# Patient Record
Sex: FEMALE | Race: WHITE | NOT HISPANIC OR LATINO | Employment: FULL TIME | URBAN - METROPOLITAN AREA
[De-identification: names, ages, dates, MRNs, and addresses within clinical notes are randomized per-mention and may not be internally consistent; named-entity substitution may affect disease eponyms.]

---

## 2017-03-01 RX ORDER — DICYCLOMINE HYDROCHLORIDE 10 MG/1
CAPSULE ORAL
Qty: 30 CAPSULE | Refills: 0 | Status: SHIPPED | OUTPATIENT
Start: 2017-03-01 | End: 2017-05-27 | Stop reason: SDUPTHER

## 2017-03-13 RX ORDER — PROMETHAZINE HYDROCHLORIDE 25 MG/1
TABLET ORAL
Qty: 30 TABLET | Refills: 0 | Status: SHIPPED | OUTPATIENT
Start: 2017-03-13 | End: 2018-12-07 | Stop reason: SDUPTHER

## 2017-05-22 RX ORDER — ALBUTEROL SULFATE 90 UG/1
AEROSOL, METERED RESPIRATORY (INHALATION)
Qty: 18 G | Refills: 0 | Status: SHIPPED | OUTPATIENT
Start: 2017-05-22 | End: 2018-04-09

## 2017-05-27 DIAGNOSIS — M81.0 SENILE OSTEOPOROSIS: ICD-10-CM

## 2017-05-29 RX ORDER — DICYCLOMINE HYDROCHLORIDE 10 MG/1
CAPSULE ORAL
Qty: 30 CAPSULE | Refills: 0 | Status: SHIPPED | OUTPATIENT
Start: 2017-05-29 | End: 2017-08-23 | Stop reason: SDUPTHER

## 2017-05-29 RX ORDER — ALENDRONATE SODIUM 70 MG/1
70 TABLET ORAL
Qty: 4 TABLET | Refills: 4 | Status: SHIPPED | OUTPATIENT
Start: 2017-05-29 | End: 2018-01-08 | Stop reason: SDUPTHER

## 2017-06-07 DIAGNOSIS — F32.A DEPRESSION: ICD-10-CM

## 2017-06-08 RX ORDER — ESCITALOPRAM OXALATE 10 MG/1
10 TABLET ORAL DAILY
Qty: 90 TABLET | Refills: 1 | Status: SHIPPED | OUTPATIENT
Start: 2017-06-08 | End: 2018-11-29 | Stop reason: SDUPTHER

## 2017-08-23 RX ORDER — DICYCLOMINE HYDROCHLORIDE 10 MG/1
CAPSULE ORAL
Qty: 30 CAPSULE | Refills: 0 | Status: SHIPPED | OUTPATIENT
Start: 2017-08-23 | End: 2017-10-31 | Stop reason: SDUPTHER

## 2017-09-06 DIAGNOSIS — G43.019 MIGRAINE WITHOUT AURA, WITH INTRACTABLE MIGRAINE, SO STATED, WITHOUT MENTION OF STATUS MIGRAINOSUS: ICD-10-CM

## 2017-09-07 RX ORDER — BUTALBITAL, ACETAMINOPHEN AND CAFFEINE 50; 325; 40 MG/1; MG/1; MG/1
TABLET ORAL
Qty: 40 TABLET | Refills: 0 | Status: SHIPPED | OUTPATIENT
Start: 2017-09-07 | End: 2017-09-18 | Stop reason: SDUPTHER

## 2017-09-18 DIAGNOSIS — G43.019 MIGRAINE WITHOUT AURA, WITH INTRACTABLE MIGRAINE, SO STATED, WITHOUT MENTION OF STATUS MIGRAINOSUS: ICD-10-CM

## 2017-09-18 RX ORDER — BUTALBITAL, ACETAMINOPHEN AND CAFFEINE 50; 325; 40 MG/1; MG/1; MG/1
TABLET ORAL
Qty: 40 TABLET | Refills: 0 | Status: SHIPPED | OUTPATIENT
Start: 2017-09-18 | End: 2018-04-27 | Stop reason: SDUPTHER

## 2017-09-22 ENCOUNTER — PATIENT OUTREACH (OUTPATIENT)
Dept: ADMINISTRATIVE | Facility: HOSPITAL | Age: 57
End: 2017-09-22

## 2017-09-29 ENCOUNTER — HOSPITAL ENCOUNTER (EMERGENCY)
Facility: HOSPITAL | Age: 57
Discharge: HOME | End: 2017-09-29
Attending: EMERGENCY MEDICINE
Payer: MEDICAID

## 2017-09-29 VITALS
RESPIRATION RATE: 16 BRPM | SYSTOLIC BLOOD PRESSURE: 144 MMHG | BODY MASS INDEX: 28.16 KG/M2 | OXYGEN SATURATION: 95 % | DIASTOLIC BLOOD PRESSURE: 66 MMHG | WEIGHT: 153 LBS | TEMPERATURE: 97 F | HEIGHT: 62 IN | HEART RATE: 90 BPM

## 2017-09-29 DIAGNOSIS — M25.519 SHOULDER PAIN: Primary | ICD-10-CM

## 2017-09-29 DIAGNOSIS — E11.9 DM (DIABETES MELLITUS): ICD-10-CM

## 2017-09-29 DIAGNOSIS — M25.512 PAIN IN LEFT SHOULDER: ICD-10-CM

## 2017-09-29 PROCEDURE — 25000003 PHARM REV CODE 250: Performed by: STUDENT IN AN ORGANIZED HEALTH CARE EDUCATION/TRAINING PROGRAM

## 2017-09-29 PROCEDURE — 99283 EMERGENCY DEPT VISIT LOW MDM: CPT

## 2017-09-29 PROCEDURE — 99284 EMERGENCY DEPT VISIT MOD MDM: CPT | Mod: ,,, | Performed by: EMERGENCY MEDICINE

## 2017-09-29 RX ORDER — HYDROCODONE BITARTRATE AND ACETAMINOPHEN 5; 325 MG/1; MG/1
1 TABLET ORAL EVERY 6 HOURS PRN
Qty: 10 TABLET | Refills: 0 | Status: SHIPPED | OUTPATIENT
Start: 2017-09-29 | End: 2017-09-29

## 2017-09-29 RX ORDER — HYDROCODONE BITARTRATE AND ACETAMINOPHEN 5; 325 MG/1; MG/1
1 TABLET ORAL
Status: COMPLETED | OUTPATIENT
Start: 2017-09-29 | End: 2017-09-29

## 2017-09-29 RX ORDER — HYDROCODONE BITARTRATE AND ACETAMINOPHEN 5; 325 MG/1; MG/1
1 TABLET ORAL EVERY 6 HOURS PRN
Qty: 10 TABLET | Refills: 0 | Status: SHIPPED | OUTPATIENT
Start: 2017-09-29 | End: 2018-04-09

## 2017-09-29 RX ADMIN — HYDROCODONE BITARTRATE AND ACETAMINOPHEN 1 TABLET: 5; 325 TABLET ORAL at 09:09

## 2017-09-30 NOTE — ED TRIAGE NOTES
Pt presents to ED c/o left shoulder pain. Pt has hx of left breast carcinoma stage 3. Pt stated that she is scheduled to begin chemo on the 4th. Pt stated pain is 10/10, stabbing, and radiates to left neck.     LOC: Patient name and date of birth verified.  The patient is awake, alert and aware of environment with an appropriate affect, the patient is oriented x 3 and speaking appropriately.  Pt in NAD.    APPEARANCE: Patient resting comfortably and in no acute distress, patient is clean and well groomed, patient's clothing is properly fastened.  SKIN: The skin is warm and dry, color consistent with ethnicity, patient has normal skin turgor and moist mucus membranes, skin intact, no breakdown or brusing noted.  MUSCULOSKELETAL: Patient moving all extremities well, no obvious swelling or deformities noted.  RESPIRATORY: Airway is open and patent, respirations are spontaneous, patient has a normal effort and rate, no accessory muscle use noted.  CARDIAC: Patient has a normal rate and rhythm, no periphreal edema noted, capillary refill < 3 seconds.  ABDOMEN: Soft and non tender to palpation, no distention noted. Bowel sounds present in all four quadrants.  NEUROLOGIC: Eyes open spontaneously, behavior appropriate to situation, follows commands, facial expression symmetrical, bilateral hand grasp equal and even, purposeful motor response noted, normal sensation in all extremities when touched with a finger.

## 2017-09-30 NOTE — ED PROVIDER NOTES
Encounter Date: 9/29/2017       History     Chief Complaint   Patient presents with    Shoulder Pain     left shoulder pain radiating to left side of neck _Onset this am. constant. Has a large tumor in her left breast - inoperable. Told her pain was in her lymph nodes.      58 yo female w/ hx of DM, osteoporosis presents for evaluation of one day of left shoulder pain. Pt reports her pain is worst w/ abducting her shoulder. Also reports swelling to the scapula. Denies any chest pain. Denies any trauma. Denies any heavy or unusual lifting. Pt was recently diagnosed w/ breast cancer to be treated w/ chemo. Pt does not take meds at home.           Review of patient's allergies indicates:  No Known Allergies  Past Medical History:   Diagnosis Date    Asthma     Breast carcinoma     Diabetes mellitus     Osteoporosis      Past Surgical History:   Procedure Laterality Date    CHOLECYSTECTOMY      COLONOSCOPY N/A 10/27/2015    Procedure: COLONOSCOPY;  Surgeon: Johnny Hurley MD;  Location: [facility];  Service: Endoscopy;  Laterality: N/A;    HYSTERECTOMY       Family History   Problem Relation Age of Onset    Diabetes Father      Social History   Substance Use Topics    Smoking status: Current Some Day Smoker     Packs/day: 1.00     Types: Cigarettes    Smokeless tobacco: Not on file    Alcohol use No     Review of Systems   Constitutional: Negative for fever.   HENT: Negative for sore throat.    Respiratory: Negative for shortness of breath.    Cardiovascular: Negative for chest pain.   Gastrointestinal: Negative for vomiting.   Genitourinary: Negative for dysuria.   Musculoskeletal: Positive for arthralgias and joint swelling. Negative for back pain.   Skin: Negative for rash.   Neurological: Negative for weakness.   Hematological: Does not bruise/bleed easily.       Physical Exam     Initial Vitals [09/29/17 1714]   BP Pulse Resp Temp SpO2   (!) 144/66 90 16 97.4 °F (36.3 °C) 95 %      MAP       92          Physical Exam    Nursing note and vitals reviewed.  Constitutional: She appears well-developed and well-nourished. No distress.   HENT:   Head: Normocephalic and atraumatic.   Eyes: EOM are normal. Pupils are equal, round, and reactive to light.   Neck: Normal range of motion. Neck supple.   Cardiovascular: Normal rate, regular rhythm and normal heart sounds. Exam reveals no gallop and no friction rub.    No murmur heard.  Pulmonary/Chest: Breath sounds normal. No respiratory distress. She has no wheezes. She has no rhonchi. She has no rales.   Abdominal: Soft. There is no tenderness. There is no rebound and no guarding.   Musculoskeletal:   Limited passive ROM 2/2 to pain of the shoulder. No tenderness to palpation of the clavicle or humerus. Distal neurovascular intact.    Neurological: She is alert and oriented to person, place, and time. She has normal strength.   Skin: Skin is warm and dry.         ED Course   Procedures  Labs Reviewed - No data to display       X-Rays:   Independently Interpreted Readings:   Other Readings:  Left shoulder: No evidence of acute fracture or dislocation noted; additionally, there is no evidence of osteolytic lesions    Medical Decision Making:   Clinical Tests:   Radiological Study: Ordered and Reviewed       APC / Resident Notes:   56 yo female w/ hx of breast CA, DM presents for one day of breast cancer. Worst w/ motion. No trauma. No overuse. VSS. NAD. RRR. LCTAB. No TTP. Passive ROM causes pain. Most likely capsulitis. XR negative. Relieved w/ norco. Will treat w/ norco and sling. Discussed plan w/ pt and daughter. Return precautions given.          Attending Attestation:   Physician Attestation Statement for Resident:  As the supervising MD   Physician Attestation Statement: I have personally seen and examined this patient.   I agree with the above history. -: 57-year-old woman with known history of breast CA presents for evaluation of atraumatic left shoulder pain  worsened with range of motion.   As the supervising MD I agree with the above PE.    As the supervising MD I agree with the above treatment, course, plan, and disposition.  I have reviewed and agree with the residents interpretation of the following: x-rays.                    ED Course      Clinical Impression:   The encounter diagnosis was Shoulder pain.    Disposition:   Disposition: Discharged  Condition: Stable                        Noel Martin MD  Resident  10/02/17 0515       Bert Craig MD  10/03/17 0303

## 2017-10-02 ENCOUNTER — TELEPHONE (OUTPATIENT)
Dept: FAMILY MEDICINE | Facility: CLINIC | Age: 57
End: 2017-10-02

## 2017-10-02 DIAGNOSIS — D49.3 BREAST LOBULAR NEOPLASIA: Primary | ICD-10-CM

## 2017-10-02 NOTE — TELEPHONE ENCOUNTER
Spoke with pt report she recently got diagnose with Triple Negative breast cancer stage 3 by Dr. Jacobo [facility]. Pt needs a referral to an oncology. Pt has medicaid insurance and theres no appt available till next year. Inform pt I will give her a call back to schedule appt. Please advice.

## 2017-10-02 NOTE — TELEPHONE ENCOUNTER
----- Message from Alicia Jeffery sent at 10/2/2017 10:57 AM CDT -----  Contact: [phone]  Patient is requesting to speak with you, she said it is an emergency

## 2017-10-05 ENCOUNTER — TELEPHONE (OUTPATIENT)
Dept: HEMATOLOGY/ONCOLOGY | Facility: CLINIC | Age: 57
End: 2017-10-05

## 2017-10-05 NOTE — TELEPHONE ENCOUNTER
Patient was erroneously scheduled with Dr Guerrero.  Called patient to explain change in physician (and that records needed before appointment).  Patient said to disregard request for appointment as she already has another provider with a different facility.    =======================================  ----- Message from Carline Neri RN sent at 10/5/2017  1:30 PM CDT -----  Contact: Self       ----- Message -----  From: Madai Fox  Sent: 10/2/2017  12:03 PM  To: , #    Pt is requesting a call back in regards to scheduling an urgent appt. Pt has been diagnosed with an aggressive form of breast cancer and is trying to be seen as soon as possible. Pt stated she is being treated at [facility] and is looking to transfer her care to [facility].       Pt would like to be contacted as soon as possible.       Pt can be contacted at [phone]

## 2017-10-31 RX ORDER — DICYCLOMINE HYDROCHLORIDE 10 MG/1
CAPSULE ORAL
Qty: 30 CAPSULE | Refills: 0 | Status: SHIPPED | OUTPATIENT
Start: 2017-10-31 | End: 2018-07-27 | Stop reason: SDUPTHER

## 2017-12-04 ENCOUNTER — TELEPHONE (OUTPATIENT)
Dept: HEMATOLOGY/ONCOLOGY | Facility: CLINIC | Age: 57
End: 2017-12-04

## 2017-12-04 NOTE — TELEPHONE ENCOUNTER
"Attempted another call to patient and daughter.  No answer and no voicemail recorder.  Will send letter.    ==============================   17  11:20 am   Called 3 numbers including daughter's.  Unable to reach either one & no voicemail available.  Will try again later.    ==============================   From: Wolfgang Artis M.D.   Sent: 2017 11:08 AM  To: Serenity Black <[email or web address]>  Subject: FW: Re:     I am OK with [facility]    ==============================   From: Nader Correia [[email or web address]   Sent: 2017 2:21 PM  To: RENITA Bermeo Ms. Fernandez (below) is looking for an oncologist at [facility]. Can you help?  Thx  Stewart  ==============================    From: Carmen Avila <[email or web address]>  Date: 2017 at 8:15:12 PM CST  To: [email or web address]  Subject: Fwd: Re:   *EXTERNAL EMAIL: EVALUATE*   Jud Villa she has been an [facility] patient in the past   Triple Negative Breast Cancer.  Stage 3   1960  THANK YOU  ================================    From: "Nader Correia" <[email or web address]>  Date: 2017 at 7:49:27 PM CST  To: Carmen Avila <[email or web address]>    Of course.  Can you find out the name of the cancer?  I'll find the best person.    =================================  On 2017, at 7:05 PM, Carmen Avila <[email or web address]> wrote:    Good evening,  My dear, dear friend Jud Villa, has been diagnosed with an extremely rare form of cancer. She is treating at [facility] (??.) I suggested a second opinion might be wise.   Can you recommend an excellent oncologist at [facility]? Please.   Thank you  Sherlyn"

## 2017-12-04 NOTE — LETTER
December 6, 2017    Jud Villa  [address] [place] [address]             [facility]  [address]  [place] [address]  Phone: [phone] Dear Ms. Villa:    We received a request to schedule an appointment for you with one of our oncologists but we have been unsuccessful in reaching you by phone.    Before we can schedule your visit, we will need your assistance in obtaining your medical records.    Please contact us at .  If you have any questions or concerns regarding our service, we will be happy to discuss them with you.    Sincerely,        Serenity Black RN

## 2017-12-06 ENCOUNTER — TELEPHONE (OUTPATIENT)
Dept: HEMATOLOGY/ONCOLOGY | Facility: CLINIC | Age: 57
End: 2017-12-06

## 2017-12-06 NOTE — TELEPHONE ENCOUNTER
From: Serenity Black   Sent: Wednesday, December 06, 2017 11:43 AM  To: Wolfgang Artis M.D. <[email or web address]>  Subject: RE: Jud Villa    Its the only number we have.  The good news is, I called for the 3rd time this morning and finally got her.  Shes getting chemo, as we speak, in [place].   I told her how Ive been trying to get a hold of her and her daughter since Monday - she wasnt aware her voicemail was full so I couldnt leave messages.    She said she will obtain records and have them faxed to me. Well then talk again to schedule appt.

## 2017-12-09 DIAGNOSIS — J43.9 PULMONARY EMPHYSEMA, UNSPECIFIED EMPHYSEMA TYPE: ICD-10-CM

## 2017-12-09 DIAGNOSIS — R06.02 SOB (SHORTNESS OF BREATH): ICD-10-CM

## 2017-12-09 DIAGNOSIS — F17.200 SMOKER: ICD-10-CM

## 2017-12-09 DIAGNOSIS — J44.9 CHRONIC OBSTRUCTIVE PULMONARY DISEASE, UNSPECIFIED COPD TYPE: ICD-10-CM

## 2017-12-11 RX ORDER — BUDESONIDE AND FORMOTEROL FUMARATE DIHYDRATE 80; 4.5 UG/1; UG/1
2 AEROSOL RESPIRATORY (INHALATION) 2 TIMES DAILY
Qty: 10 INHALER | Refills: 0 | Status: SHIPPED | OUTPATIENT
Start: 2017-12-11 | End: 2017-12-12

## 2017-12-11 RX ORDER — BUDESONIDE AND FORMOTEROL FUMARATE DIHYDRATE 160; 4.5 UG/1; UG/1
2 AEROSOL RESPIRATORY (INHALATION) EVERY 12 HOURS
Qty: 10.2 INHALER | Refills: 0 | Status: SHIPPED | OUTPATIENT
Start: 2017-12-11 | End: 2017-12-11

## 2017-12-12 DIAGNOSIS — R06.02 SOB (SHORTNESS OF BREATH): ICD-10-CM

## 2017-12-12 DIAGNOSIS — F17.200 SMOKER: ICD-10-CM

## 2017-12-12 DIAGNOSIS — J43.9 PULMONARY EMPHYSEMA, UNSPECIFIED EMPHYSEMA TYPE: ICD-10-CM

## 2017-12-12 RX ORDER — BUDESONIDE AND FORMOTEROL FUMARATE DIHYDRATE 80; 4.5 UG/1; UG/1
2 AEROSOL RESPIRATORY (INHALATION) 2 TIMES DAILY
Qty: 10 INHALER | Refills: 0 | Status: SHIPPED | OUTPATIENT
Start: 2017-12-12 | End: 2018-04-09

## 2018-01-08 DIAGNOSIS — M81.0 SENILE OSTEOPOROSIS: ICD-10-CM

## 2018-01-08 RX ORDER — ALENDRONATE SODIUM 70 MG/1
70 TABLET ORAL
Qty: 4 TABLET | Refills: 4 | Status: SHIPPED | OUTPATIENT
Start: 2018-01-08 | End: 2018-12-17 | Stop reason: SDUPTHER

## 2018-04-09 ENCOUNTER — OFFICE VISIT (OUTPATIENT)
Dept: FAMILY MEDICINE | Facility: CLINIC | Age: 58
End: 2018-04-09
Payer: MEDICAID

## 2018-04-09 VITALS
DIASTOLIC BLOOD PRESSURE: 60 MMHG | SYSTOLIC BLOOD PRESSURE: 100 MMHG | HEIGHT: 62 IN | HEART RATE: 82 BPM | WEIGHT: 149.94 LBS | BODY MASS INDEX: 27.59 KG/M2

## 2018-04-09 DIAGNOSIS — Z01.818 PREOP EXAMINATION: Primary | ICD-10-CM

## 2018-04-09 DIAGNOSIS — E11.65 TYPE 2 DIABETES MELLITUS WITH HYPERGLYCEMIA, WITHOUT LONG-TERM CURRENT USE OF INSULIN: ICD-10-CM

## 2018-04-09 DIAGNOSIS — F41.9 ANXIETY: ICD-10-CM

## 2018-04-09 DIAGNOSIS — J30.1 CHRONIC SEASONAL ALLERGIC RHINITIS DUE TO POLLEN: ICD-10-CM

## 2018-04-09 DIAGNOSIS — J43.8 OTHER EMPHYSEMA: ICD-10-CM

## 2018-04-09 DIAGNOSIS — Z01.419 ENCOUNTER FOR GYNECOLOGICAL EXAMINATION WITHOUT ABNORMAL FINDING: ICD-10-CM

## 2018-04-09 PROCEDURE — 93010 ELECTROCARDIOGRAM REPORT: CPT | Mod: ,,, | Performed by: INTERNAL MEDICINE

## 2018-04-09 PROCEDURE — 81003 URINALYSIS AUTO W/O SCOPE: CPT

## 2018-04-09 PROCEDURE — 93005 ELECTROCARDIOGRAM TRACING: CPT | Mod: PBBFAC,PO | Performed by: FAMILY MEDICINE

## 2018-04-09 PROCEDURE — 99214 OFFICE O/P EST MOD 30 MIN: CPT | Mod: S$PBB,,, | Performed by: FAMILY MEDICINE

## 2018-04-09 PROCEDURE — 99215 OFFICE O/P EST HI 40 MIN: CPT | Mod: PBBFAC,PO | Performed by: FAMILY MEDICINE

## 2018-04-09 PROCEDURE — 99999 PR PBB SHADOW E&M-EST. PATIENT-LVL V: CPT | Mod: PBBFAC,,, | Performed by: FAMILY MEDICINE

## 2018-04-09 RX ORDER — FLUTICASONE PROPIONATE 50 MCG
1 SPRAY, SUSPENSION (ML) NASAL DAILY
Qty: 16 G | Refills: 3 | Status: SHIPPED | OUTPATIENT
Start: 2018-04-09 | End: 2019-03-13 | Stop reason: SDUPTHER

## 2018-04-09 RX ORDER — HYDROXYZINE PAMOATE 50 MG/1
50 CAPSULE ORAL EVERY 8 HOURS PRN
Qty: 90 CAPSULE | Refills: 0 | Status: SHIPPED | OUTPATIENT
Start: 2018-04-09 | End: 2018-05-09 | Stop reason: SDUPTHER

## 2018-04-09 RX ORDER — MONTELUKAST SODIUM 10 MG/1
10 TABLET ORAL NIGHTLY
Qty: 30 TABLET | Refills: 0 | Status: SHIPPED | OUTPATIENT
Start: 2018-04-09 | End: 2018-05-07 | Stop reason: SDUPTHER

## 2018-04-09 NOTE — PROGRESS NOTES
Subjective:       Patient ID: Jud Villa is a 58 y.o. female.    Chief Complaint: Pre-op Exam    58 years old female who came to the clinic for preoperative evaluation for breast surgery.  Patient with seasonal ALLERGIES using Flonase with significant improvement.  Patient requests a medicine to help her with her COPD exacerbation.  No recent follow-up with pulmonary.  Patient was using Valium for anxiety.  Patient wants to change the medicine because of the side effects of Valium.  Patient was oriented about possible side effects including poor concentration and memory problems.patient with no recent A1c.  No polyuria polydipsia polyphagia      Review of Systems   Constitutional: Negative.    HENT: Negative.    Eyes: Negative.    Respiratory: Negative.    Cardiovascular: Negative.  Negative for chest pain, palpitations and leg swelling.   Gastrointestinal: Negative.    Endocrine: Negative for polydipsia, polyphagia and polyuria.   Genitourinary: Negative.    Musculoskeletal: Negative.    Skin: Negative.    Neurological: Negative.    Psychiatric/Behavioral: Negative.        Objective:      Physical Exam   Constitutional: She is oriented to person, place, and time. She appears well-developed and well-nourished. No distress.   HENT:   Head: Normocephalic and atraumatic.   Right Ear: External ear normal.   Left Ear: External ear normal.   Nose: Nose normal.   Mouth/Throat: Oropharynx is clear and moist. No oropharyngeal exudate.   Eyes: Conjunctivae and EOM are normal. Pupils are equal, round, and reactive to light. Right eye exhibits no discharge. Left eye exhibits no discharge. No scleral icterus.   Neck: Normal range of motion. Neck supple. No JVD present. No tracheal deviation present. No thyromegaly present.   Cardiovascular: Normal rate, regular rhythm, normal heart sounds and intact distal pulses.  Exam reveals no gallop and no friction rub.    No murmur heard.  Pulmonary/Chest: Effort normal and breath  sounds normal. No stridor. No respiratory distress. She has no wheezes. She has no rales. She exhibits no tenderness.   Abdominal: Soft. Bowel sounds are normal. She exhibits no distension and no mass. There is no tenderness. There is no rebound and no guarding.   Musculoskeletal: Normal range of motion. She exhibits no edema or tenderness.   Lymphadenopathy:     She has no cervical adenopathy.   Neurological: She is alert and oriented to person, place, and time. She has normal reflexes. No cranial nerve deficit. She exhibits normal muscle tone. Coordination normal.   Skin: Skin is warm and dry. No rash noted. She is not diaphoretic. No erythema. No pallor.   Psychiatric: Her behavior is normal. Judgment and thought content normal. Her mood appears anxious. Her affect is not angry, not blunt, not labile and not inappropriate. She does not exhibit a depressed mood.       Assessment:       1. Preop examination    2. Encounter for gynecological examination without abnormal finding    3. Anxiety    4. Chronic seasonal allergic rhinitis due to pollen    5. Other emphysema    6. Type 2 diabetes mellitus with hyperglycemia, without long-term current use of insulin        Plan:         Jud was seen today for pre-op exam.    Diagnoses and all orders for this visit:    Preop examination  -     Protime-INR; Future  -     APTT; Future  -     Comprehensive metabolic panel; Future  -     Urinalysis  -     CBC auto differential; Future  -     EKG 12-lead  -     X-Ray Chest PA And Lateral; Future    Encounter for gynecological examination without abnormal finding  -     Ambulatory referral to Obstetrics / Gynecology    Anxiety    Chronic seasonal allergic rhinitis due to pollen    Other emphysema  -     ipratropium (ATROVENT HFA) 17 mcg/actuation inhaler; Inhale 2 puffs into the lungs every 6 (six) hours.  -     Ambulatory referral to Pulmonology    Type 2 diabetes mellitus with hyperglycemia, without long-term current use of  insulin  -     Hemoglobin A1c; Future  -     Microalbumin/creatinine urine ratio  -     Ambulatory referral to Optometry  -     Ambulatory referral to Podiatry    Other orders  -     montelukast (SINGULAIR) 10 mg tablet; Take 1 tablet (10 mg total) by mouth every evening.  -     fluticasone (FLONASE) 50 mcg/actuation nasal spray; 1 spray (50 mcg total) by Each Nare route once daily.  -     hydrOXYzine pamoate (VISTARIL) 50 MG Cap; Take 1 capsule (50 mg total) by mouth every 8 (eight) hours as needed (anxiety).     after reviewing EKG chest x-ray and blood work.  Patient hemodynamically stable for surgical procedure.

## 2018-04-10 ENCOUNTER — TELEPHONE (OUTPATIENT)
Dept: PULMONOLOGY | Facility: CLINIC | Age: 58
End: 2018-04-10

## 2018-04-10 DIAGNOSIS — J44.9 CHRONIC OBSTRUCTIVE PULMONARY DISEASE, UNSPECIFIED COPD TYPE: Primary | ICD-10-CM

## 2018-04-10 LAB
BILIRUB UR QL STRIP: NEGATIVE
CLARITY UR REFRACT.AUTO: CLEAR
COLOR UR AUTO: YELLOW
GLUCOSE UR QL STRIP: ABNORMAL
HGB UR QL STRIP: NEGATIVE
KETONES UR QL STRIP: ABNORMAL
LEUKOCYTE ESTERASE UR QL STRIP: NEGATIVE
NITRITE UR QL STRIP: NEGATIVE
PH UR STRIP: 5 [PH] (ref 5–8)
PROT UR QL STRIP: NEGATIVE
SP GR UR STRIP: >=1.03 (ref 1–1.03)
URN SPEC COLLECT METH UR: ABNORMAL
UROBILINOGEN UR STRIP-ACNC: NEGATIVE EU/DL

## 2018-04-11 ENCOUNTER — HOSPITAL ENCOUNTER (OUTPATIENT)
Dept: RADIOLOGY | Facility: HOSPITAL | Age: 58
Discharge: HOME | End: 2018-04-11
Attending: FAMILY MEDICINE
Payer: MEDICAID

## 2018-04-11 DIAGNOSIS — Z01.818 PREOP EXAMINATION: ICD-10-CM

## 2018-04-11 PROCEDURE — 71046 X-RAY EXAM CHEST 2 VIEWS: CPT | Mod: 26,,, | Performed by: RADIOLOGY

## 2018-04-11 PROCEDURE — 71046 X-RAY EXAM CHEST 2 VIEWS: CPT | Mod: TC,FY

## 2018-04-13 DIAGNOSIS — E11.9 DIABETES MELLITUS WITHOUT COMPLICATION: ICD-10-CM

## 2018-04-17 ENCOUNTER — OFFICE VISIT (OUTPATIENT)
Dept: OBSTETRICS AND GYNECOLOGY | Facility: CLINIC | Age: 58
End: 2018-04-17
Payer: MEDICAID

## 2018-04-17 VITALS — BODY MASS INDEX: 27.62 KG/M2 | DIASTOLIC BLOOD PRESSURE: 80 MMHG | SYSTOLIC BLOOD PRESSURE: 120 MMHG | WEIGHT: 151 LBS

## 2018-04-17 DIAGNOSIS — Z12.4 ROUTINE PAPANICOLAOU SMEAR: Primary | ICD-10-CM

## 2018-04-17 DIAGNOSIS — Z01.419 WELL WOMAN EXAM WITH ROUTINE GYNECOLOGICAL EXAM: ICD-10-CM

## 2018-04-17 PROCEDURE — 99213 OFFICE O/P EST LOW 20 MIN: CPT | Mod: PBBFAC,PO | Performed by: OBSTETRICS & GYNECOLOGY

## 2018-04-17 PROCEDURE — 99999 PR PBB SHADOW E&M-EST. PATIENT-LVL III: CPT | Mod: PBBFAC,,, | Performed by: OBSTETRICS & GYNECOLOGY

## 2018-04-17 PROCEDURE — 99386 PREV VISIT NEW AGE 40-64: CPT | Mod: S$PBB,,, | Performed by: OBSTETRICS & GYNECOLOGY

## 2018-04-17 PROCEDURE — 88175 CYTOPATH C/V AUTO FLUID REDO: CPT

## 2018-04-17 RX ORDER — PEN NEEDLE, DIABETIC 31 GX5/16"
NEEDLE, DISPOSABLE MISCELLANEOUS
COMMUNITY
Start: 2018-04-07 | End: 2019-06-24 | Stop reason: SDUPTHER

## 2018-04-17 RX ORDER — CALCIUM CITRATE/VITAMIN D3 200MG-6.25
TABLET ORAL
COMMUNITY
Start: 2018-04-05 | End: 2018-12-17 | Stop reason: SDUPTHER

## 2018-04-17 RX ORDER — INSULIN GLARGINE 100 [IU]/ML
INJECTION, SOLUTION SUBCUTANEOUS
COMMUNITY
Start: 2018-04-12 | End: 2019-06-14

## 2018-04-17 RX ORDER — GLIPIZIDE 5 MG/1
TABLET ORAL
COMMUNITY
Start: 2018-03-26 | End: 2018-11-29

## 2018-04-17 RX ORDER — METFORMIN HYDROCHLORIDE 500 MG/1
TABLET ORAL
COMMUNITY
Start: 2018-04-06 | End: 2018-11-29

## 2018-04-17 NOTE — LETTER
April 17, 2018      Bo Lopez MD  [address]  [place] [address]           [facility]  [address], [address]  [address]  [place] [address]  Phone: [phone]          Patient: Jud Villa   MR Number: [id number]   YOB: 1960   Date of Visit: 4/17/2018       Dear Dr. Bo Lopez:    Thank you for referring Jud Villa to me for evaluation. Attached you will find relevant portions of my assessment and plan of care.    If you have questions, please do not hesitate to call me. I look forward to following Jud Villa along with you.    Sincerely,    Michael A. Wiedemann, MD    Enclosure  CC:  No Recipients    If you would like to receive this communication electronically, please contact [email or web address] or [phone] to request more information on [employer] access.    For providers and/or their staff who would like to refer a patient to [facility], please contact us through our one-stop-shop provider referral line, [facility], at [phone].    If you feel you have received this communication in error or would no longer like to receive these types of communications, please e-mail [email or web address]

## 2018-04-17 NOTE — PROGRESS NOTES
HPI:  58 y.o.   OB History     No data available       No LMP recorded. Patient has had a hysterectomy.   Patient here for her annual gynecologic exam.  She has no complaints at this time.    ROS:  GENERAL: No fever, chills, fatigability or weight loss.  SKIN: No rashes, itching or changes in color or texture of skin.  HEAD: No headaches or recent head trauma.  EYES: Visual acuity fine. No photophobia, ocular pain or diplopia.  EARS: Denies ear pain, discharge or vertigo.  NOSE: No loss of smell, no epistaxis or postnasal drip.  MOUTH & THROAT: No hoarseness or change in voice. No excessive gum bleeding.  NODES: Denies swollen glands.  CHEST: Denies SENA, cyanosis, wheezing, cough and sputum production.  CARDIOVASCULAR: Denies chest pain, PND, orthopnea or reduced exercise tolerance.  ABDOMEN: Appetite fine. No weight loss. Denies diarrhea, abdominal pain, hematemesis or blood in stool.  URINARY: No flank pain, dysuria or hematuria.  PERIPHERAL VASCULAR: No claudication or cyanosis.  MUSCULOSKELETAL: No joint stiffness or swelling. Denies back pain.  NEUROLOGIC: No history of seizures, paralysis, alteration of gait or coordination.    PE:   /80   Wt 68.5 kg (151 lb 0.2 oz)   BMI 27.62 kg/m²   APPEARANCE: Well nourished, well developed, in no acute distress.  NECK: Neck symmetric without masses or thyromegaly.  NODES: No inguinal lymph node enlargement.  ABDOMEN: Soft. No tenderness or masses. No hepatosplenomegaly. No hernias.  BREASTS: Symmetrical, no skin changes or visible lesions. No palpable masses, nipple discharge or adenopathy bilaterally.  PELVIC: Normal external female genitalia without lesions. Normal hair distribution. Adequate perineal body, normal urethral meatus. Vagina moist and well rugated without lesions or discharge. No significant cystocele or rectocele. Uterus and cervix surgically absent. Bimanual exam revealed no masses, tenderness or abnormality.    Procedure:  Pap  Smear    Assessment:  Normal Gynecologic Exam    Plan:  Mammogram and Colonoscopy as per current recommendations.   Return to clinic in one year or for any problems or complaints.  Ovaries in  Having mastectomy 26 double

## 2018-04-18 ENCOUNTER — TELEPHONE (OUTPATIENT)
Dept: FAMILY MEDICINE | Facility: CLINIC | Age: 58
End: 2018-04-18

## 2018-04-18 NOTE — TELEPHONE ENCOUNTER
After reviewing blood work EKG and chest x-ray patient hemodynamically stable for surgical procedure.

## 2018-04-27 DIAGNOSIS — G43.019 INTRACTABLE MIGRAINE WITHOUT AURA: ICD-10-CM

## 2018-04-27 RX ORDER — BUTALBITAL, ACETAMINOPHEN AND CAFFEINE 50; 325; 40 MG/1; MG/1; MG/1
TABLET ORAL
Qty: 40 TABLET | Refills: 0 | Status: SHIPPED | OUTPATIENT
Start: 2018-04-27 | End: 2018-05-24 | Stop reason: SDUPTHER

## 2018-05-07 RX ORDER — MONTELUKAST SODIUM 10 MG/1
10 TABLET ORAL NIGHTLY
Qty: 30 TABLET | Refills: 0 | Status: SHIPPED | OUTPATIENT
Start: 2018-05-07 | End: 2018-06-06

## 2018-05-09 RX ORDER — DIAZEPAM 10 MG/1
TABLET ORAL
Qty: 30 TABLET | Refills: 0 | Status: SHIPPED | OUTPATIENT
Start: 2018-05-09 | End: 2018-11-14 | Stop reason: SDUPTHER

## 2018-05-09 RX ORDER — HYDROXYZINE PAMOATE 50 MG/1
50 CAPSULE ORAL EVERY 8 HOURS PRN
Qty: 90 CAPSULE | Refills: 0 | Status: SHIPPED | OUTPATIENT
Start: 2018-05-09 | End: 2018-11-29

## 2018-05-11 DIAGNOSIS — E11.9 TYPE 2 DIABETES MELLITUS WITHOUT COMPLICATION: ICD-10-CM

## 2018-05-24 DIAGNOSIS — G43.019 INTRACTABLE MIGRAINE WITHOUT AURA: ICD-10-CM

## 2018-05-24 RX ORDER — BUTALBITAL, ACETAMINOPHEN AND CAFFEINE 50; 325; 40 MG/1; MG/1; MG/1
TABLET ORAL
Qty: 40 TABLET | Refills: 0 | Status: SHIPPED | OUTPATIENT
Start: 2018-05-24 | End: 2018-06-27 | Stop reason: SDUPTHER

## 2018-05-25 DIAGNOSIS — E11.9 TYPE 2 DIABETES MELLITUS WITHOUT COMPLICATION: ICD-10-CM

## 2018-06-15 DIAGNOSIS — Z12.39 BREAST CANCER SCREENING: ICD-10-CM

## 2018-06-27 DIAGNOSIS — G43.019 INTRACTABLE MIGRAINE WITHOUT AURA: ICD-10-CM

## 2018-06-27 RX ORDER — BUTALBITAL, ACETAMINOPHEN AND CAFFEINE 50; 325; 40 MG/1; MG/1; MG/1
TABLET ORAL
Qty: 40 TABLET | Refills: 0 | Status: SHIPPED | OUTPATIENT
Start: 2018-06-27 | End: 2018-11-29 | Stop reason: SDUPTHER

## 2018-07-27 RX ORDER — DICYCLOMINE HYDROCHLORIDE 10 MG/1
CAPSULE ORAL
Qty: 30 CAPSULE | Refills: 0 | Status: SHIPPED | OUTPATIENT
Start: 2018-07-27 | End: 2018-10-29 | Stop reason: SDUPTHER

## 2018-10-11 DIAGNOSIS — G43.019 INTRACTABLE MIGRAINE WITHOUT AURA: ICD-10-CM

## 2018-10-11 RX ORDER — BUTALBITAL, ACETAMINOPHEN AND CAFFEINE 50; 325; 40 MG/1; MG/1; MG/1
TABLET ORAL
Qty: 40 TABLET | Refills: 0 | OUTPATIENT
Start: 2018-10-11

## 2018-10-12 ENCOUNTER — TELEPHONE (OUTPATIENT)
Dept: FAMILY MEDICINE | Facility: CLINIC | Age: 58
End: 2018-10-12

## 2018-10-12 NOTE — TELEPHONE ENCOUNTER
----- Message from Bert Morrison MD sent at 10/11/2018  4:20 PM CDT -----  Patient requesting refill of Fioricet.  Last visit more than 6 months ago.  Patient has opiates and benzodiazepine medication filled within the last 6 months and is an elderly patient.  Fioricet indicated for migraine.  Recommend that alternatives to Fioricet be evaluated in clinic for medication safety.

## 2018-10-22 RX ORDER — ALBUTEROL SULFATE 90 UG/1
AEROSOL, METERED RESPIRATORY (INHALATION)
Qty: 18 G | Refills: 0 | Status: SHIPPED | OUTPATIENT
Start: 2018-10-22 | End: 2019-01-07 | Stop reason: SDUPTHER

## 2018-10-29 RX ORDER — EMPAGLIFLOZIN 10 MG/1
TABLET, FILM COATED ORAL
Qty: 90 TABLET | Refills: 3 | Status: SHIPPED | OUTPATIENT
Start: 2018-10-29 | End: 2018-11-29

## 2018-10-29 RX ORDER — DICYCLOMINE HYDROCHLORIDE 10 MG/1
CAPSULE ORAL
Qty: 30 CAPSULE | Refills: 0 | Status: SHIPPED | OUTPATIENT
Start: 2018-10-29 | End: 2018-11-29

## 2018-11-12 RX ORDER — DIAZEPAM 10 MG/1
10 TABLET ORAL DAILY
Qty: 30 TABLET | Refills: 0 | Status: CANCELLED | OUTPATIENT
Start: 2018-11-12

## 2018-11-12 NOTE — TELEPHONE ENCOUNTER
----- Message from Pura Cao sent at 11/12/2018  9:22 AM CST -----  Contact: [phone] / self   Patient is requesting a refill on her diazePAM (VALIUM) 10 MG Tab. Thank you

## 2018-11-13 NOTE — TELEPHONE ENCOUNTER
Flagged for further review. Patient is on chronic opiates from an outside medical provider.  At the last visit with primary doctor the patient voiced the interest to transition from benzodiazepine medication for the treatment of anxiety.

## 2018-11-14 RX ORDER — OMEPRAZOLE 20 MG/1
20 CAPSULE, DELAYED RELEASE ORAL DAILY
Qty: 90 CAPSULE | Refills: 0 | Status: SHIPPED | OUTPATIENT
Start: 2018-11-14 | End: 2018-12-07 | Stop reason: SDUPTHER

## 2018-11-14 RX ORDER — DIAZEPAM 10 MG/1
TABLET ORAL
Qty: 30 TABLET | Refills: 0 | Status: SHIPPED | OUTPATIENT
Start: 2018-11-14 | End: 2019-03-04 | Stop reason: SDUPTHER

## 2018-11-28 ENCOUNTER — TELEPHONE (OUTPATIENT)
Dept: ADMINISTRATIVE | Facility: HOSPITAL | Age: 58
End: 2018-11-28

## 2018-11-28 DIAGNOSIS — Z13.5 ENCOUNTER FOR SCREENING FOR DIABETIC RETINOPATHY: Primary | ICD-10-CM

## 2018-11-29 ENCOUNTER — CLINICAL SUPPORT (OUTPATIENT)
Dept: FAMILY MEDICINE | Facility: CLINIC | Age: 58
End: 2018-11-29
Attending: FAMILY MEDICINE
Payer: MEDICAID

## 2018-11-29 ENCOUNTER — OFFICE VISIT (OUTPATIENT)
Dept: FAMILY MEDICINE | Facility: CLINIC | Age: 58
End: 2018-11-29
Payer: MEDICAID

## 2018-11-29 VITALS
BODY MASS INDEX: 28.64 KG/M2 | SYSTOLIC BLOOD PRESSURE: 110 MMHG | DIASTOLIC BLOOD PRESSURE: 60 MMHG | HEIGHT: 62 IN | OXYGEN SATURATION: 97 % | HEART RATE: 95 BPM | WEIGHT: 155.63 LBS

## 2018-11-29 DIAGNOSIS — M15.9 PRIMARY OSTEOARTHRITIS INVOLVING MULTIPLE JOINTS: ICD-10-CM

## 2018-11-29 DIAGNOSIS — M81.0 SENILE OSTEOPOROSIS: Primary | ICD-10-CM

## 2018-11-29 DIAGNOSIS — L30.9 DERMATITIS: ICD-10-CM

## 2018-11-29 DIAGNOSIS — F33.1 MODERATE EPISODE OF RECURRENT MAJOR DEPRESSIVE DISORDER: ICD-10-CM

## 2018-11-29 DIAGNOSIS — G44.221 CHRONIC TENSION-TYPE HEADACHE, INTRACTABLE: ICD-10-CM

## 2018-11-29 DIAGNOSIS — K59.01 SLOW TRANSIT CONSTIPATION: ICD-10-CM

## 2018-11-29 DIAGNOSIS — F32.A DEPRESSION: ICD-10-CM

## 2018-11-29 DIAGNOSIS — Z13.5 ENCOUNTER FOR SCREENING FOR DIABETIC RETINOPATHY: ICD-10-CM

## 2018-11-29 DIAGNOSIS — Z79.4 TYPE 2 DIABETES MELLITUS WITH HYPERGLYCEMIA, WITH LONG-TERM CURRENT USE OF INSULIN: ICD-10-CM

## 2018-11-29 DIAGNOSIS — E11.65 TYPE 2 DIABETES MELLITUS WITH HYPERGLYCEMIA, WITH LONG-TERM CURRENT USE OF INSULIN: ICD-10-CM

## 2018-11-29 DIAGNOSIS — G43.019 INTRACTABLE MIGRAINE WITHOUT AURA: ICD-10-CM

## 2018-11-29 PROCEDURE — 99999 PR PBB SHADOW E&M-EST. PATIENT-LVL IV: CPT | Mod: PBBFAC,,, | Performed by: FAMILY MEDICINE

## 2018-11-29 PROCEDURE — 99214 OFFICE O/P EST MOD 30 MIN: CPT | Mod: S$PBB,,, | Performed by: FAMILY MEDICINE

## 2018-11-29 PROCEDURE — 99214 OFFICE O/P EST MOD 30 MIN: CPT | Mod: PBBFAC,PO | Performed by: FAMILY MEDICINE

## 2018-11-29 RX ORDER — ETODOLAC 200 MG/1
200 CAPSULE ORAL 2 TIMES DAILY PRN
Qty: 30 CAPSULE | Refills: 0 | Status: SHIPPED | OUTPATIENT
Start: 2018-11-29 | End: 2019-09-05 | Stop reason: SDUPTHER

## 2018-11-29 RX ORDER — BUTALBITAL, ACETAMINOPHEN AND CAFFEINE 50; 325; 40 MG/1; MG/1; MG/1
1 TABLET ORAL 3 TIMES DAILY PRN
Qty: 40 TABLET | Refills: 0 | Status: SHIPPED | OUTPATIENT
Start: 2018-11-29 | End: 2018-12-21 | Stop reason: SDUPTHER

## 2018-11-29 RX ORDER — TRIAMCINOLONE ACETONIDE 1 MG/G
CREAM TOPICAL 2 TIMES DAILY
Qty: 45 G | Refills: 0 | Status: SHIPPED | OUTPATIENT
Start: 2018-11-29 | End: 2020-04-27

## 2018-11-29 RX ORDER — POLYETHYLENE GLYCOL 3350 17 G/17G
17 POWDER, FOR SOLUTION ORAL 2 TIMES DAILY
Qty: 100 EACH | Refills: 3 | Status: SHIPPED | OUTPATIENT
Start: 2018-11-29 | End: 2019-02-21

## 2018-11-29 RX ORDER — ESCITALOPRAM OXALATE 10 MG/1
10 TABLET ORAL DAILY
Qty: 90 TABLET | Refills: 3 | Status: SHIPPED | OUTPATIENT
Start: 2018-11-29 | End: 2019-12-23

## 2018-11-29 NOTE — PROGRESS NOTES
Subjective:       Patient ID: Jud Villa is a 58 y.o. female.    Chief Complaint: Follow-up (on medication refill) and Diabetes    58 years old female came to the clinic for diabetes check.  Patient with no recent A1c.  Patient currently under breast cancer treatment.  Patient with significant depression currently not taking the Lexapro.  No suicidal or homicidal ideations.  Patient is planning to do chemotherapy at [facility].  Patient with multiple breast surgeries.  Patient requests a medicine to help her with the arthritis.  The pain is 3 intensity on and off aggravated with activity and better with rest.  Patient with significant constipation.      Review of Systems   Constitutional: Negative.    HENT: Negative.    Eyes: Negative.    Respiratory: Negative.    Cardiovascular: Negative.  Negative for chest pain, palpitations and leg swelling.   Gastrointestinal: Negative.    Endocrine: Negative for polydipsia, polyphagia and polyuria.   Genitourinary: Negative.    Musculoskeletal: Negative.    Skin: Negative.    Neurological: Positive for headaches.   Psychiatric/Behavioral: Negative.        Objective:      Physical Exam   Constitutional: She is oriented to person, place, and time. She appears well-developed and well-nourished. No distress.   HENT:   Head: Normocephalic and atraumatic.   Right Ear: External ear normal.   Left Ear: External ear normal.   Nose: Nose normal.   Mouth/Throat: Oropharynx is clear and moist. No oropharyngeal exudate.   Eyes: Conjunctivae and EOM are normal. Pupils are equal, round, and reactive to light. Right eye exhibits no discharge. Left eye exhibits no discharge. No scleral icterus.   Neck: Normal range of motion. Neck supple. No JVD present. No tracheal deviation present. No thyromegaly present.   Cardiovascular: Normal rate, regular rhythm, normal heart sounds and intact distal pulses. Exam reveals no gallop and no friction rub.   No murmur heard.  Pulmonary/Chest: Effort  normal and breath sounds normal. No stridor. No respiratory distress. She has no wheezes. She has no rales. She exhibits no tenderness.   Abdominal: Soft. Bowel sounds are normal. She exhibits no distension and no mass. There is no tenderness. There is no rebound and no guarding.   Musculoskeletal: Normal range of motion. She exhibits no edema or tenderness.   Lymphadenopathy:     She has no cervical adenopathy.   Neurological: She is alert and oriented to person, place, and time. She has normal reflexes. No cranial nerve deficit. She exhibits normal muscle tone. Coordination normal.   Skin: Skin is warm and dry. No rash noted. She is not diaphoretic. No erythema. No pallor.   Psychiatric: She has a normal mood and affect. Her behavior is normal. Judgment and thought content normal.       Assessment:       1. Senile osteoporosis    2. Moderate episode of recurrent major depressive disorder    3. Depression    4. Type 2 diabetes mellitus with hyperglycemia, with long-term current use of insulin    5. Primary osteoarthritis involving multiple joints    6. Dermatitis    7. Chronic tension-type headache, intractable    8. Slow transit constipation    9. Intractable migraine without aura        Plan:         Jud was seen today for follow-up and diabetes.    Diagnoses and all orders for this visit:    Senile osteoporosis  -     DXA Bone Density Spine And Hip; Future  -     Vitamin D; Future    Moderate episode of recurrent major depressive disorder  -     TSH; Future  -     CBC auto differential; Future    Depression  -     escitalopram oxalate (LEXAPRO) 10 MG tablet; Take 1 tablet (10 mg total) by mouth once daily.    Type 2 diabetes mellitus with hyperglycemia, with long-term current use of insulin  -     Comprehensive metabolic panel; Future  -     Lipid panel; Future  -     Hemoglobin A1c; Future  -     Microalbumin/creatinine urine ratio; Future  -     Ambulatory referral to Optometry    Primary osteoarthritis  involving multiple joints  -     etodolac (LODINE) 200 MG Cap; Take 1 capsule (200 mg total) by mouth 2 (two) times daily as needed.    Dermatitis  -     triamcinolone acetonide 0.1% (KENALOG) 0.1 % cream; Apply topically 2 (two) times daily.  -     Ambulatory referral to Dermatology    Chronic tension-type headache, intractable    Slow transit constipation  -     polyethylene glycol (GLYCOLAX) 17 gram PwPk; Take 17 g by mouth 2 (two) times daily.    Intractable migraine without aura  -     butalbital-acetaminophen-caffeine -40 mg (FIORICET, ESGIC) -40 mg per tablet; Take 1 tablet by mouth 3 (three) times daily as needed for Headaches.    Continue monitoring blood sugar at home,ADA diet.

## 2018-12-06 ENCOUNTER — OFFICE VISIT (OUTPATIENT)
Dept: OPTOMETRY | Facility: CLINIC | Age: 58
End: 2018-12-06
Payer: MEDICAID

## 2018-12-06 ENCOUNTER — APPOINTMENT (OUTPATIENT)
Dept: RADIOLOGY | Facility: CLINIC | Age: 58
End: 2018-12-06
Attending: FAMILY MEDICINE
Payer: MEDICAID

## 2018-12-06 DIAGNOSIS — H40.039 ANATOMICAL NARROW ANGLE: Primary | ICD-10-CM

## 2018-12-06 DIAGNOSIS — H25.13 NUCLEAR SCLEROSIS OF BOTH EYES: ICD-10-CM

## 2018-12-06 DIAGNOSIS — M81.0 SENILE OSTEOPOROSIS: ICD-10-CM

## 2018-12-06 PROCEDURE — 77080 DXA BONE DENSITY AXIAL: CPT | Mod: TC,PO

## 2018-12-06 PROCEDURE — 92020 GONIOSCOPY: CPT | Mod: PBBFAC,PO | Performed by: OPTOMETRIST

## 2018-12-06 PROCEDURE — 99999 PR PBB SHADOW E&M-EST. PATIENT-LVL II: CPT | Mod: PBBFAC,,, | Performed by: OPTOMETRIST

## 2018-12-06 PROCEDURE — 92004 COMPRE OPH EXAM NEW PT 1/>: CPT | Mod: S$PBB,,, | Performed by: OPTOMETRIST

## 2018-12-06 PROCEDURE — 77080 DXA BONE DENSITY AXIAL: CPT | Mod: 26,,, | Performed by: INTERNAL MEDICINE

## 2018-12-06 PROCEDURE — 99212 OFFICE O/P EST SF 10 MIN: CPT | Mod: PBBFAC,25,PO | Performed by: OPTOMETRIST

## 2018-12-06 PROCEDURE — 92020 GONIOSCOPY: CPT | Mod: S$PBB,,, | Performed by: OPTOMETRIST

## 2018-12-06 NOTE — LETTER
December 6, 2018      Bo Lopez MD  [address] [address]           [place] [facility]  [address]  [place] [address]  Phone: [phone]  Fax: [phone]          Patient: Jud Villa   MR Number: [id number]   YOB: 1960   Date of Visit: 12/6/2018       Dear Dr. Bo Lopez:    Thank you for referring Jud Villa to me for evaluation. Attached you will find relevant portions of my assessment and plan of care.    If you have questions, please do not hesitate to call me. I look forward to following Jud Villa along with you.    Sincerely,    Judy Tee, OD    Enclosure  CC:  No Recipients    If you would like to receive this communication electronically, please contact [email or web address] or [phone] to request more information on [employer] access.    For providers and/or their staff who would like to refer a patient to [facility], please contact us through our one-stop-shop provider referral line, [facility], at [phone].    If you feel you have received this communication in error or would no longer like to receive these types of communications, please e-mail [email or web address]

## 2018-12-06 NOTE — PROGRESS NOTES
HPI     JOSE: May years ago   (-)pain, irritation, itching OU  +2.50 OTC reading glasses  (-)SRx distance   (+)gtts, visine prn   (-)eye injures, eye surgeries  (-)POHx  (+)FOHx --- son has glaucoma      Hemoglobin A1C       Date                     Value               Ref Range             Status                04/11/2018               8.1 (H)             4.0 - 5.6 %           Final                  03/03/2016               8.9 (H)             4.5 - 6.2 %           Final             -250 recently. Usually 109-140. BS goes up at night. Pt admits to   not perfect control.        Chemo: last was March 7th. Doing another scan in January. Breast Cancer Sx   with complications.     Last edited by Judy Tee, OD on 12/6/2018  4:15 PM. (History)            Assessment /Plan     For exam results, see Encounter Report.    Anatomical narrow angle  -     Ambulatory Referral to Ophthalmology    Nuclear sclerosis of both eyes      1. Educated pt on findings. Referral to Dr. Pereira to evaluate narrow angles and determine if patient can be dilated and if a PI is indicated. Monitor.   2. Educated pt on findings. No need for removal at this time. Monitor yearly.     RTC for narrow angle evaluation with Dr. Pereira and completion of DFE if indicated.

## 2018-12-08 RX ORDER — PROMETHAZINE HYDROCHLORIDE 25 MG/1
25 TABLET ORAL EVERY 6 HOURS PRN
Qty: 30 TABLET | Refills: 0 | Status: SHIPPED | OUTPATIENT
Start: 2018-12-08 | End: 2020-04-27

## 2018-12-08 RX ORDER — OMEPRAZOLE 20 MG/1
20 CAPSULE, DELAYED RELEASE ORAL
Qty: 90 CAPSULE | Refills: 3 | Status: SHIPPED | OUTPATIENT
Start: 2018-12-08 | End: 2018-12-10 | Stop reason: CLARIF

## 2018-12-08 RX ORDER — GLIPIZIDE 10 MG/1
10 TABLET ORAL 2 TIMES DAILY
Qty: 180 TABLET | Refills: 3 | Status: SHIPPED | OUTPATIENT
Start: 2018-12-08 | End: 2019-11-12 | Stop reason: SDUPTHER

## 2018-12-08 RX ORDER — DICYCLOMINE HYDROCHLORIDE 10 MG/1
10 CAPSULE ORAL 3 TIMES DAILY PRN
Qty: 90 CAPSULE | Refills: 0 | Status: SHIPPED | OUTPATIENT
Start: 2018-12-08 | End: 2019-01-07

## 2018-12-08 RX ORDER — ERGOCALCIFEROL 1.25 MG/1
50000 CAPSULE ORAL
Qty: 12 CAPSULE | Refills: 3 | Status: SHIPPED | OUTPATIENT
Start: 2018-12-08 | End: 2019-01-08 | Stop reason: SDUPTHER

## 2018-12-10 ENCOUNTER — TELEPHONE (OUTPATIENT)
Dept: FAMILY MEDICINE | Facility: CLINIC | Age: 58
End: 2018-12-10

## 2018-12-10 RX ORDER — PANTOPRAZOLE SODIUM 40 MG/1
40 TABLET, DELAYED RELEASE ORAL DAILY
COMMUNITY
End: 2022-01-14 | Stop reason: SDUPTHER

## 2018-12-17 DIAGNOSIS — M81.0 SENILE OSTEOPOROSIS: ICD-10-CM

## 2018-12-17 RX ORDER — CALCIUM CITRATE/VITAMIN D3 200MG-6.25
1 TABLET ORAL 4 TIMES DAILY
Qty: 400 STRIP | Refills: 3 | Status: SHIPPED | OUTPATIENT
Start: 2018-12-17 | End: 2019-02-22 | Stop reason: SDUPTHER

## 2018-12-17 RX ORDER — ALENDRONATE SODIUM 70 MG/1
70 TABLET ORAL
Qty: 4 TABLET | Refills: 11 | Status: SHIPPED | OUTPATIENT
Start: 2018-12-17 | End: 2019-11-27 | Stop reason: SDUPTHER

## 2018-12-18 ENCOUNTER — LAB VISIT (OUTPATIENT)
Dept: LAB | Facility: HOSPITAL | Age: 58
End: 2018-12-18
Attending: FAMILY MEDICINE
Payer: MEDICAID

## 2018-12-18 DIAGNOSIS — F33.1 MODERATE EPISODE OF RECURRENT MAJOR DEPRESSIVE DISORDER: ICD-10-CM

## 2018-12-18 DIAGNOSIS — Z79.4 TYPE 2 DIABETES MELLITUS WITH HYPERGLYCEMIA, WITH LONG-TERM CURRENT USE OF INSULIN: ICD-10-CM

## 2018-12-18 DIAGNOSIS — M81.0 SENILE OSTEOPOROSIS: ICD-10-CM

## 2018-12-18 DIAGNOSIS — E11.65 TYPE 2 DIABETES MELLITUS WITH HYPERGLYCEMIA, WITH LONG-TERM CURRENT USE OF INSULIN: ICD-10-CM

## 2018-12-18 LAB
25(OH)D3+25(OH)D2 SERPL-MCNC: 29 NG/ML
ALBUMIN SERPL BCP-MCNC: 3.6 G/DL
ALP SERPL-CCNC: 85 U/L
ALT SERPL W/O P-5'-P-CCNC: 12 U/L
ANION GAP SERPL CALC-SCNC: 8 MMOL/L
AST SERPL-CCNC: 14 U/L
BASOPHILS # BLD AUTO: 0.06 K/UL
BASOPHILS NFR BLD: 0.8 %
BILIRUB SERPL-MCNC: 0.2 MG/DL
BUN SERPL-MCNC: 12 MG/DL
CALCIUM SERPL-MCNC: 9.1 MG/DL
CHLORIDE SERPL-SCNC: 103 MMOL/L
CHOLEST SERPL-MCNC: 199 MG/DL
CHOLEST/HDLC SERPL: 4.1 {RATIO}
CO2 SERPL-SCNC: 28 MMOL/L
CREAT SERPL-MCNC: 0.7 MG/DL
DIFFERENTIAL METHOD: NORMAL
EOSINOPHIL # BLD AUTO: 0.4 K/UL
EOSINOPHIL NFR BLD: 5.5 %
ERYTHROCYTE [DISTWIDTH] IN BLOOD BY AUTOMATED COUNT: 13.3 %
EST. GFR  (AFRICAN AMERICAN): >60 ML/MIN/1.73 M^2
EST. GFR  (NON AFRICAN AMERICAN): >60 ML/MIN/1.73 M^2
ESTIMATED AVG GLUCOSE: 146 MG/DL
GLUCOSE SERPL-MCNC: 147 MG/DL
HBA1C MFR BLD HPLC: 6.7 %
HCT VFR BLD AUTO: 37.6 %
HDLC SERPL-MCNC: 49 MG/DL
HDLC SERPL: 24.6 %
HGB BLD-MCNC: 12.1 G/DL
IMM GRANULOCYTES # BLD AUTO: 0.02 K/UL
IMM GRANULOCYTES NFR BLD AUTO: 0.3 %
LDLC SERPL CALC-MCNC: 129.2 MG/DL
LYMPHOCYTES # BLD AUTO: 2.5 K/UL
LYMPHOCYTES NFR BLD: 30.8 %
MCH RBC QN AUTO: 27.4 PG
MCHC RBC AUTO-ENTMCNC: 32.2 G/DL
MCV RBC AUTO: 85 FL
MONOCYTES # BLD AUTO: 0.5 K/UL
MONOCYTES NFR BLD: 6.6 %
NEUTROPHILS # BLD AUTO: 4.5 K/UL
NEUTROPHILS NFR BLD: 56 %
NONHDLC SERPL-MCNC: 150 MG/DL
NRBC BLD-RTO: 0 /100 WBC
PLATELET # BLD AUTO: 255 K/UL
PMV BLD AUTO: 9.7 FL
POTASSIUM SERPL-SCNC: 4.1 MMOL/L
PROT SERPL-MCNC: 7.2 G/DL
RBC # BLD AUTO: 4.42 M/UL
SODIUM SERPL-SCNC: 139 MMOL/L
TRIGL SERPL-MCNC: 104 MG/DL
TSH SERPL DL<=0.005 MIU/L-ACNC: 3.61 UIU/ML
WBC # BLD AUTO: 7.99 K/UL

## 2018-12-18 PROCEDURE — 36415 COLL VENOUS BLD VENIPUNCTURE: CPT | Mod: PO

## 2018-12-18 PROCEDURE — 84443 ASSAY THYROID STIM HORMONE: CPT

## 2018-12-18 PROCEDURE — 80053 COMPREHEN METABOLIC PANEL: CPT

## 2018-12-18 PROCEDURE — 80061 LIPID PANEL: CPT

## 2018-12-18 PROCEDURE — 85025 COMPLETE CBC W/AUTO DIFF WBC: CPT

## 2018-12-18 PROCEDURE — 83036 HEMOGLOBIN GLYCOSYLATED A1C: CPT

## 2018-12-18 PROCEDURE — 82306 VITAMIN D 25 HYDROXY: CPT

## 2018-12-21 DIAGNOSIS — G43.019 INTRACTABLE MIGRAINE WITHOUT AURA: ICD-10-CM

## 2018-12-21 RX ORDER — BUTALBITAL, ACETAMINOPHEN AND CAFFEINE 50; 325; 40 MG/1; MG/1; MG/1
1 TABLET ORAL 3 TIMES DAILY PRN
Qty: 40 TABLET | Refills: 0 | Status: SHIPPED | OUTPATIENT
Start: 2018-12-21 | End: 2019-01-08 | Stop reason: SDUPTHER

## 2018-12-21 NOTE — TELEPHONE ENCOUNTER
Patient with low vitamin-D.    Prescription was sent to the pharmacy.    Please notify the patient.

## 2019-01-07 RX ORDER — ALBUTEROL SULFATE 90 UG/1
AEROSOL, METERED RESPIRATORY (INHALATION)
Qty: 18 G | Refills: 0 | Status: SHIPPED | OUTPATIENT
Start: 2019-01-07 | End: 2019-11-13

## 2019-01-07 RX ORDER — ALBUTEROL SULFATE 90 UG/1
AEROSOL, METERED RESPIRATORY (INHALATION)
Qty: 18 G | Refills: 0 | Status: SHIPPED | OUTPATIENT
Start: 2019-01-07 | End: 2019-03-13 | Stop reason: SDUPTHER

## 2019-01-08 DIAGNOSIS — G43.019 INTRACTABLE MIGRAINE WITHOUT AURA: ICD-10-CM

## 2019-01-08 DIAGNOSIS — J44.9 CHRONIC OBSTRUCTIVE PULMONARY DISEASE, UNSPECIFIED COPD TYPE: Primary | ICD-10-CM

## 2019-01-08 RX ORDER — BUTALBITAL, ACETAMINOPHEN AND CAFFEINE 50; 325; 40 MG/1; MG/1; MG/1
1 TABLET ORAL 3 TIMES DAILY PRN
Qty: 40 TABLET | Refills: 0 | Status: SHIPPED | OUTPATIENT
Start: 2019-01-08 | End: 2019-02-22 | Stop reason: SDUPTHER

## 2019-01-08 RX ORDER — ERGOCALCIFEROL 1.25 MG/1
50000 CAPSULE ORAL
Qty: 12 CAPSULE | Refills: 3 | Status: SHIPPED | OUTPATIENT
Start: 2019-01-08 | End: 2020-01-12

## 2019-01-22 ENCOUNTER — INITIAL CONSULT (OUTPATIENT)
Dept: OPHTHALMOLOGY | Facility: CLINIC | Age: 59
End: 2019-01-22
Payer: MEDICAID

## 2019-01-22 DIAGNOSIS — H25.13 NUCLEAR SCLEROTIC CATARACT OF BOTH EYES: ICD-10-CM

## 2019-01-22 DIAGNOSIS — H40.033 ANATOMICAL NARROW ANGLE GLAUCOMA, BILATERAL: Primary | ICD-10-CM

## 2019-01-22 PROCEDURE — 99999 PR PBB SHADOW E&M-EST. PATIENT-LVL II: CPT | Mod: PBBFAC,,, | Performed by: OPHTHALMOLOGY

## 2019-01-22 PROCEDURE — 99212 OFFICE O/P EST SF 10 MIN: CPT | Mod: PBBFAC,PO,25 | Performed by: OPHTHALMOLOGY

## 2019-01-22 PROCEDURE — 92020 GONIOSCOPY: CPT | Mod: S$PBB,,, | Performed by: OPHTHALMOLOGY

## 2019-01-22 PROCEDURE — 92012 INTRM OPH EXAM EST PATIENT: CPT | Mod: S$PBB,,, | Performed by: OPHTHALMOLOGY

## 2019-01-22 PROCEDURE — 99999 PR PBB SHADOW E&M-EST. PATIENT-LVL II: ICD-10-PCS | Mod: PBBFAC,,, | Performed by: OPHTHALMOLOGY

## 2019-01-22 PROCEDURE — 92020 PR SPECIAL EYE EVAL,GONIOSCOPY: ICD-10-PCS | Mod: S$PBB,,, | Performed by: OPHTHALMOLOGY

## 2019-01-22 PROCEDURE — 92020 GONIOSCOPY: CPT | Mod: PBBFAC,PO | Performed by: OPHTHALMOLOGY

## 2019-01-22 PROCEDURE — 92012 PR EYE EXAM, EST PATIENT,INTERMED: ICD-10-PCS | Mod: S$PBB,,, | Performed by: OPHTHALMOLOGY

## 2019-01-22 RX ORDER — ONDANSETRON 4 MG/1
TABLET, ORALLY DISINTEGRATING ORAL
COMMUNITY
Start: 2018-12-31 | End: 2019-02-21

## 2019-01-22 RX ORDER — HYDROCODONE BITARTRATE AND ACETAMINOPHEN 7.5; 325 MG/1; MG/1
1 TABLET ORAL
COMMUNITY
Start: 2018-11-21 | End: 2019-02-21

## 2019-01-22 RX ORDER — IPRATROPIUM BROMIDE AND ALBUTEROL SULFATE 2.5; .5 MG/3ML; MG/3ML
3 SOLUTION RESPIRATORY (INHALATION) DAILY
COMMUNITY
Start: 2019-01-08 | End: 2019-11-13 | Stop reason: SDUPTHER

## 2019-01-22 RX ORDER — TRIAMCINOLONE ACETONIDE 1 MG/G
OINTMENT TOPICAL
COMMUNITY
Start: 2018-12-06 | End: 2019-02-21 | Stop reason: SDUPTHER

## 2019-01-23 ENCOUNTER — OFFICE VISIT (OUTPATIENT)
Dept: FAMILY MEDICINE | Facility: CLINIC | Age: 59
End: 2019-01-23
Payer: MEDICAID

## 2019-01-23 VITALS
DIASTOLIC BLOOD PRESSURE: 60 MMHG | OXYGEN SATURATION: 96 % | SYSTOLIC BLOOD PRESSURE: 104 MMHG | HEART RATE: 94 BPM | BODY MASS INDEX: 28.84 KG/M2 | WEIGHT: 156.75 LBS | HEIGHT: 62 IN

## 2019-01-23 DIAGNOSIS — J43.8 OTHER EMPHYSEMA: Primary | ICD-10-CM

## 2019-01-23 DIAGNOSIS — Z79.4 TYPE 2 DIABETES MELLITUS WITH HYPERGLYCEMIA, WITH LONG-TERM CURRENT USE OF INSULIN: ICD-10-CM

## 2019-01-23 DIAGNOSIS — I89.0 LYMPHEDEMA: ICD-10-CM

## 2019-01-23 DIAGNOSIS — E11.65 TYPE 2 DIABETES MELLITUS WITH HYPERGLYCEMIA, WITH LONG-TERM CURRENT USE OF INSULIN: ICD-10-CM

## 2019-01-23 DIAGNOSIS — K76.0 FATTY LIVER: ICD-10-CM

## 2019-01-23 DIAGNOSIS — F33.0 MILD EPISODE OF RECURRENT MAJOR DEPRESSIVE DISORDER: ICD-10-CM

## 2019-01-23 PROCEDURE — 99214 PR OFFICE/OUTPT VISIT, EST, LEVL IV, 30-39 MIN: ICD-10-PCS | Mod: S$PBB,,, | Performed by: FAMILY MEDICINE

## 2019-01-23 PROCEDURE — 99999 PR PBB SHADOW E&M-EST. PATIENT-LVL III: CPT | Mod: PBBFAC,,, | Performed by: FAMILY MEDICINE

## 2019-01-23 PROCEDURE — 99213 OFFICE O/P EST LOW 20 MIN: CPT | Mod: PBBFAC,PO | Performed by: FAMILY MEDICINE

## 2019-01-23 PROCEDURE — 99214 OFFICE O/P EST MOD 30 MIN: CPT | Mod: S$PBB,,, | Performed by: FAMILY MEDICINE

## 2019-01-23 PROCEDURE — 99999 PR PBB SHADOW E&M-EST. PATIENT-LVL III: ICD-10-PCS | Mod: PBBFAC,,, | Performed by: FAMILY MEDICINE

## 2019-01-23 RX ORDER — POTASSIUM CHLORIDE 600 MG/1
8 TABLET, FILM COATED, EXTENDED RELEASE ORAL DAILY
Qty: 30 TABLET | Refills: 0 | Status: SHIPPED | OUTPATIENT
Start: 2019-01-23 | End: 2019-02-22

## 2019-01-23 RX ORDER — HYDROCHLOROTHIAZIDE 12.5 MG/1
12.5 CAPSULE ORAL DAILY
Qty: 30 CAPSULE | Refills: 0 | Status: SHIPPED | OUTPATIENT
Start: 2019-01-23 | End: 2019-09-04

## 2019-01-23 NOTE — PROGRESS NOTES
Subjective:       Patient ID: Jud Villa is a 58 y.o. female.    Chief Complaint: Hospital Follow Up and Diabetes    58 years old female came to the clinic for diabetes check.  Last A1c was stable.  No polyuria, polydipsia or polyphagia.  Patient recently admitted in the hospital with COPD exacerbation.  Patient is doing significantly better.  Patient requests lung specialist referral.  Patient with chest lymphedema.  Patient requests a diuretic to help her.  Patient previously diagnosed with fatty liver.      Review of Systems   Constitutional: Negative.    HENT: Negative.    Eyes: Negative.    Respiratory: Negative.    Cardiovascular: Negative.  Negative for chest pain, palpitations and leg swelling.   Gastrointestinal: Negative.    Genitourinary: Negative.    Musculoskeletal: Negative.    Skin: Negative.    Neurological: Negative.    Psychiatric/Behavioral: Negative.        Objective:      Physical Exam   Constitutional: She is oriented to person, place, and time. She appears well-developed and well-nourished. No distress.   HENT:   Head: Normocephalic and atraumatic.   Right Ear: External ear normal.   Left Ear: External ear normal.   Nose: Nose normal.   Mouth/Throat: Oropharynx is clear and moist. No oropharyngeal exudate.   Eyes: Conjunctivae and EOM are normal. Pupils are equal, round, and reactive to light. Right eye exhibits no discharge. Left eye exhibits no discharge. No scleral icterus.   Neck: Normal range of motion. Neck supple. No JVD present. No tracheal deviation present. No thyromegaly present.   Cardiovascular: Normal rate, regular rhythm, normal heart sounds and intact distal pulses. Exam reveals no gallop and no friction rub.   No murmur heard.  Pulmonary/Chest: Effort normal and breath sounds normal. No stridor. No respiratory distress. She has no wheezes. She has no rales. She exhibits no tenderness.   Abdominal: Soft. Bowel sounds are normal. She exhibits no distension and no mass.  There is no tenderness. There is no rebound and no guarding.   Musculoskeletal: Normal range of motion. She exhibits no edema or tenderness.   Lymphadenopathy:     She has no cervical adenopathy.   Neurological: She is alert and oriented to person, place, and time. She has normal reflexes. No cranial nerve deficit. She exhibits normal muscle tone. Coordination normal.   Skin: Skin is warm and dry. No rash noted. She is not diaphoretic. No erythema. No pallor.   Psychiatric: She has a normal mood and affect. Her behavior is normal. Judgment and thought content normal.       Assessment:       1. Other emphysema    2. Type 2 diabetes mellitus with hyperglycemia, with long-term current use of insulin    3. Lymphedema    4. Fatty liver    5. Mild episode of recurrent major depressive disorder        Plan:         Jud was seen today for hospital follow up and diabetes.    Diagnoses and all orders for this visit:    Other emphysema  -     Ambulatory referral to Pulmonology    Type 2 diabetes mellitus with hyperglycemia, with long-term current use of insulin  -     Comprehensive metabolic panel; Future  -     Lipid panel; Future  -     Hemoglobin A1c; Future  -     Microalbumin/creatinine urine ratio; Future    Lymphedema  -     hydroCHLOROthiazide (MICROZIDE) 12.5 mg capsule; Take 1 capsule (12.5 mg total) by mouth once daily.  -     potassium chloride (KLOR-CON) 8 MEQ TbSR; Take 1 tablet (8 mEq total) by mouth once daily.    Fatty liver  -     Comprehensive metabolic panel; Future    Mild episode of recurrent major depressive disorder  -     TSH; Future    Continue monitoring blood sugar at home,ADA diet.   Diet and physical activity to promote weight loss.

## 2019-02-18 NOTE — PROGRESS NOTES
Assessment /Plan     For exam results, see Encounter Report.    Anatomical narrow angle glaucoma, bilateral    Nuclear sclerotic cataract of both eyes             Glaucoma (type and duration)    Narrow angles   First HVF   //   First photos   //   Treatment / Drops started   //           Family history    none        Glaucoma meds    none        H/O adverse rxn to glaucoma drops    none        LASERS    none        GLAUCOMA SURGERIES    none        OTHER EYE SURGERIES    none        CDR    0.35/0.35        Tbase    18-22          Tmax    22/18            Ttarget    //             HVF    / test 20/ to  20/ - / od // / os        Gonio    +1/+1        CCT    /        OCT    / test 20/ to 20/ - RNFL - / od // / os        HRT    / test 20/ to 20/ - MR - / od // / os        Disc photos    /    - Ttoday   21/??  - Test done today    PI od         NSC  Not visually significant      Schedule PI OD first - argon then yag - NO cypts // then OS    After PI's done can F/U Metaire again     PI OD - 2/21/2019 - PA taper   PI OS - pending     F/U 2 weeks for PI os // check gonio od to see if more open

## 2019-02-19 ENCOUNTER — TELEPHONE (OUTPATIENT)
Dept: ADMINISTRATIVE | Facility: HOSPITAL | Age: 59
End: 2019-02-19

## 2019-02-21 ENCOUNTER — OFFICE VISIT (OUTPATIENT)
Dept: OPHTHALMOLOGY | Facility: CLINIC | Age: 59
End: 2019-02-21
Payer: MEDICAID

## 2019-02-21 VITALS — DIASTOLIC BLOOD PRESSURE: 82 MMHG | SYSTOLIC BLOOD PRESSURE: 136 MMHG | HEART RATE: 76 BPM

## 2019-02-21 DIAGNOSIS — H40.033 ANATOMICAL NARROW ANGLE GLAUCOMA, BILATERAL: Primary | ICD-10-CM

## 2019-02-21 DIAGNOSIS — G43.019 INTRACTABLE MIGRAINE WITHOUT AURA: ICD-10-CM

## 2019-02-21 DIAGNOSIS — H25.13 NUCLEAR SCLEROTIC CATARACT OF BOTH EYES: ICD-10-CM

## 2019-02-21 PROCEDURE — 99999 PR PBB SHADOW E&M-EST. PATIENT-LVL II: ICD-10-PCS | Mod: PBBFAC,,, | Performed by: OPHTHALMOLOGY

## 2019-02-21 PROCEDURE — 99499 NO LOS: ICD-10-PCS | Mod: S$PBB,,, | Performed by: OPHTHALMOLOGY

## 2019-02-21 PROCEDURE — 66761 LASER PERIPHERY IRIDOTOMY - OD - RIGHT EYE: ICD-10-PCS | Mod: S$PBB,RT,, | Performed by: OPHTHALMOLOGY

## 2019-02-21 PROCEDURE — 99212 OFFICE O/P EST SF 10 MIN: CPT | Mod: PBBFAC,25 | Performed by: OPHTHALMOLOGY

## 2019-02-21 PROCEDURE — 99999 PR PBB SHADOW E&M-EST. PATIENT-LVL II: CPT | Mod: PBBFAC,,, | Performed by: OPHTHALMOLOGY

## 2019-02-21 PROCEDURE — 66761 REVISION OF IRIS: CPT | Mod: PBBFAC,RT | Performed by: OPHTHALMOLOGY

## 2019-02-21 PROCEDURE — 99499 UNLISTED E&M SERVICE: CPT | Mod: S$PBB,,, | Performed by: OPHTHALMOLOGY

## 2019-02-21 RX ORDER — TIZANIDINE 4 MG/1
5 TABLET ORAL
COMMUNITY
Start: 2019-02-14 | End: 2020-04-27

## 2019-02-21 RX ORDER — EMPAGLIFLOZIN 10 MG/1
10 TABLET, FILM COATED ORAL DAILY
COMMUNITY
Start: 2019-01-31 | End: 2019-11-18

## 2019-02-21 RX ORDER — UMECLIDINIUM BROMIDE AND VILANTEROL TRIFENATATE 62.5; 25 UG/1; UG/1
1 POWDER RESPIRATORY (INHALATION) DAILY PRN
COMMUNITY
Start: 2019-02-04 | End: 2019-03-13 | Stop reason: SDUPTHER

## 2019-02-21 RX ORDER — METFORMIN HYDROCHLORIDE 1000 MG/1
1000 TABLET ORAL 2 TIMES DAILY WITH MEALS
Qty: 180 TABLET | Refills: 0 | Status: SHIPPED | OUTPATIENT
Start: 2019-02-21 | End: 2019-05-20 | Stop reason: SDUPTHER

## 2019-02-21 NOTE — TELEPHONE ENCOUNTER
----- Message from Alea Wood sent at 2/21/2019 11:09 AM CST -----  Contact: self / [phone]  Patient is requesting a refill on the below. Please advise  She as well needs, her headache medication refilled       metformin (GLUCOPHAGE) 1000 MG tablet      Pharmacy       [id number]  [place] - [address]  [facility]

## 2019-02-22 RX ORDER — BUTALBITAL, ACETAMINOPHEN AND CAFFEINE 50; 325; 40 MG/1; MG/1; MG/1
1 TABLET ORAL 3 TIMES DAILY PRN
Qty: 30 TABLET | Refills: 0 | Status: SHIPPED | OUTPATIENT
Start: 2019-02-22 | End: 2019-04-07 | Stop reason: SDUPTHER

## 2019-02-22 RX ORDER — CALCIUM CITRATE/VITAMIN D3 200MG-6.25
1 TABLET ORAL 4 TIMES DAILY
Qty: 400 STRIP | Refills: 0 | Status: SHIPPED | OUTPATIENT
Start: 2019-02-22 | End: 2019-03-06 | Stop reason: SDUPTHER

## 2019-02-22 NOTE — TELEPHONE ENCOUNTER
----- Message from Roz Purdy sent at 2/22/2019 11:23 AM CST -----  Contact: [phone]/self  Patient called in requesting to speak with you. Patient prefers to speak with a nurse. Please advise.

## 2019-02-22 NOTE — TELEPHONE ENCOUNTER
----- Message from Roz Purdy sent at 2/22/2019 11:23 AM CST -----  Contact: [phone]/self  Patient called in requesting to speak with you. Patient prefers to speak with a nurse. Please advise.

## 2019-03-04 RX ORDER — DIAZEPAM 10 MG/1
TABLET ORAL
Qty: 30 TABLET | Refills: 0 | Status: SHIPPED | OUTPATIENT
Start: 2019-03-04 | End: 2019-04-09 | Stop reason: SDUPTHER

## 2019-03-07 RX ORDER — CALCIUM CITRATE/VITAMIN D3 200MG-6.25
TABLET ORAL
Qty: 400 STRIP | Refills: 0 | Status: SHIPPED | OUTPATIENT
Start: 2019-03-07 | End: 2020-01-29 | Stop reason: SDUPTHER

## 2019-03-13 ENCOUNTER — HOSPITAL ENCOUNTER (OUTPATIENT)
Dept: PULMONOLOGY | Facility: CLINIC | Age: 59
Discharge: HOME | End: 2019-03-13
Payer: MEDICAID

## 2019-03-13 ENCOUNTER — OFFICE VISIT (OUTPATIENT)
Dept: PULMONOLOGY | Facility: CLINIC | Age: 59
End: 2019-03-13
Payer: MEDICAID

## 2019-03-13 VITALS
HEART RATE: 79 BPM | BODY MASS INDEX: 29.44 KG/M2 | SYSTOLIC BLOOD PRESSURE: 110 MMHG | DIASTOLIC BLOOD PRESSURE: 78 MMHG | HEIGHT: 62 IN | OXYGEN SATURATION: 94 % | WEIGHT: 160 LBS

## 2019-03-13 DIAGNOSIS — C50.912 BREAST CANCER METASTASIZED TO AXILLARY LYMPH NODE, LEFT: ICD-10-CM

## 2019-03-13 DIAGNOSIS — J44.9 CHRONIC OBSTRUCTIVE PULMONARY DISEASE, UNSPECIFIED COPD TYPE: ICD-10-CM

## 2019-03-13 DIAGNOSIS — Z87.891 FORMER SMOKER: ICD-10-CM

## 2019-03-13 DIAGNOSIS — J32.1 CHRONIC FRONTAL SINUSITIS: Primary | ICD-10-CM

## 2019-03-13 DIAGNOSIS — C77.3 BREAST CANCER METASTASIZED TO AXILLARY LYMPH NODE, LEFT: ICD-10-CM

## 2019-03-13 DIAGNOSIS — J43.8 OTHER EMPHYSEMA: ICD-10-CM

## 2019-03-13 LAB
PRE FEV1 FVC: 68
PRE FEV1: 1.3
PRE FVC: 1.9
PREDICTED FEV1 FVC: 81
PREDICTED FEV1: 2.26
PREDICTED FVC: 2.8

## 2019-03-13 PROCEDURE — 99204 PR OFFICE/OUTPT VISIT, NEW, LEVL IV, 45-59 MIN: ICD-10-PCS | Mod: 25,S$PBB,, | Performed by: INTERNAL MEDICINE

## 2019-03-13 PROCEDURE — 99999 PR PBB SHADOW E&M-EST. PATIENT-LVL V: CPT | Mod: PBBFAC,,, | Performed by: INTERNAL MEDICINE

## 2019-03-13 PROCEDURE — 94010 BREATHING CAPACITY TEST: ICD-10-PCS | Mod: 26,S$PBB,, | Performed by: INTERNAL MEDICINE

## 2019-03-13 PROCEDURE — 94010 BREATHING CAPACITY TEST: CPT | Mod: PBBFAC | Performed by: INTERNAL MEDICINE

## 2019-03-13 PROCEDURE — 94729 PR C02/MEMBANE DIFFUSE CAPACITY: ICD-10-PCS | Mod: 26,S$PBB,, | Performed by: INTERNAL MEDICINE

## 2019-03-13 PROCEDURE — 94729 DIFFUSING CAPACITY: CPT | Mod: PBBFAC | Performed by: INTERNAL MEDICINE

## 2019-03-13 PROCEDURE — 99999 PR PBB SHADOW E&M-EST. PATIENT-LVL V: ICD-10-PCS | Mod: PBBFAC,,, | Performed by: INTERNAL MEDICINE

## 2019-03-13 PROCEDURE — 99204 OFFICE O/P NEW MOD 45 MIN: CPT | Mod: 25,S$PBB,, | Performed by: INTERNAL MEDICINE

## 2019-03-13 PROCEDURE — 99215 OFFICE O/P EST HI 40 MIN: CPT | Mod: PBBFAC,25 | Performed by: INTERNAL MEDICINE

## 2019-03-13 PROCEDURE — 94010 BREATHING CAPACITY TEST: CPT | Mod: 26,S$PBB,, | Performed by: INTERNAL MEDICINE

## 2019-03-13 PROCEDURE — 94729 DIFFUSING CAPACITY: CPT | Mod: 26,S$PBB,, | Performed by: INTERNAL MEDICINE

## 2019-03-13 RX ORDER — UMECLIDINIUM BROMIDE AND VILANTEROL TRIFENATATE 62.5; 25 UG/1; UG/1
1 POWDER RESPIRATORY (INHALATION) DAILY PRN
Qty: 1 EACH | Refills: 11 | Status: SHIPPED | OUTPATIENT
Start: 2019-03-13 | End: 2019-08-05 | Stop reason: ALTCHOICE

## 2019-03-13 RX ORDER — BENZONATATE 100 MG/1
100 CAPSULE ORAL
COMMUNITY
Start: 2019-01-08 | End: 2019-09-04

## 2019-03-13 RX ORDER — OXYMETAZOLINE HCL 0.05 %
2 SPRAY, NON-AEROSOL (ML) NASAL
COMMUNITY
Start: 2019-01-08 | End: 2023-03-22

## 2019-03-13 RX ORDER — FLUTICASONE PROPIONATE 50 MCG
1 SPRAY, SUSPENSION (ML) NASAL DAILY
Qty: 16 G | Refills: 3 | Status: SHIPPED | OUTPATIENT
Start: 2019-03-13 | End: 2019-11-13

## 2019-03-13 RX ORDER — ALBUTEROL SULFATE 90 UG/1
AEROSOL, METERED RESPIRATORY (INHALATION)
Qty: 18 G | Refills: 11 | Status: SHIPPED | OUTPATIENT
Start: 2019-03-13 | End: 2019-11-13

## 2019-03-13 RX ORDER — GABAPENTIN 100 MG/1
100 CAPSULE ORAL
COMMUNITY
End: 2020-08-26 | Stop reason: SDUPTHER

## 2019-03-13 NOTE — PATIENT INSTRUCTIONS
STop afrin, start flonase 2 sprays per nostril daily    Continue anoro every day for COPD control  Ventolin/proair for rescue and prevention.

## 2019-03-13 NOTE — PROGRESS NOTES
"Subjective:       Patient ID: Jud Villa is a 58 y.o. female.    Chief Complaint: COPD    58 year old with a history of COPD.  Since last visit 9/2015 was diagnosed with Stage III triple negative breast cancer, treated at [facility].  Here for COPD evaluation.  In January, was hospitalized for COPD exacerbation.  Not exactly adherent to Anoro daily for control and rarely uses albuterol for rescue inhalation.   Does endorse SENA with a flight of stairs when carrying groceries up a flight of stairs.      Review of Systems   Constitutional: Negative for fever, weight loss, weight gain and night sweats.   HENT: Positive for postnasal drip and congestion.         Uses afrin daily for sinus congestion   Eyes: Negative for redness and itching.   Respiratory: Positive for cough and sputum production (yellow). Negative for shortness of breath.    Cardiovascular: Negative for chest pain, palpitations and leg swelling.   Genitourinary: Negative for difficulty urinating and hematuria.   Endocrine: Negative for cold intolerance and heat intolerance.    Musculoskeletal: Negative for arthralgias.   Skin: Negative for rash.   Gastrointestinal: Negative for acid reflux.   Neurological: Positive for headaches (frontal pressure/sinus headache).   Hematological: Negative for adenopathy.   Psychiatric/Behavioral: Negative for confusion.       Past medical and surgical history reviewed.  Social and family history reviewed.  Allergies and medications reviewed.  Uses afrin daily for congestion.  Objective:       Vitals:    03/13/19 1525   BP: 110/78   BP Location: Left arm   Patient Position: Sitting   Pulse: 79   SpO2: (!) 94%   Weight: 72.6 kg (160 lb)   Height: 5' 2" (1.575 m)     Physical Exam   Constitutional: She is oriented to person, place, and time. She appears well-developed and well-nourished.   HENT:   Head: Normocephalic.   Nose: Nose normal.   Neck: Normal range of motion. Neck supple. No tracheal deviation present. "   Cardiovascular: Normal rate, regular rhythm, normal heart sounds and intact distal pulses.   Pulmonary/Chest: Normal expansion, symmetric chest wall expansion, effort normal and breath sounds normal.   Abdominal: Soft. Bowel sounds are normal. There is no hepatosplenomegaly.   Musculoskeletal: Normal range of motion. She exhibits no edema.   Lymphadenopathy: No supraclavicular adenopathy is present.     She has no cervical adenopathy.   Neurological: She is alert and oriented to person, place, and time. No cranial nerve deficit.   Skin: Skin is warm and dry.   Psychiatric: She has a normal mood and affect.        Personal Diagnostic Review:  1.  PFT stable to improved from 9/2015:  Today moderate obstruction with normal diffusion.    2.  Report of PET reviewed in Care Everywhere January 2019:    PET/CT eyes to thighs    Technique: Patient's blood glucose was 122.  Net injected dose of F-18 FDG was 12.7 mCi via right antecubital vein.  The patient was scanned approximately one hour after receiving the FDG.     PET images were obtained from the eyes to the thighs.  Correlative low dose CT images of the same levels were obtained for attenuation correction and localization.     Study performed for subsequent treatment strategy.     COMPARISON: PET/CT dated October 10, 2017    HISTORY: C 50.919. Left breast carcinoma. Bilateral mastectomy April 2018. Patient reports chemotherapy March 2018.    FINDINGS:  Head and neck: Visualized portions of the brain demonstrate no abnormal foci of FDG uptake. Correlative low-dose CT images demonstrate no mass effect, midline shift, or acute intracranial hemorrhage. There is physiologic FDG activity in the neck. No FDG avid cervical adenopathy is identified. Dependent fluid/debris levels in the maxillary sinuses, moderate on the left and small on the right.    CHEST: There are postoperative changes of bilateral mastectomy with a right breast implant in place. Subcutaneous band-like  "opacity at the left mastectomy site extends to the skin and demonstrates heterogeneous mild FDG activity, likely postoperative scarring. Prior left axillary lymph node dissection. No FDG avid thoracic adenopathy is identified. No pleural effusion. There is a band of atelectasis or scarring in the medial right middle lobe.    Abdomen and pelvis: There is physiologic FDG activity in the liver, spleen, kidneys, and several bowel loops including ileal bowel loops, the colon, rectum, and stomach. FDG activity in the liver is heterogeneous with no suspicious focal hepatic lesion identified on correlative low-dose noncontrast CT images. No FDG avid abdominal, pelvic, or inguinal adenopathy is identified. Cholecystectomy clips. The appendix is normal. Prior hysterectomy. Again seen are innumerable subcutaneous nodular foci in the gluteal regions with low level FDG activity which likely represent granulomas.    MUSCULOSKELETAL: No suspicious abnormal foci of FDG uptake are identified in the osseous structures  No flowsheet data found.      Assessment:       1. Chronic frontal sinusitis    2. Other emphysema    3. Breast cancer metastasized to axillary lymph node, left    4. Former smoker        Outpatient Encounter Medications as of 3/13/2019   Medication Sig Dispense Refill    albuterol (VENTOLIN HFA) 90 mcg/actuation inhaler INHALE 2 PUFFS BY MOUTH INTO THE LUNGS EVERY 6 HOURS AS NEEDED FOR WHEEZING 18 g 11    albuterol-ipratropium (DUO-NEB) 2.5 mg-0.5 mg/3 mL nebulizer solution Take 3 mLs by nebulization once daily.       alendronate (FOSAMAX) 70 MG tablet Take 1 tablet (70 mg total) by mouth every 7 days. 4 tablet 11    ANORO ELLIPTA 62.5-25 mcg/actuation DsDv Inhale 1 puff into the lungs daily as needed. 1 each 11    BASAGLAR KWIKPEN U-100 INSULIN 100 unit/mL (3 mL) InPn pen       BD INSULIN PEN NEEDLE UF SHORT 31 gauge x 5/16" Ndle       benzonatate (TESSALON) 100 MG capsule Take 100 mg by mouth.      " butalbital-acetaminophen-caffeine -40 mg (FIORICET, ESGIC) -40 mg per tablet Take 1 tablet by mouth 3 (three) times daily as needed for Headaches. 30 tablet 0    diazePAM (VALIUM) 10 MG Tab TAKE 1 TABLET BY MOUTH EVERY DAY AS NEEDED FOR ANXIETY 30 tablet 0    ergocalciferol (ERGOCALCIFEROL) 50,000 unit Cap Take 1 capsule (50,000 Units total) by mouth every 7 days. 12 capsule 3    escitalopram oxalate (LEXAPRO) 10 MG tablet Take 1 tablet (10 mg total) by mouth once daily. 90 tablet 3    etodolac (LODINE) 200 MG Cap Take 1 capsule (200 mg total) by mouth 2 (two) times daily as needed. 30 capsule 0    fluticasone (FLONASE) 50 mcg/actuation nasal spray 1 spray (50 mcg total) by Each Nare route once daily. 16 g 3    gabapentin (NEURONTIN) 100 MG capsule Take 100 mg by mouth.      glipiZIDE (GLUCOTROL) 10 MG tablet Take 1 tablet (10 mg total) by mouth 2 (two) times daily. 180 tablet 3    ipratropium (ATROVENT HFA) 17 mcg/actuation inhaler Inhale 2 puffs into the lungs every 6 (six) hours. 12.9 Inhaler 1    JARDIANCE 10 mg Tab Take 10 mg by mouth once daily.      lorcaserin 10 mg Tab Take 10 mg by mouth 2 (two) times daily. 60 tablet 3    metFORMIN (GLUCOPHAGE) 1000 MG tablet Take 1 tablet (1,000 mg total) by mouth 2 (two) times daily with meals. 180 tablet 0    oxymetazoline (AFRIN) 0.05 % nasal spray 2 sprays by Nasal route.      pantoprazole (PROTONIX) 40 MG tablet Take 40 mg by mouth once daily.      promethazine (PHENERGAN) 25 MG tablet Take 1 tablet (25 mg total) by mouth every 6 (six) hours as needed. 30 tablet 0    triamcinolone acetonide 0.1% (KENALOG) 0.1 % cream Apply topically 2 (two) times daily. 45 g 0    TRUE METRIX GLUCOSE TEST STRIP Strp USE AS DIRECTED FOUR TIMES DAILY 400 strip 0    VENTOLIN HFA 90 mcg/actuation inhaler INHALE 2 PUFFS BY MOUTH INTO THE LUNGS EVERY 6 HOURS AS NEEDED FOR WHEEZING 18 g 0    VIRTUSSIN AC  mg/5 mL syrup Take 5 mLs by mouth as needed.        [DISCONTINUED] albuterol (VENTOLIN HFA) 90 mcg/actuation inhaler INHALE 2 PUFFS BY MOUTH INTO THE LUNGS EVERY 6 HOURS AS NEEDED FOR WHEEZING 18 g 0    [DISCONTINUED] ANORO ELLIPTA 62.5-25 mcg/actuation DsDv Inhale 1 puff into the lungs daily as needed.      [DISCONTINUED] fluticasone (FLONASE) 50 mcg/actuation nasal spray 1 spray (50 mcg total) by Each Nare route once daily. 16 g 3    hydroCHLOROthiazide (MICROZIDE) 12.5 mg capsule Take 1 capsule (12.5 mg total) by mouth once daily. 30 capsule 0     No facility-administered encounter medications on file as of 3/13/2019.      Orders Placed This Encounter   Procedures    Ambulatory consult to Hematology / Oncology     Referral Priority:   Routine     Referral Type:   Consultation     Referral Reason:   Specialty Services Required     Requested Specialty:   Hematology and Oncology     Number of Visits Requested:   1     Plan:     Problem List Items Addressed This Visit     COPD (chronic obstructive pulmonary disease)    Overview     Anoro daily for control.  Albuterol for rescue and prevention.    Moderate obstruction on FEV1 (58% predicted), normal diffusion.         Relevant Medications    ANORO ELLIPTA 62.5-25 mcg/actuation DsDv    albuterol (VENTOLIN HFA) 90 mcg/actuation inhaler    Breast cancer metastasized to axillary lymph node, left    Overview     Visits and path in care everywhere  Diagnosed September 2017.  Status post chemo/xrt  Bilateral mastectomy 4/2018.  Would like to establish follow up care at [facility].         Relevant Orders    Ambulatory consult to Hematology / Oncology    Former smoker    Overview     Encouraged continued cessation.  Recent PET/CT 1/2019 without description of malignancy.  If no chest imaging per oncology, will continue yearly LDCT screen for cancer.         Chronic frontal sinusitis - Primary    Overview     Counseled that Afrin should not be used daily  Flonase 2 sprays per nostril daily.         Relevant Medications     fluticasone (FLONASE) 50 mcg/actuation nasal spray

## 2019-03-13 NOTE — LETTER
March 13, 2019      Bo Lopez MD  [address] [facility] [address]           [facility] - Pulmonary Services  [address]  [place] [address]  Phone: [phone]          Patient: Jud Villa   MR Number: [id number]   YOB: 1960   Date of Visit: 3/13/2019       Dear Dr. Bo Lopez:    Thank you for referring Jud Villa to me for evaluation. Attached you will find relevant portions of my assessment and plan of care.    If you have questions, please do not hesitate to call me. I look forward to following Jud Villa along with you.    Sincerely,    Maria R Camara MD    Enclosure  CC:  No Recipients    If you would like to receive this communication electronically, please contact [email or web address] or [phone] to request more information on [facility] [facility] access.    For providers and/or their staff who would like to refer a patient to [facility], please contact us through our one-stop-shop provider referral line, [facility] , at [phone].    If you feel you have received this communication in error or would no longer like to receive these types of communications, please e-mail [email or web address]

## 2019-03-14 ENCOUNTER — TELEPHONE (OUTPATIENT)
Dept: HEMATOLOGY/ONCOLOGY | Facility: CLINIC | Age: 59
End: 2019-03-14

## 2019-03-28 ENCOUNTER — OFFICE VISIT (OUTPATIENT)
Dept: OPHTHALMOLOGY | Facility: CLINIC | Age: 59
End: 2019-03-28
Payer: MEDICAID

## 2019-03-28 VITALS — SYSTOLIC BLOOD PRESSURE: 123 MMHG | HEART RATE: 74 BPM | DIASTOLIC BLOOD PRESSURE: 79 MMHG

## 2019-03-28 DIAGNOSIS — H40.033 ANATOMICAL NARROW ANGLE GLAUCOMA, BILATERAL: Primary | ICD-10-CM

## 2019-03-28 DIAGNOSIS — H25.13 NUCLEAR SCLEROTIC CATARACT OF BOTH EYES: ICD-10-CM

## 2019-03-28 PROCEDURE — 99999 PR PBB SHADOW E&M-EST. PATIENT-LVL II: CPT | Mod: PBBFAC,,, | Performed by: OPHTHALMOLOGY

## 2019-03-28 PROCEDURE — 99212 OFFICE O/P EST SF 10 MIN: CPT | Mod: PBBFAC | Performed by: OPHTHALMOLOGY

## 2019-03-28 PROCEDURE — 99499 UNLISTED E&M SERVICE: CPT | Mod: S$PBB,,, | Performed by: OPHTHALMOLOGY

## 2019-03-28 PROCEDURE — 99499 NO LOS: ICD-10-PCS | Mod: S$PBB,,, | Performed by: OPHTHALMOLOGY

## 2019-03-28 PROCEDURE — 99999 PR PBB SHADOW E&M-EST. PATIENT-LVL II: ICD-10-PCS | Mod: PBBFAC,,, | Performed by: OPHTHALMOLOGY

## 2019-03-28 NOTE — PROGRESS NOTES
Assessment /Plan     For exam results, see Encounter Report.    Anatomical narrow angle glaucoma, bilateral    Nuclear sclerotic cataract of both eyes                 Glaucoma (type and duration)    Narrow angles   First HVF   //   First photos   //   Treatment / Drops started   //           Family history    none        Glaucoma meds    none        H/O adverse rxn to glaucoma drops    none        LASERS    none        GLAUCOMA SURGERIES    none        OTHER EYE SURGERIES    none        CDR    0.35/0.35        Tbase    18-22          Tmax    22/18            Ttarget    //             HVF    / test 20/ to  20/ - / od // / os        Gonio    +1/+1 (pre- PI's)         CCT    /        OCT    / test 20/ to 20/ - RNFL - / od // / os        HRT    / test 20/ to 20/ - MR - / od // / os        Disc photos    /    - Ttoday  19/16  - Test done today   F/U PI OD // here for PI os         NSC  Not visually significant      Schedule PI OD first - argon then yag - NO cypts // then OS    After PI's done can F/U Metaire again     PI OD - 2/21/2019 -  PI OS - 3/28/2019 - steroid taper     F/U  4 months - HVF / DFE / photos - [facility]

## 2019-04-07 DIAGNOSIS — G43.019 INTRACTABLE MIGRAINE WITHOUT AURA: ICD-10-CM

## 2019-04-08 RX ORDER — BUTALBITAL, ACETAMINOPHEN AND CAFFEINE 50; 325; 40 MG/1; MG/1; MG/1
TABLET ORAL
Qty: 30 TABLET | Refills: 0 | Status: SHIPPED | OUTPATIENT
Start: 2019-04-08 | End: 2019-05-23 | Stop reason: SDUPTHER

## 2019-04-09 RX ORDER — DIAZEPAM 10 MG/1
TABLET ORAL
Qty: 30 TABLET | Refills: 0 | Status: SHIPPED | OUTPATIENT
Start: 2019-04-09 | End: 2019-11-12 | Stop reason: SDUPTHER

## 2019-04-10 ENCOUNTER — TELEPHONE (OUTPATIENT)
Dept: FAMILY MEDICINE | Facility: CLINIC | Age: 59
End: 2019-04-10

## 2019-04-11 RX ORDER — POTASSIUM CHLORIDE 600 MG/1
8 CAPSULE, EXTENDED RELEASE ORAL DAILY
Qty: 90 CAPSULE | Refills: 1 | Status: SHIPPED | OUTPATIENT
Start: 2019-04-11 | End: 2023-03-22

## 2019-04-11 NOTE — TELEPHONE ENCOUNTER
----- Message from Slime Kim sent at 4/10/2019  3:40 PM CDT -----  Contact: [facility]/[phone]  Type:  RX Refill Request    Who Called:[facility]  Refill or New Rx:  RX Name and Strength: Potassium chloride  How is the patient currently taking it? (ex. 1XDay):  Is this a 30 day or 90 day RX  Preferred Pharmacy with phone number:[facility] [phone] [address] [address]  AT [facility] & [address] [facility] [place]

## 2019-04-11 NOTE — TELEPHONE ENCOUNTER
----- Message from Slime Kim sent at 4/10/2019  3:40 PM CDT -----  Contact: [facility]/[phone]  Type:  RX Refill Request    Who Called:[facility]  Refill or New Rx:  RX Name and Strength: Potassium chloride  How is the patient currently taking it? (ex. 1XDay):  Is this a 30 day or 90 day RX  Preferred Pharmacy with phone number:[facility] [id number] [address] [address]  AT [facility] & [facility]

## 2019-04-12 ENCOUNTER — TELEPHONE (OUTPATIENT)
Dept: OPHTHALMOLOGY | Facility: CLINIC | Age: 59
End: 2019-04-12

## 2019-04-12 DIAGNOSIS — H20.9 IRITIS OF LEFT EYE: Primary | ICD-10-CM

## 2019-04-12 RX ORDER — PREDNISOLONE ACETATE 10 MG/ML
1 SUSPENSION/ DROPS OPHTHALMIC 4 TIMES DAILY
COMMUNITY
Start: 2019-04-12 | End: 2019-04-12 | Stop reason: SDUPTHER

## 2019-04-12 RX ORDER — PREDNISOLONE ACETATE 10 MG/ML
1 SUSPENSION/ DROPS OPHTHALMIC 4 TIMES DAILY
Qty: 5 ML | Refills: 0 | Status: SHIPPED | OUTPATIENT
Start: 2019-04-12 | End: 2022-12-15

## 2019-04-12 NOTE — TELEPHONE ENCOUNTER
----- Message from Prema Mcelroy sent at 4/12/2019  7:27 AM CDT -----  Contact: Pt  Pt asked if she can get a prescription for possible pink eye and says she is having issues with seeing now than before    Pt is requesting a callback at [phone]

## 2019-04-12 NOTE — TELEPHONE ENCOUNTER
----- Message from Carmen Crews sent at 4/12/2019  7:21 AM CDT -----  Contact: Self [phone]  Patient is requesting to talk to nurse she states is urgent.

## 2019-04-12 NOTE — TELEPHONE ENCOUNTER
"Pt states that her left eye has been having a "pinching felling" in it with blurred vision. Dr. Pereira reviewed and will have pt re-start steroid taper. Pt will use steroid drops for 4 x's day for 3 days, then 3 x's day for 3 days , then 2 x's a for 3 days and 1 x a day for 3 days. Pt still has some drops leftover form after laser procedure. We also sent in a Rx to [facility] for pt. Pt will call back if pronlem gets worse and also will schedule appt for 1 week ck.  "

## 2019-04-25 DIAGNOSIS — G43.019 INTRACTABLE MIGRAINE WITHOUT AURA: ICD-10-CM

## 2019-04-25 RX ORDER — BUTALBITAL, ACETAMINOPHEN AND CAFFEINE 50; 325; 40 MG/1; MG/1; MG/1
1 TABLET ORAL EVERY 6 HOURS PRN
Qty: 30 TABLET | Refills: 0 | OUTPATIENT
Start: 2019-04-25

## 2019-04-25 NOTE — TELEPHONE ENCOUNTER
----- Message from Cassandra Lopez sent at 4/25/2019  2:15 PM CDT -----  Contact: Self/ [phone]  Patient called in to get prescription refill. Please call and advise.     butalbital-acetaminophen-caffeine -40 mg (FIORICET, ESGIC) -40 mg per tablet    [id number] - [place]  [address]  AT [facility] OF [facility] & [address]

## 2019-05-20 RX ORDER — METFORMIN HYDROCHLORIDE 1000 MG/1
TABLET ORAL
Qty: 180 TABLET | Refills: 3 | Status: SHIPPED | OUTPATIENT
Start: 2019-05-20 | End: 2020-04-27 | Stop reason: SDUPTHER

## 2019-05-23 DIAGNOSIS — G43.019 INTRACTABLE MIGRAINE WITHOUT AURA: ICD-10-CM

## 2019-05-23 RX ORDER — BUTALBITAL, ACETAMINOPHEN AND CAFFEINE 50; 325; 40 MG/1; MG/1; MG/1
1 TABLET ORAL EVERY 6 HOURS PRN
Qty: 30 TABLET | Refills: 0 | Status: SHIPPED | OUTPATIENT
Start: 2019-05-23 | End: 2019-06-04 | Stop reason: SDUPTHER

## 2019-05-23 NOTE — TELEPHONE ENCOUNTER
----- Message from Kady Acharya sent at 5/23/2019  4:41 PM CDT -----  Contact: [phone]/ self   Type:  Patient Requesting Referral    Who Called: Jud Villa  Does the patient already have the specialty appointment scheduled?: No  If yes, what is the date of that appointment?: n/a  Referral to What Specialty: Neurology   Reason for Referral: Migraines  Does the patient want the referral with a specific physician?: No  Is the specialist an [facility]?: Pt unsure due to insurance  Patient Requesting a Response?: Yes  Would the patient rather a call back or a response via [facility]? Call Back  Best Call Back Number: [phone]  Additional Information: n/a

## 2019-05-23 NOTE — TELEPHONE ENCOUNTER
Patients complains of having migraines everyday for the last 2 weeks wants rx on fioricets with refills and a referral to see a neurologist. Please advice.

## 2019-05-23 NOTE — TELEPHONE ENCOUNTER
----- Message from Kady Acharya sent at 5/23/2019  4:41 PM CDT -----  Contact: [phone]/ self   Type:  Patient Requesting Referral    Who Called: Jud Villa  Does the patient already have the specialty appointment scheduled?: No  If yes, what is the date of that appointment?: n/a  Referral to What Specialty: Neurology   Reason for Referral: Migraines  Does the patient want the referral with a specific physician?: No  Is the specialist an [facility]?: Pt unsure due to insurance  Patient Requesting a Response?: Yes  Would the patient rather a call back or a response via [facility]? Call Back  Best Call Back Number: [phone]  Additional Information: n/a

## 2019-05-24 ENCOUNTER — TELEPHONE (OUTPATIENT)
Dept: FAMILY MEDICINE | Facility: CLINIC | Age: 59
End: 2019-05-24

## 2019-05-24 DIAGNOSIS — G43.909 MIGRAINE SYNDROME: Primary | ICD-10-CM

## 2019-05-24 NOTE — TELEPHONE ENCOUNTER
----- Message from Isa Logan MA sent at 5/24/2019 11:16 AM CDT -----  Contact: [phone]/ self       ----- Message -----  From: Kady Acharya  Sent: 5/23/2019   4:41 PM  To: Jessica TORRES Staff    Type:  Patient Requesting Referral    Who Called: Jud Villa  Does the patient already have the specialty appointment scheduled?: No  If yes, what is the date of that appointment?: n/a  Referral to What Specialty: Neurology   Reason for Referral: Migraines  Does the patient want the referral with a specific physician?: No  Is the specialist [facility]?: Pt unsure due to insurance  Patient Requesting a Response?: Yes  Would the patient rather a call back or a response via [facility]? Call Back  Best Call Back Number: [phone]  Additional Information: n/a

## 2019-06-04 DIAGNOSIS — G43.019 INTRACTABLE MIGRAINE WITHOUT AURA: ICD-10-CM

## 2019-06-04 RX ORDER — BUTALBITAL, ACETAMINOPHEN AND CAFFEINE 50; 325; 40 MG/1; MG/1; MG/1
1 TABLET ORAL EVERY 12 HOURS PRN
Qty: 60 TABLET | Refills: 0 | Status: SHIPPED | OUTPATIENT
Start: 2019-06-04 | End: 2019-08-01 | Stop reason: SDUPTHER

## 2019-06-04 NOTE — TELEPHONE ENCOUNTER
Patient is taking fioricet 2 pills a day and wants more than 30 pills because of getting migraines everyday.

## 2019-06-04 NOTE — TELEPHONE ENCOUNTER
----- Message from Roz Purdy sent at 6/4/2019 12:07 PM CDT -----  Contact: [phone]/self  Patient is very upset. Patient would like a callback today. Please advise.

## 2019-06-14 ENCOUNTER — TELEPHONE (OUTPATIENT)
Dept: FAMILY MEDICINE | Facility: CLINIC | Age: 59
End: 2019-06-14

## 2019-06-14 RX ORDER — INSULIN GLARGINE 100 [IU]/ML
INJECTION, SOLUTION SUBCUTANEOUS
Qty: 15 ML | Refills: 0 | Status: SHIPPED | OUTPATIENT
Start: 2019-06-14 | End: 2023-04-13 | Stop reason: SDUPTHER

## 2019-06-14 NOTE — TELEPHONE ENCOUNTER
Pt informed per dr thomas will keep fiorcet at the same dose because of side efects, she can talk to her oncologist about increasing the dose, also needs to make appt with neurology

## 2019-06-14 NOTE — TELEPHONE ENCOUNTER
Continue with Fioricet at the same dose.    Unfortunately higher doses of Fioricet associated with side effects her age group.    Wait for follow-up with neurologist.

## 2019-06-14 NOTE — TELEPHONE ENCOUNTER
----- Message from Braden Najera sent at 6/14/2019  1:21 PM CDT -----  Contact: [phone]  Patient requested to speak with the nurse about her migraine medication. Please call.

## 2019-06-14 NOTE — TELEPHONE ENCOUNTER
Pt phoned she is having a lot of headaches, her oncologist ordered an mri and told her to double the fiorcet to 2 every 6 hours until she sees neurology, her neurology appt is still pending because of her insurance, she is requesting a new rx for fiorcet

## 2019-06-24 RX ORDER — PEN NEEDLE, DIABETIC 31 GX5/16"
1 NEEDLE, DISPOSABLE MISCELLANEOUS DAILY
Qty: 100 EACH | Refills: 3 | Status: SHIPPED | OUTPATIENT
Start: 2019-06-24 | End: 2021-07-15 | Stop reason: SDUPTHER

## 2019-06-24 NOTE — TELEPHONE ENCOUNTER
----- Message from Alex Acharya sent at 6/24/2019  3:04 PM CDT -----  Contact: Patient   Patient called to give you the numbers for the neurologist she wants to see.  She also states she is in need of her needles for her insulin.    Please call back to assist at [phone]

## 2019-06-25 ENCOUNTER — TELEPHONE (OUTPATIENT)
Dept: FAMILY MEDICINE | Facility: CLINIC | Age: 59
End: 2019-06-25

## 2019-06-25 DIAGNOSIS — G44.029 CHRONIC CLUSTER HEADACHE, NOT INTRACTABLE: Primary | ICD-10-CM

## 2019-06-25 NOTE — TELEPHONE ENCOUNTER
----- Message from Leandra Mcelroy sent at 6/24/2019  4:14 PM CDT -----  Contact: self/[phone]  Needs a referral to neurologist and she also needs to get medication for dizziness.

## 2019-06-25 NOTE — TELEPHONE ENCOUNTER
Spoke to patient to inform of making an appointment today 06/25/2019 for dizziness also she gave me information on [facility] to [facility] an [facility]  To fax another referral for migraine. Patient voices understanding.

## 2019-06-28 DIAGNOSIS — E11.9 TYPE 2 DIABETES MELLITUS WITHOUT COMPLICATION: ICD-10-CM

## 2019-07-18 ENCOUNTER — TELEPHONE (OUTPATIENT)
Dept: FAMILY MEDICINE | Facility: CLINIC | Age: 59
End: 2019-07-18

## 2019-07-18 NOTE — TELEPHONE ENCOUNTER
Pt informed Heather--referral coordinator faxed referral to [facility] for appt, pt given phone number [phone] to call for appt

## 2019-08-01 ENCOUNTER — TELEPHONE (OUTPATIENT)
Dept: FAMILY MEDICINE | Facility: CLINIC | Age: 59
End: 2019-08-01

## 2019-08-01 DIAGNOSIS — G43.019 INTRACTABLE MIGRAINE WITHOUT AURA: ICD-10-CM

## 2019-08-01 RX ORDER — BUTALBITAL, ACETAMINOPHEN AND CAFFEINE 50; 325; 40 MG/1; MG/1; MG/1
1 TABLET ORAL EVERY 12 HOURS PRN
Qty: 30 TABLET | Refills: 0 | Status: SHIPPED | OUTPATIENT
Start: 2019-08-01 | End: 2019-12-23 | Stop reason: SDUPTHER

## 2019-08-02 ENCOUNTER — PATIENT OUTREACH (OUTPATIENT)
Dept: ADMINISTRATIVE | Facility: OTHER | Age: 59
End: 2019-08-02

## 2019-08-02 ENCOUNTER — TELEPHONE (OUTPATIENT)
Dept: FAMILY MEDICINE | Facility: CLINIC | Age: 59
End: 2019-08-02

## 2019-08-02 NOTE — TELEPHONE ENCOUNTER
----- Message from Bert Morrison MD sent at 8/1/2019  6:18 PM CDT -----  Just refilled fiorcet for patient, 14 day supply while covering [place]. Can you call patient and see if she established with neurology? Concerned she is not gotten this done.

## 2019-08-02 NOTE — TELEPHONE ENCOUNTER
Spoke with patient and she does not have a Neurologist she see's. Patient has medicaid and will need referral to [facility].

## 2019-08-02 NOTE — TELEPHONE ENCOUNTER
I spoke to patient and I gave her the phone number for [facility] so she can make the appointment. Patient voices understanding.

## 2019-08-05 ENCOUNTER — TELEPHONE (OUTPATIENT)
Dept: PULMONOLOGY | Facility: CLINIC | Age: 59
End: 2019-08-05

## 2019-08-05 NOTE — TELEPHONE ENCOUNTER
----- Message from Stacy Schroeder MA sent at 8/5/2019  9:46 AM CDT -----  Dr Camara,  Pts Anoro ellipts 62.5-25mcg not covered preferred alternative is Dar Zhang mcg please send to pt's pharmacy [facility] on [address] [place] [address]

## 2019-08-20 ENCOUNTER — TELEPHONE (OUTPATIENT)
Dept: FAMILY MEDICINE | Facility: CLINIC | Age: 59
End: 2019-08-20

## 2019-08-21 ENCOUNTER — TELEPHONE (OUTPATIENT)
Dept: FAMILY MEDICINE | Facility: CLINIC | Age: 59
End: 2019-08-21

## 2019-08-21 NOTE — TELEPHONE ENCOUNTER
Pt phoned states she is in [facility] now, she is having slurred speech and confusion, also has a severe headache, she will talk with the dr at the [facility] and call us back

## 2019-08-21 NOTE — TELEPHONE ENCOUNTER
----- Message from Alea Wood sent at 8/21/2019 10:16 AM CDT -----  Contact: [facility] - [phone]  She is in  hospital now for slurred speech and confusion. Please advise

## 2019-09-04 ENCOUNTER — OFFICE VISIT (OUTPATIENT)
Dept: FAMILY MEDICINE | Facility: CLINIC | Age: 59
End: 2019-09-04
Payer: MEDICAID

## 2019-09-04 VITALS
SYSTOLIC BLOOD PRESSURE: 104 MMHG | OXYGEN SATURATION: 96 % | HEART RATE: 79 BPM | BODY MASS INDEX: 26.98 KG/M2 | WEIGHT: 146.63 LBS | HEIGHT: 62 IN | DIASTOLIC BLOOD PRESSURE: 60 MMHG

## 2019-09-04 DIAGNOSIS — R41.3 MEMORY LOSS: ICD-10-CM

## 2019-09-04 DIAGNOSIS — Z00.00 ANNUAL PHYSICAL EXAM: Primary | ICD-10-CM

## 2019-09-04 DIAGNOSIS — G43.909 MIGRAINE SYNDROME: ICD-10-CM

## 2019-09-04 DIAGNOSIS — E11.65 TYPE 2 DIABETES MELLITUS WITH HYPERGLYCEMIA, WITH LONG-TERM CURRENT USE OF INSULIN: ICD-10-CM

## 2019-09-04 DIAGNOSIS — Z79.4 TYPE 2 DIABETES MELLITUS WITH HYPERGLYCEMIA, WITH LONG-TERM CURRENT USE OF INSULIN: ICD-10-CM

## 2019-09-04 PROCEDURE — 99214 OFFICE O/P EST MOD 30 MIN: CPT | Mod: S$PBB,,, | Performed by: FAMILY MEDICINE

## 2019-09-04 PROCEDURE — 99999 PR PBB SHADOW E&M-EST. PATIENT-LVL IV: CPT | Mod: PBBFAC,,, | Performed by: FAMILY MEDICINE

## 2019-09-04 PROCEDURE — 99999 PR PBB SHADOW E&M-EST. PATIENT-LVL IV: ICD-10-PCS | Mod: PBBFAC,,, | Performed by: FAMILY MEDICINE

## 2019-09-04 PROCEDURE — 99214 OFFICE O/P EST MOD 30 MIN: CPT | Mod: PBBFAC,PO | Performed by: FAMILY MEDICINE

## 2019-09-04 PROCEDURE — 99214 PR OFFICE/OUTPT VISIT, EST, LEVL IV, 30-39 MIN: ICD-10-PCS | Mod: S$PBB,,, | Performed by: FAMILY MEDICINE

## 2019-09-04 RX ORDER — BROMPHENIRAMINE MALEATE, PSEUDOEPHEDRINE HYDROCHLORIDE, AND DEXTROMETHORPHAN HYDROBROMIDE 2; 30; 10 MG/5ML; MG/5ML; MG/5ML
SYRUP ORAL
COMMUNITY
Start: 2019-07-31 | End: 2019-12-23 | Stop reason: SDUPTHER

## 2019-09-04 RX ORDER — NAPROXEN SODIUM 220 MG/1
81 TABLET, FILM COATED ORAL
COMMUNITY
Start: 2019-08-22 | End: 2020-08-21

## 2019-09-04 RX ORDER — TOPIRAMATE 25 MG/1
25 TABLET ORAL 2 TIMES DAILY
Qty: 60 TABLET | Refills: 0 | Status: SHIPPED | OUTPATIENT
Start: 2019-09-04 | End: 2020-04-27 | Stop reason: SDUPTHER

## 2019-09-04 RX ORDER — LINAGLIPTIN 5 MG/1
TABLET, FILM COATED ORAL
COMMUNITY
Start: 2019-08-07 | End: 2020-04-27

## 2019-09-04 RX ORDER — LANOLIN ALCOHOL/MO/W.PET/CERES
400 CREAM (GRAM) TOPICAL
COMMUNITY
Start: 2019-08-21 | End: 2019-11-19

## 2019-09-04 NOTE — PROGRESS NOTES
Subjective:       Patient ID: Jud Villa is a 59 y.o. female.    Chief Complaint: Annual Exam    59 years old female came to the clinic for her physical examination.  Patient with significant migraine at least 3 headaches per week.  She is using Fioricet with partial improvement of her symptoms.  Patient was not able to follow up with Neurology.  She is requesting a medicine to help her with the headaches.  Patient also reports problems with the memory.  Patient still able to do her activities of daily living.    Review of Systems   Constitutional: Negative.    HENT: Negative.    Eyes: Negative.    Respiratory: Negative.    Cardiovascular: Negative.    Gastrointestinal: Negative.    Genitourinary: Negative.    Musculoskeletal: Negative.    Skin: Negative.    Neurological: Positive for headaches.   Psychiatric/Behavioral: Positive for decreased concentration. The patient is nervous/anxious.        Objective:      Physical Exam   Constitutional: She is oriented to person, place, and time. She appears well-developed and well-nourished. No distress.   HENT:   Head: Normocephalic and atraumatic.   Right Ear: External ear normal.   Left Ear: External ear normal.   Nose: Nose normal.   Mouth/Throat: Oropharynx is clear and moist. No oropharyngeal exudate.   Eyes: Pupils are equal, round, and reactive to light. Conjunctivae and EOM are normal. Right eye exhibits no discharge. Left eye exhibits no discharge. No scleral icterus.   Neck: Normal range of motion. Neck supple. No JVD present. No tracheal deviation present. No thyromegaly present.   Cardiovascular: Normal rate, regular rhythm, normal heart sounds and intact distal pulses. Exam reveals no gallop and no friction rub.   No murmur heard.  Pulmonary/Chest: Effort normal and breath sounds normal. No stridor. No respiratory distress. She has no wheezes. She has no rales. She exhibits no tenderness.   Abdominal: Soft. Bowel sounds are normal. She exhibits no  distension and no mass. There is no tenderness. There is no rebound and no guarding.   Musculoskeletal: Normal range of motion. She exhibits no edema or tenderness.   Lymphadenopathy:     She has no cervical adenopathy.   Neurological: She is alert and oriented to person, place, and time. She has normal reflexes. No cranial nerve deficit. She exhibits normal muscle tone. Coordination normal.   Skin: Skin is warm and dry. No rash noted. She is not diaphoretic. No erythema. No pallor.   Psychiatric: Her behavior is normal. Judgment and thought content normal. Her mood appears anxious. Her affect is not angry, not blunt, not labile and not inappropriate. Cognition and memory are impaired. She exhibits a depressed mood. She exhibits abnormal recent memory. She exhibits normal remote memory.   Mini-mental test 26/30       Assessment:       1. Annual physical exam    2. Migraine syndrome    3. Type 2 diabetes mellitus with hyperglycemia, with long-term current use of insulin    4. Memory loss        Plan:         Jud was seen today for annual exam.    Diagnoses and all orders for this visit:    Annual physical exam  -     TSH; Future  -     Comprehensive metabolic panel; Future  -     Lipid panel; Future  -     Hemoglobin A1c; Future  -     Microalbumin/creatinine urine ratio; Future  -     CBC auto differential; Future    Migraine syndrome  -     topiramate (TOPAMAX) 25 MG tablet; Take 1 tablet (25 mg total) by mouth 2 (two) times daily.    Type 2 diabetes mellitus with hyperglycemia, with long-term current use of insulin  -     Hemoglobin A1c; Future  -     Microalbumin/creatinine urine ratio; Future    Memory loss  -     TSH; Future  -     Comprehensive metabolic panel; Future  -     CBC auto differential; Future  -     Vitamin B12; Future  -     Folate; Future  -     RPR; Future  -     Ambulatory Referral to Neuropsychology  -     Ambulatory referral to Neurology    Continue monitoring blood sugar at home,ADA  diet.

## 2019-09-05 DIAGNOSIS — M15.9 PRIMARY OSTEOARTHRITIS INVOLVING MULTIPLE JOINTS: ICD-10-CM

## 2019-09-05 RX ORDER — ETODOLAC 200 MG/1
200 CAPSULE ORAL 2 TIMES DAILY PRN
Qty: 60 CAPSULE | Refills: 0 | Status: SHIPPED | OUTPATIENT
Start: 2019-09-05 | End: 2020-04-27

## 2019-09-05 NOTE — TELEPHONE ENCOUNTER
----- Message from Carmen Crews sent at 9/5/2019  1:15 PM CDT -----  Contact: Self [phone]  Patient is calling to get refills on her medication sent to [id number] - [place], [place] - [address] AT [facility] OF [address] & [address] [phone] (Phone) [phone] (Fax)      1. Naproxen     2. Cyclobenzaprine Flexeril     3. Tramadol

## 2019-11-11 ENCOUNTER — TELEPHONE (OUTPATIENT)
Dept: PULMONOLOGY | Facility: CLINIC | Age: 59
End: 2019-11-11

## 2019-11-11 DIAGNOSIS — R05.9 COUGH: Primary | ICD-10-CM

## 2019-11-11 NOTE — TELEPHONE ENCOUNTER
----- Message from Mary Jane Nichols sent at 11/11/2019  1:44 PM CST -----  Contact: daughter in law  Leia   [phone] =   Pt needs urgent appt    Sent message this morning      Went to ENT   Been taking med  No better      Pt needs urgent appt     Wheezing,  Coughing bad  ,  COPD , Enphzema   ENT thinks she might have pneumonia       Thanks

## 2019-11-11 NOTE — TELEPHONE ENCOUNTER
I spoke to patients daughter in law, Tere. Patient scheduled for follow up on 11/13/19 at 10:30am with cxr prior at 8:45am on 2nd floor radiology. Tere verbalized understanding and confirmed appointment.

## 2019-11-11 NOTE — TELEPHONE ENCOUNTER
----- Message from Morenita Sanchez MA sent at 11/11/2019 10:14 AM CST -----  Contact: self/[phone]  Pt states that's she is not feeling to well, Pt says she has a sharp pain in chest and a bad cough, Pt is wanting to be seen and requesting a call back.

## 2019-11-12 ENCOUNTER — PATIENT OUTREACH (OUTPATIENT)
Dept: ADMINISTRATIVE | Facility: OTHER | Age: 59
End: 2019-11-12

## 2019-11-12 RX ORDER — GLIPIZIDE 10 MG/1
10 TABLET ORAL 2 TIMES DAILY
Qty: 180 TABLET | Refills: 3 | Status: SHIPPED | OUTPATIENT
Start: 2019-11-12 | End: 2020-04-27 | Stop reason: SDUPTHER

## 2019-11-12 RX ORDER — DIAZEPAM 10 MG/1
TABLET ORAL
Qty: 30 TABLET | Refills: 0 | Status: SHIPPED | OUTPATIENT
Start: 2019-11-12 | End: 2020-03-26

## 2019-11-13 ENCOUNTER — OFFICE VISIT (OUTPATIENT)
Dept: PULMONOLOGY | Facility: CLINIC | Age: 59
End: 2019-11-13
Payer: MEDICAID

## 2019-11-13 ENCOUNTER — HOSPITAL ENCOUNTER (OUTPATIENT)
Dept: RADIOLOGY | Facility: HOSPITAL | Age: 59
Discharge: HOME | End: 2019-11-13
Attending: INTERNAL MEDICINE
Payer: MEDICAID

## 2019-11-13 VITALS
HEART RATE: 90 BPM | HEIGHT: 62 IN | WEIGHT: 143.94 LBS | SYSTOLIC BLOOD PRESSURE: 128 MMHG | OXYGEN SATURATION: 95 % | BODY MASS INDEX: 26.49 KG/M2 | DIASTOLIC BLOOD PRESSURE: 76 MMHG

## 2019-11-13 DIAGNOSIS — J43.2 CENTRILOBULAR EMPHYSEMA: ICD-10-CM

## 2019-11-13 DIAGNOSIS — R05.9 COUGH: ICD-10-CM

## 2019-11-13 DIAGNOSIS — F17.200 SMOKER: ICD-10-CM

## 2019-11-13 DIAGNOSIS — J44.1 COPD EXACERBATION: Primary | ICD-10-CM

## 2019-11-13 DIAGNOSIS — J01.10 ACUTE FRONTAL SINUSITIS, RECURRENCE NOT SPECIFIED: ICD-10-CM

## 2019-11-13 PROCEDURE — 99999 PR PBB SHADOW E&M-EST. PATIENT-LVL III: CPT | Mod: PBBFAC,,, | Performed by: INTERNAL MEDICINE

## 2019-11-13 PROCEDURE — 99213 OFFICE O/P EST LOW 20 MIN: CPT | Mod: PBBFAC,25 | Performed by: INTERNAL MEDICINE

## 2019-11-13 PROCEDURE — 99214 OFFICE O/P EST MOD 30 MIN: CPT | Mod: 25,S$PBB,, | Performed by: INTERNAL MEDICINE

## 2019-11-13 PROCEDURE — 71046 X-RAY EXAM CHEST 2 VIEWS: CPT | Mod: 26,,, | Performed by: RADIOLOGY

## 2019-11-13 PROCEDURE — 71046 XR CHEST PA AND LATERAL: ICD-10-PCS | Mod: 26,,, | Performed by: RADIOLOGY

## 2019-11-13 PROCEDURE — 99999 PR PBB SHADOW E&M-EST. PATIENT-LVL III: ICD-10-PCS | Mod: PBBFAC,,, | Performed by: INTERNAL MEDICINE

## 2019-11-13 PROCEDURE — 99214 PR OFFICE/OUTPT VISIT, EST, LEVL IV, 30-39 MIN: ICD-10-PCS | Mod: 25,S$PBB,, | Performed by: INTERNAL MEDICINE

## 2019-11-13 PROCEDURE — 71046 X-RAY EXAM CHEST 2 VIEWS: CPT | Mod: TC,FY

## 2019-11-13 RX ORDER — FLUTICASONE PROPIONATE 50 MCG
2 SPRAY, SUSPENSION (ML) NASAL DAILY
Qty: 16 G | Refills: 6 | Status: SHIPPED | OUTPATIENT
Start: 2019-11-13 | End: 2019-12-13

## 2019-11-13 RX ORDER — IPRATROPIUM BROMIDE AND ALBUTEROL SULFATE 2.5; .5 MG/3ML; MG/3ML
3 SOLUTION RESPIRATORY (INHALATION) DAILY
Qty: 1 BOX | Refills: 11 | Status: SHIPPED | OUTPATIENT
Start: 2019-11-13 | End: 2020-02-06 | Stop reason: SDUPTHER

## 2019-11-13 RX ORDER — ALBUTEROL SULFATE 90 UG/1
AEROSOL, METERED RESPIRATORY (INHALATION)
Qty: 18 G | Refills: 11 | Status: SHIPPED | OUTPATIENT
Start: 2019-11-13 | End: 2021-08-13 | Stop reason: SDUPTHER

## 2019-11-13 RX ORDER — PREDNISONE 20 MG/1
40 TABLET ORAL DAILY
Qty: 10 TABLET | Refills: 0 | Status: SHIPPED | OUTPATIENT
Start: 2019-11-13 | End: 2019-11-18

## 2019-11-13 RX ORDER — LEVOCETIRIZINE DIHYDROCHLORIDE 5 MG/1
5 TABLET, FILM COATED ORAL NIGHTLY
Qty: 30 TABLET | Refills: 11 | Status: SHIPPED | OUTPATIENT
Start: 2019-11-13 | End: 2021-05-19

## 2019-11-13 NOTE — PROGRESS NOTES
"Subjective:       Patient ID: Jud Villa is a 59 y.o. female.    Chief Complaint: Wheezing and Cough    59 year old with mild obstruction/emphysema.  In March started on Bevespi without relief of symptoms.  Previously (in 2015) improved on symbicort.  Patient is on biaxin for sinus.  Has constant sinus congestion and pain.  Friday was given a steroid injection with minimal relief.  Has been coughing for two weeks.      Review of Systems   Constitutional: Negative for fever.   HENT: Negative for trouble swallowing.    Respiratory: Positive for cough, sputum production (white phlegm) and wheezing.    Gastrointestinal: Negative for acid reflux.   Neurological: Positive for headaches.       Sharp pain with activities and with coughing.  Quit smoking one year ago but lives in a home with a smoker.  Has been using bevespi once daily and albuterol once a day without relief of cough    Objective:       Vitals:    11/13/19 1033   BP: 128/76   BP Location: Right arm   Patient Position: Sitting   Pulse: 90   SpO2: 95%   Weight: 65.3 kg (143 lb 15.4 oz)   Height: 5' 2" (1.575 m)     Physical Exam   Constitutional: She is oriented to person, place, and time.   HENT:   Right TM is dull   Pulmonary/Chest: She has wheezes (on exhalation throughout).   Musculoskeletal: Normal range of motion. She exhibits no edema.   Lymphadenopathy: No supraclavicular adenopathy is present.     She has no cervical adenopathy.   Neurological: She is alert and oriented to person, place, and time.   Skin: Skin is warm and dry.   Psychiatric: She has a normal mood and affect.        Personal Diagnostic Review:  CXR today normal without infiltrate    CT of chest performed on 2015 without contrast revealed Slight centrilobular emphysema and 2-cm bullae in the right lung apex. There are no abnormal opacities requiring follow-up..  Pulmonary Function Tests 11/13/2019   SpO2 95   Height 62.000   Weight 2303.37   BMI (Calculated) 26.3   Some recent " "data might be hidden         Assessment:       1. COPD exacerbation    2. Centrilobular emphysema    3. Acute frontal sinusitis, recurrence not specified    4. Smoker        Outpatient Encounter Medications as of 11/13/2019   Medication Sig Dispense Refill    albuterol (VENTOLIN HFA) 90 mcg/actuation inhaler INHALE 2 PUFFS BY MOUTH INTO THE LUNGS EVERY 6 HOURS AS NEEDED FOR WHEEZING 18 g 11    albuterol-ipratropium (DUO-NEB) 2.5 mg-0.5 mg/3 mL nebulizer solution Take 3 mLs by nebulization once daily. 1 Box 11    alendronate (FOSAMAX) 70 MG tablet Take 1 tablet (70 mg total) by mouth every 7 days. 4 tablet 11    aspirin 81 MG Chew Take 81 mg by mouth.      BASAGLAR KWIKPEN U-100 INSULIN glargine 100 units/mL (3mL) SubQ pen INJECT 10 UNITS UNDER THE SKIN EVERY MORNING 15 mL 0    BD ULTRA-FINE SHORT PEN NEEDLE 31 gauge x 5/16" Ndle Inject 1 pen into the skin once daily. 100 each 3    brompheniramine-pseudoeph-DM (BROMFED DM) 2-30-10 mg/5 mL Syrp       butalbital-acetaminophen-caffeine -40 mg (FIORICET, ESGIC) -40 mg per tablet Take 1 tablet by mouth every 12 (twelve) hours as needed for Headaches. Further refills require visit with neurology 30 tablet 0    diazePAM (VALIUM) 10 MG Tab TAKE 1 TABLET BY MOUTH EVERY DAY AS NEEDED FOR ANXIETY 30 tablet 0    ergocalciferol (ERGOCALCIFEROL) 50,000 unit Cap Take 1 capsule (50,000 Units total) by mouth every 7 days. 12 capsule 3    escitalopram oxalate (LEXAPRO) 10 MG tablet Take 1 tablet (10 mg total) by mouth once daily. 90 tablet 3    etodolac (LODINE) 200 MG Cap Take 1 capsule (200 mg total) by mouth 2 (two) times daily as needed (pain). 60 capsule 0    gabapentin (NEURONTIN) 100 MG capsule Take 100 mg by mouth.      glipiZIDE (GLUCOTROL) 10 MG tablet Take 1 tablet (10 mg total) by mouth 2 (two) times daily. 180 tablet 3    JARDIANCE 10 mg Tab Take 10 mg by mouth once daily.      lorcaserin 10 mg Tab Take 10 mg by mouth 2 (two) times daily. 60 " tablet 3    magnesium oxide (MAGOX) 400 mg (241.3 mg magnesium) tablet Take 400 mg by mouth.      metFORMIN (GLUCOPHAGE) 1000 MG tablet TAKE 1 TABLET(1000 MG) BY MOUTH TWICE DAILY WITH MEALS 180 tablet 3    oxymetazoline (AFRIN) 0.05 % nasal spray 2 sprays by Nasal route.      pantoprazole (PROTONIX) 40 MG tablet Take 40 mg by mouth once daily.      potassium chloride (MICRO-K) 8 mEq CpSR Take 1 capsule (8 mEq total) by mouth once daily. 90 capsule 1    prednisoLONE acetate (PRED FORTE) 1 % DrpS Place 1 drop into the left eye 4 (four) times daily. 5 mL 0    promethazine (PHENERGAN) 25 MG tablet Take 1 tablet (25 mg total) by mouth every 6 (six) hours as needed. 30 tablet 0    topiramate (TOPAMAX) 25 MG tablet Take 1 tablet (25 mg total) by mouth 2 (two) times daily. 60 tablet 0    TRADJENTA 5 mg Tab tablet       triamcinolone acetonide 0.1% (KENALOG) 0.1 % cream Apply topically 2 (two) times daily. 45 g 0    TRUE METRIX GLUCOSE TEST STRIP Strp USE AS DIRECTED FOUR TIMES DAILY 400 strip 0    VIRTUSSIN AC  mg/5 mL syrup Take 5 mLs by mouth as needed.       [DISCONTINUED] albuterol (VENTOLIN HFA) 90 mcg/actuation inhaler INHALE 2 PUFFS BY MOUTH INTO THE LUNGS EVERY 6 HOURS AS NEEDED FOR WHEEZING 18 g 11    [DISCONTINUED] albuterol-ipratropium (DUO-NEB) 2.5 mg-0.5 mg/3 mL nebulizer solution Take 3 mLs by nebulization once daily.       [DISCONTINUED] fluticasone (FLONASE) 50 mcg/actuation nasal spray 1 spray (50 mcg total) by Each Nare route once daily. 16 g 3    [DISCONTINUED] glycopyrrolate-formoterol (BEVESPI AEROSPHERE) 9-4.8 mcg HFAA Inhale 2 puffs into the lungs 2 (two) times daily. Controller 10.7 g 11    [DISCONTINUED] VENTOLIN HFA 90 mcg/actuation inhaler INHALE 2 PUFFS BY MOUTH INTO THE LUNGS EVERY 6 HOURS AS NEEDED FOR WHEEZING 18 g 0    fluticasone propionate (FLONASE) 50 mcg/actuation nasal spray 2 sprays (100 mcg total) by Each Nostril route once daily. 16 g 6     fluticasone-umeclidin-vilanter (TRELEGY ELLIPTA) 100-62.5-25 mcg DsDv Inhale 1 puff into the lungs once daily. 1 each 11    levocetirizine (XYZAL) 5 MG tablet Take 1 tablet (5 mg total) by mouth every evening. 30 tablet 11    predniSONE (DELTASONE) 20 MG tablet Take 2 tablets (40 mg total) by mouth once daily. for 5 days 10 tablet 0    [DISCONTINUED] ipratropium (ATROVENT HFA) 17 mcg/actuation inhaler Inhale 2 puffs into the lungs every 6 (six) hours. 12.9 Inhaler 1     No facility-administered encounter medications on file as of 11/13/2019.      No orders of the defined types were placed in this encounter.    Plan:     Problem List Items Addressed This Visit     Smoker    Overview     Has quit but lives in home, must not smoke in home         COPD (chronic obstructive pulmonary disease)    Overview     Add ICS to Laba/lama combination (trelegy daily)  Albuterol HFA or nebulizer  for rescue and prevention.    Moderate obstruction on FEV1 (58% predicted), normal diffusion.         Relevant Medications    fluticasone-umeclidin-vilanter (TRELEGY ELLIPTA) 100-62.5-25 mcg DsDv    albuterol (VENTOLIN HFA) 90 mcg/actuation inhaler    albuterol-ipratropium (DUO-NEB) 2.5 mg-0.5 mg/3 mL nebulizer solution      Other Visit Diagnoses     COPD exacerbation    -  Primary    Relevant Medications    fluticasone-umeclidin-vilanter (TRELEGY ELLIPTA) 100-62.5-25 mcg DsDv    albuterol (VENTOLIN HFA) 90 mcg/actuation inhaler    predniSONE (DELTASONE) 20 MG tablet    albuterol-ipratropium (DUO-NEB) 2.5 mg-0.5 mg/3 mL nebulizer solution    Acute frontal sinusitis, recurrence not specified        Relevant Medications    predniSONE (DELTASONE) 20 MG tablet    fluticasone propionate (FLONASE) 50 mcg/actuation nasal spray    levocetirizine (XYZAL) 5 MG tablet          Follow up in three months with any provider.

## 2019-11-18 RX ORDER — EMPAGLIFLOZIN 10 MG/1
TABLET, FILM COATED ORAL
Qty: 90 TABLET | Refills: 0 | Status: SHIPPED | OUTPATIENT
Start: 2019-11-18 | End: 2020-04-27 | Stop reason: SDUPTHER

## 2019-11-27 DIAGNOSIS — M81.0 SENILE OSTEOPOROSIS: ICD-10-CM

## 2019-11-27 RX ORDER — ALENDRONATE SODIUM 70 MG/1
70 TABLET ORAL
Qty: 4 TABLET | Refills: 0 | Status: SHIPPED | OUTPATIENT
Start: 2019-11-27 | End: 2020-01-13

## 2019-12-06 ENCOUNTER — LAB VISIT (OUTPATIENT)
Dept: LAB | Facility: HOSPITAL | Age: 59
End: 2019-12-06
Attending: FAMILY MEDICINE
Payer: MEDICAID

## 2019-12-06 DIAGNOSIS — Z00.00 ANNUAL PHYSICAL EXAM: ICD-10-CM

## 2019-12-06 DIAGNOSIS — Z79.4 TYPE 2 DIABETES MELLITUS WITH HYPERGLYCEMIA, WITH LONG-TERM CURRENT USE OF INSULIN: ICD-10-CM

## 2019-12-06 DIAGNOSIS — E11.65 TYPE 2 DIABETES MELLITUS WITH HYPERGLYCEMIA, WITH LONG-TERM CURRENT USE OF INSULIN: ICD-10-CM

## 2019-12-06 LAB
ALBUMIN/CREAT UR: 6.7 UG/MG (ref 0–30)
CREAT UR-MCNC: 179 MG/DL (ref 15–325)
MICROALBUMIN UR DL<=1MG/L-MCNC: 12 UG/ML

## 2019-12-06 PROCEDURE — 82043 UR ALBUMIN QUANTITATIVE: CPT

## 2019-12-10 ENCOUNTER — PATIENT OUTREACH (OUTPATIENT)
Dept: ADMINISTRATIVE | Facility: OTHER | Age: 59
End: 2019-12-10

## 2019-12-12 ENCOUNTER — TELEPHONE (OUTPATIENT)
Dept: FAMILY MEDICINE | Facility: CLINIC | Age: 59
End: 2019-12-12

## 2019-12-12 ENCOUNTER — OFFICE VISIT (OUTPATIENT)
Dept: FAMILY MEDICINE | Facility: CLINIC | Age: 59
End: 2019-12-12
Attending: FAMILY MEDICINE
Payer: MEDICAID

## 2019-12-12 VITALS
SYSTOLIC BLOOD PRESSURE: 120 MMHG | WEIGHT: 143.31 LBS | HEART RATE: 87 BPM | TEMPERATURE: 99 F | DIASTOLIC BLOOD PRESSURE: 80 MMHG | BODY MASS INDEX: 26.37 KG/M2 | HEIGHT: 62 IN | OXYGEN SATURATION: 96 %

## 2019-12-12 DIAGNOSIS — J45.41 MODERATE PERSISTENT REACTIVE AIRWAY DISEASE WITH ACUTE EXACERBATION: ICD-10-CM

## 2019-12-12 DIAGNOSIS — R05.9 COUGH: ICD-10-CM

## 2019-12-12 DIAGNOSIS — J01.01 ACUTE RECURRENT MAXILLARY SINUSITIS: Primary | ICD-10-CM

## 2019-12-12 PROCEDURE — 99999 PR PBB SHADOW E&M-EST. PATIENT-LVL III: CPT | Mod: PBBFAC,,, | Performed by: FAMILY MEDICINE

## 2019-12-12 PROCEDURE — 99999 PR PBB SHADOW E&M-EST. PATIENT-LVL III: ICD-10-PCS | Mod: PBBFAC,,, | Performed by: FAMILY MEDICINE

## 2019-12-12 PROCEDURE — 99213 OFFICE O/P EST LOW 20 MIN: CPT | Mod: PBBFAC,PO | Performed by: FAMILY MEDICINE

## 2019-12-12 PROCEDURE — 99214 OFFICE O/P EST MOD 30 MIN: CPT | Mod: S$PBB,,, | Performed by: FAMILY MEDICINE

## 2019-12-12 PROCEDURE — 99214 PR OFFICE/OUTPT VISIT, EST, LEVL IV, 30-39 MIN: ICD-10-PCS | Mod: S$PBB,,, | Performed by: FAMILY MEDICINE

## 2019-12-12 RX ORDER — PREDNISONE 20 MG/1
20 TABLET ORAL DAILY
Qty: 3 TABLET | Refills: 0 | Status: SHIPPED | OUTPATIENT
Start: 2019-12-12 | End: 2019-12-15

## 2019-12-12 RX ORDER — PROMETHAZINE HYDROCHLORIDE AND DEXTROMETHORPHAN HYDROBROMIDE 6.25; 15 MG/5ML; MG/5ML
5 SYRUP ORAL 2 TIMES DAILY PRN
Qty: 180 ML | Refills: 1 | Status: SHIPPED | OUTPATIENT
Start: 2019-12-12 | End: 2019-12-22

## 2019-12-12 NOTE — TELEPHONE ENCOUNTER
Called patient, no answer, left VM to call office.      ----- Message from Rudy Santos sent at 12/12/2019 11:49 AM CST -----  Contact: self [phone]  Patient would like to be seen today. PINK [employer]

## 2019-12-12 NOTE — PATIENT INSTRUCTIONS
Self-Care for Sinusitis     Drinking plenty of water can help sinuses drain.   Sinusitis can often be managed with self-care. Self-care can keep sinuses moist and make you feel more comfortable. Remember to follow your doctor's instructions closely. This can make a big difference in getting your sinus problem under control.  Drink fluids  Drinking extra fluids helps thin your mucus. This lets it drain from your sinuses more easily. Have a glass of water every hour or two. A humidifier helps in much the same way. Fluids can also offset the drying effects of certain medicines. If you use a humidifier, follow the product maker's instructions on how to use it. Clean it on a regular schedule.  Use saltwater rinses  Rinses help keep your sinuses and nose moist. Mix a teaspoon of salt in 8 ounces of fresh, warm water. Use a bulb syringe to gently squirt the water into your nose a few times a day. You can also buy ready-made saline nasal sprays.  Apply hot or cold packs  Applying heat to the area surrounding your sinuses may make you feel more comfortable. Use a hot water bottle or a hand towel dipped in hot water. Some people also find ice packs effective for relieving pain.  Medicines  Your doctor may prescribe medications to help treat your sinusitis. If you have an infection, antibiotics can help clear it up. If you are prescribed antibiotics, take all pills on schedule until they are gone, even if you feel better. Decongestants help relieve swelling. Use decongestant sprays for short periods only under the direction of your doctor. If you have allergies, your doctor may prescribe medications to help relieve them.   Date Last Reviewed: 10/1/2016  © 8347-2362 [employer]. [address] [place] [address]. All rights reserved. This information is not intended as a substitute for professional medical care. Always follow your healthcare professional's instructions.

## 2019-12-12 NOTE — TELEPHONE ENCOUNTER
Called and spoke w/ patient. Scheduled same day appt urgent care at 5:00pm, body aches,headache, nausea.      ----- Message from Slime Kim sent at 12/12/2019  1:53 PM CST -----  Contact: [phone]/self  Type:  Same Day Appointment Request    Caller is requesting a same day appointment.  Caller declined first available appointment listed below.    Name of Caller: self  When is the first available appointment?   Symptoms:   Best Call Back Number:   Additional Information:  Any doctor in the office

## 2019-12-12 NOTE — PROGRESS NOTES
Subjective:       Patient ID: Jud Villa is a 59 y.o. female.    Chief Complaint: Sinusitis (x 3 days); Cough; and Generalized Body Aches    59 yr old pleasant female with known DM II, and other co morbidities presents today for urgent care c/o wheezing, cough and sinus congestion. Onset 3-4 days ago and gradually worsening. No fever or chills. OTC meds not working. Details as follows -    History as below - reviewed     Sinus Problem   This is a new problem. The current episode started in the past 7 days. The problem is unchanged. There has been no fever. Her pain is at a severity of 5/10. The pain is moderate. Associated symptoms include congestion, coughing and sinus pressure. Pertinent negatives include no diaphoresis, headaches, shortness of breath or sore throat. Past treatments include nothing. The treatment provided no relief.   Cough   This is a new problem. The current episode started in the past 7 days. The problem has been unchanged. The problem occurs constantly. The cough is non-productive. Associated symptoms include nasal congestion, postnasal drip and wheezing. Pertinent negatives include no chest pain, ear congestion, headaches, myalgias, rhinorrhea, sore throat or shortness of breath. Nothing aggravates the symptoms. She has tried nothing for the symptoms. The treatment provided no relief. Her past medical history is significant for COPD and environmental allergies. There is no history of asthma or bronchitis.   Wheezing    This is a new problem. The current episode started in the past 7 days. The problem occurs constantly. The problem has been unchanged. Associated symptoms include coughing. Pertinent negatives include no chest pain, headaches, rhinorrhea, shortness of breath or sore throat. Nothing aggravates the symptoms. She has tried nothing for the symptoms. The treatment provided no relief. Her past medical history is significant for COPD. There is no history of asthma or chronic  lung disease.     Review of Systems   Constitutional: Negative.  Negative for activity change, diaphoresis and unexpected weight change.   HENT: Positive for congestion, postnasal drip and sinus pressure. Negative for ear discharge, hearing loss, rhinorrhea, sore throat and voice change.    Eyes: Negative.  Negative for pain, discharge and visual disturbance.   Respiratory: Positive for cough and wheezing. Negative for chest tightness and shortness of breath.    Cardiovascular: Negative.  Negative for chest pain.   Gastrointestinal: Negative.  Negative for abdominal distention, anal bleeding, constipation and nausea.   Endocrine: Negative.  Negative for cold intolerance, polydipsia and polyuria.   Genitourinary: Negative.  Negative for decreased urine volume, difficulty urinating, dysuria, frequency, menstrual problem and vaginal pain.   Musculoskeletal: Negative.  Negative for arthralgias, gait problem and myalgias.   Skin: Negative.  Negative for color change, pallor and wound.   Allergic/Immunologic: Positive for environmental allergies. Negative for immunocompromised state.   Neurological: Negative.  Negative for dizziness, tremors, seizures, speech difficulty and headaches.   Hematological: Negative.  Negative for adenopathy. Does not bruise/bleed easily.   Psychiatric/Behavioral: Negative.  Negative for agitation, confusion, decreased concentration, hallucinations, self-injury and suicidal ideas. The patient is not nervous/anxious.        PMH/PSH/FH/SH/MED/ALLERGY reviewed    Objective:       Vitals:    12/12/19 1733   BP: 120/80   Pulse: 87   Temp: 98.6 °F (37 °C)       Physical Exam   Constitutional: She is oriented to person, place, and time. She appears well-developed and well-nourished. No distress.   HENT:   Head: Normocephalic and atraumatic.   Nose: Mucosal edema present. Right sinus exhibits maxillary sinus tenderness. Left sinus exhibits maxillary sinus tenderness.   Eyes: Pupils are equal, round,  and reactive to light. EOM are normal.   Neck: Normal range of motion. Neck supple.   Cardiovascular: Normal rate, regular rhythm, normal heart sounds and intact distal pulses. Exam reveals no gallop and no friction rub.   No murmur heard.  Pulmonary/Chest: Effort normal. No respiratory distress. She has wheezes (wheeze+ B/L). She has no rales.   Neurological: She is alert and oriented to person, place, and time.   Skin: Skin is warm and dry. She is not diaphoretic.   Psychiatric: She has a normal mood and affect.       Assessment:       1. Acute recurrent maxillary sinusitis    2. Moderate persistent reactive airway disease with acute exacerbation    3. Cough        Plan:           Jud was seen today for sinusitis, cough and generalized body aches.    Diagnoses and all orders for this visit:    Acute recurrent maxillary sinusitis  -     predniSONE (DELTASONE) 20 MG tablet; Take 1 tablet (20 mg total) by mouth once daily. for 3 days  -     promethazine-dextromethorphan (PROMETHAZINE-DM) 6.25-15 mg/5 mL Syrp; Take 5 mLs by mouth 2 (two) times daily as needed (cough).    Moderate persistent reactive airway disease with acute exacerbation  -     predniSONE (DELTASONE) 20 MG tablet; Take 1 tablet (20 mg total) by mouth once daily. for 3 days  -     promethazine-dextromethorphan (PROMETHAZINE-DM) 6.25-15 mg/5 mL Syrp; Take 5 mLs by mouth 2 (two) times daily as needed (cough).    Cough  -     promethazine-dextromethorphan (PROMETHAZINE-DM) 6.25-15 mg/5 mL Syrp; Take 5 mLs by mouth 2 (two) times daily as needed (cough).      Ac sinusitis/RAD  -prednisone x 3 days  -cough med prn  -Advised saline irrigation, warm humidifier, steam inhalation, vicks vaporub, claritin D for congestion  Spent adequate time in obtaining history and explaining differentials    25 minutes spent during this visit of which greater than 50% devoted to face-face counseling and coordination of care regarding diagnosis and management plan    Follow  up if symptoms worsen or fail to improve.

## 2019-12-13 ENCOUNTER — HOSPITAL ENCOUNTER (EMERGENCY)
Facility: HOSPITAL | Age: 59
Discharge: HOME | End: 2019-12-13
Attending: EMERGENCY MEDICINE
Payer: MEDICAID

## 2019-12-13 ENCOUNTER — NURSE TRIAGE (OUTPATIENT)
Dept: ADMINISTRATIVE | Facility: CLINIC | Age: 59
End: 2019-12-13

## 2019-12-13 VITALS
BODY MASS INDEX: 26.31 KG/M2 | HEIGHT: 62 IN | RESPIRATION RATE: 18 BRPM | WEIGHT: 143 LBS | TEMPERATURE: 99 F | DIASTOLIC BLOOD PRESSURE: 82 MMHG | HEART RATE: 91 BPM | SYSTOLIC BLOOD PRESSURE: 146 MMHG | OXYGEN SATURATION: 96 %

## 2019-12-13 DIAGNOSIS — B00.52 HERPES KERATITIS: Primary | ICD-10-CM

## 2019-12-13 PROCEDURE — 99284 EMERGENCY DEPT VISIT MOD MDM: CPT | Mod: ,,, | Performed by: PHYSICIAN ASSISTANT

## 2019-12-13 PROCEDURE — 99284 PR EMERGENCY DEPT VISIT,LEVEL IV: ICD-10-PCS | Mod: ,,, | Performed by: PHYSICIAN ASSISTANT

## 2019-12-13 PROCEDURE — 92012 INTRM OPH EXAM EST PATIENT: CPT | Mod: ,,, | Performed by: OPHTHALMOLOGY

## 2019-12-13 PROCEDURE — 92012 PR EYE EXAM, EST PATIENT,INTERMED: ICD-10-PCS | Mod: ,,, | Performed by: OPHTHALMOLOGY

## 2019-12-13 PROCEDURE — 25000003 PHARM REV CODE 250: Performed by: PHYSICIAN ASSISTANT

## 2019-12-13 PROCEDURE — 99284 EMERGENCY DEPT VISIT MOD MDM: CPT

## 2019-12-13 RX ORDER — PROPARACAINE HYDROCHLORIDE 5 MG/ML
1 SOLUTION/ DROPS OPHTHALMIC
Status: COMPLETED | OUTPATIENT
Start: 2019-12-13 | End: 2019-12-13

## 2019-12-13 RX ORDER — CARBOXYMETHYLCELLULOSE SODIUM 10 MG/ML
1 GEL OPHTHALMIC 4 TIMES DAILY
Status: DISCONTINUED | OUTPATIENT
Start: 2019-12-13 | End: 2019-12-13

## 2019-12-13 RX ORDER — ACYCLOVIR 400 MG/1
400 TABLET ORAL 4 TIMES DAILY
Qty: 40 TABLET | Refills: 0 | Status: SHIPPED | OUTPATIENT
Start: 2019-12-13 | End: 2020-04-27

## 2019-12-13 RX ORDER — ACYCLOVIR 800 MG/1
400 TABLET ORAL
Status: DISCONTINUED | OUTPATIENT
Start: 2019-12-13 | End: 2019-12-13

## 2019-12-13 RX ORDER — ERYTHROMYCIN 5 MG/G
OINTMENT OPHTHALMIC
Qty: 1 TUBE | Refills: 0 | Status: SHIPPED | OUTPATIENT
Start: 2019-12-13 | End: 2020-04-27

## 2019-12-13 RX ORDER — ERYTHROMYCIN 5 MG/G
OINTMENT OPHTHALMIC 2 TIMES DAILY
Status: DISCONTINUED | OUTPATIENT
Start: 2019-12-13 | End: 2019-12-13

## 2019-12-13 RX ORDER — CARBOXYMETHYLCELLULOSE SODIUM 10 MG/ML
1 GEL OPHTHALMIC 4 TIMES DAILY
Qty: 1 EACH | Refills: 0 | Status: SHIPPED | OUTPATIENT
Start: 2019-12-13 | End: 2022-12-15

## 2019-12-13 RX ADMIN — FLUORESCEIN SODIUM 1 EACH: 1 STRIP OPHTHALMIC at 08:12

## 2019-12-13 RX ADMIN — PROPARACAINE HYDROCHLORIDE 1 DROP: 5 SOLUTION/ DROPS OPHTHALMIC at 08:12

## 2019-12-13 NOTE — TELEPHONE ENCOUNTER
Went to after hours yesterday. Last night on left eye in the corner a little bruise. Now feels like needle going through her eyes.    Reason for Disposition   SEVERE eye pain    Additional Information   Negative: Followed an eye injury   Negative: Eye pain from chemical in the eye   Negative: Eye pain from foreign body in eye   Negative: [1] Tender, red lump or pimple AND [2] located along the eyelid margin   Negative: Has sinus pain or pressure    Protocols used: EYE PAIN-A-AH

## 2019-12-13 NOTE — ED PROVIDER NOTES
Encounter Date: 12/13/2019       History     Chief Complaint   Patient presents with    Eye Pain     woke up with severe pain to L eye     59-year-old female presents to the ED for evaluation of eye pain. Patient reports severe pain in the left eye that began this morning.  She states that she feels like a needle is sticking in her eye.  She reports associated blurry vision.  She does note that she has had recent congestion, cough, and sinus pressure.  She was seated PCP clinic yesterday and treated for acute recurrent maxillary sinusitis and acute reactive airway disease with steroids and cough suppressants.  She denies history of similar symptoms, eye trauma.         Review of patient's allergies indicates:  No Known Allergies  Past Medical History:   Diagnosis Date    Asthma     Breast carcinoma     Cataract     Diabetes mellitus     Osteoporosis      Past Surgical History:   Procedure Laterality Date    CHOLECYSTECTOMY      COLONOSCOPY N/A 10/27/2015    Procedure: COLONOSCOPY;  Surgeon: Johnny Hurley MD;  Location: [facility];  Service: Endoscopy;  Laterality: N/A;    HYSTERECTOMY      LASER PERIPHERAL IRIDOTOMY Right 02/21/2019         Family History   Problem Relation Age of Onset    Diabetes Father     Amblyopia Neg Hx     Blindness Neg Hx     Cataracts Neg Hx     Glaucoma Neg Hx     Macular degeneration Neg Hx     Retinal detachment Neg Hx     Strabismus Neg Hx      Social History     Tobacco Use    Smoking status: Former Smoker     Packs/day: 0.00     Years: 1.00     Pack years: 0.00     Types: Cigarettes    Smokeless tobacco: Never Used    Tobacco comment: Stop smoking 1/2018   Substance Use Topics    Alcohol use: No     Alcohol/week: 0.0 standard drinks    Drug use: No     Review of Systems   Eyes: Positive for pain and discharge (tearing).       Physical Exam     Initial Vitals [12/13/19 0747]   BP Pulse Resp Temp SpO2   (!) 146/82 91 18 98.7 °F (37.1 °C) 96 %       MAP       --         Physical Exam    Nursing note and vitals reviewed.  Constitutional: She appears well-developed and well-nourished. She is not diaphoretic.  Non-toxic appearance. She does not appear ill. No distress.   HENT:   Head: Normocephalic and atraumatic.   Eyes: EOM are normal. Pupils are equal, round, and reactive to light. Right eye exhibits no chemosis and no exudate. Left eye exhibits no chemosis and no exudate. Right conjunctiva is not injected. Left conjunctiva is injected (mild).   Jagged linear fluorescein uptake most notable in the nasal aspect the left cornea.  Tearing of both eyes   Neck: Neck supple.   Cardiovascular: Normal rate and regular rhythm.   Pulmonary/Chest: Effort normal. No accessory muscle usage. No tachypnea. No respiratory distress.   Abdominal: She exhibits no distension.   Neurological: She is alert.   Skin: No rash noted.   Psychiatric: She has a normal mood and affect. Her behavior is normal.         ED Course   Procedures  Labs Reviewed - No data to display       Imaging Results    None          Medical Decision Making:   History:   Old Medical Records: I decided to obtain old medical records.  Differential Diagnosis:   My differential diagnosis includes but is not limited to:  Herpes ophthalmicus, corneal abrasion, corneal ulcer, Acute angle closure glaucoma, conjunctivitis, iritis, uveitis  ED Management:  59-year-old female presents to the ED with acute left eye pain since this morning.  Exam is concerning for herpetic lesion noted on the cornea versus corneal abrasion versus corneal ulcer.  Ophthalmology was consulted.  Patient will be taken to ophthalmology clinic for further evaluation.  Per ophthalmology, patient may have early herpetic keratitis versus corneal abrasion.  They will treat with acyclovir, artificial tears, and topical erythromycin ointment.  She will follow up in ophthalmology clinic in 1-2 weeks for re-evaluation.  Patient brought back to the ED  and discharged in stable condition.    I have reviewed the patient's records and discussed this case with my supervising physician.  Please be advised that this text was dictated with [email or web address] software and may contain dictation errors.     Other:   I have discussed this case with another health care provider.       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

## 2019-12-13 NOTE — ED NOTES
Spoke with Jane in the Opthalmology clinic on the status of the patient. Jane states the patient was calling and screaming that her medications are not at the pharmacy.  States that opthalmology  Is to call in prescriptions.      The patient did not return to the ED for discharge.

## 2019-12-13 NOTE — ED TRIAGE NOTES
"Patient is a 60 yo female in for eval of L eye pain. Reports pain started last night - denies any eye injury or trauma and denies getting anything in her eye. Reports it feels like "needles" in her eye and reports blurry vision to her left eye. Patient reports this started sometime last night, but got worse throughout the night and this morning.  "

## 2019-12-13 NOTE — CONSULTS
"Chief complaint/Reason for Consult:     History of Present Illness: Jud Villa is a 59 y.o. female who presents with 1 day hx of OS eye pain and blurry vision. For several days, pt had viral prodrome, fever, nausea, aches pains and chills, and was later started in prednisone by local PCP and diagnosed with viral sinus infection. Arpx 6 hours after starting steroid therapy, aformentioned visual symptoms started. PT started taking PF ggts this morning, prior to coming to ED. No previous episodes, no skin involvement now or in past, no contact lens use. PT has had chemotherapy for breast cancer, which was completed in spring of 2018.     POcularHx: narrow angle glaucoma - lost to fu, being managed by MD Kelli     Current eye gtts: none      PMHx:  has a past medical history of Asthma, Breast carcinoma, Cataract, Diabetes mellitus, and Osteoporosis.     PSurgHx:  has a past surgical history that includes Cholecystectomy; Hysterectomy; Colonoscopy (N/A, 10/27/2015); and LASER PERIPHERAL IRIDOTOMY (Right, 02/21/2019).     Home Medications:   Prior to Admission medications    Medication Sig Start Date End Date Taking? Authorizing Provider   albuterol (VENTOLIN HFA) 90 mcg/actuation inhaler INHALE 2 PUFFS BY MOUTH INTO THE LUNGS EVERY 6 HOURS AS NEEDED FOR WHEEZING 11/13/19  Yes Maria R Camara MD   albuterol-ipratropium (DUO-NEB) 2.5 mg-0.5 mg/3 mL nebulizer solution Take 3 mLs by nebulization once daily. 11/13/19  Yes Maria R Camara MD   alendronate (FOSAMAX) 70 MG tablet Take 1 tablet (70 mg total) by mouth every 7 days. 11/27/19 11/26/20 Yes Johanna Desai MD   aspirin 81 MG Chew Take 81 mg by mouth. 8/22/19 8/21/20 Yes Historical Provider, MD HOANG NICHOLAS U-100 INSULIN glargine 100 units/mL (3mL) SubQ pen INJECT 10 UNITS UNDER THE SKIN EVERY MORNING 6/14/19  Yes Bo Lopez MD   BD ULTRA-FINE SHORT PEN NEEDLE 31 gauge x 5/16" Ndle Inject 1 pen into the skin once daily. 6/24/19  Yes " Bo Lopez MD   brompheniramine-pseudoeph-DM (BROMFED DM) 2-30-10 mg/5 mL Syrp  7/31/19  Yes Historical Provider, MD   butalbital-acetaminophen-caffeine -40 mg (FIORICET, ESGIC) -40 mg per tablet Take 1 tablet by mouth every 12 (twelve) hours as needed for Headaches. Further refills require visit with neurology 8/1/19  Yes Bert Morrison MD   diazePAM (VALIUM) 10 MG Tab TAKE 1 TABLET BY MOUTH EVERY DAY AS NEEDED FOR ANXIETY 11/12/19  Yes Bo Lopez MD   ergocalciferol (ERGOCALCIFEROL) 50,000 unit Cap Take 1 capsule (50,000 Units total) by mouth every 7 days. 1/8/19  Yes Bo Lopez MD   etodolac (LODINE) 200 MG Cap Take 1 capsule (200 mg total) by mouth 2 (two) times daily as needed (pain). 9/5/19  Yes Bo Lopez MD   fluticasone propionate (FLONASE) 50 mcg/actuation nasal spray 2 sprays (100 mcg total) by Each Nostril route once daily. 11/13/19 12/13/19 Yes Maria R Camara MD   fluticasone-umeclidin-vilanter (TRELEGY ELLIPTA) 100-62.5-25 mcg DsDv Inhale 1 puff into the lungs once daily. 11/13/19  Yes Maria R Camara MD   gabapentin (NEURONTIN) 100 MG capsule Take 100 mg by mouth.   Yes Historical Provider, MD   glipiZIDE (GLUCOTROL) 10 MG tablet Take 1 tablet (10 mg total) by mouth 2 (two) times daily. 11/12/19 11/11/20 Yes Bo Lopez MD   JARDIANCE 10 mg Tab TAKE 1 TABLET BY MOUTH EVERY DAY 11/18/19  Yes Bo Lopez MD   levocetirizine (XYZAL) 5 MG tablet Take 1 tablet (5 mg total) by mouth every evening. 11/13/19 11/12/20 Yes Maria R Camara MD   lorcaserin 10 mg Tab Take 10 mg by mouth 2 (two) times daily. 9/15/15  Yes Bo Lopez MD   metFORMIN (GLUCOPHAGE) 1000 MG tablet TAKE 1 TABLET(1000 MG) BY MOUTH TWICE DAILY WITH MEALS 5/20/19  Yes Bo Lopez MD   oxymetazoline (AFRIN) 0.05 % nasal spray 2 sprays by Nasal route. 1/8/19  Yes Historical Provider, MD   pantoprazole (PROTONIX) 40 MG tablet  Take 40 mg by mouth once daily.   Yes Historical Provider, MD   potassium chloride (MICRO-K) 8 mEq CpSR Take 1 capsule (8 mEq total) by mouth once daily. 4/11/19  Yes Bo Lopez MD   prednisoLONE acetate (PRED FORTE) 1 % DrpS Place 1 drop into the left eye 4 (four) times daily. 4/12/19  Yes Claribel Pereira MD   predniSONE (DELTASONE) 20 MG tablet Take 1 tablet (20 mg total) by mouth once daily. for 3 days 12/12/19 12/15/19 Yes Ranjith Parada MD   promethazine (PHENERGAN) 25 MG tablet Take 1 tablet (25 mg total) by mouth every 6 (six) hours as needed. 12/8/18  Yes Bo Lopez MD   promethazine-dextromethorphan (PROMETHAZINE-DM) 6.25-15 mg/5 mL Syrp Take 5 mLs by mouth 2 (two) times daily as needed (cough). 12/12/19 12/22/19 Yes Ranjith Parada MD   topiramate (TOPAMAX) 25 MG tablet Take 1 tablet (25 mg total) by mouth 2 (two) times daily. 9/4/19 9/3/20 Yes Bo Lopez MD   TRADJENTA 5 mg Tab tablet  8/7/19  Yes Historical Provider, MD   triamcinolone acetonide 0.1% (KENALOG) 0.1 % cream Apply topically 2 (two) times daily. 11/29/18  Yes Bo Lopez MD   TRUE METRIX GLUCOSE TEST STRIP Strp USE AS DIRECTED FOUR TIMES DAILY 3/7/19  Yes Bo Lopez MD   VIRTUSSIN AC  mg/5 mL syrup Take 5 mLs by mouth as needed.  2/14/19  Yes Historical Provider, MD   escitalopram oxalate (LEXAPRO) 10 MG tablet Take 1 tablet (10 mg total) by mouth once daily. 11/29/18 12/12/19  Bo Lopez MD        Medications this encounter:     Allergies: has No Known Allergies.     Social:  reports that she has quit smoking. Her smoking use included cigarettes. She smoked 0.00 packs per day for 1.00 year. She has never used smokeless tobacco. She reports that she does not drink alcohol or use drugs.     Family Hx: No family history of glaucoma. family history includes Diabetes in her father.     ROS: Negative x 10 except for complaints as described in HPI; negative for  fever, chills, weight loss, nausea, vomiting, diarrhea, shortness of breath, nasal discharge, cough, abdominal pain, dyspnea, difficulty moving arms and legs, confusion, dysuria, palpitations, or chest pain     Ocular examination/Dilated fundus examination:  Base Eye Exam     Visual Acuity (Snellen - Linear)       Right Left    Dist sc 20/25 20/70 -1    Dist ph sc 20/20 -2 20/40          Tonometry (Tonopen, 9:25 AM)       Right Left    Pressure 18 16          Pupils       Dark Light Shape React APD    Right 4 2 Round Brisk None    Left 4 2 Round Brisk None          Visual Fields       Right Left     Full Full          Extraocular Movement       Right Left     Full Full            Slit Lamp and Fundus Exam     External Exam       Right Left    External Prolapsed fat pads: Lower           Slit Lamp Exam       Right Left    Lids/Lashes Dermatochalasis - upper lid Dermatochalasis - upper lid    Conjunctiva/Sclera Pinguecula White and quiet    Cornea scattered PEE Inf SPK, Nasal area of linear vertical fluorescein uptake ~2mm in largest vertical diameter with possible dendrites. Dense inferior SPKs    Anterior Chamber Narrow angle Narrow angle    Iris Round and reactive, patent PI Round and reactive, patent PI    Lens 2+ Nuclear sclerosis 2+ Nuclear sclerosis          Fundus Exam       Right Left    Disc sharp pink sharp pink    C/D Ratio .3 .3    Macula flat flat    Vessels Normal Normal    Periphery no holes tears detachments 360 no holes tears detachments 360                Assessment/Plan:   1. Diffuse SPK with K abrasion VS possible early dendrite  - Etiology: EDUARDO w K abrasion vs early  Early HSV dendritic keratitis   - Erythromycin LUCY BID for bacterial ppx   - Acyclovir 400 mg po 5xd   - AT QID  - No AC reaction or photophobia, but on pred PO and PF ggts, so possible resolution of AC reaction but unlikely- will hold cyclopentolate    - Follow up with cornea/comprehensive in 1-2 weeks     2. Diabetes without  diabetic retinopathy on exam 12/2019  - BP and BG control   - A1c 6.2 (12/2018)   - DFE due 12/2020    3. Glaucoma   - patient lost to follow up     Glaucoma (type and duration)    Narrow angles              First HVF   //              First photos   //              Treatment / Drops started   //           Family history    none        Glaucoma meds    none        H/O adverse rxn to glaucoma drops    none        LASERS    none        GLAUCOMA SURGERIES    none        OTHER EYE SURGERIES    none        CDR    0.35/0.35        Tbase    18-22          Tmax    22/18            Ttarget    //             HVF    / test 20/ to  20/ - / od // / os        Gonio    +1/+1 (pre- PI's)         CCT    /        OCT    / test 20/ to 20/ - RNFL - / od // / os        HRT    / test 20/ to 20/ - MR - / od // / os        Disc photos    /     4. Cataracts OU   - possible VS     Discussed patient's history, physical, assessment and plan with the attending, Dr. Pressley and Dr. Pereira      RTC in 1-2 weeks for K check   RTC in 1-2 months to re-establish care with glaucoma specialist     Michi Santos MD  Ophthalmology Resident, PGY-2  [facility] Department of Ophthalmology

## 2019-12-23 ENCOUNTER — NURSE TRIAGE (OUTPATIENT)
Dept: ADMINISTRATIVE | Facility: CLINIC | Age: 59
End: 2019-12-23

## 2019-12-23 DIAGNOSIS — F32.A DEPRESSION: ICD-10-CM

## 2019-12-23 DIAGNOSIS — G43.019 INTRACTABLE MIGRAINE WITHOUT AURA: ICD-10-CM

## 2019-12-23 RX ORDER — BROMPHENIRAMINE MALEATE, PSEUDOEPHEDRINE HYDROCHLORIDE, AND DEXTROMETHORPHAN HYDROBROMIDE 2; 30; 10 MG/5ML; MG/5ML; MG/5ML
10 SYRUP ORAL 3 TIMES DAILY PRN
Qty: 118 ML | Refills: 0 | Status: SHIPPED | OUTPATIENT
Start: 2019-12-23 | End: 2019-12-27

## 2019-12-23 RX ORDER — ESCITALOPRAM OXALATE 10 MG/1
TABLET ORAL
Qty: 90 TABLET | Refills: 3 | Status: SHIPPED | OUTPATIENT
Start: 2019-12-23 | End: 2021-05-19 | Stop reason: SDUPTHER

## 2019-12-23 RX ORDER — BUTALBITAL, ACETAMINOPHEN AND CAFFEINE 50; 325; 40 MG/1; MG/1; MG/1
1 TABLET ORAL EVERY 12 HOURS PRN
Qty: 30 TABLET | Refills: 0 | Status: SHIPPED | OUTPATIENT
Start: 2019-12-23 | End: 2021-08-10

## 2019-12-23 NOTE — TELEPHONE ENCOUNTER
Reason for Disposition   [1] Prescription not at pharmacy AND [2] was prescribed today by PCP    Protocols used: MEDICATION QUESTION CALL-A-    Patient states she called for refill of the promethazine DM/. Informed the patient the promethazine DM has a refill, so i'd have to call Sendy Lantigua the [facility] pharmacist states a Bromfed cough syrup is being prepared right now.     Called patient back and gave her the information and verbalized understanding.

## 2019-12-27 DIAGNOSIS — E11.9 TYPE 2 DIABETES MELLITUS WITHOUT COMPLICATION: ICD-10-CM

## 2019-12-27 RX ORDER — BROMPHENIRAMINE MALEATE, PSEUDOEPHEDRINE HYDROCHLORIDE, AND DEXTROMETHORPHAN HYDROBROMIDE 2; 30; 10 MG/5ML; MG/5ML; MG/5ML
SYRUP ORAL
Qty: 118 ML | Refills: 0 | Status: SHIPPED | OUTPATIENT
Start: 2019-12-27 | End: 2023-03-22

## 2019-12-30 ENCOUNTER — TELEPHONE (OUTPATIENT)
Dept: FAMILY MEDICINE | Facility: CLINIC | Age: 59
End: 2019-12-30

## 2019-12-30 NOTE — TELEPHONE ENCOUNTER
Spoke w/ patient. Scheduled appt on 12/31/19 @ 11:20am        ----- Message from Warren Lee sent at 12/27/2019  5:01 PM CST -----  Contact: [phone] / self  Patient would like a medication sent in for a cold. Please Advise.

## 2020-01-04 ENCOUNTER — PATIENT OUTREACH (OUTPATIENT)
Dept: ADMINISTRATIVE | Facility: OTHER | Age: 60
End: 2020-01-04

## 2020-01-04 DIAGNOSIS — Z12.31 ENCOUNTER FOR SCREENING MAMMOGRAM FOR MALIGNANT NEOPLASM OF BREAST: Primary | ICD-10-CM

## 2020-01-07 ENCOUNTER — OFFICE VISIT (OUTPATIENT)
Dept: OPHTHALMOLOGY | Facility: CLINIC | Age: 60
End: 2020-01-07
Payer: MEDICAID

## 2020-01-07 DIAGNOSIS — H16.223 KERATOCONJUNCTIVITIS SICCA NOT SPECIFIED AS SJOGREN'S, BILATERAL: Primary | ICD-10-CM

## 2020-01-07 DIAGNOSIS — H25.13 NUCLEAR SCLEROTIC CATARACT OF BOTH EYES: ICD-10-CM

## 2020-01-07 DIAGNOSIS — H40.033 ANATOMICAL NARROW ANGLE GLAUCOMA, BILATERAL: ICD-10-CM

## 2020-01-07 PROCEDURE — 99213 OFFICE O/P EST LOW 20 MIN: CPT | Mod: PBBFAC | Performed by: OPHTHALMOLOGY

## 2020-01-07 PROCEDURE — 92014 PR EYE EXAM, EST PATIENT,COMPREHESV: ICD-10-PCS | Mod: S$PBB,,, | Performed by: OPHTHALMOLOGY

## 2020-01-07 PROCEDURE — 99999 PR PBB SHADOW E&M-EST. PATIENT-LVL III: CPT | Mod: PBBFAC,,, | Performed by: OPHTHALMOLOGY

## 2020-01-07 PROCEDURE — 99999 PR PBB SHADOW E&M-EST. PATIENT-LVL III: ICD-10-PCS | Mod: PBBFAC,,, | Performed by: OPHTHALMOLOGY

## 2020-01-07 PROCEDURE — 92014 COMPRE OPH EXAM EST PT 1/>: CPT | Mod: S$PBB,,, | Performed by: OPHTHALMOLOGY

## 2020-01-07 RX ORDER — PROMETHAZINE HYDROCHLORIDE AND DEXTROMETHORPHAN HYDROBROMIDE 6.25; 15 MG/5ML; MG/5ML
10 SYRUP ORAL 4 TIMES DAILY
COMMUNITY
Start: 2019-12-27 | End: 2020-01-28 | Stop reason: SDUPTHER

## 2020-01-07 RX ORDER — ONDANSETRON 4 MG/1
1 TABLET, ORALLY DISINTEGRATING ORAL 3 TIMES DAILY
COMMUNITY
Start: 2019-12-26 | End: 2021-09-22 | Stop reason: SDUPTHER

## 2020-01-07 NOTE — LETTER
January 7, 2020      Brad Pressley MD  [address] [facility] [address]  [facility] [address]           [facility]  [address]  [facility] [address]  Phone: [phone]  Fax: [phone]          Patient: Jud Villa   MR Number: [id number]   YOB: 1960   Date of Visit: 1/7/2020       Dear Dr. Brad Pressley:    Thank you for referring Jud Villa to me for evaluation. Attached you will find relevant portions of my assessment and plan of care.    If you have questions, please do not hesitate to call me. I look forward to following Jud Villa along with you.    Sincerely,    Breanne Figueroa MD    Enclosure  CC:  No Recipients    If you would like to receive this communication electronically, please contact [email or web address] or [phone] to request more information on [employer] access.    For providers and/or their staff who would like to refer a patient to [facility], please contact us through our one-stop-shop provider referral line, [facility], at [phone].    If you feel you have received this communication in error or would no longer like to receive these types of communications, please e-mail [email or web address]

## 2020-01-07 NOTE — PROGRESS NOTES
"HPI     ED F/U - Dr. Hammonds pt    Anatomical narrow angle glaucoma OU  NS OU   LASIK OU    Acyclovir (finished)   AT (did not use)  DEVON LUCY BID OU (pt ran out, no longer using)    Pt here for ed f/u for starting stages of shingles. Denies eye pain,   flashes and floaters. Pt states she feels disturbance OS, "like it's not   cured completely." Not fbs.  No other ocular complaints.     Last edited by Breanne Figueroa MD on 1/7/2020 10:25 AM. (History)            Assessment /Plan     For exam results, see Encounter Report.    Keratoconjunctivitis sicca not specified as Sjogren's, bilateral    Anatomical narrow angle glaucoma, bilateral    Nuclear sclerotic cataract of both eyes      Evaporative dry eyes  - doubt occurrence of HSV OS as pt said next day same sx occurred OD.  - incr ATs    RAH / NSC  - f/up dr. hammonds as scheduled               "

## 2020-01-11 DIAGNOSIS — M81.0 SENILE OSTEOPOROSIS: ICD-10-CM

## 2020-01-12 RX ORDER — ERGOCALCIFEROL 1.25 MG/1
CAPSULE ORAL
Qty: 12 CAPSULE | Refills: 3 | Status: SHIPPED | OUTPATIENT
Start: 2020-01-12 | End: 2021-05-14 | Stop reason: SDUPTHER

## 2020-01-13 ENCOUNTER — PATIENT OUTREACH (OUTPATIENT)
Dept: ADMINISTRATIVE | Facility: OTHER | Age: 60
End: 2020-01-13

## 2020-01-13 RX ORDER — ALENDRONATE SODIUM 70 MG/1
TABLET ORAL
Qty: 4 TABLET | Refills: 0 | Status: SHIPPED | OUTPATIENT
Start: 2020-01-13 | End: 2020-05-15

## 2020-01-28 RX ORDER — PROMETHAZINE HYDROCHLORIDE AND DEXTROMETHORPHAN HYDROBROMIDE 6.25; 15 MG/5ML; MG/5ML
SYRUP ORAL
Qty: 180 ML | Refills: 0 | Status: SHIPPED | OUTPATIENT
Start: 2020-01-28 | End: 2020-01-29 | Stop reason: SDUPTHER

## 2020-01-29 ENCOUNTER — OFFICE VISIT (OUTPATIENT)
Dept: FAMILY MEDICINE | Facility: CLINIC | Age: 60
End: 2020-01-29
Payer: MEDICAID

## 2020-01-29 VITALS
DIASTOLIC BLOOD PRESSURE: 70 MMHG | HEIGHT: 62 IN | OXYGEN SATURATION: 97 % | WEIGHT: 143.31 LBS | BODY MASS INDEX: 26.37 KG/M2 | SYSTOLIC BLOOD PRESSURE: 120 MMHG | TEMPERATURE: 98 F | HEART RATE: 97 BPM

## 2020-01-29 DIAGNOSIS — Z79.4 CONTROLLED TYPE 2 DIABETES MELLITUS WITH COMPLICATION, WITH LONG-TERM CURRENT USE OF INSULIN: Chronic | ICD-10-CM

## 2020-01-29 DIAGNOSIS — E11.8 CONTROLLED TYPE 2 DIABETES MELLITUS WITH COMPLICATION, WITH LONG-TERM CURRENT USE OF INSULIN: Chronic | ICD-10-CM

## 2020-01-29 DIAGNOSIS — J44.1 COPD EXACERBATION: Primary | ICD-10-CM

## 2020-01-29 PROCEDURE — 99215 OFFICE O/P EST HI 40 MIN: CPT | Mod: PBBFAC,PO | Performed by: NURSE PRACTITIONER

## 2020-01-29 PROCEDURE — 99999 PR PBB SHADOW E&M-EST. PATIENT-LVL V: ICD-10-PCS | Mod: PBBFAC,,, | Performed by: NURSE PRACTITIONER

## 2020-01-29 PROCEDURE — 99999 PR PBB SHADOW E&M-EST. PATIENT-LVL V: CPT | Mod: PBBFAC,,, | Performed by: NURSE PRACTITIONER

## 2020-01-29 PROCEDURE — 99213 PR OFFICE/OUTPT VISIT, EST, LEVL III, 20-29 MIN: ICD-10-PCS | Mod: S$PBB,,, | Performed by: NURSE PRACTITIONER

## 2020-01-29 PROCEDURE — 99213 OFFICE O/P EST LOW 20 MIN: CPT | Mod: S$PBB,,, | Performed by: NURSE PRACTITIONER

## 2020-01-29 RX ORDER — PREDNISONE 20 MG/1
20 TABLET ORAL DAILY
Qty: 5 TABLET | Refills: 0 | Status: SHIPPED | OUTPATIENT
Start: 2020-01-29 | End: 2020-02-03

## 2020-01-29 RX ORDER — CALCIUM CITRATE/VITAMIN D3 200MG-6.25
TABLET ORAL
Qty: 50 EACH | Refills: 0 | Status: SHIPPED | OUTPATIENT
Start: 2020-01-29

## 2020-01-29 RX ORDER — DOXYCYCLINE 100 MG/1
100 CAPSULE ORAL EVERY 12 HOURS
Qty: 20 CAPSULE | Refills: 0 | Status: SHIPPED | OUTPATIENT
Start: 2020-01-29 | End: 2020-02-08

## 2020-01-29 RX ORDER — PROMETHAZINE HYDROCHLORIDE AND DEXTROMETHORPHAN HYDROBROMIDE 6.25; 15 MG/5ML; MG/5ML
SYRUP ORAL
Qty: 180 ML | Refills: 0 | Status: SHIPPED | OUTPATIENT
Start: 2020-01-29 | End: 2020-04-27

## 2020-01-29 NOTE — TELEPHONE ENCOUNTER
----- Message from Afua Hopkins sent at 1/29/2020  1:07 PM CST -----  PT IS REQUESTING A CALL BACK     PT STATES SHE  HAS CALLED TWICE AND HAS NOT RECEIVED A CALL BACK.    PT IS REQUESTING A COUGH MEDICINE, BECAUSE SHE IS VERY SICK      PLEASE CALL AND ADVISE        THANK YOU

## 2020-01-30 NOTE — PROGRESS NOTES
Subjective:       Patient ID: Jud Villa is a 59 y.o. female.    Chief Complaint: Cough and Nasal Congestion  PMH COPD, followed by Dr Camara, LOv 11/2019  + smoker     11/13/2019  FINDINGS:  PA lateral.  Heart size is normal.  Lungs are clear.  There is aortic plaque and DJD.   Impression     No acute process seen.         Was seen 12/13/2019 in  by Dr Parada and prescribed prednisone 20 mg x 3 days and cough syrup  She the saw  Dr Geller ENT at [facility] ~ 2-3 weeks later and received a steroid injection and antibiotic injection.   She was on an antibiotic for about 10 days. She did see some improvement , but it  Never went away.       Cough   This is a chronic (HX COPD) problem. Episode onset: 2 months ago. The problem occurs every few minutes. The cough is productive of sputum. Associated symptoms include myalgias, nasal congestion, shortness of breath and wheezing. Pertinent negatives include no rash. Associated symptoms comments: Chest congestion. She has tried a beta-agonist inhaler, prescription cough suppressant, oral steroids and ipratropium inhaler for the symptoms. The treatment provided no relief. Her past medical history is significant for COPD.     Review of Systems   Constitutional: Positive for fatigue.   HENT: Positive for congestion.    Respiratory: Positive for cough, chest tightness, shortness of breath and wheezing.    Musculoskeletal: Positive for myalgias.   Skin: Negative for rash and wound.   Neurological: Negative for dizziness and weakness.   Psychiatric/Behavioral: Negative for agitation and confusion. The patient is not nervous/anxious.            Past Medical History:   Diagnosis Date    Asthma     Breast carcinoma     Cataract     Diabetes mellitus     Osteoporosis      Lab Results   Component Value Date    WBC 7.99 12/18/2018    HGB 12.1 12/18/2018    HCT 37.6 12/18/2018    MCV 85 12/18/2018     12/18/2018       Lab Results   Component Value Date    CREATININE 0.7  "12/18/2018    BUN 12 12/18/2018     12/18/2018    K 4.1 12/18/2018     12/18/2018    CO2 28 12/18/2018     Lab Results   Component Value Date    ALT 12 12/18/2018    AST 14 12/18/2018    ALKPHOS 85 12/18/2018    BILITOT 0.2 12/18/2018       Objective:  /70 (BP Location: Right arm, Patient Position: Sitting, BP Method: Medium (Manual))   Pulse 97   Temp 97.9 °F (36.6 °C) (Oral)   Ht 5' 2" (1.575 m)   Wt 65 kg (143 lb 4.8 oz)   SpO2 97%   BMI 26.21 kg/m²         Physical Exam   Constitutional: She is oriented to person, place, and time. She appears well-developed and well-nourished.   HENT:   Head: Normocephalic.   Right Ear: Tympanic membrane normal.   Left Ear: Tympanic membrane normal.   Nose: Rhinorrhea present.   Mouth/Throat: Posterior oropharyngeal erythema present.   Eyes: Pupils are equal, round, and reactive to light. Conjunctivae and EOM are normal.   Neck: Normal range of motion. Neck supple.   Cardiovascular: Normal rate, regular rhythm and normal heart sounds.   Pulmonary/Chest: Effort normal. No stridor. No respiratory distress. She has wheezes in the right upper field, the right middle field, the right lower field, the left upper field, the left middle field and the left lower field. She has no rales. She exhibits no tenderness.   expiratory wheezing noted throughout  Entire lung field    Abdominal: Soft. Bowel sounds are normal.   Musculoskeletal: Normal range of motion.   Neurological: She is alert and oriented to person, place, and time. Coordination normal.   Skin: Skin is warm and dry.   Psychiatric: She has a normal mood and affect. Her behavior is normal.   Vitals reviewed.      Assessment:       No diagnosis found.    Plan:       Jud was seen today for cough and nasal congestion.    Diagnoses and all orders for this visit:    COPD exacerbation  -     predniSONE (DELTASONE) 20 MG tablet; Take 1 tablet (20 mg total) by mouth once daily. for 5 days  -     doxycycline " (MONODOX) 100 MG capsule; Take 1 capsule (100 mg total) by mouth every 12 (twelve) hours. for 10 days  -     promethazine-dextromethorphan (PROMETHAZINE-DM) 6.25-15 mg/5 mL Syrp; TAKE 5 ML BY MOUTH TWICE DAILY AS NEEDED FOR COUGH  Instructed to f/u with Dr BRAVO Camara/ pulmonologist , next available     Pt is out of test strips:   One time refill given for test strips per request, will need to f/u with Dr Yañez for future refills.   Controlled type 2 diabetes mellitus with complication, with long-term current use of insulin  -     TRUE METRIX GLUCOSE TEST STRIP Strp; USE AS DIRECTED FOUR TIMES DAILY

## 2020-02-03 ENCOUNTER — TELEPHONE (OUTPATIENT)
Dept: PULMONOLOGY | Facility: CLINIC | Age: 60
End: 2020-02-03

## 2020-02-03 NOTE — TELEPHONE ENCOUNTER
I tried to return patient's call. Patient did not answer and mailbox was full. At this time Dr Camara does not have an available appointment for today and/or soon. Patient to follow up with PCP.

## 2020-02-03 NOTE — TELEPHONE ENCOUNTER
----- Message from Maria R Camara MD sent at 2/3/2020  3:24 PM CST -----  Contact: patient  Thanks,  Happy to follow with you.  Stacy, Please let Mrs. Renner know it is all we have this week  Maria R    ----- Message -----  From: Uyen Rodrigez NP  Sent: 2/3/2020   3:02 PM CST  To: Maria R Camara MD, Stacy Schroeder MA    I should have a spot open Wednesday.     Uyen  ----- Message -----  From: Maria R Camara MD  Sent: 2/3/2020   2:48 PM CST  To: Cat De Jesus DNP, Stacy Schroeder MA, #    Please make sure that she is using inhalers.  Any cancellations this week?  Cat or Uyen?  Maria R    ----- Message -----  From: Stacy Schroeder MA  Sent: 2/3/2020   2:33 PM CST  To: MD Dr Hoa Nice,  I spoke to Ms Villa. Patient stated she is not feeling well. She stated she feels her lungs are inflamed. She has been coughing a lot. This has been going on for 7 days. She saw her PCP last week and was told to f/u with you.    Thank you, Stacy  ----- Message -----  From: Marimar Acharya  Sent: 2/3/2020   2:06 PM CST  To: Hoa ALVARADO Staff    Please call above patient at [phone] said she has called and left a couple of messages not feeling well need to speak with the nurse waiting on a call back thanks.

## 2020-02-03 NOTE — TELEPHONE ENCOUNTER
----- Message from Morenita Sanchez MA sent at 2/3/2020 11:58 AM CST -----  Contact: self/[phone]  PT is looking to be seen today for SOB/bronchitis .

## 2020-02-05 ENCOUNTER — PATIENT OUTREACH (OUTPATIENT)
Dept: ADMINISTRATIVE | Facility: OTHER | Age: 60
End: 2020-02-05

## 2020-02-06 ENCOUNTER — TELEPHONE (OUTPATIENT)
Dept: PULMONOLOGY | Facility: CLINIC | Age: 60
End: 2020-02-06

## 2020-02-06 ENCOUNTER — OFFICE VISIT (OUTPATIENT)
Dept: PULMONOLOGY | Facility: CLINIC | Age: 60
End: 2020-02-06
Payer: MEDICARE

## 2020-02-06 VITALS
OXYGEN SATURATION: 96 % | WEIGHT: 147.06 LBS | SYSTOLIC BLOOD PRESSURE: 130 MMHG | DIASTOLIC BLOOD PRESSURE: 80 MMHG | HEART RATE: 91 BPM | HEIGHT: 62 IN | BODY MASS INDEX: 27.06 KG/M2

## 2020-02-06 DIAGNOSIS — J44.1 COPD EXACERBATION: Primary | ICD-10-CM

## 2020-02-06 DIAGNOSIS — F17.210 CIGARETTE NICOTINE DEPENDENCE WITHOUT COMPLICATION: ICD-10-CM

## 2020-02-06 PROCEDURE — 99406 BEHAV CHNG SMOKING 3-10 MIN: CPT | Mod: S$PBB,,, | Performed by: NURSE PRACTITIONER

## 2020-02-06 PROCEDURE — 94664 PR DEMO &/OR EVAL,PT USE,AEROSOL DEVICE: ICD-10-PCS | Mod: S$PBB,,, | Performed by: NURSE PRACTITIONER

## 2020-02-06 PROCEDURE — 99213 OFFICE O/P EST LOW 20 MIN: CPT | Mod: PBBFAC | Performed by: NURSE PRACTITIONER

## 2020-02-06 PROCEDURE — 99999 PR PBB SHADOW E&M-EST. PATIENT-LVL III: CPT | Mod: PBBFAC,,, | Performed by: NURSE PRACTITIONER

## 2020-02-06 PROCEDURE — 99999 PR PBB SHADOW E&M-EST. PATIENT-LVL III: ICD-10-PCS | Mod: PBBFAC,,, | Performed by: NURSE PRACTITIONER

## 2020-02-06 PROCEDURE — 94664 DEMO&/EVAL PT USE INHALER: CPT | Mod: S$PBB,,, | Performed by: NURSE PRACTITIONER

## 2020-02-06 PROCEDURE — 99406 PR TOBACCO USE CESSATION INTERMEDIATE 3-10 MINUTES: ICD-10-PCS | Mod: S$PBB,,, | Performed by: NURSE PRACTITIONER

## 2020-02-06 PROCEDURE — 99213 OFFICE O/P EST LOW 20 MIN: CPT | Mod: 25,S$PBB,, | Performed by: NURSE PRACTITIONER

## 2020-02-06 PROCEDURE — 99213 PR OFFICE/OUTPT VISIT, EST, LEVL III, 20-29 MIN: ICD-10-PCS | Mod: 25,S$PBB,, | Performed by: NURSE PRACTITIONER

## 2020-02-06 RX ORDER — IPRATROPIUM BROMIDE AND ALBUTEROL SULFATE 2.5; .5 MG/3ML; MG/3ML
3 SOLUTION RESPIRATORY (INHALATION) DAILY
Qty: 1 BOX | Refills: 11 | Status: SHIPPED | OUTPATIENT
Start: 2020-02-06 | End: 2020-05-11 | Stop reason: SDUPTHER

## 2020-02-06 RX ORDER — UMECLIDINIUM BROMIDE AND VILANTEROL TRIFENATATE 62.5; 25 UG/1; UG/1
POWDER RESPIRATORY (INHALATION)
COMMUNITY
Start: 2020-02-03 | End: 2020-02-06 | Stop reason: ALTCHOICE

## 2020-02-06 RX ORDER — LEVOFLOXACIN 500 MG/1
500 TABLET, FILM COATED ORAL DAILY
COMMUNITY
End: 2020-04-27

## 2020-02-06 RX ORDER — IBUPROFEN 800 MG/1
800 TABLET ORAL
COMMUNITY
End: 2020-04-27

## 2020-02-06 RX ORDER — PREDNISONE 20 MG/1
20 TABLET ORAL DAILY
Qty: 5 TABLET | Refills: 0 | Status: SHIPPED | OUTPATIENT
Start: 2020-02-06 | End: 2020-02-11

## 2020-02-06 NOTE — ASSESSMENT & PLAN NOTE
"Cigarette Counseling    Currently smoking    I have counseled pt for 3-5 minutes regarding cigarette cessation.  This has included the need to stop smoking as well as strategies, including but not limited to "cold turkey", CHANTIX (including risks and benefits of edita drug), nicotine replacement and WELLBUTRIN.    Patient expresses wants to quit on her own.   "

## 2020-02-06 NOTE — TELEPHONE ENCOUNTER
Patient stated that she was advised to follow up with Dr. Camara, she would like to only be seen by her doctor since she is sick. I informed patient that on 02/03/20 she confirmed an appointment to follow up with Uyen on 02/06/20 at 11:00am. Patient again stated that she wanted to be seen by Dr. Camara. I explained to patient that Dr. Camara was in clinic on today however she did not have any available appointments to offer to her, advised patient that she should keep her appointment with Uyen on this morning and also that Uyen can discuss her visit with Dr. Camara while she's here. Patient states that she will get up and get dressed to come in to her appointment.

## 2020-02-06 NOTE — PROGRESS NOTES
"Subjective:       Patient ID: Jud Villa is a 59 y.o. female.    Chief Complaint: Cough    59 year old with mild obstruction/emphysema.  In March started on Bevespi without relief of symptoms.  Previously (in 2015) improved on symbicort.  Patient is on biaxin for sinus.  Has constant sinus congestion and pain.  Friday was given a steroid injection with minimal relief.  Has been coughing for two weeks.      Interval Hx 2/6/2020  Patient of Dr. Camara, new to me. Patient needed sooner appt for cough, increased sputum and congestion-started one week ago. Explains having thick white/hollins sputum.  Ms. Villa explains had followed with her ENT yesterday. She tells prescribed Levaquin antibiotics and given steroid shot in office. She reports has been using Anoro. She explains unaware of having trelegy at pharmacy. She denies fever or chills. Requesting refills on neb treatment. Called pharmacy in office Trelegy is available for .    Ms. Villa explains she remains smoking appx 6 cigarettes a day. She is not interested in smoking cessation program.     Cough   Associated symptoms include postnasal drip and wheezing. Pertinent negatives include no fever or headaches.     Review of Systems   Constitutional: Negative for fever.   HENT: Positive for postnasal drip and congestion. Negative for trouble swallowing.    Respiratory: Positive for cough, sputum production (white phlegm) and wheezing.    Gastrointestinal: Negative for acid reflux.   Neurological: Negative for headaches.       Sharp pain with activities and with coughing.  Quit smoking one year ago but lives in a home with a smoker.  Has been using bevespi once daily and albuterol once a day without relief of cough    Objective:       Vitals:    02/06/20 1140   BP: 130/80   BP Location: Right arm   Patient Position: Sitting   Pulse: 91   SpO2: 96%   Weight: 66.7 kg (147 lb 0.8 oz)   Height: 5' 2" (1.575 m)     Physical Exam   Constitutional: She is " "oriented to person, place, and time. She appears well-developed and well-nourished. No distress.   Cardiovascular: Normal rate and regular rhythm.   Pulmonary/Chest: Normal expansion. She has no wheezes. She has rhonchi.   Musculoskeletal: Normal range of motion. She exhibits no edema.   Lymphadenopathy:     She has no cervical adenopathy.   Neurological: She is alert and oriented to person, place, and time.   Skin: Skin is warm and dry.   Psychiatric: She has a normal mood and affect.      Personal Diagnostic Review:    CXR 11/11/2019-FINDINGS:  PA lateral. Heart size is normal.  Lungs are clear.  There is aortic plaque and DJD.    CT of chest performed on 2015 without contrast revealed Slight centrilobular emphysema and 2-cm bullae in the right lung apex. There are no abnormal opacities requiring follow-up..      Assessment:       1. COPD exacerbation    2. Cigarette nicotine dependence without complication        Outpatient Encounter Medications as of 2/6/2020   Medication Sig Dispense Refill    albuterol (VENTOLIN HFA) 90 mcg/actuation inhaler INHALE 2 PUFFS BY MOUTH INTO THE LUNGS EVERY 6 HOURS AS NEEDED FOR WHEEZING 18 g 11    albuterol-ipratropium (DUO-NEB) 2.5 mg-0.5 mg/3 mL nebulizer solution Take 3 mLs by nebulization once daily. 1 Box 11    alendronate (FOSAMAX) 70 MG tablet TAKE 1 TABLET BY MOUTH EVERY 7 DAYS 4 tablet 0    aspirin 81 MG Chew Take 81 mg by mouth.      BASAGLAR KWIKPEN U-100 INSULIN glargine 100 units/mL (3mL) SubQ pen INJECT 10 UNITS UNDER THE SKIN EVERY MORNING 15 mL 0    BD ULTRA-FINE SHORT PEN NEEDLE 31 gauge x 5/16" Ndle Inject 1 pen into the skin once daily. 100 each 3    brompheniramine-pseudoeph-DM (BROMFED DM) 2-30-10 mg/5 mL Syrp TAKE 10 ML BY MOUTH THREE TIMES DAILY AS NEEDED 118 mL 0    butalbital-acetaminophen-caffeine -40 mg (FIORICET, ESGIC) -40 mg per tablet Take 1 tablet by mouth every 12 (twelve) hours as needed for Headaches. Further refills require " visit with neurology 30 tablet 0    carboxymethylcellulose sodium 1 % DpGe Apply 1 drop to eye 4 (four) times daily. 1 each 0    diazePAM (VALIUM) 10 MG Tab TAKE 1 TABLET BY MOUTH EVERY DAY AS NEEDED FOR ANXIETY 30 tablet 0    doxycycline (MONODOX) 100 MG capsule Take 1 capsule (100 mg total) by mouth every 12 (twelve) hours. for 10 days 20 capsule 0    escitalopram oxalate (LEXAPRO) 10 MG tablet TAKE 1 TABLET(10 MG) BY MOUTH EVERY DAY 90 tablet 3    gabapentin (NEURONTIN) 100 MG capsule Take 100 mg by mouth.      glipiZIDE (GLUCOTROL) 10 MG tablet Take 1 tablet (10 mg total) by mouth 2 (two) times daily. 180 tablet 3    ibuprofen (ADVIL,MOTRIN) 800 MG tablet Take 800 mg by mouth.      JARDIANCE 10 mg Tab TAKE 1 TABLET BY MOUTH EVERY DAY 90 tablet 0    levoFLOXacin (LEVAQUIN) 500 MG tablet Take 500 mg by mouth once daily.      lorcaserin 10 mg Tab Take 10 mg by mouth 2 (two) times daily. 60 tablet 3    metFORMIN (GLUCOPHAGE) 1000 MG tablet TAKE 1 TABLET(1000 MG) BY MOUTH TWICE DAILY WITH MEALS 180 tablet 3    pantoprazole (PROTONIX) 40 MG tablet Take 40 mg by mouth once daily.      potassium chloride (MICRO-K) 8 mEq CpSR Take 1 capsule (8 mEq total) by mouth once daily. 90 capsule 1    TRUE METRIX GLUCOSE TEST STRIP Strp USE AS DIRECTED FOUR TIMES DAILY 50 each 0    [DISCONTINUED] albuterol-ipratropium (DUO-NEB) 2.5 mg-0.5 mg/3 mL nebulizer solution Take 3 mLs by nebulization once daily. 1 Box 11    [DISCONTINUED] ANORO ELLIPTA 62.5-25 mcg/actuation DsDv       acyclovir (ZOVIRAX) 400 MG tablet Take 1 tablet (400 mg total) by mouth 4 (four) times daily. for 10 days (Patient not taking: Reported on 1/29/2020) 40 tablet 0    ergocalciferol (ERGOCALCIFEROL) 50,000 unit Cap TAKE 1 CAPSULE BY MOUTH EVERY 7 DAYS 12 capsule 3    erythromycin (ROMYCIN) ophthalmic ointment Place a 1/2 inch ribbon of ointment 4 times a day into the lower eyelid until medication is complete. (Patient not taking: Reported on  2/6/2020) 1 Tube 0    etodolac (LODINE) 200 MG Cap Take 1 capsule (200 mg total) by mouth 2 (two) times daily as needed (pain). (Patient not taking: Reported on 2/6/2020) 60 capsule 0    fluticasone-umeclidin-vilanter (TRELEGY ELLIPTA) 100-62.5-25 mcg DsDv Inhale 1 puff into the lungs once daily. (Patient not taking: Reported on 1/29/2020) 1 each 11    levocetirizine (XYZAL) 5 MG tablet Take 1 tablet (5 mg total) by mouth every evening. 30 tablet 11    ondansetron (ZOFRAN-ODT) 4 MG TbDL Take 1 tablet by mouth 3 (three) times daily.      oxymetazoline (AFRIN) 0.05 % nasal spray 2 sprays by Nasal route.      prednisoLONE acetate (PRED FORTE) 1 % DrpS Place 1 drop into the left eye 4 (four) times daily. (Patient not taking: Reported on 2/6/2020) 5 mL 0    predniSONE (DELTASONE) 20 MG tablet Take 1 tablet (20 mg total) by mouth once daily. for 5 days 5 tablet 0    promethazine (PHENERGAN) 25 MG tablet Take 1 tablet (25 mg total) by mouth every 6 (six) hours as needed. (Patient not taking: Reported on 1/29/2020) 30 tablet 0    promethazine-dextromethorphan (PROMETHAZINE-DM) 6.25-15 mg/5 mL Syrp TAKE 5 ML BY MOUTH TWICE DAILY AS NEEDED FOR COUGH (Patient not taking: Reported on 2/6/2020) 180 mL 0    topiramate (TOPAMAX) 25 MG tablet Take 1 tablet (25 mg total) by mouth 2 (two) times daily. (Patient not taking: Reported on 2/6/2020) 60 tablet 0    TRADJENTA 5 mg Tab tablet       triamcinolone acetonide 0.1% (KENALOG) 0.1 % cream Apply topically 2 (two) times daily. (Patient not taking: Reported on 1/29/2020) 45 g 0    VIRTUSSIN AC  mg/5 mL syrup Take 5 mLs by mouth as needed.        No facility-administered encounter medications on file as of 2/6/2020.      No orders of the defined types were placed in this encounter.    Plan:     Problem List Items Addressed This Visit        Pulmonary    COPD exacerbation - Primary    Current Assessment & Plan     Patient with increased cough and sputum production.  "Will treat for COPD exacerbation. Pt discloses remains smoking cigarettes.     Plan:  -Encouraged to stop smoking  -Stop Anoro  -Start Trelegy- medication is available at pharmacy  -Continue albuterol as needed  -Continue Levaquin as prescribed per ENT  -Start Prednisone 40 mg times 5 days    Inhaler/Nebulizer Instruction    I have instructed pt in the use of their inhaler (or nebulizer).  This has included a demonstration of the inhaler and discussion of the dosing and frequency of the medication.  I have also discussed with pt about the rationale for the medication for their condition.  All questions were answered.  Pt voiced understanding of the medication and how to use the device.             Relevant Medications    albuterol-ipratropium (DUO-NEB) 2.5 mg-0.5 mg/3 mL nebulizer solution    predniSONE (DELTASONE) 20 MG tablet       Other    Cigarette nicotine dependence without complication    Current Assessment & Plan     Cigarette Counseling    Currently smoking    I have counseled pt for 3-5 minutes regarding cigarette cessation.  This has included the need to stop smoking as well as strategies, including but not limited to "cold turkey", CHANTIX (including risks and benefits of edita drug), nicotine replacement and WELLBUTRIN.    Patient expresses wants to quit on her own.            Notify if not having improvement in symptoms in 7-10 days. If doing ok follow up in 3 months.     This note is dictated on M*Modal word recognition program.  There are word recognition mistakes that are occasionally missed on review.        "

## 2020-02-06 NOTE — TELEPHONE ENCOUNTER
----- Message from Morenita Sanchez MA sent at 2/6/2020 10:25 AM CST -----  Contact: Self/[phone]  Pt states that she only wants to be seen by Dr. Camara. Requesting a call back to R./S per pt to send follow up msg.

## 2020-02-06 NOTE — ASSESSMENT & PLAN NOTE
Patient with increased cough and sputum production. Will treat for COPD exacerbation. Pt discloses remains smoking cigarettes.     Plan:  -Encouraged to stop smoking  -Stop Anoro  -Start Trelegy- medication is available at pharmacy  -Continue albuterol as needed  -Continue Levaquin as prescribed per ENT  -Start Prednisone 40 mg times 5 days    Inhaler/Nebulizer Instruction    I have instructed pt in the use of their inhaler (or nebulizer).  This has included a demonstration of the inhaler and discussion of the dosing and frequency of the medication.  I have also discussed with pt about the rationale for the medication for their condition.  All questions were answered.  Pt voiced understanding of the medication and how to use the device.

## 2020-02-07 ENCOUNTER — TELEPHONE (OUTPATIENT)
Dept: PULMONOLOGY | Facility: CLINIC | Age: 60
End: 2020-02-07

## 2020-02-07 NOTE — TELEPHONE ENCOUNTER
----- Message from Tameka Mcelroy sent at 2/7/2020  1:57 PM CST -----  Contact: [phone]  Pharmacy states they need a Diagnose  code on pt     albuterol-ipratropium (DUO-NEB) 2.5 mg-0.5 mg/3 mL nebulizer solution please call back to discuss

## 2020-02-07 NOTE — TELEPHONE ENCOUNTER
Spoke with patient pharmacy, informed her that I have received a message stating that patient diagnosis code is needed. Patient pharmacist verbalized that she understand. I advised patient pharmacist with patient diagnosis code. Patient pharmacy verbalized that she understand.

## 2020-02-17 ENCOUNTER — PATIENT OUTREACH (OUTPATIENT)
Dept: ADMINISTRATIVE | Facility: OTHER | Age: 60
End: 2020-02-17

## 2020-02-19 ENCOUNTER — TELEPHONE (OUTPATIENT)
Dept: SLEEP MEDICINE | Facility: CLINIC | Age: 60
End: 2020-02-19

## 2020-02-19 NOTE — TELEPHONE ENCOUNTER
----- Message from Alex Acharya sent at 2/19/2020 12:10 PM CST -----  Contact: PATIENT  Type:  Needs Medical Advice    Who Called: Jud  Would the patient rather a call back or a response via [facility]?  Call back  Best Call Back Number:  [phone]  Additional Information:  She would like to begin seeing Dr Conklin at the earliest available appointment date/time

## 2020-03-06 DIAGNOSIS — E11.9 TYPE 2 DIABETES MELLITUS WITHOUT COMPLICATION: ICD-10-CM

## 2020-03-09 ENCOUNTER — PATIENT OUTREACH (OUTPATIENT)
Dept: ADMINISTRATIVE | Facility: OTHER | Age: 60
End: 2020-03-09

## 2020-03-26 ENCOUNTER — TELEPHONE (OUTPATIENT)
Dept: FAMILY MEDICINE | Facility: CLINIC | Age: 60
End: 2020-03-26

## 2020-03-26 DIAGNOSIS — F41.9 ANXIETY: Primary | ICD-10-CM

## 2020-03-26 RX ORDER — ALPRAZOLAM 0.25 MG/1
0.25 TABLET ORAL NIGHTLY PRN
Qty: 40 TABLET | Refills: 0 | Status: SHIPPED | OUTPATIENT
Start: 2020-03-26 | End: 2020-07-01

## 2020-03-26 NOTE — TELEPHONE ENCOUNTER
Alprazolam was ordered to help the patient with anxiety symptoms.    Patient was oriented about possible side effects of the medicine.  Including falls confusion and dependence.

## 2020-03-26 NOTE — TELEPHONE ENCOUNTER
----- Message from Lisa Fernández sent at 3/26/2020  3:02 PM CDT -----  Contact: SELF [phone]  Patient would like to speak with you about a personal matter. Please advise

## 2020-03-26 NOTE — TELEPHONE ENCOUNTER
Patient complaints of having a lot of anxiety because of the covid-19. Would like for you to send an RX at [facility] [address] . Please advise.

## 2020-04-27 ENCOUNTER — OFFICE VISIT (OUTPATIENT)
Dept: FAMILY MEDICINE | Facility: CLINIC | Age: 60
End: 2020-04-27
Payer: MEDICARE

## 2020-04-27 DIAGNOSIS — M79.10 MUSCLE PAIN: ICD-10-CM

## 2020-04-27 DIAGNOSIS — G43.909 MIGRAINE SYNDROME: ICD-10-CM

## 2020-04-27 DIAGNOSIS — B96.89 BACTERIAL SINUSITIS: ICD-10-CM

## 2020-04-27 DIAGNOSIS — J32.9 BACTERIAL SINUSITIS: ICD-10-CM

## 2020-04-27 DIAGNOSIS — Z79.4 TYPE 2 DIABETES MELLITUS WITH HYPERGLYCEMIA, WITH LONG-TERM CURRENT USE OF INSULIN: Primary | ICD-10-CM

## 2020-04-27 DIAGNOSIS — E11.65 TYPE 2 DIABETES MELLITUS WITH HYPERGLYCEMIA, WITH LONG-TERM CURRENT USE OF INSULIN: Primary | ICD-10-CM

## 2020-04-27 PROCEDURE — 99213 PR OFFICE/OUTPT VISIT, EST, LEVL III, 20-29 MIN: ICD-10-PCS | Mod: 95,,, | Performed by: FAMILY MEDICINE

## 2020-04-27 PROCEDURE — 99213 OFFICE O/P EST LOW 20 MIN: CPT | Mod: 95,,, | Performed by: FAMILY MEDICINE

## 2020-04-27 RX ORDER — TOPIRAMATE 25 MG/1
25 TABLET ORAL 2 TIMES DAILY
Qty: 180 TABLET | Refills: 0 | Status: SHIPPED | OUTPATIENT
Start: 2020-04-27 | End: 2021-08-10

## 2020-04-27 RX ORDER — MELOXICAM 15 MG/1
15 TABLET ORAL DAILY PRN
Qty: 30 TABLET | Refills: 0 | Status: SHIPPED | OUTPATIENT
Start: 2020-04-27 | End: 2020-04-27

## 2020-04-27 RX ORDER — METFORMIN HYDROCHLORIDE 1000 MG/1
1000 TABLET ORAL 2 TIMES DAILY WITH MEALS
Qty: 180 TABLET | Refills: 3 | Status: SHIPPED | OUTPATIENT
Start: 2020-04-27 | End: 2020-06-01

## 2020-04-27 RX ORDER — GLIPIZIDE 10 MG/1
10 TABLET ORAL 2 TIMES DAILY
Qty: 180 TABLET | Refills: 3 | Status: SHIPPED | OUTPATIENT
Start: 2020-04-27 | End: 2021-07-01

## 2020-04-27 RX ORDER — AZITHROMYCIN 500 MG/1
500 TABLET, FILM COATED ORAL DAILY
Qty: 3 TABLET | Refills: 0 | Status: SHIPPED | OUTPATIENT
Start: 2020-04-27 | End: 2020-04-30

## 2020-04-27 RX ORDER — MELOXICAM 15 MG/1
TABLET ORAL
Qty: 90 TABLET | Refills: 0 | Status: SHIPPED | OUTPATIENT
Start: 2020-04-27 | End: 2020-08-26

## 2020-04-27 NOTE — PROGRESS NOTES
The patient location is:  [place]  The chief complaint leading to consultation is:  Headache/diabetes  Visit type: audiovisual  Total time spent with patient: 21 min  Each patient to whom he or she provides medical services by telemedicine is:  (1) informed of the relationship between the physician and patient and the respective role of any other health care provider with respect to management of the patient; and (2) notified that he or she may decline to receive medical services by telemedicine and may withdraw from such care at any time.    Notes:  60 years old female who was evaluated by telemedicine.  Patient with worsening the headaches for the last month.  Patient was using Imitrex with no significant improvement.  Patient was taking Topamax at home.  The hepatic 6/10 of intensity on and off aggravated with activity and better with rest.  No recent A1c.  No polyuria, polydipsia or polyphagia.  Patient requests a refill of her medicines for diabetes.  Patient also with sinus congestion for a couple of weeks.  No fever or chills.  Patient with sinus pressure and postnasal drip.  She reports episodic muscle pain.  Patient requests a medicine to help her with the pain.  The pain is 3/10 of intensity on and off aggravated with activity better with rest.  No fever or chills.                  Diagnoses and all orders for this visit:    Type 2 diabetes mellitus with hyperglycemia, with long-term current use of insulin  -     Comprehensive metabolic panel; Future  -     Lipid panel; Future  -     Hemoglobin A1c; Future  -     metFORMIN (GLUCOPHAGE) 1000 MG tablet; Take 1 tablet (1,000 mg total) by mouth 2 (two) times daily with meals.  -     empagliflozin (JARDIANCE) 10 mg tablet; Take 1 tablet (10 mg total) by mouth once daily.  -     glipiZIDE (GLUCOTROL) 10 MG tablet; Take 1 tablet (10 mg total) by mouth 2 (two) times daily.    Migraine syndrome  -     Sedimentation rate; Future  -     C-Reactive Protein;  Future  -     RAH Screen w/Reflex; Future  -     Rheumatoid factor; Future  -     topiramate (TOPAMAX) 25 MG tablet; Take 1 tablet (25 mg total) by mouth 2 (two) times daily.    Bacterial sinusitis  -     azithromycin (ZITHROMAX) 500 MG tablet; Take 1 tablet (500 mg total) by mouth once daily. for 3 days    Muscle pain  -     meloxicam (MOBIC) 15 MG tablet; Take 1 tablet (15 mg total) by mouth daily as needed for Pain.  -     Sedimentation rate; Future  -     C-Reactive Protein; Future  -     RAH Screen w/Reflex; Future  -     Rheumatoid factor; Future

## 2020-04-28 RX ORDER — DIAZEPAM 10 MG/1
TABLET ORAL
Qty: 30 TABLET | Refills: 0 | Status: SHIPPED | OUTPATIENT
Start: 2020-04-28 | End: 2020-07-01

## 2020-05-11 DIAGNOSIS — J44.1 COPD EXACERBATION: ICD-10-CM

## 2020-05-11 RX ORDER — IPRATROPIUM BROMIDE AND ALBUTEROL SULFATE 2.5; .5 MG/3ML; MG/3ML
3 SOLUTION RESPIRATORY (INHALATION) DAILY
Qty: 1 BOX | Refills: 11 | Status: SHIPPED | OUTPATIENT
Start: 2020-05-11 | End: 2022-12-15

## 2020-05-11 NOTE — TELEPHONE ENCOUNTER
----- Message from Delaney Back sent at 5/11/2020 11:53 AM CDT -----  Contact: Pt  Pt requesting a call back in regards to getting tested for Covid 19    Pt states that she was around someone that tested positive for covid 19    Please call and advise      Phone [phone]

## 2020-05-11 NOTE — TELEPHONE ENCOUNTER
Pt reported she came in contact with a someone that is COVID 19 +.  She reported having some sore throat, headaches. She is concerned because she suffers from COPD and diabetes. Denies cough and fever.

## 2020-05-12 ENCOUNTER — OFFICE VISIT (OUTPATIENT)
Dept: URGENT CARE | Facility: CLINIC | Age: 60
End: 2020-05-12
Payer: MEDICARE

## 2020-05-12 ENCOUNTER — TELEPHONE (OUTPATIENT)
Dept: FAMILY MEDICINE | Facility: CLINIC | Age: 60
End: 2020-05-12

## 2020-05-12 VITALS
OXYGEN SATURATION: 95 % | TEMPERATURE: 99 F | BODY MASS INDEX: 27.05 KG/M2 | WEIGHT: 147 LBS | HEART RATE: 90 BPM | HEIGHT: 62 IN

## 2020-05-12 DIAGNOSIS — J20.8 ACUTE BACTERIAL BRONCHITIS: Primary | ICD-10-CM

## 2020-05-12 DIAGNOSIS — J02.9 SORE THROAT: ICD-10-CM

## 2020-05-12 DIAGNOSIS — Z20.822 SUSPECTED COVID-19 VIRUS INFECTION: Primary | ICD-10-CM

## 2020-05-12 DIAGNOSIS — B96.89 ACUTE BACTERIAL BRONCHITIS: Primary | ICD-10-CM

## 2020-05-12 DIAGNOSIS — Z20.822 SUSPECTED COVID-19 VIRUS INFECTION: ICD-10-CM

## 2020-05-12 DIAGNOSIS — R06.02 SHORTNESS OF BREATH: ICD-10-CM

## 2020-05-12 PROCEDURE — 71046 X-RAY EXAM CHEST 2 VIEWS: CPT | Mod: FY,S$GLB,, | Performed by: RADIOLOGY

## 2020-05-12 PROCEDURE — 99214 PR OFFICE/OUTPT VISIT, EST, LEVL IV, 30-39 MIN: ICD-10-PCS | Mod: S$GLB,,, | Performed by: STUDENT IN AN ORGANIZED HEALTH CARE EDUCATION/TRAINING PROGRAM

## 2020-05-12 PROCEDURE — U0002 COVID-19 LAB TEST NON-CDC: HCPCS

## 2020-05-12 PROCEDURE — 71046 XR CHEST PA AND LATERAL: ICD-10-PCS | Mod: FY,S$GLB,, | Performed by: RADIOLOGY

## 2020-05-12 PROCEDURE — 99214 OFFICE O/P EST MOD 30 MIN: CPT | Mod: S$GLB,,, | Performed by: STUDENT IN AN ORGANIZED HEALTH CARE EDUCATION/TRAINING PROGRAM

## 2020-05-12 RX ORDER — SUMATRIPTAN 50 MG/1
TABLET, FILM COATED ORAL
COMMUNITY
Start: 2020-04-18 | End: 2021-02-25

## 2020-05-12 RX ORDER — AZITHROMYCIN 250 MG/1
TABLET, FILM COATED ORAL
Qty: 6 TABLET | Refills: 0 | Status: SHIPPED | OUTPATIENT
Start: 2020-05-12 | End: 2020-05-17

## 2020-05-12 NOTE — TELEPHONE ENCOUNTER
----- Message from Slime Kim sent at 5/12/2020  1:08 PM CDT -----  Contact: [phone]/self  Patient is requesting a call back regarding being exposed to covid and wanting to be tested. Thanks

## 2020-05-12 NOTE — PROGRESS NOTES
"Subjective:       Patient ID: Jud Villa is a 60 y.o. female.    Vitals:  height is 5' 2" (1.575 m) and weight is 66.7 kg (147 lb). Her tympanic temperature is 98.9 °F (37.2 °C). Her pulse is 90. Her oxygen saturation is 95%.     Chief Complaint: Sore Throat    Pt with PMHx of COPD, DMII, breast CA, and migraines presents for cough and sore throat x3d. Reports body aches, fatigue, HA, SoB/cough with wheezing, and sore throat x3d. Concerned for COVID, outpt order put in by PCP but pt requesting to be seen by physician. Reports improvement in respiratory sx's with home nebulizer.    Sore Throat    This is a new problem. The current episode started in the past 7 days (3 days ago). The problem has been unchanged. Neither side of throat is experiencing more pain than the other. There has been no fever. The pain is at a severity of 3/10. The pain is mild. Associated symptoms include congestion, coughing, ear pain, headaches, a plugged ear sensation and shortness of breath. Pertinent negatives include no abdominal pain, diarrhea, drooling, neck pain, stridor, swollen glands, trouble swallowing or vomiting. She has had no exposure to strep or mono. She has tried nothing for the symptoms. The treatment provided no relief.   Wheezing    This is a new problem. The current episode started in the past 7 days. The problem occurs intermittently. The problem has been unchanged. Associated symptoms include coughing, ear pain, headaches, rhinorrhea, shortness of breath and a sore throat. Pertinent negatives include no abdominal pain, chest pain, chills, diarrhea, fever, hemoptysis, neck pain, rash, sputum production, swollen glands or vomiting. Nothing aggravates the symptoms. She has tried ipratropium inhalers and beta agonist inhalers for the symptoms. The treatment provided moderate relief. Her past medical history is significant for COPD.       Constitution: Positive for fatigue. Negative for chills, sweating and fever. " "  HENT: Positive for ear pain, congestion, postnasal drip, sinus pain, sinus pressure and sore throat. Negative for drooling, facial trauma, nosebleeds, foreign body in nose, trouble swallowing and voice change.    Neck: Negative for neck pain, neck stiffness and painful lymph nodes.   Cardiovascular: Negative for chest pain, leg swelling and palpitations.   Eyes: Negative for eye discharge, eye itching and eye redness.   Respiratory: Positive for chest tightness, cough, COPD, shortness of breath and wheezing. Negative for sputum production, bloody sputum and stridor.    Gastrointestinal: Negative for abdominal pain, nausea, vomiting and diarrhea.   Musculoskeletal: Positive for muscle ache. Negative for joint pain, joint swelling and muscle cramps.   Skin: Negative for color change, rash and lesion.   Neurological: Positive for headaches and history of migraines. Negative for dizziness, light-headedness, passing out and altered mental status.   Hematologic/Lymphatic: Negative for swollen lymph nodes, easy bruising/bleeding and trouble clotting. Does not bruise/bleed easily.   Psychiatric/Behavioral: Negative for altered mental status, confusion, agitation and hallucinations.       Objective:       Vitals:    05/12/20 1737   Pulse: 90   Temp: 98.9 °F (37.2 °C)   TempSrc: Tympanic   SpO2: 95%   Weight: 66.7 kg (147 lb)   Height: 5' 2" (1.575 m)     Physical Exam   Constitutional: She is oriented to person, place, and time. She appears well-developed and well-nourished. No distress.   HENT:   Head: Normocephalic and atraumatic.   Right Ear: Hearing and external ear normal.   Left Ear: Hearing and external ear normal.   Nose: Nose normal.   Mouth/Throat: Uvula is midline, oropharynx is clear and moist and mucous membranes are normal. Tonsils are 0 on the right. Tonsils are 0 on the left. No tonsillar exudate.   Eyes: Conjunctivae, EOM and lids are normal. Right eye exhibits no discharge. Left eye exhibits no discharge. " No scleral icterus.   Neck: Normal range of motion. Neck supple.   Cardiovascular: Normal rate, regular rhythm and normal heart sounds.   No murmur heard.  Pulmonary/Chest: Effort normal. No respiratory distress. She has wheezes (diffuse end expiratory). She has no rales.   Abdominal: Normal appearance.   Musculoskeletal: Normal range of motion. She exhibits no edema or deformity.   Neurological: She is alert and oriented to person, place, and time. No cranial nerve deficit (CN II-XII grossly intact).   Skin: Skin is warm, dry, not pale and no rash.   Psychiatric: She has a normal mood and affect. Her speech is normal and behavior is normal. Judgment and thought content normal. Cognition and memory are normal.   Nursing note and vitals reviewed.    CXR: no acute cardiopulmonary process; compared to 11/2019 ( physician interpretation)      Assessment:       1. Acute bacterial bronchitis    2. Sore throat    3. Suspected Covid-19 Virus Infection    4. Shortness of breath        Plan:         Acute bacterial bronchitis  -     azithromycin (Z-ANDREA) 250 MG tablet; Take 2 tablets by mouth on day 1; Take 1 tablet by mouth on days 2-5  Dispense: 6 tablet; Refill: 0  - to continue home nebulizer PRN; discussed steroids in potential COVID, pt to continue home nebulizer and return if worsening respiratory sx's    Sore throat  -     COVID-19 Routine Screening    Suspected Covid-19 Virus Infection  -     Airborne and Contact and Droplet Isolation Status; Standing  - counseled patient on home isolation protocols pending test results, questions answered and return precautions given    Shortness of breath  -     XR CHEST PA AND LATERAL; Future; Expected date: 05/12/2020    Results, medications and diagnosis reviewed with patient, questions answered, and return precautions given    Follow up if symptoms worsen or fail to improve.    Sachin Foote MD/MPH  [facility]  [facility]

## 2020-05-12 NOTE — TELEPHONE ENCOUNTER
Pt reported she came in contact with a someone that is COVID 19 +.  She reported having some sore throat, headaches. She is concerned because she suffers from COPD and diabetes. Denies cough and fever. She would like to be tested for the virus. Please advise.

## 2020-05-12 NOTE — TELEPHONE ENCOUNTER
COVID-19 testing was ordered and ambulatory monitoring.    Keep contact precautions.    Please contact the patient with the appointment .

## 2020-05-13 ENCOUNTER — TELEPHONE (OUTPATIENT)
Dept: URGENT CARE | Facility: CLINIC | Age: 60
End: 2020-05-13

## 2020-05-13 LAB — SARS-COV-2 RNA RESP QL NAA+PROBE: NOT DETECTED

## 2020-05-13 NOTE — PATIENT INSTRUCTIONS
Instructions for Patients with Confirmed or Suspected COVID-19    If you are awaiting your test result, you will either be called or it will be released to the patient portal.  If you have any questions about your test, please visit [email or web address] or call our COVID-19 information line at [phone].       Stay home and stay away from family members and friends. The CDC says, you can leave home after these three things have happened: 1) You have had no fever for at least 72 hours (that is three full days of no fever without the use of medicine that reduces fevers) 2) AND other symptoms have improved (for example, when your cough or shortness of breath have improved) 3) AND at least 7 days have passed since your symptoms first appeared.   Separate yourself from other people and animals in your home.   Call ahead before visiting your doctor.   Wear a facemask.   Cover your coughs and sneezes.   Wash your hands often with soap and water; hand  can be used, too.   Avoid sharing personal household items.   Wipe down surfaces used daily.   Monitor your symptoms. Seek prompt medical attention if your illness is worsening (e.g., difficulty breathing).    Before seeking care, call your healthcare provider.   If you have a medical emergency and need to call 911, notify the dispatch personnel that you have, or are being evaluated for COVID-19. If possible, put on a facemask before emergency medical services arrive.        Recommended precautions for household members, intimate partners, and caregivers in a home setting of a patient with symptomatic laboratory-confirmed COVID-19 or a patient under investigation.  Household members, intimate partners, and caregivers in the home setting awaiting tests results have close contact with a person with symptomatic, laboratory-confirmed COVID-19 or a person under investigation. Close contacts should monitor their health; they should call their  provider right away if they develop symptoms suggestive of COVID-19 (e.g., fever, cough, shortness of breath).    Close contacts should also follow these recommendations:   Make sure that you understand and can help the patient follow their provider's instructions for medication(s) and care. You should help the patient with basic needs in the home and provide support for getting groceries, prescriptions, and other personal needs.   Monitor the patient's symptoms. If the patient is getting sicker, call his or her healthcare provider and tell them that the patient has laboratory-confirmed COVID-19. If the patient has a medical emergency and you need to call 911, notify the dispatch personnel that the patient has, or is being evaluated for COVID-19.   Household members should stay in another room or be  from the patient. Household members should use a separate bedroom and bathroom, if available.   Prohibit visitors.   Household members should care for any pets in the home.   Make sure that shared spaces in the home have good air flow, such as by an air conditioner or an opened window, weather permitting.   Perform hand hygiene frequently. Wash your hands often with soap and water for at least 20 seconds or use an alcohol-based hand  (that contains > 60% alcohol) covering all surfaces of your hands and rubbing them together until they feel dry. Soap and water should be used preferentially.   Avoid touching your eyes, nose, and mouth.   The patient should wear a facemask. If the patient is not able to wear a facemask (for example, because it causes trouble breathing), caregivers should wear a mask when they are in the same room as the patient.   Wear a disposable facemask and gloves when you touch or have contact with the patient's blood, stool, or body fluids, such as saliva, sputum, nasal mucus, vomit, urine.  o Throw out disposable facemasks and gloves after using them. Do not  reuse.  o When removing personal protective equipment, first remove and dispose of gloves. Then, immediately clean your hands with soap and water or alcohol-based hand . Next, remove and dispose of facemask, and immediately clean your hands again with soap and water or alcohol-based hand .   You should not share dishes, drinking glasses, cups, eating utensils, towels, bedding, or other items with the patient. After the patient uses these items, you should wash them thoroughly (see below Wash laundry thoroughly).   Clean all high-touch surfaces, such as counters, tabletops, doorknobs, bathroom fixtures, toilets, phones, keyboards, tablets, and bedside tables, every day. Also, clean any surfaces that may have blood, stool, or body fluids on them.   Use a household cleaning spray or wipe, according to the label instructions. Labels contain instructions for safe and effective use of the cleaning product including precautions you should take when applying the product, such as wearing gloves and making sure you have good ventilation during use of the product.   Wash laundry thoroughly.  o Immediately remove and wash clothes or bedding that have blood, stool, or body fluids on them.  o Wear disposable gloves while handling soiled items and keep soiled items away from your body. Clean your hands (with soap and water or an alcohol-based hand ) immediately after removing your gloves.  o Read and follow directions on labels of laundry or clothing items and detergent. In general, using a normal laundry detergent according to washing machine instructions and dry thoroughly using the warmest temperatures recommended on the clothing label.   Place all used disposable gloves, facemasks, and other contaminated items in a lined container before disposing of them with other household waste. Clean your hands (with soap and water or an alcohol-based hand ) immediately after handling these  items. Soap and water should be used preferentially if hands are visibly dirty.   Discuss any additional questions with your state or local health department or healthcare provider. Check available hours when contacting your local health department.    For more information see CDC link below.      https://www.cdc.gov/coronavirus/2019-ncov/hcp/guidance-prevent-spread.html#precautions        Sources:  [facility], [facility] and [facility]

## 2020-05-15 DIAGNOSIS — M81.0 SENILE OSTEOPOROSIS: ICD-10-CM

## 2020-05-15 RX ORDER — ALENDRONATE SODIUM 70 MG/1
TABLET ORAL
Qty: 4 TABLET | Refills: 0 | Status: SHIPPED | OUTPATIENT
Start: 2020-05-15 | End: 2020-07-16

## 2020-05-15 RX ORDER — ALENDRONATE SODIUM 70 MG/1
TABLET ORAL
Qty: 12 TABLET | OUTPATIENT
Start: 2020-05-15

## 2020-05-31 DIAGNOSIS — E11.65 TYPE 2 DIABETES MELLITUS WITH HYPERGLYCEMIA, WITH LONG-TERM CURRENT USE OF INSULIN: ICD-10-CM

## 2020-05-31 DIAGNOSIS — Z79.4 TYPE 2 DIABETES MELLITUS WITH HYPERGLYCEMIA, WITH LONG-TERM CURRENT USE OF INSULIN: ICD-10-CM

## 2020-06-01 RX ORDER — METFORMIN HYDROCHLORIDE 1000 MG/1
TABLET ORAL
Qty: 180 TABLET | Refills: 3 | Status: SHIPPED | OUTPATIENT
Start: 2020-06-01 | End: 2021-12-03

## 2020-06-09 ENCOUNTER — TELEPHONE (OUTPATIENT)
Dept: FAMILY MEDICINE | Facility: CLINIC | Age: 60
End: 2020-06-09

## 2020-06-09 NOTE — TELEPHONE ENCOUNTER
----- Message from Dione Munoz sent at 6/9/2020 12:31 PM CDT -----  Contact: pt  Type:  RX Refill Request    Who Called: Pt  Refill or New Rx: refill  RX Name and Strength:rizatriptan 10 mg  How is the patient currently taking it? (ex. 1XDay):1  Is this a 30 day or 90 day RX:30  [email or web address] [phone]  Local or Mail Order:  Ordering Provider Dr Velez  Would the patient rather a call back or a response via [facility]?   Best Call Back Number:[phone]  Additional Information:

## 2020-06-11 RX ORDER — RIZATRIPTAN BENZOATE 10 MG/1
TABLET ORAL
COMMUNITY
Start: 2020-05-26 | End: 2020-06-11 | Stop reason: SDUPTHER

## 2020-06-11 NOTE — TELEPHONE ENCOUNTER
----- Message from Nathalia Tolentino sent at 6/11/2020  1:17 PM CDT -----  Type:  RX Refill Request    Who Called: Macrina    RX Name and Strength: rizatriptan 10 mg    How is the patient currently taking it? (ex. 1XDay): 1 EVERY HOUR    Is this a 30 day or 90 day RX: 90    [facility] Pharm[facility] with phone [id number] - [address]  AT [facility] [address] [phone] (Phone)     Local or Mail Order: local    Ordering Provider: DR thomas    Would the patient rather a call back or a response via [facility]? call    Best Call Back Number:  [phone]     Additional Information: pt all so want a call back

## 2020-06-12 RX ORDER — RIZATRIPTAN BENZOATE 10 MG/1
10 TABLET ORAL ONCE AS NEEDED
Qty: 30 TABLET | Refills: 0 | Status: SHIPPED | OUTPATIENT
Start: 2020-06-12 | End: 2020-06-12

## 2020-06-13 ENCOUNTER — OFFICE VISIT (OUTPATIENT)
Dept: URGENT CARE | Facility: CLINIC | Age: 60
End: 2020-06-13
Payer: MEDICARE

## 2020-06-13 VITALS
HEIGHT: 62 IN | WEIGHT: 147 LBS | TEMPERATURE: 98 F | BODY MASS INDEX: 27.05 KG/M2 | HEART RATE: 88 BPM | OXYGEN SATURATION: 96 % | RESPIRATION RATE: 16 BRPM

## 2020-06-13 DIAGNOSIS — R07.9 LEFT-SIDED CHEST PAIN: ICD-10-CM

## 2020-06-13 DIAGNOSIS — R07.89 CHEST WALL PAIN: Primary | ICD-10-CM

## 2020-06-13 PROCEDURE — 71046 X-RAY EXAM CHEST 2 VIEWS: CPT | Mod: FY,S$GLB,, | Performed by: RADIOLOGY

## 2020-06-13 PROCEDURE — 71046 XR CHEST PA AND LATERAL: ICD-10-PCS | Mod: FY,S$GLB,, | Performed by: RADIOLOGY

## 2020-06-13 PROCEDURE — 99214 OFFICE O/P EST MOD 30 MIN: CPT | Mod: S$GLB,,, | Performed by: PHYSICIAN ASSISTANT

## 2020-06-13 PROCEDURE — 99214 PR OFFICE/OUTPT VISIT, EST, LEVL IV, 30-39 MIN: ICD-10-PCS | Mod: S$GLB,,, | Performed by: PHYSICIAN ASSISTANT

## 2020-06-13 PROCEDURE — 93010 ELECTROCARDIOGRAM REPORT: CPT | Mod: S$GLB,,, | Performed by: INTERNAL MEDICINE

## 2020-06-13 PROCEDURE — 93005 ELECTROCARDIOGRAM TRACING: CPT | Mod: S$GLB,,, | Performed by: PHYSICIAN ASSISTANT

## 2020-06-13 PROCEDURE — 93005 EKG 12-LEAD: ICD-10-PCS | Mod: S$GLB,,, | Performed by: PHYSICIAN ASSISTANT

## 2020-06-13 PROCEDURE — 93010 EKG 12-LEAD: ICD-10-PCS | Mod: S$GLB,,, | Performed by: INTERNAL MEDICINE

## 2020-06-13 RX ORDER — RIZATRIPTAN BENZOATE 10 MG/1
10 TABLET ORAL
COMMUNITY
Start: 2020-05-26 | End: 2020-06-13

## 2020-06-13 RX ORDER — GABAPENTIN 100 MG/1
300 CAPSULE ORAL
COMMUNITY
Start: 2020-05-26 | End: 2020-06-13

## 2020-06-13 RX ORDER — TOPIRAMATE 50 MG/1
TABLET, FILM COATED ORAL
COMMUNITY
Start: 2020-05-12 | End: 2020-06-13

## 2020-06-13 RX ORDER — RIBOFLAVIN (VITAMIN B2) 400 MG
400 TABLET ORAL
COMMUNITY
Start: 2020-05-26 | End: 2021-05-26

## 2020-06-13 NOTE — PROGRESS NOTES
"Subjective:       Patient ID: Jud Villa is a 60 y.o. female.    Vitals:  height is 5' 2.01" (1.575 m) and weight is 66.7 kg (147 lb). Her oral temperature is 98 °F (36.7 °C). Her pulse is 88. Her respiration is 16 and oxygen saturation is 96%.     Chief Complaint: Chest Pain (L SIDE)    Patient is a 60-year-old female with a past medical history    Past Medical History:  No date: Asthma  No date: Breast carcinoma  No date: Cataract  No date: Diabetes mellitus  No date: Osteoporosis    Presents today with left-sided chest pain that is tender to palpation worse with taking a deep breath or coughing.  She denies any trauma or injury.  Pain is reproducible when does not radiate.  She denies shortness of breath.  No calf tenderness.  No recent surgery or immobilization.    Chest Pain   This is a new problem. The current episode started 1 to 4 weeks ago (X2 WEEKS). Episode frequency: WITH DEEP BREATHING. The problem has been unchanged. The pain is present in the substernal region and lateral region. The pain is at a severity of 6/10. The pain is mild. The quality of the pain is described as pressure. The pain does not radiate. Pertinent negatives include no abdominal pain, back pain, claudication, cough, diaphoresis, dizziness, exertional chest pressure, fever, headaches, hemoptysis, irregular heartbeat, leg pain, lower extremity edema, malaise/fatigue, nausea, near-syncope, numbness, orthopnea, palpitations, PND, shortness of breath, sputum production, syncope, vomiting or weakness. The pain is aggravated by breathing. She has tried nothing for the symptoms.       Constitution: Negative for chills, sweating, fatigue and fever.   HENT: Negative for congestion and sore throat.    Neck: Negative for painful lymph nodes.   Cardiovascular: Positive for chest pain. Negative for leg swelling, palpitations and passing out.        L CHEST WALL PAIN, WORSE WITH DEEP INHALATION, DENIES SOB   Eyes: Negative for double vision " and blurred vision.   Respiratory: Negative for cough, sputum production, bloody sputum and shortness of breath.    Gastrointestinal: Negative for abdominal pain, nausea, vomiting and diarrhea.   Genitourinary: Negative for dysuria, frequency, urgency and history of kidney stones.   Musculoskeletal: Negative for pain, joint pain, joint swelling, back pain, muscle cramps and muscle ache.   Skin: Negative for color change, pale, rash and bruising.   Allergic/Immunologic: Negative for seasonal allergies.   Neurological: Negative for dizziness, history of vertigo, light-headedness, passing out, headaches and numbness.   Hematologic/Lymphatic: Negative for swollen lymph nodes.   Psychiatric/Behavioral: Negative for nervous/anxious, sleep disturbance and depression. The patient is not nervous/anxious.        Objective:      Physical Exam   Constitutional: She is oriented to person, place, and time. She appears well-developed. She is cooperative.  Non-toxic appearance. She does not appear ill. No distress.   HENT:   Head: Normocephalic and atraumatic.   Right Ear: Hearing, tympanic membrane, external ear and ear canal normal.   Left Ear: Hearing, tympanic membrane, external ear and ear canal normal.   Nose: Nose normal. No mucosal edema, rhinorrhea or nasal deformity. No epistaxis. Right sinus exhibits no maxillary sinus tenderness and no frontal sinus tenderness. Left sinus exhibits no maxillary sinus tenderness and no frontal sinus tenderness.   Mouth/Throat: Uvula is midline, oropharynx is clear and moist and mucous membranes are normal. No trismus in the jaw. Normal dentition. No uvula swelling. No oropharyngeal exudate, posterior oropharyngeal edema or posterior oropharyngeal erythema.   Eyes: Conjunctivae and lids are normal. Right eye exhibits no discharge. Left eye exhibits no discharge. No scleral icterus.   Neck: Trachea normal, normal range of motion, full passive range of motion without pain and phonation  normal. Neck supple. No neck rigidity. No edema and no erythema present.   Cardiovascular: Normal rate, regular rhythm, normal heart sounds and normal pulses.   Pulmonary/Chest: Effort normal and breath sounds normal. No accessory muscle usage or stridor. No respiratory distress. She has no decreased breath sounds. She has no wheezes. She has no rhonchi. She has no rales. Chest wall is not dull to percussion. She exhibits tenderness and bony tenderness. She exhibits no mass, no laceration, no crepitus, no edema, no deformity, no swelling and no retraction.       Abdominal: Soft. Normal appearance and bowel sounds are normal. She exhibits no distension, no pulsatile midline mass and no mass. There is no abdominal tenderness.   Musculoskeletal: Normal range of motion.         General: No deformity.   Neurological: She is alert and oriented to person, place, and time. She exhibits normal muscle tone. Coordination normal.   Skin: Skin is warm, dry, intact, not diaphoretic and not pale.   Psychiatric: Her speech is normal and behavior is normal. Judgment and thought content normal.   Nursing note and vitals reviewed.      EKG: NSR; 78 bpm; No acute ST changes; Compared to last EKG no changes      X-ray Chest Pa And Lateral    Result Date: 6/13/2020  EXAMINATION: CHEST PA AND LATERAL CLINICAL HISTORY: Chest pain, unspecified TECHNIQUE: PA and lateral chest radiograph COMPARISON: 05/12/2020 FINDINGS: The cardiac silhouette is within normal limits.   There is no focal consolidation, pneumothorax, or pleural effusion.  Mild dextroscoliosis and spondylitic changes are present.  Multiple surgical clips are seen over the left chest.  The gallbladder is surgically absent.     No acute intrathoracic abnormality. Electronically signed by: Isa Engel Date:    06/13/2020 Time:    18:25    Assessment:       1. Chest wall pain    2. Left-sided chest pain        Plan:         Chest wall pain    Left-sided chest pain  -     Cancel:  POCT Urinalysis, Dipstick, Automated, W/O Scope  -     X-Ray Chest PA And Lateral; Future; Expected date: 06/13/2020  -     EKG 12-lead     Reviewed radiographs an EKG with patient.  Discussed exact etiology of chest pain unknown although tenderness palpation and being able to reproduce the pain is a good sign.  Discussed follow-up with her primary care physician.  Strict ER precautions given. Discussed diagnosis with patient as well as treatment and home care. Discussed return to clinic precautions vs ER precautions. All patients questions answered. Patient verbalized understanding. Patient agreed with plan of care.         Uncertain Causes of Chest Pain    Chest pain can happen for a number of reasons. Sometimes the cause can't be determined. If your condition does not seem serious, and your pain does not appear to be coming from your heart, your healthcare provider may recommend watching it closely. Sometimes the signs of a serious problem take more time to appear. Many problems not related to your heart can cause chest pain.These include:  · Musculoskeletal. Costochondritis, an inflammation of the tissues around the ribs that can occur from trauma or overuse injuries  · Respiratory. Pneumonia, pneumothorax, or pneumonitis (inflammation of the lining of the chest and lungs)  · Gastrointestinal. Esophageal reflux, heartburn, or gallbladder disease  · Anxiety and panic disorders  · Nerve compression and neuritis  · Miscellaneous problems such as aortic aneurysm or pulmonary embolism (a blood clot in the lungs)  Home care  After your visit, follow these recommendations:  · Rest today and avoid strenuous activity.  · Take any prescribed medicine as directed.  · Be aware of any recurrent chest pain and notice any changes  Follow-up care  Follow up with your healthcare provider if you do not start to feel better within 24 hours, or as advised.  Call 911  Call 911 if any of these occur:  · A change in the type of pain:  if it feels different, becomes more severe, lasts longer, or begins to spread into your shoulder, arm, neck, jaw or back  · Shortness of breath or increased pain with breathing  · Weakness, dizziness, or fainting  · Rapid heart beat  · Crushing sensation in your chest  When to seek medical advice  Call your healthcare provider right away if any of the following occur:  · Cough with dark colored sputum (phlegm) or blood  · Fever of 100.4ºF (38ºC) or higher, or as directed by your healthcare provider  · Swelling, pain or redness in one leg  · Shortness of breath  Date Last Reviewed: 12/30/2015 © 2000-2017 [employer]. [address] [place] [address], [place] [address]. All rights reserved. This information is not intended as a substitute for professional medical care. Always follow your healthcare professional's instructions.        Chest Wall Pain: Costochondritis    The chest pain that you have had today is caused by costochondritis. This condition is caused by an inflammation of the cartilage joining your ribs to your breastbone. It is not caused by heart or lung problems. Your healthcare team has made sure that the chest pain you feel is not from a life threatening cause of chest pain such as heart attack, collapsed lung, blood clot in the lung, tear in the aorta, or esophageal rupture. The inflammation may have been brought on by a blow to the chest, lifting heavy objects, intense exercise, or an illness that made you cough and sneeze a lot. It often occurs during times of emotional stress. It can be painful, but it is not dangerous. It usually goes away in 1 to 2 weeks. But it may happen again. Rarely, a more serious condition may cause symptoms similar to costochondritis. Thats why its important to watch for the warning signs listed below.  Home care  Follow these guidelines when caring for yourself at home:  · If you feel that emotional stress is a cause of your condition, try to figure out the sources  of that stress. It may not be obvious. Learn ways to deal with the stress in your life. This can include regular exercise, muscle relaxation, meditation, or simply taking time out for yourself.  · You may use acetaminophen, ibuprofen, or naproxen to control pain, unless another pain medicine was prescribed. If you have liver or kidney disease or ever had a stomach ulcer, talk with your healthcare provider before using these medicines.  · You can also help ease pain by using a hot, wet compress or heating pad. Use this with or without a medicated skin cream that helps relieves pain.  · Do stretching exercise as advised by your provider.  · Take any prescribed medicines as directed.  Follow-up care  Follow up with your healthcare provider, or as advised, if you do not start to get better in the next 2 days.  When to seek medical advice  Call your healthcare provider right away if any of these occur:  · A change in the type of pain. Call if it feels different, becomes more serious, lasts longer, or spreads into your shoulder, arm, neck, jaw, or back.  · Shortness of breath or pain gets worse when you breathe  · Weakness, dizziness, or fainting  · Cough with dark-colored sputum (phlegm) or blood  · Abdominal pain  · Dark red or black stools  · Fever of 100.4ºF (38ºC) or higher, or as directed by your healthcare provider  Date Last Reviewed: 12/1/2016  © 7045-5781 [employer]. [address] [place] [address]. All rights reserved. This information is not intended as a substitute for professional medical care. Always follow your healthcare professional's instructions.      Please follow up with your Primary care provider within 2-5 days if your signs and symptoms have not resolved or worsen.     If your condition worsens or fails to improve we recommend that you receive another evaluation at the emergency room immediately or contact your primary medical clinic to discuss your concerns.   You must  understand that you have received an Urgent Care treatment only and that you may be released before all of your medical problems are known or treated. You, the patient, will arrange for follow up care as instructed.     RED FLAGS/WARNING SYMPTOMS DISCUSSED WITH PATIENT THAT WOULD WARRANT EMERGENT MEDICAL ATTENTION. PATIENT VERBALIZED UNDERSTANDING.

## 2020-06-13 NOTE — PATIENT INSTRUCTIONS
Uncertain Causes of Chest Pain    Chest pain can happen for a number of reasons. Sometimes the cause can't be determined. If your condition does not seem serious, and your pain does not appear to be coming from your heart, your healthcare provider may recommend watching it closely. Sometimes the signs of a serious problem take more time to appear. Many problems not related to your heart can cause chest pain.These include:  · Musculoskeletal. Costochondritis, an inflammation of the tissues around the ribs that can occur from trauma or overuse injuries  · Respiratory. Pneumonia, pneumothorax, or pneumonitis (inflammation of the lining of the chest and lungs)  · Gastrointestinal. Esophageal reflux, heartburn, or gallbladder disease  · Anxiety and panic disorders  · Nerve compression and neuritis  · Miscellaneous problems such as aortic aneurysm or pulmonary embolism (a blood clot in the lungs)  Home care  After your visit, follow these recommendations:  · Rest today and avoid strenuous activity.  · Take any prescribed medicine as directed.  · Be aware of any recurrent chest pain and notice any changes  Follow-up care  Follow up with your healthcare provider if you do not start to feel better within 24 hours, or as advised.  Call 911  Call 911 if any of these occur:  · A change in the type of pain: if it feels different, becomes more severe, lasts longer, or begins to spread into your shoulder, arm, neck, jaw or back  · Shortness of breath or increased pain with breathing  · Weakness, dizziness, or fainting  · Rapid heart beat  · Crushing sensation in your chest  When to seek medical advice  Call your healthcare provider right away if any of the following occur:  · Cough with dark colored sputum (phlegm) or blood  · Fever of 100.4ºF (38ºC) or higher, or as directed by your healthcare provider  · Swelling, pain or redness in one leg  · Shortness of breath  Date Last Reviewed: 12/30/2015  © 0570-4253 [facility]  [employer]. [address] [place] [address], [place] [address]. All rights reserved. This information is not intended as a substitute for professional medical care. Always follow your healthcare professional's instructions.        Chest Wall Pain: Costochondritis    The chest pain that you have had today is caused by costochondritis. This condition is caused by an inflammation of the cartilage joining your ribs to your breastbone. It is not caused by heart or lung problems. Your healthcare team has made sure that the chest pain you feel is not from a life threatening cause of chest pain such as heart attack, collapsed lung, blood clot in the lung, tear in the aorta, or esophageal rupture. The inflammation may have been brought on by a blow to the chest, lifting heavy objects, intense exercise, or an illness that made you cough and sneeze a lot. It often occurs during times of emotional stress. It can be painful, but it is not dangerous. It usually goes away in 1 to 2 weeks. But it may happen again. Rarely, a more serious condition may cause symptoms similar to costochondritis. Thats why its important to watch for the warning signs listed below.  Home care  Follow these guidelines when caring for yourself at home:  · If you feel that emotional stress is a cause of your condition, try to figure out the sources of that stress. It may not be obvious. Learn ways to deal with the stress in your life. This can include regular exercise, muscle relaxation, meditation, or simply taking time out for yourself.  · You may use acetaminophen, ibuprofen, or naproxen to control pain, unless another pain medicine was prescribed. If you have liver or kidney disease or ever had a stomach ulcer, talk with your healthcare provider before using these medicines.  · You can also help ease pain by using a hot, wet compress or heating pad. Use this with or without a medicated skin cream that helps relieves pain.  · Do stretching exercise as  advised by your provider.  · Take any prescribed medicines as directed.  Follow-up care  Follow up with your healthcare provider, or as advised, if you do not start to get better in the next 2 days.  When to seek medical advice  Call your healthcare provider right away if any of these occur:  · A change in the type of pain. Call if it feels different, becomes more serious, lasts longer, or spreads into your shoulder, arm, neck, jaw, or back.  · Shortness of breath or pain gets worse when you breathe  · Weakness, dizziness, or fainting  · Cough with dark-colored sputum (phlegm) or blood  · Abdominal pain  · Dark red or black stools  · Fever of 100.4ºF (38ºC) or higher, or as directed by your healthcare provider  Date Last Reviewed: 12/1/2016  © 2682-6758 [employer]. [address] [place] [address]. All rights reserved. This information is not intended as a substitute for professional medical care. Always follow your healthcare professional's instructions.      Please follow up with your Primary care provider within 2-5 days if your signs and symptoms have not resolved or worsen.     If your condition worsens or fails to improve we recommend that you receive another evaluation at the emergency room immediately or contact your primary medical clinic to discuss your concerns.   You must understand that you have received an Urgent Care treatment only and that you may be released before all of your medical problems are known or treated. You, the patient, will arrange for follow up care as instructed.     RED FLAGS/WARNING SYMPTOMS DISCUSSED WITH PATIENT THAT WOULD WARRANT EMERGENT MEDICAL ATTENTION. PATIENT VERBALIZED UNDERSTANDING.

## 2020-06-22 ENCOUNTER — TELEPHONE (OUTPATIENT)
Dept: FAMILY MEDICINE | Facility: CLINIC | Age: 60
End: 2020-06-22

## 2020-06-22 NOTE — TELEPHONE ENCOUNTER
----- Message from Bailey Sumner sent at 6/22/2020  8:37 AM CDT -----  Regarding: Sinus Medication  Contact: Self [phone]    Type:  Needs Medical Advice    Who Called: Pt  Symptoms (please be specific): Sinus Infection, pt would like to have something Prescribed for this  How long has patient had these symptoms:  3 Days  Pharm[facility] and phone #:  [id number]  [place] - [address]  AT [facility] [address]  Would the patient rather a call back or a response via [facility]?  Call Back  Best Call Back Number: [phone]

## 2020-07-05 ENCOUNTER — PATIENT OUTREACH (OUTPATIENT)
Dept: ADMINISTRATIVE | Facility: OTHER | Age: 60
End: 2020-07-05

## 2020-07-05 NOTE — PROGRESS NOTES
Requested updates within Care Everywhere.  Patient's chart was reviewed for overdue ANTONIO topics.  Immunizations reconciled.    Orders placed:n/a  Tasked appts:n/a  Labs Linked:n/a

## 2020-07-31 DIAGNOSIS — E11.9 TYPE 2 DIABETES MELLITUS WITHOUT COMPLICATION: ICD-10-CM

## 2020-08-26 ENCOUNTER — OFFICE VISIT (OUTPATIENT)
Dept: FAMILY MEDICINE | Facility: CLINIC | Age: 60
End: 2020-08-26
Payer: MEDICARE

## 2020-08-26 VITALS
HEIGHT: 62 IN | DIASTOLIC BLOOD PRESSURE: 60 MMHG | BODY MASS INDEX: 26.53 KG/M2 | HEART RATE: 68 BPM | SYSTOLIC BLOOD PRESSURE: 100 MMHG | OXYGEN SATURATION: 95 % | TEMPERATURE: 98 F | WEIGHT: 144.19 LBS

## 2020-08-26 DIAGNOSIS — H60.502 ACUTE OTITIS EXTERNA OF LEFT EAR, UNSPECIFIED TYPE: ICD-10-CM

## 2020-08-26 DIAGNOSIS — M81.0 SENILE OSTEOPOROSIS: ICD-10-CM

## 2020-08-26 DIAGNOSIS — F33.1 MODERATE EPISODE OF RECURRENT MAJOR DEPRESSIVE DISORDER: ICD-10-CM

## 2020-08-26 DIAGNOSIS — Z11.4 ENCOUNTER FOR SCREENING FOR HIV: ICD-10-CM

## 2020-08-26 DIAGNOSIS — Z79.4 TYPE 2 DIABETES MELLITUS WITH HYPERGLYCEMIA, WITH LONG-TERM CURRENT USE OF INSULIN: ICD-10-CM

## 2020-08-26 DIAGNOSIS — E11.65 TYPE 2 DIABETES MELLITUS WITH HYPERGLYCEMIA, WITH LONG-TERM CURRENT USE OF INSULIN: ICD-10-CM

## 2020-08-26 DIAGNOSIS — G62.9 NEUROPATHY: Primary | ICD-10-CM

## 2020-08-26 DIAGNOSIS — M25.50 ARTHRALGIA, UNSPECIFIED JOINT: ICD-10-CM

## 2020-08-26 PROCEDURE — 99999 PR PBB SHADOW E&M-EST. PATIENT-LVL III: ICD-10-PCS | Mod: PBBFAC,,, | Performed by: FAMILY MEDICINE

## 2020-08-26 PROCEDURE — 99214 PR OFFICE/OUTPT VISIT, EST, LEVL IV, 30-39 MIN: ICD-10-PCS | Mod: S$PBB,,, | Performed by: FAMILY MEDICINE

## 2020-08-26 PROCEDURE — 99999 PR PBB SHADOW E&M-EST. PATIENT-LVL III: CPT | Mod: PBBFAC,,, | Performed by: FAMILY MEDICINE

## 2020-08-26 PROCEDURE — 99214 OFFICE O/P EST MOD 30 MIN: CPT | Mod: S$PBB,,, | Performed by: FAMILY MEDICINE

## 2020-08-26 PROCEDURE — 99213 OFFICE O/P EST LOW 20 MIN: CPT | Mod: PBBFAC,PO | Performed by: FAMILY MEDICINE

## 2020-08-26 RX ORDER — NEOMYCIN SULFATE, POLYMYXIN B SULFATE AND HYDROCORTISONE 10; 3.5; 1 MG/ML; MG/ML; [USP'U]/ML
3 SUSPENSION/ DROPS AURICULAR (OTIC) 3 TIMES DAILY
Qty: 10 ML | Refills: 0 | Status: SHIPPED | OUTPATIENT
Start: 2020-08-26 | End: 2021-05-19

## 2020-08-26 RX ORDER — GABAPENTIN 300 MG/1
300 CAPSULE ORAL 3 TIMES DAILY
Qty: 270 CAPSULE | Refills: 3 | Status: SHIPPED | OUTPATIENT
Start: 2020-08-26 | End: 2022-01-24

## 2020-08-26 RX ORDER — DICLOFENAC SODIUM 50 MG/1
50 TABLET, DELAYED RELEASE ORAL 2 TIMES DAILY PRN
Qty: 60 TABLET | Refills: 0 | Status: SHIPPED | OUTPATIENT
Start: 2020-08-26 | End: 2020-09-25

## 2020-08-26 NOTE — PROGRESS NOTES
Subjective:       Patient ID: Jud Villa is a 60 y.o. female.    Chief Complaint: Follow-up (on meds) and Generalized Body Aches (on and off)    60 years old female who came to the clinic with upper extremity numbness and tingling associated with multiple joints pain.  Patient with neuropathy secondary to chemotherapy.  Patient requests adjustment of gabapentin treatment.  The pain is 6/10 of intensity on and off aggravated with activity better with rest.  Patient also with left ear pain for the last couple of weeks.  No fever or chills.  Patient with good compliance with Lexapro.  No suicidal or homicidal ideations .  No recent A1c.  No polyuria, polydipsia or polyphagia.  Patient with good compliance with medical regimen.    Review of Systems   Constitutional: Negative.    HENT: Negative.    Eyes: Negative.    Respiratory: Negative.    Cardiovascular: Negative for chest pain, palpitations, leg swelling and claudication.   Gastrointestinal: Negative.    Endocrine: Negative for polydipsia, polyphagia and polyuria.   Genitourinary: Negative.    Musculoskeletal: Positive for arthralgias.   Neurological: Positive for numbness.   Psychiatric/Behavioral: Negative.          Objective:      Physical Exam  Constitutional:       General: She is not in acute distress.     Appearance: She is well-developed. She is not diaphoretic.   HENT:      Head: Normocephalic and atraumatic.      Right Ear: External ear normal.      Left Ear:  No middle ear effusion.      Ears:        Nose: Nose normal.      Mouth/Throat:      Pharynx: No oropharyngeal exudate.   Eyes:      General: No scleral icterus.        Right eye: No discharge.         Left eye: No discharge.      Conjunctiva/sclera: Conjunctivae normal.      Pupils: Pupils are equal, round, and reactive to light.   Neck:      Musculoskeletal: Normal range of motion and neck supple.      Thyroid: No thyromegaly.      Vascular: No JVD.      Trachea: No tracheal deviation.    Cardiovascular:      Rate and Rhythm: Normal rate and regular rhythm.      Heart sounds: Normal heart sounds. No murmur. No friction rub. No gallop.    Pulmonary:      Effort: Pulmonary effort is normal. No respiratory distress.      Breath sounds: Normal breath sounds. No stridor. No wheezing or rales.   Chest:      Chest wall: No tenderness.   Abdominal:      General: Bowel sounds are normal. There is no distension.      Palpations: Abdomen is soft. There is no mass.      Tenderness: There is no abdominal tenderness. There is no guarding or rebound.   Musculoskeletal: Normal range of motion.      Right shoulder: She exhibits tenderness.      Right elbow: Tenderness found.      Right wrist: She exhibits tenderness.      Right forearm: She exhibits tenderness.   Feet:      Right foot:      Protective Sensation: 10 sites tested. 10 sites sensed.      Skin integrity: No ulcer, blister, skin breakdown, erythema, warmth, callus, dry skin or fissure.      Left foot:      Protective Sensation: 10 sites tested. 10 sites sensed.      Skin integrity: No ulcer, blister, skin breakdown, erythema, warmth, callus, dry skin or fissure.   Lymphadenopathy:      Cervical: No cervical adenopathy.   Skin:     General: Skin is warm and dry.      Coloration: Skin is not pale.      Findings: No erythema or rash.   Neurological:      Mental Status: She is alert and oriented to person, place, and time.      Cranial Nerves: No cranial nerve deficit.      Motor: No abnormal muscle tone.      Coordination: Coordination normal.      Deep Tendon Reflexes: Reflexes are normal and symmetric.   Psychiatric:         Mood and Affect: Mood is anxious and depressed.         Behavior: Behavior normal.         Thought Content: Thought content normal.         Judgment: Judgment normal.         Assessment:       1. Neuropathy    2. Type 2 diabetes mellitus with hyperglycemia, with long-term current use of insulin    3. Arthralgia, unspecified joint     4. Moderate episode of recurrent major depressive disorder    5. Senile osteoporosis    6. Acute otitis externa of left ear, unspecified type    7. Encounter for screening for HIV        Plan:       Jud was seen today for follow-up and generalized body aches.    Diagnoses and all orders for this visit:    Neuropathy  -     gabapentin (NEURONTIN) 300 MG capsule; Take 1 capsule (300 mg total) by mouth 3 (three) times daily.  -     CBC auto differential; Future    Type 2 diabetes mellitus with hyperglycemia, with long-term current use of insulin  -     Hemoglobin A1C; Future  -     Lipid Panel; Future  -     CBC auto differential; Future  -     Microalbumin/creatinine urine ratio; Future    Arthralgia, unspecified joint  -     diclofenac (VOLTAREN) 50 MG EC tablet; Take 1 tablet (50 mg total) by mouth 2 (two) times daily as needed (pain).  -     CBC auto differential; Future  -     RAH Screen w/Reflex; Future  -     C-Reactive Protein; Future  -     Sedimentation rate; Future  -     Rheumatoid factor; Future  -     Uric acid; Future    Moderate episode of recurrent major depressive disorder  -     TSH; Future  -     CBC auto differential; Future    Senile osteoporosis  -     TSH; Future  -     Comprehensive metabolic panel; Future  -     CBC auto differential; Future    Acute otitis externa of left ear, unspecified type  -     neomycin-polymyxin-hydrocortisone (CORTISPORIN) 3.5-10,000-1 mg/mL-unit/mL-% otic suspension; Place 3 drops into the left ear 3 (three) times daily.    Encounter for screening for HIV  -     HIV 1/2 Ag/Ab (4th Gen); Future

## 2020-09-23 ENCOUNTER — LAB VISIT (OUTPATIENT)
Dept: LAB | Facility: HOSPITAL | Age: 60
End: 2020-09-23
Attending: FAMILY MEDICINE
Payer: MEDICARE

## 2020-09-23 DIAGNOSIS — F33.1 MODERATE EPISODE OF RECURRENT MAJOR DEPRESSIVE DISORDER: ICD-10-CM

## 2020-09-23 DIAGNOSIS — E11.65 TYPE 2 DIABETES MELLITUS WITH HYPERGLYCEMIA, WITH LONG-TERM CURRENT USE OF INSULIN: ICD-10-CM

## 2020-09-23 DIAGNOSIS — M81.0 SENILE OSTEOPOROSIS: ICD-10-CM

## 2020-09-23 DIAGNOSIS — Z11.4 ENCOUNTER FOR SCREENING FOR HIV: ICD-10-CM

## 2020-09-23 DIAGNOSIS — G62.9 NEUROPATHY: ICD-10-CM

## 2020-09-23 DIAGNOSIS — M25.50 ARTHRALGIA, UNSPECIFIED JOINT: ICD-10-CM

## 2020-09-23 DIAGNOSIS — Z79.4 TYPE 2 DIABETES MELLITUS WITH HYPERGLYCEMIA, WITH LONG-TERM CURRENT USE OF INSULIN: ICD-10-CM

## 2020-09-23 LAB
BASOPHILS # BLD AUTO: 0.08 K/UL (ref 0–0.2)
BASOPHILS NFR BLD: 0.9 % (ref 0–1.9)
DIFFERENTIAL METHOD: ABNORMAL
EOSINOPHIL # BLD AUTO: 0.4 K/UL (ref 0–0.5)
EOSINOPHIL NFR BLD: 3.9 % (ref 0–8)
ERYTHROCYTE [DISTWIDTH] IN BLOOD BY AUTOMATED COUNT: 13.8 % (ref 11.5–14.5)
ERYTHROCYTE [SEDIMENTATION RATE] IN BLOOD BY WESTERGREN METHOD: 11 MM/HR (ref 0–36)
HCT VFR BLD AUTO: 45.5 % (ref 37–48.5)
HGB BLD-MCNC: 13.9 G/DL (ref 12–16)
IMM GRANULOCYTES # BLD AUTO: 0.01 K/UL (ref 0–0.04)
IMM GRANULOCYTES NFR BLD AUTO: 0.1 % (ref 0–0.5)
LYMPHOCYTES # BLD AUTO: 2.5 K/UL (ref 1–4.8)
LYMPHOCYTES NFR BLD: 26.9 % (ref 18–48)
MCH RBC QN AUTO: 28.3 PG (ref 27–31)
MCHC RBC AUTO-ENTMCNC: 30.5 G/DL (ref 32–36)
MCV RBC AUTO: 93 FL (ref 82–98)
MONOCYTES # BLD AUTO: 0.6 K/UL (ref 0.3–1)
MONOCYTES NFR BLD: 6.6 % (ref 4–15)
NEUTROPHILS # BLD AUTO: 5.7 K/UL (ref 1.8–7.7)
NEUTROPHILS NFR BLD: 61.6 % (ref 38–73)
NRBC BLD-RTO: 0 /100 WBC
PLATELET # BLD AUTO: 279 K/UL (ref 150–350)
PMV BLD AUTO: 10.1 FL (ref 9.2–12.9)
RBC # BLD AUTO: 4.91 M/UL (ref 4–5.4)
RHEUMATOID FACT SERPL-ACNC: <10 IU/ML (ref 0–15)
WBC # BLD AUTO: 9.21 K/UL (ref 3.9–12.7)

## 2020-09-23 PROCEDURE — 83036 HEMOGLOBIN GLYCOSYLATED A1C: CPT

## 2020-09-23 PROCEDURE — 85652 RBC SED RATE AUTOMATED: CPT

## 2020-09-23 PROCEDURE — 86140 C-REACTIVE PROTEIN: CPT

## 2020-09-23 PROCEDURE — 86703 HIV-1/HIV-2 1 RESULT ANTBDY: CPT

## 2020-09-23 PROCEDURE — 86039 ANTINUCLEAR ANTIBODIES (ANA): CPT

## 2020-09-23 PROCEDURE — 84443 ASSAY THYROID STIM HORMONE: CPT

## 2020-09-23 PROCEDURE — 86235 NUCLEAR ANTIGEN ANTIBODY: CPT | Mod: 59

## 2020-09-23 PROCEDURE — 80061 LIPID PANEL: CPT

## 2020-09-23 PROCEDURE — 80053 COMPREHEN METABOLIC PANEL: CPT

## 2020-09-23 PROCEDURE — 86038 ANTINUCLEAR ANTIBODIES: CPT

## 2020-09-23 PROCEDURE — 36415 COLL VENOUS BLD VENIPUNCTURE: CPT | Mod: PO

## 2020-09-23 PROCEDURE — 84550 ASSAY OF BLOOD/URIC ACID: CPT

## 2020-09-23 PROCEDURE — 86431 RHEUMATOID FACTOR QUANT: CPT

## 2020-09-23 PROCEDURE — 85025 COMPLETE CBC W/AUTO DIFF WBC: CPT

## 2020-09-24 LAB
ALBUMIN SERPL BCP-MCNC: 3.9 G/DL (ref 3.5–5.2)
ALP SERPL-CCNC: 63 U/L (ref 55–135)
ALT SERPL W/O P-5'-P-CCNC: 14 U/L (ref 10–44)
ANA PATTERN 1: NORMAL
ANA SER QL IF: POSITIVE
ANA TITR SER IF: NORMAL {TITER}
ANION GAP SERPL CALC-SCNC: 10 MMOL/L (ref 8–16)
AST SERPL-CCNC: 15 U/L (ref 10–40)
BILIRUB SERPL-MCNC: 0.2 MG/DL (ref 0.1–1)
BUN SERPL-MCNC: 17 MG/DL (ref 8–23)
CALCIUM SERPL-MCNC: 8.8 MG/DL (ref 8.7–10.5)
CHLORIDE SERPL-SCNC: 104 MMOL/L (ref 95–110)
CHOLEST SERPL-MCNC: 192 MG/DL (ref 120–199)
CHOLEST/HDLC SERPL: 4.3 {RATIO} (ref 2–5)
CO2 SERPL-SCNC: 26 MMOL/L (ref 23–29)
CREAT SERPL-MCNC: 0.7 MG/DL (ref 0.5–1.4)
CRP SERPL-MCNC: 1.6 MG/L (ref 0–8.2)
EST. GFR  (AFRICAN AMERICAN): >60 ML/MIN/1.73 M^2
EST. GFR  (NON AFRICAN AMERICAN): >60 ML/MIN/1.73 M^2
ESTIMATED AVG GLUCOSE: 137 MG/DL (ref 68–131)
GLUCOSE SERPL-MCNC: 100 MG/DL (ref 70–110)
HBA1C MFR BLD HPLC: 6.4 % (ref 4–5.6)
HDLC SERPL-MCNC: 45 MG/DL (ref 40–75)
HDLC SERPL: 23.4 % (ref 20–50)
HIV 1+2 AB+HIV1 P24 AG SERPL QL IA: NEGATIVE
LDLC SERPL CALC-MCNC: 112.8 MG/DL (ref 63–159)
NONHDLC SERPL-MCNC: 147 MG/DL
POTASSIUM SERPL-SCNC: 3.9 MMOL/L (ref 3.5–5.1)
PROT SERPL-MCNC: 7.1 G/DL (ref 6–8.4)
SODIUM SERPL-SCNC: 140 MMOL/L (ref 136–145)
TRIGL SERPL-MCNC: 171 MG/DL (ref 30–150)
TSH SERPL DL<=0.005 MIU/L-ACNC: 2.91 UIU/ML (ref 0.4–4)
URATE SERPL-MCNC: 3.8 MG/DL (ref 2.4–5.7)

## 2020-09-25 ENCOUNTER — TELEPHONE (OUTPATIENT)
Dept: FAMILY MEDICINE | Facility: CLINIC | Age: 60
End: 2020-09-25

## 2020-09-25 DIAGNOSIS — R76.8 ANA POSITIVE: Primary | ICD-10-CM

## 2020-09-25 NOTE — TELEPHONE ENCOUNTER
Patient with positive RAH.    Rheumatologist evaluation was ordered.    Please notify the patient with appointment.

## 2020-09-28 ENCOUNTER — TELEPHONE (OUTPATIENT)
Dept: ENDOCRINOLOGY | Facility: CLINIC | Age: 60
End: 2020-09-28

## 2020-09-28 ENCOUNTER — TELEPHONE (OUTPATIENT)
Dept: FAMILY MEDICINE | Facility: CLINIC | Age: 60
End: 2020-09-28

## 2020-09-28 LAB
ANTI SM ANTIBODY: 0.07 RATIO (ref 0–0.99)
ANTI SM/RNP ANTIBODY: 0.07 RATIO (ref 0–0.99)
ANTI-SM INTERPRETATION: NEGATIVE
ANTI-SM/RNP INTERPRETATION: NEGATIVE
ANTI-SSA ANTIBODY: 0.05 RATIO (ref 0–0.99)
ANTI-SSA INTERPRETATION: NEGATIVE
ANTI-SSB ANTIBODY: 0.05 RATIO (ref 0–0.99)
ANTI-SSB INTERPRETATION: NEGATIVE
DSDNA AB SER-ACNC: NORMAL [IU]/ML

## 2020-09-28 NOTE — TELEPHONE ENCOUNTER
----- Message from Savana Jacobo sent at 9/28/2020  1:13 PM CDT -----  Patient has a referral placed by Dr. Yañez, can you assist in scheduilng, thank you

## 2020-09-30 ENCOUNTER — TELEPHONE (OUTPATIENT)
Dept: FAMILY MEDICINE | Facility: CLINIC | Age: 60
End: 2020-09-30

## 2020-09-30 NOTE — TELEPHONE ENCOUNTER
----- Message from Bailey Sumner sent at 9/30/2020  2:12 PM CDT -----  Contact: [phone]  Type:  Test Results    Who Called: PT  Name of Test (Lab/Mammo/Etc):  Blood work  Date of Test: 9/23  Ordering Provider:  Dr Yañez  Where the test was performed: [facility]  Would the patient rather a call back or a response via [facility]?  Call back  Best Call Back Number: [phone]

## 2020-10-01 ENCOUNTER — PATIENT MESSAGE (OUTPATIENT)
Dept: OTHER | Facility: OTHER | Age: 60
End: 2020-10-01

## 2020-10-01 ENCOUNTER — TELEPHONE (OUTPATIENT)
Dept: FAMILY MEDICINE | Facility: CLINIC | Age: 60
End: 2020-10-01

## 2020-10-01 NOTE — TELEPHONE ENCOUNTER
I spoke to patient yesterday and inform of all results and that she needed to follow up with a rheumatologist for her positive RAH. Patient voices understanding.

## 2020-10-01 NOTE — TELEPHONE ENCOUNTER
----- Message from Nargis Sullivan sent at 9/30/2020  4:01 PM CDT -----  Contact: [phone]  Pt ABELINO DEMARCO calling regarding following up on test results..      Please call

## 2020-10-05 ENCOUNTER — TELEPHONE (OUTPATIENT)
Dept: FAMILY MEDICINE | Facility: CLINIC | Age: 60
End: 2020-10-05

## 2020-10-05 ENCOUNTER — TELEPHONE (OUTPATIENT)
Dept: RHEUMATOLOGY | Facility: CLINIC | Age: 60
End: 2020-10-05

## 2020-10-05 NOTE — TELEPHONE ENCOUNTER
----- Message from Alycia Diaz sent at 10/5/2020  3:51 PM CDT -----  Contact: self, [phone]  Patient states she had a missed call and thinks it might have been from your office. Please advise.

## 2020-10-05 NOTE — TELEPHONE ENCOUNTER
Alycia Hensley Staff; LUIS TORRES Staff   Caller: self, [phone] (Today,  4:38 PM)             Patient states she had a missed call and thinks it might have been from your office. Please advise     Pt confirmed apt for tomorrow at 8:30 am.

## 2020-10-05 NOTE — TELEPHONE ENCOUNTER
----- Message from Alycia Diaz sent at 10/5/2020  3:51 PM CDT -----  Contact: self, [phone]  Patient states she had a missed call and thinks it might have been from your office. Please advise.

## 2020-10-06 ENCOUNTER — HOSPITAL ENCOUNTER (OUTPATIENT)
Dept: RADIOLOGY | Facility: HOSPITAL | Age: 60
Discharge: HOME | End: 2020-10-06
Attending: INTERNAL MEDICINE
Payer: MEDICARE

## 2020-10-06 ENCOUNTER — OFFICE VISIT (OUTPATIENT)
Dept: RHEUMATOLOGY | Facility: CLINIC | Age: 60
End: 2020-10-06
Payer: MEDICARE

## 2020-10-06 VITALS
HEIGHT: 62 IN | DIASTOLIC BLOOD PRESSURE: 72 MMHG | SYSTOLIC BLOOD PRESSURE: 122 MMHG | BODY MASS INDEX: 25.95 KG/M2 | WEIGHT: 141 LBS | OXYGEN SATURATION: 96 % | HEART RATE: 72 BPM | TEMPERATURE: 97 F

## 2020-10-06 DIAGNOSIS — M25.50 POLYARTHRALGIA: ICD-10-CM

## 2020-10-06 DIAGNOSIS — M75.80 ROTATOR CUFF TENDINITIS, UNSPECIFIED LATERALITY: ICD-10-CM

## 2020-10-06 DIAGNOSIS — M81.0 AGE-RELATED OSTEOPOROSIS WITHOUT CURRENT PATHOLOGICAL FRACTURE: ICD-10-CM

## 2020-10-06 DIAGNOSIS — R53.83 FATIGUE, UNSPECIFIED TYPE: ICD-10-CM

## 2020-10-06 DIAGNOSIS — R51.9 CHRONIC NONINTRACTABLE HEADACHE, UNSPECIFIED HEADACHE TYPE: ICD-10-CM

## 2020-10-06 DIAGNOSIS — G89.29 CHRONIC NONINTRACTABLE HEADACHE, UNSPECIFIED HEADACHE TYPE: ICD-10-CM

## 2020-10-06 DIAGNOSIS — R76.8 ANA POSITIVE: Primary | ICD-10-CM

## 2020-10-06 DIAGNOSIS — Z79.899 HIGH RISK MEDICATION USE: ICD-10-CM

## 2020-10-06 DIAGNOSIS — E55.9 VITAMIN D DEFICIENCY: ICD-10-CM

## 2020-10-06 PROCEDURE — 99215 OFFICE O/P EST HI 40 MIN: CPT | Mod: PBBFAC,PO,25 | Performed by: INTERNAL MEDICINE

## 2020-10-06 PROCEDURE — 73521 X-RAY EXAM HIPS BI 2 VIEWS: CPT | Mod: 26,,, | Performed by: RADIOLOGY

## 2020-10-06 PROCEDURE — 99999 PR PBB SHADOW E&M-EST. PATIENT-LVL V: CPT | Mod: PBBFAC,,, | Performed by: INTERNAL MEDICINE

## 2020-10-06 PROCEDURE — 73552 X-RAY EXAM OF FEMUR 2/>: CPT | Mod: TC,50,FY,PO

## 2020-10-06 PROCEDURE — 73521 XR HIPS BILATERAL 2 VIEW INCL AP PELVIS: ICD-10-PCS | Mod: 26,,, | Performed by: RADIOLOGY

## 2020-10-06 PROCEDURE — 73552 XR FEMUR 2 VIEW BILATERAL: ICD-10-PCS | Mod: 26,50,, | Performed by: RADIOLOGY

## 2020-10-06 PROCEDURE — 73521 X-RAY EXAM HIPS BI 2 VIEWS: CPT | Mod: TC,FY,PO

## 2020-10-06 PROCEDURE — 73552 X-RAY EXAM OF FEMUR 2/>: CPT | Mod: 26,50,, | Performed by: RADIOLOGY

## 2020-10-06 PROCEDURE — 99205 PR OFFICE/OUTPT VISIT, NEW, LEVL V, 60-74 MIN: ICD-10-PCS | Mod: S$PBB,,, | Performed by: INTERNAL MEDICINE

## 2020-10-06 PROCEDURE — 99205 OFFICE O/P NEW HI 60 MIN: CPT | Mod: S$PBB,,, | Performed by: INTERNAL MEDICINE

## 2020-10-06 PROCEDURE — 99999 PR PBB SHADOW E&M-EST. PATIENT-LVL V: ICD-10-PCS | Mod: PBBFAC,,, | Performed by: INTERNAL MEDICINE

## 2020-10-06 RX ORDER — RIZATRIPTAN BENZOATE 10 MG/1
TABLET ORAL
COMMUNITY
Start: 2020-09-15 | End: 2021-05-19

## 2020-10-06 RX ORDER — LINAGLIPTIN 5 MG/1
TABLET, FILM COATED ORAL
COMMUNITY
Start: 2020-09-08 | End: 2022-02-04 | Stop reason: SDUPTHER

## 2020-10-06 NOTE — LETTER
October 6, 2020      Bo Lopez MD  [address]  [place] [address]           [facility]  [address] [place] [address]  Phone: [phone]  Fax: [phone]          Patient: Jud Villa   MR Number: [id number]   YOB: 1960   Date of Visit: 10/6/2020       Dear Dr. Bo Lopez:    Thank you for referring Jud Villa to me for evaluation. Attached you will find relevant portions of my assessment and plan of care.    If you have questions, please do not hesitate to call me. I look forward to following Jud Villa along with you.    Sincerely,    Shellie Gauthier, DO    Enclosure  CC:  No Recipients    If you would like to receive this communication electronically, please contact [email or web address] or [phone] to request more information on [facility] access.    For providers and/or their staff who would like to refer a patient to [facility], please contact us through our one-stop-shop provider referral line, [facility] , at [phone].    If you feel you have received this communication in error or would no longer like to receive these types of communications, please e-mail [email or web address]

## 2020-10-06 NOTE — PROGRESS NOTES
Subjective:       Patient ID: Jud Villa is a 60 y.o. female.    Chief Complaint: joint pain  HPI  Pt is a 60 year old female with PMH of DM2, glaucoma, osteoporosis (on fosamax), fatty liver, psoriasis, sleep apnea (not compliant with CPAP), emphysema (seen on CT chest 2015) breast cancer in 2018 s/p b/l mastectomy and chemotherapy (triple negative breast cancer) who presented today for joint pain.    Pt stated that she has been having a lot of fatigue along with joint pain and muscle aching in her b/l shoulders and proximal arms along with proximal lower extremities which started about 6-7 months ago. She admits to stiffness in her b/l arms for just a few minutes in the morning. She rates this pain a 2/10. She saw her oncologist at [facility] Dr Lugo and she last saw him in August of 2020. She was recommended to follow up with rheumatology and she saw her PCP for the fatigue and muscle aching on 8/26/2020 who checked some labs and found to have +RAH 1:80. No joint swelling, dry eyes, raynauds, weight loss, fever, hair loss, sob, chest pain,  oral or nasal ulcerations, v/d, neck or low back pain.  She also admits to headaches over the last 6 months, she has always had headaches prior she stated but feels they are lasting longer now.  She had an MRI at [facility] and was told she had migraines.  She saw neurology Dr Ellison  And was given topamax and rizatriptan which hasn't helped much, but excedrin makes her headache go away.  Pt was told she may need botox for her headaches.     +dry mouth, +cough and sob with her smoking and emphysema, +nausea intermittently and admits to intermittent lightheadedness, unrelated to position.    Pt has been on fosamax since 2017.    Current smoker, smokes 1/2 ppd, previously was 1 ppd, has been smoking since age 16. No drinking, no drug use. 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  Pt has 4 children, no h/o pre-eclampsia or eclampsia. No miscarriages. Half sister and aunt has APS. Patient  "denied family history of RA, lupus, psoriasis, scleroderma, sjogrens, sarcoidosis, UC or Crohn's disease.    Review of Systems   Constitutional: Positive for fatigue. Negative for chills, diaphoresis, fever and unexpected weight change.   HENT: Positive for congestion. Negative for hearing loss, mouth sores, nosebleeds, sore throat, trouble swallowing and voice change.         Has sinus issues for all of her life   Eyes: Positive for visual disturbance. Negative for photophobia, pain and redness.        Blurry vision    Respiratory: Negative for cough, chest tightness and shortness of breath.    Cardiovascular: Negative for chest pain and palpitations.   Gastrointestinal: Positive for nausea. Negative for abdominal pain, diarrhea and vomiting.   Genitourinary: Negative for difficulty urinating, dysuria, genital sores and hematuria.        Yeast infection   Musculoskeletal: Positive for arthralgias and myalgias. Negative for back pain, joint swelling, neck pain and neck stiffness.   Skin: Negative for color change and rash.   Neurological: Positive for numbness and headaches. Negative for seizures and weakness.        Has neuropathy since her chemotherapy   Psychiatric/Behavioral: Positive for sleep disturbance. Negative for agitation and confusion. The patient is not nervous/anxious.          Objective:   /72   Pulse 72   Temp 97.2 °F (36.2 °C)   Ht 5' 2" (1.575 m)   Wt 64 kg (141 lb)   SpO2 96%   BMI 25.79 kg/m²      Physical Exam   Nursing note and vitals reviewed.  Constitutional: She is oriented to person, place, and time and well-developed, well-nourished, and in no distress. No distress.   HENT:   Head: Normocephalic and atraumatic.   Right Ear: External ear normal.   Left Ear: External ear normal.   Mouth/Throat: Oropharynx is clear and moist. No oropharyngeal exudate.   +2 temporal artery b/l, no temporal tenderness   Eyes: Conjunctivae are normal. Right eye exhibits no discharge. Left eye " exhibits no discharge. No scleral icterus.   Neck: Neck supple. No tracheal deviation present.   Cardiovascular: Normal rate, regular rhythm and normal heart sounds.    No murmur heard.  Pulmonary/Chest: Effort normal and breath sounds normal. No stridor. No respiratory distress. She has no wheezes. She has no rales.   Abdominal: Soft. Bowel sounds are normal. She exhibits no distension. There is no abdominal tenderness. There is no rebound.       Right Side Rheumatological Exam     Muscle Strength (0-5 scale):  Neck Flexion:  5  Neck Extension: 5  Deltoid:  5  Biceps: 5/5   Triceps:  5  : 5/5   Iliopsoas: 5  Quadriceps:  5   Distal Lower Extremity: 5    Left Side Rheumatological Exam     Muscle Strength (0-5 scale):  Neck Flexion:  5  Neck Extension: 5  Deltoid:  5  Biceps: 5/5   Triceps:  5  :  5/5   Iliopsoas: 5  Quadriceps:  5   Distal Lower Extremity: 5      Neurological: She is alert and oriented to person, place, and time.   Skin: Skin is warm and dry. No rash noted. She is not diaphoretic.     Psychiatric: Mood and affect normal.   Musculoskeletal: Normal range of motion. Deformity present. No tenderness or edema.      Comments: No active synovitis, enthesitis, dactylitis or effusion noted on exam      OA changes b/l hands with squaring of CMC joints b/l    B/l knee crepitus     Able to go from sitting to standing without pushing off the chair, able to raise her hands over her head    +empty can test b/l             Ref. Range 9/23/2020 08:21   RAH Screen Latest Ref Range: Negative <1:80  Positive (A)   RAH Titer 1 Unknown 1:80   RAH PATTERN 1 Unknown Homogeneous   ds DNA Ab Latest Ref Range: Negative 1:10  Negative 1:10   Anti-SSA Antibody Latest Ref Range: 0.00 - 0.99 Ratio 0.05   Anti-SSA Interpretation Latest Ref Range: Negative  Negative   Anti-SSB Antibody Latest Ref Range: 0.00 - 0.99 Ratio 0.05   Anti-SSB Interpretation Latest Ref Range: Negative  Negative   Anti Sm Antibody Latest Ref Range:  0.00 - 0.99 Ratio 0.07   Anti-Sm Interpretation Latest Ref Range: Negative  Negative   Anti Sm/RNP Antibody Latest Ref Range: 0.00 - 0.99 Ratio 0.07   Anti-Sm/RNP Interpretation Latest Ref Range: Negative  Negative   Rheumatoid Factor Latest Ref Range: 0.0 - 15.0 IU/mL <10.0       DEXA done 12/6/2018: FINDINGS:  Lumbar Spine: Lumbar bone mineral density L1-L4 is 0.893g/cm2, which is a T-score of -1.4. The Z-score is 0.1.Total Hip: Total hip bone mineral density is 0.667g/cm2.  The T-score is -2.3, and the Z-score is -1.2.Femoral neck: Bone mineral density is 0.524g/cm2 and the T-score is -2.9 and the Z-score is -1.7 g/cm2.  There is a 7.5% risk of a major osteoporotic fracture and a 1.7% risk of hip fracture in the next 10 years (FRAX).   IMPRESSION: Osteoporosis of the femoral neck.  Assessment:       1. RAH positive    2. Fatigue, unspecified type    3. Rotator cuff tendinitis, unspecified laterality    4. Age-related osteoporosis without current pathological fracture    5. High risk medication use    6. Vitamin D deficiency    7. Chronic nonintractable headache, unspecified headache type    8. Polyarthralgia        Pt is a 60 year old female with PMH of DM2, glaucoma, osteoporosis (on fosamax), fatty liver, psoriasis, sleep apnea (not compliant with CPAP), emphysema (seen on CT chest 2015) breast cancer in 2018 s/p b/l mastectomy and chemotherapy (triple negative breast cancer) who presented today for joint pain in proximal arms, b/l shoulders and b/l proximal lower extremities. Pt with full 5/5 strength on exam making myositis unlikely.  Given normal esr/crp and able to raise her hands over her head, minimal morning stiffness and able to go from sitting to standing without pushing off makes PMR unlikely. Normal esr/crp also makes GCA very unlikely in setting of her headaches.    No signs of inflammatory arthritis on exam, no oral or nasal ulcerations, no h/o pluerisy, no photosensitive rashes or raynauds  indicative of lupus.    Given b/l thigh pain and being on bisphosphonate therapy need to rule out subtrochanteric fracture.    Plan:       -obtain labs, urine and xrays as below  -if xrays are negative will pursue CT of b/l femurs to rule out subtrochanteric fractures in this patient with b/l thigh pain on bisphosphonate therapy.  -follow up with neurology regarding headaches.  -obtain DEXA  -PT referral for rotator cuff tendinopathy b/l  -tylenol arthritis TID for pain.  -RTC in 8 weeks or sooner if needed.  Problem List Items Addressed This Visit     None      Visit Diagnoses     RAH positive    -  Primary    Relevant Orders    C4 Complement    C3 Complement    Cardiolipin antibody    Beta-2 Glycoprotein Abs (IgA, IgG, IgM)    Direct antiglobulin test    DRVVT    Cyclic Citrullinated Peptide Antibody, IgG    Complement, total    CBC auto differential    CK    Comprehensive Metabolic Panel    Aldolase    C-Reactive Protein    Sedimentation rate    Urinalysis    Protein/Creatinine Ratio, Urine    Thyroid Peroxidase Antibody    Thyroid Stimulating Immunoglobulin    Thyrotropin Receptor Antibody    Vitamin D    PTH, Intact    Fatigue, unspecified type        Relevant Orders    Thyroid Peroxidase Antibody    Thyroid Stimulating Immunoglobulin    Thyrotropin Receptor Antibody    Rotator cuff tendinitis, unspecified laterality        Relevant Orders    Ambulatory referral/consult to Physical/Occupational Therapy    Age-related osteoporosis without current pathological fracture        Relevant Orders    X-Ray Femur 2 View Bilateral    X-Ray Hips Bilateral 2 View Inc AP Pelvis    DXA Bone Density Spine And Hip    High risk medication use        Relevant Orders    X-Ray Femur 2 View Bilateral    X-Ray Hips Bilateral 2 View Inc AP Pelvis    Vitamin D deficiency        Relevant Orders    Vitamin D    PTH, Intact    Chronic nonintractable headache, unspecified headache type        Polyarthralgia        Relevant Medications     TRADJENTA 5 mg Tab tablet    rizatriptan (MAXALT) 10 MG tablet    Other Relevant Orders    C4 Complement    C3 Complement    Cardiolipin antibody    Beta-2 Glycoprotein Abs (IgA, IgG, IgM)    Direct antiglobulin test    DRVVT    Cyclic Citrullinated Peptide Antibody, IgG    Complement, total    CBC auto differential    CK    Comprehensive Metabolic Panel    Aldolase    C-Reactive Protein    Sedimentation rate    Urinalysis    Protein/Creatinine Ratio, Urine    Thyroid Peroxidase Antibody    Thyroid Stimulating Immunoglobulin    Thyrotropin Receptor Antibody    Vitamin D    PTH, Intact    Ambulatory referral/consult to Physical/Occupational Therapy    X-Ray Femur 2 View Bilateral    X-Ray Hips Bilateral 2 View Inc AP Pelvis    DXA Bone Density Spine And Hip

## 2020-10-07 ENCOUNTER — TELEPHONE (OUTPATIENT)
Dept: RHEUMATOLOGY | Facility: CLINIC | Age: 60
End: 2020-10-07

## 2020-10-07 DIAGNOSIS — Z79.83 ENCOUNTER FOR MONITORING BISPHOSPHONATE THERAPY: ICD-10-CM

## 2020-10-07 DIAGNOSIS — M79.652 PAIN IN BOTH THIGHS: Primary | ICD-10-CM

## 2020-10-07 DIAGNOSIS — Z51.81 ENCOUNTER FOR MONITORING BISPHOSPHONATE THERAPY: ICD-10-CM

## 2020-10-07 DIAGNOSIS — M79.651 PAIN IN BOTH THIGHS: Primary | ICD-10-CM

## 2020-10-12 ENCOUNTER — TELEPHONE (OUTPATIENT)
Dept: REHABILITATION | Facility: HOSPITAL | Age: 60
End: 2020-10-12

## 2020-10-15 ENCOUNTER — CLINICAL SUPPORT (OUTPATIENT)
Dept: REHABILITATION | Facility: HOSPITAL | Age: 60
End: 2020-10-15
Attending: INTERNAL MEDICINE
Payer: MEDICARE

## 2020-10-15 DIAGNOSIS — M75.80 ROTATOR CUFF TENDINITIS, UNSPECIFIED LATERALITY: ICD-10-CM

## 2020-10-15 DIAGNOSIS — G89.29 CHRONIC LEFT SHOULDER PAIN: ICD-10-CM

## 2020-10-15 DIAGNOSIS — M25.50 POLYARTHRALGIA: ICD-10-CM

## 2020-10-15 DIAGNOSIS — Z78.9 IMPAIRED MOBILITY AND ACTIVITIES OF DAILY LIVING: ICD-10-CM

## 2020-10-15 DIAGNOSIS — Z74.09 IMPAIRED MOBILITY AND ACTIVITIES OF DAILY LIVING: ICD-10-CM

## 2020-10-15 DIAGNOSIS — M25.512 CHRONIC LEFT SHOULDER PAIN: ICD-10-CM

## 2020-10-15 PROCEDURE — 97110 THERAPEUTIC EXERCISES: CPT | Mod: PN

## 2020-10-15 PROCEDURE — 97166 OT EVAL MOD COMPLEX 45 MIN: CPT | Mod: PN

## 2020-10-15 NOTE — PLAN OF CARE
"  [facility] Occupational Therapy  Initial Evaluation     Date: 10/15/2020  Patient: Jud Villa  Chart Number: [id number]    Therapy Diagnosis:   Encounter Diagnoses   Name Primary?    Rotator cuff tendinitis, unspecified laterality     Polyarthralgia     Impaired mobility and activities of daily living     Chronic left shoulder pain      Physician: Shellie Gauthier DO    Physician Orders: Eval and treat  Medical Diagnosis:   M75.80 (ICD-10-CM) - Rotator cuff tendinitis, unspecified laterality   M25.50 (ICD-10-CM) - Polyarthralgia     Evaluation Date: 10/15/2020  Insurance Authorization period Expiration: 12/31/2020  Plan of Care Expiration Period: 12/11/2020  Next MD appointment::    Visit # / Visits Authorized: 1 / 50  Time In 9:00 AM  Time Out: 9:30 AM  Total Billable Time: 30 minutes    Precautions: Standard and cancer    Subjective     Involved Side: Left  Dominant Side: Right  Date of Onset: About 6 months ago  Mechanism of Injury: Gradual pain started  History of Current Condition: L arm started hurting without traumatic cause. Pt describes it as "muscular" pain. Arm demonstrates good function and strength, but pain near end range of motion. She reports constant pain in shoulder throughout the day that is worse in the morning and at night.  Surgical Procedure: None  Imaging: CT scan films, bone scan films under imaging  Previous Therapy: Currently receiving OT at [facility] for neuropaathy of hands post chemotherapy for cancer     Patient's Goals for Therapy: Decrease pain    Pain:  Functional Pain Scale Rating 0-10:   3/10 on average  2/10 at best  6/10 at worst  Location: R upper arm  Description: Deep  Aggravating Factors: Night Time and Morning  Easing Factors: pain medication    Occupation:    Working presently: unemployed currently due to COVID  Duties: Computer work    Functional Limitations/Social History:    Previous functional status includes: Independent with all " ADLs.     Current FunctionalStatus   Home/Living environment : lives with a grandchild (19 years old)      Limitation of Functional Status as follows:   ADLs/IADLs:     - Feeding: Independent     - Bathing: Independent     - Dressing/Grooming: Independent    - Driving: Independent     Leisure: Listening to music, gardening      Past Medical History/Physical Systems Review:   Jud Villa  has a past medical history of Asthma, Breast carcinoma, Cataract, Diabetes mellitus, and Osteoporosis.    Jud Villa  has a past surgical history that includes Cholecystectomy; Hysterectomy; Colonoscopy (N/A, 10/27/2015); LASER PERIPHERAL IRIDOTOMY (Right, 02/21/2019); and Bilateral mastectomy (04/26/2018).    Jud has a current medication list which includes the following prescription(s): albuterol, albuterol-ipratropium, alendronate, alprazolam, basaglar kwikpen u-100 insulin, bd ultra-fine short pen needle, brompheniramine-pseudoeph-dm, butalbital-acetaminophen-caffeine -40 mg, carboxymethylcellulose sodium, empagliflozin, ergocalciferol, escitalopram oxalate, fluticasone-umeclidin-vilanter, gabapentin, glipizide, levocetirizine, lorcaserin, metformin, neomycin-polymyxin-hydrocortisone, ondansetron, oxymetazoline, pantoprazole, potassium chloride, prednisolone acetate, riboflavin (vitamin b2), rizatriptan, sumatriptan, topiramate, tradjenta, and true metrix glucose test strip.    Review of patient's allergies indicates:  No Known Allergies       Objective     Sensation Test: experiences tingling experiences numbness in bilateral hands due to chemothearpy    Observation/Inspection:  rounded shoulders    Range of Motion/Strength:   Shoulder  Left   Right  Pain/Dysfunction with Movement    AROM PROM MMT AROM PROM MMT    Flexion 124 175 All 5/5 in available ROM All WNL  5/5 Yes   Extension 60 75    5/5 Yes   Abduction 155 155    5/5 Yes   HorizAdduction 14 NT    5/5 Yes   Internal rotation T12 64    5/5 Yes   ER  at 90° abd NT 70    5/5 Yes   ER at 0° abd 50 64    5/5 Yes   NT=Not tested  ROM Comments:   Pain at mid range     Special Tests:  AC Joint Left Right   Empty Can Test + -   Drop Arm test _ -   Hawkin's Kenndy - -   Neer's Test + -         CMS Impairment/Limitation/Restriction for FOTO Shoulder Survey    Therapist reviewed FOTO scores for Jud Villa on 10/15/2020.   FOTO documents entered into [employer] - see Media section.    Limitation Score: 47%  Category: Self Care         Treatment     Treatment Time In: 9:22 AM  Treatment Time Out: 9:30 AM  Total Treatment time separate from Evaluation time:8 minutes    Jud received therapeutic exercises for 8 minutes including:  -Isometric exercises hold for 5 seconds    - flexion   - extension   - abduction/adduciton   - IR/ER    Home Exercise Program/Education:  Issued HEP (see patient instructions in EMR) and educated on modality use for pain management . Exercises were reviewed and Jud was able to demonstrate them prior to the end of the session.   Pt received a written copy of exercises to perform at home. Jud demonstrated good  understanding of the education provided.  Pt was advised to perform these exercises free of pain, and to stop performing them if pain occurs.    Patient/Family Education: role of OT, goals for OT, scheduling/cancellations - pt verbalized understanding. Discussed insurance limitations with patient.    Assessment     Jud Villa is a 60 y.o. female referred to outpatient occupational therapy and presents with a medical diagnosis of rotator cuff tendinitis, resulting in Decreased ROM, Decreased muscle strength and Increased pain and demonstrates limitations as described in the chart below. Following medical record review it is determined that pt will benefit from occupational therapy services in order to maximize pain free and/or functional use of left UE. The following goals were discussed with the patient and patient is in agreement  with them as to be addressed in the treatment plan. The patient's rehab potential is Good.     Anticipated barriers to occupational therapy: pain  Pt has no cultural, educational or language barriers to learning provided.     Profile and History Assessment of Occupational Performance Level of Clinical Decision Making Complexity Score   Occupational Profile:   Jud Villa is a 60 y.o. female who lives with a grandchild and is currently unemployed as a  due to COVID. Jud Villa has difficulty with     driving/transportation management and housework/household chores  affecting his/her daily functional abilities. His/her main goal for therapy is to decrease pain in arm.     Comorbidities:   anxiety, COPD/asthma and history of cancer    Medical and Therapy History Review:   Brief               Performance Deficits    Physical:  Joint Mobility  Joint Stability  Muscle Power/Strength  Muscle Endurance  Tactile Functions  Pain    Cognitive:  No Deficits    Psychosocial:    Social Interaction  Habits  Routines  Rituals     Clinical Decision Making:  moderate    Assessment Process:  Problem-Focused Assessments    Modification/Need for Assistance:  Not Necessary    Intervention Selection:  Several Treatment Options       moderate  Based on PMHX, co morbidities , data from assessments and functional level of assistance required with task and clinical presentation directly impacting function.       The following goals were discussed with the patient and patient is in agreement with them as to be addressed in the treatment plan.     Goals:     Short Term Goals to be met in 4 weeks:  1) Initiate Hep   2) Pt will increase R shoulder AROM by 10 degrees grossly for improved performance with overhead ADL's  3) Pt will report 3/10 pain in L shoulder at worst  4) Patient will be able to achieve less than or equal to 40% on FOTO shoulder survey demonstrating overall improved functional ability with upper  extremity.     Long Term Goals to be met by discharge:  1) Independent with HEP  2) Pt will demonstrate L shoulder AROM WNL grossly for [place] with ADL's  3) Independent and pain free with ADL's and IADL's  4) Patient will be able to achieve less than or equal to 30% on FOTO shoulder survey demonstrating overall improved functional ability with upper extremity.       Plan   Certification Period/Plan of care expiration: 10/15/2020 to 12/11/2020.    Outpatient Occupational Therapy 2 times weekly for 8 weeks to include the following interventions: Modalities for pain management, Therapeutic exercises/activities., Strengthening, Joint Protection and Energy Conservation.      KYLE CHAN, OT

## 2020-10-16 ENCOUNTER — HOSPITAL ENCOUNTER (OUTPATIENT)
Dept: RADIOLOGY | Facility: HOSPITAL | Age: 60
Discharge: HOME | End: 2020-10-16
Attending: INTERNAL MEDICINE
Payer: MEDICARE

## 2020-10-16 DIAGNOSIS — Z79.83 ENCOUNTER FOR MONITORING BISPHOSPHONATE THERAPY: ICD-10-CM

## 2020-10-16 DIAGNOSIS — M79.651 PAIN IN BOTH THIGHS: ICD-10-CM

## 2020-10-16 DIAGNOSIS — M79.652 PAIN IN BOTH THIGHS: ICD-10-CM

## 2020-10-16 DIAGNOSIS — Z51.81 ENCOUNTER FOR MONITORING BISPHOSPHONATE THERAPY: ICD-10-CM

## 2020-10-16 PROCEDURE — 73701 CT LOWER EXTREMITY W/DYE: CPT | Mod: 26,50,, | Performed by: RADIOLOGY

## 2020-10-16 PROCEDURE — 73701 CT THIGH WITH CONTRAST BILATERAL: ICD-10-PCS | Mod: 26,50,, | Performed by: RADIOLOGY

## 2020-10-16 PROCEDURE — 25500020 PHARM REV CODE 255: Performed by: INTERNAL MEDICINE

## 2020-10-16 PROCEDURE — 73701 CT LOWER EXTREMITY W/DYE: CPT | Mod: TC,50

## 2020-10-16 RX ADMIN — IOHEXOL 75 ML: 350 INJECTION, SOLUTION INTRAVENOUS at 11:10

## 2020-10-19 ENCOUNTER — TELEPHONE (OUTPATIENT)
Dept: REHABILITATION | Facility: HOSPITAL | Age: 60
End: 2020-10-19

## 2020-10-26 ENCOUNTER — TELEPHONE (OUTPATIENT)
Dept: REHABILITATION | Facility: HOSPITAL | Age: 60
End: 2020-10-26

## 2020-11-03 ENCOUNTER — TELEPHONE (OUTPATIENT)
Dept: RHEUMATOLOGY | Facility: CLINIC | Age: 60
End: 2020-11-03

## 2020-11-03 NOTE — TELEPHONE ENCOUNTER
[facility] for patient to call our office back.    ----- Message from Syeda Stout MA sent at 11/3/2020  2:31 PM CST -----  Regarding: FW: test results    ----- Message -----  From: Dewayne Ram  Sent: 11/3/2020   9:49 AM CST  To: Disha Hensley Staff  Subject: test results                                     Type:  Needs Medical Advice    Who Called: patient   Reason: patient would like a call back pertaining to her test results   Would the patient rather a call back or a response via [facility]? Call back   Best Call Back Number: [phone]  Additional Information: n/a

## 2020-11-09 ENCOUNTER — DOCUMENTATION ONLY (OUTPATIENT)
Dept: REHABILITATION | Facility: HOSPITAL | Age: 60
End: 2020-11-09

## 2020-11-09 ENCOUNTER — TELEPHONE (OUTPATIENT)
Dept: RHEUMATOLOGY | Facility: CLINIC | Age: 60
End: 2020-11-09

## 2020-11-09 NOTE — TELEPHONE ENCOUNTER
DO Syeda Mars MA   Caller: Unspecified (3 days ago, 12:52 PM)             Please let the patient know i'm sorry we kept missing eachother on the phone and I am out of the office today, but her CT of her femurs did not show any fractures.  Her labs overall also do not show an inflammatory rheumatologic cause of her joint pain.  Has she started PT as we had discussed at her visit for her shoulder pain?  I see that she is on the portal online, if she has any specific questions about her lab results please let her know to reach out on the portal as this is likely a better way to communicate so we are not playing phone tag.      Pt voices understanding. Message sent to provider per the patient's request.

## 2020-11-09 NOTE — TELEPHONE ENCOUNTER
LVm for patient to call the office back.      ----- Message from Syeda Stout MA sent at 11/9/2020  4:26 PM CST -----  Hi Dr Gauthier,    Pt was informed of message you sent me in regards to her recent lab work and CT. The patient stated that she still had some question in regards to her lab work. The patient also would like to know if why her previous provider stated that she has Lupus? Please advise.

## 2020-11-09 NOTE — PROGRESS NOTES
Pt 25 minutes late to appointment. States she has missed appointments due to her mother passing and she overslept today due to taking pain meds. OT unable to see her today confirmed her next appointment with emphasis on attendance policy. Pt confirmed she would be here and understanding of policy.

## 2020-11-11 ENCOUNTER — CLINICAL SUPPORT (OUTPATIENT)
Dept: REHABILITATION | Facility: HOSPITAL | Age: 60
End: 2020-11-11
Payer: MEDICARE

## 2020-11-11 DIAGNOSIS — G89.29 CHRONIC LEFT SHOULDER PAIN: ICD-10-CM

## 2020-11-11 DIAGNOSIS — Z78.9 IMPAIRED MOBILITY AND ACTIVITIES OF DAILY LIVING: ICD-10-CM

## 2020-11-11 DIAGNOSIS — M25.512 CHRONIC LEFT SHOULDER PAIN: ICD-10-CM

## 2020-11-11 DIAGNOSIS — Z74.09 IMPAIRED MOBILITY AND ACTIVITIES OF DAILY LIVING: ICD-10-CM

## 2020-11-11 PROCEDURE — 97110 THERAPEUTIC EXERCISES: CPT | Mod: PN

## 2020-11-11 PROCEDURE — 97140 MANUAL THERAPY 1/> REGIONS: CPT | Mod: PN
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
## 2020-11-16 ENCOUNTER — CLINICAL SUPPORT (OUTPATIENT)
Dept: REHABILITATION | Facility: HOSPITAL | Age: 60
End: 2020-11-16
Payer: MEDICARE

## 2020-11-16 DIAGNOSIS — Z74.09 IMPAIRED MOBILITY AND ACTIVITIES OF DAILY LIVING: ICD-10-CM

## 2020-11-16 DIAGNOSIS — G89.29 CHRONIC LEFT SHOULDER PAIN: ICD-10-CM

## 2020-11-16 DIAGNOSIS — M25.512 CHRONIC LEFT SHOULDER PAIN: ICD-10-CM

## 2020-11-16 DIAGNOSIS — Z78.9 IMPAIRED MOBILITY AND ACTIVITIES OF DAILY LIVING: ICD-10-CM

## 2020-11-16 PROCEDURE — 97110 THERAPEUTIC EXERCISES: CPT | Mod: PN

## 2020-11-16 PROCEDURE — 97140 MANUAL THERAPY 1/> REGIONS: CPT | Mod: PN

## 2020-11-16 NOTE — PROGRESS NOTES
"  Occupational Therapy Treatment Note     Date: 11/16/2020  Name: Jud Villa  Clinic Number: [id number]    Therapy Diagnosis:   Encounter Diagnoses   Name Primary?    Impaired mobility and activities of daily living     Chronic left shoulder pain      Physician: Shellie Gauthier DO    Physician Orders: Eval and treat  Medical Diagnosis:   M75.80 (ICD-10-CM) - Rotator cuff tendinitis, unspecified laterality   M25.50 (ICD-10-CM) - Polyarthralgia      Evaluation Date: 10/15/2020  Insurance Authorization period Expiration: 12/31/2020  Plan of Care Expiration Period: 12/11/2020  Next MD appointment: 12/18/2020     Visit # / Visits Authorized: 3 / 50  Time In 1:00 PM  Time Out:  1:45 PM  Total Billable Time:  45 minutes   MT1 TE2  Precautions: Standard and cancer    Subjective     Pt reports: "My shoulder feels a lot better."  she was not compliant with home exercise program given last session.   Response to previous treatment: Good  Functional change: None noted yet    Pain: 2/10  Location: left shoulder      Objective     Jud participated in manual therapy consisting of patient supine for L and R shoulder lateral telescoping, upper trapezius soft tissue mobilization, pectoralis lift, subscapularis myofascial release and stretch, PROM with endrange stretching, glenohumeral joint inferior anterior posterior glides grade I-III, gentle shoulder oscillations for 10 minutes    Jud participated in therapeutic exercises for 35 minutes including:    Exercises    UBE forward and reverse @120 rpms 3 minutes each direction   PROM L Shoulder Flexion/Abduction/Internal rotation/External Rotation 10x     Supine dowel Flexion 1# dowel  2/15   Sidelying Abduction L 1#  2/15   Sidelying External Rotation L 1#  2/15   pulleys 3'   Wall slides ball  2/15   Theraband Extension pull downs Green  2/15   Theraband Rows Green  2/15   Theraband Internal Rotation red  2/15   Theraband External Rotation red  2/15   Corner pectoralis " "stretch 3/30"       Home Exercises and Education Provided     Education provided:   - Continue initial HEP  - Progress towards goals     Written Home Exercises Provided: Patient instructed to cont prior HEP.  Exercises were reviewed and Jud was able to demonstrate them prior to the end of the session.  Jud demonstrated good  understanding of the HEP provided.   .   See EMR under Patient Instructions for exercises provided prior visit.        Assessment     Pt participated well today. Her shoulder pain improved from previous session. She was able to perform all exercises with good technique and without c/o. Pt would probably benefit from increasing weight next session.     Jud is progressing well towards her goals and there are no updates to goals at this time. Pt prognosis is Good.     Pt will continue to benefit from skilled outpatient occupational therapy to address the deficits listed in the problem list on initial evaluation provide pt/family education and to maximize pt's level of independence in the home and community environment.     Anticipated barriers to occupational therapy: Transportation, pain    Pt's spiritual, cultural and educational needs considered and pt agreeable to plan of care and goals.    Goals:  Short Term Goals to be met in 4 weeks:  1) Initiate Hep Met 10/15/2020  2) Pt will increase R shoulder AROM by 10 degrees grossly for improved performance with overhead ADL's Progressing  3) Pt will report 3/10 pain in L shoulder at worst Progressing  4) Patient will be able to achieve less than or equal to 40% on FOTO shoulder survey demonstrating overall improved functional ability with upper extremity.  Progressing     Long Term Goals to be met by discharge:  1) Independent with HEP Progressing  2) Pt will demonstrate L shoulder AROM WNL grossly for [place] with ADL's Progressing  3) Independent and pain free with ADL's and IADL's Progressing  4) Patient will be able to achieve less " than or equal to 30% on FOTO shoulder survey demonstrating overall improved functional ability with upper extremity.  Progressing       Plan     Continue per initial plan of care  Updates/Grading for next session: Progress as tolerated       SACHI Pop

## 2020-11-18 ENCOUNTER — CLINICAL SUPPORT (OUTPATIENT)
Dept: REHABILITATION | Facility: HOSPITAL | Age: 60
End: 2020-11-18
Payer: MEDICARE

## 2020-11-18 DIAGNOSIS — G89.29 CHRONIC LEFT SHOULDER PAIN: ICD-10-CM

## 2020-11-18 DIAGNOSIS — M25.512 CHRONIC LEFT SHOULDER PAIN: ICD-10-CM

## 2020-11-18 DIAGNOSIS — Z74.09 IMPAIRED MOBILITY AND ACTIVITIES OF DAILY LIVING: ICD-10-CM

## 2020-11-18 DIAGNOSIS — Z78.9 IMPAIRED MOBILITY AND ACTIVITIES OF DAILY LIVING: ICD-10-CM

## 2020-11-18 PROCEDURE — 97110 THERAPEUTIC EXERCISES: CPT | Mod: PN

## 2020-11-18 PROCEDURE — 97140 MANUAL THERAPY 1/> REGIONS: CPT | Mod: PN

## 2020-11-18 RX ORDER — DIAZEPAM 10 MG/1
10 TABLET ORAL
OUTPATIENT
Start: 2020-11-18 | End: 2020-12-18

## 2020-11-18 NOTE — PROGRESS NOTES
"  Occupational Therapy Treatment Note     Date: 11/18/2020  Name: Jud Villa  Clinic Number: [id number]    Therapy Diagnosis:   Encounter Diagnoses   Name Primary?    Impaired mobility and activities of daily living     Chronic left shoulder pain      Physician: Shellie Gauthier DO    Physician Orders: Eval and treat  Medical Diagnosis:   M75.80 (ICD-10-CM) - Rotator cuff tendinitis, unspecified laterality   M25.50 (ICD-10-CM) - Polyarthralgia      Evaluation Date: 10/15/2020  Insurance Authorization period Expiration: 12/31/2020  Plan of Care Expiration Period: 12/11/2020  Next MD appointment: 12/18/2020     Visit # / Visits Authorized: 4 / 50  Time In  1:52 PM  Time Out: 2:35 PM  Total Billable Time: 43 minutes   MT1 TE2  Precautions: Standard and cancer    Subjective     Pt reports: "It hurts when I'm reaching for something high on the shelves"  she was not compliant with home exercise program given last session.   Response to previous treatment: Good  Functional change: None noted yet    Pain: 3/10  Location: left shoulder      Objective     Jud participated in manual therapy consisting of patient supine for L shoulder lateral telescoping, upper trapezius soft tissue mobilization, pectoralis lift, subscapularis myofascial release and stretch, PROM with endrange stretching, glenohumeral joint inferior anterior posterior glides grade I-III, gentle shoulder oscillations for 10 minutes    Jud participated in therapeutic exercises for 33 minutes including:    Exercises    Scifit UBE Level 3.0 4 minutes each direction   PROM L Shoulder Flexion/Abduction/Internal rotation/External Rotation 10x     Supine dowel Flexion/Chest Press 1# dowel  2/15   Sidelying Abduction L 1#  2/15   Sidelying External Rotation L 1#  2/15   pulleys 3'   Wall slides ball  2/15   Theraband Extension pull downs Red   2/15   Theraband Rows Red   2/15   Theraband Internal Rotation Red  2/15   Theraband External Rotation Red  2/15 " "  Corner pectoralis stretch 3/30"       Home Exercises and Education Provided     Education provided:   - Continue initial HEP  - Progress towards goals     Written Home Exercises Provided: Patient instructed to cont prior HEP.  Exercises were reviewed and Jud was able to demonstrate them prior to the end of the session.  Jud demonstrated good  understanding of the HEP provided.   .   See EMR under Patient Instructions for exercises provided prior visit.        Assessment     Pt tolerated session well today. Shoulder pain is about the same from last time, but she was able to perform all exercises in pain free range and with good form. Pt also reporting pain in B/L shoulders. Next session to continue to progress as tolerated.    Jud is progressing well towards her goals and there are no updates to goals at this time. Pt prognosis is Good.     Pt will continue to benefit from skilled outpatient occupational therapy to address the deficits listed in the problem list on initial evaluation provide pt/family education and to maximize pt's level of independence in the home and community environment.     Anticipated barriers to occupational therapy: Transportation, pain    Pt's spiritual, cultural and educational needs considered and pt agreeable to plan of care and goals.    Goals:  Short Term Goals to be met in 4 weeks:  1) Initiate Hep Met 10/15/2020  2) Pt will increase R shoulder AROM by 10 degrees grossly for improved performance with overhead ADL's Progressing  3) Pt will report 3/10 pain in L shoulder at worst Progressing  4) Patient will be able to achieve less than or equal to 40% on FOTO shoulder survey demonstrating overall improved functional ability with upper extremity.  Progressing     Long Term Goals to be met by discharge:  1) Independent with HEP Progressing  2) Pt will demonstrate L shoulder AROM WNL grossly for [place] with ADL's Progressing  3) Independent and pain free with ADL's and IADL's " Progressing  4) Patient will be able to achieve less than or equal to 30% on FOTO shoulder survey demonstrating overall improved functional ability with upper extremity.  Progressing       Plan     Continue per initial plan of care  Updates/Grading for next session: Progress as tolerated       KYLE CHAN, OT

## 2020-11-23 ENCOUNTER — CLINICAL SUPPORT (OUTPATIENT)
Dept: REHABILITATION | Facility: HOSPITAL | Age: 60
End: 2020-11-23
Payer: MEDICARE

## 2020-11-23 DIAGNOSIS — G89.29 CHRONIC LEFT SHOULDER PAIN: ICD-10-CM

## 2020-11-23 DIAGNOSIS — Z78.9 IMPAIRED MOBILITY AND ACTIVITIES OF DAILY LIVING: ICD-10-CM

## 2020-11-23 DIAGNOSIS — M25.512 CHRONIC LEFT SHOULDER PAIN: ICD-10-CM

## 2020-11-23 DIAGNOSIS — Z74.09 IMPAIRED MOBILITY AND ACTIVITIES OF DAILY LIVING: ICD-10-CM

## 2020-11-23 PROCEDURE — 97110 THERAPEUTIC EXERCISES: CPT | Mod: PN

## 2020-11-23 PROCEDURE — 97140 MANUAL THERAPY 1/> REGIONS: CPT | Mod: PN

## 2020-11-23 NOTE — PROGRESS NOTES
"  Occupational Therapy Treatment Note     Date: 11/23/2020  Name: Jud Villa  Clinic Number: [id number]    Therapy Diagnosis:   Encounter Diagnoses   Name Primary?    Impaired mobility and activities of daily living     Chronic left shoulder pain      Physician: Shellie Gauthier DO    Physician Orders: Eval and treat  Medical Diagnosis:   M75.80 (ICD-10-CM) - Rotator cuff tendinitis, unspecified laterality   M25.50 (ICD-10-CM) - Polyarthralgia      Evaluation Date: 10/15/2020  Insurance Authorization period Expiration: 12/31/2020  Plan of Care Expiration Period: 12/11/2020  Next MD appointment: 12/18/2020     Visit # / Visits Authorized: 5 / 50 FOTO:43%  Time In  1:00 PM  Time Out: 1:45 PM  Total Billable Time: 45 minutes   MT1 TE2  Precautions: Standard and cancer    Subjective     Pt reports: "I'm feeling pretty good."  she was not compliant with home exercise program given last session.   Response to previous treatment: Good  Functional change: able to progress with weight and resistance    Pain: 0/10  Location: left shoulder      Objective     Jud participated in manual therapy consisting of patient supine for L shoulder lateral telescoping, upper trapezius soft tissue mobilization, pectoralis lift, subscapularis myofascial release and stretch, PROM with endrange stretching, glenohumeral joint inferior anterior posterior glides grade I-III, gentle shoulder oscillations for 10 minutes    Jud participated in therapeutic exercises for 35 minutes including:    Exercises    cybex UBE @90rpms 3 minutes each direction   PROM L Shoulder Flexion/Abduction/Internal rotation/External Rotation 10x     Supine dowel Flexion 2# dowel  2/15   Sidelying Abduction L 1#  2/15   Sidelying External Rotation L 1#  2/15   pulleys 3'   Wall slides ball  2/15   Theraband Extension pull downs Green  2/15   Theraband Rows Green  2/15   Theraband Internal Rotation Green  2/15   Theraband External Rotation Green  2/15   Corner " "pectoralis stretch 3/30"       Home Exercises and Education Provided     Education provided:   - Continue initial HEP  - Progress towards goals     Written Home Exercises Provided: Patient instructed to cont prior HEP.  Exercises were reviewed and Jud was able to demonstrate them prior to the end of the session.  Jud demonstrated good  understanding of the HEP provided.     See EMR under Patient Instructions for exercises provided prior visit.      Assessment     Pt pain free in today's session. She was able to increase weight and resistance today without c/o. Some end range pain and stiffness noted on Left side however responds well to manual therapy and PLLS. Pt motivated.     Jud is progressing well towards her goals and there are no updates to goals at this time. Pt prognosis is Good.     Pt will continue to benefit from skilled outpatient occupational therapy to address the deficits listed in the problem list on initial evaluation provide pt/family education and to maximize pt's level of independence in the home and community environment.     Anticipated barriers to occupational therapy: Transportation, pain    Pt's spiritual, cultural and educational needs considered and pt agreeable to plan of care and goals.    Goals:  Short Term Goals to be met in 4 weeks:  1) Initiate Hep Met 10/15/2020  2) Pt will increase R shoulder AROM by 10 degrees grossly for improved performance with overhead ADL's Progressing  3) Pt will report 3/10 pain in L shoulder at worst Progressing  4) Patient will be able to achieve less than or equal to 40% on FOTO shoulder survey demonstrating overall improved functional ability with upper extremity.  Progressing     Long Term Goals to be met by discharge:  1) Independent with HEP Progressing  2) Pt will demonstrate L shoulder AROM WNL grossly for [place] with ADL's Progressing  3) Independent and pain free with ADL's and IADL's Progressing  4) Patient will be able to achieve " less than or equal to 30% on FOTO shoulder survey demonstrating overall improved functional ability with upper extremity.  Progressing       Plan     Continue per initial plan of care  Updates/Grading for next session: Progress as tolerated       SACHI Pop

## 2020-12-02 ENCOUNTER — TELEPHONE (OUTPATIENT)
Dept: REHABILITATION | Facility: HOSPITAL | Age: 60
End: 2020-12-02

## 2020-12-09 ENCOUNTER — TELEPHONE (OUTPATIENT)
Dept: REHABILITATION | Facility: HOSPITAL | Age: 60
End: 2020-12-09

## 2020-12-09 ENCOUNTER — TELEPHONE (OUTPATIENT)
Dept: FAMILY MEDICINE | Facility: CLINIC | Age: 60
End: 2020-12-09

## 2020-12-10 NOTE — TELEPHONE ENCOUNTER
----- Message from Claribel Jacobo MA sent at 12/9/2020  4:13 PM CST -----  Regarding: Rx request  Patient has nausea and dizziness, she is requesting a prescription for promethazine.  ----- Message -----  From: Jayro Reilly  Sent: 12/9/2020   4:09 PM CST  To: [facility] Staff    Type:  Needs Medical Advice    Who Called: self  Reason:Speak with nurse :)  Would the patient rather a call back or a response via [facility]? call  Best Call Back Number: [phone]  Additional Information: none

## 2021-01-05 ENCOUNTER — PATIENT MESSAGE (OUTPATIENT)
Dept: ADMINISTRATIVE | Facility: HOSPITAL | Age: 61
End: 2021-01-05

## 2021-01-06 DIAGNOSIS — E11.9 TYPE 2 DIABETES MELLITUS WITHOUT COMPLICATION: ICD-10-CM

## 2021-01-28 DIAGNOSIS — F41.9 ANXIETY: ICD-10-CM

## 2021-01-28 RX ORDER — ALPRAZOLAM 0.25 MG/1
TABLET ORAL
Qty: 40 TABLET | Refills: 0 | Status: SHIPPED | OUTPATIENT
Start: 2021-01-28 | End: 2021-02-25 | Stop reason: SDUPTHER

## 2021-02-11 ENCOUNTER — TELEPHONE (OUTPATIENT)
Dept: NEUROLOGY | Facility: CLINIC | Age: 61
End: 2021-02-11

## 2021-02-11 ENCOUNTER — HOSPITAL ENCOUNTER (EMERGENCY)
Facility: HOSPITAL | Age: 61
Discharge: HOME | End: 2021-02-11
Attending: EMERGENCY MEDICINE
Payer: MEDICARE

## 2021-02-11 VITALS
DIASTOLIC BLOOD PRESSURE: 72 MMHG | OXYGEN SATURATION: 97 % | HEART RATE: 94 BPM | BODY MASS INDEX: 24.66 KG/M2 | TEMPERATURE: 98 F | RESPIRATION RATE: 18 BRPM | SYSTOLIC BLOOD PRESSURE: 139 MMHG | WEIGHT: 134 LBS | HEIGHT: 62 IN

## 2021-02-11 DIAGNOSIS — S40.011A CONTUSION OF RIGHT SHOULDER, INITIAL ENCOUNTER: ICD-10-CM

## 2021-02-11 DIAGNOSIS — M25.511 RIGHT SHOULDER PAIN: Primary | ICD-10-CM

## 2021-02-11 LAB — HCV AB SERPL QL IA: NEGATIVE

## 2021-02-11 PROCEDURE — 99285 PR EMERGENCY DEPT VISIT,LEVEL V: ICD-10-PCS | Mod: ,,, | Performed by: EMERGENCY MEDICINE

## 2021-02-11 PROCEDURE — 86803 HEPATITIS C AB TEST: CPT

## 2021-02-11 PROCEDURE — 99285 EMERGENCY DEPT VISIT HI MDM: CPT | Mod: ,,, | Performed by: EMERGENCY MEDICINE

## 2021-02-11 PROCEDURE — 25000003 PHARM REV CODE 250: Performed by: EMERGENCY MEDICINE

## 2021-02-11 PROCEDURE — 99283 EMERGENCY DEPT VISIT LOW MDM: CPT | Mod: 25

## 2021-02-11 RX ORDER — HYDROCODONE BITARTRATE AND ACETAMINOPHEN 5; 325 MG/1; MG/1
1 TABLET ORAL
Status: COMPLETED | OUTPATIENT
Start: 2021-02-11 | End: 2021-02-11

## 2021-02-11 RX ORDER — TRAMADOL HYDROCHLORIDE 50 MG/1
50 TABLET ORAL EVERY 6 HOURS PRN
Qty: 12 TABLET | Refills: 0 | Status: ON HOLD | OUTPATIENT
Start: 2021-02-11 | End: 2023-04-22 | Stop reason: HOSPADM

## 2021-02-11 RX ADMIN — HYDROCODONE BITARTRATE AND ACETAMINOPHEN 1 TABLET: 5; 325 TABLET ORAL at 06:02

## 2021-02-12 NOTE — TELEPHONE ENCOUNTER
----- Message from Pura Cao sent at 8/20/2019 10:38 AM CDT -----  Just spoke with the patient. I let her know due to her insurance I was only able to find three places that accept new patients with medicaid:  [facility] on [facility] in [facility], neurosciences division  I will be faxing her information and referral to all three locations.   Patient asked several times while on the phone whether she could go to the ER and I advised her that yes she can go to any ER and receive treatment but that I could not tell her if they would call a neurologist to evaluate her and that she may just receive another referral to schedule with neurology. Patient expressed understanding and stated she would go to [facility] because she had a MRI done there.   Thanks     hair removal not indicated

## 2021-02-22 ENCOUNTER — PATIENT OUTREACH (OUTPATIENT)
Dept: ADMINISTRATIVE | Facility: OTHER | Age: 61
End: 2021-02-22

## 2021-02-25 ENCOUNTER — CLINICAL SUPPORT (OUTPATIENT)
Dept: FAMILY MEDICINE | Facility: CLINIC | Age: 61
End: 2021-02-25
Attending: FAMILY MEDICINE
Payer: MEDICARE

## 2021-02-25 ENCOUNTER — OFFICE VISIT (OUTPATIENT)
Dept: FAMILY MEDICINE | Facility: CLINIC | Age: 61
End: 2021-02-25
Payer: MEDICARE

## 2021-02-25 VITALS
OXYGEN SATURATION: 95 % | TEMPERATURE: 97 F | WEIGHT: 134.69 LBS | SYSTOLIC BLOOD PRESSURE: 128 MMHG | DIASTOLIC BLOOD PRESSURE: 86 MMHG | BODY MASS INDEX: 24.64 KG/M2 | HEART RATE: 119 BPM

## 2021-02-25 DIAGNOSIS — Z79.4 TYPE 2 DIABETES MELLITUS WITH HYPERGLYCEMIA, WITH LONG-TERM CURRENT USE OF INSULIN: ICD-10-CM

## 2021-02-25 DIAGNOSIS — F41.9 ANXIETY: ICD-10-CM

## 2021-02-25 DIAGNOSIS — E11.65 TYPE 2 DIABETES MELLITUS WITH HYPERGLYCEMIA, WITH LONG-TERM CURRENT USE OF INSULIN: ICD-10-CM

## 2021-02-25 DIAGNOSIS — G43.909 MIGRAINE SYNDROME: Primary | ICD-10-CM

## 2021-02-25 PROCEDURE — 92228 IMG RTA DETC/MNTR DS PHY/QHP: CPT | Mod: TC,S$GLB,, | Performed by: FAMILY MEDICINE

## 2021-02-25 PROCEDURE — 1126F PR PAIN SEVERITY QUANTIFIED, NO PAIN PRESENT: ICD-10-PCS | Mod: S$GLB,,, | Performed by: FAMILY MEDICINE

## 2021-02-25 PROCEDURE — 3044F PR MOST RECENT HEMOGLOBIN A1C LEVEL <7.0%: ICD-10-PCS | Mod: CPTII,S$GLB,, | Performed by: FAMILY MEDICINE

## 2021-02-25 PROCEDURE — 3008F PR BODY MASS INDEX (BMI) DOCUMENTED: ICD-10-PCS | Mod: CPTII,S$GLB,, | Performed by: FAMILY MEDICINE

## 2021-02-25 PROCEDURE — 92228 DIABETIC EYE SCREENING PHOTO: ICD-10-PCS | Mod: 26,S$GLB,, | Performed by: OPTOMETRIST

## 2021-02-25 PROCEDURE — 99499 RISK ADDL DX/OHS AUDIT: ICD-10-PCS | Mod: S$GLB,,, | Performed by: FAMILY MEDICINE

## 2021-02-25 PROCEDURE — 99214 PR OFFICE/OUTPT VISIT, EST, LEVL IV, 30-39 MIN: ICD-10-PCS | Mod: S$GLB,,, | Performed by: FAMILY MEDICINE

## 2021-02-25 PROCEDURE — 3008F BODY MASS INDEX DOCD: CPT | Mod: CPTII,S$GLB,, | Performed by: FAMILY MEDICINE

## 2021-02-25 PROCEDURE — 99499 UNLISTED E&M SERVICE: CPT | Mod: S$GLB,,, | Performed by: FAMILY MEDICINE

## 2021-02-25 PROCEDURE — 92228 DIABETIC EYE SCREENING PHOTO: ICD-10-PCS | Mod: TC,S$GLB,, | Performed by: FAMILY MEDICINE

## 2021-02-25 PROCEDURE — 3044F HG A1C LEVEL LT 7.0%: CPT | Mod: CPTII,S$GLB,, | Performed by: FAMILY MEDICINE

## 2021-02-25 PROCEDURE — 99214 OFFICE O/P EST MOD 30 MIN: CPT | Mod: S$GLB,,, | Performed by: FAMILY MEDICINE

## 2021-02-25 PROCEDURE — 1126F AMNT PAIN NOTED NONE PRSNT: CPT | Mod: S$GLB,,, | Performed by: FAMILY MEDICINE

## 2021-02-25 PROCEDURE — 92228 IMG RTA DETC/MNTR DS PHY/QHP: CPT | Mod: 26,S$GLB,, | Performed by: OPTOMETRIST

## 2021-02-25 PROCEDURE — 99999 PR PBB SHADOW E&M-EST. PATIENT-LVL III: CPT | Mod: PBBFAC,,, | Performed by: FAMILY MEDICINE

## 2021-02-25 PROCEDURE — 99999 PR PBB SHADOW E&M-EST. PATIENT-LVL III: ICD-10-PCS | Mod: PBBFAC,,, | Performed by: FAMILY MEDICINE

## 2021-02-25 RX ORDER — ALPRAZOLAM 0.5 MG/1
0.5 TABLET ORAL NIGHTLY PRN
Qty: 40 TABLET | Refills: 0 | Status: SHIPPED | OUTPATIENT
Start: 2021-02-25 | End: 2021-07-14

## 2021-03-12 DIAGNOSIS — J43.2 CENTRILOBULAR EMPHYSEMA: ICD-10-CM

## 2021-03-12 DIAGNOSIS — J44.1 COPD EXACERBATION: ICD-10-CM

## 2021-03-12 RX ORDER — FLUTICASONE PROPIONATE 50 MCG
2 SPRAY, SUSPENSION (ML) NASAL DAILY
Qty: 16 ML | Refills: 1 | Status: SHIPPED | OUTPATIENT
Start: 2021-03-12 | End: 2021-04-15 | Stop reason: SDUPTHER

## 2021-03-15 ENCOUNTER — HOSPITAL ENCOUNTER (EMERGENCY)
Facility: HOSPITAL | Age: 61
Discharge: HOME | End: 2021-03-16
Attending: EMERGENCY MEDICINE
Payer: MEDICARE

## 2021-03-15 ENCOUNTER — OFFICE VISIT (OUTPATIENT)
Dept: RHEUMATOLOGY | Facility: CLINIC | Age: 61
End: 2021-03-15
Payer: MEDICARE

## 2021-03-15 ENCOUNTER — TELEPHONE (OUTPATIENT)
Dept: NEUROLOGY | Facility: CLINIC | Age: 61
End: 2021-03-15

## 2021-03-15 VITALS
DIASTOLIC BLOOD PRESSURE: 74 MMHG | HEART RATE: 81 BPM | BODY MASS INDEX: 24.64 KG/M2 | OXYGEN SATURATION: 93 % | HEIGHT: 62 IN | SYSTOLIC BLOOD PRESSURE: 124 MMHG | TEMPERATURE: 97 F

## 2021-03-15 DIAGNOSIS — R76.8 ANA POSITIVE: ICD-10-CM

## 2021-03-15 DIAGNOSIS — M25.511 ACUTE PAIN OF RIGHT SHOULDER: Primary | ICD-10-CM

## 2021-03-15 DIAGNOSIS — M81.0 AGE-RELATED OSTEOPOROSIS WITHOUT CURRENT PATHOLOGICAL FRACTURE: ICD-10-CM

## 2021-03-15 DIAGNOSIS — Z79.899 HIGH RISK MEDICATION USE: ICD-10-CM

## 2021-03-15 DIAGNOSIS — M25.511 RIGHT SHOULDER PAIN: ICD-10-CM

## 2021-03-15 PROCEDURE — 99214 PR OFFICE/OUTPT VISIT, EST, LEVL IV, 30-39 MIN: ICD-10-PCS | Mod: S$GLB,,, | Performed by: INTERNAL MEDICINE

## 2021-03-15 PROCEDURE — 1125F AMNT PAIN NOTED PAIN PRSNT: CPT | Mod: S$GLB,,, | Performed by: INTERNAL MEDICINE

## 2021-03-15 PROCEDURE — 3008F PR BODY MASS INDEX (BMI) DOCUMENTED: ICD-10-PCS | Mod: CPTII,S$GLB,, | Performed by: INTERNAL MEDICINE

## 2021-03-15 PROCEDURE — 99283 EMERGENCY DEPT VISIT LOW MDM: CPT | Mod: 25

## 2021-03-15 PROCEDURE — 99214 OFFICE O/P EST MOD 30 MIN: CPT | Mod: S$GLB,,, | Performed by: INTERNAL MEDICINE

## 2021-03-15 PROCEDURE — 99999 PR PBB SHADOW E&M-EST. PATIENT-LVL III: ICD-10-PCS | Mod: PBBFAC,,, | Performed by: INTERNAL MEDICINE

## 2021-03-15 PROCEDURE — 3008F BODY MASS INDEX DOCD: CPT | Mod: CPTII,S$GLB,, | Performed by: INTERNAL MEDICINE

## 2021-03-15 PROCEDURE — 99999 PR PBB SHADOW E&M-EST. PATIENT-LVL III: CPT | Mod: PBBFAC,,, | Performed by: INTERNAL MEDICINE

## 2021-03-15 PROCEDURE — 99284 EMERGENCY DEPT VISIT MOD MDM: CPT | Mod: ,,, | Performed by: PHYSICIAN ASSISTANT

## 2021-03-15 PROCEDURE — 99284 PR EMERGENCY DEPT VISIT,LEVEL IV: ICD-10-PCS | Mod: ,,, | Performed by: PHYSICIAN ASSISTANT

## 2021-03-15 PROCEDURE — 25000003 PHARM REV CODE 250: Performed by: PHYSICIAN ASSISTANT

## 2021-03-15 PROCEDURE — 1125F PR PAIN SEVERITY QUANTIFIED, PAIN PRESENT: ICD-10-PCS | Mod: S$GLB,,, | Performed by: INTERNAL MEDICINE

## 2021-03-15 RX ORDER — METHOCARBAMOL 750 MG/1
1500 TABLET, FILM COATED ORAL
Status: COMPLETED | OUTPATIENT
Start: 2021-03-15 | End: 2021-03-15

## 2021-03-15 RX ORDER — METHOCARBAMOL 750 MG/1
1500 TABLET, FILM COATED ORAL 3 TIMES DAILY
Qty: 30 TABLET | Refills: 0 | Status: SHIPPED | OUTPATIENT
Start: 2021-03-15 | End: 2021-03-20

## 2021-03-15 RX ORDER — MELOXICAM 15 MG/1
15 TABLET ORAL DAILY
Qty: 30 TABLET | Refills: 0 | Status: SHIPPED | OUTPATIENT
Start: 2021-03-15 | End: 2021-03-15

## 2021-03-15 RX ADMIN — METHOCARBAMOL 1500 MG: 750 TABLET ORAL at 10:03

## 2021-03-16 VITALS
DIASTOLIC BLOOD PRESSURE: 88 MMHG | TEMPERATURE: 98 F | HEIGHT: 62 IN | RESPIRATION RATE: 17 BRPM | OXYGEN SATURATION: 99 % | BODY MASS INDEX: 24.66 KG/M2 | SYSTOLIC BLOOD PRESSURE: 165 MMHG | HEART RATE: 80 BPM | WEIGHT: 134 LBS

## 2021-03-22 ENCOUNTER — TELEPHONE (OUTPATIENT)
Dept: RHEUMATOLOGY | Facility: CLINIC | Age: 61
End: 2021-03-22

## 2021-03-22 ENCOUNTER — HOSPITAL ENCOUNTER (OUTPATIENT)
Dept: RADIOLOGY | Facility: HOSPITAL | Age: 61
Discharge: HOME | End: 2021-03-22
Attending: INTERNAL MEDICINE
Payer: MEDICARE

## 2021-03-22 DIAGNOSIS — M25.511 ACUTE PAIN OF RIGHT SHOULDER: ICD-10-CM

## 2021-03-22 PROCEDURE — 73221 MRI JOINT UPR EXTREM W/O DYE: CPT | Mod: TC,RT

## 2021-03-22 PROCEDURE — 73221 MRI JOINT UPR EXTREM W/O DYE: CPT | Mod: 26,RT,, | Performed by: RADIOLOGY

## 2021-03-22 PROCEDURE — 73221 MRI SHOULDER WITHOUT CONTRAST RIGHT: ICD-10-PCS | Mod: 26,RT,, | Performed by: RADIOLOGY

## 2021-04-01 ENCOUNTER — CLINICAL SUPPORT (OUTPATIENT)
Dept: REHABILITATION | Facility: HOSPITAL | Age: 61
End: 2021-04-01
Attending: INTERNAL MEDICINE
Payer: MEDICARE

## 2021-04-01 ENCOUNTER — PATIENT MESSAGE (OUTPATIENT)
Dept: ADMINISTRATIVE | Facility: HOSPITAL | Age: 61
End: 2021-04-01

## 2021-04-01 ENCOUNTER — PATIENT OUTREACH (OUTPATIENT)
Dept: ADMINISTRATIVE | Facility: HOSPITAL | Age: 61
End: 2021-04-01

## 2021-04-01 DIAGNOSIS — Z79.4 CONTROLLED TYPE 2 DIABETES MELLITUS WITH COMPLICATION, WITH LONG-TERM CURRENT USE OF INSULIN: Primary | ICD-10-CM

## 2021-04-01 DIAGNOSIS — M25.511 ACUTE PAIN OF RIGHT SHOULDER: ICD-10-CM

## 2021-04-01 DIAGNOSIS — M19.011 PRIMARY OSTEOARTHRITIS OF RIGHT SHOULDER: ICD-10-CM

## 2021-04-01 DIAGNOSIS — M11.00 HYDROXYAPATITE ARTHROPATHY: ICD-10-CM

## 2021-04-01 DIAGNOSIS — E11.8 CONTROLLED TYPE 2 DIABETES MELLITUS WITH COMPLICATION, WITH LONG-TERM CURRENT USE OF INSULIN: Primary | ICD-10-CM

## 2021-04-01 DIAGNOSIS — R53.1 WEAKNESS: ICD-10-CM

## 2021-04-01 PROCEDURE — 97110 THERAPEUTIC EXERCISES: CPT | Mod: PO

## 2021-04-01 PROCEDURE — 97161 PT EVAL LOW COMPLEX 20 MIN: CPT | Mod: PO

## 2021-04-05 ENCOUNTER — PATIENT MESSAGE (OUTPATIENT)
Dept: ADMINISTRATIVE | Facility: HOSPITAL | Age: 61
End: 2021-04-05

## 2021-04-06 ENCOUNTER — TELEPHONE (OUTPATIENT)
Dept: FAMILY MEDICINE | Facility: CLINIC | Age: 61
End: 2021-04-06

## 2021-04-06 DIAGNOSIS — Z79.4 TYPE 2 DIABETES MELLITUS WITH HYPERGLYCEMIA, WITH LONG-TERM CURRENT USE OF INSULIN: Primary | ICD-10-CM

## 2021-04-06 DIAGNOSIS — E11.65 TYPE 2 DIABETES MELLITUS WITH HYPERGLYCEMIA, WITH LONG-TERM CURRENT USE OF INSULIN: Primary | ICD-10-CM

## 2021-04-07 ENCOUNTER — PATIENT OUTREACH (OUTPATIENT)
Dept: ADMINISTRATIVE | Facility: OTHER | Age: 61
End: 2021-04-07

## 2021-04-07 ENCOUNTER — LAB VISIT (OUTPATIENT)
Dept: LAB | Facility: HOSPITAL | Age: 61
End: 2021-04-07
Attending: FAMILY MEDICINE
Payer: MEDICARE

## 2021-04-07 DIAGNOSIS — E11.65 TYPE 2 DIABETES MELLITUS WITH HYPERGLYCEMIA, WITH LONG-TERM CURRENT USE OF INSULIN: ICD-10-CM

## 2021-04-07 DIAGNOSIS — Z79.4 TYPE 2 DIABETES MELLITUS WITH HYPERGLYCEMIA, WITH LONG-TERM CURRENT USE OF INSULIN: ICD-10-CM

## 2021-04-07 LAB
ESTIMATED AVG GLUCOSE: 146 MG/DL (ref 68–131)
HBA1C MFR BLD: 6.7 % (ref 4–5.6)

## 2021-04-07 PROCEDURE — 36415 COLL VENOUS BLD VENIPUNCTURE: CPT | Mod: PO | Performed by: FAMILY MEDICINE

## 2021-04-07 PROCEDURE — 83036 HEMOGLOBIN GLYCOSYLATED A1C: CPT | Performed by: FAMILY MEDICINE

## 2021-04-07 PROCEDURE — 80053 COMPREHEN METABOLIC PANEL: CPT | Performed by: FAMILY MEDICINE

## 2021-04-07 PROCEDURE — 80061 LIPID PANEL: CPT | Performed by: FAMILY MEDICINE

## 2021-04-08 LAB
ALBUMIN SERPL BCP-MCNC: 3.5 G/DL (ref 3.5–5.2)
ALP SERPL-CCNC: 72 U/L (ref 55–135)
ALT SERPL W/O P-5'-P-CCNC: 12 U/L (ref 10–44)
ANION GAP SERPL CALC-SCNC: 8 MMOL/L (ref 8–16)
AST SERPL-CCNC: 15 U/L (ref 10–40)
BILIRUB SERPL-MCNC: 0.2 MG/DL (ref 0.1–1)
BUN SERPL-MCNC: 24 MG/DL (ref 8–23)
CALCIUM SERPL-MCNC: 8.5 MG/DL (ref 8.7–10.5)
CHLORIDE SERPL-SCNC: 110 MMOL/L (ref 95–110)
CHOLEST SERPL-MCNC: 210 MG/DL (ref 120–199)
CHOLEST/HDLC SERPL: 4.8 {RATIO} (ref 2–5)
CO2 SERPL-SCNC: 22 MMOL/L (ref 23–29)
CREAT SERPL-MCNC: 0.6 MG/DL (ref 0.5–1.4)
EST. GFR  (AFRICAN AMERICAN): >60 ML/MIN/1.73 M^2
EST. GFR  (NON AFRICAN AMERICAN): >60 ML/MIN/1.73 M^2
GLUCOSE SERPL-MCNC: 82 MG/DL (ref 70–110)
HDLC SERPL-MCNC: 44 MG/DL (ref 40–75)
HDLC SERPL: 21 % (ref 20–50)
LDLC SERPL CALC-MCNC: 125.2 MG/DL (ref 63–159)
NONHDLC SERPL-MCNC: 166 MG/DL
POTASSIUM SERPL-SCNC: 4.1 MMOL/L (ref 3.5–5.1)
PROT SERPL-MCNC: 6.7 G/DL (ref 6–8.4)
SODIUM SERPL-SCNC: 140 MMOL/L (ref 136–145)
TRIGL SERPL-MCNC: 204 MG/DL (ref 30–150)

## 2021-04-09 ENCOUNTER — TELEPHONE (OUTPATIENT)
Dept: NEUROLOGY | Facility: CLINIC | Age: 61
End: 2021-04-09

## 2021-04-12 ENCOUNTER — PATIENT MESSAGE (OUTPATIENT)
Dept: NEUROLOGY | Facility: CLINIC | Age: 61
End: 2021-04-12

## 2021-04-13 ENCOUNTER — OFFICE VISIT (OUTPATIENT)
Dept: OBSTETRICS AND GYNECOLOGY | Facility: CLINIC | Age: 61
End: 2021-04-13
Payer: MEDICARE

## 2021-04-13 VITALS
BODY MASS INDEX: 25.1 KG/M2 | WEIGHT: 136.38 LBS | DIASTOLIC BLOOD PRESSURE: 72 MMHG | HEIGHT: 62 IN | SYSTOLIC BLOOD PRESSURE: 112 MMHG

## 2021-04-13 DIAGNOSIS — Z01.419 WELL WOMAN EXAM WITH ROUTINE GYNECOLOGICAL EXAM: ICD-10-CM

## 2021-04-13 DIAGNOSIS — N89.8 VAGINAL DISCHARGE: ICD-10-CM

## 2021-04-13 DIAGNOSIS — Z12.4 ROUTINE PAPANICOLAOU SMEAR: Primary | ICD-10-CM

## 2021-04-13 PROCEDURE — 88175 CYTOPATH C/V AUTO FLUID REDO: CPT | Performed by: OBSTETRICS & GYNECOLOGY

## 2021-04-13 PROCEDURE — 3008F PR BODY MASS INDEX (BMI) DOCUMENTED: ICD-10-PCS | Mod: CPTII,S$GLB,, | Performed by: OBSTETRICS & GYNECOLOGY

## 2021-04-13 PROCEDURE — G0101 PR CA SCREEN;PELVIC/BREAST EXAM: ICD-10-PCS | Mod: GZ,S$GLB,, | Performed by: OBSTETRICS & GYNECOLOGY

## 2021-04-13 PROCEDURE — 1126F AMNT PAIN NOTED NONE PRSNT: CPT | Mod: S$GLB,,, | Performed by: OBSTETRICS & GYNECOLOGY

## 2021-04-13 PROCEDURE — 3008F BODY MASS INDEX DOCD: CPT | Mod: CPTII,S$GLB,, | Performed by: OBSTETRICS & GYNECOLOGY

## 2021-04-13 PROCEDURE — 87481 CANDIDA DNA AMP PROBE: CPT | Mod: 59 | Performed by: OBSTETRICS & GYNECOLOGY

## 2021-04-13 PROCEDURE — G0101 CA SCREEN;PELVIC/BREAST EXAM: HCPCS | Mod: GZ,S$GLB,, | Performed by: OBSTETRICS & GYNECOLOGY

## 2021-04-13 PROCEDURE — 99999 PR PBB SHADOW E&M-EST. PATIENT-LVL IV: CPT | Mod: PBBFAC,,, | Performed by: OBSTETRICS & GYNECOLOGY

## 2021-04-13 PROCEDURE — 1126F PR PAIN SEVERITY QUANTIFIED, NO PAIN PRESENT: ICD-10-PCS | Mod: S$GLB,,, | Performed by: OBSTETRICS & GYNECOLOGY

## 2021-04-13 PROCEDURE — 99999 PR PBB SHADOW E&M-EST. PATIENT-LVL IV: ICD-10-PCS | Mod: PBBFAC,,, | Performed by: OBSTETRICS & GYNECOLOGY

## 2021-04-15 ENCOUNTER — TELEPHONE (OUTPATIENT)
Dept: OBSTETRICS AND GYNECOLOGY | Facility: CLINIC | Age: 61
End: 2021-04-15

## 2021-04-15 ENCOUNTER — OFFICE VISIT (OUTPATIENT)
Dept: FAMILY MEDICINE | Facility: CLINIC | Age: 61
End: 2021-04-15
Payer: MEDICARE

## 2021-04-15 VITALS
DIASTOLIC BLOOD PRESSURE: 60 MMHG | HEIGHT: 62 IN | HEART RATE: 87 BPM | OXYGEN SATURATION: 97 % | BODY MASS INDEX: 24.75 KG/M2 | SYSTOLIC BLOOD PRESSURE: 120 MMHG | WEIGHT: 134.5 LBS

## 2021-04-15 DIAGNOSIS — M25.511 CHRONIC RIGHT SHOULDER PAIN: ICD-10-CM

## 2021-04-15 DIAGNOSIS — M81.0 SENILE OSTEOPOROSIS: ICD-10-CM

## 2021-04-15 DIAGNOSIS — E78.5 DYSLIPIDEMIA: ICD-10-CM

## 2021-04-15 DIAGNOSIS — G89.29 CHRONIC RIGHT SHOULDER PAIN: ICD-10-CM

## 2021-04-15 DIAGNOSIS — E11.65 TYPE 2 DIABETES MELLITUS WITH HYPERGLYCEMIA, WITHOUT LONG-TERM CURRENT USE OF INSULIN: Primary | ICD-10-CM

## 2021-04-15 DIAGNOSIS — E83.51 HYPOCALCEMIA: ICD-10-CM

## 2021-04-15 LAB
BACTERIAL VAGINOSIS DNA: NEGATIVE
CANDIDA GLABRATA DNA: NEGATIVE
CANDIDA KRUSEI DNA: NEGATIVE
CANDIDA RRNA VAG QL PROBE: NEGATIVE
T VAGINALIS RRNA GENITAL QL PROBE: NEGATIVE

## 2021-04-15 PROCEDURE — 99214 OFFICE O/P EST MOD 30 MIN: CPT | Mod: S$GLB,,, | Performed by: FAMILY MEDICINE

## 2021-04-15 PROCEDURE — 1125F AMNT PAIN NOTED PAIN PRSNT: CPT | Mod: S$GLB,,, | Performed by: FAMILY MEDICINE

## 2021-04-15 PROCEDURE — 99499 RISK ADDL DX/OHS AUDIT: ICD-10-PCS | Mod: S$GLB,,, | Performed by: FAMILY MEDICINE

## 2021-04-15 PROCEDURE — 99214 PR OFFICE/OUTPT VISIT, EST, LEVL IV, 30-39 MIN: ICD-10-PCS | Mod: S$GLB,,, | Performed by: FAMILY MEDICINE

## 2021-04-15 PROCEDURE — 99499 UNLISTED E&M SERVICE: CPT | Mod: S$GLB,,, | Performed by: FAMILY MEDICINE

## 2021-04-15 PROCEDURE — 1125F PR PAIN SEVERITY QUANTIFIED, PAIN PRESENT: ICD-10-PCS | Mod: S$GLB,,, | Performed by: FAMILY MEDICINE

## 2021-04-15 PROCEDURE — 99999 PR PBB SHADOW E&M-EST. PATIENT-LVL V: CPT | Mod: PBBFAC,,, | Performed by: FAMILY MEDICINE

## 2021-04-15 PROCEDURE — 99999 PR PBB SHADOW E&M-EST. PATIENT-LVL V: ICD-10-PCS | Mod: PBBFAC,,, | Performed by: FAMILY MEDICINE

## 2021-04-15 PROCEDURE — 3044F PR MOST RECENT HEMOGLOBIN A1C LEVEL <7.0%: ICD-10-PCS | Mod: CPTII,S$GLB,, | Performed by: FAMILY MEDICINE

## 2021-04-15 PROCEDURE — 3008F BODY MASS INDEX DOCD: CPT | Mod: CPTII,S$GLB,, | Performed by: FAMILY MEDICINE

## 2021-04-15 PROCEDURE — 3008F PR BODY MASS INDEX (BMI) DOCUMENTED: ICD-10-PCS | Mod: CPTII,S$GLB,, | Performed by: FAMILY MEDICINE

## 2021-04-15 PROCEDURE — 3044F HG A1C LEVEL LT 7.0%: CPT | Mod: CPTII,S$GLB,, | Performed by: FAMILY MEDICINE

## 2021-04-15 RX ORDER — CLOTRIMAZOLE AND BETAMETHASONE DIPROPIONATE 10; .64 MG/G; MG/G
CREAM TOPICAL 2 TIMES DAILY
Qty: 1 TUBE | Refills: 2 | Status: SHIPPED | OUTPATIENT
Start: 2021-04-15 | End: 2022-12-15

## 2021-04-15 RX ORDER — CYCLOBENZAPRINE HCL 5 MG
5 TABLET ORAL 3 TIMES DAILY PRN
Qty: 30 TABLET | Refills: 0 | Status: SHIPPED | OUTPATIENT
Start: 2021-04-15 | End: 2021-04-25

## 2021-04-16 RX ORDER — FLUTICASONE PROPIONATE 50 MCG
2 SPRAY, SUSPENSION (ML) NASAL DAILY
Qty: 16 G | Refills: 1 | Status: SHIPPED | OUTPATIENT
Start: 2021-04-16 | End: 2021-07-15

## 2021-04-19 LAB
FINAL PATHOLOGIC DIAGNOSIS: NORMAL
Lab: NORMAL

## 2021-04-23 ENCOUNTER — OFFICE VISIT (OUTPATIENT)
Dept: ORTHOPEDICS | Facility: CLINIC | Age: 61
End: 2021-04-23
Payer: MEDICARE

## 2021-04-23 VITALS — BODY MASS INDEX: 24.66 KG/M2 | WEIGHT: 134 LBS | HEIGHT: 62 IN

## 2021-04-23 DIAGNOSIS — M25.511 RIGHT SHOULDER PAIN: ICD-10-CM

## 2021-04-23 DIAGNOSIS — M75.31 CALCIFIC TENDINITIS OF RIGHT SHOULDER: Primary | ICD-10-CM

## 2021-04-23 DIAGNOSIS — M25.511 ACUTE PAIN OF RIGHT SHOULDER: ICD-10-CM

## 2021-04-23 DIAGNOSIS — M19.011 PRIMARY OSTEOARTHRITIS OF RIGHT SHOULDER: ICD-10-CM

## 2021-04-23 DIAGNOSIS — M11.00 HYDROXYAPATITE ARTHROPATHY: ICD-10-CM

## 2021-04-23 DIAGNOSIS — S40.011A CONTUSION OF RIGHT SHOULDER, INITIAL ENCOUNTER: ICD-10-CM

## 2021-04-23 PROCEDURE — 3008F PR BODY MASS INDEX (BMI) DOCUMENTED: ICD-10-PCS | Mod: CPTII,S$GLB,, | Performed by: PHYSICIAN ASSISTANT

## 2021-04-23 PROCEDURE — 20610 PR DRAIN/INJECT LARGE JOINT/BURSA: ICD-10-PCS | Mod: RT,S$GLB,, | Performed by: PHYSICIAN ASSISTANT

## 2021-04-23 PROCEDURE — 99202 OFFICE O/P NEW SF 15 MIN: CPT | Mod: 25,S$GLB,, | Performed by: PHYSICIAN ASSISTANT

## 2021-04-23 PROCEDURE — 20610 DRAIN/INJ JOINT/BURSA W/O US: CPT | Mod: RT,S$GLB,, | Performed by: PHYSICIAN ASSISTANT

## 2021-04-23 PROCEDURE — 99202 PR OFFICE/OUTPT VISIT, NEW, LEVL II, 15-29 MIN: ICD-10-PCS | Mod: 25,S$GLB,, | Performed by: PHYSICIAN ASSISTANT

## 2021-04-23 PROCEDURE — 99999 PR PBB SHADOW E&M-EST. PATIENT-LVL V: CPT | Mod: PBBFAC,,, | Performed by: PHYSICIAN ASSISTANT

## 2021-04-23 PROCEDURE — 1125F AMNT PAIN NOTED PAIN PRSNT: CPT | Mod: S$GLB,,, | Performed by: PHYSICIAN ASSISTANT

## 2021-04-23 PROCEDURE — 3008F BODY MASS INDEX DOCD: CPT | Mod: CPTII,S$GLB,, | Performed by: PHYSICIAN ASSISTANT

## 2021-04-23 PROCEDURE — 99999 PR PBB SHADOW E&M-EST. PATIENT-LVL V: ICD-10-PCS | Mod: PBBFAC,,, | Performed by: PHYSICIAN ASSISTANT

## 2021-04-23 PROCEDURE — 1125F PR PAIN SEVERITY QUANTIFIED, PAIN PRESENT: ICD-10-PCS | Mod: S$GLB,,, | Performed by: PHYSICIAN ASSISTANT

## 2021-04-23 RX ORDER — TRIAMCINOLONE ACETONIDE 40 MG/ML
40 INJECTION, SUSPENSION INTRA-ARTICULAR; INTRAMUSCULAR
Status: DISCONTINUED | OUTPATIENT
Start: 2021-04-23 | End: 2021-04-23 | Stop reason: HOSPADM

## 2021-04-23 RX ORDER — TRIAMCINOLONE ACETONIDE 40 MG/ML
40 INJECTION, SUSPENSION INTRA-ARTICULAR; INTRAMUSCULAR
Status: COMPLETED | OUTPATIENT
Start: 2021-04-23 | End: 2021-04-23

## 2021-04-23 RX ADMIN — TRIAMCINOLONE ACETONIDE 40 MG: 40 INJECTION, SUSPENSION INTRA-ARTICULAR; INTRAMUSCULAR at 01:04

## 2021-04-28 ENCOUNTER — CLINICAL SUPPORT (OUTPATIENT)
Dept: REHABILITATION | Facility: HOSPITAL | Age: 61
End: 2021-04-28
Attending: INTERNAL MEDICINE
Payer: MEDICARE

## 2021-04-28 DIAGNOSIS — M25.511 ACUTE PAIN OF RIGHT SHOULDER: Primary | ICD-10-CM

## 2021-04-28 DIAGNOSIS — R53.1 WEAKNESS: ICD-10-CM

## 2021-04-28 PROCEDURE — 97110 THERAPEUTIC EXERCISES: CPT | Mod: PO,CQ

## 2021-04-30 ENCOUNTER — DOCUMENTATION ONLY (OUTPATIENT)
Dept: REHABILITATION | Facility: HOSPITAL | Age: 61
End: 2021-04-30

## 2021-05-04 ENCOUNTER — DOCUMENTATION ONLY (OUTPATIENT)
Dept: REHABILITATION | Facility: HOSPITAL | Age: 61
End: 2021-05-04

## 2021-05-12 ENCOUNTER — DOCUMENTATION ONLY (OUTPATIENT)
Dept: REHABILITATION | Facility: HOSPITAL | Age: 61
End: 2021-05-12

## 2021-05-15 ENCOUNTER — TELEPHONE (OUTPATIENT)
Dept: FAMILY MEDICINE | Facility: CLINIC | Age: 61
End: 2021-05-15

## 2021-05-15 RX ORDER — ERGOCALCIFEROL 1.25 MG/1
50000 CAPSULE ORAL
Qty: 12 CAPSULE | Refills: 3 | Status: SHIPPED | OUTPATIENT
Start: 2021-05-15 | End: 2022-05-10

## 2021-05-19 ENCOUNTER — OFFICE VISIT (OUTPATIENT)
Dept: FAMILY MEDICINE | Facility: CLINIC | Age: 61
End: 2021-05-19
Payer: MEDICARE

## 2021-05-19 VITALS
WEIGHT: 132.94 LBS | HEIGHT: 62 IN | DIASTOLIC BLOOD PRESSURE: 70 MMHG | SYSTOLIC BLOOD PRESSURE: 110 MMHG | OXYGEN SATURATION: 97 % | BODY MASS INDEX: 24.46 KG/M2 | HEART RATE: 92 BPM

## 2021-05-19 DIAGNOSIS — R22.9 LUMP OF SKIN: ICD-10-CM

## 2021-05-19 DIAGNOSIS — F32.A DEPRESSION: ICD-10-CM

## 2021-05-19 DIAGNOSIS — C50.912 BREAST CANCER METASTASIZED TO AXILLARY LYMPH NODE, LEFT: ICD-10-CM

## 2021-05-19 DIAGNOSIS — R41.89 COGNITIVE DEFICITS: Primary | ICD-10-CM

## 2021-05-19 DIAGNOSIS — G43.909 MIGRAINE SYNDROME: ICD-10-CM

## 2021-05-19 DIAGNOSIS — F33.1 MODERATE EPISODE OF RECURRENT MAJOR DEPRESSIVE DISORDER: ICD-10-CM

## 2021-05-19 DIAGNOSIS — Z79.4 CONTROLLED TYPE 2 DIABETES MELLITUS WITH COMPLICATION, WITH LONG-TERM CURRENT USE OF INSULIN: ICD-10-CM

## 2021-05-19 DIAGNOSIS — C77.3 BREAST CANCER METASTASIZED TO AXILLARY LYMPH NODE, LEFT: ICD-10-CM

## 2021-05-19 DIAGNOSIS — Z23 NEED FOR VACCINATION AGAINST STREPTOCOCCUS PNEUMONIAE: ICD-10-CM

## 2021-05-19 DIAGNOSIS — E11.8 CONTROLLED TYPE 2 DIABETES MELLITUS WITH COMPLICATION, WITH LONG-TERM CURRENT USE OF INSULIN: ICD-10-CM

## 2021-05-19 PROCEDURE — 3008F PR BODY MASS INDEX (BMI) DOCUMENTED: ICD-10-PCS | Mod: CPTII,S$GLB,, | Performed by: FAMILY MEDICINE

## 2021-05-19 PROCEDURE — 3008F BODY MASS INDEX DOCD: CPT | Mod: CPTII,S$GLB,, | Performed by: FAMILY MEDICINE

## 2021-05-19 PROCEDURE — G0009 ADMIN PNEUMOCOCCAL VACCINE: HCPCS | Mod: S$GLB,,, | Performed by: FAMILY MEDICINE

## 2021-05-19 PROCEDURE — 1125F AMNT PAIN NOTED PAIN PRSNT: CPT | Mod: S$GLB,,, | Performed by: FAMILY MEDICINE

## 2021-05-19 PROCEDURE — 1125F PR PAIN SEVERITY QUANTIFIED, PAIN PRESENT: ICD-10-PCS | Mod: S$GLB,,, | Performed by: FAMILY MEDICINE

## 2021-05-19 PROCEDURE — 99214 OFFICE O/P EST MOD 30 MIN: CPT | Mod: 25,S$GLB,, | Performed by: FAMILY MEDICINE

## 2021-05-19 PROCEDURE — 90732 PNEUMOCOCCAL POLYSACCHARIDE VACCINE 23-VALENT =>2YO SQ IM: ICD-10-PCS | Mod: S$GLB,,, | Performed by: FAMILY MEDICINE

## 2021-05-19 PROCEDURE — 99999 PR PBB SHADOW E&M-EST. PATIENT-LVL V: CPT | Mod: PBBFAC,,, | Performed by: FAMILY MEDICINE

## 2021-05-19 PROCEDURE — 99214 PR OFFICE/OUTPT VISIT, EST, LEVL IV, 30-39 MIN: ICD-10-PCS | Mod: 25,S$GLB,, | Performed by: FAMILY MEDICINE

## 2021-05-19 PROCEDURE — 90732 PPSV23 VACC 2 YRS+ SUBQ/IM: CPT | Mod: S$GLB,,, | Performed by: FAMILY MEDICINE

## 2021-05-19 PROCEDURE — 99999 PR PBB SHADOW E&M-EST. PATIENT-LVL V: ICD-10-PCS | Mod: PBBFAC,,, | Performed by: FAMILY MEDICINE

## 2021-05-19 PROCEDURE — 99499 UNLISTED E&M SERVICE: CPT | Mod: S$GLB,,, | Performed by: FAMILY MEDICINE

## 2021-05-19 PROCEDURE — 99499 RISK ADDL DX/OHS AUDIT: ICD-10-PCS | Mod: S$GLB,,, | Performed by: FAMILY MEDICINE

## 2021-05-19 PROCEDURE — G0009 PNEUMOCOCCAL POLYSACCHARIDE VACCINE 23-VALENT =>2YO SQ IM: ICD-10-PCS | Mod: S$GLB,,, | Performed by: FAMILY MEDICINE

## 2021-05-19 RX ORDER — ESCITALOPRAM OXALATE 10 MG/1
10 TABLET ORAL DAILY
Qty: 90 TABLET | Refills: 3 | Status: SHIPPED | OUTPATIENT
Start: 2021-05-19 | End: 2021-08-13 | Stop reason: SDUPTHER

## 2021-05-20 ENCOUNTER — TELEPHONE (OUTPATIENT)
Dept: ADMINISTRATIVE | Facility: OTHER | Age: 61
End: 2021-05-20

## 2021-05-24 ENCOUNTER — PATIENT OUTREACH (OUTPATIENT)
Dept: ADMINISTRATIVE | Facility: OTHER | Age: 61
End: 2021-05-24

## 2021-05-25 ENCOUNTER — OFFICE VISIT (OUTPATIENT)
Dept: SURGERY | Facility: CLINIC | Age: 61
End: 2021-05-25
Payer: MEDICARE

## 2021-05-25 VITALS
DIASTOLIC BLOOD PRESSURE: 77 MMHG | BODY MASS INDEX: 24.51 KG/M2 | SYSTOLIC BLOOD PRESSURE: 126 MMHG | WEIGHT: 133.19 LBS | HEIGHT: 62 IN | HEART RATE: 79 BPM

## 2021-05-25 DIAGNOSIS — R22.9 LUMP OF SKIN: ICD-10-CM

## 2021-05-25 PROCEDURE — 1125F PR PAIN SEVERITY QUANTIFIED, PAIN PRESENT: ICD-10-PCS | Mod: S$GLB,,, | Performed by: SURGERY

## 2021-05-25 PROCEDURE — 3008F PR BODY MASS INDEX (BMI) DOCUMENTED: ICD-10-PCS | Mod: CPTII,S$GLB,, | Performed by: SURGERY

## 2021-05-25 PROCEDURE — 99203 OFFICE O/P NEW LOW 30 MIN: CPT | Mod: S$GLB,,, | Performed by: SURGERY

## 2021-05-25 PROCEDURE — 99999 PR PBB SHADOW E&M-EST. PATIENT-LVL III: ICD-10-PCS | Mod: PBBFAC,,, | Performed by: SURGERY

## 2021-05-25 PROCEDURE — 3008F BODY MASS INDEX DOCD: CPT | Mod: CPTII,S$GLB,, | Performed by: SURGERY

## 2021-05-25 PROCEDURE — 99203 PR OFFICE/OUTPT VISIT, NEW, LEVL III, 30-44 MIN: ICD-10-PCS | Mod: S$GLB,,, | Performed by: SURGERY

## 2021-05-25 PROCEDURE — 1125F AMNT PAIN NOTED PAIN PRSNT: CPT | Mod: S$GLB,,, | Performed by: SURGERY

## 2021-05-25 PROCEDURE — 99999 PR PBB SHADOW E&M-EST. PATIENT-LVL III: CPT | Mod: PBBFAC,,, | Performed by: SURGERY

## 2021-06-28 ENCOUNTER — PATIENT OUTREACH (OUTPATIENT)
Dept: ADMINISTRATIVE | Facility: OTHER | Age: 61
End: 2021-06-28

## 2021-07-21 ENCOUNTER — TELEPHONE (OUTPATIENT)
Dept: ADMINISTRATIVE | Facility: OTHER | Age: 61
End: 2021-07-21

## 2021-07-30 ENCOUNTER — TELEPHONE (OUTPATIENT)
Dept: FAMILY MEDICINE | Facility: CLINIC | Age: 61
End: 2021-07-30

## 2021-07-30 ENCOUNTER — PATIENT OUTREACH (OUTPATIENT)
Dept: ADMINISTRATIVE | Facility: HOSPITAL | Age: 61
End: 2021-07-30

## 2021-07-30 DIAGNOSIS — F41.1 ANXIETY STATE: Primary | ICD-10-CM

## 2021-07-30 DIAGNOSIS — F41.9 ANXIETY: ICD-10-CM

## 2021-08-01 RX ORDER — ALPRAZOLAM 0.25 MG/1
0.25 TABLET ORAL NIGHTLY PRN
Qty: 40 TABLET | Refills: 0 | OUTPATIENT
Start: 2021-08-01

## 2021-08-04 ENCOUNTER — PATIENT MESSAGE (OUTPATIENT)
Dept: ADMINISTRATIVE | Facility: HOSPITAL | Age: 61
End: 2021-08-04

## 2021-08-09 ENCOUNTER — PATIENT OUTREACH (OUTPATIENT)
Dept: ADMINISTRATIVE | Facility: OTHER | Age: 61
End: 2021-08-09

## 2021-08-10 ENCOUNTER — OFFICE VISIT (OUTPATIENT)
Dept: NEUROLOGY | Facility: CLINIC | Age: 61
End: 2021-08-10
Payer: MEDICARE

## 2021-08-10 ENCOUNTER — LAB VISIT (OUTPATIENT)
Dept: LAB | Facility: HOSPITAL | Age: 61
End: 2021-08-10
Attending: FAMILY MEDICINE
Payer: MEDICARE

## 2021-08-10 VITALS
HEIGHT: 62 IN | DIASTOLIC BLOOD PRESSURE: 70 MMHG | BODY MASS INDEX: 24.63 KG/M2 | HEART RATE: 84 BPM | WEIGHT: 133.81 LBS | SYSTOLIC BLOOD PRESSURE: 123 MMHG

## 2021-08-10 DIAGNOSIS — G43.711 CHRONIC MIGRAINE WITHOUT AURA, INTRACTABLE, WITH STATUS MIGRAINOSUS: Primary | ICD-10-CM

## 2021-08-10 DIAGNOSIS — G44.40 MEDICATION OVERUSE HEADACHE: ICD-10-CM

## 2021-08-10 DIAGNOSIS — R41.89 COGNITIVE DEFICITS: ICD-10-CM

## 2021-08-10 DIAGNOSIS — E11.65 TYPE 2 DIABETES MELLITUS WITH HYPERGLYCEMIA, WITH LONG-TERM CURRENT USE OF INSULIN: ICD-10-CM

## 2021-08-10 DIAGNOSIS — E11.9 TYPE 2 DIABETES MELLITUS WITHOUT COMPLICATION: ICD-10-CM

## 2021-08-10 DIAGNOSIS — G43.909 MIGRAINE SYNDROME: ICD-10-CM

## 2021-08-10 DIAGNOSIS — Z79.4 TYPE 2 DIABETES MELLITUS WITH HYPERGLYCEMIA, WITH LONG-TERM CURRENT USE OF INSULIN: ICD-10-CM

## 2021-08-10 DIAGNOSIS — G62.9 NEUROPATHY: ICD-10-CM

## 2021-08-10 LAB
ALBUMIN SERPL BCP-MCNC: 3.4 G/DL (ref 3.5–5.2)
ALP SERPL-CCNC: 58 U/L (ref 55–135)
ALT SERPL W/O P-5'-P-CCNC: 12 U/L (ref 10–44)
ANION GAP SERPL CALC-SCNC: 11 MMOL/L (ref 8–16)
AST SERPL-CCNC: 15 U/L (ref 10–40)
BILIRUB SERPL-MCNC: 0.3 MG/DL (ref 0.1–1)
BUN SERPL-MCNC: 11 MG/DL (ref 8–23)
CALCIUM SERPL-MCNC: 9.3 MG/DL (ref 8.7–10.5)
CHLORIDE SERPL-SCNC: 105 MMOL/L (ref 95–110)
CHOLEST SERPL-MCNC: 175 MG/DL (ref 120–199)
CHOLEST/HDLC SERPL: 3.5 {RATIO} (ref 2–5)
CO2 SERPL-SCNC: 26 MMOL/L (ref 23–29)
CREAT SERPL-MCNC: 0.6 MG/DL (ref 0.5–1.4)
EST. GFR  (AFRICAN AMERICAN): >60 ML/MIN/1.73 M^2
EST. GFR  (NON AFRICAN AMERICAN): >60 ML/MIN/1.73 M^2
ESTIMATED AVG GLUCOSE: 151 MG/DL (ref 68–131)
ESTIMATED AVG GLUCOSE: 151 MG/DL (ref 68–131)
GLUCOSE SERPL-MCNC: 105 MG/DL (ref 70–110)
HBA1C MFR BLD: 6.9 % (ref 4–5.6)
HBA1C MFR BLD: 6.9 % (ref 4–5.6)
HDLC SERPL-MCNC: 50 MG/DL (ref 40–75)
HDLC SERPL: 28.6 % (ref 20–50)
LDLC SERPL CALC-MCNC: 99.2 MG/DL (ref 63–159)
NONHDLC SERPL-MCNC: 125 MG/DL
POTASSIUM SERPL-SCNC: 4.3 MMOL/L (ref 3.5–5.1)
PROT SERPL-MCNC: 6.6 G/DL (ref 6–8.4)
SODIUM SERPL-SCNC: 142 MMOL/L (ref 136–145)
TRIGL SERPL-MCNC: 129 MG/DL (ref 30–150)

## 2021-08-10 PROCEDURE — 3078F PR MOST RECENT DIASTOLIC BLOOD PRESSURE < 80 MM HG: ICD-10-PCS | Mod: CPTII,S$GLB,, | Performed by: PSYCHIATRY & NEUROLOGY

## 2021-08-10 PROCEDURE — 99999 PR PBB SHADOW E&M-EST. PATIENT-LVL V: CPT | Mod: PBBFAC,,, | Performed by: PSYCHIATRY & NEUROLOGY

## 2021-08-10 PROCEDURE — 99204 PR OFFICE/OUTPT VISIT, NEW, LEVL IV, 45-59 MIN: ICD-10-PCS | Mod: S$GLB,,, | Performed by: PSYCHIATRY & NEUROLOGY

## 2021-08-10 PROCEDURE — 3044F HG A1C LEVEL LT 7.0%: CPT | Mod: CPTII,S$GLB,, | Performed by: PSYCHIATRY & NEUROLOGY

## 2021-08-10 PROCEDURE — 3008F BODY MASS INDEX DOCD: CPT | Mod: CPTII,S$GLB,, | Performed by: PSYCHIATRY & NEUROLOGY

## 2021-08-10 PROCEDURE — 80061 LIPID PANEL: CPT | Performed by: FAMILY MEDICINE

## 2021-08-10 PROCEDURE — 99499 RISK ADDL DX/OHS AUDIT: ICD-10-PCS | Mod: HCNC,S$GLB,, | Performed by: PSYCHIATRY & NEUROLOGY

## 2021-08-10 PROCEDURE — 99204 OFFICE O/P NEW MOD 45 MIN: CPT | Mod: S$GLB,,, | Performed by: PSYCHIATRY & NEUROLOGY

## 2021-08-10 PROCEDURE — 83036 HEMOGLOBIN GLYCOSYLATED A1C: CPT | Performed by: FAMILY MEDICINE

## 2021-08-10 PROCEDURE — 3074F SYST BP LT 130 MM HG: CPT | Mod: CPTII,S$GLB,, | Performed by: PSYCHIATRY & NEUROLOGY

## 2021-08-10 PROCEDURE — 3074F PR MOST RECENT SYSTOLIC BLOOD PRESSURE < 130 MM HG: ICD-10-PCS | Mod: CPTII,S$GLB,, | Performed by: PSYCHIATRY & NEUROLOGY

## 2021-08-10 PROCEDURE — 3078F DIAST BP <80 MM HG: CPT | Mod: CPTII,S$GLB,, | Performed by: PSYCHIATRY & NEUROLOGY

## 2021-08-10 PROCEDURE — 1125F AMNT PAIN NOTED PAIN PRSNT: CPT | Mod: CPTII,S$GLB,, | Performed by: PSYCHIATRY & NEUROLOGY

## 2021-08-10 PROCEDURE — 1125F PR PAIN SEVERITY QUANTIFIED, PAIN PRESENT: ICD-10-PCS | Mod: CPTII,S$GLB,, | Performed by: PSYCHIATRY & NEUROLOGY

## 2021-08-10 PROCEDURE — 99999 PR PBB SHADOW E&M-EST. PATIENT-LVL V: ICD-10-PCS | Mod: PBBFAC,,, | Performed by: PSYCHIATRY & NEUROLOGY

## 2021-08-10 PROCEDURE — 80053 COMPREHEN METABOLIC PANEL: CPT | Performed by: FAMILY MEDICINE

## 2021-08-10 PROCEDURE — 36415 COLL VENOUS BLD VENIPUNCTURE: CPT | Mod: PO | Performed by: FAMILY MEDICINE

## 2021-08-10 PROCEDURE — 3044F PR MOST RECENT HEMOGLOBIN A1C LEVEL <7.0%: ICD-10-PCS | Mod: CPTII,S$GLB,, | Performed by: PSYCHIATRY & NEUROLOGY

## 2021-08-10 PROCEDURE — 1160F RVW MEDS BY RX/DR IN RCRD: CPT | Mod: CPTII,S$GLB,, | Performed by: PSYCHIATRY & NEUROLOGY

## 2021-08-10 PROCEDURE — 1159F MED LIST DOCD IN RCRD: CPT | Mod: CPTII,S$GLB,, | Performed by: PSYCHIATRY & NEUROLOGY

## 2021-08-10 PROCEDURE — 3008F PR BODY MASS INDEX (BMI) DOCUMENTED: ICD-10-PCS | Mod: CPTII,S$GLB,, | Performed by: PSYCHIATRY & NEUROLOGY

## 2021-08-10 PROCEDURE — 99499 UNLISTED E&M SERVICE: CPT | Mod: HCNC,S$GLB,, | Performed by: PSYCHIATRY & NEUROLOGY

## 2021-08-10 PROCEDURE — 1160F PR REVIEW ALL MEDS BY PRESCRIBER/CLIN PHARMACIST DOCUMENTED: ICD-10-PCS | Mod: CPTII,S$GLB,, | Performed by: PSYCHIATRY & NEUROLOGY

## 2021-08-10 PROCEDURE — 1159F PR MEDICATION LIST DOCUMENTED IN MEDICAL RECORD: ICD-10-PCS | Mod: CPTII,S$GLB,, | Performed by: PSYCHIATRY & NEUROLOGY

## 2021-08-10 RX ORDER — TOPIRAMATE 50 MG/1
TABLET, FILM COATED ORAL
Qty: 120 TABLET | Refills: 0 | Status: SHIPPED | OUTPATIENT
Start: 2021-08-10 | End: 2021-10-11

## 2021-08-13 ENCOUNTER — OFFICE VISIT (OUTPATIENT)
Dept: FAMILY MEDICINE | Facility: CLINIC | Age: 61
End: 2021-08-13
Payer: MEDICARE

## 2021-08-13 VITALS
HEIGHT: 62 IN | DIASTOLIC BLOOD PRESSURE: 60 MMHG | HEART RATE: 83 BPM | OXYGEN SATURATION: 95 % | SYSTOLIC BLOOD PRESSURE: 134 MMHG | BODY MASS INDEX: 24.7 KG/M2 | WEIGHT: 134.25 LBS

## 2021-08-13 DIAGNOSIS — J44.1 COPD EXACERBATION: ICD-10-CM

## 2021-08-13 DIAGNOSIS — J43.2 CENTRILOBULAR EMPHYSEMA: ICD-10-CM

## 2021-08-13 DIAGNOSIS — F33.1 MODERATE EPISODE OF RECURRENT MAJOR DEPRESSIVE DISORDER: Primary | ICD-10-CM

## 2021-08-13 PROCEDURE — 3044F HG A1C LEVEL LT 7.0%: CPT | Mod: CPTII,S$GLB,, | Performed by: FAMILY MEDICINE

## 2021-08-13 PROCEDURE — 3075F PR MOST RECENT SYSTOLIC BLOOD PRESS GE 130-139MM HG: ICD-10-PCS | Mod: CPTII,S$GLB,, | Performed by: FAMILY MEDICINE

## 2021-08-13 PROCEDURE — 1159F PR MEDICATION LIST DOCUMENTED IN MEDICAL RECORD: ICD-10-PCS | Mod: CPTII,S$GLB,, | Performed by: FAMILY MEDICINE

## 2021-08-13 PROCEDURE — 3044F PR MOST RECENT HEMOGLOBIN A1C LEVEL <7.0%: ICD-10-PCS | Mod: CPTII,S$GLB,, | Performed by: FAMILY MEDICINE

## 2021-08-13 PROCEDURE — 99214 OFFICE O/P EST MOD 30 MIN: CPT | Mod: S$GLB,,, | Performed by: FAMILY MEDICINE

## 2021-08-13 PROCEDURE — 1159F MED LIST DOCD IN RCRD: CPT | Mod: CPTII,S$GLB,, | Performed by: FAMILY MEDICINE

## 2021-08-13 PROCEDURE — 99214 PR OFFICE/OUTPT VISIT, EST, LEVL IV, 30-39 MIN: ICD-10-PCS | Mod: S$GLB,,, | Performed by: FAMILY MEDICINE

## 2021-08-13 PROCEDURE — 99999 PR PBB SHADOW E&M-EST. PATIENT-LVL V: CPT | Mod: PBBFAC,,, | Performed by: FAMILY MEDICINE

## 2021-08-13 PROCEDURE — 3078F DIAST BP <80 MM HG: CPT | Mod: CPTII,S$GLB,, | Performed by: FAMILY MEDICINE

## 2021-08-13 PROCEDURE — 3078F PR MOST RECENT DIASTOLIC BLOOD PRESSURE < 80 MM HG: ICD-10-PCS | Mod: CPTII,S$GLB,, | Performed by: FAMILY MEDICINE

## 2021-08-13 PROCEDURE — 3008F PR BODY MASS INDEX (BMI) DOCUMENTED: ICD-10-PCS | Mod: CPTII,S$GLB,, | Performed by: FAMILY MEDICINE

## 2021-08-13 PROCEDURE — 1126F PR PAIN SEVERITY QUANTIFIED, NO PAIN PRESENT: ICD-10-PCS | Mod: CPTII,S$GLB,, | Performed by: FAMILY MEDICINE

## 2021-08-13 PROCEDURE — 99499 UNLISTED E&M SERVICE: CPT | Mod: HCNC,S$GLB,, | Performed by: FAMILY MEDICINE

## 2021-08-13 PROCEDURE — 1160F PR REVIEW ALL MEDS BY PRESCRIBER/CLIN PHARMACIST DOCUMENTED: ICD-10-PCS | Mod: CPTII,S$GLB,, | Performed by: FAMILY MEDICINE

## 2021-08-13 PROCEDURE — 99499 RISK ADDL DX/OHS AUDIT: ICD-10-PCS | Mod: HCNC,S$GLB,, | Performed by: FAMILY MEDICINE

## 2021-08-13 PROCEDURE — 1160F RVW MEDS BY RX/DR IN RCRD: CPT | Mod: CPTII,S$GLB,, | Performed by: FAMILY MEDICINE

## 2021-08-13 PROCEDURE — 3075F SYST BP GE 130 - 139MM HG: CPT | Mod: CPTII,S$GLB,, | Performed by: FAMILY MEDICINE

## 2021-08-13 PROCEDURE — 99999 PR PBB SHADOW E&M-EST. PATIENT-LVL V: ICD-10-PCS | Mod: PBBFAC,,, | Performed by: FAMILY MEDICINE

## 2021-08-13 PROCEDURE — 3008F BODY MASS INDEX DOCD: CPT | Mod: CPTII,S$GLB,, | Performed by: FAMILY MEDICINE

## 2021-08-13 PROCEDURE — 1126F AMNT PAIN NOTED NONE PRSNT: CPT | Mod: CPTII,S$GLB,, | Performed by: FAMILY MEDICINE

## 2021-08-13 RX ORDER — CLONAZEPAM 0.5 MG/1
0.5 TABLET ORAL NIGHTLY
Qty: 30 TABLET | Refills: 0 | Status: SHIPPED | OUTPATIENT
Start: 2021-08-13 | End: 2021-09-03

## 2021-08-13 RX ORDER — QUETIAPINE FUMARATE 25 MG/1
25 TABLET, FILM COATED ORAL NIGHTLY
Qty: 90 TABLET | Refills: 0 | Status: SHIPPED | OUTPATIENT
Start: 2021-08-13 | End: 2021-10-26

## 2021-08-13 RX ORDER — ESCITALOPRAM OXALATE 20 MG/1
20 TABLET ORAL DAILY
Qty: 90 TABLET | Refills: 0 | Status: SHIPPED | OUTPATIENT
Start: 2021-08-13 | End: 2021-10-26 | Stop reason: SDUPTHER

## 2021-08-13 RX ORDER — ALBUTEROL SULFATE 90 UG/1
AEROSOL, METERED RESPIRATORY (INHALATION)
Qty: 18 G | Refills: 11 | Status: SHIPPED | OUTPATIENT
Start: 2021-08-13

## 2021-09-20 ENCOUNTER — PATIENT MESSAGE (OUTPATIENT)
Dept: NEUROLOGY | Facility: CLINIC | Age: 61
End: 2021-09-20

## 2021-09-20 ENCOUNTER — TELEPHONE (OUTPATIENT)
Dept: NEUROLOGY | Facility: CLINIC | Age: 61
End: 2021-09-20

## 2021-09-22 ENCOUNTER — TELEPHONE (OUTPATIENT)
Dept: NEUROLOGY | Facility: CLINIC | Age: 61
End: 2021-09-22

## 2021-09-22 RX ORDER — BUTALBITAL, ACETAMINOPHEN AND CAFFEINE 50; 325; 40 MG/1; MG/1; MG/1
1 TABLET ORAL 2 TIMES DAILY
Qty: 60 TABLET | Refills: 0 | Status: SHIPPED | OUTPATIENT
Start: 2021-09-22 | End: 2022-11-25

## 2021-09-22 RX ORDER — ONDANSETRON 4 MG/1
4 TABLET, ORALLY DISINTEGRATING ORAL 3 TIMES DAILY
Qty: 30 TABLET | Refills: 0 | Status: SHIPPED | OUTPATIENT
Start: 2021-09-22 | End: 2023-04-23

## 2021-10-06 DIAGNOSIS — F33.1 MODERATE EPISODE OF RECURRENT MAJOR DEPRESSIVE DISORDER: ICD-10-CM

## 2021-10-06 RX ORDER — CLONAZEPAM 0.5 MG/1
0.5 TABLET ORAL NIGHTLY PRN
Qty: 30 TABLET | Refills: 0 | Status: SHIPPED | OUTPATIENT
Start: 2021-10-06 | End: 2021-10-07 | Stop reason: SDUPTHER

## 2021-10-07 DIAGNOSIS — F33.1 MODERATE EPISODE OF RECURRENT MAJOR DEPRESSIVE DISORDER: ICD-10-CM

## 2021-10-07 RX ORDER — CLONAZEPAM 0.5 MG/1
0.5 TABLET ORAL NIGHTLY PRN
Qty: 30 TABLET | Refills: 0 | Status: SHIPPED | OUTPATIENT
Start: 2021-10-07 | End: 2022-04-05

## 2021-10-18 ENCOUNTER — PATIENT MESSAGE (OUTPATIENT)
Dept: ADMINISTRATIVE | Facility: HOSPITAL | Age: 61
End: 2021-10-18
Payer: MEDICARE

## 2021-10-26 ENCOUNTER — OFFICE VISIT (OUTPATIENT)
Dept: PSYCHIATRY | Facility: CLINIC | Age: 61
End: 2021-10-26
Payer: MEDICARE

## 2021-10-26 DIAGNOSIS — F33.1 MODERATE EPISODE OF RECURRENT MAJOR DEPRESSIVE DISORDER: Primary | ICD-10-CM

## 2021-10-26 PROCEDURE — 90792 PSYCH DIAG EVAL W/MED SRVCS: CPT | Mod: HCNC,95,, | Performed by: PSYCHIATRY & NEUROLOGY

## 2021-10-26 PROCEDURE — 3044F PR MOST RECENT HEMOGLOBIN A1C LEVEL <7.0%: ICD-10-PCS | Mod: HCNC,CPTII,95, | Performed by: PSYCHIATRY & NEUROLOGY

## 2021-10-26 PROCEDURE — 1159F PR MEDICATION LIST DOCUMENTED IN MEDICAL RECORD: ICD-10-PCS | Mod: HCNC,CPTII,95, | Performed by: PSYCHIATRY & NEUROLOGY

## 2021-10-26 PROCEDURE — 90792 PR PSYCHIATRIC DIAGNOSTIC EVALUATION W/MEDICAL SERVICES: ICD-10-PCS | Mod: HCNC,95,, | Performed by: PSYCHIATRY & NEUROLOGY

## 2021-10-26 PROCEDURE — 99499 RISK ADDL DX/OHS AUDIT: ICD-10-PCS | Mod: 95,,, | Performed by: PSYCHIATRY & NEUROLOGY

## 2021-10-26 PROCEDURE — 1159F MED LIST DOCD IN RCRD: CPT | Mod: HCNC,CPTII,95, | Performed by: PSYCHIATRY & NEUROLOGY

## 2021-10-26 PROCEDURE — 3044F HG A1C LEVEL LT 7.0%: CPT | Mod: HCNC,CPTII,95, | Performed by: PSYCHIATRY & NEUROLOGY

## 2021-10-26 PROCEDURE — 99499 UNLISTED E&M SERVICE: CPT | Mod: 95,,, | Performed by: PSYCHIATRY & NEUROLOGY

## 2021-10-26 PROCEDURE — 1160F PR REVIEW ALL MEDS BY PRESCRIBER/CLIN PHARMACIST DOCUMENTED: ICD-10-PCS | Mod: HCNC,CPTII,95, | Performed by: PSYCHIATRY & NEUROLOGY

## 2021-10-26 PROCEDURE — 1160F RVW MEDS BY RX/DR IN RCRD: CPT | Mod: HCNC,CPTII,95, | Performed by: PSYCHIATRY & NEUROLOGY

## 2021-10-26 RX ORDER — MIRTAZAPINE 15 MG/1
7.5 TABLET, FILM COATED ORAL NIGHTLY
Qty: 15 TABLET | Refills: 5 | Status: SHIPPED | OUTPATIENT
Start: 2021-10-26 | End: 2022-01-13 | Stop reason: SDUPTHER

## 2021-10-26 RX ORDER — ESCITALOPRAM OXALATE 20 MG/1
20 TABLET ORAL DAILY
Qty: 90 TABLET | Refills: 0 | Status: SHIPPED | OUTPATIENT
Start: 2021-10-26 | End: 2022-01-13

## 2021-11-19 ENCOUNTER — OFFICE VISIT (OUTPATIENT)
Dept: URGENT CARE | Facility: CLINIC | Age: 61
End: 2021-11-19
Payer: MEDICARE

## 2021-11-19 VITALS
HEART RATE: 83 BPM | TEMPERATURE: 99 F | WEIGHT: 130 LBS | DIASTOLIC BLOOD PRESSURE: 81 MMHG | SYSTOLIC BLOOD PRESSURE: 131 MMHG | HEIGHT: 62 IN | OXYGEN SATURATION: 95 % | BODY MASS INDEX: 23.92 KG/M2 | RESPIRATION RATE: 14 BRPM

## 2021-11-19 DIAGNOSIS — S50.812A INFECTED ABRASION OF LEFT FOREARM, INITIAL ENCOUNTER: ICD-10-CM
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
## 2021-11-19 DIAGNOSIS — L08.9 INFECTED ABRASION OF LEFT FOREARM, INITIAL ENCOUNTER: ICD-10-CM

## 2021-11-19 DIAGNOSIS — S51.852A DOG BITE OF LEFT FOREARM, INITIAL ENCOUNTER: Primary | ICD-10-CM

## 2021-11-19 PROCEDURE — 99214 OFFICE O/P EST MOD 30 MIN: CPT | Mod: 25,S$GLB,, | Performed by: STUDENT IN AN ORGANIZED HEALTH CARE EDUCATION/TRAINING PROGRAM

## 2021-11-19 PROCEDURE — 3075F PR MOST RECENT SYSTOLIC BLOOD PRESS GE 130-139MM HG: ICD-10-PCS | Mod: CPTII,S$GLB,, | Performed by: STUDENT IN AN ORGANIZED HEALTH CARE EDUCATION/TRAINING PROGRAM

## 2021-11-19 PROCEDURE — 1159F MED LIST DOCD IN RCRD: CPT | Mod: CPTII,S$GLB,, | Performed by: STUDENT IN AN ORGANIZED HEALTH CARE EDUCATION/TRAINING PROGRAM

## 2021-11-19 PROCEDURE — 1160F PR REVIEW ALL MEDS BY PRESCRIBER/CLIN PHARMACIST DOCUMENTED: ICD-10-PCS | Mod: CPTII,S$GLB,, | Performed by: STUDENT IN AN ORGANIZED HEALTH CARE EDUCATION/TRAINING PROGRAM

## 2021-11-19 PROCEDURE — 90714 TD VACC NO PRESV 7 YRS+ IM: CPT | Mod: S$GLB,,, | Performed by: STUDENT IN AN ORGANIZED HEALTH CARE EDUCATION/TRAINING PROGRAM

## 2021-11-19 PROCEDURE — 3079F DIAST BP 80-89 MM HG: CPT | Mod: CPTII,S$GLB,, | Performed by: STUDENT IN AN ORGANIZED HEALTH CARE EDUCATION/TRAINING PROGRAM

## 2021-11-19 PROCEDURE — 3075F SYST BP GE 130 - 139MM HG: CPT | Mod: CPTII,S$GLB,, | Performed by: STUDENT IN AN ORGANIZED HEALTH CARE EDUCATION/TRAINING PROGRAM

## 2021-11-19 PROCEDURE — 99214 PR OFFICE/OUTPT VISIT, EST, LEVL IV, 30-39 MIN: ICD-10-PCS | Mod: 25,S$GLB,, | Performed by: STUDENT IN AN ORGANIZED HEALTH CARE EDUCATION/TRAINING PROGRAM

## 2021-11-19 PROCEDURE — 3079F PR MOST RECENT DIASTOLIC BLOOD PRESSURE 80-89 MM HG: ICD-10-PCS | Mod: CPTII,S$GLB,, | Performed by: STUDENT IN AN ORGANIZED HEALTH CARE EDUCATION/TRAINING PROGRAM

## 2021-11-19 PROCEDURE — 90471 TD VACCINE GREATER THAN OR EQUAL TO 7YO WITH PRESERVATIVE IM: ICD-10-PCS | Mod: S$GLB,,, | Performed by: STUDENT IN AN ORGANIZED HEALTH CARE EDUCATION/TRAINING PROGRAM

## 2021-11-19 PROCEDURE — 1159F PR MEDICATION LIST DOCUMENTED IN MEDICAL RECORD: ICD-10-PCS | Mod: CPTII,S$GLB,, | Performed by: STUDENT IN AN ORGANIZED HEALTH CARE EDUCATION/TRAINING PROGRAM

## 2021-11-19 PROCEDURE — 3008F BODY MASS INDEX DOCD: CPT | Mod: CPTII,S$GLB,, | Performed by: STUDENT IN AN ORGANIZED HEALTH CARE EDUCATION/TRAINING PROGRAM

## 2021-11-19 PROCEDURE — 3008F PR BODY MASS INDEX (BMI) DOCUMENTED: ICD-10-PCS | Mod: CPTII,S$GLB,, | Performed by: STUDENT IN AN ORGANIZED HEALTH CARE EDUCATION/TRAINING PROGRAM

## 2021-11-19 PROCEDURE — 90471 IMMUNIZATION ADMIN: CPT | Mod: S$GLB,,, | Performed by: STUDENT IN AN ORGANIZED HEALTH CARE EDUCATION/TRAINING PROGRAM

## 2021-11-19 PROCEDURE — 3044F PR MOST RECENT HEMOGLOBIN A1C LEVEL <7.0%: ICD-10-PCS | Mod: CPTII,S$GLB,, | Performed by: STUDENT IN AN ORGANIZED HEALTH CARE EDUCATION/TRAINING PROGRAM

## 2021-11-19 PROCEDURE — 1160F RVW MEDS BY RX/DR IN RCRD: CPT | Mod: CPTII,S$GLB,, | Performed by: STUDENT IN AN ORGANIZED HEALTH CARE EDUCATION/TRAINING PROGRAM

## 2021-11-19 PROCEDURE — 3044F HG A1C LEVEL LT 7.0%: CPT | Mod: CPTII,S$GLB,, | Performed by: STUDENT IN AN ORGANIZED HEALTH CARE EDUCATION/TRAINING PROGRAM

## 2021-11-19 PROCEDURE — 90714 TD VACCINE GREATER THAN OR EQUAL TO 7YO WITH PRESERVATIVE IM: ICD-10-PCS | Mod: S$GLB,,, | Performed by: STUDENT IN AN ORGANIZED HEALTH CARE EDUCATION/TRAINING PROGRAM

## 2021-11-19 RX ORDER — AMOXICILLIN AND CLAVULANATE POTASSIUM 875; 125 MG/1; MG/1
1 TABLET, FILM COATED ORAL EVERY 12 HOURS
Qty: 14 TABLET | Refills: 0 | Status: SHIPPED | OUTPATIENT
Start: 2021-11-19 | End: 2021-11-26

## 2021-11-19 RX ORDER — MUPIROCIN 20 MG/G
OINTMENT TOPICAL 2 TIMES DAILY
Qty: 22 G | Refills: 0 | Status: SHIPPED | OUTPATIENT
Start: 2021-11-19 | End: 2023-03-22

## 2021-12-23 DIAGNOSIS — E11.9 TYPE 2 DIABETES MELLITUS WITHOUT COMPLICATION: ICD-10-CM

## 2022-01-04 ENCOUNTER — TELEPHONE (OUTPATIENT)
Dept: FAMILY MEDICINE | Facility: CLINIC | Age: 62
End: 2022-01-04
Payer: MEDICARE

## 2022-01-04 NOTE — TELEPHONE ENCOUNTER
----- Message from Deanne Munoz sent at 2022 11:25 AM CST -----  Contact: [phone]  Who Called: PT  Regardinth day of covid and has a lot of pain in kidney (started yesterday )   Would the patient rather a call back or a response via [facility]? Call back  Best Call Back Number: [phone]  Additional Information:

## 2022-01-07 ENCOUNTER — TELEPHONE (OUTPATIENT)
Dept: FAMILY MEDICINE | Facility: CLINIC | Age: 62
End: 2022-01-07
Payer: MEDICARE

## 2022-01-07 DIAGNOSIS — U07.1 COVID-19 VIRUS INFECTION: Primary | ICD-10-CM

## 2022-01-07 RX ORDER — PROMETHAZINE HYDROCHLORIDE AND DEXTROMETHORPHAN HYDROBROMIDE 6.25; 15 MG/5ML; MG/5ML
5 SYRUP ORAL 4 TIMES DAILY PRN
Qty: 240 ML | Refills: 0 | Status: SHIPPED | OUTPATIENT
Start: 2022-01-07 | End: 2022-01-13

## 2022-01-07 RX ORDER — PREDNISONE 5 MG/1
5 TABLET ORAL 2 TIMES DAILY
Qty: 10 TABLET | Refills: 0 | Status: SHIPPED | OUTPATIENT
Start: 2022-01-07 | End: 2022-01-12

## 2022-01-07 NOTE — TELEPHONE ENCOUNTER
----- Message from Claribel Jacobo MA sent at 1/5/2022 12:44 PM CST -----  Regarding: Covid  positive  Contact: pt.  Patient is on 9 th day of Covid she is  coughing ,Headache and has nausea. Please advise  ----- Message -----  From: Georges Plaza  Sent: 1/5/2022  12:36 PM CST  To: [facility] Staff    .Type:  Needs Medical Advice    Who Called: pt  Would the patient rather a call back or a response via [facility]? Call back  Best Call Back Number: [phone]  Additional Information: Pt. Is positive for covid and would like the nurse to call her back she states this is her 2nd phone call.

## 2022-01-07 NOTE — TELEPHONE ENCOUNTER
Cough medicine and steroid were sent to the pharmacy.    If not improvement please consider the follow up at urgent care,[facility] or [facility].

## 2022-01-07 NOTE — TELEPHONE ENCOUNTER
Spoke with patient and advised her that Cough medicine and steroid were sent to the pharmacy.     If not improvement please consider the follow up at urgent care,[facility] or [facility]. Patient voiced understanding

## 2022-01-08 ENCOUNTER — OFFICE VISIT (OUTPATIENT)
Dept: URGENT CARE | Facility: CLINIC | Age: 62
End: 2022-01-08
Payer: MEDICARE

## 2022-01-08 VITALS
HEART RATE: 89 BPM | TEMPERATURE: 98 F | DIASTOLIC BLOOD PRESSURE: 71 MMHG | RESPIRATION RATE: 18 BRPM | HEIGHT: 62 IN | WEIGHT: 129 LBS | BODY MASS INDEX: 23.74 KG/M2 | SYSTOLIC BLOOD PRESSURE: 120 MMHG | OXYGEN SATURATION: 96 %

## 2022-01-08 DIAGNOSIS — R05.9 COUGH: ICD-10-CM

## 2022-01-08 DIAGNOSIS — J40 BRONCHITIS: Primary | ICD-10-CM

## 2022-01-08 DIAGNOSIS — Z87.09 HISTORY OF COPD: ICD-10-CM

## 2022-01-08 LAB
CTP QC/QA: YES
SARS-COV-2 RDRP RESP QL NAA+PROBE: NEGATIVE

## 2022-01-08 PROCEDURE — 3008F BODY MASS INDEX DOCD: CPT | Mod: CPTII,S$GLB,, | Performed by: PHYSICIAN ASSISTANT

## 2022-01-08 PROCEDURE — 3074F SYST BP LT 130 MM HG: CPT | Mod: CPTII,S$GLB,, | Performed by: PHYSICIAN ASSISTANT

## 2022-01-08 PROCEDURE — 3008F PR BODY MASS INDEX (BMI) DOCUMENTED: ICD-10-PCS | Mod: CPTII,S$GLB,, | Performed by: PHYSICIAN ASSISTANT

## 2022-01-08 PROCEDURE — 99213 PR OFFICE/OUTPT VISIT, EST, LEVL III, 20-29 MIN: ICD-10-PCS | Mod: S$GLB,CS,, | Performed by: PHYSICIAN ASSISTANT

## 2022-01-08 PROCEDURE — 3074F PR MOST RECENT SYSTOLIC BLOOD PRESSURE < 130 MM HG: ICD-10-PCS | Mod: CPTII,S$GLB,, | Performed by: PHYSICIAN ASSISTANT

## 2022-01-08 PROCEDURE — 3078F DIAST BP <80 MM HG: CPT | Mod: CPTII,S$GLB,, | Performed by: PHYSICIAN ASSISTANT

## 2022-01-08 PROCEDURE — 1159F MED LIST DOCD IN RCRD: CPT | Mod: CPTII,S$GLB,, | Performed by: PHYSICIAN ASSISTANT

## 2022-01-08 PROCEDURE — 3078F PR MOST RECENT DIASTOLIC BLOOD PRESSURE < 80 MM HG: ICD-10-PCS | Mod: CPTII,S$GLB,, | Performed by: PHYSICIAN ASSISTANT

## 2022-01-08 PROCEDURE — U0002: ICD-10-PCS | Mod: QW,S$GLB,, | Performed by: PHYSICIAN ASSISTANT

## 2022-01-08 PROCEDURE — 1159F PR MEDICATION LIST DOCUMENTED IN MEDICAL RECORD: ICD-10-PCS | Mod: CPTII,S$GLB,, | Performed by: PHYSICIAN ASSISTANT

## 2022-01-08 PROCEDURE — 71046 X-RAY EXAM CHEST 2 VIEWS: CPT | Mod: FY,S$GLB,, | Performed by: RADIOLOGY

## 2022-01-08 PROCEDURE — 1160F PR REVIEW ALL MEDS BY PRESCRIBER/CLIN PHARMACIST DOCUMENTED: ICD-10-PCS | Mod: CPTII,S$GLB,, | Performed by: PHYSICIAN ASSISTANT

## 2022-01-08 PROCEDURE — 99213 OFFICE O/P EST LOW 20 MIN: CPT | Mod: S$GLB,CS,, | Performed by: PHYSICIAN ASSISTANT

## 2022-01-08 PROCEDURE — 1160F RVW MEDS BY RX/DR IN RCRD: CPT | Mod: CPTII,S$GLB,, | Performed by: PHYSICIAN ASSISTANT

## 2022-01-08 PROCEDURE — U0002 COVID-19 LAB TEST NON-CDC: HCPCS | Mod: QW,S$GLB,, | Performed by: PHYSICIAN ASSISTANT

## 2022-01-08 PROCEDURE — 71046 XR CHEST PA AND LATERAL: ICD-10-PCS | Mod: FY,S$GLB,, | Performed by: RADIOLOGY

## 2022-01-08 RX ORDER — BETAMETHASONE SODIUM PHOSPHATE AND BETAMETHASONE ACETATE 3; 3 MG/ML; MG/ML
6 INJECTION, SUSPENSION INTRA-ARTICULAR; INTRALESIONAL; INTRAMUSCULAR; SOFT TISSUE
Status: COMPLETED | OUTPATIENT
Start: 2022-01-08 | End: 2022-01-08

## 2022-01-08 RX ORDER — AZITHROMYCIN 250 MG/1
TABLET, FILM COATED ORAL
Qty: 6 TABLET | Refills: 0 | Status: SHIPPED | OUTPATIENT
Start: 2022-01-08 | End: 2022-05-20

## 2022-01-08 RX ADMIN — BETAMETHASONE SODIUM PHOSPHATE AND BETAMETHASONE ACETATE 6 MG: 3; 3 INJECTION, SUSPENSION INTRA-ARTICULAR; INTRALESIONAL; INTRAMUSCULAR; SOFT TISSUE at 05:01

## 2022-01-08 NOTE — PATIENT INSTRUCTIONS
PLEASE READ YOUR DISCHARGE INSTRUCTIONS ENTIRELY AS IT CONTAINS IMPORTANT INFORMATION.  You may restart the steroid pills tomorrow.  Start the antibiotics today.  You received a steroid today - this can elevate your blood pressure, elevate your blood sugar, water weight gain, nervous energy, redness to the face and dimpling of the skin where the shot goes in if you had an injection.   Do not use steroids more than 3 times per year.   If you have diabetes, please check you blood sugar frequently.  If you have high blood pressure, please check your blood pressure frequently.     - Rest.    - Drink plenty of fluids.    - Tylenol or Ibuprofen as directed as needed for fever/pain.    - If you were prescribed antibiotics, please take them to completion.  - If you are female and on birth control pills - please use additional methods of contraception to prevent pregnancy while on antibiotics and for one cycle after.   - If you were prescribed a narcotic medication, a cough syrup, or a muscle relaxer, do not drive or operate heavy equipment or machinery while taking these medications, as they can cause drowsiness.   - If you smoke, please stop smoking.  -You must understand that you've received an Urgent Care treatment only and that you may be released before all your medical problems are known or treated. You, the patient, will    arrange for follow up care as instructed. Please arrange follow up with your primary medical clinic as soon as possible.   - Follow up with your PCP or specialty clinic as directed in the next 1-2 weeks if not improved or as needed.  You can call [phone] to schedule an appointment with the appropriate provider.    - Please return to Urgent Care or to the Emergency Department if your symptoms worsen.    Patient aware and verbalized understanding.      Patient Education       Acute Bronchitis   The Basics   Written by the doctors and editors at [facility]   What is bronchitis? -- Bronchitis is  "an infection that causes a cough. It happens when the tubes that carry air into the lungs, called the "bronchi," get infected (figure 1).  Usually, bronchitis happens after a person gets a cold or the flu. The viruses that cause the cold or flu infect the bronchi and irritate them. People often wonder if taking antibiotics will help with their bronchitis. But the answer is no, because it is usually caused by a virus. Antibiotics kill bacteria, not viruses.  Bronchitis can also happen when a person gets an infection called "whooping cough," but this is much less common. Whooping cough is caused by bacteria that can infect the bronchi. Most people get vaccines that prevent whooping cough, but the vaccine doesn't always work. Your doctor will be able to tell if you have whooping cough by doing an exam and listening to way your cough sounds.  This article is about "acute" bronchitis. This is different from "chronic" bronchitis, which is an illness in smokers who have a long-lasting cough.  What are the symptoms of bronchitis? -- The most common symptoms of bronchitis are:  · A nagging cough that can last up to a few weeks  · Coughing up mucus that is clear, yellow, or green - Some people think green mucus means you have a bacterial infection. But this is not always true.  · You might also have normal cold or flu symptoms, like a stuffy nose, sore throat, or headache. People with bronchitis do not usually get a fever.  When should I call the doctor or nurse? -- Most people who have a cough that lasts longer than their other cold or flu symptoms do not need to see a doctor. The cough can take up to 3 weeks to get better, sometimes even longer. But you should call your doctor or nurse if you have:  · A fever higher than 100.4°F (38°C)  · Chest pain when you cough, trouble breathing, or coughing up blood  · A barking cough that makes it hard to talk  · A cough and weight loss that you cannot explain  · Symptoms that are not " getting better after 3 weeks   Is there a test for bronchitis? -- People do not usually need a test. But your doctor or nurse might do a test, such as a chest X-ray, if the cause of your cough isn't clear.  How is bronchitis treated? -- Bronchitis almost always goes away on its own, although it can take a few weeks. Doctors do not usually treat bronchitis with antibiotic medicines. That's because bronchitis is usually caused by a virus, and antibiotics kill bacteria, not viruses. Antibiotics will not help your bronchitis go away faster, and they can actually cause other problems. So it's not a good idea to take them if you don't really need them.  To feel better, you can treat your cold and flu symptoms. Different treatments you can try include:  · Getting lots of rest and drinking plenty of liquids  · Drinking hot tea  · Sucking on cough drops or hard candy  · Taking over-the-counter cough and cold medicines  · Breathing in warm, moist air, such as in the shower, over a kettle, or from a humidifier  · Taking a pain-relieving medicine if you have cold or flu symptoms like headache, muscle aches, or joint pain  It's also important to avoid smoking or being around others who smoke. This can make your cough worse.  How can I keep from getting bronchitis again? -- You can reduce your chance of getting bronchitis again by keeping the germs that cause bronchitis out of your body. One of the best ways to do this is to wash your hands often with soap and water. If there is no sink nearby, you can use a hand gel with alcohol in it to clean your hands.  How can I keep from spreading my germs? -- In addition to washing your hands often, you should cover your mouth with your elbow when you sneeze or cough. Using your elbow keeps you from getting germs on your hands. If you use a tissue, throw the tissue away and wash your hands.  All topics are updated as new evidence becomes available and our peer review process is  "complete.  This topic retrieved from [employer] on: Sep 21, 2021.  Topic 05791 Version 13.0  Release: 29.4.2 - C29.263  © 2021 UpToDate, Inc. and/or its affiliates. All rights reserved.  figure 1: Normal lungs     The lungs sit in the chest, inside the ribcage. They are covered with a thin membrane called the "pleura." The windpipe, or trachea, branches into two smaller airways called the left and right "bronchi." The space between the lungs is called the "mediastinum." Lymph nodes are located within and around the lungs and mediastinum.  Graphic 60252 Version 13.0    Consumer Information Use and Disclaimer   This information is not specific medical advice and does not replace information you receive from your health care provider. This is only a brief summary of general information. It does NOT include all information about conditions, illnesses, injuries, tests, procedures, treatments, therapies, discharge instructions or life-style choices that may apply to you. You must talk with your health care provider for complete information about your health and treatment options. This information should not be used to decide whether or not to accept your health care provider's advice, instructions or recommendations. Only your health care provider has the knowledge and training to provide advice that is right for you. The use of this information is governed by the [employer] End User License Agreement, available at https://www.[employer].Socialance/en/solutions/[employer]/about/faby.The use of [employer] content is governed by the [employer] Terms of Use. ©2021 UpToDate, Inc. All rights reserved.  Copyright   © 2021 UpToDate, Inc. and/or its affiliates. All rights reserved.    "

## 2022-01-08 NOTE — PROGRESS NOTES
"Subjective:       Patient ID: Jud Villa is a 61 y.o. female.    Vitals:  height is 5' 2" (1.575 m) and weight is 58.5 kg (129 lb). Her temperature is 98.1 °F (36.7 °C). Her blood pressure is 120/71 and her pulse is 89. Her respiration is 18 and oxygen saturation is 96%.     Chief Complaint: Cough (Headache, sore throat, abdominal pain. Fatigue, body aches, running nose)    Ms. Villa presents for evaluation of cough, PND, sore throat, headache, congestion, SOB, wheezing that started 9 days ago.  She does have a history of COPD.  She reports her daughter tested positive for COVID-19 around the time that she got sick.  She did not test, but assumed she also had COVID.  She reports brown sputum.  She denies any fevers, chills, chest pain, leg swelling, nausea, vomiting, diarrhea, anosmia or ageusia.  She called her PCP yesterday & was sent cough syrup & prednisone.  She took two doses of prednisone yesterday but has had no improvement.  Her albuterol inhaler does improve the cough.        Cough  This is a new problem. The current episode started 1 to 4 weeks ago. The problem has been unchanged. The problem occurs every few minutes. The cough is non-productive. Associated symptoms include a fever, headaches, nasal congestion, postnasal drip, a sore throat, shortness of breath and wheezing. Pertinent negatives include no chest pain, chills, ear pain, myalgias or rash. Nothing aggravates the symptoms. She has tried OTC cough suppressant for the symptoms. The treatment provided no relief.       Constitution: Positive for fever. Negative for appetite change, chills, sweating and fatigue.   HENT: Positive for postnasal drip and sore throat. Negative for ear pain, ear discharge, hearing loss, drooling, congestion, sinus pain and sinus pressure.    Neck: Negative for neck stiffness and painful lymph nodes.   Cardiovascular: Negative for chest trauma, chest pain, leg swelling, palpitations, sob on exertion and passing " out.   Eyes: Negative for eye pain and blurred vision.   Respiratory: Positive for cough, shortness of breath and wheezing. Negative for chest tightness and sputum production.    Gastrointestinal: Negative for abdominal pain, nausea, vomiting and diarrhea.   Genitourinary: Negative for dysuria, frequency and urgency.   Musculoskeletal: Negative for joint pain, joint swelling, muscle cramps and muscle ache.   Skin: Negative for rash.   Allergic/Immunologic: Negative for itching and sneezing.   Neurological: Positive for headaches. Negative for dizziness, history of vertigo, light-headedness, passing out, facial drooping, speech difficulty, coordination disturbances, loss of balance and altered mental status.   Hematologic/Lymphatic: Negative for swollen lymph nodes and easy bruising/bleeding. Does not bruise/bleed easily.   Psychiatric/Behavioral: Negative for altered mental status.       Objective:      Physical Exam   Constitutional: She is oriented to person, place, and time. She appears well-developed and well-nourished. She is cooperative.  Non-toxic appearance. She does not have a sickly appearance. She does not appear ill. No distress.   HENT:   Head: Normocephalic and atraumatic.   Ears:   Right Ear: Hearing, tympanic membrane, external ear and ear canal normal.   Left Ear: Hearing, tympanic membrane, external ear and ear canal normal.   Nose: Nose normal. No mucosal edema, rhinorrhea or nasal deformity. No epistaxis. Right sinus exhibits no maxillary sinus tenderness and no frontal sinus tenderness. Left sinus exhibits no maxillary sinus tenderness and no frontal sinus tenderness.   Mouth/Throat: Uvula is midline, oropharynx is clear and moist and mucous membranes are normal. No trismus in the jaw. Normal dentition. No uvula swelling. No oropharyngeal exudate, posterior oropharyngeal edema or posterior oropharyngeal erythema.   Eyes: Conjunctivae and lids are normal. No scleral icterus.   Neck: Trachea  normal and phonation normal. Neck supple. No edema present. No erythema present. No neck rigidity present.   Cardiovascular: Normal rate, regular rhythm, normal heart sounds, intact distal pulses and normal pulses.   Pulmonary/Chest: Effort normal. No stridor. No respiratory distress. She has no decreased breath sounds. She has wheezes. She has no rhonchi. She has no rales.   Wheeze with cough    Comments: Wheeze with cough    Abdominal: Normal appearance.   Musculoskeletal: Normal range of motion.         General: No deformity or edema. Normal range of motion.   Lymphadenopathy:     She has no cervical adenopathy.   Neurological: She is alert and oriented to person, place, and time. She exhibits normal muscle tone. Coordination normal.   Skin: Skin is warm, dry, intact, not diaphoretic and not pale.   Psychiatric: She has a normal mood and affect. Her speech is normal and behavior is normal. Judgment and thought content normal. Cognition and memory  Nursing note and vitals reviewed.        Results for orders placed or performed in visit on 01/08/22   POCT COVID-19 Rapid Screening   Result Value Ref Range    POC Rapid COVID Negative Negative     Acceptable Yes      CXR - No acute abnormality.    Assessment:       1. Bronchitis    2. Cough    3. History of COPD          Plan:         Bronchitis    Cough  -     POCT COVID-19 Rapid Screening  -     XR CHEST PA AND LATERAL; Future; Expected date: 01/08/2022    History of COPD    Other orders  -     betamethasone acetate-betamethasone sodium phosphate injection 6 mg  -     azithromycin (Z-ANDREA) 250 MG tablet; Take 2 tablets by mouth on day 1; Take 1 tablet by mouth on days 2-5  Dispense: 6 tablet; Refill: 0    Discussed risk of steroids with patient, understanding was verbalized.    Diagnoses and plan discussed with the patient, as well as the expected course and duration of her symptoms. All questions and concerns were addressed prior to discharge.  She  was advised to follow up with her PCP within 1 week if symptoms do not improve. Emergency department precautions were given. Patient verbalized understanding and was happy with the plan of care.   Note dictated with voice recognition software, please excuse any grammatical errors.    Patient Instructions   PLEASE READ YOUR DISCHARGE INSTRUCTIONS ENTIRELY AS IT CONTAINS IMPORTANT INFORMATION.  You may restart the steroid pills tomorrow.  Start the antibiotics today.  You received a steroid today - this can elevate your blood pressure, elevate your blood sugar, water weight gain, nervous energy, redness to the face and dimpling of the skin where the shot goes in if you had an injection.   Do not use steroids more than 3 times per year.   If you have diabetes, please check you blood sugar frequently.  If you have high blood pressure, please check your blood pressure frequently.     - Rest.    - Drink plenty of fluids.    - Tylenol or Ibuprofen as directed as needed for fever/pain.    - If you were prescribed antibiotics, please take them to completion.  - If you are female and on birth control pills - please use additional methods of contraception to prevent pregnancy while on antibiotics and for one cycle after.   - If you were prescribed a narcotic medication, a cough syrup, or a muscle relaxer, do not drive or operate heavy equipment or machinery while taking these medications, as they can cause drowsiness.   - If you smoke, please stop smoking.  -You must understand that you've received an Urgent Care treatment only and that you may be released before all your medical problems are known or treated. You, the patient, will    arrange for follow up care as instructed. Please arrange follow up with your primary medical clinic as soon as possible.   - Follow up with your PCP or specialty clinic as directed in the next 1-2 weeks if not improved or as needed.  You can call [phone] to schedule an appointment with  "the appropriate provider.    - Please return to Urgent Care or to the Emergency Department if your symptoms worsen.    Patient aware and verbalized understanding.      Patient Education       Acute Bronchitis   The Basics   Written by the doctors and editors at [facility]   What is bronchitis? -- Bronchitis is an infection that causes a cough. It happens when the tubes that carry air into the lungs, called the "bronchi," get infected (figure 1).  Usually, bronchitis happens after a person gets a cold or the flu. The viruses that cause the cold or flu infect the bronchi and irritate them. People often wonder if taking antibiotics will help with their bronchitis. But the answer is no, because it is usually caused by a virus. Antibiotics kill bacteria, not viruses.  Bronchitis can also happen when a person gets an infection called "whooping cough," but this is much less common. Whooping cough is caused by bacteria that can infect the bronchi. Most people get vaccines that prevent whooping cough, but the vaccine doesn't always work. Your doctor will be able to tell if you have whooping cough by doing an exam and listening to way your cough sounds.  This article is about "acute" bronchitis. This is different from "chronic" bronchitis, which is an illness in smokers who have a long-lasting cough.  What are the symptoms of bronchitis? -- The most common symptoms of bronchitis are:  · A nagging cough that can last up to a few weeks  · Coughing up mucus that is clear, yellow, or green - Some people think green mucus means you have a bacterial infection. But this is not always true.  · You might also have normal cold or flu symptoms, like a stuffy nose, sore throat, or headache. People with bronchitis do not usually get a fever.  When should I call the doctor or nurse? -- Most people who have a cough that lasts longer than their other cold or flu symptoms do not need to see a doctor. The cough can take up to 3 weeks to get " better, sometimes even longer. But you should call your doctor or nurse if you have:  · A fever higher than 100.4°F (38°C)  · Chest pain when you cough, trouble breathing, or coughing up blood  · A barking cough that makes it hard to talk  · A cough and weight loss that you cannot explain  · Symptoms that are not getting better after 3 weeks   Is there a test for bronchitis? -- People do not usually need a test. But your doctor or nurse might do a test, such as a chest X-ray, if the cause of your cough isn't clear.  How is bronchitis treated? -- Bronchitis almost always goes away on its own, although it can take a few weeks. Doctors do not usually treat bronchitis with antibiotic medicines. That's because bronchitis is usually caused by a virus, and antibiotics kill bacteria, not viruses. Antibiotics will not help your bronchitis go away faster, and they can actually cause other problems. So it's not a good idea to take them if you don't really need them.  To feel better, you can treat your cold and flu symptoms. Different treatments you can try include:  · Getting lots of rest and drinking plenty of liquids  · Drinking hot tea  · Sucking on cough drops or hard candy  · Taking over-the-counter cough and cold medicines  · Breathing in warm, moist air, such as in the shower, over a kettle, or from a humidifier  · Taking a pain-relieving medicine if you have cold or flu symptoms like headache, muscle aches, or joint pain  It's also important to avoid smoking or being around others who smoke. This can make your cough worse.  How can I keep from getting bronchitis again? -- You can reduce your chance of getting bronchitis again by keeping the germs that cause bronchitis out of your body. One of the best ways to do this is to wash your hands often with soap and water. If there is no sink nearby, you can use a hand gel with alcohol in it to clean your hands.  How can I keep from spreading my germs? -- In addition to  "washing your hands often, you should cover your mouth with your elbow when you sneeze or cough. Using your elbow keeps you from getting germs on your hands. If you use a tissue, throw the tissue away and wash your hands.  All topics are updated as new evidence becomes available and our peer review process is complete.  This topic retrieved from [employer] on: Sep 21, 2021.  Topic 43328 Version 13.0  Release: 29.4.2 - C29.263  © 2021 UpToDate, Inc. and/or its affiliates. All rights reserved.  figure 1: Normal lungs     The lungs sit in the chest, inside the ribcage. They are covered with a thin membrane called the "pleura." The windpipe, or trachea, branches into two smaller airways called the left and right "bronchi." The space between the lungs is called the "mediastinum." Lymph nodes are located within and around the lungs and mediastinum.  Graphic 71604 Version 13.0    Consumer Information Use and Disclaimer   This information is not specific medical advice and does not replace information you receive from your health care provider. This is only a brief summary of general information. It does NOT include all information about conditions, illnesses, injuries, tests, procedures, treatments, therapies, discharge instructions or life-style choices that may apply to you. You must talk with your health care provider for complete information about your health and treatment options. This information should not be used to decide whether or not to accept your health care provider's advice, instructions or recommendations. Only your health care provider has the knowledge and training to provide advice that is right for you. The use of this information is governed by the [employer] End User License Agreement, available at https://www.[employer]/en/solutions/[employer]/about/faby.The use of [employer] content is governed by the [employer] Terms of Use. ©2021 UpToDate, Inc. All rights reserved.  Copyright   © 2021 Up[employer]Date, Inc. " and/or its affiliates. All rights reserved.

## 2022-01-13 ENCOUNTER — HOSPITAL ENCOUNTER (EMERGENCY)
Facility: HOSPITAL | Age: 62
Discharge: HOME | End: 2022-01-13
Attending: EMERGENCY MEDICINE
Payer: MEDICARE

## 2022-01-13 ENCOUNTER — OFFICE VISIT (OUTPATIENT)
Dept: FAMILY MEDICINE | Facility: CLINIC | Age: 62
End: 2022-01-13
Payer: MEDICARE

## 2022-01-13 VITALS
SYSTOLIC BLOOD PRESSURE: 116 MMHG | RESPIRATION RATE: 16 BRPM | TEMPERATURE: 98 F | BODY MASS INDEX: 23.78 KG/M2 | OXYGEN SATURATION: 99 % | HEART RATE: 78 BPM | DIASTOLIC BLOOD PRESSURE: 58 MMHG | WEIGHT: 130 LBS

## 2022-01-13 VITALS
BODY MASS INDEX: 24.51 KG/M2 | SYSTOLIC BLOOD PRESSURE: 102 MMHG | OXYGEN SATURATION: 95 % | DIASTOLIC BLOOD PRESSURE: 60 MMHG | WEIGHT: 133.19 LBS | HEIGHT: 62 IN

## 2022-01-13 DIAGNOSIS — C50.912 BREAST CANCER METASTASIZED TO AXILLARY LYMPH NODE, LEFT: ICD-10-CM

## 2022-01-13 DIAGNOSIS — E11.8 CONTROLLED TYPE 2 DIABETES MELLITUS WITH COMPLICATION, WITH LONG-TERM CURRENT USE OF INSULIN: ICD-10-CM

## 2022-01-13 DIAGNOSIS — C77.3 BREAST CANCER METASTASIZED TO AXILLARY LYMPH NODE, LEFT: ICD-10-CM

## 2022-01-13 DIAGNOSIS — F33.1 MODERATE EPISODE OF RECURRENT MAJOR DEPRESSIVE DISORDER: ICD-10-CM

## 2022-01-13 DIAGNOSIS — L40.9 PSORIASIS: ICD-10-CM

## 2022-01-13 DIAGNOSIS — J44.1 COPD EXACERBATION: ICD-10-CM

## 2022-01-13 DIAGNOSIS — Z78.0 ASYMPTOMATIC MENOPAUSE: ICD-10-CM

## 2022-01-13 DIAGNOSIS — G62.0 DRUG-INDUCED POLYNEUROPATHY: Primary | ICD-10-CM

## 2022-01-13 DIAGNOSIS — Z79.4 CONTROLLED TYPE 2 DIABETES MELLITUS WITH COMPLICATION, WITH LONG-TERM CURRENT USE OF INSULIN: ICD-10-CM

## 2022-01-13 DIAGNOSIS — R07.9 CHEST PAIN: ICD-10-CM

## 2022-01-13 LAB
ALBUMIN SERPL BCP-MCNC: 3.7 G/DL (ref 3.5–5.2)
ALP SERPL-CCNC: 89 U/L (ref 55–135)
ALT SERPL W/O P-5'-P-CCNC: 10 U/L (ref 10–44)
ANION GAP SERPL CALC-SCNC: 8 MMOL/L (ref 8–16)
AST SERPL-CCNC: 11 U/L (ref 10–40)
BASOPHILS # BLD AUTO: 0.06 K/UL (ref 0–0.2)
BASOPHILS NFR BLD: 0.5 % (ref 0–1.9)
BILIRUB SERPL-MCNC: 0.2 MG/DL (ref 0.1–1)
BNP SERPL-MCNC: 16 PG/ML (ref 0–99)
BUN SERPL-MCNC: 20 MG/DL (ref 8–23)
CALCIUM SERPL-MCNC: 10.2 MG/DL (ref 8.7–10.5)
CHLORIDE SERPL-SCNC: 101 MMOL/L (ref 95–110)
CO2 SERPL-SCNC: 25 MMOL/L (ref 23–29)
CREAT SERPL-MCNC: 0.8 MG/DL (ref 0.5–1.4)
D DIMER PPP IA.FEU-MCNC: 0.63 MG/L FEU
DIFFERENTIAL METHOD: ABNORMAL
EOSINOPHIL # BLD AUTO: 0.2 K/UL (ref 0–0.5)
EOSINOPHIL NFR BLD: 1.5 % (ref 0–8)
ERYTHROCYTE [DISTWIDTH] IN BLOOD BY AUTOMATED COUNT: 13.2 % (ref 11.5–14.5)
EST. GFR  (AFRICAN AMERICAN): >60 ML/MIN/1.73 M^2
EST. GFR  (NON AFRICAN AMERICAN): >60 ML/MIN/1.73 M^2
GLUCOSE SERPL-MCNC: 167 MG/DL (ref 70–110)
HCT VFR BLD AUTO: 45.4 % (ref 37–48.5)
HGB BLD-MCNC: 14.7 G/DL (ref 12–16)
IMM GRANULOCYTES # BLD AUTO: 0.04 K/UL (ref 0–0.04)
IMM GRANULOCYTES NFR BLD AUTO: 0.3 % (ref 0–0.5)
LYMPHOCYTES # BLD AUTO: 3.1 K/UL (ref 1–4.8)
LYMPHOCYTES NFR BLD: 23.8 % (ref 18–48)
MCH RBC QN AUTO: 29.2 PG (ref 27–31)
MCHC RBC AUTO-ENTMCNC: 32.4 G/DL (ref 32–36)
MCV RBC AUTO: 90 FL (ref 82–98)
MONOCYTES # BLD AUTO: 1 K/UL (ref 0.3–1)
MONOCYTES NFR BLD: 7.3 % (ref 4–15)
NEUTROPHILS # BLD AUTO: 8.7 K/UL (ref 1.8–7.7)
NEUTROPHILS NFR BLD: 66.6 % (ref 38–73)
NRBC BLD-RTO: 0 /100 WBC
PLATELET # BLD AUTO: 238 K/UL (ref 150–450)
PMV BLD AUTO: 9.8 FL (ref 9.2–12.9)
POTASSIUM SERPL-SCNC: 4.4 MMOL/L (ref 3.5–5.1)
PROT SERPL-MCNC: 7.3 G/DL (ref 6–8.4)
RBC # BLD AUTO: 5.03 M/UL (ref 4–5.4)
SODIUM SERPL-SCNC: 134 MMOL/L (ref 136–145)
TROPONIN I SERPL DL<=0.01 NG/ML-MCNC: 0.01 NG/ML (ref 0–0.03)
TROPONIN I SERPL DL<=0.01 NG/ML-MCNC: <0.006 NG/ML (ref 0–0.03)
WBC # BLD AUTO: 13.05 K/UL (ref 3.9–12.7)

## 2022-01-13 PROCEDURE — 99999 PR PBB SHADOW E&M-EST. PATIENT-LVL III: ICD-10-PCS | Mod: PBBFAC,HCNC,, | Performed by: FAMILY MEDICINE

## 2022-01-13 PROCEDURE — 3074F PR MOST RECENT SYSTOLIC BLOOD PRESSURE < 130 MM HG: ICD-10-PCS | Mod: HCNC,CPTII,S$GLB, | Performed by: FAMILY MEDICINE

## 2022-01-13 PROCEDURE — 99999 PR PBB SHADOW E&M-EST. PATIENT-LVL III: CPT | Mod: PBBFAC,HCNC,, | Performed by: FAMILY MEDICINE

## 2022-01-13 PROCEDURE — 3074F SYST BP LT 130 MM HG: CPT | Mod: HCNC,CPTII,S$GLB, | Performed by: FAMILY MEDICINE

## 2022-01-13 PROCEDURE — 85379 FIBRIN DEGRADATION QUANT: CPT | Mod: HCNC | Performed by: EMERGENCY MEDICINE

## 2022-01-13 PROCEDURE — 80053 COMPREHEN METABOLIC PANEL: CPT | Mod: HCNC | Performed by: EMERGENCY MEDICINE

## 2022-01-13 PROCEDURE — 25000003 PHARM REV CODE 250: Mod: HCNC | Performed by: EMERGENCY MEDICINE

## 2022-01-13 PROCEDURE — 36000 PLACE NEEDLE IN VEIN: CPT | Mod: HCNC

## 2022-01-13 PROCEDURE — 3078F PR MOST RECENT DIASTOLIC BLOOD PRESSURE < 80 MM HG: ICD-10-PCS | Mod: HCNC,CPTII,S$GLB, | Performed by: FAMILY MEDICINE

## 2022-01-13 PROCEDURE — 99213 OFFICE O/P EST LOW 20 MIN: CPT | Mod: PBBFAC,PO | Performed by: FAMILY MEDICINE

## 2022-01-13 PROCEDURE — 1159F PR MEDICATION LIST DOCUMENTED IN MEDICAL RECORD: ICD-10-PCS | Mod: HCNC,CPTII,S$GLB, | Performed by: FAMILY MEDICINE

## 2022-01-13 PROCEDURE — 99285 EMERGENCY DEPT VISIT HI MDM: CPT | Mod: HCNC,,, | Performed by: EMERGENCY MEDICINE

## 2022-01-13 PROCEDURE — 85025 COMPLETE CBC W/AUTO DIFF WBC: CPT | Mod: HCNC | Performed by: EMERGENCY MEDICINE

## 2022-01-13 PROCEDURE — 3008F PR BODY MASS INDEX (BMI) DOCUMENTED: ICD-10-PCS | Mod: HCNC,CPTII,S$GLB, | Performed by: FAMILY MEDICINE

## 2022-01-13 PROCEDURE — 1159F MED LIST DOCD IN RCRD: CPT | Mod: HCNC,CPTII,S$GLB, | Performed by: FAMILY MEDICINE

## 2022-01-13 PROCEDURE — 3008F BODY MASS INDEX DOCD: CPT | Mod: HCNC,CPTII,S$GLB, | Performed by: FAMILY MEDICINE

## 2022-01-13 PROCEDURE — 93005 ELECTROCARDIOGRAM TRACING: CPT | Mod: HCNC

## 2022-01-13 PROCEDURE — 83880 ASSAY OF NATRIURETIC PEPTIDE: CPT | Mod: HCNC | Performed by: EMERGENCY MEDICINE

## 2022-01-13 PROCEDURE — 93010 EKG 12-LEAD: ICD-10-PCS | Mod: HCNC,,, | Performed by: INTERNAL MEDICINE

## 2022-01-13 PROCEDURE — 99285 PR EMERGENCY DEPT VISIT,LEVEL V: ICD-10-PCS | Mod: HCNC,,, | Performed by: EMERGENCY MEDICINE

## 2022-01-13 PROCEDURE — 99214 PR OFFICE/OUTPT VISIT, EST, LEVL IV, 30-39 MIN: ICD-10-PCS | Mod: HCNC,S$GLB,, | Performed by: FAMILY MEDICINE

## 2022-01-13 PROCEDURE — 99499 RISK ADDL DX/OHS AUDIT: ICD-10-PCS | Mod: HCNC,S$GLB,, | Performed by: FAMILY MEDICINE

## 2022-01-13 PROCEDURE — 3078F DIAST BP <80 MM HG: CPT | Mod: HCNC,CPTII,S$GLB, | Performed by: FAMILY MEDICINE

## 2022-01-13 PROCEDURE — 1160F PR REVIEW ALL MEDS BY PRESCRIBER/CLIN PHARMACIST DOCUMENTED: ICD-10-PCS | Mod: HCNC,CPTII,S$GLB, | Performed by: FAMILY MEDICINE

## 2022-01-13 PROCEDURE — 84484 ASSAY OF TROPONIN QUANT: CPT | Mod: HCNC | Performed by: EMERGENCY MEDICINE

## 2022-01-13 PROCEDURE — 93010 ELECTROCARDIOGRAM REPORT: CPT | Mod: HCNC,,, | Performed by: INTERNAL MEDICINE

## 2022-01-13 PROCEDURE — 99499 UNLISTED E&M SERVICE: CPT | Mod: HCNC,S$GLB,, | Performed by: FAMILY MEDICINE

## 2022-01-13 PROCEDURE — 1160F RVW MEDS BY RX/DR IN RCRD: CPT | Mod: HCNC,CPTII,S$GLB, | Performed by: FAMILY MEDICINE

## 2022-01-13 PROCEDURE — 99285 EMERGENCY DEPT VISIT HI MDM: CPT | Mod: 25,HCNC

## 2022-01-13 PROCEDURE — 99214 OFFICE O/P EST MOD 30 MIN: CPT | Mod: HCNC,S$GLB,, | Performed by: FAMILY MEDICINE

## 2022-01-13 RX ORDER — BETAMETHASONE DIPROPIONATE 0.5 MG/G
CREAM TOPICAL 2 TIMES DAILY
Qty: 50 G | Refills: 0 | Status: SHIPPED | OUTPATIENT
Start: 2022-01-13 | End: 2023-03-24

## 2022-01-13 RX ORDER — MIRTAZAPINE 15 MG/1
15 TABLET, FILM COATED ORAL NIGHTLY
Qty: 90 TABLET | Refills: 3 | Status: SHIPPED | OUTPATIENT
Start: 2022-01-13 | End: 2022-02-16 | Stop reason: DRUGHIGH

## 2022-01-13 RX ORDER — ASPIRIN 325 MG
325 TABLET ORAL
Status: COMPLETED | OUTPATIENT
Start: 2022-01-13 | End: 2022-01-13

## 2022-01-13 RX ORDER — HYDROCODONE POLISTIREX AND CHLORPHENIRAMINE POLISTIREX 10; 8 MG/5ML; MG/5ML
5 SUSPENSION, EXTENDED RELEASE ORAL EVERY 12 HOURS PRN
Qty: 115 ML | Refills: 0 | Status: SHIPPED | OUTPATIENT
Start: 2022-01-13 | End: 2023-03-22

## 2022-01-13 RX ADMIN — Medication 325 MG: at 06:01

## 2022-01-13 NOTE — PROGRESS NOTES
Subjective:       Patient ID: Jud Villa is a 61 y.o. female.    Chief Complaint: Follow-up, Depression, and Cough    61 years old female came to the clinic for diabetes follow-up.  Last A1c was stable.  No polyuria, polydipsia or polyphagia.  No recent flare ups of breast cancer.  Patient previously diagnosed with COPD.  She is requesting a medicine to help her to control cough symptoms .  She has reported psoriasis flare ups.  She is due for her bone density.    Review of Systems   Constitutional: Negative.    HENT: Negative.    Eyes: Negative.    Respiratory: Positive for cough.    Gastrointestinal: Negative.    Endocrine: Negative for polydipsia, polyphagia and polyuria.   Genitourinary: Negative.    Musculoskeletal: Negative.    Integumentary:  Positive for rash.   Neurological: Negative.    Psychiatric/Behavioral: Positive for dysphoric mood. The patient is nervous/anxious.          Objective:      Physical Exam  Constitutional:       General: She is not in acute distress.     Appearance: She is well-developed and well-nourished. She is not diaphoretic.   HENT:      Head: Normocephalic and atraumatic.      Right Ear: External ear normal.      Left Ear: External ear normal.      Nose: Nose normal.      Mouth/Throat:      Mouth: Oropharynx is clear and moist.      Pharynx: No oropharyngeal exudate.   Eyes:      General: No scleral icterus.        Right eye: No discharge.         Left eye: No discharge.      Extraocular Movements: EOM normal.      Conjunctiva/sclera: Conjunctivae normal.      Pupils: Pupils are equal, round, and reactive to light.   Neck:      Thyroid: No thyromegaly.      Vascular: No JVD.      Trachea: No tracheal deviation.   Cardiovascular:      Rate and Rhythm: Normal rate and regular rhythm.      Pulses: Intact distal pulses.      Heart sounds: Normal heart sounds. No murmur heard.  No friction rub. No gallop.    Pulmonary:      Effort: Pulmonary effort is normal. No respiratory  distress.      Breath sounds: No stridor. Examination of the left-upper field reveals rhonchi. Rhonchi present. No wheezing or rales.   Chest:      Chest wall: No tenderness.   Abdominal:      General: Bowel sounds are normal. There is no distension.      Palpations: Abdomen is soft. There is no mass.      Tenderness: There is no abdominal tenderness. There is no guarding or rebound.   Musculoskeletal:         General: No tenderness or edema. Normal range of motion.      Cervical back: Normal range of motion and neck supple.   Lymphadenopathy:      Cervical: No cervical adenopathy.   Skin:     General: Skin is warm and dry.      Coloration: Skin is not pale.      Findings: No erythema or rash.   Neurological:      Mental Status: She is alert and oriented to person, place, and time.      Cranial Nerves: No cranial nerve deficit.      Motor: No abnormal muscle tone.      Coordination: Coordination normal.      Deep Tendon Reflexes: Reflexes are normal and symmetric.   Psychiatric:         Mood and Affect: Mood and affect normal.         Behavior: Behavior normal.         Thought Content: Thought content normal.         Judgment: Judgment normal.         Assessment:       Problem List Items Addressed This Visit     Controlled type 2 diabetes mellitus with complication, with long-term current use of insulin (Chronic)    Relevant Orders    Comprehensive Metabolic Panel    Hemoglobin A1C    Lipid Panel    Microalbumin/Creatinine Ratio, Urine    Ambulatory referral/consult to Podiatry    Breast cancer metastasized to axillary lymph node, left    COPD exacerbation    Relevant Medications    hydrocodone-chlorpheniramine (TUSSIONEX) 10-8 mg/5 mL suspension    Moderate episode of recurrent major depressive disorder    Relevant Medications    mirtazapine (REMERON) 15 MG tablet    Drug-induced polyneuropathy - Primary      Other Visit Diagnoses     Psoriasis        Relevant Medications    augmented betamethasone dipropionate  (DIPROLENE-AF) 0.05 % cream    Asymptomatic menopause        Relevant Orders    DXA Bone Density Spine And Hip          Plan:           Jud was seen today for follow-up, depression and cough.    Diagnoses and all orders for this visit:    Drug-induced polyneuropathy    Breast cancer metastasized to axillary lymph node, left    Controlled type 2 diabetes mellitus with complication, with long-term current use of insulin  -     Comprehensive Metabolic Panel; Future  -     Hemoglobin A1C; Future  -     Lipid Panel; Future  -     Microalbumin/Creatinine Ratio, Urine; Future  -     Ambulatory referral/consult to Podiatry; Future    COPD exacerbation  -     hydrocodone-chlorpheniramine (TUSSIONEX) 10-8 mg/5 mL suspension; Take 5 mLs by mouth every 12 (twelve) hours as needed for Cough.    Moderate episode of recurrent major depressive disorder  -     mirtazapine (REMERON) 15 MG tablet; Take 1 tablet (15 mg total) by mouth every evening.    Psoriasis  -     augmented betamethasone dipropionate (DIPROLENE-AF) 0.05 % cream; Apply topically 2 (two) times daily. for 10 days    Asymptomatic menopause  -     Cancel: DXA Bone Density Spine And Hip; Future  -     DXA Bone Density Spine And Hip; Future

## 2022-01-14 RX ORDER — PANTOPRAZOLE SODIUM 40 MG/1
40 TABLET, DELAYED RELEASE ORAL
Qty: 90 TABLET | Refills: 3 | Status: SHIPPED | OUTPATIENT
Start: 2022-01-14 | End: 2022-01-20

## 2022-01-14 NOTE — ED PROVIDER NOTES
"Encounter Date: 1/13/2022       History     Chief Complaint   Patient presents with    Chest Pain     Midsternal chest pressure, "comes and goes", right arm feels weaker, neuro exam in triage shows no facial drop, equal strength bilaterally      61-year-old female with a history of breast cancer, diabetes, asthma presenting with midsternal chest pressure, intermittent lasting approximately 5-6 minutes at a time intermittently for approximately 1 hour.  Denies shortness of breath, nausea, vomiting, diaphoresis.  Denies history of similar symptoms.  Notes intermittent cough for approximately the last week.  Denies hemoptysis, productive cough, lower extremity edema, history of DVT or PE.  Also patient states she feels like her right arm is weak about the shoulder.  Denies numbness, weakness, changes in vision, facial droop, syncope, headache.  Previously in usual state of health.        Review of patient's allergies indicates:  No Known Allergies  Past Medical History:   Diagnosis Date    Asthma     Breast carcinoma     Cataract     Diabetes mellitus     Moderate episode of recurrent major depressive disorder 5/19/2021    Osteoporosis      Past Surgical History:   Procedure Laterality Date    BILATERAL MASTECTOMY  04/26/2018    CHOLECYSTECTOMY      COLONOSCOPY N/A 10/27/2015    Procedure: COLONOSCOPY;  Surgeon: Johnny Hurley MD;  Location: [facility];  Service: Endoscopy;  Laterality: N/A;    HYSTERECTOMY      LASER PERIPHERAL IRIDOTOMY Right 02/21/2019         Family History   Problem Relation Age of Onset    Diabetes Father     Amblyopia Neg Hx     Blindness Neg Hx     Cataracts Neg Hx     Glaucoma Neg Hx     Macular degeneration Neg Hx     Retinal detachment Neg Hx     Strabismus Neg Hx      Social History     Tobacco Use    Smoking status: Current Every Day Smoker     Packs/day: 1.00     Years: 0.00     Pack years: 0.00     Types: Cigarettes    Smokeless tobacco: Never Used "   Substance Use Topics    Alcohol use: No     Alcohol/week: 0.0 standard drinks    Drug use: No     Review of Systems   Constitutional: Negative for fever.   HENT: Negative for sore throat.    Respiratory: Positive for cough. Negative for shortness of breath.    Cardiovascular: Positive for chest pain.   Gastrointestinal: Negative for nausea.   Genitourinary: Negative for dysuria.   Musculoskeletal: Negative for back pain.   Skin: Negative for rash.   Neurological: Negative for weakness.   Psychiatric/Behavioral: Negative for confusion.       Physical Exam     Initial Vitals [01/13/22 1731]   BP Pulse Resp Temp SpO2   128/71 82 16 98.2 °F (36.8 °C) 97 %      MAP       --         Physical Exam    Nursing note and vitals reviewed.  Constitutional: She appears well-developed and well-nourished. She is not diaphoretic. No distress.   HENT:   Head: Normocephalic and atraumatic.   Nose: Nose normal.   Eyes: EOM are normal. Pupils are equal, round, and reactive to light. No scleral icterus.   Neck: Neck supple.   Normal range of motion.  Cardiovascular: Normal rate, regular rhythm, normal heart sounds and intact distal pulses. Exam reveals no gallop and no friction rub.    No murmur heard.  Pulmonary/Chest: Breath sounds normal. No stridor. No respiratory distress. She has no wheezes. She has no rhonchi. She has no rales.   Abdominal: Abdomen is soft. Normal appearance and bowel sounds are normal. She exhibits no distension. There is no abdominal tenderness. There is no rebound and no guarding.   Musculoskeletal:         General: No tenderness or edema. Normal range of motion.      Cervical back: Normal range of motion and neck supple.     Neurological: She is alert and oriented to person, place, and time. No cranial nerve deficit. GCS score is 15. GCS eye subscore is 4. GCS verbal subscore is 5. GCS motor subscore is 6.   Skin: Skin is warm and dry. No rash noted.   Psychiatric: She has a normal mood and affect. Her  behavior is normal.         ED Course   Procedures  Labs Reviewed   CBC W/ AUTO DIFFERENTIAL - Abnormal; Notable for the following components:       Result Value    WBC 13.05 (*)     Gran # (ANC) 8.7 (*)     All other components within normal limits   COMPREHENSIVE METABOLIC PANEL - Abnormal; Notable for the following components:    Sodium 134 (*)     Glucose 167 (*)     All other components within normal limits   D DIMER, QUANTITATIVE - Abnormal; Notable for the following components:    D-Dimer 0.63 (*)     All other components within normal limits   TROPONIN I   B-TYPE NATRIURETIC PEPTIDE   TROPONIN I     EKG Readings: (Independently Interpreted)   Initial Reading: No STEMI. Rhythm: Normal Sinus Rhythm. Heart Rate: 78. Ectopy: No Ectopy.   No ST segment elevation or depression concerning for acute ischemia.          Imaging Results          X-Ray Chest AP Portable (Final result)  Result time 01/13/22 20:33:47    Final result by Westley Santos MD (01/13/22 20:33:47)                 Impression:      No detrimental change or radiographic acute intrathoracic process seen.    Electronically signed by resident: Brad Hansen  Date:    01/13/2022  Time:    20:26    Electronically signed by: Westley Santos MD  Date:    01/13/2022  Time:    20:33             Narrative:    EXAMINATION:  XR CHEST AP PORTABLE    CLINICAL HISTORY:  Chest Pain;    TECHNIQUE:  Single frontal portable view of the chest was performed.    COMPARISON:  Chest radiograph 01/08/2022, 06/13/2020, 05/12/2020    FINDINGS:  Trachea and mediastinal structures are midline.  Cardiac silhouette appears stable.  Remote postoperative changes of the bilateral breasts similar to prior.  Surgical clips in the right upper quadrant.  Left axillary surgical clips.  No focal consolidation, pneumothorax, or pleural fluid.  No pneumoperitoneum.                                 Medications   aspirin tablet 325 mg (325 mg Oral Given 1/13/22 1830)     Medical Decision  Making:   Initial Assessment:   61 year old patient presenting secondary to chest pain.  Patient has received an aspirin. Troponins, Cxr, ekg, and labs ordered which showed no acute findings.    Also considered but less likely:     PE: normal rate, no sob/recent immovilization/surgery/travel/family history  Pneumonia: chest xray negative. No fever or leukoscytosis. No cough and lungs non consistent with pna  Tamponade: unlikely due to chest xray and ekg  STEMI: No STEMI on ekg  Dissection: equal pulses bilaterally and no ripping chest pain to the back  Esophageal rupture: no dysphagia or vomiting and chest xray negative for mediastinal air  Arrhythmia: no arrhythmia on ekg  CHF: no fluid overload on Cxr and physical exam  Pneumothorax: bilateral breath sounds and no signs of pneumothorax on chest xray      D-dimer at limit of age adjusted cutoff.  Symptoms not consistent with PE.  Troponin negative x2.  EKG nonischemic.  Doubt ACS.  Unclear cause, symptoms self-resolved.  Believe she is safe for discharge home.  Neuro exam reassuring, no significant weakness.  Doubt TIA or stroke.   Discussed return precautions.                      Clinical Impression:   Final diagnoses:  [R07.9] Chest pain          ED Disposition Condition    Discharge Stable        ED Prescriptions     None        Follow-up Information     Follow up With Specialties Details Why Contact Info    Bo Lopez MD Family Medicine Schedule an appointment as soon as possible for a visit   [address] [facility] [address] [phone]      [facility] - Emergency Dept Emergency Medicine  As needed, If symptoms worsen [address] [facility] [address] [phone]           Ben Bernard MD  01/13/22 2025

## 2022-01-14 NOTE — ED NOTES
"Patient states pain to chest with right arm "weakness" onset 1530 today, reports SOB, cough. Recent positive COVID, cleared Saturday. Currently on steroids and Zithromax  "

## 2022-01-14 NOTE — TELEPHONE ENCOUNTER
Care Due:                  Date            Visit Type   Department     Provider  --------------------------------------------------------------------------------                                             [facility]  Last Visit: 01-      [facility]       Bo Lopez                                           [facility]  Next Visit: 05-      [facility]       Bo Lopez                                                            Last  Test          Frequency    Reason                     Performed    Due Date  --------------------------------------------------------------------------------    HBA1C.......  6 months...  JARDIANCE, glipiZIDE,      08-   02-                             metFORMIN................    Powered by [employer]. Reference number: [id number].   1/14/2022 9:46:50 AM CST

## 2022-01-18 ENCOUNTER — TELEPHONE (OUTPATIENT)
Dept: FAMILY MEDICINE | Facility: CLINIC | Age: 62
End: 2022-01-18
Payer: MEDICARE

## 2022-01-18 NOTE — TELEPHONE ENCOUNTER
----- Message from Varsha Lamas sent at 1/18/2022  9:43 AM CST -----  Needs advice from nurse:      Who Called:pt  Regarding:patient having lab work done tomorrow, would like some lab work done for her kidneys/states she had covid and she thinks it is not effecting her kidneys  Would the patient rather a call back or [facility]IA [facility]?  Best Call Back number:[phone]  Additional Info:

## 2022-01-19 ENCOUNTER — LAB VISIT (OUTPATIENT)
Dept: LAB | Facility: HOSPITAL | Age: 62
End: 2022-01-19
Attending: FAMILY MEDICINE
Payer: MEDICARE

## 2022-01-19 DIAGNOSIS — E11.8 CONTROLLED TYPE 2 DIABETES MELLITUS WITH COMPLICATION, WITH LONG-TERM CURRENT USE OF INSULIN: ICD-10-CM

## 2022-01-19 DIAGNOSIS — Z79.4 CONTROLLED TYPE 2 DIABETES MELLITUS WITH COMPLICATION, WITH LONG-TERM CURRENT USE OF INSULIN: ICD-10-CM

## 2022-01-19 LAB
ALBUMIN SERPL BCP-MCNC: 3.3 G/DL (ref 3.5–5.2)
ALP SERPL-CCNC: 70 U/L (ref 55–135)
ALT SERPL W/O P-5'-P-CCNC: 15 U/L (ref 10–44)
ANION GAP SERPL CALC-SCNC: 10 MMOL/L (ref 8–16)
AST SERPL-CCNC: 23 U/L (ref 10–40)
BILIRUB SERPL-MCNC: 0.3 MG/DL (ref 0.1–1)
BUN SERPL-MCNC: 22 MG/DL (ref 8–23)
CALCIUM SERPL-MCNC: 9.8 MG/DL (ref 8.7–10.5)
CHLORIDE SERPL-SCNC: 103 MMOL/L (ref 95–110)
CHOLEST SERPL-MCNC: 186 MG/DL (ref 120–199)
CHOLEST/HDLC SERPL: 5.2 {RATIO} (ref 2–5)
CO2 SERPL-SCNC: 26 MMOL/L (ref 23–29)
CREAT SERPL-MCNC: 0.7 MG/DL (ref 0.5–1.4)
EST. GFR  (AFRICAN AMERICAN): >60 ML/MIN/1.73 M^2
EST. GFR  (NON AFRICAN AMERICAN): >60 ML/MIN/1.73 M^2
ESTIMATED AVG GLUCOSE: 157 MG/DL (ref 68–131)
GLUCOSE SERPL-MCNC: 123 MG/DL (ref 70–110)
HBA1C MFR BLD: 7.1 % (ref 4–5.6)
HDLC SERPL-MCNC: 36 MG/DL (ref 40–75)
HDLC SERPL: 19.4 % (ref 20–50)
LDLC SERPL CALC-MCNC: 112.4 MG/DL (ref 63–159)
NONHDLC SERPL-MCNC: 150 MG/DL
POTASSIUM SERPL-SCNC: 4.2 MMOL/L (ref 3.5–5.1)
PROT SERPL-MCNC: 7.1 G/DL (ref 6–8.4)
SODIUM SERPL-SCNC: 139 MMOL/L (ref 136–145)
TRIGL SERPL-MCNC: 188 MG/DL (ref 30–150)

## 2022-01-19 PROCEDURE — 83036 HEMOGLOBIN GLYCOSYLATED A1C: CPT | Mod: HCNC | Performed by: FAMILY MEDICINE

## 2022-01-19 PROCEDURE — 36415 COLL VENOUS BLD VENIPUNCTURE: CPT | Mod: HCNC,PO | Performed by: FAMILY MEDICINE

## 2022-01-19 PROCEDURE — 80053 COMPREHEN METABOLIC PANEL: CPT | Mod: HCNC | Performed by: FAMILY MEDICINE

## 2022-01-19 PROCEDURE — 80061 LIPID PANEL: CPT | Mod: HCNC | Performed by: FAMILY MEDICINE

## 2022-01-20 ENCOUNTER — OFFICE VISIT (OUTPATIENT)
Dept: FAMILY MEDICINE | Facility: CLINIC | Age: 62
End: 2022-01-20
Payer: MEDICARE

## 2022-01-20 ENCOUNTER — LAB VISIT (OUTPATIENT)
Dept: LAB | Facility: HOSPITAL | Age: 62
End: 2022-01-20
Attending: FAMILY MEDICINE
Payer: MEDICARE

## 2022-01-20 VITALS
SYSTOLIC BLOOD PRESSURE: 102 MMHG | OXYGEN SATURATION: 96 % | HEART RATE: 104 BPM | HEIGHT: 62 IN | DIASTOLIC BLOOD PRESSURE: 60 MMHG | WEIGHT: 130.94 LBS | BODY MASS INDEX: 24.09 KG/M2

## 2022-01-20 DIAGNOSIS — Z79.4 CONTROLLED TYPE 2 DIABETES MELLITUS WITH COMPLICATION, WITH LONG-TERM CURRENT USE OF INSULIN: ICD-10-CM

## 2022-01-20 DIAGNOSIS — K21.9 GASTROESOPHAGEAL REFLUX DISEASE WITHOUT ESOPHAGITIS: Primary | ICD-10-CM

## 2022-01-20 DIAGNOSIS — R10.13 EPIGASTRIC PAIN: ICD-10-CM

## 2022-01-20 DIAGNOSIS — K29.00 ACUTE GASTRITIS WITHOUT HEMORRHAGE, UNSPECIFIED GASTRITIS TYPE: ICD-10-CM

## 2022-01-20 DIAGNOSIS — E11.8 CONTROLLED TYPE 2 DIABETES MELLITUS WITH COMPLICATION, WITH LONG-TERM CURRENT USE OF INSULIN: ICD-10-CM

## 2022-01-20 DIAGNOSIS — F17.200 SMOKER: ICD-10-CM

## 2022-01-20 LAB
AMYLASE SERPL-CCNC: 33 U/L (ref 20–110)
LIPASE SERPL-CCNC: 16 U/L (ref 4–60)

## 2022-01-20 PROCEDURE — 99214 PR OFFICE/OUTPT VISIT, EST, LEVL IV, 30-39 MIN: ICD-10-PCS | Mod: HCNC,S$GLB,, | Performed by: FAMILY MEDICINE

## 2022-01-20 PROCEDURE — 99999 PR PBB SHADOW E&M-EST. PATIENT-LVL III: ICD-10-PCS | Mod: PBBFAC,HCNC,, | Performed by: FAMILY MEDICINE

## 2022-01-20 PROCEDURE — 99999 PR PBB SHADOW E&M-EST. PATIENT-LVL III: CPT | Mod: PBBFAC,HCNC,, | Performed by: FAMILY MEDICINE

## 2022-01-20 PROCEDURE — 3074F SYST BP LT 130 MM HG: CPT | Mod: HCNC,CPTII,S$GLB, | Performed by: FAMILY MEDICINE

## 2022-01-20 PROCEDURE — 3008F BODY MASS INDEX DOCD: CPT | Mod: HCNC,CPTII,S$GLB, | Performed by: FAMILY MEDICINE

## 2022-01-20 PROCEDURE — 3061F NEG MICROALBUMINURIA REV: CPT | Mod: HCNC,CPTII,S$GLB, | Performed by: FAMILY MEDICINE

## 2022-01-20 PROCEDURE — 83690 ASSAY OF LIPASE: CPT | Mod: HCNC | Performed by: FAMILY MEDICINE

## 2022-01-20 PROCEDURE — 3078F PR MOST RECENT DIASTOLIC BLOOD PRESSURE < 80 MM HG: ICD-10-PCS | Mod: HCNC,CPTII,S$GLB, | Performed by: FAMILY MEDICINE

## 2022-01-20 PROCEDURE — 3066F NEPHROPATHY DOC TX: CPT | Mod: HCNC,CPTII,S$GLB, | Performed by: FAMILY MEDICINE

## 2022-01-20 PROCEDURE — 3051F PR MOST RECENT HEMOGLOBIN A1C LEVEL 7.0 - < 8.0%: ICD-10-PCS | Mod: HCNC,CPTII,S$GLB, | Performed by: FAMILY MEDICINE

## 2022-01-20 PROCEDURE — 3008F PR BODY MASS INDEX (BMI) DOCUMENTED: ICD-10-PCS | Mod: HCNC,CPTII,S$GLB, | Performed by: FAMILY MEDICINE

## 2022-01-20 PROCEDURE — 36415 COLL VENOUS BLD VENIPUNCTURE: CPT | Mod: HCNC,PO | Performed by: FAMILY MEDICINE

## 2022-01-20 PROCEDURE — 99214 OFFICE O/P EST MOD 30 MIN: CPT | Mod: HCNC,S$GLB,, | Performed by: FAMILY MEDICINE

## 2022-01-20 PROCEDURE — 3051F HG A1C>EQUAL 7.0%<8.0%: CPT | Mod: HCNC,CPTII,S$GLB, | Performed by: FAMILY MEDICINE

## 2022-01-20 PROCEDURE — 3074F PR MOST RECENT SYSTOLIC BLOOD PRESSURE < 130 MM HG: ICD-10-PCS | Mod: HCNC,CPTII,S$GLB, | Performed by: FAMILY MEDICINE

## 2022-01-20 PROCEDURE — 3066F PR DOCUMENTATION OF TREATMENT FOR NEPHROPATHY: ICD-10-PCS | Mod: HCNC,CPTII,S$GLB, | Performed by: FAMILY MEDICINE

## 2022-01-20 PROCEDURE — 1159F MED LIST DOCD IN RCRD: CPT | Mod: HCNC,CPTII,S$GLB, | Performed by: FAMILY MEDICINE

## 2022-01-20 PROCEDURE — 3061F PR NEG MICROALBUMINURIA RESULT DOCUMENTED/REVIEW: ICD-10-PCS | Mod: HCNC,CPTII,S$GLB, | Performed by: FAMILY MEDICINE

## 2022-01-20 PROCEDURE — 82150 ASSAY OF AMYLASE: CPT | Mod: HCNC | Performed by: FAMILY MEDICINE

## 2022-01-20 PROCEDURE — 3078F DIAST BP <80 MM HG: CPT | Mod: HCNC,CPTII,S$GLB, | Performed by: FAMILY MEDICINE

## 2022-01-20 PROCEDURE — 1159F PR MEDICATION LIST DOCUMENTED IN MEDICAL RECORD: ICD-10-PCS | Mod: HCNC,CPTII,S$GLB, | Performed by: FAMILY MEDICINE

## 2022-01-20 RX ORDER — IBUPROFEN 200 MG
1 TABLET ORAL DAILY
Qty: 28 PATCH | Refills: 3 | Status: SHIPPED | OUTPATIENT
Start: 2022-01-20 | End: 2022-12-15

## 2022-01-20 RX ORDER — SUCRALFATE 1 G/1
1 TABLET ORAL
Qty: 90 TABLET | Refills: 0 | Status: SHIPPED | OUTPATIENT
Start: 2022-01-20 | End: 2022-01-21

## 2022-01-20 NOTE — PROGRESS NOTES
Subjective:       Patient ID: Jud Villa is a 61 y.o. female.    Chief Complaint: Abdominal Pain and Heartburn    61 years old female came to the clinic with epigastric pain for the last couple of weeks.  Patient was using Nexium with no significant improvement.  The pain is 3/10 of intensity on and off aggravated with palpation and better with rest.  She is smoker.    Review of Systems   Constitutional: Negative.  Negative for fever.   HENT: Negative.    Eyes: Negative.    Respiratory: Negative.  Negative for cough and shortness of breath.    Gastrointestinal: Positive for abdominal pain and reflux. Negative for abdominal distention, anal bleeding, blood in stool, change in bowel habit, constipation, diarrhea, nausea, rectal pain, vomiting, fecal incontinence and change in bowel habit.   Genitourinary: Negative.    Musculoskeletal: Negative.    Neurological: Negative.    Psychiatric/Behavioral: Negative.          Objective:      Physical Exam  Constitutional:       General: She is not in acute distress.     Appearance: She is well-developed and well-nourished. She is not diaphoretic.   HENT:      Head: Normocephalic and atraumatic.      Right Ear: External ear normal.      Left Ear: External ear normal.      Nose: Nose normal.      Mouth/Throat:      Mouth: Oropharynx is clear and moist.      Pharynx: No oropharyngeal exudate.   Eyes:      General: No scleral icterus.        Right eye: No discharge.         Left eye: No discharge.      Extraocular Movements: EOM normal.      Conjunctiva/sclera: Conjunctivae normal.      Pupils: Pupils are equal, round, and reactive to light.   Neck:      Thyroid: No thyromegaly.      Vascular: No JVD.      Trachea: No tracheal deviation.   Cardiovascular:      Rate and Rhythm: Normal rate and regular rhythm.      Pulses: Intact distal pulses.      Heart sounds: Normal heart sounds. No murmur heard.  No friction rub. No gallop.    Pulmonary:      Effort: Pulmonary effort is  normal. No respiratory distress.      Breath sounds: Normal breath sounds. No stridor. No wheezing or rales.   Chest:      Chest wall: No tenderness.   Abdominal:      General: Bowel sounds are normal. There is no distension.      Palpations: Abdomen is soft. There is no mass.      Tenderness: There is no abdominal tenderness. There is no guarding or rebound.   Musculoskeletal:         General: No tenderness or edema. Normal range of motion.      Cervical back: Normal range of motion and neck supple.   Lymphadenopathy:      Cervical: No cervical adenopathy.   Skin:     General: Skin is warm and dry.      Coloration: Skin is not pale.      Findings: No erythema or rash.   Neurological:      Mental Status: She is alert and oriented to person, place, and time.      Cranial Nerves: No cranial nerve deficit.      Motor: No abnormal muscle tone.      Coordination: Coordination normal.      Deep Tendon Reflexes: Reflexes are normal and symmetric.   Psychiatric:         Mood and Affect: Mood and affect normal.         Behavior: Behavior normal.         Thought Content: Thought content normal.         Judgment: Judgment normal.         Assessment:       Problem List Items Addressed This Visit     Smoker    Relevant Medications    nicotine (NICODERM CQ) 21 mg/24 hr    Other Relevant Orders    Ambulatory referral/consult to Smoking Cessation Program      Other Visit Diagnoses     Gastroesophageal reflux disease without esophagitis    -  Primary    Relevant Medications    sucralfate (CARAFATE) 1 gram tablet    Acute gastritis without hemorrhage, unspecified gastritis type        Relevant Medications    sucralfate (CARAFATE) 1 gram tablet    Other Relevant Orders    Case Request Endoscopy: ESOPHAGOGASTRODUODENOSCOPY (EGD) (Completed)    Epigastric pain        Relevant Orders    Amylase    Lipase    US Abdomen Complete          Plan:         Jud was seen today for abdominal pain and heartburn.    Diagnoses and all orders for  this visit:    Gastroesophageal reflux disease without esophagitis  -     sucralfate (CARAFATE) 1 gram tablet; Take 1 tablet (1 g total) by mouth 3 (three) times daily before meals.    Acute gastritis without hemorrhage, unspecified gastritis type  -     sucralfate (CARAFATE) 1 gram tablet; Take 1 tablet (1 g total) by mouth 3 (three) times daily before meals.  -     Case Request Endoscopy: ESOPHAGOGASTRODUODENOSCOPY (EGD)    Epigastric pain  -     Amylase; Future  -     Lipase; Future  -     US Abdomen Complete; Future    Smoker  -     nicotine (NICODERM CQ) 21 mg/24 hr; Place 1 patch onto the skin once daily.  -     Ambulatory referral/consult to Smoking Cessation Program; Future

## 2022-01-21 ENCOUNTER — PATIENT MESSAGE (OUTPATIENT)
Dept: ENDOSCOPY | Facility: HOSPITAL | Age: 62
End: 2022-01-21
Payer: MEDICARE

## 2022-01-24 ENCOUNTER — HOSPITAL ENCOUNTER (OUTPATIENT)
Facility: HOSPITAL | Age: 62
Discharge: HOME | End: 2022-01-24
Attending: INTERNAL MEDICINE | Admitting: INTERNAL MEDICINE
Payer: MEDICARE

## 2022-01-24 ENCOUNTER — ANESTHESIA EVENT (OUTPATIENT)
Dept: ENDOSCOPY | Facility: HOSPITAL | Age: 62
End: 2022-01-24
Payer: MEDICARE

## 2022-01-24 ENCOUNTER — PATIENT OUTREACH (OUTPATIENT)
Dept: ADMINISTRATIVE | Facility: OTHER | Age: 62
End: 2022-01-24
Payer: MEDICARE

## 2022-01-24 ENCOUNTER — ANESTHESIA (OUTPATIENT)
Dept: ENDOSCOPY | Facility: HOSPITAL | Age: 62
End: 2022-01-24
Payer: MEDICARE

## 2022-01-24 VITALS
SYSTOLIC BLOOD PRESSURE: 111 MMHG | WEIGHT: 129 LBS | RESPIRATION RATE: 18 BRPM | TEMPERATURE: 97 F | HEIGHT: 62 IN | DIASTOLIC BLOOD PRESSURE: 67 MMHG | BODY MASS INDEX: 23.74 KG/M2 | OXYGEN SATURATION: 97 % | HEART RATE: 84 BPM

## 2022-01-24 DIAGNOSIS — K21.9 GASTROESOPHAGEAL REFLUX DISEASE, UNSPECIFIED WHETHER ESOPHAGITIS PRESENT: Primary | ICD-10-CM

## 2022-01-24 DIAGNOSIS — K21.00 REFLUX ESOPHAGITIS: ICD-10-CM

## 2022-01-24 LAB
CTP QC/QA: YES
POCT GLUCOSE: 117 MG/DL (ref 70–110)
SARS-COV-2 AG RESP QL IA.RAPID: NEGATIVE

## 2022-01-24 PROCEDURE — 88342 CHG IMMUNOCYTOCHEMISTRY: ICD-10-PCS | Mod: 26,HCNC,, | Performed by: PATHOLOGY

## 2022-01-24 PROCEDURE — 25000003 PHARM REV CODE 250: Mod: HCNC | Performed by: NURSE ANESTHETIST, CERTIFIED REGISTERED

## 2022-01-24 PROCEDURE — E9220 PRA ENDO ANESTHESIA: ICD-10-PCS | Mod: HCNC,,, | Performed by: NURSE ANESTHETIST, CERTIFIED REGISTERED

## 2022-01-24 PROCEDURE — 43239 EGD BIOPSY SINGLE/MULTIPLE: CPT | Mod: HCNC,,, | Performed by: INTERNAL MEDICINE

## 2022-01-24 PROCEDURE — 88305 TISSUE EXAM BY PATHOLOGIST: ICD-10-PCS | Mod: 26,HCNC,, | Performed by: PATHOLOGY

## 2022-01-24 PROCEDURE — 37000009 HC ANESTHESIA EA ADD 15 MINS: Mod: HCNC | Performed by: INTERNAL MEDICINE

## 2022-01-24 PROCEDURE — 88305 TISSUE EXAM BY PATHOLOGIST: CPT | Mod: 26,HCNC,, | Performed by: PATHOLOGY

## 2022-01-24 PROCEDURE — 43239 EGD BIOPSY SINGLE/MULTIPLE: CPT | Mod: HCNC | Performed by: INTERNAL MEDICINE

## 2022-01-24 PROCEDURE — 63600175 PHARM REV CODE 636 W HCPCS: Mod: HCNC | Performed by: NURSE ANESTHETIST, CERTIFIED REGISTERED

## 2022-01-24 PROCEDURE — 37000008 HC ANESTHESIA 1ST 15 MINUTES: Mod: HCNC | Performed by: INTERNAL MEDICINE

## 2022-01-24 PROCEDURE — 88305 TISSUE EXAM BY PATHOLOGIST: CPT | Mod: HCNC | Performed by: PATHOLOGY

## 2022-01-24 PROCEDURE — E9220 PRA ENDO ANESTHESIA: HCPCS | Mod: HCNC,,, | Performed by: NURSE ANESTHETIST, CERTIFIED REGISTERED

## 2022-01-24 PROCEDURE — 88342 IMHCHEM/IMCYTCHM 1ST ANTB: CPT | Mod: 26,HCNC,, | Performed by: PATHOLOGY

## 2022-01-24 PROCEDURE — 88342 IMHCHEM/IMCYTCHM 1ST ANTB: CPT | Mod: HCNC | Performed by: PATHOLOGY

## 2022-01-24 PROCEDURE — 27201012 HC FORCEPS, HOT/COLD, DISP: Mod: HCNC | Performed by: INTERNAL MEDICINE

## 2022-01-24 PROCEDURE — 43239 PR EGD, FLEX, W/BIOPSY, SGL/MULTI: ICD-10-PCS | Mod: HCNC,,, | Performed by: INTERNAL MEDICINE

## 2022-01-24 RX ORDER — PANTOPRAZOLE SODIUM 40 MG/1
40 TABLET, DELAYED RELEASE ORAL DAILY
Qty: 30 TABLET | Refills: 1 | Status: SHIPPED | OUTPATIENT
Start: 2022-01-24 | End: 2022-12-15 | Stop reason: SDUPTHER

## 2022-01-24 RX ORDER — PROPOFOL 10 MG/ML
VIAL (ML) INTRAVENOUS
Status: DISCONTINUED | OUTPATIENT
Start: 2022-01-24 | End: 2022-01-24

## 2022-01-24 RX ORDER — LIDOCAINE HYDROCHLORIDE 20 MG/ML
INJECTION INTRAVENOUS
Status: DISCONTINUED | OUTPATIENT
Start: 2022-01-24 | End: 2022-01-24

## 2022-01-24 RX ORDER — PROPOFOL 10 MG/ML
VIAL (ML) INTRAVENOUS CONTINUOUS PRN
Status: DISCONTINUED | OUTPATIENT
Start: 2022-01-24 | End: 2022-01-24

## 2022-01-24 RX ORDER — SODIUM CHLORIDE 9 MG/ML
INJECTION, SOLUTION INTRAVENOUS CONTINUOUS
Status: DISCONTINUED | OUTPATIENT
Start: 2022-01-24 | End: 2022-01-24 | Stop reason: HOSPADM

## 2022-01-24 RX ADMIN — Medication 70 MG: at 12:01

## 2022-01-24 RX ADMIN — LIDOCAINE HYDROCHLORIDE 100 MG: 20 INJECTION, SOLUTION INTRAVENOUS at 12:01

## 2022-01-24 RX ADMIN — GLYCOPYRROLATE 0.2 MG: 0.2 INJECTION, SOLUTION INTRAMUSCULAR; INTRAVITREAL at 12:01

## 2022-01-24 RX ADMIN — PROPOFOL 200 MCG/KG/MIN: 10 INJECTION, EMULSION INTRAVENOUS at 12:01

## 2022-01-24 RX ADMIN — SODIUM CHLORIDE: 0.9 INJECTION, SOLUTION INTRAVENOUS at 11:01

## 2022-01-24 NOTE — H&P
Short Stay Endoscopy History and Physical    PCP - Bo Lopez MD  Referring Physician - Bo Lopez MD  [address] [facility]  [place] [id number]    Procedure - Endoscopy  ASA - per anesthesia  Mallampati - per anesthesia  History of Anesthesia problems - no  Family history Anesthesia problems -  no   Plan of anesthesia - General    HPI  61 y.o. female  Reason for procedure:   Abd pain, reflux    ROS:  Constitutional: No fevers, chills, No weight loss  CV: No chest pain  Pulm: No cough, No shortness of breath  GI: see HPI    Medical History:  has a past medical history of Asthma, Breast carcinoma, Cataract, Diabetes mellitus, Moderate episode of recurrent major depressive disorder (5/19/2021), and Osteoporosis.    Surgical History:  has a past surgical history that includes Cholecystectomy; Hysterectomy; Colonoscopy (N/A, 10/27/2015); LASER PERIPHERAL IRIDOTOMY (Right, 02/21/2019); and Bilateral mastectomy (04/26/2018).    Family History: family history includes Diabetes in her father..    Social History:  reports that she has been smoking cigarettes. She has been smoking about 1.00 pack per day for the past 0.00 years. She has never used smokeless tobacco. She reports that she does not drink alcohol and does not use drugs.    Review of patient's allergies indicates:  No Known Allergies    Medications:   Medications Prior to Admission   Medication Sig Dispense Refill Last Dose    albuterol (PROVENTIL) 2.5 mg /3 mL (0.083 %) nebulizer solution INHALE 1 VIAL PER NEBULIZER FOUR TIMES DAILY AS NEEDED FOR SHORTNESS OF BREATH/ WHEEZING 360 mL 11     albuterol (VENTOLIN HFA) 90 mcg/actuation inhaler INHALE 2 PUFFS BY MOUTH INTO THE LUNGS EVERY 6 HOURS AS NEEDED FOR WHEEZING 18 g 11     albuterol-ipratropium (DUO-NEB) 2.5 mg-0.5 mg/3 mL nebulizer solution Take 3 mLs by nebulization once daily. 1 Box 11     alendronate (FOSAMAX) 70 MG tablet TAKE 1 TABLET BY MOUTH EVERY 7 DAYS. TAKE WITH 8 OUNCES OF  "WATER AND 30 MINUTES BEFORE FOOD, STAY UPRIGHT FOR 30 MINUTES 12 tablet 3     augmented betamethasone dipropionate (DIPROLENE-AF) 0.05 % cream Apply topically 2 (two) times daily. for 10 days 50 g 0     azithromycin (Z-ANDREA) 250 MG tablet Take 2 tablets by mouth on day 1; Take 1 tablet by mouth on days 2-5 6 tablet 0     BASAGLAR KWIKPEN U-100 INSULIN glargine 100 units/mL (3mL) SubQ pen INJECT 10 UNITS UNDER THE SKIN EVERY MORNING 15 mL 0     BD ULTRA-FINE SHORT PEN NEEDLE 31 gauge x 5/16" Ndle Inject 1 pen into the skin once daily. 100 each 3     brompheniramine-pseudoeph-DM (BROMFED DM) 2-30-10 mg/5 mL Syrp TAKE 10 ML BY MOUTH THREE TIMES DAILY AS NEEDED 118 mL 0     carboxymethylcellulose sodium 1 % DpGe Apply 1 drop to eye 4 (four) times daily. 1 each 0     clonazePAM (KLONOPIN) 0.5 MG tablet Take 1 tablet (0.5 mg total) by mouth nightly as needed for Anxiety. 30 tablet 0     clotrimazole-betamethasone 1-0.05% (LOTRISONE) cream Apply topically 2 (two) times daily. 1 Tube 2     ergocalciferol (ERGOCALCIFEROL) 50,000 unit Cap Take 1 capsule (50,000 Units total) by mouth every 7 days. 12 capsule 3     fluticasone-umeclidin-vilanter (TRELEGY ELLIPTA) 100-62.5-25 mcg DsDv Inhale 1 puff into the lungs once daily. 1 each 11     gabapentin (NEURONTIN) 300 MG capsule TAKE 1 CAPSULE(300 MG) BY MOUTH THREE TIMES DAILY 270 capsule 3     glipiZIDE (GLUCOTROL) 10 MG tablet TAKE 1 TABLET(10 MG) BY MOUTH TWICE DAILY 180 tablet 3     hydrocodone-chlorpheniramine (TUSSIONEX) 10-8 mg/5 mL suspension Take 5 mLs by mouth every 12 (twelve) hours as needed for Cough. 115 mL 0     JARDIANCE 10 mg tablet TAKE 1 TABLET(10 MG) BY MOUTH EVERY DAY 90 tablet 3     metFORMIN (GLUCOPHAGE) 1000 MG tablet TAKE 1 TABLET(1000 MG) BY MOUTH TWICE DAILY WITH MEALS 180 tablet 0     mirtazapine (REMERON) 15 MG tablet Take 1 tablet (15 mg total) by mouth every evening. 90 tablet 3     mupirocin (BACTROBAN) 2 % ointment Apply topically 2 " (two) times daily. 22 g 0     nicotine (NICODERM CQ) 21 mg/24 hr Place 1 patch onto the skin once daily. 28 patch 3     ondansetron (ZOFRAN-ODT) 4 MG TbDL Take 1 tablet (4 mg total) by mouth 3 (three) times daily. 30 tablet 0     oxymetazoline (AFRIN) 0.05 % nasal spray 2 sprays by Nasal route.       potassium chloride (MICRO-K) 8 mEq CpSR Take 1 capsule (8 mEq total) by mouth once daily. 90 capsule 1     prednisoLONE acetate (PRED FORTE) 1 % DrpS Place 1 drop into the left eye 4 (four) times daily. 5 mL 0     sucralfate (CARAFATE) 1 gram tablet TAKE 1 TABLET(1 GRAM) BY MOUTH THREE TIMES DAILY BEFORE MEALS 270 tablet 3     topiramate (TOPAMAX) 50 MG tablet Take 2 tablets (100 mg total) by mouth 2 (two) times daily. TAKE 1 TABLET BY MOUTH TWICE DAILY FOR 7 DAYS, THEN 1 TABLET EVERY MORNING AND 2 TABLETS EVERY EVENING FOR 7 DAYS, THEN 2 TABLETS TWICE DAILY 360 tablet 1     TRADJENTA 5 mg Tab tablet        traMADoL (ULTRAM) 50 mg tablet Take 1 tablet (50 mg total) by mouth every 6 (six) hours as needed for Pain. 12 tablet 0     TRUE METRIX GLUCOSE TEST STRIP Strp USE AS DIRECTED FOUR TIMES DAILY 50 each 0        Physical Exam:    Vital Signs: There were no vitals filed for this visit.    General Appearance: Well appearing in no acute distress  Abdomen: Soft, non tender, non distended with normal bowel sounds, no masses    Labs:  Lab Results   Component Value Date    WBC 13.05 (H) 01/13/2022    HGB 14.7 01/13/2022    HCT 45.4 01/13/2022     01/13/2022    CHOL 186 01/19/2022    TRIG 188 (H) 01/19/2022    HDL 36 (L) 01/19/2022    ALT 15 01/19/2022    AST 23 01/19/2022     01/19/2022    K 4.2 01/19/2022     01/19/2022    CREATININE 0.7 01/19/2022    BUN 22 01/19/2022    CO2 26 01/19/2022    TSH 2.913 09/23/2020    INR 0.9 04/11/2018    HGBA1C 7.1 (H) 01/19/2022       I have explained the risks and benefits of this endoscopic procedure to the patient including but not limited to bleeding,  inflammation, infection, perforation, and death.      Danny Shoemaker MD

## 2022-01-24 NOTE — DISCHARGE INSTRUCTIONS
Patient Education       Upper GI Endoscopy   Why is this procedure done?   This procedure is done to view your upper gastrointestinal (GI) tract. This includes your throat and food pipe (esophagus). It also includes your stomach and the first part of the small bowel. Some people have this test for problems like coughing or throwing up blood. Other people may be having bad belly pain or blood in their stool. You may be having trouble swallowing or problems with acid reflux.  Doctors often use this test to look for problems like:  · Ulcers  · Cancer or tumor growths  · Internal bleeding  · Swelling  · Inflammation  · Infection  · Sandoval's esophagus  · Gastroesophageal reflux disease or GERD  · Swallowing problems     What will the results be?   Your doctor may find the problem inside your body that is causing your signs. The doctor can also treat some problems while doing this procedure. This may include things like stopping bleeding or removing a growth.  What happens before the procedure?   Your doctor will take your history and do an exam. Talk to the doctor about:  · All the drugs you are taking. Be sure to include all prescription, over the counter, vitamins, and herbal supplements. Bring a list of drugs you take with you.  · Tell the doctor if you have any drug allergy.  · Any bleeding problems. Be sure to tell your doctor if you are taking any drugs that may cause bleeding. Some of these are warfarin, rivaroxaban, apixaban, ticagrelor, clopidogrel, ketorolac, ibuprofen, naproxen, or aspirin. Certain vitamins and herbs, such as garlic and fish oil, may also add to the risk for bleeding. You may need to stop these drugs as well. Talk to your doctor about them.  · When you need to stop eating or drinking before your procedure.  You will not be allowed to drive right away after the procedure. Ask a family member or a friend to drive you home.  What happens during the procedure?   · Once you are in the operating  room, the staff will put an IV in your arm to give you fluids and drugs. You will be given a drug to make you sleepy. It will also help you stay pain free during the surgery.  · Your doctor may spray a drug in your throat to numb the area.  · You will be asked to lie on your left side. The staff may put a small tube in your nose to help you breathe. Your doctor may place a tool in your mouth to keep it open during the procedure. The staff may place a suction tool in your mouth to lessen saliva flow.  · The doctor will put a special scope in your mouth and down your food pipe. It is a long, thin tube with lights and a small camera. It sends images to a screen in the operating room where the camera is being used.  · To be able to view the site clearly, gas will be pumped into your belly.  · Your doctor will use the scope to see if there are problems in your upper GI tract. Small tools may be used with the scope to fix any problems that are found. Your doctor may stop an area of bleeding or take out a tumor. The doctor may also remove a growth or take tissue samples for biopsy.  · This procedure takes about 15 to 30 minutes.  What happens after the procedure?   · You will go to the Recovery Room and the staff will watch you closely.  · You will be allowed to go home when you are awake and able to eat and drink.  · You may feel bloated after the procedure. This is from any gas the doctor may have used to help see your GI tract better.  · You may have a sore throat after the procedure. You can drink fluid once the numbing drugs in your throat wear off.  · Ask your doctor when the results will be available. Set up a visit to talk about them.  What drugs may be needed?   The doctor may order drugs to:  · Help with pain  · Decrease the acid in your stomach  What problems could happen?   · Painful swallowing  · Upset stomach  · Injury to food pipe   · Throwing up  · Tear in the esophagus  Where can I learn more?   [employer]  [employer]  https://gi.org/topics/upper-gi-endoscopy-[email or web address]   Last Reviewed Date   2021-10-05  Consumer Information Use and Disclaimer   This information is not specific medical advice and does not replace information you receive from your health care provider. This is only a brief summary of general information. It does NOT include all information about conditions, illnesses, injuries, tests, procedures, treatments, therapies, discharge instructions or life-style choices that may apply to you. You must talk with your health care provider for complete information about your health and treatment options. This information should not be used to decide whether or not to accept your health care providers advice, instructions or recommendations. Only your health care provider has the knowledge and training to provide advice that is right for you.  Copyright   Copyright © 2021 [employer] and its affiliates and/or licensors. All rights reserved.

## 2022-01-24 NOTE — ANESTHESIA POSTPROCEDURE EVALUATION
Anesthesia Post Evaluation    Patient: Jud Villa    Procedure(s) Performed: Procedure(s) (LRB):  ESOPHAGOGASTRODUODENOSCOPY (EGD) (N/A)    Final Anesthesia Type: general                        Vitals Value Taken Time   /67 01/24/22 1241   Temp * 01/24/22 1352   Pulse 84 01/24/22 1241   Resp 18 01/24/22 1241   SpO2 97 % 01/24/22 1241         Event Time   Out of Recovery 12:48:32         Pain/Javed Score: Javed Score: 10 (1/24/2022 12:19 PM)

## 2022-01-24 NOTE — PROGRESS NOTES
Health Maintenance Due   Topic Date Due    Low Dose Statin  Never done    Shingles Vaccine (1 of 2) Never done    DEXA SCAN  12/06/2020    Foot Exam  08/26/2021    Influenza Vaccine (1) 09/01/2021    COVID-19 Vaccine (3 - Booster for Pfizer series) 10/18/2021     Updates were requested from care everywhere.  Chart was reviewed for overdue Proactive [facility] Encounters ([facility]) topics (CRS, Breast Cancer Screening, Eye exam)  Health Maintenance has been updated.  LINKS immunization registry triggered.  Immunizations were reconciled.

## 2022-01-24 NOTE — PROVATION PATIENT INSTRUCTIONS
Discharge Summary/Instructions after an Endoscopic Procedure  Patient Name: Jud Villa  Patient MRN: [id number]  Patient YOB: 1960  Monday, January 24, 2022  Mili Katz MD  Dear patient,  As a result of recent federal legislation (The Federal Cures Act), you may   receive lab or pathology results from your procedure in your [facility]   account before your physician is able to contact you. Your physician or   their representative will relay the results to you with their   recommendations at their soonest availability.  Thank you,  RESTRICTIONS:  During your procedure today, you received medications for sedation.  These   medications may affect your judgment, balance and coordination.  Therefore,   for 24 hours, you have the following restrictions:   - DO NOT drive a car, operate machinery, make legal/financial decisions,   sign important papers or drink alcohol.    ACTIVITY:  Today: no heavy lifting, straining or running due to procedural   sedation/anesthesia.  The following day: return to full activity including work.  DIET:  Eat and drink normally unless instructed otherwise.     TREATMENT FOR COMMON SIDE EFFECTS:  - Mild abdominal pain, nausea, belching, bloating or excessive gas:  rest,   eat lightly and use a heating pad.  - Sore Throat: treat with throat lozenges and/or gargle with warm salt   water.  - Because air was used during the procedure, expelling large amounts of air   from your rectum or belching is normal.  - If a bowel prep was taken, you may not have a bowel movement for 1-3 days.    This is normal.  SYMPTOMS TO WATCH FOR AND REPORT TO YOUR PHYSICIAN:  1. Abdominal pain or bloating, other than gas cramps.  2. Chest pain.  3. Back pain.  4. Signs of infection such as: chills or fever occurring within 24 hours   after the procedure.  5. Rectal bleeding, which would show as bright red, maroon, or black stools.   (A tablespoon of blood from the rectum is not serious, especially  if   hemorrhoids are present.)  6. Vomiting.  7. Weakness or dizziness.  GO DIRECTLY TO THE NEAREST EMERGENCY ROOM IF YOU HAVE ANY OF THE FOLLOWING:      Difficulty breathing              Chills and/or fever over 101 F   Persistent vomiting and/or vomiting blood   Severe abdominal pain   Severe chest pain   Black, tarry stools   Bleeding- more than one tablespoon   Any other symptom or condition that you feel may need urgent attention  Your doctor recommends these additional instructions:  If any biopsies were taken, your doctors clinic will contact you in 1 to 2   weeks with any results.  - Discharge patient to home.   - Resume previous diet.   - Continue present medications.   - Await pathology results.  For questions, problems or results please call your physician - Mili Katz MD at Work:  [phone].  [facility] [place], EMERGENCY ROOM PHONE NUMBER: [phone]  IF A COMPLICATION OR EMERGENCY SITUATION ARISES AND YOU ARE UNABLE TO REACH   YOUR PHYSICIAN - GO DIRECTLY TO THE EMERGENCY ROOM.  Mili Katz MD  1/24/2022 12:12:59 PM  This report has been verified and signed electronically.  Dear patient,  As a result of recent federal legislation (The Federal Cures Act), you may   receive lab or pathology results from your procedure in your [facility]   account before your physician is able to contact you. Your physician or   their representative will relay the results to you with their   recommendations at their soonest availability.  Thank you,  [employer]ATION

## 2022-01-24 NOTE — TRANSFER OF CARE
"Anesthesia Transfer of Care Note    Patient: Jud Villa    Procedure(s) Performed: Procedure(s) (LRB):  ESOPHAGOGASTRODUODENOSCOPY (EGD) (N/A)    Patient location: PACU    Anesthesia Type: general    Transport from OR: Transported from OR on 2-3 L/min O2 by NC with adequate spontaneous ventilation    Post pain: adequate analgesia    Post assessment: tolerated procedure well and no apparent anesthetic complications    Post vital signs: stable    Level of consciousness: sedated    Nausea/Vomiting: no nausea/vomiting    Complications: none    Transfer of care protocol was followed      Last vitals:   Visit Vitals  /67   Pulse 88   Temp 36.2 °C (97.2 °F)   Resp 15   Ht 5' 2" (1.575 m)   Wt 58.5 kg (129 lb)   SpO2 97%   Breastfeeding No   BMI 23.59 kg/m²     "

## 2022-01-24 NOTE — ANESTHESIA PREPROCEDURE EVALUATION
"                                                                                                             01/24/2022  Jud Villa is a 61 y.o., female.  Pre-operative evaluation for Procedure(s) (LRB):  ESOPHAGOGASTRODUODENOSCOPY (EGD) (N/A)    Jud Villa is a 61 y.o. female     Patient Active Problem List   Diagnosis    Vertigo    Chronic migraine without aura, intractable, with status migrainosus    Dizziness    Anxiety state, unspecified    Smoker    History of colon polyps    COPD (chronic obstructive pulmonary disease)    Breast cancer metastasized to axillary lymph node, left    Former smoker    Chronic frontal sinusitis    Controlled type 2 diabetes mellitus with complication, with long-term current use of insulin    COPD exacerbation    Cigarette nicotine dependence without complication    Impaired mobility and activities of daily living    Chronic right shoulder pain    Weakness    Moderate episode of recurrent major depressive disorder    Medication overuse headache    Drug-induced polyneuropathy       Review of patient's allergies indicates:  No Known Allergies    No current facility-administered medications on file prior to encounter.     Current Outpatient Medications on File Prior to Encounter   Medication Sig Dispense Refill    albuterol (PROVENTIL) 2.5 mg /3 mL (0.083 %) nebulizer solution INHALE 1 VIAL PER NEBULIZER FOUR TIMES DAILY AS NEEDED FOR SHORTNESS OF BREATH/ WHEEZING 360 mL 11    albuterol (VENTOLIN HFA) 90 mcg/actuation inhaler INHALE 2 PUFFS BY MOUTH INTO THE LUNGS EVERY 6 HOURS AS NEEDED FOR WHEEZING 18 g 11    alendronate (FOSAMAX) 70 MG tablet TAKE 1 TABLET BY MOUTH EVERY 7 DAYS. TAKE WITH 8 OUNCES OF WATER AND 30 MINUTES BEFORE FOOD, STAY UPRIGHT FOR 30 MINUTES 12 tablet 3    BD ULTRA-FINE SHORT PEN NEEDLE 31 gauge x 5/16" Ndle Inject 1 pen into the skin once daily. 100 each 3    clonazePAM (KLONOPIN) 0.5 MG tablet Take 1 tablet (0.5 mg total) by " mouth nightly as needed for Anxiety. 30 tablet 0    clotrimazole-betamethasone 1-0.05% (LOTRISONE) cream Apply topically 2 (two) times daily. 1 Tube 2    ergocalciferol (ERGOCALCIFEROL) 50,000 unit Cap Take 1 capsule (50,000 Units total) by mouth every 7 days. 12 capsule 3    fluticasone-umeclidin-vilanter (TRELEGY ELLIPTA) 100-62.5-25 mcg DsDv Inhale 1 puff into the lungs once daily. 1 each 11    glipiZIDE (GLUCOTROL) 10 MG tablet TAKE 1 TABLET(10 MG) BY MOUTH TWICE DAILY 180 tablet 3    JARDIANCE 10 mg tablet TAKE 1 TABLET(10 MG) BY MOUTH EVERY DAY 90 tablet 3    metFORMIN (GLUCOPHAGE) 1000 MG tablet TAKE 1 TABLET(1000 MG) BY MOUTH TWICE DAILY WITH MEALS 180 tablet 0    mirtazapine (REMERON) 15 MG tablet Take 1 tablet (15 mg total) by mouth every evening. 90 tablet 3    topiramate (TOPAMAX) 50 MG tablet Take 2 tablets (100 mg total) by mouth 2 (two) times daily. TAKE 1 TABLET BY MOUTH TWICE DAILY FOR 7 DAYS, THEN 1 TABLET EVERY MORNING AND 2 TABLETS EVERY EVENING FOR 7 DAYS, THEN 2 TABLETS TWICE DAILY 360 tablet 1    TRADJENTA 5 mg Tab tablet       traMADoL (ULTRAM) 50 mg tablet Take 1 tablet (50 mg total) by mouth every 6 (six) hours as needed for Pain. 12 tablet 0    TRUE METRIX GLUCOSE TEST STRIP Strp USE AS DIRECTED FOUR TIMES DAILY 50 each 0    albuterol-ipratropium (DUO-NEB) 2.5 mg-0.5 mg/3 mL nebulizer solution Take 3 mLs by nebulization once daily. 1 Box 11    augmented betamethasone dipropionate (DIPROLENE-AF) 0.05 % cream Apply topically 2 (two) times daily. for 10 days 50 g 0    azithromycin (Z-ANDREA) 250 MG tablet Take 2 tablets by mouth on day 1; Take 1 tablet by mouth on days 2-5 6 tablet 0    BASAGLAR KWIKPEN U-100 INSULIN glargine 100 units/mL (3mL) SubQ pen INJECT 10 UNITS UNDER THE SKIN EVERY MORNING 15 mL 0    brompheniramine-pseudoeph-DM (BROMFED DM) 2-30-10 mg/5 mL Syrp TAKE 10 ML BY MOUTH THREE TIMES DAILY AS NEEDED 118 mL 0    carboxymethylcellulose sodium 1 % DpGe Apply 1  drop to eye 4 (four) times daily. 1 each 0    hydrocodone-chlorpheniramine (TUSSIONEX) 10-8 mg/5 mL suspension Take 5 mLs by mouth every 12 (twelve) hours as needed for Cough. 115 mL 0    mupirocin (BACTROBAN) 2 % ointment Apply topically 2 (two) times daily. 22 g 0    nicotine (NICODERM CQ) 21 mg/24 hr Place 1 patch onto the skin once daily. 28 patch 3    ondansetron (ZOFRAN-ODT) 4 MG TbDL Take 1 tablet (4 mg total) by mouth 3 (three) times daily. 30 tablet 0    oxymetazoline (AFRIN) 0.05 % nasal spray 2 sprays by Nasal route.      potassium chloride (MICRO-K) 8 mEq CpSR Take 1 capsule (8 mEq total) by mouth once daily. 90 capsule 1    prednisoLONE acetate (PRED FORTE) 1 % DrpS Place 1 drop into the left eye 4 (four) times daily. 5 mL 0       Past Surgical History:   Procedure Laterality Date    BILATERAL MASTECTOMY  04/26/2018    CHOLECYSTECTOMY      COLONOSCOPY N/A 10/27/2015    Procedure: COLONOSCOPY;  Surgeon: Johnny Hurley MD;  [facility];  Service: Endoscopy;  Laterality: N/A;    HYSTERECTOMY      LASER PERIPHERAL IRIDOTOMY Right 02/21/2019           Social History     Socioeconomic History    Marital status:    Tobacco Use    Smoking status: Current Every Day Smoker     Packs/day: 1.00     Years: 0.00     Pack years: 0.00     Types: Cigarettes    Smokeless tobacco: Never Used   Substance and Sexual Activity    Alcohol use: No     Alcohol/week: 0.0 standard drinks    Drug use: No         Anesthesia Evaluation    I have reviewed the Patient Summary Reports.    I have reviewed the Nursing Notes.    I have reviewed the Medications.     Review of Systems  Anesthesia Hx:  No problems with previous Anesthesia  History of prior surgery of interest to airway management or planning: Denies Family Hx of Anesthesia complications.   Denies Personal Hx of Anesthesia complications.   Social:  Smoker    Hematology/Oncology:  Hematology Normal   Oncology Normal      EENT/Dental:EENT/Dental Normal   Cardiovascular:  Cardiovascular Normal     Pulmonary:   COPD Asthma    Renal/:  Renal/ Normal     Hepatic/GI:  Hepatic/GI Normal    Musculoskeletal:  Musculoskeletal Normal    Neurological:  Neurology Normal    Endocrine:  Endocrine Normal    Psych:  Psychiatric Normal           Physical Exam  General:  Well nourished    Airway/Jaw/Neck:  Airway Findings: Mouth Opening: Normal Tongue: Normal  General Airway Assessment: Adult  Mallampati: II  Jaw/Neck Findings:  Neck ROM: Normal ROM      Dental:  Dental Findings: In tact   Chest/Lungs:  Chest/Lungs Findings: Clear to auscultation, Normal Respiratory Rate     Heart/Vascular:  Heart Findings: Rate: Normal  Rhythm: Regular Rhythm  Sounds: Normal        Mental Status:  Mental Status Findings:  Cooperative, Alert and Oriented         Anesthesia Plan  Type of Anesthesia, risks & benefits discussed:  Anesthesia Type:  general    Patient's Preference:   Plan Factors:          Intra-op Monitoring Plan: standard ASA monitors  Intra-op Monitoring Plan Comments:   Post Op Pain Control Plan: multimodal analgesia  Post Op Pain Control Plan Comments:     Induction:   IV  Beta Blocker:  Patient is not currently on a Beta-Blocker (No further documentation required).       Informed Consent: Patient understands risks and agrees with Anesthesia plan.  Questions answered. Anesthesia consent signed with patient.  ASA Score: 3     Day of Surgery Review of History & Physical:    H&P update referred to the provider.         Ready For Surgery From Anesthesia Perspective.

## 2022-01-24 NOTE — ANESTHESIA POSTPROCEDURE EVALUATION
Anesthesia Post Evaluation    Patient: Jud Villa    Procedure(s) Performed: Procedure(s) (LRB):  ESOPHAGOGASTRODUODENOSCOPY (EGD) (N/A)    Final Anesthesia Type: general      Patient location during evaluation: PACU  Patient participation: Yes- Able to Participate  Level of consciousness: awake and alert  Post-procedure vital signs: reviewed and stable  Pain management: adequate  Airway patency: patent    PONV status at discharge: No PONV  Anesthetic complications: no      Cardiovascular status: blood pressure returned to baseline  Respiratory status: unassisted, spontaneous ventilation and room air  Hydration status: euvolemic  Follow-up not needed.          Vitals Value Taken Time   /67 01/24/22 1241   Temp  01/24/22 1353   Pulse 84 01/24/22 1241   Resp 18 01/24/22 1241   SpO2 97 % 01/24/22 1241         Event Time   Out of Recovery 12:48:32         Pain/Javed Score: Javed Score: 10 (1/24/2022 12:19 PM)

## 2022-01-26 DIAGNOSIS — E11.8 CONTROLLED TYPE 2 DIABETES MELLITUS WITH COMPLICATION, WITH LONG-TERM CURRENT USE OF INSULIN: ICD-10-CM

## 2022-01-26 DIAGNOSIS — Z79.4 CONTROLLED TYPE 2 DIABETES MELLITUS WITH COMPLICATION, WITH LONG-TERM CURRENT USE OF INSULIN: ICD-10-CM

## 2022-02-01 LAB
FINAL PATHOLOGIC DIAGNOSIS: NORMAL
GROSS: NORMAL
Lab: NORMAL
SUPPLEMENTAL DIAGNOSIS: NORMAL

## 2022-02-02 DIAGNOSIS — E11.8 CONTROLLED TYPE 2 DIABETES MELLITUS WITH COMPLICATION, WITH LONG-TERM CURRENT USE OF INSULIN: ICD-10-CM

## 2022-02-02 DIAGNOSIS — Z79.4 CONTROLLED TYPE 2 DIABETES MELLITUS WITH COMPLICATION, WITH LONG-TERM CURRENT USE OF INSULIN: ICD-10-CM

## 2022-02-04 DIAGNOSIS — M25.50 POLYARTHRALGIA: ICD-10-CM

## 2022-02-04 DIAGNOSIS — G62.9 NEUROPATHY: ICD-10-CM

## 2022-02-04 NOTE — TELEPHONE ENCOUNTER
No new care gaps identified.  Powered by [employer]. Reference number: [id number].   2/04/2022 2:57:26 PM CST

## 2022-02-07 ENCOUNTER — TELEPHONE (OUTPATIENT)
Dept: FAMILY MEDICINE | Facility: CLINIC | Age: 62
End: 2022-02-07
Payer: MEDICARE

## 2022-02-07 RX ORDER — LINAGLIPTIN 5 MG/1
5 TABLET, FILM COATED ORAL DAILY
Qty: 90 TABLET | Refills: 3 | Status: SHIPPED | OUTPATIENT
Start: 2022-02-07 | End: 2022-05-20 | Stop reason: SDUPTHER

## 2022-02-07 RX ORDER — GABAPENTIN 300 MG/1
300 CAPSULE ORAL 3 TIMES DAILY
Qty: 270 CAPSULE | Refills: 3 | Status: SHIPPED | OUTPATIENT
Start: 2022-02-07 | End: 2023-04-24

## 2022-02-07 NOTE — TELEPHONE ENCOUNTER
Refill of the medicines was done.    Upper endoscopy with evidence of inflammation of the duodenum and hiatal hernia.    Continue with Carafate and pantoprazole.    Reflux diet is recommended.

## 2022-02-08 NOTE — TELEPHONE ENCOUNTER
Pt. notified endoscopy showed inflammation and a hiatal hernia. Instructed to keep taking Carafate and Protonix. Also instructed on GERD diet. Verbalized understanding.

## 2022-02-11 ENCOUNTER — HOSPITAL ENCOUNTER (OUTPATIENT)
Dept: RADIOLOGY | Facility: HOSPITAL | Age: 62
Discharge: HOME | End: 2022-02-11
Attending: FAMILY MEDICINE
Payer: MEDICARE

## 2022-02-11 DIAGNOSIS — R10.13 EPIGASTRIC PAIN: ICD-10-CM

## 2022-02-11 PROCEDURE — 76700 US EXAM ABDOM COMPLETE: CPT | Mod: 26,HCNC,, | Performed by: RADIOLOGY

## 2022-02-11 PROCEDURE — 76700 US EXAM ABDOM COMPLETE: CPT | Mod: TC,HCNC

## 2022-02-11 PROCEDURE — 76700 US ABDOMEN COMPLETE: ICD-10-PCS | Mod: 26,HCNC,, | Performed by: RADIOLOGY

## 2022-02-14 ENCOUNTER — PATIENT MESSAGE (OUTPATIENT)
Dept: RESEARCH | Facility: HOSPITAL | Age: 62
End: 2022-02-14
Payer: MEDICARE

## 2022-02-16 ENCOUNTER — OFFICE VISIT (OUTPATIENT)
Dept: PSYCHIATRY | Facility: CLINIC | Age: 62
End: 2022-02-16
Payer: MEDICARE

## 2022-02-16 DIAGNOSIS — F33.1 MODERATE EPISODE OF RECURRENT MAJOR DEPRESSIVE DISORDER: Primary | ICD-10-CM

## 2022-02-16 PROCEDURE — 3061F NEG MICROALBUMINURIA REV: CPT | Mod: HCNC,CPTII,95, | Performed by: PSYCHIATRY & NEUROLOGY

## 2022-02-16 PROCEDURE — 99214 PR OFFICE/OUTPT VISIT, EST, LEVL IV, 30-39 MIN: ICD-10-PCS | Mod: HCNC,95,, | Performed by: PSYCHIATRY & NEUROLOGY

## 2022-02-16 PROCEDURE — 3066F NEPHROPATHY DOC TX: CPT | Mod: HCNC,CPTII,95, | Performed by: PSYCHIATRY & NEUROLOGY

## 2022-02-16 PROCEDURE — 3051F HG A1C>EQUAL 7.0%<8.0%: CPT | Mod: HCNC,CPTII,95, | Performed by: PSYCHIATRY & NEUROLOGY

## 2022-02-16 PROCEDURE — 99214 OFFICE O/P EST MOD 30 MIN: CPT | Mod: HCNC,95,, | Performed by: PSYCHIATRY & NEUROLOGY

## 2022-02-16 PROCEDURE — 1159F PR MEDICATION LIST DOCUMENTED IN MEDICAL RECORD: ICD-10-PCS | Mod: HCNC,CPTII,95, | Performed by: PSYCHIATRY & NEUROLOGY

## 2022-02-16 PROCEDURE — 3066F PR DOCUMENTATION OF TREATMENT FOR NEPHROPATHY: ICD-10-PCS | Mod: HCNC,CPTII,95, | Performed by: PSYCHIATRY & NEUROLOGY

## 2022-02-16 PROCEDURE — 90833 PR PSYCHOTHERAPY W/PATIENT W/E&M, 30 MIN (ADD ON): ICD-10-PCS | Mod: HCNC,95,, | Performed by: PSYCHIATRY & NEUROLOGY

## 2022-02-16 PROCEDURE — 1159F MED LIST DOCD IN RCRD: CPT | Mod: HCNC,CPTII,95, | Performed by: PSYCHIATRY & NEUROLOGY

## 2022-02-16 PROCEDURE — 3061F PR NEG MICROALBUMINURIA RESULT DOCUMENTED/REVIEW: ICD-10-PCS | Mod: HCNC,CPTII,95, | Performed by: PSYCHIATRY & NEUROLOGY

## 2022-02-16 PROCEDURE — 3051F PR MOST RECENT HEMOGLOBIN A1C LEVEL 7.0 - < 8.0%: ICD-10-PCS | Mod: HCNC,CPTII,95, | Performed by: PSYCHIATRY & NEUROLOGY

## 2022-02-16 PROCEDURE — 1160F RVW MEDS BY RX/DR IN RCRD: CPT | Mod: HCNC,CPTII,95, | Performed by: PSYCHIATRY & NEUROLOGY

## 2022-02-16 PROCEDURE — 90833 PSYTX W PT W E/M 30 MIN: CPT | Mod: HCNC,95,, | Performed by: PSYCHIATRY & NEUROLOGY

## 2022-02-16 PROCEDURE — 1160F PR REVIEW ALL MEDS BY PRESCRIBER/CLIN PHARMACIST DOCUMENTED: ICD-10-PCS | Mod: HCNC,CPTII,95, | Performed by: PSYCHIATRY & NEUROLOGY

## 2022-02-16 RX ORDER — MIRTAZAPINE 30 MG/1
30 TABLET, FILM COATED ORAL NIGHTLY
Qty: 30 TABLET | Refills: 11 | Status: SHIPPED | OUTPATIENT
Start: 2022-02-16 | End: 2022-05-20

## 2022-02-16 NOTE — PROGRESS NOTES
"Outpatient Psychiatry Follow-Up Visit (MD/NP)    2/16/2022Phone visit. Patient could not use virtual system and requested a phone call instead.(29", with 3" on meds and 26" for supportive counseling and update of history and mental status.)    Clinical Status of Patient:  Outpatient (Ambulatory)    Chief Complaint:  Jud Villa is a 61 y.o. female who presents today for follow-up of depression.  Met with patient and no relatives present for interview.      Interval History and Content of Current Session:  Interim Events/Subjective Report/Content of Current Session: Patient continues to grieve the loss of daughter. Patient remains sad and tearful.Patient discussed frustrations re the loss of her daughter. Patient had to stop Lexapro due to breast swelling in recent past. Patient denies active thoughts of harming herself and has no signs of nghia or psychosis. We discussed my FDC. Support was offered to patient re her grief. Patient also has lack of drive and enthusiasm and sleep. Her appetite is better since beginning Remeron. Patient is very upset re her daughter's demise and talked of how important her daughter was to her and the impact of the loss.    Psychotherapy:  · Target symptoms: depression  · Why chosen therapy is appropriate versus another modality: relevant to diagnosis, patient responds to this modality, evidence based practice  · Outcome monitoring methods: self-report  · Therapeutic intervention type: supportive psychotherapy  · Topics discussed/themes: depression, illness/death of a loved one  · The patient's response to the intervention is accepting. The patient's progress toward treatment goals is not progressing.   · Duration of intervention: 29 minutes.    Review of Systems   · PSYCHIATRIC: Pertinant items are noted in the narrative.    Past Medical, Family and Social History: The patient's past medical, family and social history have been reviewed and updated as appropriate within the " electronic medical record - see encounter notes.    Compliance: yes    Side effects: None    Risk Parameters:  Patient reports no suicidal ideation  Patient reports no homicidal ideation  Patient reports no self-injurious behavior  Patient reports no violent behavior    Exam (detailed: at least 9 elements; comprehensive: all 15 elements)   Constitutional  Vitals:  Most recent vital signs, dated less than 90 days prior to this appointment, were reviewed.   There were no vitals filed for this visit.Patient reports stable vital signs     General:  unremarkable, age appropriate, could not assess appearance     Musculoskeletal  Muscle Strength/Tone:  patient reports some loss of muscle tone   Gait & Station:  non-ataxic     Psychiatric  Speech:  no latency; no press, spontaneous   Mood & Affect:  depressed, grieving death of daughter  congruent and appropriate, sad   Thought Process:  normal and logical   Associations:  intact   Thought Content:  normal, no suicidality, no homicidality, delusions, or paranoia   Insight:  has awareness of illness   Judgement: behavior is adequate to circumstances   Orientation:  grossly intact   Memory: intact for content of interview   Language: grossly intact   Attention Span & Concentration:  able to focus   Fund of Knowledge:  intact and appropriate to age and level of education     Assessment and Diagnosis   Status/Progress: Based on the examination today, the patient's problem(s) is/are inadequately controlled.  New problems have not been presented today.   grief re loss of daughter are complicating management of the primary condition.  The working differential for this patient includes depression.     General Impression: Patient is struggling with grief re death of her daughter. Patient is depressed but not an active danger to self or others at this time. Patient is not sleeping well and is still without adequate energy and drive and is tearful during much of the day. Patient is  starting with a counselor in the near future.      ICD-10-CM ICD-9-CM   1. Moderate episode of recurrent major depressive disorder  F33.1 296.32       Intervention/Counseling/Treatment Plan   · Medication Management: The risks and benefits of medication were discussed with the patient. Patient agreed to stop Lexapro.(caused swelling in the recent past and was stopped shortly ago in the past anyway), to increase Remeron to 30 mg q hs(has been taking 15 mg q hs for 2 weeks now), and continue Klonopin 0.5 mg prn only for severe stress. Patient will be seeing a counselor for grief counseling.      Return to Clinic: 2 months

## 2022-02-22 DIAGNOSIS — D84.9 IMMUNOSUPPRESSED STATUS: ICD-10-CM

## 2022-02-28 ENCOUNTER — TELEPHONE (OUTPATIENT)
Dept: ADMINISTRATIVE | Facility: OTHER | Age: 62
End: 2022-02-28
Payer: MEDICARE

## 2022-02-28 DIAGNOSIS — Z79.4 TYPE 2 DIABETES MELLITUS WITH HYPERGLYCEMIA, WITH LONG-TERM CURRENT USE OF INSULIN: ICD-10-CM

## 2022-02-28 DIAGNOSIS — E11.65 TYPE 2 DIABETES MELLITUS WITH HYPERGLYCEMIA, WITH LONG-TERM CURRENT USE OF INSULIN: ICD-10-CM

## 2022-03-01 NOTE — TELEPHONE ENCOUNTER
No new care gaps identified.  Powered by [email or web address] by [employer]. Reference number: [id number].   2/28/2022 6:39:36 PM CST

## 2022-03-08 RX ORDER — METFORMIN HYDROCHLORIDE 1000 MG/1
TABLET ORAL
Qty: 180 TABLET | Refills: 3 | Status: SHIPPED | OUTPATIENT
Start: 2022-03-08 | End: 2023-05-08 | Stop reason: SDUPTHER

## 2022-03-08 NOTE — TELEPHONE ENCOUNTER
This Rx Request does not qualify for assessment with the OR.  Please review protocol details and the Care Due Message for extra clinical information    Reasons Rx Request may be deferred:  Labs/Vitals overdue  Labs/Vitals abnormal  Patient has been seen in the ED/Hospital since the last PCP visit  Medication is non-delegated  Provider is a non-participating provider   No Valid encounter within last 15 months

## 2022-03-09 ENCOUNTER — OFFICE VISIT (OUTPATIENT)
Dept: PSYCHIATRY | Facility: CLINIC | Age: 62
End: 2022-03-09
Payer: MEDICARE

## 2022-03-09 DIAGNOSIS — F33.1 MDD (MAJOR DEPRESSIVE DISORDER), RECURRENT EPISODE, MODERATE: Primary | ICD-10-CM

## 2022-03-09 PROCEDURE — 3066F PR DOCUMENTATION OF TREATMENT FOR NEPHROPATHY: ICD-10-PCS | Mod: CPTII,S$GLB,, | Performed by: SOCIAL WORKER

## 2022-03-09 PROCEDURE — 3066F NEPHROPATHY DOC TX: CPT | Mod: CPTII,S$GLB,, | Performed by: SOCIAL WORKER

## 2022-03-09 PROCEDURE — 99999 PR PBB SHADOW E&M-EST. PATIENT-LVL I: CPT | Mod: PBBFAC,,, | Performed by: SOCIAL WORKER

## 2022-03-09 PROCEDURE — 1159F MED LIST DOCD IN RCRD: CPT | Mod: CPTII,S$GLB,, | Performed by: SOCIAL WORKER

## 2022-03-09 PROCEDURE — 1159F PR MEDICATION LIST DOCUMENTED IN MEDICAL RECORD: ICD-10-PCS | Mod: CPTII,S$GLB,, | Performed by: SOCIAL WORKER

## 2022-03-09 PROCEDURE — 99999 PR PBB SHADOW E&M-EST. PATIENT-LVL I: ICD-10-PCS | Mod: PBBFAC,,, | Performed by: SOCIAL WORKER

## 2022-03-09 PROCEDURE — 3061F PR NEG MICROALBUMINURIA RESULT DOCUMENTED/REVIEW: ICD-10-PCS | Mod: CPTII,S$GLB,, | Performed by: SOCIAL WORKER

## 2022-03-09 PROCEDURE — 90791 PSYCH DIAGNOSTIC EVALUATION: CPT | Mod: S$GLB,,, | Performed by: SOCIAL WORKER

## 2022-03-09 PROCEDURE — 90791 PR PSYCHIATRIC DIAGNOSTIC EVALUATION: ICD-10-PCS | Mod: S$GLB,,, | Performed by: SOCIAL WORKER

## 2022-03-09 PROCEDURE — 3061F NEG MICROALBUMINURIA REV: CPT | Mod: CPTII,S$GLB,, | Performed by: SOCIAL WORKER

## 2022-03-09 PROCEDURE — 3051F HG A1C>EQUAL 7.0%<8.0%: CPT | Mod: CPTII,S$GLB,, | Performed by: SOCIAL WORKER

## 2022-03-09 PROCEDURE — 3051F PR MOST RECENT HEMOGLOBIN A1C LEVEL 7.0 - < 8.0%: ICD-10-PCS | Mod: CPTII,S$GLB,, | Performed by: SOCIAL WORKER

## 2022-03-09 NOTE — PROGRESS NOTES
Psychiatry Initial Visit (PhD/LCSW)  Diagnostic Interview - CPT 50393    Date: 3/9/2022    Site: [facility]    Referral source: DR. Wing    Clinical status of patient: Outpatient    Jud Villa, a 61 y.o. female, for initial evaluation visit.  Met with patient.    Chief complaint/reason for encounter: grief and loss, depression, anxiety, sleep issues, and coping with a difficult loss    History of present illness: client lives alone, and lost her daughter age 37 in July of 2021 from an accidental death and the client is having a hard time with this and needs some help and some one to talk with, has grief and loss feelings, and a lot of emotional pain from this loss. The client helped her daughter for many years and the client's daughter had some mental health problems with at times, caused stress for the daughter and the client also. The client lives alone and is needing support and counseling at this time due to severe loss. The client has four children, the daughter who passed was the third sibling. The daughters son was living with her, the client, for awhile and he is age 20. The client is very spiritual and has a good support system, the client is not suicidal and is not homicidal and is not psychotic, no drugs and no alcohol. Client smokes since daughter passed away.     Pain: 0    Symptoms:   · Mood: depressed mood, diminished interest, insomnia, fatigue, worthlessness/guilt, poor concentration, tearfulness and social isolation  · Anxiety: decreased memory, excessive anxiety/worry, restlessness/keyed up, irritability and muscle tension  · Substance abuse: denied  · Cognitive functioning: denied  · Health behaviors: noncontributory    Psychiatric history: currently under psychiatric care    Medical history: noncontributory    Family history of psychiatric illness: her daughter had bipolar disorder, and a cousin has bipolar disorder    Social history (marriage, employment, etc.): client lives  alone right now.    Substance use:   Alcohol: none   Drugs: none   Tobacco: none   Caffeine: none    Current medications and drug reactions (include OTC, herbal): see medication list see med list    Strengths and liabilities: Strength: Patient accepts guidance/feedback, Strength: Patient is expressive/articulate., Strength: Patient is intelligent., Strength: Patient is motivated for change., Strength: Patient is physically healthy., Strength: Patient has positive support network., Strength: Patient has reasonable judgment., Liability: Patient lacks coping skills.    Current Evaluation:     Mental Status Exam:  General Appearance:  unremarkable, age appropriate   Speech: normal tone, normal rate, normal pitch, normal volume      Level of Cooperation: cooperative      Thought Processes: normal and logical   Mood: angry, anxious, depressed, irritable, sad      Thought Content: normal, no suicidality, no homicidality, delusions, or paranoia   Affect: congruent and appropriate   Orientation: Oriented x3   Memory: recent >  intact, remote >  intact   Attention Span & Concentration: intact   Fund of General Knowledge: intact and appropriate to age and level of education   Abstract Reasoning: interpretation of similarities was concrete   Judgment & Insight: good     Language  intact     Diagnostic Impression - Plan:       ICD-10-CM ICD-9-CM   1. MDD (major depressive disorder), recurrent episode, moderate  F33.1 296.32       Plan:individual psychotherapy and consult psychiatrist for medication evaluation    Return to Clinic: 2 weeks    Length of Service (minutes): 45  Will do individual therapy and maybe add group therapy.

## 2022-03-21 ENCOUNTER — TELEPHONE (OUTPATIENT)
Dept: ADMINISTRATIVE | Facility: OTHER | Age: 62
End: 2022-03-21
Payer: MEDICARE

## 2022-03-31 ENCOUNTER — OFFICE VISIT (OUTPATIENT)
Dept: PSYCHIATRY | Facility: CLINIC | Age: 62
End: 2022-03-31
Payer: MEDICARE

## 2022-03-31 DIAGNOSIS — F33.1 MDD (MAJOR DEPRESSIVE DISORDER), RECURRENT EPISODE, MODERATE: Primary | ICD-10-CM

## 2022-03-31 DIAGNOSIS — F43.23 ADJUSTMENT DISORDER WITH MIXED ANXIETY AND DEPRESSED MOOD: ICD-10-CM

## 2022-03-31 PROCEDURE — 1159F PR MEDICATION LIST DOCUMENTED IN MEDICAL RECORD: ICD-10-PCS | Mod: CPTII,S$GLB,, | Performed by: SOCIAL WORKER

## 2022-03-31 PROCEDURE — 90834 PSYTX W PT 45 MINUTES: CPT | Mod: S$GLB,,, | Performed by: SOCIAL WORKER

## 2022-03-31 PROCEDURE — 3051F PR MOST RECENT HEMOGLOBIN A1C LEVEL 7.0 - < 8.0%: ICD-10-PCS | Mod: CPTII,S$GLB,, | Performed by: SOCIAL WORKER

## 2022-03-31 PROCEDURE — 99999 PR PBB SHADOW E&M-EST. PATIENT-LVL I: ICD-10-PCS | Mod: PBBFAC,,, | Performed by: SOCIAL WORKER

## 2022-03-31 PROCEDURE — 90834 PR PSYCHOTHERAPY W/PATIENT, 45 MIN: ICD-10-PCS | Mod: S$GLB,,, | Performed by: SOCIAL WORKER

## 2022-03-31 PROCEDURE — 3061F NEG MICROALBUMINURIA REV: CPT | Mod: CPTII,S$GLB,, | Performed by: SOCIAL WORKER

## 2022-03-31 PROCEDURE — 1159F MED LIST DOCD IN RCRD: CPT | Mod: CPTII,S$GLB,, | Performed by: SOCIAL WORKER

## 2022-03-31 PROCEDURE — 3066F PR DOCUMENTATION OF TREATMENT FOR NEPHROPATHY: ICD-10-PCS | Mod: CPTII,S$GLB,, | Performed by: SOCIAL WORKER

## 2022-03-31 PROCEDURE — 3066F NEPHROPATHY DOC TX: CPT | Mod: CPTII,S$GLB,, | Performed by: SOCIAL WORKER

## 2022-03-31 PROCEDURE — 3051F HG A1C>EQUAL 7.0%<8.0%: CPT | Mod: CPTII,S$GLB,, | Performed by: SOCIAL WORKER

## 2022-03-31 PROCEDURE — 99999 PR PBB SHADOW E&M-EST. PATIENT-LVL I: CPT | Mod: PBBFAC,,, | Performed by: SOCIAL WORKER

## 2022-03-31 PROCEDURE — 3061F PR NEG MICROALBUMINURIA RESULT DOCUMENTED/REVIEW: ICD-10-PCS | Mod: CPTII,S$GLB,, | Performed by: SOCIAL WORKER

## 2022-03-31 NOTE — PROGRESS NOTES
Individual Psychotherapy (PhD/LCSW)    3/31/2022    Site:  [facility]         Therapeutic Intervention: Met with patient.  Outpatient - Insight oriented psychotherapy 45 min - CPT code 84822    Chief complaint/reason for encounter: depression, anxiety, sleep, appetite, behavior and interpersonal     Interval history and content of current session: discussed grief and loss issues, discussed her grand daughter age five is visiting her, and they are having a good visit, she is concerned as the mother of the grand daughter is not very responsible and client would like to get an improved situation for her, and court and legal issues need to be bridged for this to happen, sleep, mood, loss and grief and how to take care of herself addressed, and wants to do group therapy and starts on Wednesday group beginning 4/6/2022 at 5:30 PM to 7 PM. She feels she is grieving and also concerned over the grand daughter's situation and wants to help and her 20 year old son will help also. Start group and do weekly and then have also an individual session.    Treatment plan:  · Target symptoms: depression, recurrent depression, anxiety , mood swings, mood disorder, adjustment, grief  · Why chosen therapy is appropriate versus another modality: relevant to diagnosis, patient responds to this modality, evidence based practice  · Outcome monitoring methods: self-report, observation  · Therapeutic intervention type: insight oriented psychotherapy, behavior modifying psychotherapy, supportive psychotherapy    Risk parameters:  Patient reports no suicidal ideation  Patient reports no homicidal ideation  Patient reports no self-injurious behavior  Patient reports no violent behavior    Verbal deficits: None    Patient's response to intervention:  The patient's response to intervention is accepting.    Progress toward goals and other mental status changes:  The patient's progress toward goals is fair .    Diagnosis:     ICD-10-CM  ICD-9-CM   1. MDD (major depressive disorder), recurrent episode, moderate  F33.1 296.32   2. Adjustment disorder with mixed anxiety and depressed mood  F43.23 309.28       Plan:  individual psychotherapy, group psychotherapy and consult psychiatrist for medication evaluation    Return to clinic: 1 week    Length of Service (minutes): 45

## 2022-04-13 ENCOUNTER — OFFICE VISIT (OUTPATIENT)
Dept: PSYCHIATRY | Facility: CLINIC | Age: 62
End: 2022-04-13
Payer: MEDICARE

## 2022-04-13 DIAGNOSIS — F33.1 MDD (MAJOR DEPRESSIVE DISORDER), RECURRENT EPISODE, MODERATE: Primary | ICD-10-CM

## 2022-04-13 PROCEDURE — 3061F NEG MICROALBUMINURIA REV: CPT | Mod: CPTII,S$GLB,, | Performed by: SOCIAL WORKER

## 2022-04-13 PROCEDURE — 99999 PR PBB SHADOW E&M-EST. PATIENT-LVL I: CPT | Mod: PBBFAC,,, | Performed by: SOCIAL WORKER

## 2022-04-13 PROCEDURE — 3066F NEPHROPATHY DOC TX: CPT | Mod: CPTII,S$GLB,, | Performed by: SOCIAL WORKER

## 2022-04-13 PROCEDURE — 3061F PR NEG MICROALBUMINURIA RESULT DOCUMENTED/REVIEW: ICD-10-PCS | Mod: CPTII,S$GLB,, | Performed by: SOCIAL WORKER

## 2022-04-13 PROCEDURE — 1159F MED LIST DOCD IN RCRD: CPT | Mod: CPTII,S$GLB,, | Performed by: SOCIAL WORKER

## 2022-04-13 PROCEDURE — 3051F HG A1C>EQUAL 7.0%<8.0%: CPT | Mod: CPTII,S$GLB,, | Performed by: SOCIAL WORKER

## 2022-04-13 PROCEDURE — 90853 PR GROUP PSYCHOTHERAPY: ICD-10-PCS | Mod: S$GLB,,, | Performed by: SOCIAL WORKER

## 2022-04-13 PROCEDURE — 3051F PR MOST RECENT HEMOGLOBIN A1C LEVEL 7.0 - < 8.0%: ICD-10-PCS | Mod: CPTII,S$GLB,, | Performed by: SOCIAL WORKER

## 2022-04-13 PROCEDURE — 90853 GROUP PSYCHOTHERAPY: CPT | Mod: S$GLB,,, | Performed by: SOCIAL WORKER

## 2022-04-13 PROCEDURE — 99999 PR PBB SHADOW E&M-EST. PATIENT-LVL I: ICD-10-PCS | Mod: PBBFAC,,, | Performed by: SOCIAL WORKER

## 2022-04-13 PROCEDURE — 3066F PR DOCUMENTATION OF TREATMENT FOR NEPHROPATHY: ICD-10-PCS | Mod: CPTII,S$GLB,, | Performed by: SOCIAL WORKER

## 2022-04-13 PROCEDURE — 1159F PR MEDICATION LIST DOCUMENTED IN MEDICAL RECORD: ICD-10-PCS | Mod: CPTII,S$GLB,, | Performed by: SOCIAL WORKER

## 2022-04-16 NOTE — PROGRESS NOTES
Group Psychotherapy    Site: [facility]    Clinical status of patient: Outpatient    4/13/2022    Length of service:[id number]    Referred by: MD     Number of patients in attendance: 4    Target symptoms: depression, recurrent depression, anxiety , mood swings, mood disorder, adjustment, grief    Patient's response to intervention:  The patient's response to intervention is active listening, frequent questions, self-disclosure, feedback to other patients.    Progress toward goals and other mental status changes:  The patient's progress toward goals is limited.    Interval history: discussed her family situation with come complicated relationship, stress, coping skills, medical concerns, sleep and grief and loss, taking care of herself and getting support all addressed, group members were very supportive of her.    Diagnosis: 296.32, 309.28    Plan: individual psychotherapy, group psychotherapy, consult psychiatrist for medication evaluation and medication management by physician    Return to clinic: 1 week

## 2022-05-13 ENCOUNTER — HOSPITAL ENCOUNTER (EMERGENCY)
Facility: HOSPITAL | Age: 62
Discharge: HOME | End: 2022-05-13
Attending: EMERGENCY MEDICINE
Payer: MEDICARE

## 2022-05-13 VITALS
HEART RATE: 84 BPM | TEMPERATURE: 98 F | WEIGHT: 130 LBS | RESPIRATION RATE: 20 BRPM | BODY MASS INDEX: 23.92 KG/M2 | HEIGHT: 62 IN | OXYGEN SATURATION: 95 % | SYSTOLIC BLOOD PRESSURE: 158 MMHG | DIASTOLIC BLOOD PRESSURE: 76 MMHG

## 2022-05-13 DIAGNOSIS — M54.6 ACUTE BILATERAL THORACIC BACK PAIN: Primary | ICD-10-CM

## 2022-05-13 PROCEDURE — 99284 PR EMERGENCY DEPT VISIT,LEVEL IV: ICD-10-PCS | Mod: ,,, | Performed by: EMERGENCY MEDICINE

## 2022-05-13 PROCEDURE — 99284 EMERGENCY DEPT VISIT MOD MDM: CPT | Mod: 25

## 2022-05-13 PROCEDURE — 99284 EMERGENCY DEPT VISIT MOD MDM: CPT | Mod: ,,, | Performed by: EMERGENCY MEDICINE

## 2022-05-13 PROCEDURE — 63600175 PHARM REV CODE 636 W HCPCS: Performed by: EMERGENCY MEDICINE

## 2022-05-13 PROCEDURE — 96372 THER/PROPH/DIAG INJ SC/IM: CPT | Performed by: EMERGENCY MEDICINE

## 2022-05-13 RX ORDER — KETOROLAC TROMETHAMINE 30 MG/ML
15 INJECTION, SOLUTION INTRAMUSCULAR; INTRAVENOUS
Status: COMPLETED | OUTPATIENT
Start: 2022-05-13 | End: 2022-05-13

## 2022-05-13 RX ADMIN — KETOROLAC TROMETHAMINE 15 MG: 30 INJECTION, SOLUTION INTRAMUSCULAR at 10:05

## 2022-05-14 NOTE — ED NOTES
"Patient identifiers verified and correct for Jud Mackay  C/C: pt c/o lower back onset 4 days ago, denies any injury. Pt also reports sob "more than usual" states has history of emphysema  APPEARANCE: awake and alert in no acute distress.  SKIN: warm, dry and intact. No breakdown or bruising.  MUSCULOSKELETAL: Patient moving all extremities spontaneously, no obvious swelling or deformities noted. Ambulates independently.  RESPIRATORY: reports shortness of breath. Respirations unlabored.   CARDIAC: Denies CP, 2+ distal pulses; no peripheral edema  ABDOMEN: soft, non-tender, and non-distended, Denies N/V  : voids spontaneously, denies difficulty  Neurologic: AAO x 4; follows commands equal strength in all extremities; denies numbness/tingling. Denies dizziness   "

## 2022-05-14 NOTE — ED PROVIDER NOTES
"SCRIBE #1 NOTE: I, Zeynep Contreras, am scribing for, and in the presence of,  Jovan Pinedo MD. I have scribed the following portions of the note: HPI, ROS, PE.     Source of History:  Patient    Chief complaint:  Back Pain ("My lung is hurting. Right in the middle" x3-4 days. Pt reports hx emphysema)      HPI:  Jud Villa is a 62 y.o. female presenting with middle back pain with onset 2-3 days ago. The pain has worsened today and does not radiate. She reports it is constant but worsens when she coughs or moves. She is also experiencing a productive cough that is more than she normally has with her emphysema. She denies any strain, injury, or fevers. She has not taken any medications for the pain.      ROS: As per HPI and below:    General: No fever.  No chills.  Eyes: No visual changes.  Head: No headache.    Integument: No rashes or lesions.  Chest: No shortness of breath. + Cough (productive)  Cardiovascular: No chest pain.  Abdomen: No abdominal pain.  No nausea or vomiting.  Urinary: No abnormal urination.  Musculoskeletal: + Mid back pain.  Neurologic: No focal weakness.  No numbness.  Hematologic: No easy bruising.  Endocrine: No excessive thirst or urination.      Review of patient's allergies indicates:  No Known Allergies    No current facility-administered medications on file prior to encounter.     Current Outpatient Medications on File Prior to Encounter   Medication Sig Dispense Refill    albuterol (PROVENTIL) 2.5 mg /3 mL (0.083 %) nebulizer solution INHALE 1 VIAL PER NEBULIZER FOUR TIMES DAILY AS NEEDED FOR SHORTNESS OF BREATH/ WHEEZING 360 mL 11    albuterol (VENTOLIN HFA) 90 mcg/actuation inhaler INHALE 2 PUFFS BY MOUTH INTO THE LUNGS EVERY 6 HOURS AS NEEDED FOR WHEEZING 18 g 11    albuterol-ipratropium (DUO-NEB) 2.5 mg-0.5 mg/3 mL nebulizer solution Take 3 mLs by nebulization once daily. 1 Box 11    alendronate (FOSAMAX) 70 MG tablet TAKE 1 TABLET BY MOUTH EVERY 7 DAYS. TAKE WITH 8 " "OUNCES OF WATER AND 30 MINUTES BEFORE FOOD, STAY UPRIGHT FOR 30 MINUTES 12 tablet 3    augmented betamethasone dipropionate (DIPROLENE-AF) 0.05 % cream Apply topically 2 (two) times daily. for 10 days 50 g 0    azithromycin (Z-ANDREA) 250 MG tablet Take 2 tablets by mouth on day 1; Take 1 tablet by mouth on days 2-5 6 tablet 0    BASAGLAR KWIKPEN U-100 INSULIN glargine 100 units/mL (3mL) SubQ pen INJECT 10 UNITS UNDER THE SKIN EVERY MORNING 15 mL 0    BD ULTRA-FINE SHORT PEN NEEDLE 31 gauge x 5/16" Ndle Inject 1 pen into the skin once daily. 100 each 3    brompheniramine-pseudoeph-DM (BROMFED DM) 2-30-10 mg/5 mL Syrp TAKE 10 ML BY MOUTH THREE TIMES DAILY AS NEEDED 118 mL 0    carboxymethylcellulose sodium 1 % DpGe Apply 1 drop to eye 4 (four) times daily. 1 each 0    clonazePAM (KLONOPIN) 0.5 MG tablet TAKE 1 TABLET(0.5 MG) BY MOUTH EVERY NIGHT AS NEEDED FOR ANXIETY 30 tablet 0    clotrimazole-betamethasone 1-0.05% (LOTRISONE) cream Apply topically 2 (two) times daily. 1 Tube 2    ergocalciferol (ERGOCALCIFEROL) 50,000 unit Cap TAKE 1 CAPSULE BY MOUTH EVERY 7 DAYS 12 capsule 3    gabapentin (NEURONTIN) 300 MG capsule Take 1 capsule (300 mg total) by mouth 3 (three) times daily. 270 capsule 3    glipiZIDE (GLUCOTROL) 10 MG tablet TAKE 1 TABLET(10 MG) BY MOUTH TWICE DAILY 180 tablet 3    hydrocodone-chlorpheniramine (TUSSIONEX) 10-8 mg/5 mL suspension Take 5 mLs by mouth every 12 (twelve) hours as needed for Cough. 115 mL 0    JARDIANCE 10 mg tablet TAKE 1 TABLET(10 MG) BY MOUTH EVERY DAY 90 tablet 3    metFORMIN (GLUCOPHAGE) 1000 MG tablet TAKE 1 TABLET(1000 MG) BY MOUTH TWICE DAILY WITH MEALS 180 tablet 3    mirtazapine (REMERON) 30 MG tablet Take 1 tablet (30 mg total) by mouth every evening. 30 tablet 11    mupirocin (BACTROBAN) 2 % ointment Apply topically 2 (two) times daily. 22 g 0    nicotine (NICODERM CQ) 21 mg/24 hr Place 1 patch onto the skin once daily. 28 patch 3    ondansetron (ZOFRAN-ODT) " 4 MG TbDL Take 1 tablet (4 mg total) by mouth 3 (three) times daily. 30 tablet 0    oxymetazoline (AFRIN) 0.05 % nasal spray 2 sprays by Nasal route.      pantoprazole (PROTONIX) 40 MG tablet Take 1 tablet (40 mg total) by mouth once daily. 30 tablet 1    potassium chloride (MICRO-K) 8 mEq CpSR Take 1 capsule (8 mEq total) by mouth once daily. 90 capsule 1    prednisoLONE acetate (PRED FORTE) 1 % DrpS Place 1 drop into the left eye 4 (four) times daily. 5 mL 0    sucralfate (CARAFATE) 1 gram tablet TAKE 1 TABLET(1 GRAM) BY MOUTH THREE TIMES DAILY BEFORE MEALS 270 tablet 3    topiramate (TOPAMAX) 50 MG tablet Take 2 tablets (100 mg total) by mouth 2 (two) times daily. TAKE 1 TABLET BY MOUTH TWICE DAILY FOR 7 DAYS, THEN 1 TABLET EVERY MORNING AND 2 TABLETS EVERY EVENING FOR 7 DAYS, THEN 2 TABLETS TWICE DAILY 360 tablet 1    TRADJENTA 5 mg Tab tablet Take 1 tablet (5 mg total) by mouth once daily. 90 tablet 3    traMADoL (ULTRAM) 50 mg tablet Take 1 tablet (50 mg total) by mouth every 6 (six) hours as needed for Pain. 12 tablet 0    TRELEGY ELLIPTA 100-62.5-25 mcg DsDv INHALE 1 PUFF INTO THE LUNGS EVERY DAY 60 each 2    TRUE METRIX GLUCOSE TEST STRIP Strp USE AS DIRECTED FOUR TIMES DAILY 50 each 0       PMH:  As per HPI and below:  Past Medical History:   Diagnosis Date    Asthma     Breast carcinoma     Cataract     Diabetes mellitus     Moderate episode of recurrent major depressive disorder 5/19/2021    Osteoporosis      Past Surgical History:   Procedure Laterality Date    BILATERAL MASTECTOMY  04/26/2018    CHOLECYSTECTOMY      COLONOSCOPY N/A 10/27/2015    Procedure: COLONOSCOPY;  Surgeon: Johnny Hurley MD;  Location: [facility];  Service: Endoscopy;  Laterality: N/A;    ESOPHAGOGASTRODUODENOSCOPY N/A 1/24/2022    Procedure: ESOPHAGOGASTRODUODENOSCOPY (EGD);  Surgeon: Mili Katz MD;  Location: [facility] ([address] [place] [address]);  Service: Endoscopy;  Laterality: N/A;  rapid in AM, instr portal -ml     HYSTERECTOMY      LASER PERIPHERAL IRIDOTOMY Right 02/21/2019           Social History     Socioeconomic History    Marital status:    Tobacco Use    Smoking status: Current Every Day Smoker     Packs/day: 1.00     Years: 0.00     Pack years: 0.00     Types: Cigarettes    Smokeless tobacco: Never Used   Substance and Sexual Activity    Alcohol use: No     Alcohol/week: 0.0 standard drinks    Drug use: No       Family History   Problem Relation Age of Onset    Diabetes Father     Amblyopia Neg Hx     Blindness Neg Hx     Cataracts Neg Hx     Glaucoma Neg Hx     Macular degeneration Neg Hx     Retinal detachment Neg Hx     Strabismus Neg Hx        Physical Exam:    Vitals:    05/13/22 2154   BP: (!) 158/76   Pulse: 84   Resp: 20   Temp: 97.7 °F (36.5 °C)     Appearance: No acute distress.  Skin: No rashes seen.  Good turgor.  No abrasions.  No ecchymoses.  Eyes: No conjunctival injection. EOMI, PERRL.  ENT: Oropharynx clear.    Chest:  No increased work of breathing, bilateral chest rise.  Cardiovascular: Regular rate and rhythm.  Normal equal bilateral radial pulses.  Abdomen: Soft.  Not distended.  Nontender.  No guarding.  No rebound. No Masses  Musculoskeletal: Good range of motion all joints. Paraspinal tenderness bilaterally at approximately T10. No midline tenderness, step-offs, or deformities. Neck supple, full range of motion, no obvious deformity.  Neurologic: Moves all extremities.  Normal sensation.  No facial droop.  Normal speech.    Mental Status:  Alert and oriented x 3.  Appropriate, conversant.          Laboratory Studies:  Labs Reviewed - No data to display        Imaging Results    None         Medications Given:  Medications   ketorolac injection 15 mg (has no administration in time range)       Discussed with: pt    MDM:    62 y.o. female with thoracic paraspinal back pain.  There is no trauma to suggest fracture or spine injury.  No midline tenderness.  No  neurological symptoms to suggest spinal cord injury.  It is not pleuritic, and it is reproducible, I think this is most likely musculoskeletal.  She has not taken any medications yet, she was given Toradol in the ED.  Recommended continuing anti-inflammatories at home.  Advised pt to follow up with PCP or return if concerning symptoms arise. Pt understands and agrees with plan. Will d/c home.          Diagnostic Impression:    1. Acute bilateral thoracic back pain            Scribe Attestation:  Scribe #1: I performed the above scribed service and the documentation accurately describes the services I performed. I attest to the accuracy of the note.     Jovan Pinedo MD  05/13/22 2452

## 2022-05-18 ENCOUNTER — OFFICE VISIT (OUTPATIENT)
Dept: PSYCHIATRY | Facility: CLINIC | Age: 62
End: 2022-05-18
Payer: MEDICARE

## 2022-05-18 DIAGNOSIS — F33.1 MDD (MAJOR DEPRESSIVE DISORDER), RECURRENT EPISODE, MODERATE: Primary | ICD-10-CM

## 2022-05-18 PROCEDURE — 3061F NEG MICROALBUMINURIA REV: CPT | Mod: CPTII,S$GLB,, | Performed by: SOCIAL WORKER

## 2022-05-18 PROCEDURE — 3051F HG A1C>EQUAL 7.0%<8.0%: CPT | Mod: CPTII,S$GLB,, | Performed by: SOCIAL WORKER

## 2022-05-18 PROCEDURE — 3066F PR DOCUMENTATION OF TREATMENT FOR NEPHROPATHY: ICD-10-PCS | Mod: CPTII,S$GLB,, | Performed by: SOCIAL WORKER

## 2022-05-18 PROCEDURE — 3066F NEPHROPATHY DOC TX: CPT | Mod: CPTII,S$GLB,, | Performed by: SOCIAL WORKER

## 2022-05-18 PROCEDURE — 3061F PR NEG MICROALBUMINURIA RESULT DOCUMENTED/REVIEW: ICD-10-PCS | Mod: CPTII,S$GLB,, | Performed by: SOCIAL WORKER

## 2022-05-18 PROCEDURE — 90853 PR GROUP PSYCHOTHERAPY: ICD-10-PCS | Mod: S$GLB,,, | Performed by: SOCIAL WORKER

## 2022-05-18 PROCEDURE — 99999 PR PBB SHADOW E&M-EST. PATIENT-LVL I: CPT | Mod: PBBFAC,,, | Performed by: SOCIAL WORKER

## 2022-05-18 PROCEDURE — 3051F PR MOST RECENT HEMOGLOBIN A1C LEVEL 7.0 - < 8.0%: ICD-10-PCS | Mod: CPTII,S$GLB,, | Performed by: SOCIAL WORKER

## 2022-05-18 PROCEDURE — 1159F PR MEDICATION LIST DOCUMENTED IN MEDICAL RECORD: ICD-10-PCS | Mod: CPTII,S$GLB,, | Performed by: SOCIAL WORKER

## 2022-05-18 PROCEDURE — 90853 GROUP PSYCHOTHERAPY: CPT | Mod: S$GLB,,, | Performed by: SOCIAL WORKER

## 2022-05-18 PROCEDURE — 1159F MED LIST DOCD IN RCRD: CPT | Mod: CPTII,S$GLB,, | Performed by: SOCIAL WORKER

## 2022-05-18 PROCEDURE — 99999 PR PBB SHADOW E&M-EST. PATIENT-LVL I: ICD-10-PCS | Mod: PBBFAC,,, | Performed by: SOCIAL WORKER

## 2022-05-20 ENCOUNTER — OFFICE VISIT (OUTPATIENT)
Dept: FAMILY MEDICINE | Facility: CLINIC | Age: 62
End: 2022-05-20
Payer: MEDICARE

## 2022-05-20 VITALS
DIASTOLIC BLOOD PRESSURE: 78 MMHG | SYSTOLIC BLOOD PRESSURE: 130 MMHG | OXYGEN SATURATION: 95 % | HEART RATE: 98 BPM | WEIGHT: 132.06 LBS | BODY MASS INDEX: 24.3 KG/M2 | HEIGHT: 62 IN

## 2022-05-20 DIAGNOSIS — K44.9 HIATAL HERNIA: ICD-10-CM

## 2022-05-20 DIAGNOSIS — Z79.4 TYPE 2 DIABETES MELLITUS WITH HYPERGLYCEMIA, WITH LONG-TERM CURRENT USE OF INSULIN: ICD-10-CM

## 2022-05-20 DIAGNOSIS — E11.65 TYPE 2 DIABETES MELLITUS WITH HYPERGLYCEMIA, WITH LONG-TERM CURRENT USE OF INSULIN: ICD-10-CM

## 2022-05-20 DIAGNOSIS — M81.0 SENILE OSTEOPOROSIS: Primary | ICD-10-CM

## 2022-05-20 DIAGNOSIS — K21.9 GASTROESOPHAGEAL REFLUX DISEASE, UNSPECIFIED WHETHER ESOPHAGITIS PRESENT: ICD-10-CM

## 2022-05-20 DIAGNOSIS — K29.30 CHRONIC SUPERFICIAL GASTRITIS WITHOUT BLEEDING: ICD-10-CM

## 2022-05-20 DIAGNOSIS — E11.9 TYPE 2 DIABETES MELLITUS WITHOUT COMPLICATION, WITHOUT LONG-TERM CURRENT USE OF INSULIN: ICD-10-CM

## 2022-05-20 DIAGNOSIS — F33.1 MODERATE EPISODE OF RECURRENT MAJOR DEPRESSIVE DISORDER: ICD-10-CM
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
## 2022-05-20 PROCEDURE — 3061F NEG MICROALBUMINURIA REV: CPT | Mod: CPTII,S$GLB,, | Performed by: FAMILY MEDICINE

## 2022-05-20 PROCEDURE — 99999 PR PBB SHADOW E&M-EST. PATIENT-LVL III: CPT | Mod: PBBFAC,,, | Performed by: FAMILY MEDICINE

## 2022-05-20 PROCEDURE — 3066F PR DOCUMENTATION OF TREATMENT FOR NEPHROPATHY: ICD-10-PCS | Mod: CPTII,S$GLB,, | Performed by: FAMILY MEDICINE

## 2022-05-20 PROCEDURE — 3078F DIAST BP <80 MM HG: CPT | Mod: CPTII,S$GLB,, | Performed by: FAMILY MEDICINE

## 2022-05-20 PROCEDURE — 3075F PR MOST RECENT SYSTOLIC BLOOD PRESS GE 130-139MM HG: ICD-10-PCS | Mod: CPTII,S$GLB,, | Performed by: FAMILY MEDICINE

## 2022-05-20 PROCEDURE — 3066F NEPHROPATHY DOC TX: CPT | Mod: CPTII,S$GLB,, | Performed by: FAMILY MEDICINE

## 2022-05-20 PROCEDURE — 3075F SYST BP GE 130 - 139MM HG: CPT | Mod: CPTII,S$GLB,, | Performed by: FAMILY MEDICINE

## 2022-05-20 PROCEDURE — 99215 PR OFFICE/OUTPT VISIT, EST, LEVL V, 40-54 MIN: ICD-10-PCS | Mod: S$GLB,,, | Performed by: FAMILY MEDICINE

## 2022-05-20 PROCEDURE — 3051F HG A1C>EQUAL 7.0%<8.0%: CPT | Mod: CPTII,S$GLB,, | Performed by: FAMILY MEDICINE

## 2022-05-20 PROCEDURE — 99499 RISK ADDL DX/OHS AUDIT: ICD-10-PCS | Mod: S$GLB,,, | Performed by: FAMILY MEDICINE

## 2022-05-20 PROCEDURE — 3008F PR BODY MASS INDEX (BMI) DOCUMENTED: ICD-10-PCS | Mod: CPTII,S$GLB,, | Performed by: FAMILY MEDICINE

## 2022-05-20 PROCEDURE — 99215 OFFICE O/P EST HI 40 MIN: CPT | Mod: S$GLB,,, | Performed by: FAMILY MEDICINE

## 2022-05-20 PROCEDURE — 99999 PR PBB SHADOW E&M-EST. PATIENT-LVL III: ICD-10-PCS | Mod: PBBFAC,,, | Performed by: FAMILY MEDICINE

## 2022-05-20 PROCEDURE — 3008F BODY MASS INDEX DOCD: CPT | Mod: CPTII,S$GLB,, | Performed by: FAMILY MEDICINE

## 2022-05-20 PROCEDURE — 99499 UNLISTED E&M SERVICE: CPT | Mod: S$GLB,,, | Performed by: FAMILY MEDICINE

## 2022-05-20 PROCEDURE — 3051F PR MOST RECENT HEMOGLOBIN A1C LEVEL 7.0 - < 8.0%: ICD-10-PCS | Mod: CPTII,S$GLB,, | Performed by: FAMILY MEDICINE

## 2022-05-20 PROCEDURE — 3078F PR MOST RECENT DIASTOLIC BLOOD PRESSURE < 80 MM HG: ICD-10-PCS | Mod: CPTII,S$GLB,, | Performed by: FAMILY MEDICINE

## 2022-05-20 PROCEDURE — 3061F PR NEG MICROALBUMINURIA RESULT DOCUMENTED/REVIEW: ICD-10-PCS | Mod: CPTII,S$GLB,, | Performed by: FAMILY MEDICINE

## 2022-05-20 RX ORDER — LINAGLIPTIN 5 MG/1
5 TABLET, FILM COATED ORAL DAILY
Qty: 90 TABLET | Refills: 3 | Status: SHIPPED | OUTPATIENT
Start: 2022-05-20 | End: 2023-07-17

## 2022-05-20 RX ORDER — ALPRAZOLAM 0.25 MG/1
TABLET ORAL
COMMUNITY
Start: 2021-10-06 | End: 2022-05-20

## 2022-05-20 RX ORDER — SERTRALINE HYDROCHLORIDE 50 MG/1
50 TABLET, FILM COATED ORAL DAILY
Qty: 90 TABLET | Refills: 3 | Status: SHIPPED | OUTPATIENT
Start: 2022-05-20 | End: 2023-10-04

## 2022-05-20 NOTE — PROGRESS NOTES
Subjective:       Patient ID: Jud Villa is a 62 y.o. female.    Chief Complaint: Follow-up, Diabetes, and Knee Pain    62 years old female came to the clinic for diabetes follow-up.  Patient with good compliance with medical regimen.  No polyuria, polydipsia or polyphagia.  Patient with significant depression after daughter passed away.  No suicidal or homicidal ideations.  Patient was to try a antidepressant medicine.  Patient with a fall and the clinic.  Patient denies injuries.  She reports good range of motion and no head trauma.  Esophageal reflux currently control.    Review of Systems   Constitutional: Negative.    HENT: Negative.    Eyes: Negative.    Respiratory: Negative.    Cardiovascular: Negative for chest pain, palpitations, leg swelling and claudication.   Gastrointestinal: Negative.  Positive for reflux.   Genitourinary: Negative.    Musculoskeletal: Positive for arthralgias. Negative for gait problem and joint swelling.   Neurological: Negative.    Psychiatric/Behavioral: Positive for dysphoric mood. Negative for suicidal ideas. The patient is not nervous/anxious.          Objective:      Physical Exam  Constitutional:       General: She is not in acute distress.     Appearance: She is well-developed. She is not diaphoretic.   HENT:      Head: Normocephalic and atraumatic.      Right Ear: External ear normal.      Left Ear: External ear normal.      Nose: Nose normal.      Mouth/Throat:      Pharynx: No oropharyngeal exudate.   Eyes:      General: No scleral icterus.        Right eye: No discharge.         Left eye: No discharge.      Conjunctiva/sclera: Conjunctivae normal.      Pupils: Pupils are equal, round, and reactive to light.   Neck:      Thyroid: No thyromegaly.      Vascular: No JVD.      Trachea: No tracheal deviation.   Cardiovascular:      Rate and Rhythm: Normal rate and regular rhythm.      Heart sounds: Normal heart sounds. No murmur heard.    No friction rub. No gallop.    Pulmonary:      Effort: Pulmonary effort is normal. No respiratory distress.      Breath sounds: Normal breath sounds. No stridor. No wheezing or rales.   Chest:      Chest wall: No tenderness.   Abdominal:      General: Bowel sounds are normal. There is no distension.      Palpations: Abdomen is soft. There is no mass.      Tenderness: There is no abdominal tenderness. There is no guarding or rebound.   Musculoskeletal:         General: No tenderness. Normal range of motion.      Cervical back: Normal range of motion and neck supple.   Lymphadenopathy:      Cervical: No cervical adenopathy.   Skin:     General: Skin is warm and dry.      Coloration: Skin is not pale.      Findings: No erythema or rash.   Neurological:      Mental Status: She is alert and oriented to person, place, and time.      Cranial Nerves: No cranial nerve deficit.      Motor: No abnormal muscle tone.      Coordination: Coordination normal.      Deep Tendon Reflexes: Reflexes are normal and symmetric.   Psychiatric:         Mood and Affect: Mood is anxious and depressed.         Behavior: Behavior normal.         Thought Content: Thought content normal.         Judgment: Judgment normal.         Assessment:       Problem List Items Addressed This Visit     Moderate episode of recurrent major depressive disorder    Relevant Medications    sertraline (ZOLOFT) 50 MG tablet      Other Visit Diagnoses     Senile osteoporosis    -  Primary    Relevant Medications    denosumab (PROLIA) 60 mg/mL Syrg    Other Relevant Orders    DXA Bone Density Spine And Hip    CBC Auto Differential    Comprehensive Metabolic Panel    Fall, initial encounter        Type 2 diabetes mellitus with hyperglycemia, with long-term current use of insulin        Relevant Medications    TRADJENTA 5 mg Tab tablet    Other Relevant Orders    Comprehensive Metabolic Panel    Lipid Panel    Hemoglobin A1C    Microalbumin/Creatinine Ratio, Urine    Polyarthralgia        Relevant  Medications    TRADJENTA 5 mg Tab tablet    Gastroesophageal reflux disease, unspecified whether esophagitis present        Hiatal hernia        Chronic superficial gastritis without bleeding        Relevant Orders    CBC Auto Differential          Plan:       Jud was seen today for follow-up, diabetes and knee pain.    Diagnoses and all orders for this visit:    Senile osteoporosis  -     denosumab (PROLIA) 60 mg/mL Syrg; Inject 1 mL (60 mg total) into the skin every 6 (six) months.  -     DXA Bone Density Spine And Hip; Future  -     CBC Auto Differential; Future  -     Comprehensive Metabolic Panel; Future    Fall, initial encounter    Type 2 diabetes mellitus with hyperglycemia, with long-term current use of insulin  -     Comprehensive Metabolic Panel; Future  -     Lipid Panel; Future  -     Hemoglobin A1C; Future  -     Microalbumin/Creatinine Ratio, Urine; Future    Diabetes.  -     TRADJENTA 5 mg Tab tablet; Take 1 tablet (5 mg total) by mouth once daily.    Gastroesophageal reflux disease, unspecified whether esophagitis present    Hiatal hernia    Chronic superficial gastritis without bleeding  -     CBC Auto Differential; Future    Moderate episode of recurrent major depressive disorder  -     sertraline (ZOLOFT) 50 MG tablet; Take 1 tablet (50 mg total) by mouth once daily.

## 2022-05-21 ENCOUNTER — SPECIALTY PHARMACY (OUTPATIENT)
Dept: PHARMACY | Facility: CLINIC | Age: 62
End: 2022-05-21
Payer: MEDICARE

## 2022-05-22 NOTE — PROGRESS NOTES
Group Psychotherapy    Site: [facility]    Clinical status of patient: Outpatient    5/18/2022    Length of service:[id number]    Referred by: MD     Number of patients in attendance: 3  Target symptoms: depression, recurrent depression, anxiety , mood swings, mood disorder, adjustment, work stress, school, family, stress, autism    Patient's response to intervention:  The patient's response to intervention is active listening, frequent questions, self-disclosure, feedback to other patients.    Progress toward goals and other mental status changes:  The patient's progress toward goals is fair .    Interval history: discussed doing better, schedule, management of time, and stress management skills, sleep, less depression, and taking care of himself and ways to cope and continue his good progress all focused on.    Diagnosis: autism spectrum disorder, depression, anxiety    Plan: individual psychotherapy, group psychotherapy, consult psychiatrist for medication evaluation and medication management by physician    Return to clinic: 1 week

## 2022-05-23 NOTE — TELEPHONE ENCOUNTER
Benefit Investigation    PROLIA  LIS: Level 2  Copay: $4  Pt is in coverage gap  [facility] in network    Forward to FA    JN/PTL

## 2022-05-27 ENCOUNTER — SPECIALTY PHARMACY (OUTPATIENT)
Dept: PHARMACY | Facility: CLINIC | Age: 62
End: 2022-05-27
Payer: MEDICARE

## 2022-05-27 DIAGNOSIS — M81.0 SENILE OSTEOPOROSIS: Primary | ICD-10-CM

## 2022-05-27 NOTE — TELEPHONE ENCOUNTER
"5/27 - Outgoing call to pt regarding initial consult for Prolia.   1st call: Pt picked up, said "hello", introduced call: "Hi, I'm calling from [facility] for Ms. Renner", pt hung up.   2nd call:Redialed, no answer, cannot leave voicemail.   3rd call: Outgoing call again, pt picked up, said hello then hung up when introduction made.     [facility] will make another call attempt next week   "

## 2022-06-01 NOTE — TELEPHONE ENCOUNTER
"Specialty Pharmacy - Initial Clinical Assessment    Specialty Medication Orders Linked to Encounter    Flowsheet Row Most Recent Value   Medication #1 denosumab (PROLIA) 60 mg/mL Syrg (Order#[id number], Rx#[id number])        Patient Diagnosis   M81.0 - Senile osteoporosis    Subjective    Jud Villa is a 62 y.o. female, who is followed by the specialty pharmacy service for management and education.    Recent Encounters     Date Type Provider Description    05/27/2022 Specialty Pharmacy Perla Bess, [place] Initial Clinical Assessment    05/21/2022 Specialty Pharmacy Perla Bess, [place] Referral Authorization        Clinical call attempts since last clinical assessment   5/27/2022  3:33 PM - Specialty Pharmacy - Clinical Assessment by Perla Bess, MacarioD     Current Outpatient Medications   Medication Sig    albuterol (PROVENTIL) 2.5 mg /3 mL (0.083 %) nebulizer solution INHALE 1 VIAL PER NEBULIZER FOUR TIMES DAILY AS NEEDED FOR SHORTNESS OF BREATH/ WHEEZING    albuterol (VENTOLIN HFA) 90 mcg/actuation inhaler INHALE 2 PUFFS BY MOUTH INTO THE LUNGS EVERY 6 HOURS AS NEEDED FOR WHEEZING    albuterol-ipratropium (DUO-NEB) 2.5 mg-0.5 mg/3 mL nebulizer solution Take 3 mLs by nebulization once daily.    augmented betamethasone dipropionate (DIPROLENE-AF) 0.05 % cream Apply topically 2 (two) times daily. for 10 days    BASAGLAR KWIKPEN U-100 INSULIN glargine 100 units/mL (3mL) SubQ pen INJECT 10 UNITS UNDER THE SKIN EVERY MORNING    BD ULTRA-FINE SHORT PEN NEEDLE 31 gauge x 5/16" Ndle Inject 1 pen into the skin once daily.    brompheniramine-pseudoeph-DM (BROMFED DM) 2-30-10 mg/5 mL Syrp TAKE 10 ML BY MOUTH THREE TIMES DAILY AS NEEDED    carboxymethylcellulose sodium 1 % DpGe Apply 1 drop to eye 4 (four) times daily.    clonazePAM (KLONOPIN) 0.5 MG tablet TAKE 1 TABLET(0.5 MG) BY MOUTH EVERY NIGHT AS NEEDED FOR ANXIETY    clotrimazole-betamethasone 1-0.05% (LOTRISONE) cream Apply topically 2 (two) times " daily.    denosumab (PROLIA) 60 mg/mL Syrg Inject 1 mL (60 mg total) into the skin every 6 (six) months.    ergocalciferol (ERGOCALCIFEROL) 50,000 unit Cap TAKE 1 CAPSULE BY MOUTH EVERY 7 DAYS    gabapentin (NEURONTIN) 300 MG capsule Take 1 capsule (300 mg total) by mouth 3 (three) times daily.    glipiZIDE (GLUCOTROL) 10 MG tablet TAKE 1 TABLET(10 MG) BY MOUTH TWICE DAILY    hydrocodone-chlorpheniramine (TUSSIONEX) 10-8 mg/5 mL suspension Take 5 mLs by mouth every 12 (twelve) hours as needed for Cough.    JARDIANCE 10 mg tablet TAKE 1 TABLET(10 MG) BY MOUTH EVERY DAY    metFORMIN (GLUCOPHAGE) 1000 MG tablet TAKE 1 TABLET(1000 MG) BY MOUTH TWICE DAILY WITH MEALS    mupirocin (BACTROBAN) 2 % ointment Apply topically 2 (two) times daily.    nicotine (NICODERM CQ) 21 mg/24 hr Place 1 patch onto the skin once daily.    ondansetron (ZOFRAN-ODT) 4 MG TbDL Take 1 tablet (4 mg total) by mouth 3 (three) times daily.    oxymetazoline (AFRIN) 0.05 % nasal spray 2 sprays by Nasal route.    pantoprazole (PROTONIX) 40 MG tablet Take 1 tablet (40 mg total) by mouth once daily.    potassium chloride (MICRO-K) 8 mEq CpSR Take 1 capsule (8 mEq total) by mouth once daily.    prednisoLONE acetate (PRED FORTE) 1 % DrpS Place 1 drop into the left eye 4 (four) times daily.    sertraline (ZOLOFT) 50 MG tablet Take 1 tablet (50 mg total) by mouth once daily.    sucralfate (CARAFATE) 1 gram tablet TAKE 1 TABLET(1 GRAM) BY MOUTH THREE TIMES DAILY BEFORE MEALS    topiramate (TOPAMAX) 50 MG tablet Take 2 tablets (100 mg total) by mouth 2 (two) times daily. TAKE 1 TABLET BY MOUTH TWICE DAILY FOR 7 DAYS, THEN 1 TABLET EVERY MORNING AND 2 TABLETS EVERY EVENING FOR 7 DAYS, THEN 2 TABLETS TWICE DAILY    TRADJENTA 5 mg Tab tablet Take 1 tablet (5 mg total) by mouth once daily.    traMADoL (ULTRAM) 50 mg tablet Take 1 tablet (50 mg total) by mouth every 6 (six) hours as needed for Pain.    TRELEGY ELLIPTA 100-62.5-25 mcg DsDv  INHALE 1 PUFF INTO THE LUNGS EVERY DAY    TRUE METRIX GLUCOSE TEST STRIP Strp USE AS DIRECTED FOUR TIMES DAILY   Last reviewed on 6/1/2022  8:57 AM by Perla Bess, PharmD    Review of patient's allergies indicates:  No Known AllergiesLast reviewed on  6/1/2022 8:50 AM by Perla Bess    Drug Interactions    Drug interactions evaluated: yes  Clinically relevant drug interactions identified: no  Provided the patient with educational material regarding drug interactions: not applicable         Adverse Effects    *All other systems reviewed and are negative       Assessment Questions - Documented Responses    Flowsheet Row Most Recent Value   Assessment    Medication Reconciliation completed for patient Yes   During the past 4 weeks, has patient missed any activities due to condition or medication? No   During the past 4 weeks, did patient have any of the following urgent care visits? None   Goals of Therapy Status Discussed (new start)   Status of the patients ability to self-administer: Caregiver to administer  [Provider Office Administered]   All education points have been covered with patient? Yes, supplemental printed education provided   Welcome packet contents reviewed and discussed with patient? No   Assesment completed? Yes   Plan Therapy being initiated   Do you need to open a clinical intervention (i-vent)? No   Do you want to schedule first shipment? Yes   Medication #1 Assessment Info    Patient status New medication, New to [facility]   Is this medication appropriate for the patient? Yes   Is this medication effective? Not yet started        Refill Questions - Documented Responses    Flowsheet Row Most Recent Value   Patient Availability and HIPAA Verification    Does patient want to proceed with activity? Yes   HIPAA/medical authority confirmed? Yes   Relationship to patient of person spoken to? Self   Refill Screening Questions    When does the patient need to receive the medication? 06/07/22   Refill  "Delivery Questions    How will the patient receive the medication?    When does the patient need to receive the medication? 06/07/22   Shipping Address Prescription   Address in [facility] confirmed and updated if neccessary? Yes   Expected Copay ($) 4   Is the patient able to afford the medication copay? Yes   Payment Method new CC added to file   Days supply of Refill 180   Supplies needed? No supplies needed   Refill activity completed? Yes   Refill activity plan Refill scheduled   Shipment/Pickup Date: 06/03/22          Objective    She has a past medical history of Asthma, Breast carcinoma, Cataract, Diabetes mellitus, Moderate episode of recurrent major depressive disorder (5/19/2021), and Osteoporosis.    Tried/failed medications: Alendronate    BP Readings from Last 4 Encounters:   05/20/22 130/78   05/13/22 (!) 158/76   01/24/22 111/67   01/20/22 102/60     Ht Readings from Last 4 Encounters:   05/20/22 5' 2" (1.575 m)   05/13/22 5' 2" (1.575 m)   01/24/22 5' 2" (1.575 m)   01/20/22 5' 2" (1.575 m)     Wt Readings from Last 4 Encounters:   05/20/22 59.9 kg (132 lb 0.9 oz)   05/13/22 59 kg (130 lb)   01/24/22 58.5 kg (129 lb)   01/20/22 59.4 kg (130 lb 15.3 oz)       The goals of prescribed drug therapy management include:  · Supporting patient to meet the prescriber's medical treatment objectives  · Improving or maintaining quality of life  · Maintaining optimal therapy adherence  · Minimizing and managing side effects      Goals of Therapy Status: Discussed (new start)    Assessment/Plan  Patient plans to start therapy on 06/07/22      Indication, dosage, appropriateness, effectiveness, safety and convenience of her specialty medication(s) were reviewed today.     Patient Education   Patient received education on the following:    Expectations and possible outcomes of therapy   Proper use, timely administration, and missed dose management   Duration of therapy   Side effects, including " prevention, minimization, and management   Contraindications and safety precautions   New or changed medications, including prescribe and over the counter medications and supplements   Reviews recommended vaccinations, as appropriate   Storage, safe handling, and disposal        Tasks added this encounter   No tasks added.   Tasks due within next 3 months   5/23/2022 - Clinical - Initial Assessment     Perla Bess, PharmD  [facility]  [address] [place] [id number]  Phone: [phone]  Fax: [phone]

## 2022-06-01 NOTE — TELEPHONE ENCOUNTER
Ce confirmed with LANE Jacobo for 6/3 to:    Attn: Claribel Jacobo  [address] [facility]  [place] [address]

## 2022-06-02 ENCOUNTER — APPOINTMENT (OUTPATIENT)
Dept: RADIOLOGY | Facility: CLINIC | Age: 62
End: 2022-06-02
Attending: FAMILY MEDICINE
Payer: MEDICARE

## 2022-06-02 DIAGNOSIS — M81.0 SENILE OSTEOPOROSIS: ICD-10-CM

## 2022-06-02 PROCEDURE — 77080 DXA BONE DENSITY AXIAL: CPT | Mod: TC,PO

## 2022-06-02 PROCEDURE — 77080 DEXA BONE DENSITY SPINE HIP: ICD-10-PCS | Mod: 26,,, | Performed by: INTERNAL MEDICINE

## 2022-06-02 PROCEDURE — 77080 DXA BONE DENSITY AXIAL: CPT | Mod: 26,,, | Performed by: INTERNAL MEDICINE

## 2022-06-06 ENCOUNTER — LAB VISIT (OUTPATIENT)
Dept: LAB | Facility: HOSPITAL | Age: 62
End: 2022-06-06
Attending: FAMILY MEDICINE
Payer: MEDICARE

## 2022-06-06 DIAGNOSIS — E11.65 TYPE 2 DIABETES MELLITUS WITH HYPERGLYCEMIA, WITH LONG-TERM CURRENT USE OF INSULIN: ICD-10-CM

## 2022-06-06 DIAGNOSIS — Z79.4 TYPE 2 DIABETES MELLITUS WITH HYPERGLYCEMIA, WITH LONG-TERM CURRENT USE OF INSULIN: ICD-10-CM

## 2022-06-06 LAB
ALBUMIN/CREAT UR: 19.2 UG/MG (ref 0–30)
CREAT UR-MCNC: 307 MG/DL (ref 15–325)
MICROALBUMIN UR DL<=1MG/L-MCNC: 59 UG/ML

## 2022-06-06 PROCEDURE — 82570 ASSAY OF URINE CREATININE: CPT | Performed by: FAMILY MEDICINE

## 2022-06-06 PROCEDURE — 82043 UR ALBUMIN QUANTITATIVE: CPT | Performed by: FAMILY MEDICINE

## 2022-06-08 ENCOUNTER — PATIENT MESSAGE (OUTPATIENT)
Dept: SMOKING CESSATION | Facility: CLINIC | Age: 62
End: 2022-06-08
Payer: MEDICARE

## 2022-06-13 ENCOUNTER — OFFICE VISIT (OUTPATIENT)
Dept: SLEEP MEDICINE | Facility: CLINIC | Age: 62
End: 2022-06-13
Payer: MEDICARE

## 2022-06-13 VITALS
HEART RATE: 63 BPM | WEIGHT: 130 LBS | BODY MASS INDEX: 23.92 KG/M2 | RESPIRATION RATE: 18 BRPM | SYSTOLIC BLOOD PRESSURE: 105 MMHG | HEIGHT: 62 IN | DIASTOLIC BLOOD PRESSURE: 65 MMHG

## 2022-06-13 DIAGNOSIS — G47.30 SLEEP APNEA, UNSPECIFIED TYPE: Primary | ICD-10-CM

## 2022-06-13 PROCEDURE — 3061F NEG MICROALBUMINURIA REV: CPT | Mod: CPTII,S$GLB,, | Performed by: PSYCHIATRY & NEUROLOGY

## 2022-06-13 PROCEDURE — 3061F PR NEG MICROALBUMINURIA RESULT DOCUMENTED/REVIEW: ICD-10-PCS | Mod: CPTII,S$GLB,, | Performed by: PSYCHIATRY & NEUROLOGY

## 2022-06-13 PROCEDURE — 3008F PR BODY MASS INDEX (BMI) DOCUMENTED: ICD-10-PCS | Mod: CPTII,S$GLB,, | Performed by: PSYCHIATRY & NEUROLOGY

## 2022-06-13 PROCEDURE — 99204 OFFICE O/P NEW MOD 45 MIN: CPT | Mod: S$GLB,,, | Performed by: PSYCHIATRY & NEUROLOGY

## 2022-06-13 PROCEDURE — 3051F HG A1C>EQUAL 7.0%<8.0%: CPT | Mod: CPTII,S$GLB,, | Performed by: PSYCHIATRY & NEUROLOGY

## 2022-06-13 PROCEDURE — 3066F NEPHROPATHY DOC TX: CPT | Mod: CPTII,S$GLB,, | Performed by: PSYCHIATRY & NEUROLOGY

## 2022-06-13 PROCEDURE — 1159F PR MEDICATION LIST DOCUMENTED IN MEDICAL RECORD: ICD-10-PCS | Mod: CPTII,S$GLB,, | Performed by: PSYCHIATRY & NEUROLOGY

## 2022-06-13 PROCEDURE — 99499 UNLISTED E&M SERVICE: CPT | Mod: S$GLB,,, | Performed by: PSYCHIATRY & NEUROLOGY

## 2022-06-13 PROCEDURE — 3078F DIAST BP <80 MM HG: CPT | Mod: CPTII,S$GLB,, | Performed by: PSYCHIATRY & NEUROLOGY

## 2022-06-13 PROCEDURE — 1159F MED LIST DOCD IN RCRD: CPT | Mod: CPTII,S$GLB,, | Performed by: PSYCHIATRY & NEUROLOGY

## 2022-06-13 PROCEDURE — 3074F SYST BP LT 130 MM HG: CPT | Mod: CPTII,S$GLB,, | Performed by: PSYCHIATRY & NEUROLOGY

## 2022-06-13 PROCEDURE — 3078F PR MOST RECENT DIASTOLIC BLOOD PRESSURE < 80 MM HG: ICD-10-PCS | Mod: CPTII,S$GLB,, | Performed by: PSYCHIATRY & NEUROLOGY

## 2022-06-13 PROCEDURE — 99499 RISK ADDL DX/OHS AUDIT: ICD-10-PCS | Mod: S$GLB,,, | Performed by: PSYCHIATRY & NEUROLOGY

## 2022-06-13 PROCEDURE — 99204 PR OFFICE/OUTPT VISIT, NEW, LEVL IV, 45-59 MIN: ICD-10-PCS | Mod: S$GLB,,, | Performed by: PSYCHIATRY & NEUROLOGY

## 2022-06-13 PROCEDURE — 3008F BODY MASS INDEX DOCD: CPT | Mod: CPTII,S$GLB,, | Performed by: PSYCHIATRY & NEUROLOGY

## 2022-06-13 PROCEDURE — 3051F PR MOST RECENT HEMOGLOBIN A1C LEVEL 7.0 - < 8.0%: ICD-10-PCS | Mod: CPTII,S$GLB,, | Performed by: PSYCHIATRY & NEUROLOGY

## 2022-06-13 PROCEDURE — 3066F PR DOCUMENTATION OF TREATMENT FOR NEPHROPATHY: ICD-10-PCS | Mod: CPTII,S$GLB,, | Performed by: PSYCHIATRY & NEUROLOGY

## 2022-06-13 PROCEDURE — 3074F PR MOST RECENT SYSTOLIC BLOOD PRESSURE < 130 MM HG: ICD-10-PCS | Mod: CPTII,S$GLB,, | Performed by: PSYCHIATRY & NEUROLOGY

## 2022-06-13 NOTE — PATIENT INSTRUCTIONS
SLEEP LAB ([facility]) will contact you to schedulethe sleep study. Their number is [phone] (ext 2). Please call them if you do not hear from them in 2 weeks from now.  [facility] is located on 7th floor of the [facility]; [place] lab is located in [facility].    SLEEP CLINIC (my assistant) will call you when the sleep study results are ready - if you have not heard from us by 2 weeks from the date of the study, please call [phone] (ext 1) or you can use [facility] to contact me.    You are advised to abstain from driving should you feel sleepy or drowsy.  __________________          Sleep Hygiene Practices     1. Try going to bed only when you are drowsy.  ?   2. If you are unable to fall asleep or stay asleep, leave your bedroom and engage in a quiet activity elsewhere. Do not permit yourself to fall asleep outside the bedroom. Return to bed when and only when you are sleepy. Repeat this process as often as necessary throughout night.   3. Maintain regular wake-up time, even on days off work & weekends   4. Use your bedroom for sleep and sex   5. Do not watch TV in bed  6. Avoid napping during the daytime. If daytime sleepiness becomes overwhelming, limit nap time to a single nap of less than 1hr, no later than 3pm.   7. Distract your mind. Avoid clock watching. Lying in bed unable to sleep and frustrated needs to be avoided. Try reading or watching a videotape or listening to books on tape. It may be necessary to go into another room to do these.   8. Avoid caffeine within 4-6hrs of bedtime   9. Avoid use of nicotine close to bedtime   10. do not drink alcoholic beverages within 4-6hrs of bedtime   11. While a light snack before bedtime can help promote sound sleep, avoid large meals.   12. Obtain regular exercise, but avoid strenuous exercise within 4hrs of bedtime   13. Minimize light, noise, and extremes in temperature in the bedroom.   14. Precautions: The patient was  advised to abstain from driving should they feel sleepy or drowsy.

## 2022-06-16 ENCOUNTER — TELEPHONE (OUTPATIENT)
Dept: SLEEP MEDICINE | Facility: OTHER | Age: 62
End: 2022-06-16
Payer: MEDICARE

## 2022-06-16 NOTE — TELEPHONE ENCOUNTER
Tried calling to schedule the sleep study,v/m is full.  Sent out a message through [facility] [employer] to schedule.

## 2022-06-29 ENCOUNTER — OFFICE VISIT (OUTPATIENT)
Dept: PSYCHIATRY | Facility: CLINIC | Age: 62
End: 2022-06-29
Payer: MEDICARE

## 2022-06-29 DIAGNOSIS — F33.1 MDD (MAJOR DEPRESSIVE DISORDER), RECURRENT EPISODE, MODERATE: Primary | ICD-10-CM

## 2022-06-29 DIAGNOSIS — F41.1 GAD (GENERALIZED ANXIETY DISORDER): ICD-10-CM

## 2022-06-29 PROCEDURE — 1159F PR MEDICATION LIST DOCUMENTED IN MEDICAL RECORD: ICD-10-PCS | Mod: CPTII,S$GLB,, | Performed by: SOCIAL WORKER

## 2022-06-29 PROCEDURE — 99999 PR PBB SHADOW E&M-EST. PATIENT-LVL I: CPT | Mod: PBBFAC,,, | Performed by: SOCIAL WORKER

## 2022-06-29 PROCEDURE — 90853 GROUP PSYCHOTHERAPY: CPT | Mod: S$GLB,,, | Performed by: SOCIAL WORKER

## 2022-06-29 PROCEDURE — 3061F NEG MICROALBUMINURIA REV: CPT | Mod: CPTII,S$GLB,, | Performed by: SOCIAL WORKER

## 2022-06-29 PROCEDURE — 3051F PR MOST RECENT HEMOGLOBIN A1C LEVEL 7.0 - < 8.0%: ICD-10-PCS | Mod: CPTII,S$GLB,, | Performed by: SOCIAL WORKER

## 2022-06-29 PROCEDURE — 3066F PR DOCUMENTATION OF TREATMENT FOR NEPHROPATHY: ICD-10-PCS | Mod: CPTII,S$GLB,, | Performed by: SOCIAL WORKER

## 2022-06-29 PROCEDURE — 1159F MED LIST DOCD IN RCRD: CPT | Mod: CPTII,S$GLB,, | Performed by: SOCIAL WORKER

## 2022-06-29 PROCEDURE — 3051F HG A1C>EQUAL 7.0%<8.0%: CPT | Mod: CPTII,S$GLB,, | Performed by: SOCIAL WORKER

## 2022-06-29 PROCEDURE — 90853 PR GROUP PSYCHOTHERAPY: ICD-10-PCS | Mod: S$GLB,,, | Performed by: SOCIAL WORKER

## 2022-06-29 PROCEDURE — 3061F PR NEG MICROALBUMINURIA RESULT DOCUMENTED/REVIEW: ICD-10-PCS | Mod: CPTII,S$GLB,, | Performed by: SOCIAL WORKER

## 2022-06-29 PROCEDURE — 99999 PR PBB SHADOW E&M-EST. PATIENT-LVL I: ICD-10-PCS | Mod: PBBFAC,,, | Performed by: SOCIAL WORKER

## 2022-06-29 PROCEDURE — 3066F NEPHROPATHY DOC TX: CPT | Mod: CPTII,S$GLB,, | Performed by: SOCIAL WORKER

## 2022-07-03 NOTE — PROGRESS NOTES
Group Psychotherapy    Site: [facility]    Clinical status of patient: Outpatient    6/29/2022    Length of service:[id number]    Referred by: MD     Number of patients in attendance: 4    Target symptoms: depression, recurrent depression, anxiety , mood swings, mood disorder, adjustment, grief, work stress    Patient's response to intervention:  The patient's response to intervention is active listening, frequent questions, self-disclosure, feedback to other patients.    Progress toward goals and other mental status changes:  The patient's progress toward goals is limited.    Interval history: discussed loss and grief, job issues, family stress, legal stress, coping skills, family, how to take care of herself and ways to manage her feelings and deal with challenges that are occurring in her life at this time.    Diagnosis: 296.32, 300.02    Plan: individual psychotherapy and group psychotherapy    Return to clinic: 1 week

## 2022-07-13 ENCOUNTER — OFFICE VISIT (OUTPATIENT)
Dept: PSYCHIATRY | Facility: CLINIC | Age: 62
End: 2022-07-13
Payer: MEDICARE

## 2022-07-13 DIAGNOSIS — F43.23 ADJUSTMENT DISORDER WITH MIXED ANXIETY AND DEPRESSED MOOD: ICD-10-CM

## 2022-07-13 DIAGNOSIS — F33.1 MDD (MAJOR DEPRESSIVE DISORDER), RECURRENT EPISODE, MODERATE: Primary | ICD-10-CM

## 2022-07-13 DIAGNOSIS — F41.1 GAD (GENERALIZED ANXIETY DISORDER): ICD-10-CM

## 2022-07-13 PROCEDURE — 3051F HG A1C>EQUAL 7.0%<8.0%: CPT | Mod: CPTII,S$GLB,, | Performed by: SOCIAL WORKER

## 2022-07-13 PROCEDURE — 3051F PR MOST RECENT HEMOGLOBIN A1C LEVEL 7.0 - < 8.0%: ICD-10-PCS | Mod: CPTII,S$GLB,, | Performed by: SOCIAL WORKER

## 2022-07-13 PROCEDURE — 3066F NEPHROPATHY DOC TX: CPT | Mod: CPTII,S$GLB,, | Performed by: SOCIAL WORKER

## 2022-07-13 PROCEDURE — 3061F PR NEG MICROALBUMINURIA RESULT DOCUMENTED/REVIEW: ICD-10-PCS | Mod: CPTII,S$GLB,, | Performed by: SOCIAL WORKER

## 2022-07-13 PROCEDURE — 90853 PR GROUP PSYCHOTHERAPY: ICD-10-PCS | Mod: S$GLB,,, | Performed by: SOCIAL WORKER

## 2022-07-13 PROCEDURE — 3066F PR DOCUMENTATION OF TREATMENT FOR NEPHROPATHY: ICD-10-PCS | Mod: CPTII,S$GLB,, | Performed by: SOCIAL WORKER

## 2022-07-13 PROCEDURE — 99999 PR PBB SHADOW E&M-EST. PATIENT-LVL I: CPT | Mod: PBBFAC,,, | Performed by: SOCIAL WORKER

## 2022-07-13 PROCEDURE — 3061F NEG MICROALBUMINURIA REV: CPT | Mod: CPTII,S$GLB,, | Performed by: SOCIAL WORKER

## 2022-07-13 PROCEDURE — 99999 PR PBB SHADOW E&M-EST. PATIENT-LVL I: ICD-10-PCS | Mod: PBBFAC,,, | Performed by: SOCIAL WORKER

## 2022-07-13 PROCEDURE — 1159F MED LIST DOCD IN RCRD: CPT | Mod: CPTII,S$GLB,, | Performed by: SOCIAL WORKER

## 2022-07-13 PROCEDURE — 1159F PR MEDICATION LIST DOCUMENTED IN MEDICAL RECORD: ICD-10-PCS | Mod: CPTII,S$GLB,, | Performed by: SOCIAL WORKER

## 2022-07-13 PROCEDURE — 90853 GROUP PSYCHOTHERAPY: CPT | Mod: S$GLB,,, | Performed by: SOCIAL WORKER

## 2022-07-17 NOTE — PROGRESS NOTES
Group Psychotherapy    Site: [facility]    Clinical status of patient: Outpatient    7/13/2022    Length of service:10598-93 minutes    Referred by: MD     Number of patients in attendance: 6    Target symptoms: depression, anxiety , mood swings, mood disorder, adjustment, grief, work stress    Patient's response to intervention:  The patient's response to intervention is active listening, frequent questions, self-disclosure, feedback to other patients.    Progress toward goals and other mental status changes:  The patient's progress toward goals is fair .    Interval history: discussed coping skills, family, and grief and loss, taking care of herself, job issues, and boundaries, and getting support.    Diagnosis: 296.32, 300.02    Plan: individual psychotherapy, group psychotherapy, consult psychiatrist for medication evaluation and medication management by physician    Return to clinic: 1 week

## 2022-07-22 ENCOUNTER — TELEPHONE (OUTPATIENT)
Dept: SLEEP MEDICINE | Facility: OTHER | Age: 62
End: 2022-07-22
Payer: MEDICARE

## 2022-08-18 ENCOUNTER — TELEPHONE (OUTPATIENT)
Dept: SLEEP MEDICINE | Facility: OTHER | Age: 62
End: 2022-08-18
Payer: MEDICARE

## 2022-08-22 DIAGNOSIS — M81.0 SENILE OSTEOPOROSIS: ICD-10-CM

## 2022-08-22 RX ORDER — ALENDRONATE SODIUM 70 MG/1
TABLET ORAL
Qty: 12 TABLET | Refills: 3 | OUTPATIENT
Start: 2022-08-22

## 2022-08-24 ENCOUNTER — PATIENT MESSAGE (OUTPATIENT)
Dept: ADMINISTRATIVE | Facility: HOSPITAL | Age: 62
End: 2022-08-24
Payer: MEDICARE

## 2022-09-02 ENCOUNTER — OFFICE VISIT (OUTPATIENT)
Dept: URGENT CARE | Facility: CLINIC | Age: 62
End: 2022-09-02
Payer: MEDICARE

## 2022-09-02 VITALS
HEART RATE: 89 BPM | DIASTOLIC BLOOD PRESSURE: 75 MMHG | RESPIRATION RATE: 17 BRPM | BODY MASS INDEX: 23 KG/M2 | OXYGEN SATURATION: 93 % | SYSTOLIC BLOOD PRESSURE: 119 MMHG | WEIGHT: 125 LBS | TEMPERATURE: 99 F | HEIGHT: 62 IN

## 2022-09-02 DIAGNOSIS — S50.811A ABRASION OF RIGHT FOREARM, INITIAL ENCOUNTER: Primary | ICD-10-CM

## 2022-09-02 PROCEDURE — 1160F RVW MEDS BY RX/DR IN RCRD: CPT | Mod: CPTII,S$GLB,, | Performed by: FAMILY MEDICINE

## 2022-09-02 PROCEDURE — 3051F PR MOST RECENT HEMOGLOBIN A1C LEVEL 7.0 - < 8.0%: ICD-10-PCS | Mod: CPTII,S$GLB,, | Performed by: FAMILY MEDICINE

## 2022-09-02 PROCEDURE — 3078F DIAST BP <80 MM HG: CPT | Mod: CPTII,S$GLB,, | Performed by: FAMILY MEDICINE

## 2022-09-02 PROCEDURE — 99213 OFFICE O/P EST LOW 20 MIN: CPT | Mod: S$GLB,,, | Performed by: FAMILY MEDICINE

## 2022-09-02 PROCEDURE — 1159F MED LIST DOCD IN RCRD: CPT | Mod: CPTII,S$GLB,, | Performed by: FAMILY MEDICINE

## 2022-09-02 PROCEDURE — 99213 PR OFFICE/OUTPT VISIT, EST, LEVL III, 20-29 MIN: ICD-10-PCS | Mod: S$GLB,,, | Performed by: FAMILY MEDICINE

## 2022-09-02 PROCEDURE — 3061F NEG MICROALBUMINURIA REV: CPT | Mod: CPTII,S$GLB,, | Performed by: FAMILY MEDICINE

## 2022-09-02 PROCEDURE — 3066F NEPHROPATHY DOC TX: CPT | Mod: CPTII,S$GLB,, | Performed by: FAMILY MEDICINE

## 2022-09-02 PROCEDURE — 3008F PR BODY MASS INDEX (BMI) DOCUMENTED: ICD-10-PCS | Mod: CPTII,S$GLB,, | Performed by: FAMILY MEDICINE

## 2022-09-02 PROCEDURE — 3078F PR MOST RECENT DIASTOLIC BLOOD PRESSURE < 80 MM HG: ICD-10-PCS | Mod: CPTII,S$GLB,, | Performed by: FAMILY MEDICINE

## 2022-09-02 PROCEDURE — 3074F SYST BP LT 130 MM HG: CPT | Mod: CPTII,S$GLB,, | Performed by: FAMILY MEDICINE

## 2022-09-02 PROCEDURE — 1159F PR MEDICATION LIST DOCUMENTED IN MEDICAL RECORD: ICD-10-PCS | Mod: CPTII,S$GLB,, | Performed by: FAMILY MEDICINE

## 2022-09-02 PROCEDURE — 3074F PR MOST RECENT SYSTOLIC BLOOD PRESSURE < 130 MM HG: ICD-10-PCS | Mod: CPTII,S$GLB,, | Performed by: FAMILY MEDICINE

## 2022-09-02 PROCEDURE — 3051F HG A1C>EQUAL 7.0%<8.0%: CPT | Mod: CPTII,S$GLB,, | Performed by: FAMILY MEDICINE

## 2022-09-02 PROCEDURE — 3008F BODY MASS INDEX DOCD: CPT | Mod: CPTII,S$GLB,, | Performed by: FAMILY MEDICINE

## 2022-09-02 PROCEDURE — 1160F PR REVIEW ALL MEDS BY PRESCRIBER/CLIN PHARMACIST DOCUMENTED: ICD-10-PCS | Mod: CPTII,S$GLB,, | Performed by: FAMILY MEDICINE

## 2022-09-02 PROCEDURE — 3066F PR DOCUMENTATION OF TREATMENT FOR NEPHROPATHY: ICD-10-PCS | Mod: CPTII,S$GLB,, | Performed by: FAMILY MEDICINE

## 2022-09-02 PROCEDURE — 3061F PR NEG MICROALBUMINURIA RESULT DOCUMENTED/REVIEW: ICD-10-PCS | Mod: CPTII,S$GLB,, | Performed by: FAMILY MEDICINE

## 2022-09-02 RX ORDER — MUPIROCIN 20 MG/G
OINTMENT TOPICAL 2 TIMES DAILY
Qty: 30 G | Refills: 2 | Status: SHIPPED | OUTPATIENT
Start: 2022-09-02 | End: 2023-03-22

## 2022-09-02 NOTE — PATIENT INSTRUCTIONS
Wound Care Discharge Instructions   About this topic   A wound is an injury to the skin that breaks the barrier to the outside. The skin protects the inside of the body from the outside world. When the skin is damaged or broken open, the wound can become infected or bleed.  Cuts and scrapes are one type of wound. You may also hear these called abrasions. Scrapes on the surface leave deeper skin layers in place. These are often caused by friction or rubbing against a rough surface.  Another kind is a puncture wound. This comes from something like a bite or stepping on a nail. Puncture wounds are caused by a pointed or sharp object, such as a nail, needle, or tooth entering the skin. Bleeding can be very little, and the wound may be barely obvious. Bites from a human or an animal always have germs in them and need extra care.  A laceration is a cut on your skin. It is most often caused by a sharp object like a knife blade, glass, or from other things with sharp edges. If the cut is shallow, it does not need stitches.     What care is needed at home?   Keep your wound covered as it heals. You may want to use a thin layer of antibiotic ointment to help keep the wound moist. This will also keep the dressing from sticking to the wound.  After 24 hours, you can gently wash the wound with soap and water. Pat dry and put on a clean dressing. A telfa pad will not stick to the wound.  Change your dressing once a day or every other day.  Always wash your hands before and after touching the wound.  Each time you change the dressing, look closely at the wound to be sure it is healing the right way. The wound may have a yellowish discharge, and this is normal.  Avoid picking the scab or scratching the site which may cause more irritation.  Do not soak in water or swim with an open wound.  Do not rub the wound or take off any small strips of tape.  What follow-up care is needed?   Follow up with your primary care provider within  1 week to check on your progress for wound healing.  Will physical activity be limited?   Avoid activities which can cause further injury until your wound is fully healed.   What problems could happen?   Bleeding  Infection  Scarring  Poor healing  When do I need to call the doctor?   Signs of infection. These include a fever of 100.4°F (38°C) or higher, chills, or wound that will not heal.  The pain in and around the area gets much worse.  There is a bad smell or pus (thick yellow, green, or gray fluid) coming from your wound.  You notice a crunchy feeling or blisters in the skin around the wound.  The redness around your wound gets bigger or is spreading up your arm or leg.  Fluid that is not pus drains from your wound.  Your swelling doesnt improve or gets worse.

## 2022-09-02 NOTE — PROGRESS NOTES
"Subjective:       Patient ID: Jud Villa is a 62 y.o. female.    Vitals:  height is 5' 2" (1.575 m) and weight is 56.7 kg (125 lb). Her temperature is 99.3 °F (37.4 °C). Her blood pressure is 119/75 and her pulse is 89. Her respiration is 17 and oxygen saturation is 93% (abnormal).     Chief Complaint: Abrasion    This is a 62 y.o. female who presents today with a chief complaint of abrasion on right forearm x yesterday. Pt states that she was pushing the stove and scrapped her skin. Last Td was 11/19/2021      Laceration   The incident occurred 12 to 24 hours ago. Pain location: right forearm. The laceration mechanism was a metal edge. The pain is at a severity of 0/10. The patient is experiencing no pain. She reports no foreign bodies present. Her tetanus status is UTD.     Constitution: Negative for activity change, appetite change, chills, fatigue, fever and generalized weakness.   HENT:  Negative for congestion, postnasal drip and sinus pressure.    Neck: Negative for neck swelling.   Cardiovascular:  Negative for chest pain, leg swelling, palpitations, sob on exertion and passing out.   Eyes:  Negative for vision loss.   Respiratory:  Negative for chest tightness, cough and shortness of breath.    Gastrointestinal:  Negative for nausea and vomiting.   Genitourinary:  Negative for dysuria.   Skin:  Positive for wound. Negative for rash.   Neurological:  Negative for passing out, altered mental status and numbness.   Psychiatric/Behavioral:  Negative for altered mental status, confusion and agitation.      Objective:      Vitals:    09/02/22 1745   BP: 119/75   Pulse: 89   Resp: 17   Temp: 99.3 °F (37.4 °C)   SpO2: (!) 93%   Weight: 56.7 kg (125 lb)   Height: 5' 2" (1.575 m)      Physical Exam   Constitutional: She is oriented to person, place, and time.  Non-toxic appearance. She does not appear ill. No distress.   HENT:   Head: Atraumatic.   Eyes: Conjunctivae are normal.   Cardiovascular: Normal rate, " regular rhythm, normal heart sounds and normal pulses.   Pulmonary/Chest: Effort normal and breath sounds normal.   Neurological: She is alert and oriented to person, place, and time.   Skin: Skin is not diaphoretic.         Comments: Wound on right forearm   Psychiatric: Judgment and thought content normal.         Assessment:       1. Abrasion of right forearm, initial encounter          Plan:         Abrasion of right forearm, initial encounter  -     mupirocin (BACTROBAN) 2 % ointment; Apply topically 2 (two) times daily.  Dispense: 30 g; Refill: 2    Patient Instructions   Wound Care Discharge Instructions   About this topic   A wound is an injury to the skin that breaks the barrier to the outside. The skin protects the inside of the body from the outside world. When the skin is damaged or broken open, the wound can become infected or bleed.  Cuts and scrapes are one type of wound. You may also hear these called abrasions. Scrapes on the surface leave deeper skin layers in place. These are often caused by friction or rubbing against a rough surface.  Another kind is a puncture wound. This comes from something like a bite or stepping on a nail. Puncture wounds are caused by a pointed or sharp object, such as a nail, needle, or tooth entering the skin. Bleeding can be very little, and the wound may be barely obvious. Bites from a human or an animal always have germs in them and need extra care.  A laceration is a cut on your skin. It is most often caused by a sharp object like a knife blade, glass, or from other things with sharp edges. If the cut is shallow, it does not need stitches.     What care is needed at home?   Keep your wound covered as it heals. You may want to use a thin layer of antibiotic ointment to help keep the wound moist. This will also keep the dressing from sticking to the wound.  After 24 hours, you can gently wash the wound with soap and water. Pat dry and put on a clean dressing. A telfa  pad will not stick to the wound.  Change your dressing once a day or every other day.  Always wash your hands before and after touching the wound.  Each time you change the dressing, look closely at the wound to be sure it is healing the right way. The wound may have a yellowish discharge, and this is normal.  Avoid picking the scab or scratching the site which may cause more irritation.  Do not soak in water or swim with an open wound.  Do not rub the wound or take off any small strips of tape.  What follow-up care is needed?   Follow up with your primary care provider within 1 week to check on your progress for wound healing.  Will physical activity be limited?   Avoid activities which can cause further injury until your wound is fully healed.   What problems could happen?   Bleeding  Infection  Scarring  Poor healing  When do I need to call the doctor?   Signs of infection. These include a fever of 100.4°F (38°C) or higher, chills, or wound that will not heal.  The pain in and around the area gets much worse.  There is a bad smell or pus (thick yellow, green, or gray fluid) coming from your wound.  You notice a crunchy feeling or blisters in the skin around the wound.  The redness around your wound gets bigger or is spreading up your arm or leg.  Fluid that is not pus drains from your wound.  Your swelling doesnt improve or gets worse.

## 2022-09-08 ENCOUNTER — OFFICE VISIT (OUTPATIENT)
Dept: URGENT CARE | Facility: CLINIC | Age: 62
End: 2022-09-08
Payer: MEDICARE

## 2022-09-08 VITALS
TEMPERATURE: 99 F | OXYGEN SATURATION: 91 % | RESPIRATION RATE: 16 BRPM | WEIGHT: 124 LBS | HEIGHT: 62 IN | BODY MASS INDEX: 22.82 KG/M2 | HEART RATE: 76 BPM | SYSTOLIC BLOOD PRESSURE: 111 MMHG | DIASTOLIC BLOOD PRESSURE: 68 MMHG

## 2022-09-08 DIAGNOSIS — S60.512A HAND ABRASION, LEFT, INITIAL ENCOUNTER: Primary | ICD-10-CM

## 2022-09-08 PROCEDURE — 3051F PR MOST RECENT HEMOGLOBIN A1C LEVEL 7.0 - < 8.0%: ICD-10-PCS | Mod: CPTII,S$GLB,, | Performed by: STUDENT IN AN ORGANIZED HEALTH CARE EDUCATION/TRAINING PROGRAM

## 2022-09-08 PROCEDURE — 3078F PR MOST RECENT DIASTOLIC BLOOD PRESSURE < 80 MM HG: ICD-10-PCS | Mod: CPTII,S$GLB,, | Performed by: STUDENT IN AN ORGANIZED HEALTH CARE EDUCATION/TRAINING PROGRAM

## 2022-09-08 PROCEDURE — 3074F SYST BP LT 130 MM HG: CPT | Mod: CPTII,S$GLB,, | Performed by: STUDENT IN AN ORGANIZED HEALTH CARE EDUCATION/TRAINING PROGRAM

## 2022-09-08 PROCEDURE — 3051F HG A1C>EQUAL 7.0%<8.0%: CPT | Mod: CPTII,S$GLB,, | Performed by: STUDENT IN AN ORGANIZED HEALTH CARE EDUCATION/TRAINING PROGRAM

## 2022-09-08 PROCEDURE — 3074F PR MOST RECENT SYSTOLIC BLOOD PRESSURE < 130 MM HG: ICD-10-PCS | Mod: CPTII,S$GLB,, | Performed by: STUDENT IN AN ORGANIZED HEALTH CARE EDUCATION/TRAINING PROGRAM

## 2022-09-08 PROCEDURE — 1159F MED LIST DOCD IN RCRD: CPT | Mod: CPTII,S$GLB,, | Performed by: STUDENT IN AN ORGANIZED HEALTH CARE EDUCATION/TRAINING PROGRAM

## 2022-09-08 PROCEDURE — 3078F DIAST BP <80 MM HG: CPT | Mod: CPTII,S$GLB,, | Performed by: STUDENT IN AN ORGANIZED HEALTH CARE EDUCATION/TRAINING PROGRAM

## 2022-09-08 PROCEDURE — 1160F PR REVIEW ALL MEDS BY PRESCRIBER/CLIN PHARMACIST DOCUMENTED: ICD-10-PCS | Mod: CPTII,S$GLB,, | Performed by: STUDENT IN AN ORGANIZED HEALTH CARE EDUCATION/TRAINING PROGRAM

## 2022-09-08 PROCEDURE — 3008F PR BODY MASS INDEX (BMI) DOCUMENTED: ICD-10-PCS | Mod: CPTII,S$GLB,, | Performed by: STUDENT IN AN ORGANIZED HEALTH CARE EDUCATION/TRAINING PROGRAM

## 2022-09-08 PROCEDURE — 1159F PR MEDICATION LIST DOCUMENTED IN MEDICAL RECORD: ICD-10-PCS | Mod: CPTII,S$GLB,, | Performed by: STUDENT IN AN ORGANIZED HEALTH CARE EDUCATION/TRAINING PROGRAM

## 2022-09-08 PROCEDURE — 99213 PR OFFICE/OUTPT VISIT, EST, LEVL III, 20-29 MIN: ICD-10-PCS | Mod: S$GLB,,, | Performed by: STUDENT IN AN ORGANIZED HEALTH CARE EDUCATION/TRAINING PROGRAM

## 2022-09-08 PROCEDURE — 3066F NEPHROPATHY DOC TX: CPT | Mod: CPTII,S$GLB,, | Performed by: STUDENT IN AN ORGANIZED HEALTH CARE EDUCATION/TRAINING PROGRAM

## 2022-09-08 PROCEDURE — 3061F NEG MICROALBUMINURIA REV: CPT | Mod: CPTII,S$GLB,, | Performed by: STUDENT IN AN ORGANIZED HEALTH CARE EDUCATION/TRAINING PROGRAM

## 2022-09-08 PROCEDURE — 3061F PR NEG MICROALBUMINURIA RESULT DOCUMENTED/REVIEW: ICD-10-PCS | Mod: CPTII,S$GLB,, | Performed by: STUDENT IN AN ORGANIZED HEALTH CARE EDUCATION/TRAINING PROGRAM

## 2022-09-08 PROCEDURE — 3066F PR DOCUMENTATION OF TREATMENT FOR NEPHROPATHY: ICD-10-PCS | Mod: CPTII,S$GLB,, | Performed by: STUDENT IN AN ORGANIZED HEALTH CARE EDUCATION/TRAINING PROGRAM

## 2022-09-08 PROCEDURE — 1160F RVW MEDS BY RX/DR IN RCRD: CPT | Mod: CPTII,S$GLB,, | Performed by: STUDENT IN AN ORGANIZED HEALTH CARE EDUCATION/TRAINING PROGRAM

## 2022-09-08 PROCEDURE — 99213 OFFICE O/P EST LOW 20 MIN: CPT | Mod: S$GLB,,, | Performed by: STUDENT IN AN ORGANIZED HEALTH CARE EDUCATION/TRAINING PROGRAM

## 2022-09-08 PROCEDURE — 3008F BODY MASS INDEX DOCD: CPT | Mod: CPTII,S$GLB,, | Performed by: STUDENT IN AN ORGANIZED HEALTH CARE EDUCATION/TRAINING PROGRAM

## 2022-09-08 NOTE — PROGRESS NOTES
"Subjective:       Patient ID: Jud Villa is a 62 y.o. female.    Vitals:  height is 5' 2" (1.575 m) and weight is 56.2 kg (124 lb). Her temperature is 98.7 °F (37.1 °C). Her blood pressure is 111/68 and her pulse is 76. Her respiration is 16 and oxygen saturation is 91% (abnormal).     Chief Complaint: Abrasion    This is a 62 y.o. female who presents today with a chief complaint of abrasion on left hand that happened yesterday. Pt states that she was taking out the trash and the metal on the car window cut the top of her hand. Pt last Td was 11/19/2021.      Laceration   The incident occurred 12 to 24 hours ago. The laceration is located on the Left hand. The laceration mechanism was a metal edge. The pain is at a severity of 3/10. The pain is mild. The pain has been Constant since onset. She reports no foreign bodies present. Her tetanus status is UTD.     Skin:  Positive for laceration.     Objective:      Physical Exam   Constitutional: She does not appear ill. No distress.   Musculoskeletal:      Comments: Normal ROM left hand    Neurological: She is alert. Coordination and gait normal.   Skin: Skin is warm and dry.         Comments: Abrasion to left hand, approx 1cm. Non gaping, no active bleeding, no erythema, warmth or swelling.    Psychiatric: Her behavior is normal. Mood normal.   Nursing note and vitals reviewed.      Assessment:       1. Hand abrasion, left, initial encounter          Plan:         Hand abrasion, left, initial encounter       Tdap UTD. Patient has mupirocin from visit over the weekend for another abrasion. Ok to use at this site as well. F/u prn            "

## 2022-09-09 ENCOUNTER — TELEPHONE (OUTPATIENT)
Dept: SLEEP MEDICINE | Facility: OTHER | Age: 62
End: 2022-09-09
Payer: MEDICARE

## 2022-09-19 ENCOUNTER — TELEPHONE (OUTPATIENT)
Dept: INTERNAL MEDICINE | Facility: CLINIC | Age: 62
End: 2022-09-19
Payer: MEDICARE

## 2022-09-19 NOTE — TELEPHONE ENCOUNTER
----- Message from Adenike Mackenzie sent at 9/19/2022 11:35 AM CDT -----  Contact: [phone]  Who Called: PT  Regarding: missed call from office   Would the patient rather a call back or a response via [facility]? Call back  Best Call Back Number: [phone]   Additional Information: n/a

## 2022-09-19 NOTE — TELEPHONE ENCOUNTER
Patient states that she has an hernia that has been bothering her for 3 days.Patient is requesting a stronger medications. She states that the hernia is causing arm pain.

## 2022-09-19 NOTE — TELEPHONE ENCOUNTER
----- Message from Bailey Sumner sent at 9/19/2022 10:48 AM CDT -----  Contact: [phone]  Type:  Needs Medical Advice    Who Called:  Pt   Reason for call; pt states she is having severe hernia pain and her right gets weak from the pain and it is very important that she speaks with the nurse regarding this  Would the patient rather a call back or a response via [facility]?  Call back  Best Call Back Number: [phone]

## 2022-09-21 ENCOUNTER — PATIENT MESSAGE (OUTPATIENT)
Dept: INTERNAL MEDICINE | Facility: CLINIC | Age: 62
End: 2022-09-21
Payer: MEDICARE

## 2022-09-21 ENCOUNTER — TELEPHONE (OUTPATIENT)
Dept: INTERNAL MEDICINE | Facility: CLINIC | Age: 62
End: 2022-09-21
Payer: MEDICARE

## 2022-09-21 NOTE — TELEPHONE ENCOUNTER
Pt. States she has a hiatl hernia. C/O epigastric pain. No nausea or vomiting. Constipated yesterday, but not stool passing. States when abd. Pain starts, her right hand becomes numb. Taking Carafate, Mylanta, Tums, Libia Selzer with no relief.  Will go to Urgent Care for an evaluation of her pain.

## 2022-09-21 NOTE — TELEPHONE ENCOUNTER
MAILBOX IS FULL CAN NOT LEAVE ANY MESSAGES AT THIS TIME.     Sent a message to the patient via the portal.

## 2022-09-22 ENCOUNTER — HOSPITAL ENCOUNTER (EMERGENCY)
Facility: HOSPITAL | Age: 62
Discharge: HOME | End: 2022-09-22
Attending: EMERGENCY MEDICINE
Payer: MEDICARE

## 2022-09-22 VITALS
RESPIRATION RATE: 16 BRPM | HEART RATE: 69 BPM | HEIGHT: 62 IN | BODY MASS INDEX: 22.82 KG/M2 | DIASTOLIC BLOOD PRESSURE: 76 MMHG | TEMPERATURE: 99 F | OXYGEN SATURATION: 96 % | WEIGHT: 124 LBS | SYSTOLIC BLOOD PRESSURE: 142 MMHG

## 2022-09-22 DIAGNOSIS — R11.0 NAUSEA: ICD-10-CM

## 2022-09-22 DIAGNOSIS — R10.13 EPIGASTRIC PAIN: ICD-10-CM

## 2022-09-22 DIAGNOSIS — D73.89 LESION OF SPLEEN: ICD-10-CM

## 2022-09-22 DIAGNOSIS — R10.12 LEFT UPPER QUADRANT ABDOMINAL PAIN: Primary | ICD-10-CM

## 2022-09-22 LAB
ALBUMIN SERPL BCP-MCNC: 3.6 G/DL (ref 3.5–5.2)
ALP SERPL-CCNC: 71 U/L (ref 55–135)
ALT SERPL W/O P-5'-P-CCNC: 11 U/L (ref 10–44)
ANION GAP SERPL CALC-SCNC: 7 MMOL/L (ref 8–16)
AST SERPL-CCNC: 21 U/L (ref 10–40)
BACTERIA #/AREA URNS AUTO: NORMAL /HPF
BASOPHILS # BLD AUTO: 0.05 K/UL (ref 0–0.2)
BASOPHILS NFR BLD: 0.5 % (ref 0–1.9)
BILIRUB SERPL-MCNC: 0.5 MG/DL (ref 0.1–1)
BILIRUB UR QL STRIP: NEGATIVE
BUN SERPL-MCNC: 14 MG/DL (ref 6–30)
BUN SERPL-MCNC: 14 MG/DL (ref 8–23)
CALCIUM SERPL-MCNC: 8.9 MG/DL (ref 8.7–10.5)
CHLORIDE SERPL-SCNC: 102 MMOL/L (ref 95–110)
CHLORIDE SERPL-SCNC: 103 MMOL/L (ref 95–110)
CLARITY UR REFRACT.AUTO: CLEAR
CO2 SERPL-SCNC: 27 MMOL/L (ref 23–29)
COLOR UR AUTO: YELLOW
CREAT SERPL-MCNC: 0.4 MG/DL (ref 0.5–1.4)
CREAT SERPL-MCNC: 0.7 MG/DL (ref 0.5–1.4)
DIFFERENTIAL METHOD: ABNORMAL
EOSINOPHIL # BLD AUTO: 0.2 K/UL (ref 0–0.5)
EOSINOPHIL NFR BLD: 1.7 % (ref 0–8)
ERYTHROCYTE [DISTWIDTH] IN BLOOD BY AUTOMATED COUNT: 13.3 % (ref 11.5–14.5)
EST. GFR  (NO RACE VARIABLE): >60 ML/MIN/1.73 M^2
GLUCOSE SERPL-MCNC: 161 MG/DL (ref 70–110)
GLUCOSE SERPL-MCNC: 168 MG/DL (ref 70–110)
GLUCOSE UR QL STRIP: ABNORMAL
HCT VFR BLD AUTO: 40.2 % (ref 37–48.5)
HCT VFR BLD CALC: 40 %PCV (ref 36–54)
HGB BLD-MCNC: 13.2 G/DL (ref 12–16)
HGB UR QL STRIP: NEGATIVE
IMM GRANULOCYTES # BLD AUTO: 0.03 K/UL (ref 0–0.04)
IMM GRANULOCYTES NFR BLD AUTO: 0.3 % (ref 0–0.5)
KETONES UR QL STRIP: NEGATIVE
LACTATE SERPL-SCNC: 1.3 MMOL/L (ref 0.5–2.2)
LEUKOCYTE ESTERASE UR QL STRIP: NEGATIVE
LIPASE SERPL-CCNC: 15 U/L (ref 4–60)
LYMPHOCYTES # BLD AUTO: 1.8 K/UL (ref 1–4.8)
LYMPHOCYTES NFR BLD: 16.7 % (ref 18–48)
MCH RBC QN AUTO: 29 PG (ref 27–31)
MCHC RBC AUTO-ENTMCNC: 32.8 G/DL (ref 32–36)
MCV RBC AUTO: 88 FL (ref 82–98)
MICROSCOPIC COMMENT: NORMAL
MONOCYTES # BLD AUTO: 0.8 K/UL (ref 0.3–1)
MONOCYTES NFR BLD: 7.2 % (ref 4–15)
NEUTROPHILS # BLD AUTO: 8.1 K/UL (ref 1.8–7.7)
NEUTROPHILS NFR BLD: 73.6 % (ref 38–73)
NITRITE UR QL STRIP: NEGATIVE
NRBC BLD-RTO: 0 /100 WBC
PH UR STRIP: 7 [PH] (ref 5–8)
PLATELET # BLD AUTO: 236 K/UL (ref 150–450)
PMV BLD AUTO: 9.5 FL (ref 9.2–12.9)
POC IONIZED CALCIUM: 1.09 MMOL/L (ref 1.06–1.42)
POC TCO2 (MEASURED): ABNORMAL MMOL/L
POTASSIUM BLD-SCNC: 3.8 MMOL/L (ref 3.5–5.1)
POTASSIUM SERPL-SCNC: 3.9 MMOL/L (ref 3.5–5.1)
PROT SERPL-MCNC: 6.7 G/DL (ref 6–8.4)
PROT UR QL STRIP: NEGATIVE
RBC # BLD AUTO: 4.55 M/UL (ref 4–5.4)
RBC #/AREA URNS AUTO: 0 /HPF (ref 0–4)
SAMPLE: ABNORMAL
SODIUM BLD-SCNC: 138 MMOL/L (ref 136–145)
SODIUM SERPL-SCNC: 137 MMOL/L (ref 136–145)
SP GR UR STRIP: >1.03 (ref 1–1.03)
SQUAMOUS #/AREA URNS AUTO: 1 /HPF
URN SPEC COLLECT METH UR: ABNORMAL
WBC # BLD AUTO: 10.98 K/UL (ref 3.9–12.7)
WBC #/AREA URNS AUTO: 1 /HPF (ref 0–5)
YEAST UR QL AUTO: NORMAL

## 2022-09-22 PROCEDURE — 25000003 PHARM REV CODE 250: Performed by: EMERGENCY MEDICINE

## 2022-09-22 PROCEDURE — 85025 COMPLETE CBC W/AUTO DIFF WBC: CPT | Performed by: EMERGENCY MEDICINE

## 2022-09-22 PROCEDURE — 99285 EMERGENCY DEPT VISIT HI MDM: CPT | Mod: 25

## 2022-09-22 PROCEDURE — 99285 EMERGENCY DEPT VISIT HI MDM: CPT | Mod: ,,, | Performed by: EMERGENCY MEDICINE

## 2022-09-22 PROCEDURE — 99285 PR EMERGENCY DEPT VISIT,LEVEL V: ICD-10-PCS | Mod: ,,, | Performed by: EMERGENCY MEDICINE

## 2022-09-22 PROCEDURE — 93005 ELECTROCARDIOGRAM TRACING: CPT

## 2022-09-22 PROCEDURE — 83605 ASSAY OF LACTIC ACID: CPT | Performed by: EMERGENCY MEDICINE

## 2022-09-22 PROCEDURE — 83690 ASSAY OF LIPASE: CPT | Performed by: EMERGENCY MEDICINE

## 2022-09-22 PROCEDURE — 93010 EKG 12-LEAD: ICD-10-PCS | Mod: ,,, | Performed by: INTERNAL MEDICINE

## 2022-09-22 PROCEDURE — 63600175 PHARM REV CODE 636 W HCPCS: Performed by: EMERGENCY MEDICINE

## 2022-09-22 PROCEDURE — 25500020 PHARM REV CODE 255: Performed by: EMERGENCY MEDICINE

## 2022-09-22 PROCEDURE — 96374 THER/PROPH/DIAG INJ IV PUSH: CPT | Mod: 59

## 2022-09-22 PROCEDURE — 81001 URINALYSIS AUTO W/SCOPE: CPT | Performed by: EMERGENCY MEDICINE

## 2022-09-22 PROCEDURE — 93010 ELECTROCARDIOGRAM REPORT: CPT | Mod: ,,, | Performed by: INTERNAL MEDICINE

## 2022-09-22 PROCEDURE — 80053 COMPREHEN METABOLIC PANEL: CPT | Performed by: EMERGENCY MEDICINE

## 2022-09-22 RX ORDER — NAPROXEN 375 MG/1
375 TABLET ORAL 2 TIMES DAILY WITH MEALS
Qty: 10 TABLET | Refills: 0 | Status: SHIPPED | OUTPATIENT
Start: 2022-09-22 | End: 2022-09-27

## 2022-09-22 RX ORDER — ACETAMINOPHEN 500 MG
1000 TABLET ORAL
Status: COMPLETED | OUTPATIENT
Start: 2022-09-22 | End: 2022-09-22

## 2022-09-22 RX ORDER — FAMOTIDINE 20 MG/1
20 TABLET, FILM COATED ORAL
Status: DISCONTINUED | OUTPATIENT
Start: 2022-09-22 | End: 2022-09-22 | Stop reason: HOSPADM

## 2022-09-22 RX ORDER — OXYCODONE HYDROCHLORIDE 5 MG/1
5 TABLET ORAL
Status: COMPLETED | OUTPATIENT
Start: 2022-09-22 | End: 2022-09-22

## 2022-09-22 RX ORDER — ONDANSETRON 2 MG/ML
4 INJECTION INTRAMUSCULAR; INTRAVENOUS
Status: COMPLETED | OUTPATIENT
Start: 2022-09-22 | End: 2022-09-22

## 2022-09-22 RX ADMIN — ACETAMINOPHEN 1000 MG: 500 TABLET ORAL at 11:09

## 2022-09-22 RX ADMIN — IOHEXOL 75 ML: 350 INJECTION, SOLUTION INTRAVENOUS at 10:09

## 2022-09-22 RX ADMIN — OXYCODONE 5 MG: 5 TABLET ORAL at 12:09

## 2022-09-22 RX ADMIN — ONDANSETRON 4 MG: 2 INJECTION INTRAMUSCULAR; INTRAVENOUS at 10:09

## 2022-09-22 NOTE — DISCHARGE INSTRUCTIONS
Today, your evaluation for your symptoms did not show any abnormalities concerning for obstruction, perforation or abscess and your stomach.  However your spleen did show a lesion.  You also had a small bruise/hematoma in your muscle on your right side.  We recommend close follow-up with your oncologist.  We have discussed follow-up plan.    Our goal in the emergency department is to always give you outstanding care and exceptional service. You may receive a survey by mail or e-mail in the next week regarding your experience in our ED. We would greatly appreciate your completing and returning the survey. Your feedback provides us with a way to recognize our staff who give very good care and it helps us learn how to improve when your experience was below our aspiration of excellence.

## 2022-09-22 NOTE — ED PROVIDER NOTES
Encounter Date: 9/22/2022       History     Chief Complaint   Patient presents with    Abdominal Pain     Arrived via ems with c/o abominal pain, hx of hernia     HPI  Jud Villa is a 62-year-old female with a history of type 2 diabetes, depressive disorder, osteoporosis, asthma, breast carcinoma presenting with abdominal pain.  Patient states she has a history of hiatal hernia that was 2 cm in January and she has worsening abdominal pain that began 5 days ago.  Onset was gradual, worsening initially rated at 10/10 which she called EMS for evaluation.  Per EMS she received fentanyl 75 mcg, 12 lead ECG without abnormalities, glucose stable and vital signs are stable in route.  On arrival her pain is improved to 3/10 after fentanyl, but she has some associated nausea.  She denies any vomiting, hematochezia, melena, hemoptysis or hematemesis.  She denies any urinary symptoms.  She denies any chest pain, lightheadedness or dizziness.  No other aggravating or alleviating factors.  Pain is worse when palpated.  She reported constipation 2 days ago however she did have a bowel movement yesterday.  She denies any diarrhea or loose stool.    Review of patient's allergies indicates:  No Known Allergies  Past Medical History:   Diagnosis Date    Asthma     Breast carcinoma     Cataract     Diabetes mellitus     Moderate episode of recurrent major depressive disorder 5/19/2021    Osteoporosis      Past Surgical History:   Procedure Laterality Date    BILATERAL MASTECTOMY  04/26/2018    CHOLECYSTECTOMY      COLONOSCOPY N/A 10/27/2015    Procedure: COLONOSCOPY;  Surgeon: Johnny Hurley MD;  Location: [facility];  Service: Endoscopy;  Laterality: N/A;    ESOPHAGOGASTRODUODENOSCOPY N/A 1/24/2022    Procedure: ESOPHAGOGASTRODUODENOSCOPY (EGD);  Surgeon: Mili Katz MD;  Location: [address]);  Service: Endoscopy;  Laterality: N/A;  rapid in AM, instr portal -ml    HYSTERECTOMY      LASER PERIPHERAL IRIDOTOMY  Right 02/21/2019         Family History   Problem Relation Age of Onset    Diabetes Father     Amblyopia Neg Hx     Blindness Neg Hx     Cataracts Neg Hx     Glaucoma Neg Hx     Macular degeneration Neg Hx     Retinal detachment Neg Hx     Strabismus Neg Hx      Social History     Tobacco Use    Smoking status: Every Day     Packs/day: 1.00     Years: 40.00     Pack years: 40.00     Types: Cigarettes    Smokeless tobacco: Never   Substance Use Topics    Alcohol use: No     Alcohol/week: 0.0 standard drinks    Drug use: No     Review of Systems   Constitutional:  Negative for chills, fatigue and fever.   HENT:  Negative for congestion, sinus pain and sore throat.    Eyes:  Negative for photophobia and visual disturbance.   Respiratory:  Negative for chest tightness and shortness of breath.    Cardiovascular:  Negative for chest pain, palpitations and leg swelling.   Gastrointestinal:  Positive for abdominal pain and nausea. Negative for constipation, diarrhea and vomiting.   Genitourinary:  Negative for flank pain, frequency and vaginal bleeding.   Musculoskeletal:  Negative for back pain, myalgias and neck pain.   Skin:  Negative for color change and wound.   Neurological:  Negative for weakness, light-headedness and headaches.   Psychiatric/Behavioral:  Negative for confusion. The patient is not nervous/anxious.      Physical Exam     Initial Vitals [09/22/22 0922]   BP Pulse Resp Temp SpO2   (!) 146/80 88 16 98.9 °F (37.2 °C) 95 %      MAP       --         Physical Exam    Nursing note and vitals reviewed.    Gen/Constitutional: Interactive. No acute distress  Head: Normocephalic, Atraumatic  Neck: supple, no masses or LAD, no JVD  Eyes: PERRLA, conjunctiva clear  Ears, Nose and Throat: No rhinorrhea or stridor.  Cardiac:  Regular rate, Reg Rhythm, No murmur  Pulmonary: CTA Bilat, no wheezes, rhonchi, rales.  No increased work of breathing.  GI: Abdomen soft, left upper abd tenderness, non-distended;  no rebound or guarding  : No CVA tenderness.  Musculoskeletal: Extremities warm, well perfused, no erythema, no edema  Skin: No rashes, cyanosis or jaundice.  Neuro: Alert and Oriented x 3; No focal motor or sensory deficits.    Psych: Normal affect      ED Course   Procedures  Labs Reviewed   CBC W/ AUTO DIFFERENTIAL - Abnormal; Notable for the following components:       Result Value    Gran # (ANC) 8.1 (*)     Gran % 73.6 (*)     Lymph % 16.7 (*)     All other components within normal limits   COMPREHENSIVE METABOLIC PANEL - Abnormal; Notable for the following components:    Glucose 161 (*)     Anion Gap 7 (*)     All other components within normal limits   URINALYSIS, REFLEX TO URINE CULTURE - Abnormal; Notable for the following components:    Specific Gravity, UA >1.030 (*)     Glucose, UA 3+ (*)     All other components within normal limits    Narrative:     Specimen Source->Urine   ISTAT PROCEDURE - Abnormal; Notable for the following components:    POC Glucose 168 (*)     POC Creatinine 0.4 (*)     All other components within normal limits   LIPASE   LACTIC ACID, PLASMA   URINALYSIS MICROSCOPIC    Narrative:     Specimen Source->Urine     EKG Readings: (Independently Interpreted)   Initial Reading: No STEMI. Previous EKG: Compared with most recent EKG Rhythm: Normal Sinus Rhythm. Heart Rate: 69. ST Segments: Non-Specific ST Segment Depression.   ECG Results              EKG 12-lead (Final result)  Result time 09/22/22 17:07:38      Final result by Interface, Lab In [facility] (09/22/22 17:07:38)                   Narrative:    Test Reason : R10.13,    Vent. Rate : 069 BPM     Atrial Rate : 069 BPM     P-R Int : 116 ms          QRS Dur : 088 ms      QT Int : 400 ms       P-R-T Axes : 067 032 066 degrees     QTc Int : 428 ms    Normal sinus rhythm  Possible Left atrial enlargement  Low voltage QRS  Cannot rule out Anteroseptal infarct ,age undetermined  Abnormal ECG  When compared with ECG of 13-JAN-2022  17:38,  Minimal criteria for Anteroseptal infarct are now Present  Confirmed by LEEANN OCONNOR MD ([id number]) on 9/22/2022 5:07:26 PM    Referred By: [email or web address]   SELF           Confirmed By:LEEANN OCONNOR MD                                  Imaging Results               CT Abdomen Pelvis With Contrast (Final result)  Result time 09/22/22 11:38:32      Final result by Nader Murillo MD (09/22/22 11:38:32)                   Impression:      Mild thickening of the gastric antrum without adjacent inflammatory change.  Findings could represent nondistention versus early gastritis.    New nonspecific lobulated hypodensity in the posterior spleen.  Findings could represent area of prior infarction, transient perfusion differences, atypical cyst or potentially metastatic disease given reported history of breast cancer.    Nonspecific hypodense collection within the right iliopsoas muscle measuring 2.3 x 1.5 cm.  Findings could represent area of chronic hematoma.  Recommend correlation with physical exam and history for possible prior trauma.  Nonemergent outpatient MRI of the right hip could be useful for further characterization if clinically warranted.    Multiple nonspecific soft tissue nodules are present within the bilateral gluteal tissues, potentially areas of evolving fat necrosis, injection granulomas, or less likely sebaceous cysts.    Sigmoid diverticulosis.    This report was flagged in Epic as abnormal.    Electronically signed by resident: Brad Hansen  Date:    09/22/2022  Time:    11:11    Electronically signed by: Nader Murillo MD  Date:    09/22/2022  Time:    11:38               Narrative:    EXAMINATION:  CT ABDOMEN PELVIS WITH CONTRAST    CLINICAL HISTORY:  Nausea/vomiting;Epigastric pain;Abdominal pain, acute, nonlocalized;    TECHNIQUE:  The patient was surveyed from the lung bases through the pelvis after the administration of 75 cc Omni 350 IV contrast.  Oral contrast was not administered.   The data was reconstructed for coronal, sagittal, and axial images.    COMPARISON:  CT 10/14/2015, 12/31/2005    FINDINGS:  Lungs/Pleura: Lung bases are unremarkable.  No focal consolidation, pneumothorax, or pleural effusion is present.    Heart: The visualized portions of the heart are normal. No pericardial effusion.    Liver: Liver is normal in size.  Liver is diffusely hypoattenuating suggestive of hepatic steatosis.  Nonspecific hypodensity along the falciform ligament within the left hepatic lobe.    Gallbladder/Bile ducts: Gallbladder is surgically absent..  Mild intrahepatic and extrahepatic biliary ductal dilation, likely sequela of prior cholecystectomy.    Spleen: Lobulated hypodensity involving the posterior aspect of the spleen, nonspecific however could represent area of infarct, perfusion differences, abnormal cysts or potentially metastatic disease given reported history breast carcinoma.  Finding not definitively seen on prior chest CT 10/14/2015, or abdomen CT 12/31/2005.  Splenic artery peers grossly patent throughout its course with scattered areas of calcifications.  No definite thrombus is visualized.    Stomach: Mild thickening of the gastric antrum without significant surrounding inflammatory change. Findings could relate to nondistention or early gastritis.    Pancreas: Unremarkable.    Adrenals: Unremarkable.    Renal/Ureters: The kidneys are normal in size and location. No hydronephrosis. Left renal cyst.  The ureters are normal in course and caliber. The urinary bladder is unremarkable.    Reproductive: Uterus is absent.    Bowel: The visualized loops of small and large bowel show no evidence of obstruction or inflammation. Appendix is visualized without evidence of obstruction or surrounding inflammatory change.  Sigmoid diverticulosis.    Peritoneum: no ascites, free fluid, or intraperitoneal free air.    Lymph Nodes: No pathologic jignesh enlargement in the abdomen or  pelvis.    Vasculature: The abdominal aorta is normal in course and caliber.  Moderate atherosclerotic calcifications.    Bones: Age appropriate degenerative changes.  No acute fractures or osseous destructive lesions.    Soft Tissues: Nonspecific diffuse soft tissue nodules are present within the bilateral gluteal tissues.  Nonspecific hypodense collection within the right iliopsoas measuring 2.3 x 1.5 cm.                                       X-Ray Abdomen Flat And Erect (Final result)  Result time 09/22/22 10:38:14      Final result by Nader Singh MD (09/22/22 10:38:14)                   Impression:      No definite acute findings identified.      Electronically signed by: Nader Singh  Date:    09/22/2022  Time:    10:38               Narrative:    EXAMINATION:  XR ABDOMEN FLAT AND ERECT    CLINICAL HISTORY:  Abdominal Pain;    TECHNIQUE:  Flat and erect AP views of the abdomen were performed.    COMPARISON:  Abdominal radiograph 01/10/2011.    FINDINGS:  Monitoring leads noted.  No definite dilated gas-filled loops of small or large bowel appreciated.  Moderate to large volume colonic stool.    Postsurgical sequelae noted.  No definite abnormal calcifications.  No definite acute abnormality of the visualized lung bases.                                    X-Rays:   Independently Interpreted Readings:   Abdomen:   Flat and Erect of Abdomen - Nonspecific bowel gas.  No free air under diaphragm.  No air fluid levels or signs of obstruction.   Abdomen and Pelvis CT with Contrast - No obstruction, no free air noted.  Nonspecific density in the posterior spleen.  Right iliopsoas muscle hematoma potentially.   Medications   ondansetron injection 4 mg (4 mg Intravenous Given 9/22/22 1019)   iohexoL (OMNIPAQUE 350) injection 75 mL (75 mLs Intravenous Given 9/22/22 1055)   acetaminophen tablet 1,000 mg (1,000 mg Oral Given 9/22/22 1120)   oxyCODONE immediate release tablet 5 mg (5 mg Oral Given 9/22/22 1243)      Medical Decision Making:   History:   Old Medical Records: I decided to obtain old medical records.  Initial Assessment:   Jud Villa is a 62-year-old female with a history of type 2 diabetes, depressive disorder, osteoporosis, asthma, breast carcinoma presenting with abdominal pain.   Differential Diagnosis:   Obstruction, perforation, hiatal hernia, ventral hernia, intra-abdominal abscess, diverticulitis, colitis, enteritis  Independently Interpreted Test(s):   I have ordered and independently interpreted X-rays - see prior notes.  I have ordered and independently interpreted EKG Reading(s) - see prior notes  Clinical Tests:   Lab Tests: Ordered and Reviewed  Radiological Study: Ordered and Reviewed  Medical Tests: Ordered and Reviewed                 Emergent evaluation of patient presenting with left upper quadrant pain and epigastric pain.  She is currently afebrile vital signs are stable.  Physical exam findings unremarkable with slight tenderness in the left upper quadrant to palpation.  There are no broken ribs, crepitus or evidence of trauma.  There is no ecchymosis or overlying skin changes.  No obvious lesions to suggest shingles.  Patient reports hiatal hernia.  Broad workup including IV, labs, pain control and antiemetic.  ECG with no signs of ischemia or STEMI on my read.  CT scan obtained which shows no acute fracture of the ribs, no intra-abdominal abscess, obstruction or perforation.  However there is a small lesion seen on the spleen and early gastritis.  Discussed these findings with her oncologist, who plans to obtain an outpatient MRI for further evaluation.  Patient instructed on conservative treatment with home pain management regimen including PPI.  Strict ED precautions return instructions were discussed. Patient agreeable to discharge plan. Strict ED precautions and return instructions discussed at length and patient verbalized understanding. All questions were answered and ample  time was given for questions.      Complexity:  High risk       Clinical Impression:   Final diagnoses:  [R10.13] Epigastric pain  [R10.12] Left upper quadrant abdominal pain (Primary)  [R11.0] Nausea  [D73.89] Lesion of spleen      ED Disposition Condition    Discharge Stable          ED Prescriptions       Medication Sig Dispense Start Date End Date Auth. Provider    naproxen (NAPROSYN) 375 MG tablet Take 1 tablet (375 mg total) by mouth 2 (two) times daily with meals. for 5 days 10 tablet 9/22/2022 9/27/2022 Danny Ambrocio DO          Follow-up Information       Follow up With Specialties Details Why Contact Info    Dr. Gonzalez  Schedule an appointment as soon as possible for a visit in 1 week              Danny Ambrocio DO, FAAEM  Emergency Staff Physician   Dept of Emergency Medicine   [facility]  Spectralink: [id number]        Disclaimer: This note has been generated using voice-recognition software. There may be typographical errors that have been missed during proof-reading.       Danny Ambrocio DO  09/22/22 2057

## 2022-09-22 NOTE — ED NOTES
I-STAT Chem-8+ Results:   Value Reference Range   Sodium 138 136-145 mmol/L   Potassium  3.8 3.5-5.1 mmol/L   Chloride 102  mmol/L   Ionized Calcium 1.09 1.06-1.42 mmol/L   CO2 (measured)  MD made aware of no result for this section and he is OK with it 23-29 mmol/L   Glucose 168  mg/dL   BUN 14 6-30 mg/dL   Creatinine 0.4 0.5-1.4 mg/dL   Hematocrit 40 36-54%

## 2022-09-22 NOTE — ED NOTES
Jud Villa, an 62 y.o. female presents to the ED via EMS for complaints of abdominal pain x5 days. Has a hx of hiatal hernia. Denies vomiting but endorses belching and dry heaves. Pain is located to her left lower quadrant. States bowel and bladder habits have remained the same. Denies hematuria, dysuria, constipation, or diarrhea. Received 75mcg of Fentanyl en route with EMS.      Chief Complaint   Patient presents with    Abdominal Pain     Arrived via ems with c/o abominal pain, hx of hernia     Review of patient's allergies indicates:  No Known Allergies  Past Medical History:   Diagnosis Date    Asthma     Breast carcinoma     Cataract     Diabetes mellitus     Moderate episode of recurrent major depressive disorder 5/19/2021    Osteoporosis

## 2022-09-23 ENCOUNTER — TELEPHONE (OUTPATIENT)
Dept: INTERNAL MEDICINE | Facility: CLINIC | Age: 62
End: 2022-09-23
Payer: MEDICARE

## 2022-09-23 NOTE — TELEPHONE ENCOUNTER
----- Message from Neal Keyes sent at 9/23/2022  1:20 PM CDT -----  Contact: pt  Type: Requesting to speak with nurse        Who Called: PT  Regarding:  Pt went to hospital yesterday. Pt would like a call back   Would the patient rather a call back or a response via [facility]? Call back  Best Call Back Number: [phone]  Additional Information:

## 2022-10-10 ENCOUNTER — PATIENT MESSAGE (OUTPATIENT)
Dept: ADMINISTRATIVE | Facility: HOSPITAL | Age: 62
End: 2022-10-10
Payer: MEDICARE

## 2022-10-31 ENCOUNTER — TELEPHONE (OUTPATIENT)
Dept: FAMILY MEDICINE | Facility: CLINIC | Age: 62
End: 2022-10-31
Payer: MEDICARE

## 2022-10-31 NOTE — TELEPHONE ENCOUNTER
----- Message from Carmen Crews sent at 10/31/2022  9:43 AM CDT -----  Regarding: Lilia Calling from [facility] [phone]  Contact: Lilia Calling from [facility] [phone]  Who Called: Lilia Calling from [facility] [phone]     Calling to talk to nurse in regards to getting a copy of patient last 3 office visit for her diabetes supplies faxed to [phone].

## 2022-10-31 NOTE — TELEPHONE ENCOUNTER
Confirmed fax number with Patricia and informed her she should receive paperwork via fax before 2:30 pm today.

## 2022-11-14 ENCOUNTER — PATIENT MESSAGE (OUTPATIENT)
Dept: PHARMACY | Facility: CLINIC | Age: 62
End: 2022-11-14
Payer: MEDICARE

## 2022-11-17 ENCOUNTER — SPECIALTY PHARMACY (OUTPATIENT)
Dept: PHARMACY | Facility: CLINIC | Age: 62
End: 2022-11-17
Payer: MEDICARE

## 2022-11-17 NOTE — TELEPHONE ENCOUNTER
Outgoing call regarding prolia refill; per pt, she's not doing the shots anymore because her test came back negative[facility]

## 2022-11-17 NOTE — TELEPHONE ENCOUNTER
Outgoing call to pt following up on Prolia, pt stated she had her dexa scan redone and she does not have osteoporosis. Pt does not want to continue with Prolia. Pt confirmed she never received the injection in June.     Staff message sent to [facility]  Closing out referral at [facility]

## 2022-12-06 DIAGNOSIS — J44.1 COPD EXACERBATION: ICD-10-CM

## 2022-12-06 DIAGNOSIS — J43.2 CENTRILOBULAR EMPHYSEMA: ICD-10-CM

## 2022-12-06 NOTE — TELEPHONE ENCOUNTER
No new care gaps identified.  [facility] Embedded Care Gaps. Reference number: [id number]. 12/06/2022   1:24:58 PM CST

## 2022-12-14 ENCOUNTER — PATIENT MESSAGE (OUTPATIENT)
Dept: ADMINISTRATIVE | Facility: HOSPITAL | Age: 62
End: 2022-12-14
Payer: MEDICARE

## 2022-12-15 ENCOUNTER — OFFICE VISIT (OUTPATIENT)
Dept: FAMILY MEDICINE | Facility: CLINIC | Age: 62
End: 2022-12-15
Payer: MEDICARE

## 2022-12-15 VITALS
SYSTOLIC BLOOD PRESSURE: 90 MMHG | WEIGHT: 127.44 LBS | OXYGEN SATURATION: 95 % | DIASTOLIC BLOOD PRESSURE: 54 MMHG | BODY MASS INDEX: 23.45 KG/M2 | HEIGHT: 62 IN

## 2022-12-15 DIAGNOSIS — L40.9 PSORIASIS: ICD-10-CM

## 2022-12-15 DIAGNOSIS — Z79.4 TYPE 2 DIABETES MELLITUS WITH HYPERGLYCEMIA, WITH LONG-TERM CURRENT USE OF INSULIN: ICD-10-CM

## 2022-12-15 DIAGNOSIS — E11.65 TYPE 2 DIABETES MELLITUS WITH HYPERGLYCEMIA, WITH LONG-TERM CURRENT USE OF INSULIN: ICD-10-CM

## 2022-12-15 DIAGNOSIS — E11.9 TYPE 2 DIABETES MELLITUS WITHOUT COMPLICATION: ICD-10-CM

## 2022-12-15 DIAGNOSIS — K21.9 GASTROESOPHAGEAL REFLUX DISEASE WITHOUT ESOPHAGITIS: Primary | ICD-10-CM

## 2022-12-15 PROCEDURE — 3074F SYST BP LT 130 MM HG: CPT | Mod: CPTII,S$GLB,, | Performed by: FAMILY MEDICINE

## 2022-12-15 PROCEDURE — 1159F MED LIST DOCD IN RCRD: CPT | Mod: CPTII,S$GLB,, | Performed by: FAMILY MEDICINE

## 2022-12-15 PROCEDURE — 1159F PR MEDICATION LIST DOCUMENTED IN MEDICAL RECORD: ICD-10-PCS | Mod: CPTII,S$GLB,, | Performed by: FAMILY MEDICINE

## 2022-12-15 PROCEDURE — 3074F PR MOST RECENT SYSTOLIC BLOOD PRESSURE < 130 MM HG: ICD-10-PCS | Mod: CPTII,S$GLB,, | Performed by: FAMILY MEDICINE

## 2022-12-15 PROCEDURE — 3061F NEG MICROALBUMINURIA REV: CPT | Mod: CPTII,S$GLB,, | Performed by: FAMILY MEDICINE

## 2022-12-15 PROCEDURE — 3008F BODY MASS INDEX DOCD: CPT | Mod: CPTII,S$GLB,, | Performed by: FAMILY MEDICINE

## 2022-12-15 PROCEDURE — 1160F RVW MEDS BY RX/DR IN RCRD: CPT | Mod: CPTII,S$GLB,, | Performed by: FAMILY MEDICINE

## 2022-12-15 PROCEDURE — 3051F PR MOST RECENT HEMOGLOBIN A1C LEVEL 7.0 - < 8.0%: ICD-10-PCS | Mod: CPTII,S$GLB,, | Performed by: FAMILY MEDICINE

## 2022-12-15 PROCEDURE — 3066F NEPHROPATHY DOC TX: CPT | Mod: CPTII,S$GLB,, | Performed by: FAMILY MEDICINE

## 2022-12-15 PROCEDURE — 3066F PR DOCUMENTATION OF TREATMENT FOR NEPHROPATHY: ICD-10-PCS | Mod: CPTII,S$GLB,, | Performed by: FAMILY MEDICINE

## 2022-12-15 PROCEDURE — 3078F DIAST BP <80 MM HG: CPT | Mod: CPTII,S$GLB,, | Performed by: FAMILY MEDICINE

## 2022-12-15 PROCEDURE — 3008F PR BODY MASS INDEX (BMI) DOCUMENTED: ICD-10-PCS | Mod: CPTII,S$GLB,, | Performed by: FAMILY MEDICINE

## 2022-12-15 PROCEDURE — 99999 PR PBB SHADOW E&M-EST. PATIENT-LVL V: CPT | Mod: PBBFAC,,, | Performed by: FAMILY MEDICINE

## 2022-12-15 PROCEDURE — 99215 PR OFFICE/OUTPT VISIT, EST, LEVL V, 40-54 MIN: ICD-10-PCS | Mod: S$GLB,,, | Performed by: FAMILY MEDICINE

## 2022-12-15 PROCEDURE — 3078F PR MOST RECENT DIASTOLIC BLOOD PRESSURE < 80 MM HG: ICD-10-PCS | Mod: CPTII,S$GLB,, | Performed by: FAMILY MEDICINE

## 2022-12-15 PROCEDURE — 1160F PR REVIEW ALL MEDS BY PRESCRIBER/CLIN PHARMACIST DOCUMENTED: ICD-10-PCS | Mod: CPTII,S$GLB,, | Performed by: FAMILY MEDICINE

## 2022-12-15 PROCEDURE — 3061F PR NEG MICROALBUMINURIA RESULT DOCUMENTED/REVIEW: ICD-10-PCS | Mod: CPTII,S$GLB,, | Performed by: FAMILY MEDICINE

## 2022-12-15 PROCEDURE — 99215 OFFICE O/P EST HI 40 MIN: CPT | Mod: S$GLB,,, | Performed by: FAMILY MEDICINE

## 2022-12-15 PROCEDURE — 3051F HG A1C>EQUAL 7.0%<8.0%: CPT | Mod: CPTII,S$GLB,, | Performed by: FAMILY MEDICINE

## 2022-12-15 PROCEDURE — 99999 PR PBB SHADOW E&M-EST. PATIENT-LVL V: ICD-10-PCS | Mod: PBBFAC,,, | Performed by: FAMILY MEDICINE

## 2022-12-15 RX ORDER — PANTOPRAZOLE SODIUM 40 MG/1
40 TABLET, DELAYED RELEASE ORAL
Qty: 90 TABLET | Refills: 3 | Status: SHIPPED | OUTPATIENT
Start: 2022-12-15 | End: 2023-12-15

## 2022-12-15 RX ORDER — BETAMETHASONE DIPROPIONATE 0.5 MG/G
CREAM TOPICAL 2 TIMES DAILY
Qty: 45 G | Refills: 0 | Status: SHIPPED | OUTPATIENT
Start: 2022-12-15 | End: 2023-03-22

## 2022-12-15 RX ORDER — ATORVASTATIN CALCIUM 10 MG/1
10 TABLET, FILM COATED ORAL NIGHTLY
Qty: 90 TABLET | Refills: 3 | Status: SHIPPED | OUTPATIENT
Start: 2022-12-15 | End: 2023-03-24

## 2022-12-15 NOTE — PROGRESS NOTES
Subjective:       Patient ID: Jud Villa is a 62 y.o. female.    Chief Complaint: Rash    62 years old female came to the clinic with significant reflux associated with hiatal hernia.  Patient is using Carafate only.  She is requesting migraine treatment she was using Fioricet and Topamax with partial improvement of the symptoms.  She is requesting medicine to help with hand psoriasis.  No recent A1c.  No polyuria, polydipsia or polyphagia.    Rash    Review of Systems   Constitutional: Negative.    HENT: Negative.     Eyes: Negative.    Respiratory: Negative.     Gastrointestinal: Negative.  Positive for reflux.   Endocrine: Negative for polydipsia, polyphagia and polyuria.   Genitourinary: Negative.    Musculoskeletal: Negative.    Integumentary:  Positive for rash.   Neurological:  Positive for headaches.   Psychiatric/Behavioral: Negative.         Objective:      Physical Exam  Constitutional:       General: She is not in acute distress.     Appearance: She is well-developed. She is not diaphoretic.   HENT:      Head: Normocephalic and atraumatic.      Right Ear: External ear normal.      Left Ear: External ear normal.      Nose: Nose normal.      Mouth/Throat:      Pharynx: No oropharyngeal exudate.   Eyes:      General: No scleral icterus.        Right eye: No discharge.         Left eye: No discharge.      Conjunctiva/sclera: Conjunctivae normal.      Pupils: Pupils are equal, round, and reactive to light.   Neck:      Thyroid: No thyromegaly.      Vascular: No JVD.      Trachea: No tracheal deviation.   Cardiovascular:      Rate and Rhythm: Normal rate and regular rhythm.      Heart sounds: Normal heart sounds. No murmur heard.    No friction rub. No gallop.   Pulmonary:      Effort: Pulmonary effort is normal. No respiratory distress.      Breath sounds: Normal breath sounds. No stridor. No wheezing or rales.   Chest:      Chest wall: No tenderness.   Abdominal:      General: Bowel sounds are normal.  There is no distension.      Palpations: Abdomen is soft. There is no mass.      Tenderness: There is no abdominal tenderness. There is no guarding or rebound.   Musculoskeletal:         General: No tenderness. Normal range of motion.      Cervical back: Normal range of motion and neck supple.   Lymphadenopathy:      Cervical: No cervical adenopathy.   Skin:     General: Skin is warm and dry.      Coloration: Skin is not pale.      Findings: Rash present. No erythema. Rash is scaling.      Comments: Both hands.   Neurological:      Mental Status: She is alert and oriented to person, place, and time.      Cranial Nerves: No cranial nerve deficit.      Motor: No abnormal muscle tone.      Coordination: Coordination normal.      Deep Tendon Reflexes: Reflexes are normal and symmetric.   Psychiatric:         Behavior: Behavior normal.         Thought Content: Thought content normal.         Judgment: Judgment normal.       Assessment:       Problem List Items Addressed This Visit    None      Plan:           Jud was seen today for rash.    Diagnoses and all orders for this visit:    Gastroesophageal reflux disease without esophagitis  -     pantoprazole (PROTONIX) 40 MG tablet; Take 1 tablet (40 mg total) by mouth before breakfast.    Psoriasis  -     betamethasone dipropionate 0.05 % cream; Apply topically 2 (two) times daily. for 10 days    Type 2 diabetes mellitus with hyperglycemia, with long-term current use of insulin  -     Comprehensive Metabolic Panel; Future  -     Hemoglobin A1C; Future  -     Lipid Panel; Future  -     Microalbumin/Creatinine Ratio, Urine; Future  -     atorvastatin (LIPITOR) 10 MG tablet; Take 1 tablet (10 mg total) by mouth every evening.         Continue monitoring blood sugar at home,ADA diet.

## 2022-12-19 ENCOUNTER — PATIENT MESSAGE (OUTPATIENT)
Dept: ADMINISTRATIVE | Facility: HOSPITAL | Age: 62
End: 2022-12-19
Payer: MEDICARE

## 2023-01-06 ENCOUNTER — OFFICE VISIT (OUTPATIENT)
Dept: URGENT CARE | Facility: CLINIC | Age: 63
End: 2023-01-06
Payer: MEDICARE

## 2023-01-06 VITALS
BODY MASS INDEX: 23.37 KG/M2 | WEIGHT: 127 LBS | TEMPERATURE: 99 F | DIASTOLIC BLOOD PRESSURE: 64 MMHG | HEIGHT: 62 IN | HEART RATE: 82 BPM | SYSTOLIC BLOOD PRESSURE: 129 MMHG | OXYGEN SATURATION: 93 % | RESPIRATION RATE: 18 BRPM

## 2023-01-06 DIAGNOSIS — J40 BRONCHITIS: Primary | ICD-10-CM

## 2023-01-06 DIAGNOSIS — R05.1 ACUTE COUGH: ICD-10-CM

## 2023-01-06 DIAGNOSIS — R06.02 SOB (SHORTNESS OF BREATH): ICD-10-CM

## 2023-01-06 LAB
CTP QC/QA: YES
SARS-COV-2 AG RESP QL IA.RAPID: NEGATIVE

## 2023-01-06 PROCEDURE — 71046 XR CHEST PA AND LATERAL: ICD-10-PCS | Mod: FY,S$GLB,, | Performed by: RADIOLOGY

## 2023-01-06 PROCEDURE — 94640 AIRWAY INHALATION TREATMENT: CPT | Mod: 59,S$GLB,, | Performed by: NURSE PRACTITIONER

## 2023-01-06 PROCEDURE — 99213 PR OFFICE/OUTPT VISIT, EST, LEVL III, 20-29 MIN: ICD-10-PCS | Mod: 25,S$GLB,CS, | Performed by: NURSE PRACTITIONER

## 2023-01-06 PROCEDURE — 1159F PR MEDICATION LIST DOCUMENTED IN MEDICAL RECORD: ICD-10-PCS | Mod: CPTII,S$GLB,, | Performed by: NURSE PRACTITIONER

## 2023-01-06 PROCEDURE — 3008F BODY MASS INDEX DOCD: CPT | Mod: CPTII,S$GLB,, | Performed by: NURSE PRACTITIONER

## 2023-01-06 PROCEDURE — 3078F DIAST BP <80 MM HG: CPT | Mod: CPTII,S$GLB,, | Performed by: NURSE PRACTITIONER

## 2023-01-06 PROCEDURE — 1160F RVW MEDS BY RX/DR IN RCRD: CPT | Mod: CPTII,S$GLB,, | Performed by: NURSE PRACTITIONER

## 2023-01-06 PROCEDURE — 1159F MED LIST DOCD IN RCRD: CPT | Mod: CPTII,S$GLB,, | Performed by: NURSE PRACTITIONER

## 2023-01-06 PROCEDURE — U0002 COVID-19 LAB TEST NON-CDC: HCPCS | Mod: QW,S$GLB,, | Performed by: NURSE PRACTITIONER

## 2023-01-06 PROCEDURE — 3074F PR MOST RECENT SYSTOLIC BLOOD PRESSURE < 130 MM HG: ICD-10-PCS | Mod: CPTII,S$GLB,, | Performed by: NURSE PRACTITIONER

## 2023-01-06 PROCEDURE — 71046 X-RAY EXAM CHEST 2 VIEWS: CPT | Mod: FY,S$GLB,, | Performed by: RADIOLOGY

## 2023-01-06 PROCEDURE — 99213 OFFICE O/P EST LOW 20 MIN: CPT | Mod: 25,S$GLB,CS, | Performed by: NURSE PRACTITIONER

## 2023-01-06 PROCEDURE — 3008F PR BODY MASS INDEX (BMI) DOCUMENTED: ICD-10-PCS | Mod: CPTII,S$GLB,, | Performed by: NURSE PRACTITIONER

## 2023-01-06 PROCEDURE — 3074F SYST BP LT 130 MM HG: CPT | Mod: CPTII,S$GLB,, | Performed by: NURSE PRACTITIONER

## 2023-01-06 PROCEDURE — 1160F PR REVIEW ALL MEDS BY PRESCRIBER/CLIN PHARMACIST DOCUMENTED: ICD-10-PCS | Mod: CPTII,S$GLB,, | Performed by: NURSE PRACTITIONER

## 2023-01-06 PROCEDURE — 94640 PR INHAL RX, AIRWAY OBST/DX SPUTUM INDUCT: ICD-10-PCS | Mod: 59,S$GLB,, | Performed by: NURSE PRACTITIONER

## 2023-01-06 PROCEDURE — U0002 SARS CORONAVIRUS 2 ANTIGEN POCT, MANUAL READ: ICD-10-PCS | Mod: QW,S$GLB,, | Performed by: NURSE PRACTITIONER

## 2023-01-06 PROCEDURE — 3078F PR MOST RECENT DIASTOLIC BLOOD PRESSURE < 80 MM HG: ICD-10-PCS | Mod: CPTII,S$GLB,, | Performed by: NURSE PRACTITIONER

## 2023-01-06 RX ORDER — ALBUTEROL SULFATE 0.83 MG/ML
2.5 SOLUTION RESPIRATORY (INHALATION)
Status: COMPLETED | OUTPATIENT
Start: 2023-01-06 | End: 2023-01-06

## 2023-01-06 RX ORDER — ALBUTEROL SULFATE 0.83 MG/ML
2.5 SOLUTION RESPIRATORY (INHALATION)
Qty: 1 EACH | Refills: 0 | Status: SHIPPED | OUTPATIENT
Start: 2023-01-06 | End: 2023-04-28

## 2023-01-06 RX ORDER — PREDNISONE 20 MG/1
40 TABLET ORAL DAILY
Qty: 10 TABLET | Refills: 0 | Status: SHIPPED | OUTPATIENT
Start: 2023-01-06 | End: 2023-01-11

## 2023-01-06 RX ORDER — IPRATROPIUM BROMIDE 0.5 MG/2.5ML
0.5 SOLUTION RESPIRATORY (INHALATION)
Status: COMPLETED | OUTPATIENT
Start: 2023-01-06 | End: 2023-01-06

## 2023-01-06 RX ADMIN — ALBUTEROL SULFATE 2.5 MG: 0.83 SOLUTION RESPIRATORY (INHALATION) at 03:01

## 2023-01-06 RX ADMIN — IPRATROPIUM BROMIDE 0.5 MG: 0.5 SOLUTION RESPIRATORY (INHALATION) at 03:01

## 2023-01-06 NOTE — PROGRESS NOTES
"Subjective:       Patient ID: Jud Villa is a 62 y.o. female.    Vitals:  height is 5' 2" (1.575 m) and weight is 57.6 kg (127 lb). Her oral temperature is 98.5 °F (36.9 °C). Her blood pressure is 129/64 and her pulse is 82. Her respiration is 18 and oxygen saturation is 93% (abnormal).     Chief Complaint: Cough    This is a 62 y.o. female who presents today with a chief complaint of  cough, SOB, wheezing. Hx of emphysema. Pt was seen  by a specialists in early Dec and was giving ciprofloxacin.  She did not complete the course of ax, no fever or chills.     Cough  This is a recurrent problem. The current episode started 1 to 4 weeks ago. The problem has been gradually worsening. The problem occurs constantly. The cough is Productive of sputum. Associated symptoms include nasal congestion, a sore throat, shortness of breath and wheezing. Pertinent negatives include no chest pain, chills, ear pain, fever, headaches, myalgias or rash. The symptoms are aggravated by lying down. She has tried oral steroids and OTC cough suppressant for the symptoms. Her past medical history is significant for emphysema.     Constitution: Negative for chills, sweating, fatigue and fever.   HENT:  Positive for sore throat. Negative for ear pain, ear discharge, tinnitus, hearing loss, congestion, sinus pain, sinus pressure, trouble swallowing and voice change.    Neck: Negative for neck pain and painful lymph nodes.   Cardiovascular:  Negative for chest pain, palpitations and sob on exertion.   Respiratory:  Positive for cough, shortness of breath and wheezing. Negative for sputum production.    Gastrointestinal:  Negative for abdominal pain, nausea, vomiting and diarrhea.   Musculoskeletal:  Negative for muscle ache.   Skin:  Negative for color change, pale and rash.   Allergic/Immunologic: Negative for sneezing.   Neurological:  Negative for headaches, numbness and tingling.   Hematologic/Lymphatic: Negative for swollen lymph nodes. "     Objective:      Physical Exam   Constitutional: She is oriented to person, place, and time. She appears well-developed. She is cooperative.  Non-toxic appearance. She does not appear ill. No distress.   HENT:   Head: Normocephalic and atraumatic.   Ears:   Right Ear: Hearing, tympanic membrane, external ear and ear canal normal.   Left Ear: Hearing, tympanic membrane, external ear and ear canal normal.   Nose: Nose normal. No mucosal edema, rhinorrhea, nasal deformity or congestion. No epistaxis. Right sinus exhibits no maxillary sinus tenderness and no frontal sinus tenderness. Left sinus exhibits no maxillary sinus tenderness and no frontal sinus tenderness.   Mouth/Throat: Uvula is midline, oropharynx is clear and moist and mucous membranes are normal. No trismus in the jaw. Normal dentition. No uvula swelling. No oropharyngeal exudate, posterior oropharyngeal edema or posterior oropharyngeal erythema.   Eyes: Conjunctivae and lids are normal. No scleral icterus.   Neck: Trachea normal and phonation normal. Neck supple. No edema present. No erythema present. No neck rigidity present.   Cardiovascular: Normal rate, regular rhythm and normal heart sounds.   Pulmonary/Chest: Effort normal. No stridor. No respiratory distress. She has no decreased breath sounds. She has wheezes. She has no rhonchi. She has rales. She exhibits no tenderness.   Abdominal: Normal appearance.   Musculoskeletal: Normal range of motion.         General: No deformity. Normal range of motion.      Cervical back: She exhibits no tenderness.   Lymphadenopathy:     She has no cervical adenopathy.   Neurological: She is alert and oriented to person, place, and time. She exhibits normal muscle tone. Coordination normal.   Skin: Skin is warm, dry, intact, not diaphoretic and not pale.   Psychiatric: Her speech is normal and behavior is normal. Judgment and thought content normal.   Nursing note and vitals reviewed.      Results for orders  placed or performed in visit on 01/06/23   SARS Coronavirus 2 Antigen, POCT Manual Read   Result Value Ref Range    SARS Coronavirus 2 Antigen Negative Negative     Acceptable Yes      XR CHEST PA AND LATERAL    Result Date: 1/6/2023  EXAMINATION: XR CHEST PA AND LATERAL CLINICAL HISTORY: Acute cough TECHNIQUE: PA and lateral views of the chest were performed. COMPARISON: 01/13/2022. FINDINGS: There are postoperative changes in left axillary region.  There are changes suggestive of left-sided mastectomy. The trachea is unremarkable.  The cardiomediastinal silhouette is within normal limits.  The hilar structures are unremarkable.  There is no evidence of free air beneath the hemidiaphragms.  There are no pleural effusions.  There is no evidence of a pneumothorax.  There is no evidence of pneumomediastinum.  No airspace opacity is present.  The osseous structures demonstrate degenerative changes.     No acute process. Electronically signed by: Augusto Crooks MD Date:    01/06/2023 Time:    15:06     Assessment:       1. Bronchitis    2. Acute cough    3. SOB (shortness of breath)          Plan:         Bronchitis  -     albuterol nebulizer solution 2.5 mg  -     ipratropium 0.02 % nebulizer solution 0.5 mg  -     predniSONE (DELTASONE) 20 MG tablet; Take 2 tablets (40 mg total) by mouth once daily. for 5 days  Dispense: 10 tablet; Refill: 0    Acute cough  -     XR CHEST PA AND LATERAL; Future; Expected date: 01/06/2023  -     SARS Coronavirus 2 Antigen, POCT Manual Read    SOB (shortness of breath)  -     SARS Coronavirus 2 Antigen, POCT Manual Read  -     albuterol nebulizer solution 2.5 mg  -     ipratropium 0.02 % nebulizer solution 0.5 mg                   Patient Instructions   See additional patient Instructions provided    - Rest.    - Drink plenty of fluids.  - Bacterial infections can be treated with oral antibiotics, however, Viral upper respiratory infections will not respond to antibiotics  but with simple symptomatic care, typically run their course in 10-14 days.     - Tylenol or Ibuprofen as directed as needed for fever/pain. Avoid tylenol if you have a history of liver disease. Do not take ibuprofen if you have a history of GI bleeding, kidney disease, or if you take blood thinners.     - You can take over-the-counter claritin, zyrtec, allegra, or xyzal as directed. These are antihistamines that can help with runny nose, nasal congestion, sneezing, and helps to dry up post-nasal drip, which usually causes sore throat and cough.   - If you do NOT have high blood pressure, you may use a decongestant form (D)  of this medication (ie. Claritin- D, zyrtec-D, allegra-D) or if you do not take the D form, you can take sudafed (pseudoephedrine) over the counter, which is a decongestant. Do NOT take two decongestant (D) medications at the same time (such as mucinex-D and claritin-D or plain sudafed and claritin D)    - You can use Flonase (fluticasone) nasal spray as directed for sinus congestion and postnasal drip. This is a steroid nasal spray that works locally over time to decrease the inflammation in your nose/sinuses and help with allergic symptoms. This is not an quick- relief spray like afrin, but it works well if used daily.  Discontinue if you develop nose bleed  - use nasal saline prior to Flonase.  - Use Ocean Spray Nasal Saline 1-3 puffs each nostril every 2-3 hours then blow out onto tissue. This is to irrigate the nasal passage way to clear the sinus openings. Use until sinus problem resolved.    - you can take plain Mucinex (guaifenesin) 1200 mg twice a day to help loosen mucous.     -warm salt water gargles can help with sore throat    - warm tea with honey can help with cough. Honey is a natural cough suppressant.    - Dextromethorphan (DM) is a cough suppressant over the counter (ie. mucinex DM, robitussin, delsym; dayquil/nyquil has DM as well.)    - Follow up with your PCP or specialty  clinic as directed in the next 1-2 weeks if not improved or as needed.  You can call [phone] to schedule an appointment with the appropriate provider.      - Go to the ER if you develop new or worsening symptoms.     - You must understand that you have received an Urgent Care treatment only and that you may be released before all of your medical problems are known or treated.   - You, the patient, will arrange for follow up care as instructed.   - If your condition worsens or fails to improve we recommend that you receive another evaluation at the ER immediately or contact your PCP to discuss your concerns or return here.     Patient Instructions   - You must understand that you have received an Urgent Care treatment only and that you may be released before all of your medical problems are known or treated.   - You, the patient, will arrange for follow up care as instructed.   - If your condition worsens or fails to improve we recommend that you receive another evaluation at the ER immediately or contact your PCP to discuss your concerns or return here.     Advised on return/follow-up precautions. Advised on ER precautions. Answered all patient questions. Patient verbalized understanding and voiced agreement with current treatment plan.

## 2023-01-06 NOTE — PATIENT INSTRUCTIONS
See additional patient Instructions provided    - Rest.    - Drink plenty of fluids.  - Bacterial infections can be treated with oral antibiotics, however, Viral upper respiratory infections will not respond to antibiotics but with simple symptomatic care, typically run their course in 10-14 days.     - Tylenol or Ibuprofen as directed as needed for fever/pain. Avoid tylenol if you have a history of liver disease. Do not take ibuprofen if you have a history of GI bleeding, kidney disease, or if you take blood thinners.     - You can take over-the-counter claritin, zyrtec, allegra, or xyzal as directed. These are antihistamines that can help with runny nose, nasal congestion, sneezing, and helps to dry up post-nasal drip, which usually causes sore throat and cough.   - If you do NOT have high blood pressure, you may use a decongestant form (D)  of this medication (ie. Claritin- D, zyrtec-D, allegra-D) or if you do not take the D form, you can take sudafed (pseudoephedrine) over the counter, which is a decongestant. Do NOT take two decongestant (D) medications at the same time (such as mucinex-D and claritin-D or plain sudafed and claritin D)    - You can use Flonase (fluticasone) nasal spray as directed for sinus congestion and postnasal drip. This is a steroid nasal spray that works locally over time to decrease the inflammation in your nose/sinuses and help with allergic symptoms. This is not an quick- relief spray like afrin, but it works well if used daily.  Discontinue if you develop nose bleed  - use nasal saline prior to Flonase.  - Use Ocean Spray Nasal Saline 1-3 puffs each nostril every 2-3 hours then blow out onto tissue. This is to irrigate the nasal passage way to clear the sinus openings. Use until sinus problem resolved.    - you can take plain Mucinex (guaifenesin) 1200 mg twice a day to help loosen mucous.     -warm salt water gargles can help with sore throat    - warm tea with honey can help with  cough. Honey is a natural cough suppressant.    - Dextromethorphan (DM) is a cough suppressant over the counter (ie. mucinex DM, robitussin, delsym; dayquil/nyquil has DM as well.)    - Follow up with your PCP or specialty clinic as directed in the next 1-2 weeks if not improved or as needed.  You can call [phone] to schedule an appointment with the appropriate provider.      - Go to the ER if you develop new or worsening symptoms.     - You must understand that you have received an Urgent Care treatment only and that you may be released before all of your medical problems are known or treated.   - You, the patient, will arrange for follow up care as instructed.   - If your condition worsens or fails to improve we recommend that you receive another evaluation at the ER immediately or contact your PCP to discuss your concerns or return here.     Patient Instructions   - You must understand that you have received an Urgent Care treatment only and that you may be released before all of your medical problems are known or treated.   - You, the patient, will arrange for follow up care as instructed.   - If your condition worsens or fails to improve we recommend that you receive another evaluation at the ER immediately or contact your PCP to discuss your concerns or return here.     Advised on return/follow-up precautions. Advised on ER precautions. Answered all patient questions. Patient verbalized understanding and voiced agreement with current treatment plan.

## 2023-01-16 ENCOUNTER — OFFICE VISIT (OUTPATIENT)
Dept: URGENT CARE | Facility: CLINIC | Age: 63
End: 2023-01-16
Payer: MEDICARE

## 2023-01-16 VITALS
WEIGHT: 125 LBS | HEART RATE: 82 BPM | RESPIRATION RATE: 20 BRPM | HEIGHT: 62 IN | OXYGEN SATURATION: 94 % | TEMPERATURE: 98 F | DIASTOLIC BLOOD PRESSURE: 77 MMHG | SYSTOLIC BLOOD PRESSURE: 110 MMHG | BODY MASS INDEX: 23 KG/M2

## 2023-01-16 DIAGNOSIS — U07.1 COVID-19 VIRUS INFECTION: Primary | ICD-10-CM

## 2023-01-16 DIAGNOSIS — R05.9 COUGH, UNSPECIFIED TYPE: ICD-10-CM

## 2023-01-16 DIAGNOSIS — U07.1 COVID-19 VIRUS DETECTED: ICD-10-CM

## 2023-01-16 LAB
CTP QC/QA: YES
SARS-COV-2 AG RESP QL IA.RAPID: POSITIVE

## 2023-01-16 PROCEDURE — 3074F PR MOST RECENT SYSTOLIC BLOOD PRESSURE < 130 MM HG: ICD-10-PCS | Mod: CPTII,S$GLB,, | Performed by: NURSE PRACTITIONER

## 2023-01-16 PROCEDURE — 3078F PR MOST RECENT DIASTOLIC BLOOD PRESSURE < 80 MM HG: ICD-10-PCS | Mod: CPTII,S$GLB,, | Performed by: NURSE PRACTITIONER

## 2023-01-16 PROCEDURE — 99213 PR OFFICE/OUTPT VISIT, EST, LEVL III, 20-29 MIN: ICD-10-PCS | Mod: S$GLB,CS,, | Performed by: NURSE PRACTITIONER

## 2023-01-16 PROCEDURE — 1159F MED LIST DOCD IN RCRD: CPT | Mod: CPTII,S$GLB,, | Performed by: NURSE PRACTITIONER

## 2023-01-16 PROCEDURE — 3078F DIAST BP <80 MM HG: CPT | Mod: CPTII,S$GLB,, | Performed by: NURSE PRACTITIONER

## 2023-01-16 PROCEDURE — 1160F RVW MEDS BY RX/DR IN RCRD: CPT | Mod: CPTII,S$GLB,, | Performed by: NURSE PRACTITIONER

## 2023-01-16 PROCEDURE — 3008F PR BODY MASS INDEX (BMI) DOCUMENTED: ICD-10-PCS | Mod: CPTII,S$GLB,, | Performed by: NURSE PRACTITIONER

## 2023-01-16 PROCEDURE — 1160F PR REVIEW ALL MEDS BY PRESCRIBER/CLIN PHARMACIST DOCUMENTED: ICD-10-PCS | Mod: CPTII,S$GLB,, | Performed by: NURSE PRACTITIONER

## 2023-01-16 PROCEDURE — 87811 SARS-COV-2 COVID19 W/OPTIC: CPT | Mod: QW,S$GLB,, | Performed by: NURSE PRACTITIONER

## 2023-01-16 PROCEDURE — 3008F BODY MASS INDEX DOCD: CPT | Mod: CPTII,S$GLB,, | Performed by: NURSE PRACTITIONER

## 2023-01-16 PROCEDURE — 99213 OFFICE O/P EST LOW 20 MIN: CPT | Mod: S$GLB,CS,, | Performed by: NURSE PRACTITIONER

## 2023-01-16 PROCEDURE — 3074F SYST BP LT 130 MM HG: CPT | Mod: CPTII,S$GLB,, | Performed by: NURSE PRACTITIONER

## 2023-01-16 PROCEDURE — 87811 SARS CORONAVIRUS 2 ANTIGEN POCT, MANUAL READ: ICD-10-PCS | Mod: QW,S$GLB,, | Performed by: NURSE PRACTITIONER

## 2023-01-16 PROCEDURE — 1159F PR MEDICATION LIST DOCUMENTED IN MEDICAL RECORD: ICD-10-PCS | Mod: CPTII,S$GLB,, | Performed by: NURSE PRACTITIONER

## 2023-01-16 RX ORDER — BENZONATATE 200 MG/1
200 CAPSULE ORAL 3 TIMES DAILY PRN
Qty: 30 CAPSULE | Refills: 0 | Status: SHIPPED | OUTPATIENT
Start: 2023-01-16 | End: 2023-01-26

## 2023-01-16 RX ORDER — PROMETHAZINE HYDROCHLORIDE AND DEXTROMETHORPHAN HYDROBROMIDE 6.25; 15 MG/5ML; MG/5ML
5 SYRUP ORAL EVERY 4 HOURS PRN
Qty: 180 ML | Refills: 0 | Status: SHIPPED | OUTPATIENT
Start: 2023-01-16 | End: 2023-01-23

## 2023-01-16 NOTE — PROGRESS NOTES
"Subjective:       Patient ID: Jud Villa is a 62 y.o. female.    Vitals:  height is 5' 2" (1.575 m) and weight is 56.7 kg (125 lb). Her temperature is 97.9 °F (36.6 °C). Her blood pressure is 110/77 and her pulse is 82. Her respiration is 20 and oxygen saturation is 94% (abnormal).     Chief Complaint: Cough    This is a 62 y.o. female who presents today with a chief complaint of cough, chest congestion, headaches, body aches, and nasal congestion x 3 days.  Pt took an at home Covid test and it was Positive. Pt has been using her inhaler. Completed a zpack provided by her PCP but sx did not improve     Cough  This is a new problem. The current episode started in the past 7 days. The problem has been gradually worsening. The cough is Productive of sputum. Associated symptoms include headaches, nasal congestion and a sore throat. Pertinent negatives include no chest pain, chills, ear pain, fever, myalgias, rash, shortness of breath or wheezing. Her past medical history is significant for COPD. There is no history of asthma or bronchitis.     Constitution: Positive for generalized weakness. Negative for chills, sweating, fatigue and fever.   HENT:  Positive for sore throat. Negative for ear pain, ear discharge, tinnitus, hearing loss, congestion, sinus pain, sinus pressure, trouble swallowing and voice change.    Neck: Negative for neck pain and painful lymph nodes.   Cardiovascular:  Negative for chest pain, palpitations and sob on exertion.   Respiratory:  Positive for cough. Negative for sputum production, shortness of breath and wheezing.    Gastrointestinal:  Negative for abdominal pain, nausea, vomiting and diarrhea.   Musculoskeletal:  Negative for muscle ache.   Skin:  Negative for color change, pale and rash.   Allergic/Immunologic: Negative for sneezing.   Neurological:  Positive for headaches. Negative for numbness and tingling.   Hematologic/Lymphatic: Negative for swollen lymph nodes.     Objective: "      Physical Exam   Constitutional: She is oriented to person, place, and time. She appears well-developed. She is cooperative.  Non-toxic appearance. She does not appear ill. No distress.   HENT:   Head: Normocephalic and atraumatic.   Ears:   Right Ear: Hearing, tympanic membrane, external ear and ear canal normal.   Left Ear: Hearing, tympanic membrane, external ear and ear canal normal.   Nose: Congestion present. No mucosal edema, rhinorrhea or nasal deformity. No epistaxis. Right sinus exhibits no maxillary sinus tenderness and no frontal sinus tenderness. Left sinus exhibits no maxillary sinus tenderness and no frontal sinus tenderness.   Mouth/Throat: Uvula is midline and mucous membranes are normal. No trismus in the jaw. Normal dentition. No uvula swelling. Posterior oropharyngeal erythema present. No oropharyngeal exudate or posterior oropharyngeal edema.   Eyes: Conjunctivae and lids are normal. No scleral icterus.   Neck: Trachea normal and phonation normal. Neck supple. No edema present. No erythema present. No neck rigidity present.   Cardiovascular: Normal rate, regular rhythm and normal heart sounds.   Pulmonary/Chest: Effort normal. No stridor. No respiratory distress. She has no decreased breath sounds. She has wheezes. She has no rhonchi. She has rales. She exhibits no tenderness.   Abdominal: Normal appearance.   Musculoskeletal: Normal range of motion.         General: No deformity. Normal range of motion.   Neurological: She is alert and oriented to person, place, and time. She exhibits normal muscle tone. Coordination normal.   Skin: Skin is warm, dry, intact, not diaphoretic and not pale.   Psychiatric: Her speech is normal and behavior is normal. Judgment and thought content normal.   Nursing note and vitals reviewed.      Results for orders placed or performed in visit on 01/16/23   SARS Coronavirus 2 Antigen, POCT Manual Read   Result Value Ref Range    SARS Coronavirus 2 Antigen  Positive (A) Negative     Acceptable Yes        Assessment:       1. COVID-19 virus infection    2. Cough, unspecified type          Plan:         COVID-19 virus infection  -     molnupiravir 200 mg capsule (EUA); Take 4 capsules (800 mg total) by mouth every 12 (twelve) hours. for 5 days  Dispense: 40 capsule; Refill: 0  -     promethazine-dextromethorphan (PROMETHAZINE-DM) 6.25-15 mg/5 mL Syrp; Take 5 mLs by mouth every 4 (four) hours as needed (cough).  Dispense: 180 mL; Refill: 0  -     benzonatate (TESSALON) 200 MG capsule; Take 1 capsule (200 mg total) by mouth 3 (three) times daily as needed for Cough.  Dispense: 30 capsule; Refill: 0    Cough, unspecified type  -     SARS Coronavirus 2 Antigen, POCT Manual Read    -continue with home  inh q 6 hrs prn               Patient Instructions   See additional patient Instructions provided    - Rest.    - Drink plenty of fluids.  - Viral upper respiratory infections typically run their course in 10-14 days.     - Tylenol or Ibuprofen as directed as needed for fever/pain. Avoid tylenol if you have a history of liver disease. Do not take ibuprofen if you have a history of GI bleeding, kidney disease, or if you take blood thinners.     - You can take over-the-counter claritin, zyrtec, allegra, or xyzal as directed. These are antihistamines that can help with runny nose, nasal congestion, sneezing, and helps to dry up post-nasal drip, which usually causes sore throat and cough.   - If you do NOT have high blood pressure, you may use a decongestant form (D)  of this medication (ie. Claritin- D, zyrtec-D, allegra-D) or if you do not take the D form, you can take sudafed (pseudoephedrine) over the counter, which is a decongestant. Do NOT take two decongestant (D) medications at the same time (such as mucinex-D and claritin-D or plain sudafed and claritin D)    - You can use Flonase (fluticasone) nasal spray as directed for sinus congestion and postnasal drip.  This is a steroid nasal spray that works locally over time to decrease the inflammation in your nose/sinuses and help with allergic symptoms. This is not an quick- relief spray like afrin, but it works well if used daily.  Discontinue if you develop nose bleed  - use nasal saline prior to Flonase.  - Use Ocean Spray Nasal Saline 1-3 puffs each nostril every 2-3 hours then blow out onto tissue. This is to irrigate the nasal passage way to clear the sinus openings. Use until sinus problem resolved.    - you can take plain Mucinex (guaifenesin) 1200 mg twice a day to help loosen mucous.     -warm salt water gargles can help with sore throat    - warm tea with honey can help with cough. Honey is a natural cough suppressant.    - Dextromethorphan (DM) is a cough suppressant over the counter (ie. mucinex DM, robitussin, delsym; dayquil/nyquil has DM as well.)    - Follow up with your PCP or specialty clinic as directed in the next 1-2 weeks if not improved or as needed.  You can call [phone] to schedule an appointment with the appropriate provider.      - Go to the ER if you develop new or worsening symptoms.     - You must understand that you have received an Urgent Care treatment only and that you may be released before all of your medical problems are known or treated.   - You, the patient, will arrange for follow up care as instructed.   - If your condition worsens or fails to improve we recommend that you receive another evaluation at the ER immediately or contact your PCP to discuss your concerns or return here.     You have tested positive for COVID-19 today.           ISOLATION    If you tested positive and do not have symptoms, you must isolate for 5 days starting on the day of the positive test.          If you tested positive and have symptoms, you must isolate for 5 days starting on the day of the first symptoms,  not the day of the positive test.       Both the CDC and the [facility] emphasize that you do not  test out of isolation.         After 5 days, if your symptoms have improved and you have not had fever on day 5, you can return to the community on day 6- NO TESTING REQUIRED!          In fact, we do not retest if you were positive in the last 90 days.         After your 5 days of isolation are completed, the CDC recommends strict mask use for the first 5 days that you come out of isolation.      Patient Instructions   - You must understand that you have received an Urgent Care treatment only and that you may be released before all of your medical problems are known or treated.   - You, the patient, will arrange for follow up care as instructed.   - If your condition worsens or fails to improve we recommend that you receive another evaluation at the ER immediately or contact your PCP to discuss your concerns or return here.     Advised on return/follow-up precautions. Advised on ER precautions. Answered all patient questions. Patient verbalized understanding and voiced agreement with current treatment plan.

## 2023-01-16 NOTE — PATIENT INSTRUCTIONS
See additional patient Instructions provided    - Rest.    - Drink plenty of fluids.  - Viral upper respiratory infections typically run their course in 10-14 days.     - Tylenol or Ibuprofen as directed as needed for fever/pain. Avoid tylenol if you have a history of liver disease. Do not take ibuprofen if you have a history of GI bleeding, kidney disease, or if you take blood thinners.     - You can take over-the-counter claritin, zyrtec, allegra, or xyzal as directed. These are antihistamines that can help with runny nose, nasal congestion, sneezing, and helps to dry up post-nasal drip, which usually causes sore throat and cough.   - If you do NOT have high blood pressure, you may use a decongestant form (D)  of this medication (ie. Claritin- D, zyrtec-D, allegra-D) or if you do not take the D form, you can take sudafed (pseudoephedrine) over the counter, which is a decongestant. Do NOT take two decongestant (D) medications at the same time (such as mucinex-D and claritin-D or plain sudafed and claritin D)    - You can use Flonase (fluticasone) nasal spray as directed for sinus congestion and postnasal drip. This is a steroid nasal spray that works locally over time to decrease the inflammation in your nose/sinuses and help with allergic symptoms. This is not an quick- relief spray like afrin, but it works well if used daily.  Discontinue if you develop nose bleed  - use nasal saline prior to Flonase.  - Use Ocean Spray Nasal Saline 1-3 puffs each nostril every 2-3 hours then blow out onto tissue. This is to irrigate the nasal passage way to clear the sinus openings. Use until sinus problem resolved.    - you can take plain Mucinex (guaifenesin) 1200 mg twice a day to help loosen mucous.     -warm salt water gargles can help with sore throat    - warm tea with honey can help with cough. Honey is a natural cough suppressant.    - Dextromethorphan (DM) is a cough suppressant over the counter (ie. mucinex DM,  robitussin, delsym; dayquil/nyquil has DM as well.)    - Follow up with your PCP or specialty clinic as directed in the next 1-2 weeks if not improved or as needed.  You can call [phone] to schedule an appointment with the appropriate provider.      - Go to the ER if you develop new or worsening symptoms.     - You must understand that you have received an Urgent Care treatment only and that you may be released before all of your medical problems are known or treated.   - You, the patient, will arrange for follow up care as instructed.   - If your condition worsens or fails to improve we recommend that you receive another evaluation at the ER immediately or contact your PCP to discuss your concerns or return here.     You have tested positive for COVID-19 today.           ISOLATION    If you tested positive and do not have symptoms, you must isolate for 5 days starting on the day of the positive test.          If you tested positive and have symptoms, you must isolate for 5 days starting on the day of the first symptoms,  not the day of the positive test.       Both the CDC and the [facility] emphasize that you do not test out of isolation.         After 5 days, if your symptoms have improved and you have not had fever on day 5, you can return to the community on day 6- NO TESTING REQUIRED!          In fact, we do not retest if you were positive in the last 90 days.         After your 5 days of isolation are completed, the CDC recommends strict mask use for the first 5 days that you come out of isolation.      Patient Instructions   - You must understand that you have received an Urgent Care treatment only and that you may be released before all of your medical problems are known or treated.   - You, the patient, will arrange for follow up care as instructed.   - If your condition worsens or fails to improve we recommend that you receive another evaluation at the ER immediately or contact your PCP to discuss your  concerns or return here.     Advised on return/follow-up precautions. Advised on ER precautions. Answered all patient questions. Patient verbalized understanding and voiced agreement with current treatment plan.

## 2023-01-17 ENCOUNTER — PATIENT MESSAGE (OUTPATIENT)
Dept: ADMINISTRATIVE | Facility: HOSPITAL | Age: 63
End: 2023-01-17
Payer: MEDICARE

## 2023-01-19 ENCOUNTER — PATIENT MESSAGE (OUTPATIENT)
Dept: ADMINISTRATIVE | Facility: HOSPITAL | Age: 63
End: 2023-01-19
Payer: MEDICARE

## 2023-01-21 ENCOUNTER — CLINICAL SUPPORT (OUTPATIENT)
Dept: URGENT CARE | Facility: CLINIC | Age: 63
End: 2023-01-21
Payer: MEDICARE

## 2023-01-21 VITALS
DIASTOLIC BLOOD PRESSURE: 68 MMHG | BODY MASS INDEX: 23 KG/M2 | WEIGHT: 125 LBS | TEMPERATURE: 99 F | HEART RATE: 77 BPM | RESPIRATION RATE: 20 BRPM | SYSTOLIC BLOOD PRESSURE: 126 MMHG | HEIGHT: 62 IN | OXYGEN SATURATION: 94 %

## 2023-01-21 DIAGNOSIS — U07.1 COVID-19: Primary | ICD-10-CM

## 2023-01-21 DIAGNOSIS — R11.0 NAUSEA: ICD-10-CM

## 2023-01-21 DIAGNOSIS — E11.9 DIABETES: ICD-10-CM

## 2023-01-21 LAB
GLUCOSE SERPL-MCNC: 101 MG/DL (ref 70–110)
GLUCOSE SERPL-MCNC: 49 MG/DL (ref 70–110)

## 2023-01-21 PROCEDURE — 99214 PR OFFICE/OUTPT VISIT, EST, LEVL IV, 30-39 MIN: ICD-10-PCS | Mod: S$GLB,,,

## 2023-01-21 PROCEDURE — 82962 GLUCOSE BLOOD TEST: CPT | Mod: S$GLB,,,

## 2023-01-21 PROCEDURE — 99499 UNLISTED E&M SERVICE: CPT | Mod: S$GLB,,,

## 2023-01-21 PROCEDURE — 99214 OFFICE O/P EST MOD 30 MIN: CPT | Mod: S$GLB,,,

## 2023-01-21 PROCEDURE — 99499 RISK ADDL DX/OHS AUDIT: ICD-10-PCS | Mod: S$GLB,,,

## 2023-01-21 PROCEDURE — 82962 POCT GLUCOSE, HAND-HELD DEVICE: ICD-10-PCS | Mod: S$GLB,,,

## 2023-01-21 RX ORDER — PROMETHAZINE HYDROCHLORIDE AND DEXTROMETHORPHAN HYDROBROMIDE 6.25; 15 MG/5ML; MG/5ML
5 SYRUP ORAL EVERY 6 HOURS PRN
Qty: 118 ML | Refills: 0 | Status: SHIPPED | OUTPATIENT
Start: 2023-01-21 | End: 2023-01-31

## 2023-01-21 RX ORDER — FLUTICASONE PROPIONATE 50 MCG
1 SPRAY, SUSPENSION (ML) NASAL DAILY
Qty: 9.9 ML | Refills: 0 | Status: SHIPPED | OUTPATIENT
Start: 2023-01-21 | End: 2023-04-25

## 2023-01-21 NOTE — PROGRESS NOTES
"Subjective:       Patient ID: Jud Villa is a 62 y.o. female.    Vitals:  height is 5' 2" (1.575 m) and weight is 56.7 kg (125 lb). Her temperature is 98.5 °F (36.9 °C). Her blood pressure is 126/68 and her pulse is 77. Her respiration is 20 and oxygen saturation is 94% (abnormal).     Chief Complaint: Cough    This is a 62 y.o. female who presents today with a chief complaint of cough and headaches that have been going on for 5 days. Pt states that she came here on Monday and tested positive for Covid. Pt is still experiancing a cough and headaches and is not sure if she still has Covid. Pt finished Paxlovid and reports that symptoms have improved some but is still having symptoms.     Pt reports her blood sugar is low at this time and is nauseous. Patient has a history of T2DM and often has episodes of hypoglycemia.       Cough  The current episode started in the past 7 days. The problem has been unchanged. The cough is Productive of sputum. Associated symptoms include headaches. Pertinent negatives include no chest pain, shortness of breath or wheezing. Treatments tried: molnupivavir. Her past medical history is significant for COPD. There is no history of asthma, bronchiectasis or bronchitis.     Cardiovascular:  Negative for chest pain.   Respiratory:  Positive for cough. Negative for shortness of breath and wheezing.    Gastrointestinal:  Positive for nausea.   Neurological:  Positive for headaches. Negative for dizziness and light-headedness.     Objective:      Physical Exam   Constitutional: She is oriented to person, place, and time. She appears well-developed. She is cooperative.  Non-toxic appearance. She appears ill. No distress.   HENT:   Head: Normocephalic and atraumatic.   Ears:   Right Ear: Hearing, tympanic membrane, external ear and ear canal normal.   Left Ear: Hearing, tympanic membrane, external ear and ear canal normal.   Nose: Nose normal. No mucosal edema, rhinorrhea or nasal " deformity. No epistaxis. Right sinus exhibits no maxillary sinus tenderness and no frontal sinus tenderness. Left sinus exhibits no maxillary sinus tenderness and no frontal sinus tenderness.   Mouth/Throat: Uvula is midline, oropharynx is clear and moist and mucous membranes are normal. No trismus in the jaw. Normal dentition. No uvula swelling. No oropharyngeal exudate, posterior oropharyngeal edema or posterior oropharyngeal erythema.   Eyes: Conjunctivae and lids are normal. No scleral icterus.   Neck: Trachea normal and phonation normal. Neck supple. No edema present. No erythema present. No neck rigidity present.   Cardiovascular: Normal rate, regular rhythm, normal heart sounds and normal pulses.   Pulmonary/Chest: Effort normal and breath sounds normal. No respiratory distress. She has no decreased breath sounds. She has no wheezes. She has no rhonchi. She has no rales.   Abdominal: Normal appearance.   Musculoskeletal: Normal range of motion.         General: No deformity. Normal range of motion.   Neurological: no focal deficit. She is alert and oriented to person, place, and time. She exhibits normal muscle tone. Coordination normal.   Skin: Skin is warm, dry, intact, not diaphoretic and not pale.   Psychiatric: Her speech is normal and behavior is normal. Judgment and thought content normal.   Nursing note and vitals reviewed.      Results for orders placed or performed in visit on 01/21/23   POCT Glucose, Hand-Held Device   Result Value Ref Range    POC Glucose 49 (A) 70 - 110 MG/DL   POCT Glucose, Hand-Held Device   Result Value Ref Range    POC Glucose 101 70 - 110 MG/DL       Assessment:       1. COVID-19    2. Nausea    3. Diabetes          Plan:         COVID-19  -     promethazine-dextromethorphan (PROMETHAZINE-DM) 6.25-15 mg/5 mL Syrp; Take 5 mLs by mouth every 6 (six) hours as needed (cough).  Dispense: 118 mL; Refill: 0  -     fluticasone propionate (FLONASE) 50 mcg/actuation nasal spray; 1  spray (50 mcg total) by Each Nostril route once daily.  Dispense: 9.9 mL; Refill: 0    Nausea  -     POCT Glucose, Hand-Held Device  -     POCT Glucose, Hand-Held Device    Diabetes  -     POCT Glucose, Hand-Held Device  -     POCT Glucose, Hand-Held Device         Medical Decision Making:   Urgent Care Management:  Discussed symptoms and quarantine timeline of COVID-19 in detail. Cough medication and Flonase sent. Patient has hx of T2DM and reports frequent episodes of hypoglycemia. Patient given Starbust and bag of chips and BG increased to 101 after snacks. Discussed the importance of further evaluation if symptoms worsen. Patient stated verbal understanding.      Patient Instructions   You have tested positive for COVID-19 today.      ISOLATION  If you tested positive and do not have symptoms, you must isolate for 5 days starting on the day of the positive test. I    If you tested positive and have symptoms, you must isolate for 5 days starting on the day of the first symptoms,  not the day of the positive test.     Both the CDC and the [facility] emphasize that you do not test out of isolation.     After 5 days, if your symptoms have improved and you have not had fever on day 5, you can return to the community on day 6- NO TESTING REQUIRED!      In fact, we do not retest if you were positive in the last 90 days.    After your 5 days of isolation are completed, the CDC recommends strict mask use for the first 5 days that you come out of isolation.    Patient Instructions   PLEASE READ YOUR DISCHARGE INSTRUCTIONS ENTIRELY AS IT CONTAINS IMPORTANT INFORMATION.     Please drink plenty of fluids.     Please get plenty of rest.     Please return here or go to the Emergency Department for any concerns or worsening of condition.     Please take an over the counter antihistamine medication (allegra/Claritin/Zyrtec) of your choice as directed.     Try an over the counter decongestant like Mucinex D or Sudafed. You buy this  behind the pharmacy counter     If you do have Hypertension or palpitations, it is safe to take Coricidin HBP for relief of sinus symptoms.     If not allergic, please take over the counter Tylenol (Acetaminophen) and/or Motrin (Ibuprofen) as directed for control of pain and/or fever.  Please follow up with your primary care doctor or specialist as needed.     Sore throat recommendations: Warm fluids, warm salt water gargles, throat lozenges, tea, honey, soup, rest, hydration.     Use over the counter flonase: one spray each nostril twice daily OR two sprays each nostril once daily.      If you  smoke, please stop smoking.     Please return or see your primary care doctor if you develop new or worsening symptoms.      Please arrange follow up with your primary medical clinic as soon as possible. You must understand that you've received an Urgent Care treatment only and that you may be released before all of your medical problems are known or treated. You, the patient, will arrange for follow up as instructed. If your symptoms worsen or fail to improve you should go to the Emergency Room.

## 2023-01-23 ENCOUNTER — TELEPHONE (OUTPATIENT)
Dept: URGENT CARE | Facility: CLINIC | Age: 63
End: 2023-01-23
Payer: MEDICARE

## 2023-01-26 DIAGNOSIS — G43.711 CHRONIC MIGRAINE WITHOUT AURA, INTRACTABLE, WITH STATUS MIGRAINOSUS: Primary | ICD-10-CM

## 2023-01-26 RX ORDER — RIZATRIPTAN BENZOATE 10 MG/1
10 TABLET ORAL ONCE AS NEEDED
Qty: 30 TABLET | Refills: 0 | Status: SHIPPED | OUTPATIENT
Start: 2023-01-26 | End: 2023-01-27 | Stop reason: SDUPTHER

## 2023-01-26 RX ORDER — RIZATRIPTAN BENZOATE 10 MG/1
TABLET ORAL
COMMUNITY
Start: 2022-10-12 | End: 2023-01-26 | Stop reason: SDUPTHER

## 2023-01-26 RX ORDER — RIZATRIPTAN BENZOATE 10 MG/1
10 TABLET ORAL ONCE AS NEEDED
Qty: 30 TABLET | Refills: 0 | Status: SHIPPED | OUTPATIENT
Start: 2023-01-26 | End: 2023-01-26 | Stop reason: SDUPTHER

## 2023-01-26 NOTE — TELEPHONE ENCOUNTER
Care Due:                  Date            Visit Type   Department     Provider  --------------------------------------------------------------------------------                                EP -                              PRIMARY      [facility]  Last Visit: 12-      CARE ([facility])   MEDICINE       Bo Lopez                              EP -                              PRIMARY      [facility]  Next Visit: 05-      CARE ([facility])   CLEMENTE Lopez                                                            Last  Test          Frequency    Reason                     Performed    Due Date  --------------------------------------------------------------------------------    HBA1C.......  6 months...  TRINH KEN,      06- 12-                             glipiZIDE, metFORMIN.....    [employer] Embedded Care Gaps. Reference number: [id number]. 1/26/2023   10:20:40 AM CST

## 2023-01-26 NOTE — TELEPHONE ENCOUNTER
----- Message from Heather Geller RN sent at 1/26/2023 10:12 AM CST -----  Regarding: Migraine Med  Just spoke w Ms. Villa. She states her pharmacy has been faxing over a request for Rizatriptan for 3 days with no response. Patient states she needs her migraine med. And was told if she could remember the name that it would be filled for her.    Thanks

## 2023-01-27 RX ORDER — RIZATRIPTAN BENZOATE 10 MG/1
10 TABLET ORAL ONCE AS NEEDED
Qty: 30 TABLET | Refills: 0 | Status: SHIPPED | OUTPATIENT
Start: 2023-01-27 | End: 2023-01-30 | Stop reason: SDUPTHER

## 2023-01-27 NOTE — TELEPHONE ENCOUNTER
No new care gaps identified.  [facility] Embedded Care Gaps. Reference number: [id number]. 1/27/2023   1:48:50 PM CST

## 2023-01-30 DIAGNOSIS — G43.711 CHRONIC MIGRAINE WITHOUT AURA, INTRACTABLE, WITH STATUS MIGRAINOSUS: ICD-10-CM

## 2023-01-30 RX ORDER — RIZATRIPTAN BENZOATE 10 MG/1
10 TABLET ORAL ONCE AS NEEDED
Qty: 30 TABLET | Refills: 0 | Status: SHIPPED | OUTPATIENT
Start: 2023-01-30 | End: 2023-03-22

## 2023-01-30 NOTE — TELEPHONE ENCOUNTER
No new care gaps identified.  [facility] Embedded Care Gaps. Reference number: [id number]. 1/30/2023   8:35:58 AM CST

## 2023-02-07 DIAGNOSIS — Z00.00 ENCOUNTER FOR MEDICARE ANNUAL WELLNESS EXAM: ICD-10-CM

## 2023-02-09 DIAGNOSIS — Z00.00 ENCOUNTER FOR MEDICARE ANNUAL WELLNESS EXAM: ICD-10-CM

## 2023-03-17 ENCOUNTER — PATIENT OUTREACH (OUTPATIENT)
Dept: ADMINISTRATIVE | Facility: CLINIC | Age: 63
End: 2023-03-17
Payer: MEDICARE

## 2023-03-17 ENCOUNTER — PATIENT MESSAGE (OUTPATIENT)
Dept: RESEARCH | Facility: HOSPITAL | Age: 63
End: 2023-03-17
Payer: MEDICARE

## 2023-03-17 ENCOUNTER — TELEPHONE (OUTPATIENT)
Dept: FAMILY MEDICINE | Facility: CLINIC | Age: 63
End: 2023-03-17
Payer: MEDICARE

## 2023-03-17 ENCOUNTER — EXTERNAL HOSPITAL ADMISSION (OUTPATIENT)
Dept: ADMINISTRATIVE | Facility: CLINIC | Age: 63
End: 2023-03-17
Payer: MEDICARE

## 2023-03-17 NOTE — TELEPHONE ENCOUNTER
Pt was in [facility] after having a stroke, she is still having bad headaches, she has a hx of migraines, she was getting Toradol through iv while in the hosp, she is requesting pills to take for the headaches, please advise

## 2023-03-17 NOTE — PROGRESS NOTES
Also taking 2 new ASA 81 mg and another one to avoid stroke do not remember the name.      C3 nurse spoke with Jud Villa  for a TCC post hospital discharge follow up call. The patient has a scheduled HOS appointment with Bo Lopez MD on 03/22/2023 @ 1300.

## 2023-03-20 ENCOUNTER — TELEPHONE (OUTPATIENT)
Dept: FAMILY MEDICINE | Facility: CLINIC | Age: 63
End: 2023-03-20
Payer: MEDICARE

## 2023-03-20 DIAGNOSIS — G43.909 MIGRAINE SYNDROME: Primary | ICD-10-CM

## 2023-03-20 RX ORDER — KETOROLAC TROMETHAMINE 10 MG/1
10 TABLET, FILM COATED ORAL 3 TIMES DAILY PRN
Qty: 30 TABLET | Refills: 0 | Status: SHIPPED | OUTPATIENT
Start: 2023-03-20 | End: 2023-03-25

## 2023-03-20 NOTE — TELEPHONE ENCOUNTER
"Notified patient prescription was sent to pharmacy. Patient stated "this is not acceptable." She needs to have a serious talk with  because she had a stroke and hung up the phone.  "

## 2023-03-20 NOTE — PROGRESS NOTES
Toradol prescription was sent to the pharmacy for her migraines.  If not improvement please schedule follow-up visit with me or my nurse practitioner.

## 2023-03-20 NOTE — TELEPHONE ENCOUNTER
----- Message from Radha Grimaldo sent at 3/20/2023  1:55 PM CDT -----  .Type:  Needs Medical Advice    Who Called: pt   Pharmacy name and phone #:  [id number] - [place] - [address] AT [facility] OF [facility] [address]   Phone:  [phone]  Fax:  [phone]  Would the patient rather a call back or a response via [facility]? call  Best Call Back Number: [phone]  Additional Information:     Pt stated that she recently had a stroke and requested that medication be called in for an on going migraine but she has not received anything.  Pt is very upset and would like to speak to Dr. Lopez

## 2023-03-22 ENCOUNTER — OFFICE VISIT (OUTPATIENT)
Dept: FAMILY MEDICINE | Facility: CLINIC | Age: 63
End: 2023-03-22
Payer: MEDICARE

## 2023-03-22 ENCOUNTER — PATIENT MESSAGE (OUTPATIENT)
Dept: NEUROLOGY | Facility: CLINIC | Age: 63
End: 2023-03-22
Payer: MEDICARE

## 2023-03-22 ENCOUNTER — HOSPITAL ENCOUNTER (OUTPATIENT)
Dept: CARDIOLOGY | Facility: HOSPITAL | Age: 63
Discharge: HOME | End: 2023-03-22
Attending: FAMILY MEDICINE
Payer: MEDICARE

## 2023-03-22 VITALS
HEIGHT: 62 IN | HEART RATE: 71 BPM | SYSTOLIC BLOOD PRESSURE: 110 MMHG | WEIGHT: 131.19 LBS | BODY MASS INDEX: 24.14 KG/M2 | OXYGEN SATURATION: 97 % | DIASTOLIC BLOOD PRESSURE: 60 MMHG

## 2023-03-22 VITALS
BODY MASS INDEX: 24.11 KG/M2 | HEART RATE: 68 BPM | SYSTOLIC BLOOD PRESSURE: 110 MMHG | WEIGHT: 131 LBS | DIASTOLIC BLOOD PRESSURE: 60 MMHG | HEIGHT: 62 IN

## 2023-03-22 DIAGNOSIS — G45.9 TIA (TRANSIENT ISCHEMIC ATTACK): Primary | ICD-10-CM

## 2023-03-22 DIAGNOSIS — Z13.6 SCREENING FOR CARDIOVASCULAR CONDITION: ICD-10-CM

## 2023-03-22 DIAGNOSIS — Z79.4 TYPE 2 DIABETES MELLITUS WITH HYPERGLYCEMIA, WITH LONG-TERM CURRENT USE OF INSULIN: ICD-10-CM

## 2023-03-22 DIAGNOSIS — G43.909 MIGRAINE SYNDROME: ICD-10-CM

## 2023-03-22 DIAGNOSIS — E11.65 TYPE 2 DIABETES MELLITUS WITH HYPERGLYCEMIA, WITH LONG-TERM CURRENT USE OF INSULIN: ICD-10-CM

## 2023-03-22 LAB
ASCENDING AORTA: 3.05 CM
AV INDEX (PROSTH): 0.84
AV MEAN GRADIENT: 2 MMHG
AV PEAK GRADIENT: 5 MMHG
AV VALVE AREA: 3.45 CM2
AV VELOCITY RATIO: 0.88
BSA FOR ECHO PROCEDURE: 1.61 M2
CV ECHO LV RWT: 0.33 CM
DOP CALC AO PEAK VEL: 1.07 M/S
DOP CALC AO VTI: 23.12 CM
DOP CALC LVOT AREA: 4.1 CM2
DOP CALC LVOT DIAMETER: 2.29 CM
DOP CALC LVOT PEAK VEL: 0.94 M/S
DOP CALC LVOT STROKE VOLUME: 79.78 CM3
DOP CALCLVOT PEAK VEL VTI: 19.38 CM
E WAVE DECELERATION TIME: 172.06 MSEC
E/A RATIO: 1.09
E/E' RATIO: 8.82 M/S
ECHO LV POSTERIOR WALL: 0.73 CM (ref 0.6–1.1)
EJECTION FRACTION: 55 %
FRACTIONAL SHORTENING: 35 % (ref 28–44)
INTERVENTRICULAR SEPTUM: 0.75 CM (ref 0.6–1.1)
LA MAJOR: 4.3 CM
LA MINOR: 4.18 CM
LA WIDTH: 3.59 CM
LEFT ATRIUM SIZE: 3.17 CM
LEFT ATRIUM VOLUME INDEX MOD: 22.4 ML/M2
LEFT ATRIUM VOLUME INDEX: 25.6 ML/M2
LEFT ATRIUM VOLUME MOD: 35.84 CM3
LEFT ATRIUM VOLUME: 41.01 CM3
LEFT INTERNAL DIMENSION IN SYSTOLE: 2.83 CM (ref 2.1–4)
LEFT VENTRICLE DIASTOLIC VOLUME INDEX: 54 ML/M2
LEFT VENTRICLE DIASTOLIC VOLUME: 86.4 ML
LEFT VENTRICLE MASS INDEX: 61 G/M2
LEFT VENTRICLE SYSTOLIC VOLUME INDEX: 19 ML/M2
LEFT VENTRICLE SYSTOLIC VOLUME: 30.43 ML
LEFT VENTRICULAR INTERNAL DIMENSION IN DIASTOLE: 4.37 CM (ref 3.5–6)
LEFT VENTRICULAR MASS: 97.73 G
LV LATERAL E/E' RATIO: 8.33 M/S
LV SEPTAL E/E' RATIO: 9.38 M/S
MV A" WAVE DURATION": 12.56 MSEC
MV PEAK A VEL: 0.69 M/S
MV PEAK E VEL: 0.75 M/S
MV STENOSIS PRESSURE HALF TIME: 49.9 MS
MV VALVE AREA P 1/2 METHOD: 4.41 CM2
PISA TR MAX VEL: 2.7 M/S
PULM VEIN S/D RATIO: 1.41
PV PEAK D VEL: 0.56 M/S
PV PEAK S VEL: 0.79 M/S
RA MAJOR: 4.12 CM
RA PRESSURE: 3 MMHG
RA WIDTH: 2.88 CM
RIGHT VENTRICULAR END-DIASTOLIC DIMENSION: 2.62 CM
SINUS: 2.95 CM
STJ: 3.04 CM
TDI LATERAL: 0.09 M/S
TDI SEPTAL: 0.08 M/S
TDI: 0.09 M/S
TR MAX PG: 29 MMHG
TRICUSPID ANNULAR PLANE SYSTOLIC EXCURSION: 2.21 CM
TV REST PULMONARY ARTERY PRESSURE: 32 MMHG

## 2023-03-22 PROCEDURE — 99215 PR OFFICE/OUTPT VISIT, EST, LEVL V, 40-54 MIN: ICD-10-PCS | Mod: HCNC,S$GLB,, | Performed by: FAMILY MEDICINE

## 2023-03-22 PROCEDURE — 1160F RVW MEDS BY RX/DR IN RCRD: CPT | Mod: HCNC,CPTII,S$GLB, | Performed by: FAMILY MEDICINE

## 2023-03-22 PROCEDURE — 93306 TTE W/DOPPLER COMPLETE: CPT | Mod: 26,,, | Performed by: INTERNAL MEDICINE

## 2023-03-22 PROCEDURE — 1159F PR MEDICATION LIST DOCUMENTED IN MEDICAL RECORD: ICD-10-PCS | Mod: HCNC,CPTII,S$GLB, | Performed by: FAMILY MEDICINE

## 2023-03-22 PROCEDURE — 3074F PR MOST RECENT SYSTOLIC BLOOD PRESSURE < 130 MM HG: ICD-10-PCS | Mod: HCNC,CPTII,S$GLB, | Performed by: FAMILY MEDICINE

## 2023-03-22 PROCEDURE — 99999 PR PBB SHADOW E&M-EST. PATIENT-LVL V: ICD-10-PCS | Mod: PBBFAC,HCNC,, | Performed by: FAMILY MEDICINE

## 2023-03-22 PROCEDURE — 99999 PR PBB SHADOW E&M-EST. PATIENT-LVL V: CPT | Mod: PBBFAC,HCNC,, | Performed by: FAMILY MEDICINE

## 2023-03-22 PROCEDURE — 93306 ECHO (CUPID ONLY): ICD-10-PCS | Mod: 26,,, | Performed by: INTERNAL MEDICINE

## 2023-03-22 PROCEDURE — 3008F PR BODY MASS INDEX (BMI) DOCUMENTED: ICD-10-PCS | Mod: HCNC,CPTII,S$GLB, | Performed by: FAMILY MEDICINE

## 2023-03-22 PROCEDURE — 3078F DIAST BP <80 MM HG: CPT | Mod: HCNC,CPTII,S$GLB, | Performed by: FAMILY MEDICINE

## 2023-03-22 PROCEDURE — 93306 TTE W/DOPPLER COMPLETE: CPT

## 2023-03-22 PROCEDURE — 99215 OFFICE O/P EST HI 40 MIN: CPT | Mod: HCNC,S$GLB,, | Performed by: FAMILY MEDICINE

## 2023-03-22 PROCEDURE — 3074F SYST BP LT 130 MM HG: CPT | Mod: HCNC,CPTII,S$GLB, | Performed by: FAMILY MEDICINE

## 2023-03-22 PROCEDURE — 3008F BODY MASS INDEX DOCD: CPT | Mod: HCNC,CPTII,S$GLB, | Performed by: FAMILY MEDICINE

## 2023-03-22 PROCEDURE — 1160F PR REVIEW ALL MEDS BY PRESCRIBER/CLIN PHARMACIST DOCUMENTED: ICD-10-PCS | Mod: HCNC,CPTII,S$GLB, | Performed by: FAMILY MEDICINE

## 2023-03-22 PROCEDURE — 1159F MED LIST DOCD IN RCRD: CPT | Mod: HCNC,CPTII,S$GLB, | Performed by: FAMILY MEDICINE

## 2023-03-22 PROCEDURE — 3078F PR MOST RECENT DIASTOLIC BLOOD PRESSURE < 80 MM HG: ICD-10-PCS | Mod: HCNC,CPTII,S$GLB, | Performed by: FAMILY MEDICINE

## 2023-03-22 NOTE — PROGRESS NOTES
"Subjective:       Patient ID: Jud Villa is a 62 y.o. female.    Chief Complaint: Follow-up    62 years old female came to the clinic after recent hospitalization secondary to TIA.  Patient reports also migraine with significant improvement using Toradol.  She reports "heart weakness" during her hospitalization.  Last A1c was 6.8.  No polyuria, polydipsia or polyphagia.    Follow-up  Associated symptoms include headaches. Pertinent negatives include no chest pain.   Review of Systems   Constitutional: Negative.    HENT: Negative.     Eyes: Negative.    Respiratory: Negative.     Cardiovascular:  Negative for chest pain, palpitations, leg swelling and claudication.   Gastrointestinal: Negative.    Endocrine: Negative for polydipsia, polyphagia and polyuria.   Genitourinary: Negative.    Musculoskeletal: Negative.    Neurological:  Positive for headaches.   Psychiatric/Behavioral: Negative.         Objective:      Physical Exam  Constitutional:       General: She is not in acute distress.     Appearance: She is well-developed. She is not diaphoretic.   HENT:      Head: Normocephalic and atraumatic.      Right Ear: External ear normal.      Left Ear: External ear normal.      Nose: Nose normal.      Mouth/Throat:      Pharynx: No oropharyngeal exudate.   Eyes:      General: No scleral icterus.        Right eye: No discharge.         Left eye: No discharge.      Conjunctiva/sclera: Conjunctivae normal.      Pupils: Pupils are equal, round, and reactive to light.   Neck:      Thyroid: No thyromegaly.      Vascular: No JVD.      Trachea: No tracheal deviation.   Cardiovascular:      Rate and Rhythm: Normal rate and regular rhythm.      Heart sounds: Normal heart sounds. No murmur heard.    No friction rub. No gallop.   Pulmonary:      Effort: Pulmonary effort is normal. No respiratory distress.      Breath sounds: Normal breath sounds. No stridor. No wheezing or rales.   Chest:      Chest wall: No tenderness. "   Abdominal:      General: Bowel sounds are normal. There is no distension.      Palpations: Abdomen is soft. There is no mass.      Tenderness: There is no abdominal tenderness. There is no guarding or rebound.   Musculoskeletal:         General: No tenderness. Normal range of motion.      Cervical back: Normal range of motion and neck supple.   Lymphadenopathy:      Cervical: No cervical adenopathy.   Skin:     General: Skin is warm and dry.      Coloration: Skin is not pale.      Findings: No erythema or rash.   Neurological:      Mental Status: She is alert and oriented to person, place, and time.      Cranial Nerves: No cranial nerve deficit.      Motor: No abnormal muscle tone.      Coordination: Coordination normal.      Deep Tendon Reflexes: Reflexes are normal and symmetric.   Psychiatric:         Behavior: Behavior normal.         Thought Content: Thought content normal.         Judgment: Judgment normal.       Assessment:       Problem List Items Addressed This Visit    None  Visit Diagnoses       TIA (transient ischemic attack)    -  Primary    Relevant Orders    Ambulatory referral/consult to Neurology    CBC Auto Differential    Comprehensive Metabolic Panel    Migraine syndrome        Relevant Orders    Ambulatory referral/consult to Neurology    Screening for cardiovascular condition        Relevant Orders    Echo Saline Bubble? No    Ambulatory referral/consult to Cardiology    Lipid Panel    Type 2 diabetes mellitus with hyperglycemia, with long-term current use of insulin        Relevant Orders    Comprehensive Metabolic Panel    Hemoglobin A1C    Microalbumin/Creatinine Ratio, Urine    Lipid Panel              Plan:         Jud was seen today for follow-up.    Diagnoses and all orders for this visit:    TIA (transient ischemic attack)  -     Ambulatory referral/consult to Neurology; Future  -     CBC Auto Differential; Future  -     Comprehensive Metabolic Panel; Future    Migraine syndrome  -      Ambulatory referral/consult to Neurology; Future    Screening for cardiovascular condition  -     Echo Saline Bubble? No; Future  -     Ambulatory referral/consult to Cardiology; Future  -     Lipid Panel; Future    Type 2 diabetes mellitus with hyperglycemia, with long-term current use of insulin  -     Comprehensive Metabolic Panel; Future  -     Hemoglobin A1C; Future  -     Microalbumin/Creatinine Ratio, Urine; Future  -     Lipid Panel; Future       Continue monitoring blood sugar at home,ADA diet.     Get medical reports from [facility].    Follow-up in 3 weeks .

## 2023-03-24 ENCOUNTER — TELEPHONE (OUTPATIENT)
Dept: FAMILY MEDICINE | Facility: CLINIC | Age: 63
End: 2023-03-24
Payer: MEDICARE

## 2023-03-24 ENCOUNTER — PATIENT MESSAGE (OUTPATIENT)
Dept: FAMILY MEDICINE | Facility: CLINIC | Age: 63
End: 2023-03-24
Payer: MEDICARE

## 2023-03-24 ENCOUNTER — OFFICE VISIT (OUTPATIENT)
Dept: OPHTHALMOLOGY | Facility: CLINIC | Age: 63
End: 2023-03-24
Payer: MEDICARE

## 2023-03-24 DIAGNOSIS — H25.13 NUCLEAR SCLEROTIC CATARACT OF BOTH EYES: ICD-10-CM

## 2023-03-24 DIAGNOSIS — Z86.69 HX OF MIGRAINES: ICD-10-CM

## 2023-03-24 DIAGNOSIS — Z98.890 S/P LASER IRIDOTOMY: ICD-10-CM

## 2023-03-24 DIAGNOSIS — Z86.73 H/O TIA (TRANSIENT ISCHEMIC ATTACK) AND STROKE: ICD-10-CM

## 2023-03-24 DIAGNOSIS — H40.033 ANATOMICAL NARROW ANGLE BORDERLINE GLAUCOMA OF BOTH EYES: Primary | ICD-10-CM

## 2023-03-24 PROCEDURE — 1160F PR REVIEW ALL MEDS BY PRESCRIBER/CLIN PHARMACIST DOCUMENTED: ICD-10-PCS | Mod: HCNC,CPTII,S$GLB, | Performed by: OPHTHALMOLOGY

## 2023-03-24 PROCEDURE — 1160F RVW MEDS BY RX/DR IN RCRD: CPT | Mod: HCNC,CPTII,S$GLB, | Performed by: OPHTHALMOLOGY

## 2023-03-24 PROCEDURE — 1159F MED LIST DOCD IN RCRD: CPT | Mod: HCNC,CPTII,S$GLB, | Performed by: OPHTHALMOLOGY

## 2023-03-24 PROCEDURE — 99999 PR PBB SHADOW E&M-EST. PATIENT-LVL III: CPT | Mod: PBBFAC,HCNC,, | Performed by: OPHTHALMOLOGY

## 2023-03-24 PROCEDURE — 92002 INTRM OPH EXAM NEW PATIENT: CPT | Mod: HCNC,S$GLB,, | Performed by: OPHTHALMOLOGY

## 2023-03-24 PROCEDURE — 99999 PR PBB SHADOW E&M-EST. PATIENT-LVL III: ICD-10-PCS | Mod: PBBFAC,HCNC,, | Performed by: OPHTHALMOLOGY

## 2023-03-24 PROCEDURE — 92002 PR EYE EXAM, NEW PATIENT,INTERMED: ICD-10-PCS | Mod: HCNC,S$GLB,, | Performed by: OPHTHALMOLOGY

## 2023-03-24 PROCEDURE — 1159F PR MEDICATION LIST DOCUMENTED IN MEDICAL RECORD: ICD-10-PCS | Mod: HCNC,CPTII,S$GLB, | Performed by: OPHTHALMOLOGY

## 2023-03-24 RX ORDER — ATORVASTATIN CALCIUM 80 MG/1
80 TABLET, FILM COATED ORAL NIGHTLY
Status: ON HOLD | COMMUNITY
Start: 2023-03-16 | End: 2023-04-06 | Stop reason: SDUPTHER

## 2023-03-24 RX ORDER — NAPROXEN SODIUM 220 MG/1
81 TABLET, FILM COATED ORAL DAILY
COMMUNITY
Start: 2023-03-17 | End: 2024-03-16

## 2023-03-24 NOTE — TELEPHONE ENCOUNTER
----- Message from Fidelina Santos sent at 3/24/2023  8:33 AM CDT -----  Type:  Test Results    Who Called: Pt  Name of Test (Lab/Mammo/Etc): - [place], Cardiology  Date of Test: 03/22/23  Ordering Provider: GLORIA BERGMAN  Where the test was performed: [facility]  Would the patient rather a call back or a response via [facility]? call  Best Call Back Number: [phone]  Additional Information:

## 2023-03-24 NOTE — PROGRESS NOTES
"HPI    DLS: 3/28/19    Pt here today for vision changes after having a stroke on 3/13/23. Pt   states she sees "static" when she is trying to read but it comes and goes.   Pt states that when she was having the stroke, she could not see on the   right side of vision but it came back.    1. Hx Narrow Angles OU - S/P PI's ou 2019/2020 (never followed up )   2. NS OU  3. Dry eyes - saw [facility] 1/7/2020  Last edited by Claribel Pereira MD on 3/24/2023 10:15 AM.            Assessment /Plan     For exam results, see Encounter Report.    Anatomical narrow angle borderline glaucoma of both eyes    Nuclear sclerotic cataract of both eyes    S/P laser iridotomy    H/O TIA (transient ischemic attack) and stroke           Glaucoma (type and duration)    Narrow angles   First HVF   //   First photos   3/25/2023   Treatment / Drops started   //           Family history    none        Glaucoma meds    none        H/O adverse rxn to glaucoma drops    none        LASERS   PI od 2/21/2019 // PI OS 3/28/2019  (did not F/U for 4 yrs - 3/3032)        GLAUCOMA SURGERIES    none        OTHER EYE SURGERIES    none        CDR    0.35/0.35        Tbase    18-22          Tmax    22/18            Ttarget    //             HVF    / test 20/ to  20/ - / od // / os        Gonio    +1/+1 (pre- PI's)         CCT    /        OCT    / test 20/ to 20/ - RNFL - / od // / os        Disc photos    3/2023     - Ttoday  19/16  - Test done today   re-establish care // IOP // DFE // photos       NSC  ? Vis sign    [facility] - [facility]   See kateryna note 1/7/2020    Recent TIA / CVA    Had loss of right VF temporarily - resolved - pt now on ASA           F/U  4 months - HVF (hopefully no residual VF defect post TIA 3/13/2023) /  OCT - do NOT dilate and check AR/MR/BAT cataract check and gonio   "

## 2023-03-31 ENCOUNTER — TELEPHONE (OUTPATIENT)
Dept: NEUROLOGY | Facility: CLINIC | Age: 63
End: 2023-03-31
Payer: MEDICARE

## 2023-03-31 NOTE — TELEPHONE ENCOUNTER
Called pt and informed her that we do not have her imaging for her upcoming appt on Monday w Dr. Mckeon. I informed pt that we can see her but it would be best if she gets the records so that we have them on Monday and can give a more accurate evaluation. Pt verbalized understanding and stated she would head over now.

## 2023-04-01 ENCOUNTER — HOSPITAL ENCOUNTER (INPATIENT)
Facility: HOSPITAL | Age: 63
LOS: 2 days | Discharge: HOME | DRG: 287 | End: 2023-04-06
Attending: EMERGENCY MEDICINE | Admitting: STUDENT IN AN ORGANIZED HEALTH CARE EDUCATION/TRAINING PROGRAM
Payer: MEDICARE

## 2023-04-01 DIAGNOSIS — I73.9 PAD (PERIPHERAL ARTERY DISEASE): ICD-10-CM

## 2023-04-01 DIAGNOSIS — I25.118 CORONARY ARTERY DISEASE OF NATIVE HEART WITH STABLE ANGINA PECTORIS, UNSPECIFIED VESSEL OR LESION TYPE: ICD-10-CM

## 2023-04-01 DIAGNOSIS — R07.9 CHEST PAIN, UNSPECIFIED TYPE: ICD-10-CM

## 2023-04-01 DIAGNOSIS — Z01.810 PREOP CARDIOVASCULAR EXAM: ICD-10-CM

## 2023-04-01 DIAGNOSIS — I48.91 A-FIB: ICD-10-CM

## 2023-04-01 DIAGNOSIS — R07.9 CHEST PAIN: ICD-10-CM

## 2023-04-01 DIAGNOSIS — Z86.73 HISTORY OF CVA (CEREBROVASCULAR ACCIDENT): Primary | ICD-10-CM

## 2023-04-01 PROBLEM — I25.9 CHEST PAIN DUE TO MYOCARDIAL ISCHEMIA: Status: ACTIVE | Noted: 2023-04-01

## 2023-04-01 LAB
ALBUMIN SERPL BCP-MCNC: 3.7 G/DL (ref 3.5–5.2)
ALP SERPL-CCNC: 90 U/L (ref 55–135)
ALT SERPL W/O P-5'-P-CCNC: 12 U/L (ref 10–44)
ANION GAP SERPL CALC-SCNC: 9 MMOL/L (ref 8–16)
AST SERPL-CCNC: 15 U/L (ref 10–40)
BASOPHILS # BLD AUTO: 0.07 K/UL (ref 0–0.2)
BASOPHILS NFR BLD: 0.7 % (ref 0–1.9)
BILIRUB SERPL-MCNC: 0.2 MG/DL (ref 0.1–1)
BNP SERPL-MCNC: 122 PG/ML (ref 0–99)
BUN SERPL-MCNC: 21 MG/DL (ref 8–23)
CALCIUM SERPL-MCNC: 10.5 MG/DL (ref 8.7–10.5)
CHLORIDE SERPL-SCNC: 110 MMOL/L (ref 95–110)
CO2 SERPL-SCNC: 21 MMOL/L (ref 23–29)
CREAT SERPL-MCNC: 0.7 MG/DL (ref 0.5–1.4)
DIFFERENTIAL METHOD: ABNORMAL
EOSINOPHIL # BLD AUTO: 0.6 K/UL (ref 0–0.5)
EOSINOPHIL NFR BLD: 5.6 % (ref 0–8)
ERYTHROCYTE [DISTWIDTH] IN BLOOD BY AUTOMATED COUNT: 13.3 % (ref 11.5–14.5)
EST. GFR  (NO RACE VARIABLE): >60 ML/MIN/1.73 M^2
GLUCOSE SERPL-MCNC: 65 MG/DL (ref 70–110)
HCT VFR BLD AUTO: 41.5 % (ref 37–48.5)
HGB BLD-MCNC: 13 G/DL (ref 12–16)
IMM GRANULOCYTES # BLD AUTO: 0.03 K/UL (ref 0–0.04)
IMM GRANULOCYTES NFR BLD AUTO: 0.3 % (ref 0–0.5)
LYMPHOCYTES # BLD AUTO: 2.4 K/UL (ref 1–4.8)
LYMPHOCYTES NFR BLD: 24.4 % (ref 18–48)
MCH RBC QN AUTO: 28.1 PG (ref 27–31)
MCHC RBC AUTO-ENTMCNC: 31.3 G/DL (ref 32–36)
MCV RBC AUTO: 90 FL (ref 82–98)
MONOCYTES # BLD AUTO: 0.7 K/UL (ref 0.3–1)
MONOCYTES NFR BLD: 6.7 % (ref 4–15)
NEUTROPHILS # BLD AUTO: 6.2 K/UL (ref 1.8–7.7)
NEUTROPHILS NFR BLD: 62.3 % (ref 38–73)
NRBC BLD-RTO: 0 /100 WBC
PLATELET # BLD AUTO: 302 K/UL (ref 150–450)
PMV BLD AUTO: 9.5 FL (ref 9.2–12.9)
POC CARDIAC TROPONIN I: 0.01 NG/ML (ref 0–0.08)
POCT GLUCOSE: 70 MG/DL (ref 70–110)
POTASSIUM SERPL-SCNC: 4.2 MMOL/L (ref 3.5–5.1)
PROT SERPL-MCNC: 7.3 G/DL (ref 6–8.4)
RBC # BLD AUTO: 4.62 M/UL (ref 4–5.4)
SAMPLE: NORMAL
SARS-COV-2 RDRP RESP QL NAA+PROBE: NEGATIVE
SODIUM SERPL-SCNC: 140 MMOL/L (ref 136–145)
TROPONIN I SERPL DL<=0.01 NG/ML-MCNC: <0.006 NG/ML (ref 0–0.03)
TROPONIN I SERPL DL<=0.01 NG/ML-MCNC: <0.006 NG/ML (ref 0–0.03)
WBC # BLD AUTO: 9.92 K/UL (ref 3.9–12.7)

## 2023-04-01 PROCEDURE — U0002 COVID-19 LAB TEST NON-CDC: HCPCS | Mod: HCNC | Performed by: EMERGENCY MEDICINE

## 2023-04-01 PROCEDURE — 99285 EMERGENCY DEPT VISIT HI MDM: CPT | Mod: 25,HCNC

## 2023-04-01 PROCEDURE — 96375 TX/PRO/DX INJ NEW DRUG ADDON: CPT | Mod: HCNC

## 2023-04-01 PROCEDURE — 25000003 PHARM REV CODE 250: Mod: HCNC | Performed by: HOSPITALIST

## 2023-04-01 PROCEDURE — 63600175 PHARM REV CODE 636 W HCPCS: Mod: HCNC | Performed by: HOSPITALIST

## 2023-04-01 PROCEDURE — 80053 COMPREHEN METABOLIC PANEL: CPT | Mod: HCNC | Performed by: EMERGENCY MEDICINE

## 2023-04-01 PROCEDURE — 93010 ELECTROCARDIOGRAM REPORT: CPT | Mod: HCNC,,, | Performed by: INTERNAL MEDICINE

## 2023-04-01 PROCEDURE — 83880 ASSAY OF NATRIURETIC PEPTIDE: CPT | Mod: HCNC | Performed by: EMERGENCY MEDICINE

## 2023-04-01 PROCEDURE — 84484 ASSAY OF TROPONIN QUANT: CPT | Mod: 91,HCNC | Performed by: EMERGENCY MEDICINE

## 2023-04-01 PROCEDURE — G0378 HOSPITAL OBSERVATION PER HR: HCPCS | Mod: HCNC

## 2023-04-01 PROCEDURE — 99285 EMERGENCY DEPT VISIT HI MDM: CPT | Mod: HCNC,CS,, | Performed by: EMERGENCY MEDICINE

## 2023-04-01 PROCEDURE — 63600175 PHARM REV CODE 636 W HCPCS: Mod: HCNC | Performed by: EMERGENCY MEDICINE

## 2023-04-01 PROCEDURE — 82962 GLUCOSE BLOOD TEST: CPT | Mod: HCNC

## 2023-04-01 PROCEDURE — 99222 PR INITIAL HOSPITAL CARE,LEVL II: ICD-10-PCS | Mod: HCNC,,, | Performed by: HOSPITALIST

## 2023-04-01 PROCEDURE — 93010 EKG 12-LEAD: ICD-10-PCS | Mod: HCNC,,, | Performed by: INTERNAL MEDICINE

## 2023-04-01 PROCEDURE — 99285 PR EMERGENCY DEPT VISIT,LEVEL V: ICD-10-PCS | Mod: HCNC,CS,, | Performed by: EMERGENCY MEDICINE

## 2023-04-01 PROCEDURE — 25000003 PHARM REV CODE 250: Mod: HCNC | Performed by: EMERGENCY MEDICINE

## 2023-04-01 PROCEDURE — 93005 ELECTROCARDIOGRAM TRACING: CPT | Mod: HCNC

## 2023-04-01 PROCEDURE — 85025 COMPLETE CBC W/AUTO DIFF WBC: CPT | Mod: HCNC | Performed by: EMERGENCY MEDICINE

## 2023-04-01 PROCEDURE — 99222 1ST HOSP IP/OBS MODERATE 55: CPT | Mod: HCNC,,, | Performed by: HOSPITALIST

## 2023-04-01 RX ORDER — MORPHINE SULFATE 4 MG/ML
4 INJECTION, SOLUTION INTRAMUSCULAR; INTRAVENOUS
Status: COMPLETED | OUTPATIENT
Start: 2023-04-01 | End: 2023-04-01

## 2023-04-01 RX ORDER — NAPROXEN SODIUM 220 MG/1
81 TABLET, FILM COATED ORAL DAILY
Status: DISCONTINUED | OUTPATIENT
Start: 2023-04-02 | End: 2023-04-06 | Stop reason: HOSPADM

## 2023-04-01 RX ORDER — ASPIRIN 325 MG
325 TABLET ORAL
Status: COMPLETED | OUTPATIENT
Start: 2023-04-01 | End: 2023-04-01

## 2023-04-01 RX ORDER — DEXTROSE 40 %
15 GEL (GRAM) ORAL
Status: DISCONTINUED | OUTPATIENT
Start: 2023-04-01 | End: 2023-04-06 | Stop reason: HOSPADM

## 2023-04-01 RX ORDER — TOPIRAMATE 25 MG/1
100 TABLET ORAL 2 TIMES DAILY
Status: DISCONTINUED | OUTPATIENT
Start: 2023-04-01 | End: 2023-04-06 | Stop reason: HOSPADM

## 2023-04-01 RX ORDER — DEXTROSE 40 %
30 GEL (GRAM) ORAL
Status: DISCONTINUED | OUTPATIENT
Start: 2023-04-01 | End: 2023-04-06 | Stop reason: HOSPADM

## 2023-04-01 RX ORDER — INSULIN ASPART 100 [IU]/ML
1-10 INJECTION, SOLUTION INTRAVENOUS; SUBCUTANEOUS
Status: DISCONTINUED | OUTPATIENT
Start: 2023-04-01 | End: 2023-04-06 | Stop reason: HOSPADM

## 2023-04-01 RX ORDER — GLUCAGON 1 MG
1 KIT INJECTION
Status: DISCONTINUED | OUTPATIENT
Start: 2023-04-01 | End: 2023-04-06 | Stop reason: HOSPADM

## 2023-04-01 RX ORDER — GABAPENTIN 300 MG/1
300 CAPSULE ORAL 3 TIMES DAILY
Status: DISCONTINUED | OUTPATIENT
Start: 2023-04-01 | End: 2023-04-06 | Stop reason: HOSPADM

## 2023-04-01 RX ORDER — SODIUM CHLORIDE 0.9 % (FLUSH) 0.9 %
10 SYRINGE (ML) INJECTION
Status: DISCONTINUED | OUTPATIENT
Start: 2023-04-01 | End: 2023-04-06 | Stop reason: HOSPADM

## 2023-04-01 RX ORDER — NALOXONE HCL 0.4 MG/ML
0.02 VIAL (ML) INJECTION
Status: DISCONTINUED | OUTPATIENT
Start: 2023-04-01 | End: 2023-04-06 | Stop reason: HOSPADM

## 2023-04-01 RX ORDER — PANTOPRAZOLE SODIUM 40 MG/1
40 TABLET, DELAYED RELEASE ORAL
Status: DISCONTINUED | OUTPATIENT
Start: 2023-04-02 | End: 2023-04-06 | Stop reason: HOSPADM

## 2023-04-01 RX ORDER — ONDANSETRON 2 MG/ML
4 INJECTION INTRAMUSCULAR; INTRAVENOUS
Status: COMPLETED | OUTPATIENT
Start: 2023-04-01 | End: 2023-04-01

## 2023-04-01 RX ORDER — ATORVASTATIN CALCIUM 40 MG/1
80 TABLET, FILM COATED ORAL NIGHTLY
Status: DISCONTINUED | OUTPATIENT
Start: 2023-04-01 | End: 2023-04-06 | Stop reason: HOSPADM

## 2023-04-01 RX ORDER — PROCHLORPERAZINE EDISYLATE 5 MG/ML
5 INJECTION INTRAMUSCULAR; INTRAVENOUS EVERY 6 HOURS PRN
Status: DISCONTINUED | OUTPATIENT
Start: 2023-04-01 | End: 2023-04-06 | Stop reason: HOSPADM

## 2023-04-01 RX ORDER — CLOPIDOGREL BISULFATE 75 MG/1
75 TABLET ORAL DAILY
Status: DISCONTINUED | OUTPATIENT
Start: 2023-04-02 | End: 2023-04-06 | Stop reason: HOSPADM

## 2023-04-01 RX ORDER — TALC
6 POWDER (GRAM) TOPICAL NIGHTLY PRN
Status: DISCONTINUED | OUTPATIENT
Start: 2023-04-01 | End: 2023-04-06 | Stop reason: HOSPADM

## 2023-04-01 RX ORDER — ACETAMINOPHEN 325 MG/1
650 TABLET ORAL EVERY 4 HOURS PRN
Status: DISCONTINUED | OUTPATIENT
Start: 2023-04-01 | End: 2023-04-02

## 2023-04-01 RX ORDER — ENOXAPARIN SODIUM 100 MG/ML
40 INJECTION SUBCUTANEOUS EVERY 24 HOURS
Status: DISCONTINUED | OUTPATIENT
Start: 2023-04-01 | End: 2023-04-06 | Stop reason: HOSPADM

## 2023-04-01 RX ORDER — METOPROLOL TARTRATE 25 MG/1
12.5 TABLET ORAL 2 TIMES DAILY
Status: DISCONTINUED | OUTPATIENT
Start: 2023-04-01 | End: 2023-04-06 | Stop reason: HOSPADM

## 2023-04-01 RX ORDER — ONDANSETRON 2 MG/ML
4 INJECTION INTRAMUSCULAR; INTRAVENOUS EVERY 8 HOURS PRN
Status: DISCONTINUED | OUTPATIENT
Start: 2023-04-01 | End: 2023-04-06 | Stop reason: HOSPADM

## 2023-04-01 RX ADMIN — ASPIRIN 325 MG ORAL TABLET 325 MG: 325 PILL ORAL at 05:04

## 2023-04-01 RX ADMIN — GABAPENTIN 300 MG: 300 CAPSULE ORAL at 10:04

## 2023-04-01 RX ADMIN — MORPHINE SULFATE 4 MG: 4 INJECTION INTRAVENOUS at 05:04

## 2023-04-01 RX ADMIN — ATORVASTATIN CALCIUM 80 MG: 40 TABLET, FILM COATED ORAL at 10:04

## 2023-04-01 RX ADMIN — ONDANSETRON 4 MG: 2 INJECTION INTRAMUSCULAR; INTRAVENOUS at 05:04

## 2023-04-01 RX ADMIN — METOPROLOL TARTRATE 12.5 MG: 25 TABLET, FILM COATED ORAL at 10:04

## 2023-04-01 RX ADMIN — TOPIRAMATE 100 MG: 100 TABLET, FILM COATED ORAL at 10:04

## 2023-04-01 NOTE — ED TRIAGE NOTES
"Pt states she has chest pain left anterior chest since this morning. Pt states she had a stroke on the 12th of march and was told she "has a weak heart." Pt states it hurts when she takes a deep breath. Pt states it hurts when she coughs. Pt states she feels very tired. Pt states she has chest pain even when resting.  "

## 2023-04-01 NOTE — PROVIDER PROGRESS NOTES - EMERGENCY DEPT.
Encounter Date: 4/1/2023    ED Physician Progress Notes        Physician Note:   Assumed care of patient from Dr. Brown at 5:00 p.m. pending CTNI x 2, labs.    Glucose on CMP noted, will give PO and recheck.  Improved on repeat.  CTNI neg.    Given recent abnormal ECHO results and description of pain earlier, will admit to obs, consider stress testing.  Patient understands and agrees with the plan.  Pain is improved on re-assessment.

## 2023-04-01 NOTE — Clinical Note
35 ml of contrast were injected throughout the case. 115 mL of contrast was the total wasted during the case. 150 mL was the total amount used during the case.

## 2023-04-01 NOTE — Clinical Note
Diagnosis: Chest pain [121362]   Future Attending Provider: YADIRA ALEXANDER [[id number]]   Admitting Provider:: YADIRA ALEXANDER [[id number]]

## 2023-04-01 NOTE — ED PROVIDER NOTES
"Encounter Date: 4/1/2023    SCRIBE #1 NOTE: I, Caren Nobles, am scribing for, and in the presence of,  Allegra Brown MD. I have scribed the following portions of the note - Other sections scribed: HPI, ROS, PE.     History     Chief Complaint   Patient presents with    Chest Pain     Started today while resting. L sided, described as sharp. 3/10 at this time, worse on inspiration. Denies cardiac issues, states recent CVA.      Time patient was seen by the provider: 3:58 PM    The patient is a 63 y.o. female with past medical history of asthma, smoking (quit), migraines, DM, breast carcinoma, stroke 2 weeks ago, on aspirin who presents to the ED with a complaint of constant, localized left sided chest pain that began this morning while she was exerting herself. She describes the pain as a "crushing" sensation that is waxing and waning in severity. She also reports headache. She took Toradol earlier which she was prescribed 3/20/23 which improved the headache. At the time of her stroke, she had an echo which showed signs of previous MI. She has been experiencing some confusion since the stroke. She is scheduled to see neurology next week. She denies cough, SOB, diaphoresis, nausea, vomiting, and belching. No early Fhx of MI.     The history is provided by the patient and medical records. No  was used.   Review of patient's allergies indicates:  No Known Allergies  Past Medical History:   Diagnosis Date    Asthma     Breast carcinoma     Cataract     Diabetes mellitus     Moderate episode of recurrent major depressive disorder 5/19/2021    Osteoporosis      Past Surgical History:   Procedure Laterality Date    BILATERAL MASTECTOMY  04/26/2018    CHOLECYSTECTOMY      COLONOSCOPY N/A 10/27/2015    Procedure: COLONOSCOPY;  Surgeon: Johnny Hurley MD;  Location: [facility];  Service: Endoscopy;  Laterality: N/A;    ESOPHAGOGASTRODUODENOSCOPY N/A 01/24/2022    Procedure: " ESOPHAGOGASTRODUODENOSCOPY (EGD);  Surgeon: Mili Katz MD;  Location: [facility] ([address] [place] [address]);  Service: Endoscopy;  Laterality: N/A;  rapid in AM, instr portal -ml    HYSTERECTOMY      LASER PERIPHERAL IRIDOTOMY Bilateral 2019         Family History   Problem Relation Age of Onset    Diabetes Father     Amblyopia Neg Hx     Blindness Neg Hx     Cataracts Neg Hx     Glaucoma Neg Hx     Macular degeneration Neg Hx     Retinal detachment Neg Hx     Strabismus Neg Hx      Social History     Tobacco Use    Smoking status: Former     Packs/day: 1.00     Years: 40.00     Pack years: 40.00     Types: Cigarettes     Quit date: 3/12/2023     Years since quittin.0     Passive exposure: Current    Smokeless tobacco: Never   Substance Use Topics    Alcohol use: No     Alcohol/week: 0.0 standard drinks    Drug use: No     Review of Systems   Constitutional:  Negative for diaphoresis and fever.   HENT:  Negative for sore throat.    Respiratory:  Negative for shortness of breath.    Cardiovascular:  Positive for chest pain.   Gastrointestinal:  Negative for nausea and vomiting.   Genitourinary:  Negative for dysuria.   Musculoskeletal:  Negative for back pain.   Skin:  Negative for rash.   Neurological:  Positive for headaches. Negative for weakness.   Hematological:  Does not bruise/bleed easily.     Physical Exam     Initial Vitals [23 1544]   BP Pulse Resp Temp SpO2   126/67 89 18 98.6 °F (37 °C) 100 %      MAP       --         Physical Exam    Nursing note and vitals reviewed.  Constitutional: She appears well-developed and well-nourished. She is not diaphoretic. No distress.   HENT:   Head: Normocephalic and atraumatic.   Eyes: Conjunctivae and EOM are normal.   Neck: Neck supple.   Normal range of motion.  Cardiovascular:  Normal rate, regular rhythm and normal heart sounds.           Pulmonary/Chest: No respiratory distress. She exhibits tenderness (left mid).   No swelling to chest wall.    Abdominal: She exhibits no distension.   Musculoskeletal:      Cervical back: Normal range of motion and neck supple.     Neurological: She is alert and oriented to person, place, and time. GCS score is 15. GCS eye subscore is 4. GCS verbal subscore is 5. GCS motor subscore is 6.   Skin: Skin is warm and dry.   Psychiatric: She has a normal mood and affect. Her behavior is normal. Judgment and thought content normal.       ED Course   Procedures  Labs Reviewed   CBC W/ AUTO DIFFERENTIAL - Abnormal; Notable for the following components:       Result Value    MCHC 31.3 (*)     Eos # 0.6 (*)     All other components within normal limits   COMPREHENSIVE METABOLIC PANEL - Abnormal; Notable for the following components:    CO2 21 (*)     Glucose 65 (*)     All other components within normal limits   B-TYPE NATRIURETIC PEPTIDE - Abnormal; Notable for the following components:     (*)     All other components within normal limits   TROPONIN I   SARS-COV-2 RNA AMPLIFICATION, QUAL   TROPONIN ISTAT   TROPONIN I   POCT GLUCOSE   POCT GLUCOSE MONITORING CONTINUOUS     EKG Readings: (Independently Interpreted)   Initial Reading: No STEMI. Previous EKG: Compared with most recent EKG Rhythm: Normal Sinus Rhythm. Heart Rate: 75.   Low voltage QRS.  Septal infarct.  No significant change from previous EKG on 9/22/22.   ECG Results              EKG 12-lead (Final result)  Result time 04/02/23 10:56:01      Final result by Interface, Lab In [facility] (04/02/23 10:56:01)                   Narrative:    Test Reason : R07.9,    Vent. Rate : 075 BPM     Atrial Rate : 075 BPM     P-R Int : 118 ms          QRS Dur : 078 ms      QT Int : 374 ms       P-R-T Axes : 061 027 045 degrees     QTc Int : 417 ms    Normal sinus rhythm  Low voltage QRS  Abnormal ECG  When compared with ECG of 22-SEP-2022 09:42,  Questionable change in initial forces of Anterior leads  Confirmed by Job Gregg MD ([id number]) on 4/2/2023 10:55:51 AM    Referred By:  [email or web address]   SELF           Confirmed By:Job Gregg MD                                  Imaging Results              X-Ray Chest PA And Lateral (Final result)  Result time 04/01/23 16:59:28      Final result by Cuco Linares MD (04/01/23 16:59:28)                   Impression:      No acute cardiopulmonary process.      Electronically signed by: Cuco Linares MD  Date:    04/01/2023  Time:    16:59               Narrative:    EXAMINATION:  XR CHEST PA AND LATERAL    CLINICAL HISTORY:  Chest Pain;    TECHNIQUE:  PA and lateral views of the chest were performed.    COMPARISON:  01/06/2023    FINDINGS:  There is no consolidation, effusion, or pneumothorax.  Cardiomediastinal silhouette is unremarkable.  Regional osseous structures are unremarkable.  Surgical clips noted in the left chest, Status post left mastectomy.                                       Medications   sodium chloride 0.9% flush 10 mL (has no administration in time range)   melatonin tablet 6 mg (6 mg Oral Not Given 4/2/23 0127)   aspirin chewable tablet 81 mg (81 mg Oral Given 4/3/23 0901)   atorvastatin tablet 80 mg (80 mg Oral Given 4/3/23 2051)   pantoprazole EC tablet 40 mg (40 mg Oral Given 4/3/23 0620)   topiramate tablet 100 mg (100 mg Oral Given 4/3/23 2051)   gabapentin capsule 300 mg (300 mg Oral Given 4/3/23 2051)   sodium chloride 0.9% flush 10 mL (has no administration in time range)   ondansetron injection 4 mg (has no administration in time range)   prochlorperazine injection Soln 5 mg (has no administration in time range)   naloxone 0.4 mg/mL injection 0.02 mg (has no administration in time range)   enoxaparin injection 40 mg (40 mg Subcutaneous Given 4/3/23 1707)   clopidogreL tablet 75 mg (75 mg Oral Given 4/3/23 0901)   metoprolol tartrate (LOPRESSOR) split tablet 12.5 mg (12.5 mg Oral Given 4/3/23 2102)   glucagon (human recombinant) injection 1 mg (has no administration in time range)   dextrose 10% bolus 125 mL 125 mL (has  no administration in time range)   dextrose 10% bolus 250 mL 250 mL (has no administration in time range)   dextrose 40 % gel 15,000 mg (has no administration in time range)   dextrose 40 % gel 30,000 mg (has no administration in time range)   insulin aspart U-100 pen 1-10 Units (1 Units Subcutaneous Given 4/3/23 2101)   acetaminophen tablet 1,000 mg (1,000 mg Oral Given 4/2/23 1216)   aspirin tablet 325 mg (325 mg Oral Given 4/1/23 1700)   ondansetron injection 4 mg (4 mg Intravenous Given 4/1/23 1700)   morphine injection 4 mg (4 mg Intravenous Given 4/1/23 1700)   ketorolac tablet 10 mg (10 mg Oral Given 4/2/23 0216)   magnesium sulfate 2g in water 50mL IVPB (premix) (0 g Intravenous Stopped 4/2/23 1417)   ketorolac injection 10.005 mg (10.005 mg Intravenous Given 4/2/23 1357)   ketorolac injection 15 mg (15 mg Intravenous Given 4/2/23 2024)   regadenoson injection 0.4 mg (0.4 mg Intravenous Given 4/3/23 0827)   aminophylline injection 75 mg (75 mg Intravenous Given 4/3/23 0830)     Medical Decision Making:   History:   Old Medical Records: I decided to obtain old medical records.  Old Records Summarized: records from clinic visits and records from previous admission(s).  Independently Interpreted Test(s):   I have ordered and independently interpreted EKG Reading(s) - see prior notes  Clinical Tests:   Lab Tests: Ordered and Reviewed  Radiological Study: Ordered and Reviewed  Medical Tests: Ordered and Reviewed  ED Management:  Will rule out ACS given age and risk factors. Seems likely musculoskeletal however and is reproducible on exam  Care transferred to incoming EDP plan for trop x 2  Additional MDM:   Heart Score:    History:          Moderately suspicious.  ECG:             Normal  Age:               45-65 years  Risk factors: 1-2 risk factors  Troponin:       Less than or equal to normal limit  Final Score: 3       Scribe Attestation:   Scribe #1: I performed the above scribed service and the  documentation accurately describes the services I performed. I attest to the accuracy of the note.                   Clinical Impression:   Final diagnoses:  [R07.9] Chest pain, unspecified type        ED Disposition Condition    Observation Stable                Allegra Brown MD  04/03/23 0448

## 2023-04-02 LAB
ALBUMIN SERPL BCP-MCNC: 3.2 G/DL (ref 3.5–5.2)
ALP SERPL-CCNC: 75 U/L (ref 55–135)
ALT SERPL W/O P-5'-P-CCNC: 11 U/L (ref 10–44)
ANION GAP SERPL CALC-SCNC: 10 MMOL/L (ref 8–16)
AST SERPL-CCNC: 14 U/L (ref 10–40)
BASOPHILS # BLD AUTO: 0.05 K/UL (ref 0–0.2)
BASOPHILS NFR BLD: 0.6 % (ref 0–1.9)
BILIRUB SERPL-MCNC: 0.4 MG/DL (ref 0.1–1)
BUN SERPL-MCNC: 25 MG/DL (ref 8–23)
CALCIUM SERPL-MCNC: 9.2 MG/DL (ref 8.7–10.5)
CHLORIDE SERPL-SCNC: 109 MMOL/L (ref 95–110)
CO2 SERPL-SCNC: 23 MMOL/L (ref 23–29)
CREAT SERPL-MCNC: 0.7 MG/DL (ref 0.5–1.4)
DIFFERENTIAL METHOD: ABNORMAL
EOSINOPHIL # BLD AUTO: 0.5 K/UL (ref 0–0.5)
EOSINOPHIL NFR BLD: 5.7 % (ref 0–8)
ERYTHROCYTE [DISTWIDTH] IN BLOOD BY AUTOMATED COUNT: 13.4 % (ref 11.5–14.5)
EST. GFR  (NO RACE VARIABLE): >60 ML/MIN/1.73 M^2
GLUCOSE SERPL-MCNC: 128 MG/DL (ref 70–110)
HCT VFR BLD AUTO: 41.9 % (ref 37–48.5)
HGB BLD-MCNC: 12.9 G/DL (ref 12–16)
IMM GRANULOCYTES # BLD AUTO: 0.02 K/UL (ref 0–0.04)
IMM GRANULOCYTES NFR BLD AUTO: 0.2 % (ref 0–0.5)
LYMPHOCYTES # BLD AUTO: 2.1 K/UL (ref 1–4.8)
LYMPHOCYTES NFR BLD: 25.2 % (ref 18–48)
MAGNESIUM SERPL-MCNC: 1.8 MG/DL (ref 1.6–2.6)
MCH RBC QN AUTO: 28.2 PG (ref 27–31)
MCHC RBC AUTO-ENTMCNC: 30.8 G/DL (ref 32–36)
MCV RBC AUTO: 92 FL (ref 82–98)
MONOCYTES # BLD AUTO: 0.6 K/UL (ref 0.3–1)
MONOCYTES NFR BLD: 7.7 % (ref 4–15)
NEUTROPHILS # BLD AUTO: 4.9 K/UL (ref 1.8–7.7)
NEUTROPHILS NFR BLD: 60.6 % (ref 38–73)
NRBC BLD-RTO: 0 /100 WBC
PHOSPHATE SERPL-MCNC: 4.9 MG/DL (ref 2.7–4.5)
PLATELET # BLD AUTO: 264 K/UL (ref 150–450)
PMV BLD AUTO: 9.6 FL (ref 9.2–12.9)
POCT GLUCOSE: 145 MG/DL (ref 70–110)
POCT GLUCOSE: 156 MG/DL (ref 70–110)
POCT GLUCOSE: 171 MG/DL (ref 70–110)
POCT GLUCOSE: 210 MG/DL (ref 70–110)
POTASSIUM SERPL-SCNC: 4.3 MMOL/L (ref 3.5–5.1)
PROT SERPL-MCNC: 6.4 G/DL (ref 6–8.4)
RBC # BLD AUTO: 4.57 M/UL (ref 4–5.4)
SODIUM SERPL-SCNC: 142 MMOL/L (ref 136–145)
WBC # BLD AUTO: 8.13 K/UL (ref 3.9–12.7)

## 2023-04-02 PROCEDURE — 96366 THER/PROPH/DIAG IV INF ADDON: CPT

## 2023-04-02 PROCEDURE — G0378 HOSPITAL OBSERVATION PER HR: HCPCS | Mod: HCNC

## 2023-04-02 PROCEDURE — 84100 ASSAY OF PHOSPHORUS: CPT | Mod: HCNC

## 2023-04-02 PROCEDURE — 25000003 PHARM REV CODE 250: Mod: HCNC | Performed by: HOSPITALIST

## 2023-04-02 PROCEDURE — 99233 PR SUBSEQUENT HOSPITAL CARE,LEVL III: ICD-10-PCS | Mod: HCNC,,,

## 2023-04-02 PROCEDURE — 83735 ASSAY OF MAGNESIUM: CPT | Mod: HCNC

## 2023-04-02 PROCEDURE — 96372 THER/PROPH/DIAG INJ SC/IM: CPT | Performed by: HOSPITALIST

## 2023-04-02 PROCEDURE — 63600175 PHARM REV CODE 636 W HCPCS: Mod: HCNC | Performed by: NURSE PRACTITIONER

## 2023-04-02 PROCEDURE — 85025 COMPLETE CBC W/AUTO DIFF WBC: CPT | Mod: HCNC | Performed by: HOSPITALIST

## 2023-04-02 PROCEDURE — 96365 THER/PROPH/DIAG IV INF INIT: CPT

## 2023-04-02 PROCEDURE — 63600175 PHARM REV CODE 636 W HCPCS: Mod: HCNC

## 2023-04-02 PROCEDURE — 25000003 PHARM REV CODE 250: Mod: HCNC

## 2023-04-02 PROCEDURE — 96375 TX/PRO/DX INJ NEW DRUG ADDON: CPT

## 2023-04-02 PROCEDURE — 99223 PR INITIAL HOSPITAL CARE,LEVL III: ICD-10-PCS | Mod: HCNC,,, | Performed by: INTERNAL MEDICINE

## 2023-04-02 PROCEDURE — 63600175 PHARM REV CODE 636 W HCPCS: Mod: HCNC | Performed by: HOSPITALIST

## 2023-04-02 PROCEDURE — 99223 1ST HOSP IP/OBS HIGH 75: CPT | Mod: HCNC,,, | Performed by: INTERNAL MEDICINE

## 2023-04-02 PROCEDURE — 99233 SBSQ HOSP IP/OBS HIGH 50: CPT | Mod: HCNC,,,

## 2023-04-02 PROCEDURE — 96376 TX/PRO/DX INJ SAME DRUG ADON: CPT

## 2023-04-02 PROCEDURE — 80053 COMPREHEN METABOLIC PANEL: CPT | Mod: HCNC | Performed by: HOSPITALIST

## 2023-04-02 RX ORDER — KETOROLAC TROMETHAMINE 15 MG/ML
10 INJECTION, SOLUTION INTRAMUSCULAR; INTRAVENOUS ONCE
Status: COMPLETED | OUTPATIENT
Start: 2023-04-02 | End: 2023-04-02

## 2023-04-02 RX ORDER — MAGNESIUM SULFATE HEPTAHYDRATE 40 MG/ML
2 INJECTION, SOLUTION INTRAVENOUS ONCE
Status: COMPLETED | OUTPATIENT
Start: 2023-04-02 | End: 2023-04-02

## 2023-04-02 RX ORDER — TRAMADOL HYDROCHLORIDE 50 MG/1
50 TABLET ORAL EVERY 6 HOURS PRN
Status: DISCONTINUED | OUTPATIENT
Start: 2023-04-02 | End: 2023-04-02

## 2023-04-02 RX ORDER — ACETAMINOPHEN 500 MG
1000 TABLET ORAL EVERY 8 HOURS PRN
Status: DISCONTINUED | OUTPATIENT
Start: 2023-04-02 | End: 2023-04-06 | Stop reason: HOSPADM

## 2023-04-02 RX ORDER — KETOROLAC TROMETHAMINE 15 MG/ML
15 INJECTION, SOLUTION INTRAMUSCULAR; INTRAVENOUS ONCE
Status: COMPLETED | OUTPATIENT
Start: 2023-04-02 | End: 2023-04-02

## 2023-04-02 RX ORDER — KETOROLAC TROMETHAMINE 10 MG/1
10 TABLET, FILM COATED ORAL ONCE AS NEEDED
Status: COMPLETED | OUTPATIENT
Start: 2023-04-02 | End: 2023-04-02

## 2023-04-02 RX ADMIN — TOPIRAMATE 100 MG: 100 TABLET, FILM COATED ORAL at 08:04

## 2023-04-02 RX ADMIN — INSULIN ASPART 2 UNITS: 100 INJECTION, SOLUTION INTRAVENOUS; SUBCUTANEOUS at 09:04

## 2023-04-02 RX ADMIN — ATORVASTATIN CALCIUM 80 MG: 40 TABLET, FILM COATED ORAL at 08:04

## 2023-04-02 RX ADMIN — GABAPENTIN 300 MG: 300 CAPSULE ORAL at 04:04

## 2023-04-02 RX ADMIN — GABAPENTIN 300 MG: 300 CAPSULE ORAL at 08:04

## 2023-04-02 RX ADMIN — PANTOPRAZOLE SODIUM 40 MG: 40 TABLET, DELAYED RELEASE ORAL at 06:04

## 2023-04-02 RX ADMIN — METOPROLOL TARTRATE 12.5 MG: 25 TABLET, FILM COATED ORAL at 08:04

## 2023-04-02 RX ADMIN — KETOROLAC TROMETHAMINE 10 MG: 10 TABLET, FILM COATED ORAL at 02:04

## 2023-04-02 RX ADMIN — KETOROLAC TROMETHAMINE 10.01 MG: 15 INJECTION, SOLUTION INTRAMUSCULAR; INTRAVENOUS at 01:04

## 2023-04-02 RX ADMIN — MAGNESIUM SULFATE 2 G: 2 INJECTION INTRAVENOUS at 12:04

## 2023-04-02 RX ADMIN — CLOPIDOGREL BISULFATE 75 MG: 75 TABLET ORAL at 08:04

## 2023-04-02 RX ADMIN — KETOROLAC TROMETHAMINE 15 MG: 15 INJECTION, SOLUTION INTRAMUSCULAR; INTRAVENOUS at 08:04

## 2023-04-02 RX ADMIN — ASPIRIN 81 MG CHEWABLE TABLET 81 MG: 81 TABLET CHEWABLE at 08:04

## 2023-04-02 RX ADMIN — ACETAMINOPHEN 1000 MG: 500 TABLET ORAL at 12:04

## 2023-04-02 NOTE — CONSULTS
"[facility]  Cardiology Consult Note    Patient Name: Jud Villa  MRN: [id number]  Admission Date: 4/1/2023  Hospital Length of Stay: 0 days  Code Status: Full Code   Attending Physician: Rose Marie Wood MD   Primary Care Physician: Bo Lopez MD  Expected Discharge Date: 4/3/2023  Principal Problem:Chest pain due to myocardial ischemia    Subjective:     HPI: 62 yo F with PMHx acute CVA (admitted to [facility] 3/13-3/16), DM2, tobacco use, pre-diabetic, and triple negative breast cancer 2018 (treated with chemo and bilateral mastectomy, in remission) who presents to the ED complaining of chest pain x 1 day.    Patient reports that she developed crushing chest pain 4/1 around 10 am when she started cleaning around the house. Pain was was constant, pressure-like and without radiation. She denies any associated nausea, lightheadedness, SOB, or palpitations. She states that she has never had pain like this before. The pain was unrelenting and did not improve until she came to the ED and was given nitroglycerin. Patients history significant for smoking, pre-diabetic recently started on jardiance, and prior breast cancer but did not undergo radiation.     TTE obtained here 3/22/2023 showed "segmental left ventricular wall motion abnormalities consistent with anteroapical MI." Currently has an outpatient cardiology appointment scheduled for 4/13/2023. Overnight medicine admitted and continuing aspirin and statin, added plavix. Troponins negative x3. EKG reviewed.      Hospital Course:   No notes on file    Past Medical History:   Diagnosis Date    Asthma     Breast carcinoma     Cataract     Diabetes mellitus     Moderate episode of recurrent major depressive disorder 5/19/2021    Osteoporosis       Past Surgical History:   Procedure Laterality Date    BILATERAL MASTECTOMY  04/26/2018    CHOLECYSTECTOMY      COLONOSCOPY N/A 10/27/2015    Procedure: COLONOSCOPY;  Surgeon: Johnny Hurley, " "MD;  Location: [facility];  Service: Endoscopy;  Laterality: N/A;    ESOPHAGOGASTRODUODENOSCOPY N/A 01/24/2022    Procedure: ESOPHAGOGASTRODUODENOSCOPY (EGD);  Surgeon: Mili Katz MD;  Location: [facility] (4TH FLR);  Service: Endoscopy;  Laterality: N/A;  rapid in AM, instr portal -ml    HYSTERECTOMY      LASER PERIPHERAL IRIDOTOMY Bilateral 2019          Review of patient's allergies indicates:  No Known Allergies   Current Outpatient Medications   Medication Instructions    albuterol (PROVENTIL) 2.5 mg, Nebulization, Every 4-6 hours PRN, Rescue    albuterol (VENTOLIN HFA) 90 mcg/actuation inhaler INHALE 2 PUFFS BY MOUTH INTO THE LUNGS EVERY 6 HOURS AS NEEDED FOR WHEEZING    aspirin 81 mg, Oral, Daily    atorvastatin (LIPITOR) 80 mg, Oral, Nightly    BASAGLAR KWIKPEN U-100 INSULIN glargine 100 units/mL (3mL) SubQ pen INJECT 10 UNITS UNDER THE SKIN EVERY MORNING    BD ULTRA-FINE SHORT PEN NEEDLE 31 gauge x 5/16" Ndle 1 pen, Subcutaneous, Daily    clonazePAM (KLONOPIN) 0.5 MG tablet TAKE 1 TABLET(0.5 MG) BY MOUTH EVERY NIGHT AS NEEDED FOR ANXIETY    ergocalciferol (ERGOCALCIFEROL) 50,000 unit Cap TAKE 1 CAPSULE BY MOUTH EVERY 7 DAYS    fluticasone propionate (FLONASE) 50 mcg, Each Nostril, Daily    gabapentin (NEURONTIN) 300 mg, Oral, 3 times daily    glipiZIDE (GLUCOTROL) 10 MG tablet TAKE 1 TABLET(10 MG) BY MOUTH TWICE DAILY    JARDIANCE 10 mg tablet TAKE 1 TABLET(10 MG) BY MOUTH EVERY DAY    metFORMIN (GLUCOPHAGE) 1000 MG tablet TAKE 1 TABLET(1000 MG) BY MOUTH TWICE DAILY WITH MEALS    nicotine (NICODERM CQ) 21 mg/24 hr PLACE 1 PATCH ONTO THE SKIN DAILY    ondansetron (ZOFRAN-ODT) 4 mg, Oral, 3 times daily    pantoprazole (PROTONIX) 40 mg, Oral, Before breakfast    sertraline (ZOLOFT) 50 mg, Oral, Daily    sucralfate (CARAFATE) 1 gram tablet TAKE 1 TABLET(1 GRAM) BY MOUTH THREE TIMES DAILY BEFORE MEALS    topiramate (TOPAMAX) 100 mg, Oral, 2 times daily, TAKE 1 TABLET BY " MOUTH TWICE DAILY FOR 7 DAYS, THEN 1 TABLET EVERY MORNING AND 2 TABLETS EVERY EVENING FOR 7 DAYS, THEN 2 TABLETS TWICE DAILY    TRADJENTA 5 mg, Oral, Daily    traMADoL (ULTRAM) 50 mg, Oral, Every 6 hours PRN    TRELEGY ELLIPTA 100-62.5-25 mcg DsDv INHALE 1 PUFF INTO THE LUNGS EVERY DAY    TRUE METRIX GLUCOSE TEST STRIP Strp USE AS DIRECTED FOUR TIMES DAILY      Review of Systems   Constitutional:  Negative for chills and fever.   Respiratory:  Negative for chest tightness and shortness of breath.    Cardiovascular:  Negative for chest pain, palpitations and leg swelling.   Gastrointestinal:  Negative for abdominal pain and nausea.   Neurological:  Negative for dizziness and weakness.   Objective:     Vital Signs (Most Recent):  Temp: 98.1 °F (36.7 °C) (04/02/23 0729)  Pulse: 62 (04/02/23 1137)  Resp: 18 (04/02/23 0729)  BP: 111/60 (04/02/23 0729)  SpO2: 95 % (04/02/23 0729) Vital Signs (24h Range):  Temp:  [97.8 °F (36.6 °C)-98.6 °F (37 °C)] 98.1 °F (36.7 °C)  Pulse:  [60-89] 62  Resp:  [16-18] 18  SpO2:  [95 %-100 %] 95 %  BP: (104-127)/(55-83) 111/60     Weight: 59.4 kg (131 lb)  Body mass index is 23.96 kg/m².    Intake/Output Summary (Last 24 hours) at 4/2/2023 1437  Last data filed at 4/2/2023 0649  Gross per 24 hour   Intake 180 ml   Output --   Net 180 ml        Physical Exam  Vitals and nursing note reviewed.   Constitutional:       Appearance: She is well-developed.   Eyes:      Pupils: Pupils are equal, round, and reactive to light.   Cardiovascular:      Rate and Rhythm: Normal rate and regular rhythm.   Pulmonary:      Effort: Pulmonary effort is normal.      Breath sounds: Normal breath sounds.   Abdominal:      Palpations: Abdomen is soft.      Tenderness: There is no abdominal tenderness.   Musculoskeletal:         General: No tenderness.   Skin:     General: Skin is warm and dry.   Neurological:      Mental Status: She is alert and oriented to person, place, and time.   Psychiatric:          Behavior: Behavior normal.     Significant Labs: All pertinent labs within the past 24 hours have been reviewed.  CBC:   Recent Labs   Lab 04/01/23  1621 04/02/23  0821   WBC 9.92 8.13   HGB 13.0 12.9   HCT 41.5 41.9    264     CMP:   Recent Labs   Lab 04/01/23  1621 04/02/23  0821    142   K 4.2 4.3    109   CO2 21* 23   GLU 65* 128*   BUN 21 25*   CREATININE 0.7 0.7   CALCIUM 10.5 9.2   PROT 7.3 6.4   ALBUMIN 3.7 3.2*   BILITOT 0.2 0.4   ALKPHOS 90 75   AST 15 14   ALT 12 11   ANIONGAP 9 10       Recent Labs   Lab 04/01/23  1901   TROPONINI <0.006      Significant Imaging: I have reviewed all pertinent imaging results/findings within the past 24 hours.      Assessment and Plan:     * Chest pain due to myocardial ischemia  Patient presents with one episode of typical chest pain that resolved with nitroglycerin in the ED. No further chest pain.    Plan  -given smoker, pre-diabetic and recent posterior stroke at  would work patient up with inpatient stress test  -baseline WMA and mastectomy, will order PET stress  -LDL 54, A1c 6.8  -order carotid imaging, ANDREA to assess PAD  -30 day event monitor to assess for underlying Afib as cause of recent stroke  -further recs based on stress test.  -NPO at midnight and no caffeine 24h prior to PET stress, call if any further episodes of chest pain and obtain EKG and troponin      VTE Risk Mitigation (From admission, onward)         Ordered     enoxaparin injection 40 mg  Daily         04/01/23 2044     IP VTE HIGH RISK PATIENT  Once         04/01/23 2044     Place sequential compression device  Until discontinued         04/01/23 2044     Place sequential compression device  Until discontinued         04/01/23 2034                Lucas Mcclure MD  Cardiology  [facility]

## 2023-04-02 NOTE — HPI
"HPI: 62 yo F with PMHx acute CVA (admitted to [facility] 3/13-3/16), DM2, tobacco use, pre-diabetic, and triple negative breast cancer 2018 (treated with chemo and bilateral mastectomy, in remission) who presents to the ED complaining of chest pain x 1 day.    Patient reports that she developed crushing chest pain 4/1 around 10 am when she started cleaning around the house. Pain was was constant, pressure-like and without radiation. She denies any associated nausea, lightheadedness, SOB, or palpitations. She states that she has never had pain like this before. The pain was unrelenting and did not improve until she came to the ED and was given nitroglycerin.     TTE obtained here 3/22/2023 showed "segmental left ventricular wall motion abnormalities consistent with anteroapical MI." Currently has an outpatient cardiology appointment scheduled for 4/13/2023. Overnight medicine admitted and continuing aspirin and statin, added plavix. Troponins negative x3. EKG reviewed.  "

## 2023-04-02 NOTE — H&P
"[facility] - [facility]  [facility]  History & Physical    Patient Name: Jud Villa  MRN: [id number]  Patient Class: OP- Observation  Admission Date: 4/1/2023  Attending Physician: Rose Marie Wood MD   Primary Care Provider: Bo Lopez MD         Patient information was obtained from patient, past medical records and ER records.     Subjective:     Principal Problem:Chest pain due to myocardial ischemia    Chief Complaint:   Chief Complaint   Patient presents with    Chest Pain     Started today while resting. L sided, described as sharp. 3/10 at this time, worse on inspiration. Denies cardiac issues, states recent CVA.         HPI: 62 yo F with PMHx acute CVA (admitted to [facility] 3/13-3/16), DM2, and breast Ca in remission who presents to the ED complaining of chest pain x 1 day. Pt. Reports that she developed crushing chest pain this morning around 10 am when she started cleaning around the house. Pt. Reports that the pain was constant, pressure-like and without radiation. She denies any associated nausea, lightheadedness, SOB, or palpitations. She states that she has never had pain like this before. The pain was unrelenting and did not improve until she came to the ED and was given nitroglycerin.    TTE obtained here 3/22/2023 showed "segmental left ventricular wall motion abnormalities consistent with anteroapical MI." Pt. Currently has an outpatient cardiology appointment scheduled for 4/13/2023.      Past Medical History:   Diagnosis Date    Asthma     Breast carcinoma     Cataract     Diabetes mellitus     Moderate episode of recurrent major depressive disorder 5/19/2021    Osteoporosis        Past Surgical History:   Procedure Laterality Date    BILATERAL MASTECTOMY  04/26/2018    CHOLECYSTECTOMY      COLONOSCOPY N/A 10/27/2015    Procedure: COLONOSCOPY;  Surgeon: Johnny Hurley MD;  Location: [facility];  Service: Endoscopy;  Laterality: N/A;    ESOPHAGOGASTRODUODENOSCOPY " "N/A 01/24/2022    Procedure: ESOPHAGOGASTRODUODENOSCOPY (EGD);  Surgeon: Mili Katz MD;  Location: [facility] ([address] [place] [address]);  Service: Endoscopy;  Laterality: N/A;  rapid in AM, instr portal -ml    HYSTERECTOMY      LASER PERIPHERAL IRIDOTOMY Bilateral 2019           Review of patient's allergies indicates:  No Known Allergies    No current facility-administered medications on file prior to encounter.     Current Outpatient Medications on File Prior to Encounter   Medication Sig    albuterol (PROVENTIL) 2.5 mg /3 mL (0.083 %) nebulizer solution Take 3 mLs (2.5 mg total) by nebulization every 4 to 6 hours as needed for Wheezing or Shortness of Breath. Rescue    albuterol (VENTOLIN HFA) 90 mcg/actuation inhaler INHALE 2 PUFFS BY MOUTH INTO THE LUNGS EVERY 6 HOURS AS NEEDED FOR WHEEZING    aspirin 81 MG Chew Take 81 mg by mouth once daily.    atorvastatin (LIPITOR) 80 MG tablet Take 80 mg by mouth every evening.    BASAGLAR KWIKPEN U-100 INSULIN glargine 100 units/mL (3mL) SubQ pen INJECT 10 UNITS UNDER THE SKIN EVERY MORNING    BD ULTRA-FINE SHORT PEN NEEDLE 31 gauge x 5/16" Ndle Inject 1 pen into the skin once daily.    clonazePAM (KLONOPIN) 0.5 MG tablet TAKE 1 TABLET(0.5 MG) BY MOUTH EVERY NIGHT AS NEEDED FOR ANXIETY    ergocalciferol (ERGOCALCIFEROL) 50,000 unit Cap TAKE 1 CAPSULE BY MOUTH EVERY 7 DAYS    fluticasone propionate (FLONASE) 50 mcg/actuation nasal spray 1 spray (50 mcg total) by Each Nostril route once daily.    gabapentin (NEURONTIN) 300 MG capsule Take 1 capsule (300 mg total) by mouth 3 (three) times daily.    glipiZIDE (GLUCOTROL) 10 MG tablet TAKE 1 TABLET(10 MG) BY MOUTH TWICE DAILY    JARDIANCE 10 mg tablet TAKE 1 TABLET(10 MG) BY MOUTH EVERY DAY    metFORMIN (GLUCOPHAGE) 1000 MG tablet TAKE 1 TABLET(1000 MG) BY MOUTH TWICE DAILY WITH MEALS    nicotine (NICODERM CQ) 21 mg/24 hr PLACE 1 PATCH ONTO THE SKIN DAILY    ondansetron (ZOFRAN-ODT) 4 MG TbDL Take 1 tablet " (4 mg total) by mouth 3 (three) times daily.    pantoprazole (PROTONIX) 40 MG tablet Take 1 tablet (40 mg total) by mouth before breakfast.    sertraline (ZOLOFT) 50 MG tablet Take 1 tablet (50 mg total) by mouth once daily.    sucralfate (CARAFATE) 1 gram tablet TAKE 1 TABLET(1 GRAM) BY MOUTH THREE TIMES DAILY BEFORE MEALS    topiramate (TOPAMAX) 50 MG tablet Take 2 tablets (100 mg total) by mouth 2 (two) times daily. TAKE 1 TABLET BY MOUTH TWICE DAILY FOR 7 DAYS, THEN 1 TABLET EVERY MORNING AND 2 TABLETS EVERY EVENING FOR 7 DAYS, THEN 2 TABLETS TWICE DAILY    TRADJENTA 5 mg Tab tablet Take 1 tablet (5 mg total) by mouth once daily.    traMADoL (ULTRAM) 50 mg tablet Take 1 tablet (50 mg total) by mouth every 6 (six) hours as needed for Pain.    TRELEGY ELLIPTA 100-62.5-25 mcg DsDv INHALE 1 PUFF INTO THE LUNGS EVERY DAY    TRUE METRIX GLUCOSE TEST STRIP Strp USE AS DIRECTED FOUR TIMES DAILY     Family History       Problem Relation (Age of Onset)    Diabetes Father          Tobacco Use    Smoking status: Former     Packs/day: 1.00     Years: 40.00     Pack years: 40.00     Types: Cigarettes     Quit date: 3/12/2023     Years since quittin.0     Passive exposure: Current    Smokeless tobacco: Never   Substance and Sexual Activity    Alcohol use: No     Alcohol/week: 0.0 standard drinks    Drug use: No    Sexual activity: Not on file     Review of Systems   Constitutional:  Negative for activity change, appetite change, chills, fever and unexpected weight change.   HENT:  Negative for congestion and sore throat.    Respiratory:  Negative for cough and shortness of breath.    Cardiovascular:  Positive for chest pain. Negative for palpitations and leg swelling.   Gastrointestinal:  Negative for abdominal distention, abdominal pain, blood in stool, constipation, diarrhea, nausea and vomiting.   Genitourinary:  Negative for difficulty urinating, dysuria and hematuria.   Musculoskeletal:  Negative for  arthralgias and myalgias.   Skin:  Negative for color change and rash.   Neurological:  Negative for dizziness, tremors and seizures.   Objective:     Vital Signs (Most Recent):  Temp: 98 °F (36.7 °C) (04/01/23 2145)  Pulse: 71 (04/01/23 2145)  Resp: 16 (04/01/23 2145)  BP: 127/83 (04/01/23 2145)  SpO2: 98 % (04/01/23 2145) Vital Signs (24h Range):  Temp:  [97.8 °F (36.6 °C)-98.6 °F (37 °C)] 98 °F (36.7 °C)  Pulse:  [70-89] 71  Resp:  [16-18] 16  SpO2:  [98 %-100 %] 98 %  BP: (104-127)/(55-83) 127/83     Weight: 59.4 kg (131 lb)  Body mass index is 23.96 kg/m².    Physical Exam  Vitals reviewed.   Constitutional:       General: She is not in acute distress.     Appearance: She is well-developed.   HENT:      Head: Normocephalic and atraumatic.   Eyes:      Extraocular Movements: Extraocular movements intact.      Pupils: Pupils are equal, round, and reactive to light.   Neck:      Vascular: No JVD.      Trachea: No tracheal deviation.   Cardiovascular:      Rate and Rhythm: Normal rate and regular rhythm.      Heart sounds: No murmur heard.    No friction rub. No gallop.   Pulmonary:      Effort: No respiratory distress.      Breath sounds: Normal breath sounds. No wheezing or rales.   Abdominal:      General: Bowel sounds are normal. There is no distension.      Palpations: Abdomen is soft. There is no mass.      Tenderness: There is no abdominal tenderness.   Musculoskeletal:         General: No deformity.      Cervical back: Neck supple.   Lymphadenopathy:      Cervical: No cervical adenopathy.   Skin:     General: Skin is warm and dry.      Findings: No rash.   Neurological:      Mental Status: She is alert and oriented to person, place, and time.         CRANIAL NERVES     CN III, IV, VI   Pupils are equal, round, and reactive to light.     Significant Labs: All pertinent labs within the past 24 hours have been reviewed.    Significant Imaging: I have reviewed all pertinent imaging results/findings within the  past 24 hours.    Assessment/Plan:     * Chest pain due to myocardial ischemia  -Pt. With pressure-like chest pain reported, relieved with NG known WMA noted on recent TTE consistent with likely anteroapical MI  -Troponin WNL x2, EKG wihtout ST elevations or other significant ST findings, do not suspect ACS  -Plan to consult cards/interventional cards non-emergently in [facility] for consideration of [facility] inpatient vs. Continued f/u outpatient given known abnormal TTE. Will start pt. On max GDMT with ASA, plavix, statin, and B-blocker      History of CVA (cerebrovascular accident)  -Pt. dx'd 3/2023 with Left PCA territory acute infarct, only symptoms headache and dizziness, no clear residual deficits  -Continue ASA and statin, adding plavix given pt. concerns of CAD as above      Controlled type 2 diabetes mellitus with complication, with long-term current use of insulin  -A1c 6.8, well-controlled on home PO meds  -SSI ordered while inpatient    Breast cancer metastasized to axillary lymph node, left  -No acute issues, pt. S/p B.L mastectomy 2018      COPD (chronic obstructive pulmonary disease)  -No acute issues, duo-nebs ordered PRN      VTE Risk Mitigation (From admission, onward)         Ordered     enoxaparin injection 40 mg  Daily         04/01/23 2044     IP VTE HIGH RISK PATIENT  Once         04/01/23 2044     Place sequential compression device  Until discontinued         04/01/23 2044     Place sequential compression device  Until discontinued         04/01/23 2034                   On 04/01/2023, patient should be placed in hospital observation services under my care.        Carlos Fox MD  Department of Hospital Medicine  [facility] - Emergency Dept

## 2023-04-02 NOTE — SUBJECTIVE & OBJECTIVE
"Past Medical History:   Diagnosis Date    Asthma     Breast carcinoma     Cataract     Diabetes mellitus     Moderate episode of recurrent major depressive disorder 5/19/2021    Osteoporosis        Past Surgical History:   Procedure Laterality Date    BILATERAL MASTECTOMY  04/26/2018    CHOLECYSTECTOMY      COLONOSCOPY N/A 10/27/2015    Procedure: COLONOSCOPY;  Surgeon: Johnny Hurley MD;  Location: [facility] ENDO;  Service: Endoscopy;  Laterality: N/A;    ESOPHAGOGASTRODUODENOSCOPY N/A 01/24/2022    Procedure: ESOPHAGOGASTRODUODENOSCOPY (EGD);  Surgeon: Mili Katz MD;  Location: [facility] ([address] [place] [address]);  Service: Endoscopy;  Laterality: N/A;  rapid in AM, instr portal -ml    HYSTERECTOMY      LASER PERIPHERAL IRIDOTOMY Bilateral 2019           Review of patient's allergies indicates:  No Known Allergies    No current facility-administered medications on file prior to encounter.     Current Outpatient Medications on File Prior to Encounter   Medication Sig    albuterol (PROVENTIL) 2.5 mg /3 mL (0.083 %) nebulizer solution Take 3 mLs (2.5 mg total) by nebulization every 4 to 6 hours as needed for Wheezing or Shortness of Breath. Rescue    albuterol (VENTOLIN HFA) 90 mcg/actuation inhaler INHALE 2 PUFFS BY MOUTH INTO THE LUNGS EVERY 6 HOURS AS NEEDED FOR WHEEZING    aspirin 81 MG Chew Take 81 mg by mouth once daily.    atorvastatin (LIPITOR) 80 MG tablet Take 80 mg by mouth every evening.    BASAGLAR KWIKPEN U-100 INSULIN glargine 100 units/mL (3mL) SubQ pen INJECT 10 UNITS UNDER THE SKIN EVERY MORNING    BD ULTRA-FINE SHORT PEN NEEDLE 31 gauge x 5/16" Ndle Inject 1 pen into the skin once daily.    clonazePAM (KLONOPIN) 0.5 MG tablet TAKE 1 TABLET(0.5 MG) BY MOUTH EVERY NIGHT AS NEEDED FOR ANXIETY    ergocalciferol (ERGOCALCIFEROL) 50,000 unit Cap TAKE 1 CAPSULE BY MOUTH EVERY 7 DAYS    fluticasone propionate (FLONASE) 50 mcg/actuation nasal spray 1 spray (50 mcg total) by Each Nostril route once daily.    " gabapentin (NEURONTIN) 300 MG capsule Take 1 capsule (300 mg total) by mouth 3 (three) times daily.    glipiZIDE (GLUCOTROL) 10 MG tablet TAKE 1 TABLET(10 MG) BY MOUTH TWICE DAILY    JARDIANCE 10 mg tablet TAKE 1 TABLET(10 MG) BY MOUTH EVERY DAY    metFORMIN (GLUCOPHAGE) 1000 MG tablet TAKE 1 TABLET(1000 MG) BY MOUTH TWICE DAILY WITH MEALS    nicotine (NICODERM CQ) 21 mg/24 hr PLACE 1 PATCH ONTO THE SKIN DAILY    ondansetron (ZOFRAN-ODT) 4 MG TbDL Take 1 tablet (4 mg total) by mouth 3 (three) times daily.    pantoprazole (PROTONIX) 40 MG tablet Take 1 tablet (40 mg total) by mouth before breakfast.    sertraline (ZOLOFT) 50 MG tablet Take 1 tablet (50 mg total) by mouth once daily.    sucralfate (CARAFATE) 1 gram tablet TAKE 1 TABLET(1 GRAM) BY MOUTH THREE TIMES DAILY BEFORE MEALS    topiramate (TOPAMAX) 50 MG tablet Take 2 tablets (100 mg total) by mouth 2 (two) times daily. TAKE 1 TABLET BY MOUTH TWICE DAILY FOR 7 DAYS, THEN 1 TABLET EVERY MORNING AND 2 TABLETS EVERY EVENING FOR 7 DAYS, THEN 2 TABLETS TWICE DAILY    TRADJENTA 5 mg Tab tablet Take 1 tablet (5 mg total) by mouth once daily.    traMADoL (ULTRAM) 50 mg tablet Take 1 tablet (50 mg total) by mouth every 6 (six) hours as needed for Pain.    TRELEGY ELLIPTA 100-62.5-25 mcg DsDv INHALE 1 PUFF INTO THE LUNGS EVERY DAY    TRUE METRIX GLUCOSE TEST STRIP Strp USE AS DIRECTED FOUR TIMES DAILY     Family History       Problem Relation (Age of Onset)    Diabetes Father          Tobacco Use    Smoking status: Former     Packs/day: 1.00     Years: 40.00     Pack years: 40.00     Types: Cigarettes     Quit date: 3/12/2023     Years since quittin.0     Passive exposure: Current    Smokeless tobacco: Never   Substance and Sexual Activity    Alcohol use: No     Alcohol/week: 0.0 standard drinks    Drug use: No    Sexual activity: Not on file     Review of Systems   Constitutional:  Negative for activity change, appetite change, chills, fever and unexpected weight  change.   HENT:  Negative for congestion and sore throat.    Respiratory:  Negative for cough and shortness of breath.    Cardiovascular:  Positive for chest pain. Negative for palpitations and leg swelling.   Gastrointestinal:  Negative for abdominal distention, abdominal pain, blood in stool, constipation, diarrhea, nausea and vomiting.   Genitourinary:  Negative for difficulty urinating, dysuria and hematuria.   Musculoskeletal:  Negative for arthralgias and myalgias.   Skin:  Negative for color change and rash.   Neurological:  Negative for dizziness, tremors and seizures.   Objective:     Vital Signs (Most Recent):  Temp: 98 °F (36.7 °C) (04/01/23 2145)  Pulse: 71 (04/01/23 2145)  Resp: 16 (04/01/23 2145)  BP: 127/83 (04/01/23 2145)  SpO2: 98 % (04/01/23 2145) Vital Signs (24h Range):  Temp:  [97.8 °F (36.6 °C)-98.6 °F (37 °C)] 98 °F (36.7 °C)  Pulse:  [70-89] 71  Resp:  [16-18] 16  SpO2:  [98 %-100 %] 98 %  BP: (104-127)/(55-83) 127/83     Weight: 59.4 kg (131 lb)  Body mass index is 23.96 kg/m².    Physical Exam  Vitals reviewed.   Constitutional:       General: She is not in acute distress.     Appearance: She is well-developed.   HENT:      Head: Normocephalic and atraumatic.   Eyes:      Extraocular Movements: Extraocular movements intact.      Pupils: Pupils are equal, round, and reactive to light.   Neck:      Vascular: No JVD.      Trachea: No tracheal deviation.   Cardiovascular:      Rate and Rhythm: Normal rate and regular rhythm.      Heart sounds: No murmur heard.    No friction rub. No gallop.   Pulmonary:      Effort: No respiratory distress.      Breath sounds: Normal breath sounds. No wheezing or rales.   Abdominal:      General: Bowel sounds are normal. There is no distension.      Palpations: Abdomen is soft. There is no mass.      Tenderness: There is no abdominal tenderness.   Musculoskeletal:         General: No deformity.      Cervical back: Neck supple.   Lymphadenopathy:      Cervical:  No cervical adenopathy.   Skin:     General: Skin is warm and dry.      Findings: No rash.   Neurological:      Mental Status: She is alert and oriented to person, place, and time.         CRANIAL NERVES     CN III, IV, VI   Pupils are equal, round, and reactive to light.     Significant Labs: All pertinent labs within the past 24 hours have been reviewed.    Significant Imaging: I have reviewed all pertinent imaging results/findings within the past 24 hours.

## 2023-04-02 NOTE — HPI
"64 yo F with PMHx acute CVA (admitted to [facility] 3/13-3/16), DM2, and breast Ca in remission who presents to the ED complaining of chest pain x 1 day. Pt. Reports that she developed crushing chest pain this morning around 10 am when she started cleaning around the house. Pt. Reports that the pain was constant, pressure-like and without radiation. She denies any associated nausea, lightheadedness, SOB, or palpitations. She states that she has never had pain like this before. The pain was unrelenting and did not improve until she came to the ED and was given nitroglycerin.    TTE obtained here 3/22/2023 showed "segmental left ventricular wall motion abnormalities consistent with anteroapical MI." Pt. Currently has an outpatient cardiology appointment scheduled for 4/13/2023.  "

## 2023-04-02 NOTE — SUBJECTIVE & OBJECTIVE
"Past Medical History:   Diagnosis Date    Asthma     Breast carcinoma     Cataract     Diabetes mellitus     Moderate episode of recurrent major depressive disorder 5/19/2021    Osteoporosis       Past Surgical History:   Procedure Laterality Date    BILATERAL MASTECTOMY  04/26/2018    CHOLECYSTECTOMY      COLONOSCOPY N/A 10/27/2015    Procedure: COLONOSCOPY;  Surgeon: Johnny Hurley MD;  Location: [facility];  Service: Endoscopy;  Laterality: N/A;    ESOPHAGOGASTRODUODENOSCOPY N/A 01/24/2022    Procedure: ESOPHAGOGASTRODUODENOSCOPY (EGD);  Surgeon: Mili Katz MD;  Location: [facility] ([address] [place] [address]);  Service: Endoscopy;  Laterality: N/A;  rapid in AM, instr portal -ml    HYSTERECTOMY      LASER PERIPHERAL IRIDOTOMY Bilateral 2019          Review of patient's allergies indicates:  No Known Allergies   Current Outpatient Medications   Medication Instructions    albuterol (PROVENTIL) 2.5 mg, Nebulization, Every 4-6 hours PRN, Rescue    albuterol (VENTOLIN HFA) 90 mcg/actuation inhaler INHALE 2 PUFFS BY MOUTH INTO THE LUNGS EVERY 6 HOURS AS NEEDED FOR WHEEZING    aspirin 81 mg, Oral, Daily    atorvastatin (LIPITOR) 80 mg, Oral, Nightly    BASAGLAR KWIKPEN U-100 INSULIN glargine 100 units/mL (3mL) SubQ pen INJECT 10 UNITS UNDER THE SKIN EVERY MORNING    BD ULTRA-FINE SHORT PEN NEEDLE 31 gauge x 5/16" Ndle 1 pen, Subcutaneous, Daily    clonazePAM (KLONOPIN) 0.5 MG tablet TAKE 1 TABLET(0.5 MG) BY MOUTH EVERY NIGHT AS NEEDED FOR ANXIETY    ergocalciferol (ERGOCALCIFEROL) 50,000 unit Cap TAKE 1 CAPSULE BY MOUTH EVERY 7 DAYS    fluticasone propionate (FLONASE) 50 mcg, Each Nostril, Daily    gabapentin (NEURONTIN) 300 mg, Oral, 3 times daily    glipiZIDE (GLUCOTROL) 10 MG tablet TAKE 1 TABLET(10 MG) BY MOUTH TWICE DAILY    JARDIANCE 10 mg tablet TAKE 1 TABLET(10 MG) BY MOUTH EVERY DAY    metFORMIN (GLUCOPHAGE) 1000 MG tablet TAKE 1 TABLET(1000 MG) BY MOUTH TWICE DAILY WITH MEALS    nicotine (NICODERM CQ) 21 " mg/24 hr PLACE 1 PATCH ONTO THE SKIN DAILY    ondansetron (ZOFRAN-ODT) 4 mg, Oral, 3 times daily    pantoprazole (PROTONIX) 40 mg, Oral, Before breakfast    sertraline (ZOLOFT) 50 mg, Oral, Daily    sucralfate (CARAFATE) 1 gram tablet TAKE 1 TABLET(1 GRAM) BY MOUTH THREE TIMES DAILY BEFORE MEALS    topiramate (TOPAMAX) 100 mg, Oral, 2 times daily, TAKE 1 TABLET BY MOUTH TWICE DAILY FOR 7 DAYS, THEN 1 TABLET EVERY MORNING AND 2 TABLETS EVERY EVENING FOR 7 DAYS, THEN 2 TABLETS TWICE DAILY    TRADJENTA 5 mg, Oral, Daily    traMADoL (ULTRAM) 50 mg, Oral, Every 6 hours PRN    TRELEGY ELLIPTA 100-62.5-25 mcg DsDv INHALE 1 PUFF INTO THE LUNGS EVERY DAY    TRUE METRIX GLUCOSE TEST STRIP Strp USE AS DIRECTED FOUR TIMES DAILY      Review of Systems   Constitutional:  Negative for chills and fever.   Respiratory:  Negative for chest tightness and shortness of breath.    Cardiovascular:  Negative for chest pain, palpitations and leg swelling.   Gastrointestinal:  Negative for abdominal pain and nausea.   Neurological:  Negative for dizziness and weakness.   Objective:     Vital Signs (Most Recent):  Temp: 98.1 °F (36.7 °C) (04/02/23 0729)  Pulse: 62 (04/02/23 1137)  Resp: 18 (04/02/23 0729)  BP: 111/60 (04/02/23 0729)  SpO2: 95 % (04/02/23 0729) Vital Signs (24h Range):  Temp:  [97.8 °F (36.6 °C)-98.6 °F (37 °C)] 98.1 °F (36.7 °C)  Pulse:  [60-89] 62  Resp:  [16-18] 18  SpO2:  [95 %-100 %] 95 %  BP: (104-127)/(55-83) 111/60     Weight: 59.4 kg (131 lb)  Body mass index is 23.96 kg/m².    Intake/Output Summary (Last 24 hours) at 4/2/2023 1437  Last data filed at 4/2/2023 0649  Gross per 24 hour   Intake 180 ml   Output --   Net 180 ml        Physical Exam  Vitals and nursing note reviewed.   Constitutional:       Appearance: She is well-developed.   Eyes:      Pupils: Pupils are equal, round, and reactive to light.   Cardiovascular:      Rate and Rhythm: Normal rate and regular rhythm.   Pulmonary:      Effort: Pulmonary effort  is normal.      Breath sounds: Normal breath sounds.   Abdominal:      Palpations: Abdomen is soft.      Tenderness: There is no abdominal tenderness.   Musculoskeletal:         General: No tenderness.   Skin:     General: Skin is warm and dry.   Neurological:      Mental Status: She is alert and oriented to person, place, and time.   Psychiatric:         Behavior: Behavior normal.     Significant Labs: All pertinent labs within the past 24 hours have been reviewed.  CBC:   Recent Labs   Lab 04/01/23  1621 04/02/23  0821   WBC 9.92 8.13   HGB 13.0 12.9   HCT 41.5 41.9    264     CMP:   Recent Labs   Lab 04/01/23  1621 04/02/23  0821    142   K 4.2 4.3    109   CO2 21* 23   GLU 65* 128*   BUN 21 25*   CREATININE 0.7 0.7   CALCIUM 10.5 9.2   PROT 7.3 6.4   ALBUMIN 3.7 3.2*   BILITOT 0.2 0.4   ALKPHOS 90 75   AST 15 14   ALT 12 11   ANIONGAP 9 10       Recent Labs   Lab 04/01/23  1901   TROPONINI <0.006      Significant Imaging: I have reviewed all pertinent imaging results/findings within the past 24 hours.

## 2023-04-02 NOTE — ASSESSMENT & PLAN NOTE
-Pt. With pressure-like chest pain reported, relieved with NG known WMA noted on recent TTE consistent with likely anteroapical MI  -Troponin WNL x2, EKG wihtout ST elevations or other significant ST findings, do not suspect ACS  -Plan to consult cards/interventional cards non-emergently in [facility] for consideration of [facility] inpatient vs. Continued f/u outpatient given known abnormal TTE. Will start pt. On max GDMT with ASA, plavix, statin, and B-blocker  - discussion with Cards who recommend stress test in am, ordered  - cont telemetry  - stat ekg for chest pain  - NPO midnight

## 2023-04-02 NOTE — PLAN OF CARE
Pt denies chest pain. One time dose of IV Toradol given for migraine. Pt informed on NPO status at midnight.     Problem: Adult Inpatient Plan of Care  Goal: Plan of Care Review  Outcome: Ongoing, Progressing  Goal: Absence of Hospital-Acquired Illness or Injury  Outcome: Ongoing, Progressing     Problem: Diabetes Comorbidity  Goal: Blood Glucose Level Within Targeted Range  Outcome: Ongoing, Progressing     Problem: Chest Pain  Goal: Resolution of Chest Pain Symptoms  Outcome: Ongoing, Progressing

## 2023-04-02 NOTE — SUBJECTIVE & OBJECTIVE
Interval History:   FLORENCE, ANGIESAF. Patient without complaints. Denies chest pain today and denies episodes overnight. Continuing ASA, statin, plavix. Troponins negative. Stress test ordered for tomorrow. CBC without leukocytosis. CMP without emergent abnormalities.     Review of Systems   Constitutional:  Negative for chills and fever.   Respiratory:  Negative for chest tightness and shortness of breath.    Cardiovascular:  Negative for chest pain, palpitations and leg swelling.   Gastrointestinal:  Negative for abdominal pain and nausea.   Neurological:  Negative for dizziness and weakness.   Objective:     Vital Signs (Most Recent):  Temp: 98.1 °F (36.7 °C) (04/02/23 0729)  Pulse: 60 (04/02/23 0729)  Resp: 18 (04/02/23 0729)  BP: 111/60 (04/02/23 0729)  SpO2: 95 % (04/02/23 0729) Vital Signs (24h Range):  Temp:  [97.8 °F (36.6 °C)-98.6 °F (37 °C)] 98.1 °F (36.7 °C)  Pulse:  [60-89] 60  Resp:  [16-18] 18  SpO2:  [95 %-100 %] 95 %  BP: (104-127)/(55-83) 111/60     Weight: 59.4 kg (131 lb)  Body mass index is 23.96 kg/m².    Intake/Output Summary (Last 24 hours) at 4/2/2023 1110  Last data filed at 4/2/2023 0649  Gross per 24 hour   Intake 180 ml   Output --   Net 180 ml      Physical Exam  Vitals and nursing note reviewed.   Constitutional:       Appearance: She is well-developed.   Eyes:      Pupils: Pupils are equal, round, and reactive to light.   Cardiovascular:      Rate and Rhythm: Normal rate and regular rhythm.   Pulmonary:      Effort: Pulmonary effort is normal.      Breath sounds: Normal breath sounds.   Abdominal:      Palpations: Abdomen is soft.      Tenderness: There is no abdominal tenderness.   Musculoskeletal:         General: No tenderness.   Skin:     General: Skin is warm and dry.   Neurological:      Mental Status: She is alert and oriented to person, place, and time.   Psychiatric:         Behavior: Behavior normal.       Significant Labs: All pertinent labs within the past 24 hours have been  reviewed.  CBC:   Recent Labs   Lab 04/01/23  1621 04/02/23  0821   WBC 9.92 8.13   HGB 13.0 12.9   HCT 41.5 41.9    264     CMP:   Recent Labs   Lab 04/01/23  1621 04/02/23  0821    142   K 4.2 4.3    109   CO2 21* 23   GLU 65* 128*   BUN 21 25*   CREATININE 0.7 0.7   CALCIUM 10.5 9.2   PROT 7.3 6.4   ALBUMIN 3.7 3.2*   BILITOT 0.2 0.4   ALKPHOS 90 75   AST 15 14   ALT 12 11   ANIONGAP 9 10       Significant Imaging: I have reviewed all pertinent imaging results/findings within the past 24 hours.

## 2023-04-02 NOTE — ASSESSMENT & PLAN NOTE
Patient presents with one episode of typical chest pain that resolved with nitroglycerin in the ED. No further chest pain.    Plan  -given smoker, pre-diabetic and recent posterior stroke at  would work patient up with inpatient stress test  -baseline WMA and mastectomy, will order PET stress  -LDL 54, A1c 6.8  -order carotid imaging, ANDREA to assess PAD  -30 day event monitor to assess for underlying Afib as cause of recent stroke  -further recs based on stress test.  -NPO at midnight and no caffeine 24h prior to PET stress, call if any further episodes of chest pain and obtain EKG and troponin

## 2023-04-02 NOTE — HOSPITAL COURSE
Jud Villa is placed in  Observation for management of chest pain. Troponins trended and all negative. EKG reviewed. Discussion with cardiology who recommended inpatient stress trest, ordered. PET stress 04/03 showing large sized, severe intensity mid to apical anterior, septal, anteroseptal and inferoapical resting perfusion abnormality involving 29% of LV myocardium in the distribution of the proximal  LAD that worsened with stress. Interventional cardiology consulted, LHC done on 04/05 showing severe coronary artery disease. No stents placed in lieu of consult to CTS to evaluate for possible CABG. Increased statin to high intensity, added metoprolol and plavix to complete GDMT. CTS evaluated patient, Dr. De Leon to review films and see patient in clinic on 04/11/2023 for pre-op clearance with surgery planned for 04/13/2023. Discussed plan with CTS, will continue patient on ASA and plavix until this follow up appointment. Referrals to cardiology and CTS made prior to discharge.     On day of discharge patient's vital signs were stable and patient appeared clinically ready for discharge. Hospital course, discharge plan and return precautions discussed with patient and her daughter Annetta via phone call- they expressed understanding. All questions answered at that time.

## 2023-04-02 NOTE — PROGRESS NOTES
"[facility] - Observation [address] [place] [address] Medicine  Progress Note    Patient Name: Jud Villa  MRN: [id number]  Patient Class: OP- Observation   Admission Date: 4/1/2023  Length of Stay: 0 days  Attending Physician: Rose Marie Wood MD  Primary Care Provider: Bo Lopez MD        Subjective:     Principal Problem:Chest pain due to myocardial ischemia        HPI:  62 yo F with PMHx acute CVA (admitted to [facility] 3/13-3/16), DM2, and breast Ca in remission who presents to the ED complaining of chest pain x 1 day. Pt. Reports that she developed crushing chest pain this morning around 10 am when she started cleaning around the house. Pt. Reports that the pain was constant, pressure-like and without radiation. She denies any associated nausea, lightheadedness, SOB, or palpitations. She states that she has never had pain like this before. The pain was unrelenting and did not improve until she came to the ED and was given nitroglycerin.    TTE obtained here 3/22/2023 showed "segmental left ventricular wall motion abnormalities consistent with anteroapical MI." Pt. Currently has an outpatient cardiology appointment scheduled for 4/13/2023.      Overview/Hospital Course:  Jud Villa is placed in  Observation for management of chest pain. Continuing aspirin and statin, added plavix. Troponins negative. EKG reviewed. Discussion with Cardiology who recommended inpatient stress trest, ordered. Patient will discharge when medically ready. She will follow up with Cardiology and PCP.      Interval History:   NAEON, VSSAF. Patient without complaints. Denies chest pain today and denies episodes overnight. Continuing ASA, statin, plavix. Troponins negative. Stress test ordered for tomorrow. CBC without leukocytosis. CMP without emergent abnormalities.     Review of Systems   Constitutional:  Negative for chills and fever.   Respiratory:  Negative for chest tightness and shortness of breath.    Cardiovascular:  Negative " for chest pain, palpitations and leg swelling.   Gastrointestinal:  Negative for abdominal pain and nausea.   Neurological:  Negative for dizziness and weakness.   Objective:     Vital Signs (Most Recent):  Temp: 98.1 °F (36.7 °C) (04/02/23 0729)  Pulse: 60 (04/02/23 0729)  Resp: 18 (04/02/23 0729)  BP: 111/60 (04/02/23 0729)  SpO2: 95 % (04/02/23 0729) Vital Signs (24h Range):  Temp:  [97.8 °F (36.6 °C)-98.6 °F (37 °C)] 98.1 °F (36.7 °C)  Pulse:  [60-89] 60  Resp:  [16-18] 18  SpO2:  [95 %-100 %] 95 %  BP: (104-127)/(55-83) 111/60     Weight: 59.4 kg (131 lb)  Body mass index is 23.96 kg/m².    Intake/Output Summary (Last 24 hours) at 4/2/2023 1110  Last data filed at 4/2/2023 0649  Gross per 24 hour   Intake 180 ml   Output --   Net 180 ml      Physical Exam  Vitals and nursing note reviewed.   Constitutional:       Appearance: She is well-developed.   Eyes:      Pupils: Pupils are equal, round, and reactive to light.   Cardiovascular:      Rate and Rhythm: Normal rate and regular rhythm.   Pulmonary:      Effort: Pulmonary effort is normal.      Breath sounds: Normal breath sounds.   Abdominal:      Palpations: Abdomen is soft.      Tenderness: There is no abdominal tenderness.   Musculoskeletal:         General: No tenderness.   Skin:     General: Skin is warm and dry.   Neurological:      Mental Status: She is alert and oriented to person, place, and time.   Psychiatric:         Behavior: Behavior normal.       Significant Labs: All pertinent labs within the past 24 hours have been reviewed.  CBC:   Recent Labs   Lab 04/01/23  1621 04/02/23  0821   WBC 9.92 8.13   HGB 13.0 12.9   HCT 41.5 41.9    264     CMP:   Recent Labs   Lab 04/01/23  1621 04/02/23  0821    142   K 4.2 4.3    109   CO2 21* 23   GLU 65* 128*   BUN 21 25*   CREATININE 0.7 0.7   CALCIUM 10.5 9.2   PROT 7.3 6.4   ALBUMIN 3.7 3.2*   BILITOT 0.2 0.4   ALKPHOS 90 75   AST 15 14   ALT 12 11   ANIONGAP 9 10       Significant  Imaging: I have reviewed all pertinent imaging results/findings within the past 24 hours.      Assessment/Plan:      * Chest pain due to myocardial ischemia  -Pt. With pressure-like chest pain reported, relieved with NG known WMA noted on recent TTE consistent with likely anteroapical MI  -Troponin WNL x2, EKG wihtout ST elevations or other significant ST findings, do not suspect ACS  -Plan to consult cards/interventional cards non-emergently in [facility] for consideration of [facility] inpatient vs. Continued f/u outpatient given known abnormal TTE. Will start pt. On max GDMT with ASA, plavix, statin, and B-blocker  - discussion with Cards who recommend stress test in am, ordered  - cont telemetry  - stat ekg for chest pain  - NPO midnight    History of CVA (cerebrovascular accident)  -Pt. dx'd 3/2023 with Left PCA territory acute infarct, only symptoms headache and dizziness, no clear residual deficits  -Continue ASA and statin, adding plavix given pt. concerns of CAD as above    Controlled type 2 diabetes mellitus with complication, with long-term current use of insulin  -A1c 6.8, well-controlled on home PO meds  -SSI ordered while inpatient    Breast cancer metastasized to axillary lymph node, left  -No acute issues, pt. S/p B.L mastectomy 2018    COPD (chronic obstructive pulmonary disease)  -No acute issues, duo-nebs ordered PRN      VTE Risk Mitigation (From admission, onward)         Ordered     enoxaparin injection 40 mg  Daily         04/01/23 2044     IP VTE HIGH RISK PATIENT  Once         04/01/23 2044     Place sequential compression device  Until discontinued         04/01/23 2044     Place sequential compression device  Until discontinued         04/01/23 2034                Discharge Planning   JORGE: 4/4/2023     Code Status: Full Code   Is the patient medically ready for discharge?:     Reason for patient still in hospital (select all that apply): Patient trending condition and Treatment                      Johnny Barahona PA-C  Department of [facility] Medicine   [facility] - Observation 11H

## 2023-04-02 NOTE — ASSESSMENT & PLAN NOTE
-Pt. With pressure-like chest pain reported, relieved with NG known WMA noted on recent TTE consistent with likely anteroapical MI  -Troponin WNL x2, EKG wihtout ST elevations or other significant ST findings, do not suspect ACS  -Plan to consult cards/interventional cards non-emergently in [facility] for consideration of [facility] inpatient vs. Continued f/u outpatient given known abnormal TTE. Will start pt. On max GDMT with ASA, plavix, statin, and B-blocker

## 2023-04-02 NOTE — ASSESSMENT & PLAN NOTE
-Pt. dx'd 3/2023 with Left PCA territory acute infarct, only symptoms headache and dizziness, no clear residual deficits  -Continue ASA and statin, adding plavix given pt. concerns of CAD as above

## 2023-04-02 NOTE — NURSING TRANSFER
Nursing Transfer Note      Pt arrived from the er via wc accompanied by transporter.arrived on Telemetry,NSR.aaox4.resp even and non labored.Troponin levels negative x2.instructed on NPO poss stress test/LHC. Cards will see pt in am.safety precautions implemented.bed in low position.rails up x2.call bell in reach.will monitor.

## 2023-04-02 NOTE — PLAN OF CARE
Pt admitted and care plan initiated.Telemetry maintained,NSR.Continue to trend Troponin levels.NPO for poss stress test/LHC in am.pt c/o migraine h/a upon arrival.pt requesting Tramadol which is what she takes at home.Ronnie ANDRADE notified.pt refusing tylenol and melatonin.Tramadol x1 dose administered.safety precautions implemented.bed in low position.rails up x2.call bell in reach.continue plan of care.

## 2023-04-03 LAB
ALBUMIN SERPL BCP-MCNC: 3.2 G/DL (ref 3.5–5.2)
ALP SERPL-CCNC: 79 U/L (ref 55–135)
ALT SERPL W/O P-5'-P-CCNC: 10 U/L (ref 10–44)
ANION GAP SERPL CALC-SCNC: 8 MMOL/L (ref 8–16)
AST SERPL-CCNC: 11 U/L (ref 10–40)
BASOPHILS # BLD AUTO: 0.05 K/UL (ref 0–0.2)
BASOPHILS NFR BLD: 0.6 % (ref 0–1.9)
BILIRUB SERPL-MCNC: 0.3 MG/DL (ref 0.1–1)
BUN SERPL-MCNC: 28 MG/DL (ref 8–23)
CALCIUM SERPL-MCNC: 9.2 MG/DL (ref 8.7–10.5)
CFR FLOW - ANTERIOR: 1.71
CFR FLOW - INFERIOR: 2.82
CFR FLOW - LATERAL: 2.44
CFR FLOW - MAX: 3.81
CFR FLOW - MIN: 0.82
CFR FLOW - SEPTAL: 1.55
CFR FLOW - WHOLE HEART: 2.13
CHLORIDE SERPL-SCNC: 109 MMOL/L (ref 95–110)
CO2 SERPL-SCNC: 22 MMOL/L (ref 23–29)
CREAT SERPL-MCNC: 0.7 MG/DL (ref 0.5–1.4)
CV PHARM DOSE: 0.4 MG
CV STRESS BASE HR: 61 BPM
DIASTOLIC BLOOD PRESSURE: 98 MMHG
DIFFERENTIAL METHOD: ABNORMAL
EJECTION FRACTION- HIGH: 59 %
END DIASTOLIC INDEX-HIGH: 155 ML/M2
END DIASTOLIC INDEX-LOW: 91 ML/M2
END SYSTOLIC INDEX-HIGH: 78 ML/M2
END SYSTOLIC INDEX-LOW: 40 ML/M2
EOSINOPHIL # BLD AUTO: 0.4 K/UL (ref 0–0.5)
EOSINOPHIL NFR BLD: 5.4 % (ref 0–8)
ERYTHROCYTE [DISTWIDTH] IN BLOOD BY AUTOMATED COUNT: 13.2 % (ref 11.5–14.5)
EST. GFR  (NO RACE VARIABLE): >60 ML/MIN/1.73 M^2
GLUCOSE SERPL-MCNC: 177 MG/DL (ref 70–110)
HCT VFR BLD AUTO: 40.5 % (ref 37–48.5)
HGB BLD-MCNC: 12.9 G/DL (ref 12–16)
IMM GRANULOCYTES # BLD AUTO: 0.01 K/UL (ref 0–0.04)
IMM GRANULOCYTES NFR BLD AUTO: 0.1 % (ref 0–0.5)
LYMPHOCYTES # BLD AUTO: 2.2 K/UL (ref 1–4.8)
LYMPHOCYTES NFR BLD: 27.6 % (ref 18–48)
MAGNESIUM SERPL-MCNC: 1.9 MG/DL (ref 1.6–2.6)
MCH RBC QN AUTO: 28.7 PG (ref 27–31)
MCHC RBC AUTO-ENTMCNC: 31.9 G/DL (ref 32–36)
MCV RBC AUTO: 90 FL (ref 82–98)
MONOCYTES # BLD AUTO: 0.6 K/UL (ref 0.3–1)
MONOCYTES NFR BLD: 6.9 % (ref 4–15)
NEUTROPHILS # BLD AUTO: 4.8 K/UL (ref 1.8–7.7)
NEUTROPHILS NFR BLD: 59.4 % (ref 38–73)
NRBC BLD-RTO: 0 /100 WBC
NUC REST DIASTOLIC VOLUME INDEX: 62
NUC REST EJECTION FRACTION: 54
NUC REST SYSTOLIC VOLUME INDEX: 29
NUC STRESS DIASTOLIC VOLUME INDEX: 73
NUC STRESS EJECTION FRACTION: 55 %
NUC STRESS SYSTOLIC VOLUME INDEX: 33
OHS CV CPX 85 PERCENT MAX PREDICTED HEART RATE MALE: 126
OHS CV CPX MAX PREDICTED HEART RATE: 149
OHS CV CPX PATIENT IS FEMALE: 1
OHS CV CPX PATIENT IS MALE: 0
OHS CV CPX PEAK DIASTOLIC BLOOD PRESSURE: 63 MMHG
OHS CV CPX PEAK HEAR RATE: 71 BPM
OHS CV CPX PEAK RATE PRESSURE PRODUCT: 7526
OHS CV CPX PEAK SYSTOLIC BLOOD PRESSURE: 106 MMHG
OHS CV CPX PERCENT MAX PREDICTED HEART RATE ACHIEVED: 48
OHS CV CPX RATE PRESSURE PRODUCT PRESENTING: 7625
PERFUSION DEFECT 1 SIZE IN %: 29 %
PERFUSION DEFECT SIZE WORSENS % 1: 38 %
PHOSPHATE SERPL-MCNC: 3.2 MG/DL (ref 2.7–4.5)
PLATELET # BLD AUTO: 276 K/UL (ref 150–450)
PMV BLD AUTO: 9.6 FL (ref 9.2–12.9)
POCT GLUCOSE: 175 MG/DL (ref 70–110)
POCT GLUCOSE: 180 MG/DL (ref 70–110)
POCT GLUCOSE: 187 MG/DL (ref 70–110)
POCT GLUCOSE: 208 MG/DL (ref 70–110)
POTASSIUM SERPL-SCNC: 4.2 MMOL/L (ref 3.5–5.1)
PROT SERPL-MCNC: 6.1 G/DL (ref 6–8.4)
RBC # BLD AUTO: 4.5 M/UL (ref 4–5.4)
REST FLOW - ANTERIOR: 0.48 CC/MIN/G
REST FLOW - INFERIOR: 0.47 CC/MIN/G
REST FLOW - LATERAL: 0.71 CC/MIN/G
REST FLOW - MAX: 0.96 CC/MIN/G
REST FLOW - MIN: 0.24 CC/MIN/G
REST FLOW - SEPTAL: 0.4 CC/MIN/G
REST FLOW - WHOLE HEART: 0.52 CC/MIN/G
RETIRED EF AND QEF - SEE NOTES: 47 %
SODIUM SERPL-SCNC: 139 MMOL/L (ref 136–145)
STRESS FLOW - ANTERIOR: 0.88 CC/MIN/G
STRESS FLOW - INFERIOR: 1.35 CC/MIN/G
STRESS FLOW - LATERAL: 1.73 CC/MIN/G
STRESS FLOW - MAX: 2 CC/MIN/G
STRESS FLOW - MIN: 0.2 CC/MIN/G
STRESS FLOW - SEPTAL: 0.63 CC/MIN/G
STRESS FLOW - WHOLE HEART: 1.15 CC/MIN/G
SYSTOLIC BLOOD PRESSURE: 125 MMHG
WBC # BLD AUTO: 8.08 K/UL (ref 3.9–12.7)

## 2023-04-03 PROCEDURE — 96372 THER/PROPH/DIAG INJ SC/IM: CPT | Performed by: HOSPITALIST

## 2023-04-03 PROCEDURE — 83735 ASSAY OF MAGNESIUM: CPT | Mod: HCNC

## 2023-04-03 PROCEDURE — 25000003 PHARM REV CODE 250: Mod: HCNC | Performed by: HOSPITALIST

## 2023-04-03 PROCEDURE — 99233 SBSQ HOSP IP/OBS HIGH 50: CPT | Mod: HCNC,,,

## 2023-04-03 PROCEDURE — 84100 ASSAY OF PHOSPHORUS: CPT | Mod: HCNC

## 2023-04-03 PROCEDURE — 36415 COLL VENOUS BLD VENIPUNCTURE: CPT | Mod: HCNC

## 2023-04-03 PROCEDURE — 94761 N-INVAS EAR/PLS OXIMETRY MLT: CPT | Mod: HCNC

## 2023-04-03 PROCEDURE — 63600175 PHARM REV CODE 636 W HCPCS: Mod: HCNC | Performed by: HOSPITALIST

## 2023-04-03 PROCEDURE — 63600175 PHARM REV CODE 636 W HCPCS: Mod: HCNC | Performed by: STUDENT IN AN ORGANIZED HEALTH CARE EDUCATION/TRAINING PROGRAM

## 2023-04-03 PROCEDURE — 85025 COMPLETE CBC W/AUTO DIFF WBC: CPT | Mod: HCNC

## 2023-04-03 PROCEDURE — G0378 HOSPITAL OBSERVATION PER HR: HCPCS | Mod: HCNC

## 2023-04-03 PROCEDURE — 99233 PR SUBSEQUENT HOSPITAL CARE,LEVL III: ICD-10-PCS | Mod: HCNC,,,

## 2023-04-03 PROCEDURE — 80053 COMPREHEN METABOLIC PANEL: CPT | Mod: HCNC

## 2023-04-03 PROCEDURE — 96375 TX/PRO/DX INJ NEW DRUG ADDON: CPT

## 2023-04-03 RX ORDER — AMINOPHYLLINE 25 MG/ML
75 INJECTION, SOLUTION INTRAVENOUS ONCE
Status: COMPLETED | OUTPATIENT
Start: 2023-04-03 | End: 2023-04-03

## 2023-04-03 RX ORDER — REGADENOSON 0.08 MG/ML
0.4 INJECTION, SOLUTION INTRAVENOUS
Status: COMPLETED | OUTPATIENT
Start: 2023-04-03 | End: 2023-04-03

## 2023-04-03 RX ADMIN — CLOPIDOGREL BISULFATE 75 MG: 75 TABLET ORAL at 09:04

## 2023-04-03 RX ADMIN — AMINOPHYLLINE 75 MG: 25 INJECTION, SOLUTION INTRAVENOUS at 08:04

## 2023-04-03 RX ADMIN — GABAPENTIN 300 MG: 300 CAPSULE ORAL at 08:04

## 2023-04-03 RX ADMIN — METOPROLOL TARTRATE 12.5 MG: 25 TABLET, FILM COATED ORAL at 09:04

## 2023-04-03 RX ADMIN — ENOXAPARIN SODIUM 40 MG: 40 INJECTION SUBCUTANEOUS at 05:04

## 2023-04-03 RX ADMIN — GABAPENTIN 300 MG: 300 CAPSULE ORAL at 02:04

## 2023-04-03 RX ADMIN — REGADENOSON 0.4 MG: 0.08 INJECTION, SOLUTION INTRAVENOUS at 08:04

## 2023-04-03 RX ADMIN — ATORVASTATIN CALCIUM 80 MG: 40 TABLET, FILM COATED ORAL at 08:04

## 2023-04-03 RX ADMIN — GABAPENTIN 300 MG: 300 CAPSULE ORAL at 09:04

## 2023-04-03 RX ADMIN — ASPIRIN 81 MG CHEWABLE TABLET 81 MG: 81 TABLET CHEWABLE at 09:04

## 2023-04-03 RX ADMIN — TOPIRAMATE 100 MG: 100 TABLET, FILM COATED ORAL at 09:04

## 2023-04-03 RX ADMIN — TOPIRAMATE 100 MG: 100 TABLET, FILM COATED ORAL at 08:04

## 2023-04-03 RX ADMIN — INSULIN ASPART 4 UNITS: 100 INJECTION, SOLUTION INTRAVENOUS; SUBCUTANEOUS at 05:04

## 2023-04-03 RX ADMIN — INSULIN ASPART 1 UNITS: 100 INJECTION, SOLUTION INTRAVENOUS; SUBCUTANEOUS at 09:04

## 2023-04-03 RX ADMIN — PANTOPRAZOLE SODIUM 40 MG: 40 TABLET, DELAYED RELEASE ORAL at 06:04

## 2023-04-03 NOTE — ASSESSMENT & PLAN NOTE
-Pt. With pressure-like chest pain reported, relieved with NG known WMA noted on recent TTE consistent with likely anteroapical MI  -Troponin WNL x2, EKG wihtout ST elevations or other significant ST findings, do not suspect ACS  -Plan to consult cards/interventional cards non-emergently in [facility] for consideration of [facility] inpatient vs. Continued f/u outpatient given known abnormal TTE. Will start pt. On max GDMT with ASA, plavix, statin, and B-blocker  - discussion with Cards who recommend stress test in am, ordered  - 04/03 - abnormal PET stress, Interventional Cardiology consulted   - cont telemetry  - stat ekg for chest pain  - NPO midnight

## 2023-04-03 NOTE — NURSING
Pt AAOX4. No distress noted. Bed in lowest position. Side rails up x2. Call bell and personal belongs within reach. Telemetry maintained. Safety precautions maintained. Pt free of falls or injuries. No further issues or concerns at this time. Plan of care continues.

## 2023-04-03 NOTE — PLAN OF CARE
[facility] - Observation 11H  Initial Discharge Assessment       Primary Care Provider: Bo Lopez MD    Admission Diagnosis: Chest pain [R07.9]    Admission Date: 4/1/2023  Expected Discharge Date: 4/3/2023         Payor: HU[employer] MANAGED MEDICARE / Plan: HU[employer] SNP HMO PPO SPECIAL NEEDS / Product Type: Medicare Advantage /     Extended Emergency Contact Information  Primary Emergency Contact: Katarina Villa  [address]           [place] [address] [place]  Mobile Phone: [phone]  Relation: Daughter    Discharge Plan A: (P) Home  Discharge Plan B: (P) Home      [id number] - [place] - [address] AT [facility] [address]  [address] [place] [address]  Phone: [phone] Fax: [phone]    [id number] - [place], [place] - [address] AT SEC OF  [address]  [address]  [place] [address]  Phone: [phone] Fax: [phone]               SW completed Discharge Planning Assessment with patient via bedside. Discharge planning booklet given to patient/family and whiteboard updated with JORGE and phone #. All questions answered.    Patient reported that she will have transportation upon discharge.     Patient reported that she lives alone, and prior to hospitalization she was independent with her ADL's. Patient reported that she is not on dialysis and does not go to a Coumadin clinic.      Patient lives in an apartment complex with 18 steps to enter.       Marguerite Escobar LMSW  [facility]  Ext. [phone]

## 2023-04-03 NOTE — SUBJECTIVE & OBJECTIVE
Interval History: NAEON, hypotensive, other VSSAF on my exam. Patient with abnormal stress test today, Interventional Cardiology consulted. NPO at midnight. CBC without leukocytosis. CMP without emergent abnormalities. Patient without further complaints.     Review of Systems   Constitutional:  Negative for chills and fever.   Respiratory:  Negative for chest tightness and shortness of breath.    Cardiovascular:  Negative for chest pain, palpitations and leg swelling.   Gastrointestinal:  Negative for abdominal pain and nausea.   Neurological:  Negative for dizziness and weakness.   Objective:     Vital Signs (Most Recent):  Temp: 97 °F (36.1 °C) (04/03/23 1554)  Pulse: 63 (04/03/23 1554)  Resp: 18 (04/03/23 1554)  BP: (!) 99/57 (04/03/23 1554)  SpO2: 96 % (04/03/23 1554)   Vital Signs (24h Range):  Temp:  [97 °F (36.1 °C)-97.8 °F (36.6 °C)] 97 °F (36.1 °C)  Pulse:  [59-74] 63  Resp:  [16-18] 18  SpO2:  [95 %-98 %] 96 %  BP: ()/(57-98) 99/57     Weight: 59.4 kg (131 lb)  Body mass index is 23.96 kg/m².  No intake or output data in the 24 hours ending 04/03/23 1719   Physical Exam  Vitals and nursing note reviewed.   Constitutional:       Appearance: She is well-developed.   Eyes:      Pupils: Pupils are equal, round, and reactive to light.   Cardiovascular:      Rate and Rhythm: Normal rate and regular rhythm.   Pulmonary:      Effort: Pulmonary effort is normal.      Breath sounds: Normal breath sounds.   Abdominal:      Palpations: Abdomen is soft.      Tenderness: There is no abdominal tenderness.   Musculoskeletal:         General: No tenderness.   Skin:     General: Skin is warm and dry.   Neurological:      Mental Status: She is alert and oriented to person, place, and time.   Psychiatric:         Behavior: Behavior normal.       Significant Labs: All pertinent labs within the past 24 hours have been reviewed.  CBC:   Recent Labs   Lab 04/02/23  0821 04/03/23  0535   WBC 8.13 8.08   HGB 12.9 12.9   HCT  41.9 40.5    276     CMP:   Recent Labs   Lab 04/02/23  0821 04/03/23  0535    139   K 4.3 4.2    109   CO2 23 22*   * 177*   BUN 25* 28*   CREATININE 0.7 0.7   CALCIUM 9.2 9.2   PROT 6.4 6.1   ALBUMIN 3.2* 3.2*   BILITOT 0.4 0.3   ALKPHOS 75 79   AST 14 11   ALT 11 10   ANIONGAP 10 8       Significant Imaging: I have reviewed all pertinent imaging results/findings within the past 24 hours.

## 2023-04-03 NOTE — PLAN OF CARE
Problem: Adult Inpatient Plan of Care  Goal: Plan of Care Review  Outcome: Ongoing, Progressing  Goal: Patient-Specific Goal (Individualized)  Outcome: Ongoing, Progressing  Goal: Absence of Hospital-Acquired Illness or Injury  Outcome: Ongoing, Progressing  Goal: Optimal Comfort and Wellbeing  Outcome: Ongoing, Progressing  Goal: Readiness for Transition of Care  Outcome: Ongoing, Progressing     Problem: Diabetes Comorbidity  Goal: Blood Glucose Level Within Targeted Range  Outcome: Ongoing, Progressing     Problem: Chest Pain  Goal: Resolution of Chest Pain Symptoms  Outcome: Ongoing, Progressing

## 2023-04-03 NOTE — PROGRESS NOTES
"[facility] - Observation [address] [place] [address] Medicine  Progress Note    Patient Name: Jud Villa  MRN: [id number]  Patient Class: OP- Observation   Admission Date: 4/1/2023  Length of Stay: 0 days  Attending Physician: Rose Marie Wood MD  Primary Care Provider: Bo Lopez MD        Subjective:     Principal Problem:Chest pain due to myocardial ischemia        HPI:  62 yo F with PMHx acute CVA (admitted to [facility] 3/13-3/16), DM2, and breast Ca in remission who presents to the ED complaining of chest pain x 1 day. Pt. Reports that she developed crushing chest pain this morning around 10 am when she started cleaning around the house. Pt. Reports that the pain was constant, pressure-like and without radiation. She denies any associated nausea, lightheadedness, SOB, or palpitations. She states that she has never had pain like this before. The pain was unrelenting and did not improve until she came to the ED and was given nitroglycerin.    TTE obtained here 3/22/2023 showed "segmental left ventricular wall motion abnormalities consistent with anteroapical MI." Pt. Currently has an outpatient cardiology appointment scheduled for 4/13/2023.      Overview/Hospital Course:  Jud Villa is placed in  Observation for management of chest pain. Continuing aspirin and statin, added plavix. Troponins negative. EKG reviewed. Discussion with Cardiology who recommended inpatient stress trest, ordered. PET stress 04/03 with abnormalities, Interventional Cardiology consulted. NPO at midnight. Patient will discharge when medically ready. She will follow up with Cardiology and PCP.      Interval History: NAEON, hypotensive, other VSSAF on my exam. Patient with abnormal stress test today, Interventional Cardiology consulted. NPO at midnight. CBC without leukocytosis. CMP without emergent abnormalities. Patient without further complaints.     Review of Systems   Constitutional:  Negative for chills and fever.   Respiratory:  " Negative for chest tightness and shortness of breath.    Cardiovascular:  Negative for chest pain, palpitations and leg swelling.   Gastrointestinal:  Negative for abdominal pain and nausea.   Neurological:  Negative for dizziness and weakness.   Objective:     Vital Signs (Most Recent):  Temp: 97 °F (36.1 °C) (04/03/23 1554)  Pulse: 63 (04/03/23 1554)  Resp: 18 (04/03/23 1554)  BP: (!) 99/57 (04/03/23 1554)  SpO2: 96 % (04/03/23 1554)   Vital Signs (24h Range):  Temp:  [97 °F (36.1 °C)-97.8 °F (36.6 °C)] 97 °F (36.1 °C)  Pulse:  [59-74] 63  Resp:  [16-18] 18  SpO2:  [95 %-98 %] 96 %  BP: ()/(57-98) 99/57     Weight: 59.4 kg (131 lb)  Body mass index is 23.96 kg/m².  No intake or output data in the 24 hours ending 04/03/23 1719   Physical Exam  Vitals and nursing note reviewed.   Constitutional:       Appearance: She is well-developed.   Eyes:      Pupils: Pupils are equal, round, and reactive to light.   Cardiovascular:      Rate and Rhythm: Normal rate and regular rhythm.   Pulmonary:      Effort: Pulmonary effort is normal.      Breath sounds: Normal breath sounds.   Abdominal:      Palpations: Abdomen is soft.      Tenderness: There is no abdominal tenderness.   Musculoskeletal:         General: No tenderness.   Skin:     General: Skin is warm and dry.   Neurological:      Mental Status: She is alert and oriented to person, place, and time.   Psychiatric:         Behavior: Behavior normal.       Significant Labs: All pertinent labs within the past 24 hours have been reviewed.  CBC:   Recent Labs   Lab 04/02/23  0821 04/03/23  0535   WBC 8.13 8.08   HGB 12.9 12.9   HCT 41.9 40.5    276     CMP:   Recent Labs   Lab 04/02/23  0821 04/03/23  0535    139   K 4.3 4.2    109   CO2 23 22*   * 177*   BUN 25* 28*   CREATININE 0.7 0.7   CALCIUM 9.2 9.2   PROT 6.4 6.1   ALBUMIN 3.2* 3.2*   BILITOT 0.4 0.3   ALKPHOS 75 79   AST 14 11   ALT 11 10   ANIONGAP 10 8       Significant Imaging: I  have reviewed all pertinent imaging results/findings within the past 24 hours.      Assessment/Plan:      * Chest pain due to myocardial ischemia  -Pt. With pressure-like chest pain reported, relieved with NG known WMA noted on recent TTE consistent with likely anteroapical MI  -Troponin WNL x2, EKG wihtout ST elevations or other significant ST findings, do not suspect ACS  -Plan to consult cards/interventional cards non-emergently in [facility] for consideration of [facility] inpatient vs. Continued f/u outpatient given known abnormal TTE. Will start pt. On max GDMT with ASA, plavix, statin, and B-blocker  - discussion with Cards who recommend stress test in am, ordered  - 04/03 - abnormal PET stress, Interventional Cardiology consulted   - cont telemetry  - stat ekg for chest pain  - NPO midnight    History of CVA (cerebrovascular accident)  -Pt. dx'd 3/2023 with Left PCA territory acute infarct, only symptoms headache and dizziness, no clear residual deficits  -Continue ASA and statin, adding plavix given pt. concerns of CAD as above    Controlled type 2 diabetes mellitus with complication, with long-term current use of insulin  -A1c 6.8, well-controlled on home PO meds  -SSI ordered while inpatient    Breast cancer metastasized to axillary lymph node, left  -No acute issues, pt. S/p B.L mastectomy 2018    COPD (chronic obstructive pulmonary disease)  -No acute issues, duo-nebs ordered PRN      VTE Risk Mitigation (From admission, onward)         Ordered     enoxaparin injection 40 mg  Daily         04/01/23 2044     IP VTE HIGH RISK PATIENT  Once         04/01/23 2044     Place sequential compression device  Until discontinued         04/01/23 2044     Place sequential compression device  Until discontinued         04/01/23 2034                Discharge Planning   JORGE: 4/3/2023     Code Status: Full Code   Is the patient medically ready for discharge?: No    Reason for patient still in hospital (select all that apply):  Patient trending condition, Treatment and Consult recommendations  Discharge Plan A: Home                  Johnny Barahona PA-C  Department of Hospital Medicine   [facility] Observ[facility] 11H

## 2023-04-03 NOTE — PLAN OF CARE
Problem: Adult Inpatient Plan of Care  Goal: Plan of Care Review  Outcome: Ongoing, Progressing  Goal: Patient-Specific Goal (Individualized)  Outcome: Ongoing, Progressing  Goal: Absence of Hospital-Acquired Illness or Injury  Outcome: Ongoing, Progressing  Goal: Optimal Comfort and Wellbeing  Outcome: Ongoing, Progressing  Goal: Readiness for Transition of Care  Outcome: Ongoing, Progressing     Problem: Diabetes Comorbidity  Goal: Blood Glucose Level Within Targeted Range  Outcome: Ongoing, Progressing     Problem: Chest Pain  Goal: Resolution of Chest Pain Symptoms  Outcome: Ongoing, Progressing   Pt progressing toward goals. No distress noted. No falls or injuries during shift. Pt bed in lowest position. Side rails x2. Bed alarm activated. Telemetry maintained. Safety precautions maintained.

## 2023-04-04 PROBLEM — I25.118 CORONARY ARTERY DISEASE OF NATIVE HEART WITH STABLE ANGINA PECTORIS: Status: ACTIVE | Noted: 2023-04-04

## 2023-04-04 LAB
ALBUMIN SERPL BCP-MCNC: 3.3 G/DL (ref 3.5–5.2)
ALP SERPL-CCNC: 86 U/L (ref 55–135)
ALT SERPL W/O P-5'-P-CCNC: 11 U/L (ref 10–44)
ANION GAP SERPL CALC-SCNC: 9 MMOL/L (ref 8–16)
AST SERPL-CCNC: 11 U/L (ref 10–40)
BASOPHILS # BLD AUTO: 0.06 K/UL (ref 0–0.2)
BASOPHILS NFR BLD: 0.8 % (ref 0–1.9)
BILIRUB SERPL-MCNC: 0.3 MG/DL (ref 0.1–1)
BUN SERPL-MCNC: 22 MG/DL (ref 8–23)
CALCIUM SERPL-MCNC: 9 MG/DL (ref 8.7–10.5)
CHLORIDE SERPL-SCNC: 109 MMOL/L (ref 95–110)
CO2 SERPL-SCNC: 20 MMOL/L (ref 23–29)
CREAT SERPL-MCNC: 0.7 MG/DL (ref 0.5–1.4)
DIFFERENTIAL METHOD: ABNORMAL
EOSINOPHIL # BLD AUTO: 0.5 K/UL (ref 0–0.5)
EOSINOPHIL NFR BLD: 5.8 % (ref 0–8)
ERYTHROCYTE [DISTWIDTH] IN BLOOD BY AUTOMATED COUNT: 13.2 % (ref 11.5–14.5)
EST. GFR  (NO RACE VARIABLE): >60 ML/MIN/1.73 M^2
GLUCOSE SERPL-MCNC: 244 MG/DL (ref 70–110)
HCT VFR BLD AUTO: 41.4 % (ref 37–48.5)
HGB BLD-MCNC: 13 G/DL (ref 12–16)
IMM GRANULOCYTES # BLD AUTO: 0.01 K/UL (ref 0–0.04)
IMM GRANULOCYTES NFR BLD AUTO: 0.1 % (ref 0–0.5)
LEFT ABI: 1.08
LEFT CBA DIAS: 20 CM/S
LEFT CBA SYS: 54 CM/S
LEFT CCA DIST DIAS: 20 CM/S
LEFT CCA DIST SYS: 65 CM/S
LEFT CCA MID DIAS: 19 CM/S
LEFT CCA MID SYS: 60 CM/S
LEFT CCA PROX DIAS: 16 CM/S
LEFT CCA PROX SYS: 57 CM/S
LEFT DORSALIS PEDIS: 121 MMHG
LEFT ECA DIAS: 6 CM/S
LEFT ECA SYS: 54 CM/S
LEFT ICA DIST DIAS: 28 CM/S
LEFT ICA DIST SYS: 77 CM/S
LEFT ICA MID DIAS: 26 CM/S
LEFT ICA MID SYS: 84 CM/S
LEFT ICA PROX DIAS: 28 CM/S
LEFT ICA PROX SYS: 65 CM/S
LEFT POSTERIOR TIBIAL: 114 MMHG
LEFT VERTEBRAL DIAS: 17 CM/S
LEFT VERTEBRAL SYS: 52 CM/S
LYMPHOCYTES # BLD AUTO: 2.2 K/UL (ref 1–4.8)
LYMPHOCYTES NFR BLD: 28.2 % (ref 18–48)
MAGNESIUM SERPL-MCNC: 2 MG/DL (ref 1.6–2.6)
MCH RBC QN AUTO: 28.6 PG (ref 27–31)
MCHC RBC AUTO-ENTMCNC: 31.4 G/DL (ref 32–36)
MCV RBC AUTO: 91 FL (ref 82–98)
MONOCYTES # BLD AUTO: 0.6 K/UL (ref 0.3–1)
MONOCYTES NFR BLD: 7.6 % (ref 4–15)
NEUTROPHILS # BLD AUTO: 4.5 K/UL (ref 1.8–7.7)
NEUTROPHILS NFR BLD: 57.5 % (ref 38–73)
NRBC BLD-RTO: 0 /100 WBC
OHS CV CAROTID RIGHT ICA EDV HIGHEST: 22
OHS CV CAROTID ULTRASOUND LEFT ICA/CCA RATIO: 1.29
OHS CV CAROTID ULTRASOUND RIGHT ICA/CCA RATIO: 1.47
OHS CV PV CAROTID LEFT HIGHEST CCA: 65
OHS CV PV CAROTID LEFT HIGHEST ICA: 84
OHS CV PV CAROTID RIGHT HIGHEST CCA: 63
OHS CV PV CAROTID RIGHT HIGHEST ICA: 81
OHS CV US CAROTID LEFT HIGHEST EDV: 28
PHOSPHATE SERPL-MCNC: 3.5 MG/DL (ref 2.7–4.5)
PLATELET # BLD AUTO: 265 K/UL (ref 150–450)
PLATELET RESPONSE PLAVIX: 146 PRU (ref 194–418)
PMV BLD AUTO: 9.7 FL (ref 9.2–12.9)
POCT GLUCOSE: 145 MG/DL (ref 70–110)
POCT GLUCOSE: 171 MG/DL (ref 70–110)
POCT GLUCOSE: 208 MG/DL (ref 70–110)
POTASSIUM SERPL-SCNC: 3.9 MMOL/L (ref 3.5–5.1)
PROT SERPL-MCNC: 6.3 G/DL (ref 6–8.4)
RBC # BLD AUTO: 4.55 M/UL (ref 4–5.4)
RIGHT ABI: 1
RIGHT ARM BP: 112 MMHG
RIGHT CBA DIAS: 13 CM/S
RIGHT CBA SYS: 48 CM/S
RIGHT CCA DIST DIAS: 16 CM/S
RIGHT CCA DIST SYS: 55 CM/S
RIGHT CCA MID DIAS: 16 CM/S
RIGHT CCA MID SYS: 62 CM/S
RIGHT CCA PROX DIAS: 13 CM/S
RIGHT CCA PROX SYS: 63 CM/S
RIGHT DORSALIS PEDIS: 112 MMHG
RIGHT ECA DIAS: 9 CM/S
RIGHT ECA SYS: 57 CM/S
RIGHT ICA DIST DIAS: 22 CM/S
RIGHT ICA DIST SYS: 66 CM/S
RIGHT ICA MID DIAS: 21 CM/S
RIGHT ICA MID SYS: 81 CM/S
RIGHT ICA PROX DIAS: 15 CM/S
RIGHT ICA PROX SYS: 64 CM/S
RIGHT POSTERIOR TIBIAL: 112 MMHG
RIGHT VERTEBRAL DIAS: 9 CM/S
RIGHT VERTEBRAL SYS: 30 CM/S
SODIUM SERPL-SCNC: 138 MMOL/L (ref 136–145)
WBC # BLD AUTO: 7.81 K/UL (ref 3.9–12.7)

## 2023-04-04 PROCEDURE — 11000001 HC ACUTE MED/SURG PRIVATE ROOM

## 2023-04-04 PROCEDURE — 36415 COLL VENOUS BLD VENIPUNCTURE: CPT | Mod: HCNC | Performed by: STUDENT IN AN ORGANIZED HEALTH CARE EDUCATION/TRAINING PROGRAM

## 2023-04-04 PROCEDURE — 12000002 HC ACUTE/MED SURGE SEMI-PRIVATE ROOM

## 2023-04-04 PROCEDURE — 25000003 PHARM REV CODE 250: Mod: HCNC

## 2023-04-04 PROCEDURE — G0378 HOSPITAL OBSERVATION PER HR: HCPCS | Mod: HCNC

## 2023-04-04 PROCEDURE — 99233 PR SUBSEQUENT HOSPITAL CARE,LEVL III: ICD-10-PCS | Mod: HCNC,,,

## 2023-04-04 PROCEDURE — 94761 N-INVAS EAR/PLS OXIMETRY MLT: CPT | Mod: HCNC

## 2023-04-04 PROCEDURE — 84100 ASSAY OF PHOSPHORUS: CPT | Mod: HCNC

## 2023-04-04 PROCEDURE — 21400001 HC TELEMETRY ROOM

## 2023-04-04 PROCEDURE — 85025 COMPLETE CBC W/AUTO DIFF WBC: CPT | Mod: HCNC

## 2023-04-04 PROCEDURE — 63600175 PHARM REV CODE 636 W HCPCS: Mod: HCNC | Performed by: HOSPITALIST

## 2023-04-04 PROCEDURE — 36415 COLL VENOUS BLD VENIPUNCTURE: CPT | Mod: HCNC

## 2023-04-04 PROCEDURE — 80053 COMPREHEN METABOLIC PANEL: CPT | Mod: HCNC

## 2023-04-04 PROCEDURE — 85576 BLOOD PLATELET AGGREGATION: CPT | Mod: HCNC | Performed by: STUDENT IN AN ORGANIZED HEALTH CARE EDUCATION/TRAINING PROGRAM

## 2023-04-04 PROCEDURE — 83735 ASSAY OF MAGNESIUM: CPT | Mod: HCNC

## 2023-04-04 PROCEDURE — 99233 SBSQ HOSP IP/OBS HIGH 50: CPT | Mod: HCNC,,,

## 2023-04-04 PROCEDURE — 25000003 PHARM REV CODE 250: Mod: HCNC | Performed by: HOSPITALIST

## 2023-04-04 RX ORDER — SODIUM CHLORIDE 0.9 % (FLUSH) 0.9 %
10 SYRINGE (ML) INJECTION
Status: DISCONTINUED | OUTPATIENT
Start: 2023-04-04 | End: 2023-04-06 | Stop reason: HOSPADM

## 2023-04-04 RX ORDER — TRAMADOL HYDROCHLORIDE 50 MG/1
50 TABLET ORAL EVERY 6 HOURS PRN
Status: DISCONTINUED | OUTPATIENT
Start: 2023-04-04 | End: 2023-04-06 | Stop reason: HOSPADM

## 2023-04-04 RX ORDER — POLYETHYLENE GLYCOL 3350 17 G/17G
17 POWDER, FOR SOLUTION ORAL 2 TIMES DAILY PRN
Status: DISCONTINUED | OUTPATIENT
Start: 2023-04-05 | End: 2023-04-06 | Stop reason: HOSPADM

## 2023-04-04 RX ORDER — AMOXICILLIN 250 MG
1 CAPSULE ORAL 2 TIMES DAILY PRN
Status: DISCONTINUED | OUTPATIENT
Start: 2023-04-05 | End: 2023-04-06 | Stop reason: HOSPADM

## 2023-04-04 RX ADMIN — GABAPENTIN 300 MG: 300 CAPSULE ORAL at 08:04

## 2023-04-04 RX ADMIN — GABAPENTIN 300 MG: 300 CAPSULE ORAL at 03:04

## 2023-04-04 RX ADMIN — ACETAMINOPHEN 1000 MG: 500 TABLET ORAL at 08:04

## 2023-04-04 RX ADMIN — ATORVASTATIN CALCIUM 80 MG: 40 TABLET, FILM COATED ORAL at 08:04

## 2023-04-04 RX ADMIN — TOPIRAMATE 100 MG: 100 TABLET, FILM COATED ORAL at 08:04

## 2023-04-04 RX ADMIN — METOPROLOL TARTRATE 12.5 MG: 25 TABLET, FILM COATED ORAL at 08:04

## 2023-04-04 RX ADMIN — CLOPIDOGREL BISULFATE 75 MG: 75 TABLET ORAL at 08:04

## 2023-04-04 RX ADMIN — ENOXAPARIN SODIUM 40 MG: 40 INJECTION SUBCUTANEOUS at 04:04

## 2023-04-04 RX ADMIN — TRAMADOL HYDROCHLORIDE 50 MG: 50 TABLET, COATED ORAL at 04:04

## 2023-04-04 RX ADMIN — INSULIN ASPART 1 UNITS: 100 INJECTION, SOLUTION INTRAVENOUS; SUBCUTANEOUS at 08:04

## 2023-04-04 RX ADMIN — ASPIRIN 81 MG CHEWABLE TABLET 81 MG: 81 TABLET CHEWABLE at 08:04

## 2023-04-04 RX ADMIN — PANTOPRAZOLE SODIUM 40 MG: 40 TABLET, DELAYED RELEASE ORAL at 05:04

## 2023-04-04 NOTE — SUBJECTIVE & OBJECTIVE
"Past Medical History:   Diagnosis Date    Asthma     Breast carcinoma     Cataract     Diabetes mellitus     Moderate episode of recurrent major depressive disorder 5/19/2021    Osteoporosis        Past Surgical History:   Procedure Laterality Date    BILATERAL MASTECTOMY  04/26/2018    CHOLECYSTECTOMY      COLONOSCOPY N/A 10/27/2015    Procedure: COLONOSCOPY;  Surgeon: Johnny Hurley MD;  Location: [facility] ENDO;  Service: Endoscopy;  Laterality: N/A;    ESOPHAGOGASTRODUODENOSCOPY N/A 01/24/2022    Procedure: ESOPHAGOGASTRODUODENOSCOPY (EGD);  Surgeon: Mili Katz MD;  Location: [facility] ([address] [place] [address]);  Service: Endoscopy;  Laterality: N/A;  rapid in AM, instr portal -ml    HYSTERECTOMY      LASER PERIPHERAL IRIDOTOMY Bilateral 2019           Review of patient's allergies indicates:  No Known Allergies    PTA Medications   Medication Sig    albuterol (PROVENTIL) 2.5 mg /3 mL (0.083 %) nebulizer solution Take 3 mLs (2.5 mg total) by nebulization every 4 to 6 hours as needed for Wheezing or Shortness of Breath. Rescue    albuterol (VENTOLIN HFA) 90 mcg/actuation inhaler INHALE 2 PUFFS BY MOUTH INTO THE LUNGS EVERY 6 HOURS AS NEEDED FOR WHEEZING    aspirin 81 MG Chew Take 81 mg by mouth once daily.    atorvastatin (LIPITOR) 80 MG tablet Take 80 mg by mouth every evening.    BASAGLAR KWIKPEN U-100 INSULIN glargine 100 units/mL (3mL) SubQ pen INJECT 10 UNITS UNDER THE SKIN EVERY MORNING    BD ULTRA-FINE SHORT PEN NEEDLE 31 gauge x 5/16" Ndle Inject 1 pen into the skin once daily.    clonazePAM (KLONOPIN) 0.5 MG tablet TAKE 1 TABLET(0.5 MG) BY MOUTH EVERY NIGHT AS NEEDED FOR ANXIETY    ergocalciferol (ERGOCALCIFEROL) 50,000 unit Cap TAKE 1 CAPSULE BY MOUTH EVERY 7 DAYS    fluticasone propionate (FLONASE) 50 mcg/actuation nasal spray 1 spray (50 mcg total) by Each Nostril route once daily.    gabapentin (NEURONTIN) 300 MG capsule Take 1 capsule (300 mg total) by mouth 3 (three) times daily.    glipiZIDE " (GLUCOTROL) 10 MG tablet TAKE 1 TABLET(10 MG) BY MOUTH TWICE DAILY    JARDIANCE 10 mg tablet TAKE 1 TABLET(10 MG) BY MOUTH EVERY DAY    metFORMIN (GLUCOPHAGE) 1000 MG tablet TAKE 1 TABLET(1000 MG) BY MOUTH TWICE DAILY WITH MEALS    nicotine (NICODERM CQ) 21 mg/24 hr PLACE 1 PATCH ONTO THE SKIN DAILY    ondansetron (ZOFRAN-ODT) 4 MG TbDL Take 1 tablet (4 mg total) by mouth 3 (three) times daily.    pantoprazole (PROTONIX) 40 MG tablet Take 1 tablet (40 mg total) by mouth before breakfast.    sertraline (ZOLOFT) 50 MG tablet Take 1 tablet (50 mg total) by mouth once daily.    sucralfate (CARAFATE) 1 gram tablet TAKE 1 TABLET(1 GRAM) BY MOUTH THREE TIMES DAILY BEFORE MEALS    topiramate (TOPAMAX) 50 MG tablet Take 2 tablets (100 mg total) by mouth 2 (two) times daily. TAKE 1 TABLET BY MOUTH TWICE DAILY FOR 7 DAYS, THEN 1 TABLET EVERY MORNING AND 2 TABLETS EVERY EVENING FOR 7 DAYS, THEN 2 TABLETS TWICE DAILY    TRADJENTA 5 mg Tab tablet Take 1 tablet (5 mg total) by mouth once daily.    traMADoL (ULTRAM) 50 mg tablet Take 1 tablet (50 mg total) by mouth every 6 (six) hours as needed for Pain.    TRELEGY ELLIPTA 100-62.5-25 mcg DsDv INHALE 1 PUFF INTO THE LUNGS EVERY DAY    TRUE METRIX GLUCOSE TEST STRIP Strp USE AS DIRECTED FOUR TIMES DAILY     Family History       Problem Relation (Age of Onset)    Diabetes Father          Tobacco Use    Smoking status: Former     Packs/day: 1.00     Years: 40.00     Pack years: 40.00     Types: Cigarettes     Quit date: 3/12/2023     Years since quittin.0     Passive exposure: Current    Smokeless tobacco: Never   Substance and Sexual Activity    Alcohol use: No     Alcohol/week: 0.0 standard drinks    Drug use: No    Sexual activity: Not on file     Review of Systems   Constitutional: Positive for malaise/fatigue.   Objective:     Vital Signs (Most Recent):  Temp: 98.1 °F (36.7 °C) (23)  Pulse: 66 (23)  Resp: 18 (23)  BP: 131/66 (23  0834)  SpO2: 98 % (04/04/23 0733) Vital Signs (24h Range):  Temp:  [96.6 °F (35.9 °C)-98.1 °F (36.7 °C)] 98.1 °F (36.7 °C)  Pulse:  [58-72] 66  Resp:  [18] 18  SpO2:  [95 %-98 %] 98 %  BP: ()/(57-70) 131/66     Weight: 59.4 kg (131 lb)  Body mass index is 23.96 kg/m².    SpO2: 98 %       No intake or output data in the 24 hours ending 04/04/23 0949    Lines/Drains/Airways       Peripheral Intravenous Line  Duration                  Peripheral IV - Single Lumen 04/01/23 1622 20 G Right Antecubital 2 days                    Physical Exam  Vitals reviewed.   Constitutional:       Appearance: Normal appearance.   HENT:      Head: Normocephalic and atraumatic.      Mouth/Throat:      Mouth: Mucous membranes are moist.   Eyes:      Conjunctiva/sclera: Conjunctivae normal.   Cardiovascular:      Rate and Rhythm: Normal rate and regular rhythm.   Pulmonary:      Effort: Pulmonary effort is normal. No respiratory distress.   Abdominal:      General: Abdomen is flat. There is no distension.   Musculoskeletal:      Cervical back: Normal range of motion.      Right lower leg: No edema.      Left lower leg: No edema.   Skin:     Capillary Refill: Capillary refill takes less than 2 seconds.      Findings: No rash.   Neurological:      Mental Status: She is alert and oriented to person, place, and time.       Significant Labs: All pertinent lab results from the last 24 hours have been reviewed.

## 2023-04-04 NOTE — SUBJECTIVE & OBJECTIVE
Interval History:   FLOR[place], ELANF on my exam. Patient without complaints. Evaluated by IC today, plan for LHC in AM. NPO midnight. CBC without leukocytosis. CMP without emergent abnormalities.     Review of Systems   Constitutional:  Negative for chills and fever.   Respiratory:  Negative for chest tightness and shortness of breath.    Cardiovascular:  Negative for chest pain, palpitations and leg swelling.   Gastrointestinal:  Negative for abdominal pain and nausea.   Neurological:  Negative for dizziness and weakness.   Objective:     Vital Signs (Most Recent):  Temp: 98 °F (36.7 °C) (04/04/23 1501)  Pulse: 74 (04/04/23 1507)  Resp: 18 (04/04/23 1636)  BP: 131/66 (04/04/23 1501)  SpO2: 99 % (04/04/23 1501) Vital Signs (24h Range):  Temp:  [96.6 °F (35.9 °C)-98.1 °F (36.7 °C)] 98 °F (36.7 °C)  Pulse:  [55-74] 74  Resp:  [18] 18  SpO2:  [95 %-99 %] 99 %  BP: (108-131)/(59-69) 131/66     Weight: 59.4 kg (131 lb)  Body mass index is 23.96 kg/m².  No intake or output data in the 24 hours ending 04/04/23 1702   Physical Exam  Vitals and nursing note reviewed.   Constitutional:       Appearance: She is well-developed.   Eyes:      Pupils: Pupils are equal, round, and reactive to light.   Cardiovascular:      Rate and Rhythm: Normal rate and regular rhythm.   Pulmonary:      Effort: Pulmonary effort is normal.      Breath sounds: Normal breath sounds.   Abdominal:      Palpations: Abdomen is soft.      Tenderness: There is no abdominal tenderness.   Musculoskeletal:         General: No tenderness.   Skin:     General: Skin is warm and dry.   Neurological:      Mental Status: She is alert and oriented to person, place, and time.   Psychiatric:         Behavior: Behavior normal.       Significant Labs: All pertinent labs within the past 24 hours have been reviewed.  CBC:   Recent Labs   Lab 04/03/23 0535 04/04/23  0633   WBC 8.08 7.81   HGB 12.9 13.0   HCT 40.5 41.4    265     CMP:   Recent Labs   Lab 04/03/23 0535  04/04/23  0633    138   K 4.2 3.9    109   CO2 22* 20*   * 244*   BUN 28* 22   CREATININE 0.7 0.7   CALCIUM 9.2 9.0   PROT 6.1 6.3   ALBUMIN 3.2* 3.3*   BILITOT 0.3 0.3   ALKPHOS 79 86   AST 11 11   ALT 10 11   ANIONGAP 8 9       Significant Imaging: I have reviewed all pertinent imaging results/findings within the past 24 hours.

## 2023-04-04 NOTE — ASSESSMENT & PLAN NOTE
Risk factors but no history of CAD prior to admit  PET with large ischemic territory in LAD distribution     - Plan is for LHC/angiogram +/- PCI tomorrow 4/5, keep npo at midnight   - TTE: 55%  - Anti-platelet therapy: ASA and plavix  - Access: R fem  - Creatinine/CrCl: 0.7  - Hemoglobin: 13  - Allergies: no contrast allergies.   - Pre-op hydration: 3 cc/kg/hr x 1 hour  - Pre-op med: 50 mg Benadryl     All patient's questions were answered.   The risks, benefits and alternatives of the procedure were explained to the patient.    The risks of coronary angiography include but are not limited to: bleeding, infection, heart rhythm abnormalities, allergic reactions, kidney injury and potential need for dialysis, stroke and death.    Should stenting be indicated, the patient has agreed to dual anti-platelet therapy for 1-consecutive year with a drug-eluting stent and a minimum of 1-month with the use of a bare metal stent   Additionally, pt is aware that non-compliance is likely to result in stent clotting with heart attack, heart failure, and/or death   The risks of moderate sedation include hypotension, respiratory depression, arrhythmias, bronchospasm, and death.    Informed consent was obtained and the  patient is agreeable to proceed with the procedure.

## 2023-04-04 NOTE — CONSULTS
[facility] Observation 11H  Interventional Cardiology  Consult Note    Patient Name: Jud Villa  MRN: [id number]  Admission Date: 4/1/2023  Hospital Length of Stay: 0 days  Code Status: Full Code   Attending Provider: Rose Marie Wood MD  Consulting Provider: Juan Barnett MD  Primary Care Physician: Bo Lopez MD  Principal Problem:Chest pain due to myocardial ischemia    Patient information was obtained from patient and past medical records.     Inpatient consult to Interventional Cardiology  Consult performed by: Juan Barnett MD  Consult ordered by: Johnny Barahona PA-C        Subjective:     Chief Complaint:  Chest pain     HPI:  PMHx acute CVA (admitted to [facility] 3/13-3/16), DM2, tobacco use, DM, and triple negative breast cancer 2018 (treated with chemo and bilateral mastectomy, in remission) who presented on 4/1 due to chest pain. Reports she was doing chores/mopping when she developed chest pain described as substernal, pressure like without radiation, no associated SOB or diaphoresis. Denies history of angina. The pain was unrelenting and did not improve until she came to the ED and was given nitroglycerin. Admitted to , continued on aspirin and statin, added plavix. Troponins negative x3. EKG with no acute ischemic changes. PET stress below. No further chest discomfort since arrival     PET stress on 4/3  Large sized, severe intensity mid to apical anterior, septal, anteroseptal and inferoapical resting perfusion abnormality involving 29% of LV myocardium in the distribution of the proximal  LAD. After pharmacologic stress, this perfusion abnormality is larger and more severe involving 38% of the LV myocardium, indicative of ischemia with underlying scar.    Results for orders placed during the hospital encounter of 03/22/23    Echo Saline Bubble? No    Interpretation Summary  · The left ventricle is normal in size with normal systolic function.  · The estimated ejection fraction is  "55%.  · There are segmental left ventricular wall motion abnormalities consistent with anteroapical MI.  · Normal left ventricular diastolic function.  · The estimated PA systolic pressure is 32 mmHg.  · Normal right ventricular size with normal right ventricular systolic function.  · Normal central venous pressure (3 mmHg).  · There is no pulmonary hypertension.    Echocardiogram report from [facility] reviewed, appears consistent with current study        Past Medical History:   Diagnosis Date    Asthma     Breast carcinoma     Cataract     Diabetes mellitus     Moderate episode of recurrent major depressive disorder 5/19/2021    Osteoporosis        Past Surgical History:   Procedure Laterality Date    BILATERAL MASTECTOMY  04/26/2018    CHOLECYSTECTOMY      COLONOSCOPY N/A 10/27/2015    Procedure: COLONOSCOPY;  Surgeon: Johnny Hurley MD;  Location: [facility] ENDO;  Service: Endoscopy;  Laterality: N/A;    ESOPHAGOGASTRODUODENOSCOPY N/A 01/24/2022    Procedure: ESOPHAGOGASTRODUODENOSCOPY (EGD);  Surgeon: Mili Katz MD;  [facility]  Service: Endoscopy;  Laterality: N/A;  rapid in AM, instr portal -ml    HYSTERECTOMY      LASER PERIPHERAL IRIDOTOMY Bilateral 2019           Review of patient's allergies indicates:  No Known Allergies    PTA Medications   Medication Sig    albuterol (PROVENTIL) 2.5 mg /3 mL (0.083 %) nebulizer solution Take 3 mLs (2.5 mg total) by nebulization every 4 to 6 hours as needed for Wheezing or Shortness of Breath. Rescue    albuterol (VENTOLIN HFA) 90 mcg/actuation inhaler INHALE 2 PUFFS BY MOUTH INTO THE LUNGS EVERY 6 HOURS AS NEEDED FOR WHEEZING    aspirin 81 MG Chew Take 81 mg by mouth once daily.    atorvastatin (LIPITOR) 80 MG tablet Take 80 mg by mouth every evening.    BASAGLAR KWIKPEN U-100 INSULIN glargine 100 units/mL (3mL) SubQ pen INJECT 10 UNITS UNDER THE SKIN EVERY MORNING    BD ULTRA-FINE SHORT PEN NEEDLE 31 gauge x 5/16" Ndle " Inject 1 pen into the skin once daily.    clonazePAM (KLONOPIN) 0.5 MG tablet TAKE 1 TABLET(0.5 MG) BY MOUTH EVERY NIGHT AS NEEDED FOR ANXIETY    ergocalciferol (ERGOCALCIFEROL) 50,000 unit Cap TAKE 1 CAPSULE BY MOUTH EVERY 7 DAYS    fluticasone propionate (FLONASE) 50 mcg/actuation nasal spray 1 spray (50 mcg total) by Each Nostril route once daily.    gabapentin (NEURONTIN) 300 MG capsule Take 1 capsule (300 mg total) by mouth 3 (three) times daily.    glipiZIDE (GLUCOTROL) 10 MG tablet TAKE 1 TABLET(10 MG) BY MOUTH TWICE DAILY    JARDIANCE 10 mg tablet TAKE 1 TABLET(10 MG) BY MOUTH EVERY DAY    metFORMIN (GLUCOPHAGE) 1000 MG tablet TAKE 1 TABLET(1000 MG) BY MOUTH TWICE DAILY WITH MEALS    nicotine (NICODERM CQ) 21 mg/24 hr PLACE 1 PATCH ONTO THE SKIN DAILY    ondansetron (ZOFRAN-ODT) 4 MG TbDL Take 1 tablet (4 mg total) by mouth 3 (three) times daily.    pantoprazole (PROTONIX) 40 MG tablet Take 1 tablet (40 mg total) by mouth before breakfast.    sertraline (ZOLOFT) 50 MG tablet Take 1 tablet (50 mg total) by mouth once daily.    sucralfate (CARAFATE) 1 gram tablet TAKE 1 TABLET(1 GRAM) BY MOUTH THREE TIMES DAILY BEFORE MEALS    topiramate (TOPAMAX) 50 MG tablet Take 2 tablets (100 mg total) by mouth 2 (two) times daily. TAKE 1 TABLET BY MOUTH TWICE DAILY FOR 7 DAYS, THEN 1 TABLET EVERY MORNING AND 2 TABLETS EVERY EVENING FOR 7 DAYS, THEN 2 TABLETS TWICE DAILY    TRADJENTA 5 mg Tab tablet Take 1 tablet (5 mg total) by mouth once daily.    traMADoL (ULTRAM) 50 mg tablet Take 1 tablet (50 mg total) by mouth every 6 (six) hours as needed for Pain.    TRELEGY ELLIPTA 100-62.5-25 mcg DsDv INHALE 1 PUFF INTO THE LUNGS EVERY DAY    TRUE METRIX GLUCOSE TEST STRIP Strp USE AS DIRECTED FOUR TIMES DAILY     Family History       Problem Relation (Age of Onset)    Diabetes Father          Tobacco Use    Smoking status: Former     Packs/day: 1.00     Years: 40.00     Pack years: 40.00     Types: Cigarettes      Quit date: 3/12/2023     Years since quittin.0     Passive exposure: Current    Smokeless tobacco: Never   Substance and Sexual Activity    Alcohol use: No     Alcohol/week: 0.0 standard drinks    Drug use: No    Sexual activity: Not on file     Review of Systems   Constitutional: Positive for malaise/fatigue.   Objective:     Vital Signs (Most Recent):  Temp: 98.1 °F (36.7 °C) (23 07)  Pulse: 66 (23 07)  Resp: 18 (23)  BP: 131/66 (23 0834)  SpO2: 98 % (23) Vital Signs (24h Range):  Temp:  [96.6 °F (35.9 °C)-98.1 °F (36.7 °C)] 98.1 °F (36.7 °C)  Pulse:  [58-72] 66  Resp:  [18] 18  SpO2:  [95 %-98 %] 98 %  BP: ()/(57-70) 131/66     Weight: 59.4 kg (131 lb)  Body mass index is 23.96 kg/m².    SpO2: 98 %       No intake or output data in the 24 hours ending 23 0949    Lines/Drains/Airways       Peripheral Intravenous Line  Duration                  Peripheral IV - Single Lumen 23 1622 20 G Right Antecubital 2 days                    Physical Exam  Vitals reviewed.   Constitutional:       Appearance: Normal appearance.   HENT:      Head: Normocephalic and atraumatic.      Mouth/Throat:      Mouth: Mucous membranes are moist.   Eyes:      Conjunctiva/sclera: Conjunctivae normal.   Cardiovascular:      Rate and Rhythm: Normal rate and regular rhythm.   Pulmonary:      Effort: Pulmonary effort is normal. No respiratory distress.   Abdominal:      General: Abdomen is flat. There is no distension.   Musculoskeletal:      Cervical back: Normal range of motion.      Right lower leg: No edema.      Left lower leg: No edema.   Skin:     Capillary Refill: Capillary refill takes less than 2 seconds.      Findings: No rash.   Neurological:      Mental Status: She is alert and oriented to person, place, and time.       Significant Labs: All pertinent lab results from the last 24 hours have been reviewed.        Assessment and Plan:      Cardiac/Vascular  Coronary artery disease of native heart with stable angina pectoris  Risk factors but no history of CAD prior to admit  PET with large ischemic territory in LAD distribution     - Plan is for LHC/angiogram +/- PCI tomorrow 4/5, keep npo at midnight   - TTE: 55%  - Anti-platelet therapy: ASA and plavix  - Access: R fem  - Creatinine/CrCl: 0.7  - Hemoglobin: 13  - Allergies: no contrast allergies.   - Pre-op hydration: 3 cc/kg/hr x 1 hour  - Pre-op med: 50 mg Benadryl     All patient's questions were answered.   The risks, benefits and alternatives of the procedure were explained to the patient.    The risks of coronary angiography include but are not limited to: bleeding, infection, heart rhythm abnormalities, allergic reactions, kidney injury and potential need for dialysis, stroke and death.    Should stenting be indicated, the patient has agreed to dual anti-platelet therapy for 1-consecutive year with a drug-eluting stent and a minimum of 1-month with the use of a bare metal stent   Additionally, pt is aware that non-compliance is likely to result in stent clotting with heart attack, heart failure, and/or death   The risks of moderate sedation include hypotension, respiratory depression, arrhythmias, bronchospasm, and death.    Informed consent was obtained and the  patient is agreeable to proceed with the procedure.                  VTE Risk Mitigation (From admission, onward)         Ordered     enoxaparin injection 40 mg  Daily         04/01/23 2044     IP VTE HIGH RISK PATIENT  Once         04/01/23 2044     Place sequential compression device  Until discontinued         04/01/23 2044     Place sequential compression device  Until discontinued         04/01/23 2034                    Juan Barnett MD  Interventional Cardiology   [facility] Observation 11H

## 2023-04-04 NOTE — ASSESSMENT & PLAN NOTE
Coronary artery disease of native heart with stable angina pectoris   -Pt. With pressure-like chest pain reported, relieved with NG known WMA noted on recent TTE consistent with likely anteroapical MI  -Troponin WNL x2, EKG wihtout ST elevations or other significant ST findings, do not suspect ACS  -Plan to consult cards/interventional cards non-emergently in [facility] for consideration of LHC inpatient vs. Continued f/u outpatient given known abnormal TTE. Will start pt. On max GDMT with ASA, plavix, statin, and B-blocker  - discussion with Cards who recommend stress test in am, ordered  - 04/03 - abnormal PET stress, Interventional Cardiology consulted, appreciate assistance   - cont telemetry  - stat ekg for chest pain  - LHC in AM  - NPO midnight

## 2023-04-04 NOTE — PROGRESS NOTES
"[facility] - Observation [address] [place] [address] Medicine  Progress Note    Patient Name: Jud Villa  MRN: [id number]  Patient Class: IP- Inpatient   Admission Date: 4/1/2023  Length of Stay: 0 days  Attending Physician: Rose Marie Wood MD  Primary Care Provider: Bo Lopez MD        Subjective:     Principal Problem:Chest pain due to myocardial ischemia        HPI:  62 yo F with PMHx acute CVA (admitted to [facility] 3/13-3/16), DM2, and breast Ca in remission who presents to the ED complaining of chest pain x 1 day. Pt. Reports that she developed crushing chest pain this morning around 10 am when she started cleaning around the house. Pt. Reports that the pain was constant, pressure-like and without radiation. She denies any associated nausea, lightheadedness, SOB, or palpitations. She states that she has never had pain like this before. The pain was unrelenting and did not improve until she came to the ED and was given nitroglycerin.    TTE obtained here 3/22/2023 showed "segmental left ventricular wall motion abnormalities consistent with anteroapical MI." Pt. Currently has an outpatient cardiology appointment scheduled for 4/13/2023.      Overview/Hospital Course:  Jud Villa is placed in  Observation for management of chest pain. Continuing aspirin and statin, added plavix. Troponins negative. EKG reviewed. Discussion with Cardiology who recommended inpatient stress trest, ordered. PET stress 04/03 with abnormalities, Interventional Cardiology consulted, NPO midnight, LHC planned for tomorrow. Patient will discharge when medically ready. She will follow up with Cardiology and PCP.      Interval History:   AISHA HENRIQUEZ on my exam. Patient without complaints. Evaluated by IC today, plan for LHC in AM. NPO midnight. CBC without leukocytosis. CMP without emergent abnormalities.     Review of Systems   Constitutional:  Negative for chills and fever.   Respiratory:  Negative for chest tightness and shortness " of breath.    Cardiovascular:  Negative for chest pain, palpitations and leg swelling.   Gastrointestinal:  Negative for abdominal pain and nausea.   Neurological:  Negative for dizziness and weakness.   Objective:     Vital Signs (Most Recent):  Temp: 98 °F (36.7 °C) (04/04/23 1501)  Pulse: 74 (04/04/23 1507)  Resp: 18 (04/04/23 1636)  BP: 131/66 (04/04/23 1501)  SpO2: 99 % (04/04/23 1501) Vital Signs (24h Range):  Temp:  [96.6 °F (35.9 °C)-98.1 °F (36.7 °C)] 98 °F (36.7 °C)  Pulse:  [55-74] 74  Resp:  [18] 18  SpO2:  [95 %-99 %] 99 %  BP: (108-131)/(59-69) 131/66     Weight: 59.4 kg (131 lb)  Body mass index is 23.96 kg/m².  No intake or output data in the 24 hours ending 04/04/23 1702   Physical Exam  Vitals and nursing note reviewed.   Constitutional:       Appearance: She is well-developed.   Eyes:      Pupils: Pupils are equal, round, and reactive to light.   Cardiovascular:      Rate and Rhythm: Normal rate and regular rhythm.   Pulmonary:      Effort: Pulmonary effort is normal.      Breath sounds: Normal breath sounds.   Abdominal:      Palpations: Abdomen is soft.      Tenderness: There is no abdominal tenderness.   Musculoskeletal:         General: No tenderness.   Skin:     General: Skin is warm and dry.   Neurological:      Mental Status: She is alert and oriented to person, place, and time.   Psychiatric:         Behavior: Behavior normal.       Significant Labs: All pertinent labs within the past 24 hours have been reviewed.  CBC:   Recent Labs   Lab 04/03/23  0535 04/04/23  0633   WBC 8.08 7.81   HGB 12.9 13.0   HCT 40.5 41.4    265     CMP:   Recent Labs   Lab 04/03/23  0535 04/04/23  0633    138   K 4.2 3.9    109   CO2 22* 20*   * 244*   BUN 28* 22   CREATININE 0.7 0.7   CALCIUM 9.2 9.0   PROT 6.1 6.3   ALBUMIN 3.2* 3.3*   BILITOT 0.3 0.3   ALKPHOS 79 86   AST 11 11   ALT 10 11   ANIONGAP 8 9       Significant Imaging: I have reviewed all pertinent imaging  results/findings within the past 24 hours.      Assessment/Plan:      * Chest pain due to myocardial ischemia  Coronary artery disease of native heart with stable angina pectoris   -Pt. With pressure-like chest pain reported, relieved with NG known WMA noted on recent TTE consistent with likely anteroapical MI  -Troponin WNL x2, EKG wihtout ST elevations or other significant ST findings, do not suspect ACS  -Plan to consult cards/interventional cards non-emergently in [facility] for consideration of C inpatient vs. Continued f/u outpatient given known abnormal TTE. Will start pt. On max GDMT with ASA, plavix, statin, and B-blocker  - discussion with Cards who recommend stress test in am, ordered  - 04/03 - abnormal PET stress, Interventional Cardiology consulted, appreciate assistance   - cont telemetry  - stat ekg for chest pain  - LHC in AM  - NPO midnight    History of CVA (cerebrovascular accident)  -Pt. dx'd 3/2023 with Left PCA territory acute infarct, only symptoms headache and dizziness, no clear residual deficits  -Continue ASA and statin, adding plavix given pt. concerns of CAD as above    Controlled type 2 diabetes mellitus with complication, with long-term current use of insulin  -A1c 6.8, well-controlled on home PO meds  -SSI ordered while inpatient    Breast cancer metastasized to axillary lymph node, left  -No acute issues, pt. S/p B.L mastectomy 2018    COPD (chronic obstructive pulmonary disease)  -No acute issues, duo-nebs ordered PRN      VTE Risk Mitigation (From admission, onward)         Ordered     enoxaparin injection 40 mg  Daily         04/01/23 2044     IP VTE HIGH RISK PATIENT  Once         04/01/23 2044     Place sequential compression device  Until discontinued         04/01/23 2044     Place sequential compression device  Until discontinued         04/01/23 2034                Discharge Planning   JORGE: 4/5/2023     Code Status: Full Code   Is the patient medically ready for discharge?:  No    Reason for patient still in hospital (select all that apply): Treatment  Discharge Plan A: Home                  Johnny Barahona PA-C  Department of Hospital Medicine   [facility] - Observation 11H

## 2023-04-04 NOTE — HPI
PMHx acute CVA (admitted to [facility] 3/13-3/16), DM2, tobacco use, DM, and triple negative breast cancer 2018 (treated with chemo and bilateral mastectomy, in remission) who presented on 4/1 due to chest pain. Reports she was doing chores/mopping when she developed chest pain described as substernal, pressure like without radiation, no associated SOB or diaphoresis. Denies history of angina. The pain was unrelenting and did not improve until she came to the ED and was given nitroglycerin. Admitted to , continued on aspirin and statin, added plavix. Troponins negative x3. EKG with no acute ischemic changes. PET stress below. No further chest discomfort since arrival     PET stress on 4/3  Large sized, severe intensity mid to apical anterior, septal, anteroseptal and inferoapical resting perfusion abnormality involving 29% of LV myocardium in the distribution of the proximal  LAD. After pharmacologic stress, this perfusion abnormality is larger and more severe involving 38% of the LV myocardium, indicative of ischemia with underlying scar.    Results for orders placed during the hospital encounter of 03/22/23    Echo Saline Bubble? No    Interpretation Summary  · The left ventricle is normal in size with normal systolic function.  · The estimated ejection fraction is 55%.  · There are segmental left ventricular wall motion abnormalities consistent with anteroapical MI.  · Normal left ventricular diastolic function.  · The estimated PA systolic pressure is 32 mmHg.  · Normal right ventricular size with normal right ventricular systolic function.  · Normal central venous pressure (3 mmHg).  · There is no pulmonary hypertension.    Echocardiogram report from [facility], appears consistent with current study

## 2023-04-04 NOTE — PLAN OF CARE
Problem: Adult Inpatient Plan of Care  Goal: Plan of Care Review  Outcome: Ongoing, Progressing  Goal: Patient-Specific Goal (Individualized)  Outcome: Ongoing, Progressing  Goal: Absence of Hospital-Acquired Illness or Injury  Outcome: Ongoing, Progressing  Goal: Optimal Comfort and Wellbeing  Outcome: Ongoing, Progressing  Goal: Readiness for Transition of Care  Outcome: Ongoing, Progressing     Problem: Diabetes Comorbidity  Goal: Blood Glucose Level Within Targeted Range  Outcome: Ongoing, Progressing     Problem: Chest Pain  Goal: Resolution of Chest Pain Symptoms  Outcome: Ongoing, Progressing   Pt progressing toward goals. No distress noted. No falls or injuries during shift. Pt bed in lowest position. Side rails x2. Call bell and personal belongs within reach. Telemetry maintained. Safety precautions maintained.

## 2023-04-05 DIAGNOSIS — I25.10 CORONARY ARTERY DISEASE, UNSPECIFIED VESSEL OR LESION TYPE, UNSPECIFIED WHETHER ANGINA PRESENT, UNSPECIFIED WHETHER NATIVE OR TRANSPLANTED HEART: Primary | ICD-10-CM

## 2023-04-05 DIAGNOSIS — R79.1 ABNORMAL COAGULATION PROFILE: ICD-10-CM

## 2023-04-05 DIAGNOSIS — R79.9 ABNORMAL FINDING OF BLOOD CHEMISTRY, UNSPECIFIED: ICD-10-CM

## 2023-04-05 DIAGNOSIS — R94.2 ABNORMAL RESULTS OF PULMONARY FUNCTION STUDIES: ICD-10-CM

## 2023-04-05 LAB
ABO + RH BLD: NORMAL
ALBUMIN SERPL BCP-MCNC: 3.2 G/DL (ref 3.5–5.2)
ALP SERPL-CCNC: 72 U/L (ref 55–135)
ALT SERPL W/O P-5'-P-CCNC: 12 U/L (ref 10–44)
ANION GAP SERPL CALC-SCNC: 7 MMOL/L (ref 8–16)
AST SERPL-CCNC: 17 U/L (ref 10–40)
BASOPHILS # BLD AUTO: 0.07 K/UL (ref 0–0.2)
BASOPHILS NFR BLD: 0.9 % (ref 0–1.9)
BILIRUB SERPL-MCNC: 0.3 MG/DL (ref 0.1–1)
BLD GP AB SCN CELLS X3 SERPL QL: NORMAL
BUN SERPL-MCNC: 24 MG/DL (ref 8–23)
CALCIUM SERPL-MCNC: 8.9 MG/DL (ref 8.7–10.5)
CHLORIDE SERPL-SCNC: 111 MMOL/L (ref 95–110)
CO2 SERPL-SCNC: 19 MMOL/L (ref 23–29)
CREAT SERPL-MCNC: 0.7 MG/DL (ref 0.5–1.4)
DIFFERENTIAL METHOD: ABNORMAL
EOSINOPHIL # BLD AUTO: 0.4 K/UL (ref 0–0.5)
EOSINOPHIL NFR BLD: 5.6 % (ref 0–8)
ERYTHROCYTE [DISTWIDTH] IN BLOOD BY AUTOMATED COUNT: 13.1 % (ref 11.5–14.5)
EST. GFR  (NO RACE VARIABLE): >60 ML/MIN/1.73 M^2
GLUCOSE SERPL-MCNC: 168 MG/DL (ref 70–110)
HCT VFR BLD AUTO: 42.2 % (ref 37–48.5)
HGB BLD-MCNC: 13.4 G/DL (ref 12–16)
IMM GRANULOCYTES # BLD AUTO: 0.02 K/UL (ref 0–0.04)
IMM GRANULOCYTES NFR BLD AUTO: 0.3 % (ref 0–0.5)
LYMPHOCYTES # BLD AUTO: 2.5 K/UL (ref 1–4.8)
LYMPHOCYTES NFR BLD: 31.8 % (ref 18–48)
MAGNESIUM SERPL-MCNC: 1.7 MG/DL (ref 1.6–2.6)
MCH RBC QN AUTO: 28.5 PG (ref 27–31)
MCHC RBC AUTO-ENTMCNC: 31.8 G/DL (ref 32–36)
MCV RBC AUTO: 90 FL (ref 82–98)
MONOCYTES # BLD AUTO: 0.7 K/UL (ref 0.3–1)
MONOCYTES NFR BLD: 8.7 % (ref 4–15)
NEUTROPHILS # BLD AUTO: 4.1 K/UL (ref 1.8–7.7)
NEUTROPHILS NFR BLD: 52.7 % (ref 38–73)
NRBC BLD-RTO: 0 /100 WBC
PHOSPHATE SERPL-MCNC: 3.7 MG/DL (ref 2.7–4.5)
PLATELET # BLD AUTO: 258 K/UL (ref 150–450)
PMV BLD AUTO: 9.9 FL (ref 9.2–12.9)
POCT GLUCOSE: 153 MG/DL (ref 70–110)
POCT GLUCOSE: 240 MG/DL (ref 70–110)
POCT GLUCOSE: 264 MG/DL (ref 70–110)
POTASSIUM SERPL-SCNC: 4 MMOL/L (ref 3.5–5.1)
PROT SERPL-MCNC: 6.3 G/DL (ref 6–8.4)
RBC # BLD AUTO: 4.7 M/UL (ref 4–5.4)
SODIUM SERPL-SCNC: 137 MMOL/L (ref 136–145)
SPECIMEN OUTDATE: NORMAL
WBC # BLD AUTO: 7.7 K/UL (ref 3.9–12.7)

## 2023-04-05 PROCEDURE — C1769 GUIDE WIRE: HCPCS | Mod: HCNC | Performed by: INTERNAL MEDICINE

## 2023-04-05 PROCEDURE — 25000003 PHARM REV CODE 250: Mod: HCNC | Performed by: STUDENT IN AN ORGANIZED HEALTH CARE EDUCATION/TRAINING PROGRAM

## 2023-04-05 PROCEDURE — 25000003 PHARM REV CODE 250: Mod: HCNC | Performed by: HOSPITALIST

## 2023-04-05 PROCEDURE — 36415 COLL VENOUS BLD VENIPUNCTURE: CPT | Mod: HCNC

## 2023-04-05 PROCEDURE — 63600175 PHARM REV CODE 636 W HCPCS: Mod: HCNC | Performed by: INTERNAL MEDICINE

## 2023-04-05 PROCEDURE — 99233 SBSQ HOSP IP/OBS HIGH 50: CPT | Mod: HCNC,,,

## 2023-04-05 PROCEDURE — 99233 PR SUBSEQUENT HOSPITAL CARE,LEVL III: ICD-10-PCS | Mod: HCNC,,,

## 2023-04-05 PROCEDURE — C1887 CATHETER, GUIDING: HCPCS | Mod: HCNC | Performed by: INTERNAL MEDICINE

## 2023-04-05 PROCEDURE — 86900 BLOOD TYPING SEROLOGIC ABO: CPT | Mod: HCNC | Performed by: STUDENT IN AN ORGANIZED HEALTH CARE EDUCATION/TRAINING PROGRAM

## 2023-04-05 PROCEDURE — 93458 PR CATH PLACE/CORON ANGIO, IMG SUPER/INTERP,W LEFT HEART VENTRICULOGRAPHY: ICD-10-PCS | Mod: 26,HCNC,GC, | Performed by: INTERNAL MEDICINE

## 2023-04-05 PROCEDURE — 25500020 PHARM REV CODE 255: Mod: HCNC | Performed by: INTERNAL MEDICINE

## 2023-04-05 PROCEDURE — 93458 L HRT ARTERY/VENTRICLE ANGIO: CPT | Mod: HCNC | Performed by: INTERNAL MEDICINE

## 2023-04-05 PROCEDURE — C1894 INTRO/SHEATH, NON-LASER: HCPCS | Mod: HCNC | Performed by: INTERNAL MEDICINE

## 2023-04-05 PROCEDURE — 83735 ASSAY OF MAGNESIUM: CPT | Mod: HCNC

## 2023-04-05 PROCEDURE — 93458 L HRT ARTERY/VENTRICLE ANGIO: CPT | Mod: 26,HCNC,GC, | Performed by: INTERNAL MEDICINE

## 2023-04-05 PROCEDURE — 11000001 HC ACUTE MED/SURG PRIVATE ROOM: Mod: HCNC

## 2023-04-05 PROCEDURE — 80053 COMPREHEN METABOLIC PANEL: CPT | Mod: HCNC

## 2023-04-05 PROCEDURE — 21400001 HC TELEMETRY ROOM

## 2023-04-05 PROCEDURE — 84100 ASSAY OF PHOSPHORUS: CPT | Mod: HCNC

## 2023-04-05 PROCEDURE — 25000003 PHARM REV CODE 250: Mod: HCNC | Performed by: INTERNAL MEDICINE

## 2023-04-05 PROCEDURE — 25000242 PHARM REV CODE 250 ALT 637 W/ HCPCS: Mod: HCNC | Performed by: NURSE PRACTITIONER

## 2023-04-05 PROCEDURE — 85025 COMPLETE CBC W/AUTO DIFF WBC: CPT | Mod: HCNC

## 2023-04-05 RX ORDER — LIDOCAINE HYDROCHLORIDE 20 MG/ML
INJECTION, SOLUTION EPIDURAL; INFILTRATION; INTRACAUDAL; PERINEURAL
Status: DISCONTINUED | OUTPATIENT
Start: 2023-04-05 | End: 2023-04-05 | Stop reason: HOSPADM

## 2023-04-05 RX ORDER — SODIUM CHLORIDE 9 MG/ML
INJECTION, SOLUTION INTRAVENOUS CONTINUOUS
Status: ACTIVE | OUTPATIENT
Start: 2023-04-05 | End: 2023-04-05

## 2023-04-05 RX ORDER — FENTANYL CITRATE 50 UG/ML
INJECTION, SOLUTION INTRAMUSCULAR; INTRAVENOUS
Status: DISCONTINUED | OUTPATIENT
Start: 2023-04-05 | End: 2023-04-05 | Stop reason: HOSPADM

## 2023-04-05 RX ORDER — HEPARIN SOD,PORCINE/0.9 % NACL 1000/500ML
INTRAVENOUS SOLUTION INTRAVENOUS
Status: DISCONTINUED | OUTPATIENT
Start: 2023-04-05 | End: 2023-04-05 | Stop reason: HOSPADM

## 2023-04-05 RX ORDER — HEPARIN SODIUM 1000 [USP'U]/ML
INJECTION, SOLUTION INTRAVENOUS; SUBCUTANEOUS
Status: DISCONTINUED | OUTPATIENT
Start: 2023-04-05 | End: 2023-04-05 | Stop reason: HOSPADM

## 2023-04-05 RX ORDER — MIDAZOLAM HYDROCHLORIDE 1 MG/ML
INJECTION, SOLUTION INTRAMUSCULAR; INTRAVENOUS
Status: DISCONTINUED | OUTPATIENT
Start: 2023-04-05 | End: 2023-04-05 | Stop reason: HOSPADM

## 2023-04-05 RX ORDER — ACETAMINOPHEN 325 MG/1
650 TABLET ORAL EVERY 4 HOURS PRN
Status: DISCONTINUED | OUTPATIENT
Start: 2023-04-05 | End: 2023-04-06 | Stop reason: HOSPADM

## 2023-04-05 RX ORDER — DIPHENHYDRAMINE HCL 50 MG
50 CAPSULE ORAL ONCE
Status: COMPLETED | OUTPATIENT
Start: 2023-04-05 | End: 2023-04-05

## 2023-04-05 RX ORDER — FLUTICASONE PROPIONATE 50 MCG
2 SPRAY, SUSPENSION (ML) NASAL DAILY
Status: DISCONTINUED | OUTPATIENT
Start: 2023-04-05 | End: 2023-04-06 | Stop reason: HOSPADM

## 2023-04-05 RX ADMIN — ASPIRIN 81 MG CHEWABLE TABLET 81 MG: 81 TABLET CHEWABLE at 07:04

## 2023-04-05 RX ADMIN — METOPROLOL TARTRATE 12.5 MG: 25 TABLET, FILM COATED ORAL at 08:04

## 2023-04-05 RX ADMIN — FLUTICASONE PROPIONATE 100 MCG: 50 SPRAY, METERED NASAL at 08:04

## 2023-04-05 RX ADMIN — PANTOPRAZOLE SODIUM 40 MG: 40 TABLET, DELAYED RELEASE ORAL at 05:04

## 2023-04-05 RX ADMIN — INSULIN ASPART 2 UNITS: 100 INJECTION, SOLUTION INTRAVENOUS; SUBCUTANEOUS at 08:04

## 2023-04-05 RX ADMIN — TOPIRAMATE 100 MG: 100 TABLET, FILM COATED ORAL at 08:04

## 2023-04-05 RX ADMIN — GABAPENTIN 300 MG: 300 CAPSULE ORAL at 08:04

## 2023-04-05 RX ADMIN — ATORVASTATIN CALCIUM 80 MG: 40 TABLET, FILM COATED ORAL at 08:04

## 2023-04-05 RX ADMIN — GABAPENTIN 300 MG: 300 CAPSULE ORAL at 03:04

## 2023-04-05 RX ADMIN — DIPHENHYDRAMINE HYDROCHLORIDE 50 MG: 50 CAPSULE ORAL at 06:04

## 2023-04-05 RX ADMIN — CLOPIDOGREL BISULFATE 75 MG: 75 TABLET ORAL at 07:04

## 2023-04-05 RX ADMIN — SODIUM CHLORIDE: 9 INJECTION, SOLUTION INTRAVENOUS at 11:04

## 2023-04-05 NOTE — ASSESSMENT & PLAN NOTE
Coronary artery disease of native heart with stable angina pectoris   -Pt. With pressure-like chest pain reported, relieved with NG known WMA noted on recent TTE consistent with likely anteroapical MI  -Troponin WNL x2, EKG wihtout ST elevations or other significant ST findings, do not suspect ACS  -Plan to consult cards/interventional cards non-emergently in [facility] for consideration of [facility] inpatient vs. Continued f/u outpatient given known abnormal TTE. Will start pt. On max GDMT with ASA, plavix, statin, and B-blocker  - discussion with Cards who recommend stress test in am, ordered  - 04/03 - abnormal PET stress, Interventional Cardiology consulted, appreciate assistance   - cont telemetry  - stat ekg for chest pain  - [facility] 04/05 showing severe coronary artery disease, no stents placed  - CTS consulted, appreciate assistance

## 2023-04-05 NOTE — SUBJECTIVE & OBJECTIVE
Interval History: AISHA HENRIQUEZ on my exam. [facility] today showing severe coronary artery disease, no stents placed. CTS consulted, will review films. Patient without complaints. CBC without leukocytosis. CMP without emergent abnormalities.     Review of Systems   Constitutional:  Negative for chills and fever.   Respiratory:  Negative for chest tightness and shortness of breath.    Cardiovascular:  Negative for chest pain, palpitations and leg swelling.   Gastrointestinal:  Negative for abdominal pain and nausea.   Neurological:  Negative for dizziness and weakness.   Objective:     Vital Signs (Most Recent):  Temp: 97.3 °F (36.3 °C) (04/05/23 0417)  Pulse: 67 (04/05/23 1345)  Resp: 16 (04/05/23 1100)  BP: (!) 121/59 (04/05/23 1345)  SpO2: 99 % (04/05/23 1100)   Vital Signs (24h Range):  Temp:  [97.1 °F (36.2 °C)-98 °F (36.7 °C)] 97.3 °F (36.3 °C)  Pulse:  [57-74] 67  Resp:  [16-18] 16  SpO2:  [95 %-99 %] 99 %  BP: (102-134)/(54-74) 121/59     Weight: 59.4 kg (131 lb)  Body mass index is 23.96 kg/m².  No intake or output data in the 24 hours ending 04/05/23 1435   Physical Exam  Vitals and nursing note reviewed.   Constitutional:       Appearance: She is well-developed.   Eyes:      Pupils: Pupils are equal, round, and reactive to light.   Cardiovascular:      Rate and Rhythm: Normal rate and regular rhythm.   Pulmonary:      Effort: Pulmonary effort is normal.      Breath sounds: Normal breath sounds.   Abdominal:      Palpations: Abdomen is soft.      Tenderness: There is no abdominal tenderness.   Musculoskeletal:         General: No tenderness.   Skin:     General: Skin is warm and dry.   Neurological:      Mental Status: She is alert and oriented to person, place, and time.   Psychiatric:         Behavior: Behavior normal.       Significant Labs: All pertinent labs within the past 24 hours have been reviewed.  CBC:   Recent Labs   Lab 04/04/23  0633 04/05/23  0619   WBC 7.81 7.70   HGB 13.0 13.4   HCT 41.4 42.2     258     CMP:   Recent Labs   Lab 04/04/23  0633 04/05/23  0619    137   K 3.9 4.0    111*   CO2 20* 19*   * 168*   BUN 22 24*   CREATININE 0.7 0.7   CALCIUM 9.0 8.9   PROT 6.3 6.3   ALBUMIN 3.3* 3.2*   BILITOT 0.3 0.3   ALKPHOS 86 72   AST 11 17   ALT 11 12   ANIONGAP 9 7*       Significant Imaging: I have reviewed all pertinent imaging results/findings within the past 24 hours.

## 2023-04-05 NOTE — NURSING
Report received from SANDRA Gayle. Patient s/p [facility]. R radial vasc band intact. No bleeding or hematoma noted. + pulse. Post procedure protocol discussed with patient. Vss. Connected to cont tele monitoring. Call light in reach.No complaints from patient at this time.

## 2023-04-05 NOTE — ASSESSMENT & PLAN NOTE
-Pt. dx'd 3/2023 with Left PCA territory acute infarct, only symptoms headache and dizziness, no clear residual deficits  -Continue ASA and statin, plavix added given concerns of CAD as above

## 2023-04-05 NOTE — SUBJECTIVE & OBJECTIVE
"Scheduled Meds:   aspirin  81 mg Oral Daily    atorvastatin  80 mg Oral QHS    clopidogreL  75 mg Oral Daily    enoxaparin  40 mg Subcutaneous Daily    gabapentin  300 mg Oral TID    metoprolol tartrate  12.5 mg Oral BID    pantoprazole  40 mg Oral Before breakfast    topiramate  100 mg Oral BID     Continuous Infusions:   sodium chloride 0.9% 125 mL/hr at 04/05/23 1119     PRN Meds:.acetaminophen, acetaminophen, dextrose 10%, dextrose 10%, dextrose, dextrose, fentaNYL, glucagon (human recombinant), heparin (porcine), heparin (porcine), insulin aspart U-100, iohexoL, LIDOcaine (PF) 20 mg/ml (2%), melatonin, midazolam, naloxone, ondansetron, polyethylene glycol, prochlorperazine, senna-docusate 8.6-50 mg, sodium chloride 0.9%, sodium chloride 0.9%, sodium chloride 0.9%, traMADoL, verapamil-nitroGLYCERIN 2.5-0.2 mg in NS 10 mL      Current Outpatient Medications on File Prior to Encounter   Medication Sig    albuterol (PROVENTIL) 2.5 mg /3 mL (0.083 %) nebulizer solution Take 3 mLs (2.5 mg total) by nebulization every 4 to 6 hours as needed for Wheezing or Shortness of Breath. Rescue    albuterol (VENTOLIN HFA) 90 mcg/actuation inhaler INHALE 2 PUFFS BY MOUTH INTO THE LUNGS EVERY 6 HOURS AS NEEDED FOR WHEEZING    aspirin 81 MG Chew Take 81 mg by mouth once daily.    atorvastatin (LIPITOR) 80 MG tablet Take 80 mg by mouth every evening.    BASAGLAR KWIKPEN U-100 INSULIN glargine 100 units/mL (3mL) SubQ pen INJECT 10 UNITS UNDER THE SKIN EVERY MORNING    BD ULTRA-FINE SHORT PEN NEEDLE 31 gauge x 5/16" Ndle Inject 1 pen into the skin once daily.    clonazePAM (KLONOPIN) 0.5 MG tablet TAKE 1 TABLET(0.5 MG) BY MOUTH EVERY NIGHT AS NEEDED FOR ANXIETY    ergocalciferol (ERGOCALCIFEROL) 50,000 unit Cap TAKE 1 CAPSULE BY MOUTH EVERY 7 DAYS    fluticasone propionate (FLONASE) 50 mcg/actuation nasal spray 1 spray (50 mcg total) by Each Nostril route once daily.    gabapentin (NEURONTIN) 300 MG capsule Take 1 capsule (300 mg " total) by mouth 3 (three) times daily.    glipiZIDE (GLUCOTROL) 10 MG tablet TAKE 1 TABLET(10 MG) BY MOUTH TWICE DAILY    JARDIANCE 10 mg tablet TAKE 1 TABLET(10 MG) BY MOUTH EVERY DAY    metFORMIN (GLUCOPHAGE) 1000 MG tablet TAKE 1 TABLET(1000 MG) BY MOUTH TWICE DAILY WITH MEALS    nicotine (NICODERM CQ) 21 mg/24 hr PLACE 1 PATCH ONTO THE SKIN DAILY    ondansetron (ZOFRAN-ODT) 4 MG TbDL Take 1 tablet (4 mg total) by mouth 3 (three) times daily.    pantoprazole (PROTONIX) 40 MG tablet Take 1 tablet (40 mg total) by mouth before breakfast.    sertraline (ZOLOFT) 50 MG tablet Take 1 tablet (50 mg total) by mouth once daily.    sucralfate (CARAFATE) 1 gram tablet TAKE 1 TABLET(1 GRAM) BY MOUTH THREE TIMES DAILY BEFORE MEALS    topiramate (TOPAMAX) 50 MG tablet Take 2 tablets (100 mg total) by mouth 2 (two) times daily. TAKE 1 TABLET BY MOUTH TWICE DAILY FOR 7 DAYS, THEN 1 TABLET EVERY MORNING AND 2 TABLETS EVERY EVENING FOR 7 DAYS, THEN 2 TABLETS TWICE DAILY    TRADJENTA 5 mg Tab tablet Take 1 tablet (5 mg total) by mouth once daily.    traMADoL (ULTRAM) 50 mg tablet Take 1 tablet (50 mg total) by mouth every 6 (six) hours as needed for Pain.    TRELEGY ELLIPTA 100-62.5-25 mcg DsDv INHALE 1 PUFF INTO THE LUNGS EVERY DAY    TRUE METRIX GLUCOSE TEST STRIP Strp USE AS DIRECTED FOUR TIMES DAILY       Review of patient's allergies indicates:  No Known Allergies    Past Medical History:   Diagnosis Date    Asthma     Breast carcinoma     Cataract     Coronary artery disease of native heart with stable angina pectoris 4/4/2023    Diabetes mellitus     Moderate episode of recurrent major depressive disorder 5/19/2021    Osteoporosis      Past Surgical History:   Procedure Laterality Date    BILATERAL MASTECTOMY  04/26/2018    CHOLECYSTECTOMY      COLONOSCOPY N/A 10/27/2015    Procedure: COLONOSCOPY;  Surgeon: Johnny Hurley MD;  Location: [facility];  Service: Endoscopy;  Laterality: N/A;    ESOPHAGOGASTRODUODENOSCOPY N/A  2022    Procedure: ESOPHAGOGASTRODUODENOSCOPY (EGD);  Surgeon: Mili Katz MD;  Location: [facility] ([address] [place] [address]);  Service: Endoscopy;  Laterality: N/A;  rapid in AM, instr portal -ml    HYSTERECTOMY      LASER PERIPHERAL IRIDOTOMY Bilateral 2019         Family History       Problem Relation (Age of Onset)    Diabetes Father          Tobacco Use    Smoking status: Former     Packs/day: 1.00     Years: 40.00     Pack years: 40.00     Types: Cigarettes     Quit date: 3/12/2023     Years since quittin.0     Passive exposure: Current    Smokeless tobacco: Never   Substance and Sexual Activity    Alcohol use: No     Alcohol/week: 0.0 standard drinks    Drug use: No    Sexual activity: Not on file     Review of Systems   Unable to perform ROS: Other   Objective:     Vital Signs (Most Recent):  Temp: 97.3 °F (36.3 °C) (23 0417)  Pulse: 66 (23 1130)  Resp: 16 (23 1100)  BP: 134/64 (23 1130)  SpO2: 99 % (23 1100) Vital Signs (24h Range):  Temp:  [97.1 °F (36.2 °C)-98 °F (36.7 °C)] 97.3 °F (36.3 °C)  Pulse:  [57-74] 66  Resp:  [16-18] 16  SpO2:  [95 %-99 %] 99 %  BP: (102-134)/(54-74) 134/64     Weight: 59.4 kg (131 lb)  Body mass index is 23.96 kg/m².    SpO2: 99 %        Intake/Output - Last 3 Shifts       None             Lines/Drains/Airways       Peripheral Intravenous Line  Duration                  Peripheral IV - Single Lumen 23 1622 20 G Right Antecubital 3 days                       Physical Exam  Constitutional:       General: She is not in acute distress.  HENT:      Head: Normocephalic and atraumatic.   Cardiovascular:      Rate and Rhythm: Normal rate.   Pulmonary:      Effort: No respiratory distress.   Musculoskeletal:      Cervical back: Normal range of motion.   Skin:     Coloration: Skin is not pale.   Neurological:      General: No focal deficit present.      Mental Status: She is alert.   Psychiatric:         Mood and Affect: Mood normal.          Behavior: Behavior normal.       Significant Labs:  ABGs: No results for input(s): PH, PCO2, PO2, HCO3, POCSATURATED, BE in the last 48 hours.  Amylase: No results for input(s): AMYLASE in the last 48 hours.  BMP:   Recent Labs   Lab 04/05/23  0619   *      K 4.0   *   CO2 19*   BUN 24*   CREATININE 0.7   CALCIUM 8.9   MG 1.7     Cardiac markers: No results for input(s): CKMB, CPKMB, TROPONINT, TROPONINI, MYOGLOBIN in the last 48 hours.  CBC:   Recent Labs   Lab 04/05/23  0619   WBC 7.70   RBC 4.70   HGB 13.4   HCT 42.2      MCV 90   MCH 28.5   MCHC 31.8*     CMP:   Recent Labs   Lab 04/05/23  0619   *   CALCIUM 8.9   ALBUMIN 3.2*   PROT 6.3      K 4.0   CO2 19*   *   BUN 24*   CREATININE 0.7   ALKPHOS 72   ALT 12   AST 17   BILITOT 0.3     Coagulation: No results for input(s): PT, INR, APTT in the last 48 hours.  Lactic Acid: No results for input(s): LACTATE in the last 48 hours.  LFTs:   Recent Labs   Lab 04/05/23  0619   ALT 12   AST 17   ALKPHOS 72   BILITOT 0.3   PROT 6.3   ALBUMIN 3.2*     Lipase: No results for input(s): LIPASE in the last 48 hours.    Significant Diagnostics: reviewed   [facility] to be reviewed by Dr. Avila     Carotid US 4/4/23  There is 0-19% right Internal Carotid Stenosis.  There is 0-19% left Internal Carotid Stenosis.    Cardiac PET 4/3/23    There is a large sized, severe intensity mid to apical anterior, septal, anteroseptal and inferoapical resting perfusion abnormality involving 29% of LV myocardium in the distribution of the proximal  LAD. After pharmacologic stress, this perfusion abnormality is larger and more severe involving 38% of the LV myocardium, indicative of ischemia with underlying scar.    There are no other significant perfusion abnormalities.    The whole heart absolute myocardial perfusion values averaged 0.52 cc/min/g at rest, which is reduced; 1.15 cc/min/g at stress, which is mildly reduced; and CFR is 2.13 , which  is mildly reduced.    CT attenuation images demonstrate mild diffuse coronary calcifications in the LAD, LCX, RCA and LM territory and mild diffuse aortic calcifications in the descending aorta.    The gated perfusion images showed an ejection fraction of 54% at rest and 55% during stress. A normal ejection fraction is greater than 47%.    There is mid to apical anterior and anteroseptal wall moderate hypokinesis at rest and mid to apical anterior and anteroseptal wall akinesis during stress.    There is mid to apical inferior and inferoseptal wall hypokinesis at rest and mid to apical inferior and inferoseptal wall akinesis during stress.    The LV cavity size is normal at rest and stress.    The ECG portion of the study is negative for ischemia.    There were no arrhythmias during stress.    The patient reported chest pain during the stress test.    There are no prior studies for comparison.    TTE 3/22/23  The left ventricle is normal in size with normal systolic function.  The estimated ejection fraction is 55%.  There are segmental left ventricular wall motion abnormalities consistent with anteroapical MI.  Normal left ventricular diastolic function.  The estimated PA systolic pressure is 32 mmHg.  Normal right ventricular size with normal right ventricular systolic function.  Normal central venous pressure (3 mmHg).  There is no pulmonary hypertension.

## 2023-04-05 NOTE — BRIEF OP NOTE
Brief Operative Note:    : Francisco Barahona MD     Referring Physician: Self,Aaareferral     All Operators: Surgeon(s):  MD Francisco Michaels MD John Dudley, MD     Preoperative Diagnosis: Coronary artery disease of native heart with stable angina pectoris, unspecified vessel or lesion type [I25.118]     Postop Diagnosis: Coronary artery disease of native heart with stable angina pectoris, unspecified vessel or lesion type [I25.118]    Treatments/Procedures: Procedure(s) (LRB):  Left heart cath (Left)  ANGIOGRAM, CORONARY ARTERY (N/A)    Access: Right radial artery    Findings:Severe coronary artery disease is present.     See catheterization report for full details.    Intervention: none    See catheterization report for full details.    Closure device:  vascband        Plan:  - CTS consulted   - Post cath protocol   - IVF @ 150 cc/kg/hr x 4 hours  - Bed rest x 2 hours   - Continue aspirin 81 mg daily indefinitely  - Continue plavix for minimum 6 months, ideally up to 1 year  - Continue high intensity statin therapy (LDL goal < 70)  - Risk factor reduction (BP <130/80 mmHg, glycemic control, etc)  - Cardiac rehab referral  - Follow up with outpatient cardiologist    Estimated Blood loss: 20 cc    Specimens removed: No    Juan Barnett MD

## 2023-04-05 NOTE — PROGRESS NOTES
"[facility] - Short Stay Cardiac Unit  [facility] Medicine  Progress Note    Patient Name: Jud Villa  MRN: [id number]  Patient Class: IP- Inpatient   Admission Date: 4/1/2023  Length of Stay: 1 days  Attending Physician: Rose Marie Wood MD  Primary Care Provider: Bo Lopez MD        Subjective:     Principal Problem:Chest pain due to myocardial ischemia        HPI:  64 yo F with PMHx acute CVA (admitted to [facility] 3/13-3/16), DM2, and breast Ca in remission who presents to the ED complaining of chest pain x 1 day. Pt. Reports that she developed crushing chest pain this morning around 10 am when she started cleaning around the house. Pt. Reports that the pain was constant, pressure-like and without radiation. She denies any associated nausea, lightheadedness, SOB, or palpitations. She states that she has never had pain like this before. The pain was unrelenting and did not improve until she came to the ED and was given nitroglycerin.    TTE obtained here 3/22/2023 showed "segmental left ventricular wall motion abnormalities consistent with anteroapical MI." Pt. Currently has an outpatient cardiology appointment scheduled for 4/13/2023.      Overview/Hospital Course:  Jud Villa is placed in  Observation for management of chest pain. Continuing aspirin and statin, added plavix. Troponins negative. EKG reviewed. Discussion with Cardiology who recommended inpatient stress trest, ordered. PET stress 04/03 with abnormalities, Interventional Cardiology consulted, NPO midnight, C planned for 04/05. C 04/05 showing severe coronary artery disease, no stents placed. Consult to CTS placed to see and review films. Patient will discharge when medically ready. She will follow up with Cardiology and PCP.      Interval History: AISHA HENRIQUEZ on my exam. LHC today showing severe coronary artery disease, no stents placed. CTS consulted, will review films. Patient without complaints. CBC without leukocytosis. CMP " without emergent abnormalities.     Review of Systems   Constitutional:  Negative for chills and fever.   Respiratory:  Negative for chest tightness and shortness of breath.    Cardiovascular:  Negative for chest pain, palpitations and leg swelling.   Gastrointestinal:  Negative for abdominal pain and nausea.   Neurological:  Negative for dizziness and weakness.   Objective:     Vital Signs (Most Recent):  Temp: 97.3 °F (36.3 °C) (04/05/23 0417)  Pulse: 67 (04/05/23 1345)  Resp: 16 (04/05/23 1100)  BP: (!) 121/59 (04/05/23 1345)  SpO2: 99 % (04/05/23 1100)   Vital Signs (24h Range):  Temp:  [97.1 °F (36.2 °C)-98 °F (36.7 °C)] 97.3 °F (36.3 °C)  Pulse:  [57-74] 67  Resp:  [16-18] 16  SpO2:  [95 %-99 %] 99 %  BP: (102-134)/(54-74) 121/59     Weight: 59.4 kg (131 lb)  Body mass index is 23.96 kg/m².  No intake or output data in the 24 hours ending 04/05/23 1435   Physical Exam  Vitals and nursing note reviewed.   Constitutional:       Appearance: She is well-developed.   Eyes:      Pupils: Pupils are equal, round, and reactive to light.   Cardiovascular:      Rate and Rhythm: Normal rate and regular rhythm.   Pulmonary:      Effort: Pulmonary effort is normal.      Breath sounds: Normal breath sounds.   Abdominal:      Palpations: Abdomen is soft.      Tenderness: There is no abdominal tenderness.   Musculoskeletal:         General: No tenderness.   Skin:     General: Skin is warm and dry.   Neurological:      Mental Status: She is alert and oriented to person, place, and time.   Psychiatric:         Behavior: Behavior normal.       Significant Labs: All pertinent labs within the past 24 hours have been reviewed.  CBC:   Recent Labs   Lab 04/04/23 0633 04/05/23 0619   WBC 7.81 7.70   HGB 13.0 13.4   HCT 41.4 42.2    258     CMP:   Recent Labs   Lab 04/04/23 0633 04/05/23 0619    137   K 3.9 4.0    111*   CO2 20* 19*   * 168*   BUN 22 24*   CREATININE 0.7 0.7   CALCIUM 9.0 8.9   PROT 6.3  6.3   ALBUMIN 3.3* 3.2*   BILITOT 0.3 0.3   ALKPHOS 86 72   AST 11 17   ALT 11 12   ANIONGAP 9 7*       Significant Imaging: I have reviewed all pertinent imaging results/findings within the past 24 hours.      Assessment/Plan:      * Chest pain due to myocardial ischemia  Coronary artery disease of native heart with stable angina pectoris   -Pt. With pressure-like chest pain reported, relieved with NG known WMA noted on recent TTE consistent with likely anteroapical MI  -Troponin WNL x2, EKG wihtout ST elevations or other significant ST findings, do not suspect ACS  -Plan to consult cards/interventional cards non-emergently in [facility] for consideration of [facility] inpatient vs. Continued f/u outpatient given known abnormal TTE. Will start pt. On max GDMT with ASA, plavix, statin, and B-blocker  - discussion with Cards who recommend stress test in am, ordered  - 04/03 - abnormal PET stress, Interventional Cardiology consulted, appreciate assistance   - cont telemetry  - stat ekg for chest pain  - [facility] 04/05 showing severe coronary artery disease, no stents placed  - CTS consulted, appreciate assistance    History of CVA (cerebrovascular accident)  -Pt. dx'd 3/2023 with Left PCA territory acute infarct, only symptoms headache and dizziness, no clear residual deficits  -Continue ASA and statin, plavix added given concerns of CAD as above    Controlled type 2 diabetes mellitus with complication, with long-term current use of insulin  -A1c 6.8, well-controlled on home PO meds  -SSI ordered while inpatient    Breast cancer metastasized to axillary lymph node, left  -No acute issues, pt. S/p B.L mastectomy 2018    COPD (chronic obstructive pulmonary disease)  -No acute issues, duo-nebs ordered PRN      VTE Risk Mitigation (From admission, onward)         Ordered     heparin (porcine) injection  As needed (PRN)         04/05/23 1033     heparin infusion 1,000 units/500 ml in 0.9% NaCl (on sterile field)  As needed (PRN)          04/05/23 1010     enoxaparin injection 40 mg  Daily         04/01/23 2044     IP VTE HIGH RISK PATIENT  Once         04/01/23 2044     Place sequential compression device  Until discontinued         04/01/23 2044     Place sequential compression device  Until discontinued         04/01/23 2034                Discharge Planning   JORGE: 4/5/2023     Code Status: Full Code   Is the patient medically ready for discharge?: No    Reason for patient still in hospital (select all that apply): Treatment and Consult recommendations  Discharge Plan A: Home                  Johnny Barahona PA-C  Department of Hospital Medicine   [facility] - Short Stay Cardiac Unit

## 2023-04-05 NOTE — PROGRESS NOTES
Pt arrived back on the unit. Pt AAOX4. No distress noted. NS infusing at 125 ml/hr. Bed in lowest position. Side rails up x2. Call bell and personal belongs within reach. Telemetry maintained. Safety precautions maintained. Pt free of falls or injuries. No further issues or concerns at this time. Plan of care continues.    04/05/23 1540   Vital Signs   Temp 97.9 °F (36.6 °C)   Temp Source Tympanic   Pulse 67   Heart Rate Source Monitor   Resp 18   SpO2 97 %   /64   MAP (mmHg) 88   BP Location Right arm   Patient Position Lying

## 2023-04-05 NOTE — NURSING
Report called to SANDRA Navarro. Patient doing well with no complaints. Dressing to R radial site cdi,soft. Vss. Connected to cont tele monitoring for transport. Patient left unit via stretcher with transport. IVF infusing. Chart sent with patient.

## 2023-04-05 NOTE — HPI
"62 yo F with PMHx acute CVA (admitted to [facility] 3/13-3/16), DM2, and breast Ca in remission who presents to the ED complaining of chest pain x 1 day. Pt. Reports that she developed crushing chest pain this morning around 10 am when she started cleaning around the house. Pt. Reports that the pain was constant, pressure-like and without radiation. She denies any associated nausea, lightheadedness, SOB, or palpitations. She states that she has never had pain like this before. The pain was unrelenting and did not improve until she came to the ED and was given nitroglycerin.     TTE obtained here 3/22/2023 showed "segmental left ventricular wall motion abnormalities consistent with anteroapical MI." Pt. Currently has an outpatient cardiology appointment scheduled for 4/13/2023.           "

## 2023-04-05 NOTE — CONSULTS
"[facility] - Short Stay Cardiac Unit  Cardiothoracic Surgery  Consult Note    Patient Name: Jud Villa  MRN: [id number]  Admission Date: 4/1/2023  Attending Physician: Rose Marie Wood MD  Referring Provider: Self, Aaareferral    Patient information was obtained from patient, past medical records and ER records.     Inpatient consult to Cardiothoracic Surgery  Consult performed by: Pura Alcala PA-C  Consult ordered by: Juan Barnett MD        Subjective:     Principal Problem: Chest pain due to myocardial ischemia    History of Present Illness: 64 yo F with PMHx acute CVA (admitted to [facility] 3/13-3/16), DM2, and breast Ca in remission who presents to the ED complaining of chest pain x 1 day. Pt. Reports that she developed crushing chest pain this morning around 10 am when she started cleaning around the house. Pt. Reports that the pain was constant, pressure-like and without radiation. She denies any associated nausea, lightheadedness, SOB, or palpitations. She states that she has never had pain like this before. The pain was unrelenting and did not improve until she came to the ED and was given nitroglycerin.     TTE obtained here 3/22/2023 showed "segmental left ventricular wall motion abnormalities consistent with anteroapical MI." Pt. Currently has an outpatient cardiology appointment scheduled for 4/13/2023.               Scheduled Meds:   aspirin  81 mg Oral Daily    atorvastatin  80 mg Oral QHS    clopidogreL  75 mg Oral Daily    enoxaparin  40 mg Subcutaneous Daily    gabapentin  300 mg Oral TID    metoprolol tartrate  12.5 mg Oral BID    pantoprazole  40 mg Oral Before breakfast    topiramate  100 mg Oral BID     Continuous Infusions:   sodium chloride 0.9% 125 mL/hr at 04/05/23 1119     PRN Meds:.acetaminophen, acetaminophen, dextrose 10%, dextrose 10%, dextrose, dextrose, fentaNYL, glucagon (human recombinant), heparin (porcine), heparin (porcine), insulin aspart U-100, iohexoL, LIDOcaine (PF) " "20 mg/ml (2%), melatonin, midazolam, naloxone, ondansetron, polyethylene glycol, prochlorperazine, senna-docusate 8.6-50 mg, sodium chloride 0.9%, sodium chloride 0.9%, sodium chloride 0.9%, traMADoL, verapamil-nitroGLYCERIN 2.5-0.2 mg in NS 10 mL      Current Outpatient Medications on File Prior to Encounter   Medication Sig    albuterol (PROVENTIL) 2.5 mg /3 mL (0.083 %) nebulizer solution Take 3 mLs (2.5 mg total) by nebulization every 4 to 6 hours as needed for Wheezing or Shortness of Breath. Rescue    albuterol (VENTOLIN HFA) 90 mcg/actuation inhaler INHALE 2 PUFFS BY MOUTH INTO THE LUNGS EVERY 6 HOURS AS NEEDED FOR WHEEZING    aspirin 81 MG Chew Take 81 mg by mouth once daily.    atorvastatin (LIPITOR) 80 MG tablet Take 80 mg by mouth every evening.    BASAGLAR KWIKPEN U-100 INSULIN glargine 100 units/mL (3mL) SubQ pen INJECT 10 UNITS UNDER THE SKIN EVERY MORNING    BD ULTRA-FINE SHORT PEN NEEDLE 31 gauge x 5/16" Ndle Inject 1 pen into the skin once daily.    clonazePAM (KLONOPIN) 0.5 MG tablet TAKE 1 TABLET(0.5 MG) BY MOUTH EVERY NIGHT AS NEEDED FOR ANXIETY    ergocalciferol (ERGOCALCIFEROL) 50,000 unit Cap TAKE 1 CAPSULE BY MOUTH EVERY 7 DAYS    fluticasone propionate (FLONASE) 50 mcg/actuation nasal spray 1 spray (50 mcg total) by Each Nostril route once daily.    gabapentin (NEURONTIN) 300 MG capsule Take 1 capsule (300 mg total) by mouth 3 (three) times daily.    glipiZIDE (GLUCOTROL) 10 MG tablet TAKE 1 TABLET(10 MG) BY MOUTH TWICE DAILY    JARDIANCE 10 mg tablet TAKE 1 TABLET(10 MG) BY MOUTH EVERY DAY    metFORMIN (GLUCOPHAGE) 1000 MG tablet TAKE 1 TABLET(1000 MG) BY MOUTH TWICE DAILY WITH MEALS    nicotine (NICODERM CQ) 21 mg/24 hr PLACE 1 PATCH ONTO THE SKIN DAILY    ondansetron (ZOFRAN-ODT) 4 MG TbDL Take 1 tablet (4 mg total) by mouth 3 (three) times daily.    pantoprazole (PROTONIX) 40 MG tablet Take 1 tablet (40 mg total) by mouth before breakfast.    sertraline (ZOLOFT) 50 MG tablet Take 1 " tablet (50 mg total) by mouth once daily.    sucralfate (CARAFATE) 1 gram tablet TAKE 1 TABLET(1 GRAM) BY MOUTH THREE TIMES DAILY BEFORE MEALS    topiramate (TOPAMAX) 50 MG tablet Take 2 tablets (100 mg total) by mouth 2 (two) times daily. TAKE 1 TABLET BY MOUTH TWICE DAILY FOR 7 DAYS, THEN 1 TABLET EVERY MORNING AND 2 TABLETS EVERY EVENING FOR 7 DAYS, THEN 2 TABLETS TWICE DAILY    TRADJENTA 5 mg Tab tablet Take 1 tablet (5 mg total) by mouth once daily.    traMADoL (ULTRAM) 50 mg tablet Take 1 tablet (50 mg total) by mouth every 6 (six) hours as needed for Pain.    TRELEGY ELLIPTA 100-62.5-25 mcg DsDv INHALE 1 PUFF INTO THE LUNGS EVERY DAY    TRUE METRIX GLUCOSE TEST STRIP Strp USE AS DIRECTED FOUR TIMES DAILY       Review of patient's allergies indicates:  No Known Allergies    Past Medical History:   Diagnosis Date    Asthma     Breast carcinoma     Cataract     Coronary artery disease of native heart with stable angina pectoris 2023    Diabetes mellitus     Moderate episode of recurrent major depressive disorder 2021    Osteoporosis      Past Surgical History:   Procedure Laterality Date    BILATERAL MASTECTOMY  2018    CHOLECYSTECTOMY      COLONOSCOPY N/A 10/27/2015    Procedure: COLONOSCOPY;  Surgeon: Johnny Hurley MD;  Location: [facility];  Service: Endoscopy;  Laterality: N/A;    ESOPHAGOGASTRODUODENOSCOPY N/A 2022    Procedure: ESOPHAGOGASTRODUODENOSCOPY (EGD);  Surgeon: Mili Katz MD;  Location: [address];  Service: Endoscopy;  Laterality: N/A;  rapid in AM, instr portal -ml    HYSTERECTOMY      LASER PERIPHERAL IRIDOTOMY Bilateral          Family History       Problem Relation (Age of Onset)    Diabetes Father          Tobacco Use    Smoking status: Former     Packs/day: 1.00     Years: 40.00     Pack years: 40.00     Types: Cigarettes     Quit date: 3/12/2023     Years since quittin.0     Passive exposure: Current    Smokeless tobacco: Never    Substance and Sexual Activity    Alcohol use: No     Alcohol/week: 0.0 standard drinks    Drug use: No    Sexual activity: Not on file     Review of Systems   Unable to perform ROS: Other   Objective:     Vital Signs (Most Recent):  Temp: 97.3 °F (36.3 °C) (04/05/23 0417)  Pulse: 66 (04/05/23 1130)  Resp: 16 (04/05/23 1100)  BP: 134/64 (04/05/23 1130)  SpO2: 99 % (04/05/23 1100) Vital Signs (24h Range):  Temp:  [97.1 °F (36.2 °C)-98 °F (36.7 °C)] 97.3 °F (36.3 °C)  Pulse:  [57-74] 66  Resp:  [16-18] 16  SpO2:  [95 %-99 %] 99 %  BP: (102-134)/(54-74) 134/64     Weight: 59.4 kg (131 lb)  Body mass index is 23.96 kg/m².    SpO2: 99 %        Intake/Output - Last 3 Shifts       None             Lines/Drains/Airways       Peripheral Intravenous Line  Duration                  Peripheral IV - Single Lumen 04/01/23 1622 20 G Right Antecubital 3 days                       Physical Exam  Constitutional:       General: She is not in acute distress.  HENT:      Head: Normocephalic and atraumatic.   Cardiovascular:      Rate and Rhythm: Normal rate.   Pulmonary:      Effort: No respiratory distress.   Musculoskeletal:      Cervical back: Normal range of motion.   Skin:     Coloration: Skin is not pale.   Neurological:      General: No focal deficit present.      Mental Status: She is alert.   Psychiatric:         Mood and Affect: Mood normal.         Behavior: Behavior normal.       Significant Labs:  ABGs: No results for input(s): PH, PCO2, PO2, HCO3, POCSATURATED, BE in the last 48 hours.  Amylase: No results for input(s): AMYLASE in the last 48 hours.  BMP:   Recent Labs   Lab 04/05/23  0619   *      K 4.0   *   CO2 19*   BUN 24*   CREATININE 0.7   CALCIUM 8.9   MG 1.7     Cardiac markers: No results for input(s): CKMB, CPKMB, TROPONINT, TROPONINI, MYOGLOBIN in the last 48 hours.  CBC:   Recent Labs   Lab 04/05/23  0619   WBC 7.70   RBC 4.70   HGB 13.4   HCT 42.2      MCV 90   MCH 28.5   MCHC 31.8*      CMP:   Recent Labs   Lab 04/05/23  0619   *   CALCIUM 8.9   ALBUMIN 3.2*   PROT 6.3      K 4.0   CO2 19*   *   BUN 24*   CREATININE 0.7   ALKPHOS 72   ALT 12   AST 17   BILITOT 0.3     Coagulation: No results for input(s): PT, INR, APTT in the last 48 hours.  Lactic Acid: No results for input(s): LACTATE in the last 48 hours.  LFTs:   Recent Labs   Lab 04/05/23  0619   ALT 12   AST 17   ALKPHOS 72   BILITOT 0.3   PROT 6.3   ALBUMIN 3.2*     Lipase: No results for input(s): LIPASE in the last 48 hours.    Significant Diagnostics: reviewed   [facility] images to be reviewed by Dr. Kahn    Carotid US 4/4/23  There is 0-19% right Internal Carotid Stenosis.  There is 0-19% left Internal Carotid Stenosis.    Cardiac PET 4/3/23    There is a large sized, severe intensity mid to apical anterior, septal, anteroseptal and inferoapical resting perfusion abnormality involving 29% of LV myocardium in the distribution of the proximal  LAD. After pharmacologic stress, this perfusion abnormality is larger and more severe involving 38% of the LV myocardium, indicative of ischemia with underlying scar.    There are no other significant perfusion abnormalities.    The whole heart absolute myocardial perfusion values averaged 0.52 cc/min/g at rest, which is reduced; 1.15 cc/min/g at stress, which is mildly reduced; and CFR is 2.13 , which is mildly reduced.    CT attenuation images demonstrate mild diffuse coronary calcifications in the LAD, LCX, RCA and LM territory and mild diffuse aortic calcifications in the descending aorta.    The gated perfusion images showed an ejection fraction of 54% at rest and 55% during stress. A normal ejection fraction is greater than 47%.    There is mid to apical anterior and anteroseptal wall moderate hypokinesis at rest and mid to apical anterior and anteroseptal wall akinesis during stress.    There is mid to apical inferior and inferoseptal wall hypokinesis at rest and mid to  apical inferior and inferoseptal wall akinesis during stress.    The LV cavity size is normal at rest and stress.    The ECG portion of the study is negative for ischemia.    There were no arrhythmias during stress.    The patient reported chest pain during the stress test.    There are no prior studies for comparison.    TTE 3/22/23  The left ventricle is normal in size with normal systolic function.  The estimated ejection fraction is 55%.  There are segmental left ventricular wall motion abnormalities consistent with anteroapical MI.  Normal left ventricular diastolic function.  The estimated PA systolic pressure is 32 mmHg.  Normal right ventricular size with normal right ventricular systolic function.  Normal central venous pressure (3 mmHg).  There is no pulmonary hypertension.    Assessment/Plan:     Coronary artery disease of native heart with stable angina pectoris  Patient admitted with chest pain and noted multivessel disease on [facility]. Patient was admitted earlier this month to an OSH with stroke but no residual deficits. Currently smokes. History of breast cancer s/p mastectomy. COPD, DM2. Patient requesting to see Dr. Kahn who will review films and give final recommendations.          Thank you for your consult. I will follow-up with patient. Please contact us if you have any additional questions.    Pura Alcala PA-C  Cardiothoracic Surgery  [facility] Short Stay Cardiac Unit    CTS Attending Note:    I have personally taken the history and examined this patient and agree with the CLAUDINE's note as stated above.  I met with the patient and her family.  I recommended coronary artery bypass grafting, and she agreed with this.  We will plan to see her in clinic on Tuesday with plans for operation on Friday the 14th.

## 2023-04-05 NOTE — ASSESSMENT & PLAN NOTE
Patient admitted with chest pain and noted multivessel disease on [facility]. Patient was admitted earlier this month to an OSH with stroke but no residual deficits. Currently smokes. History of breast cancer s/p mastectomy. COPD, DM2. Patient requesting to see Dr. Kahn who will review films and give final recommendations.

## 2023-04-06 ENCOUNTER — TELEPHONE (OUTPATIENT)
Dept: CARDIOTHORACIC SURGERY | Facility: CLINIC | Age: 63
End: 2023-04-06
Payer: MEDICARE

## 2023-04-06 VITALS
RESPIRATION RATE: 18 BRPM | HEIGHT: 62 IN | DIASTOLIC BLOOD PRESSURE: 68 MMHG | WEIGHT: 131 LBS | TEMPERATURE: 98 F | OXYGEN SATURATION: 96 % | SYSTOLIC BLOOD PRESSURE: 113 MMHG | BODY MASS INDEX: 24.11 KG/M2 | HEART RATE: 68 BPM

## 2023-04-06 LAB
ALBUMIN SERPL BCP-MCNC: 3.3 G/DL (ref 3.5–5.2)
ALP SERPL-CCNC: 70 U/L (ref 55–135)
ALT SERPL W/O P-5'-P-CCNC: 12 U/L (ref 10–44)
ANION GAP SERPL CALC-SCNC: 8 MMOL/L (ref 8–16)
AST SERPL-CCNC: 13 U/L (ref 10–40)
BILIRUB SERPL-MCNC: 0.3 MG/DL (ref 0.1–1)
BUN SERPL-MCNC: 16 MG/DL (ref 8–23)
CALCIUM SERPL-MCNC: 9 MG/DL (ref 8.7–10.5)
CHLORIDE SERPL-SCNC: 110 MMOL/L (ref 95–110)
CO2 SERPL-SCNC: 20 MMOL/L (ref 23–29)
CREAT SERPL-MCNC: 0.7 MG/DL (ref 0.5–1.4)
EST. GFR  (NO RACE VARIABLE): >60 ML/MIN/1.73 M^2
GLUCOSE SERPL-MCNC: 166 MG/DL (ref 70–110)
MAGNESIUM SERPL-MCNC: 1.6 MG/DL (ref 1.6–2.6)
PHOSPHATE SERPL-MCNC: 3 MG/DL (ref 2.7–4.5)
POCT GLUCOSE: 135 MG/DL (ref 70–110)
POCT GLUCOSE: 218 MG/DL (ref 70–110)
POTASSIUM SERPL-SCNC: 3.9 MMOL/L (ref 3.5–5.1)
PROT SERPL-MCNC: 6.3 G/DL (ref 6–8.4)
SODIUM SERPL-SCNC: 138 MMOL/L (ref 136–145)

## 2023-04-06 PROCEDURE — 84100 ASSAY OF PHOSPHORUS: CPT | Mod: HCNC

## 2023-04-06 PROCEDURE — 99239 PR HOSPITAL DISCHARGE DAY,>30 MIN: ICD-10-PCS | Mod: HCNC,,,

## 2023-04-06 PROCEDURE — 99239 HOSP IP/OBS DSCHRG MGMT >30: CPT | Mod: HCNC,,,

## 2023-04-06 PROCEDURE — 1111F DSCHRG MED/CURRENT MED MERGE: CPT | Mod: HCNC,CPTII,,

## 2023-04-06 PROCEDURE — 11000001 HC ACUTE MED/SURG PRIVATE ROOM: Mod: HCNC

## 2023-04-06 PROCEDURE — 80053 COMPREHEN METABOLIC PANEL: CPT | Mod: HCNC

## 2023-04-06 PROCEDURE — 25000003 PHARM REV CODE 250: Mod: HCNC | Performed by: HOSPITALIST

## 2023-04-06 PROCEDURE — 83735 ASSAY OF MAGNESIUM: CPT | Mod: HCNC

## 2023-04-06 PROCEDURE — 36415 COLL VENOUS BLD VENIPUNCTURE: CPT | Mod: HCNC

## 2023-04-06 PROCEDURE — 25000003 PHARM REV CODE 250: Mod: HCNC

## 2023-04-06 PROCEDURE — 1111F PR DISCHARGE MEDS RECONCILED W/ CURRENT OUTPATIENT MED LIST: ICD-10-PCS | Mod: HCNC,CPTII,,

## 2023-04-06 RX ORDER — CLOPIDOGREL BISULFATE 75 MG/1
75 TABLET ORAL DAILY
Qty: 30 TABLET | Refills: 11 | Status: SHIPPED | OUTPATIENT
Start: 2023-04-07 | End: 2023-04-06 | Stop reason: SDUPTHER

## 2023-04-06 RX ORDER — METOPROLOL TARTRATE 25 MG/1
12.5 TABLET, FILM COATED ORAL 2 TIMES DAILY
Qty: 30 TABLET | Refills: 11 | Status: ON HOLD | OUTPATIENT
Start: 2023-04-06 | End: 2023-04-22 | Stop reason: HOSPADM

## 2023-04-06 RX ORDER — CLOPIDOGREL BISULFATE 75 MG/1
75 TABLET ORAL DAILY
Qty: 30 TABLET | Refills: 11 | Status: ON HOLD | OUTPATIENT
Start: 2023-04-07 | End: 2023-04-22 | Stop reason: HOSPADM

## 2023-04-06 RX ORDER — ATORVASTATIN CALCIUM 80 MG/1
80 TABLET, FILM COATED ORAL NIGHTLY
Qty: 90 TABLET | Refills: 3 | Status: SHIPPED | OUTPATIENT
Start: 2023-04-06 | End: 2024-04-05

## 2023-04-06 RX ORDER — METOPROLOL TARTRATE 25 MG/1
12.5 TABLET, FILM COATED ORAL 2 TIMES DAILY
Qty: 30 TABLET | Refills: 11 | Status: SHIPPED | OUTPATIENT
Start: 2023-04-06 | End: 2023-04-06 | Stop reason: SDUPTHER

## 2023-04-06 RX ADMIN — GABAPENTIN 300 MG: 300 CAPSULE ORAL at 03:04

## 2023-04-06 RX ADMIN — PANTOPRAZOLE SODIUM 40 MG: 40 TABLET, DELAYED RELEASE ORAL at 06:04

## 2023-04-06 RX ADMIN — TRAMADOL HYDROCHLORIDE 50 MG: 50 TABLET, COATED ORAL at 07:04

## 2023-04-06 RX ADMIN — TOPIRAMATE 100 MG: 100 TABLET, FILM COATED ORAL at 08:04

## 2023-04-06 RX ADMIN — GABAPENTIN 300 MG: 300 CAPSULE ORAL at 08:04

## 2023-04-06 RX ADMIN — FLUTICASONE PROPIONATE 100 MCG: 50 SPRAY, METERED NASAL at 08:04

## 2023-04-06 RX ADMIN — CLOPIDOGREL BISULFATE 75 MG: 75 TABLET ORAL at 08:04

## 2023-04-06 RX ADMIN — METOPROLOL TARTRATE 12.5 MG: 25 TABLET, FILM COATED ORAL at 08:04

## 2023-04-06 RX ADMIN — ASPIRIN 81 MG CHEWABLE TABLET 81 MG: 81 TABLET CHEWABLE at 08:04

## 2023-04-06 RX ADMIN — INSULIN ASPART 4 UNITS: 100 INJECTION, SOLUTION INTRAVENOUS; SUBCUTANEOUS at 12:04

## 2023-04-06 NOTE — NURSING
Discharge instructions and education given to pt. Verbalized understanding. Removal of IV. No redness, swelling or drainage noted. Telemetry removed.  Pt escort requested and family at bedside.

## 2023-04-06 NOTE — TELEPHONE ENCOUNTER
Called pt to review plan for next week. Plan is for pre-op appointments on Tuesday, April 11 with surgery scheduled for Friday, April 14. Pt's first pre-op appointment on Tuesday is at 8:15 am on the 9th floor. Pt has several other appointments that day. Pt is unable to access My Chart to review appointment details but states she will reset her password so she can login once she gets home. I advised pt that we will call her on Monday to review her appointments with her again. Also advised pt she will be receiving call from pre-anesthesia department with instructions on how to dose her diabetes medications prior to surgery. Pt agreeable to plan and verbalized understanding of all information.

## 2023-04-06 NOTE — ASSESSMENT & PLAN NOTE
Coronary artery disease of native heart with stable angina pectoris   -Pt. With pressure-like chest pain reported, relieved with NG known WMA noted on recent TTE consistent with likely anteroapical MI  -Troponin WNL x2, EKG wihtout ST elevations or other significant ST findings, do not suspect ACS  - started patient on max GDMT with ASA, plavix, statin, and B-blocker  - cardiology consulted, following    - 04/03 - abnormal PET stress   Interventional Cardiology consulted, appreciate assistance    - [facility] 04/05 showing severe coronary artery disease, no stents place  - CTS consulted, appreciate recommendations    Dr. Kahn discussed surgical options with patient yesterday afternoon. Plan to see her in clinic Tuesday April 11 for pre-op  with surgery planned for Thursday, April 13. Ok with patient being sent home on ASA and Plavix

## 2023-04-06 NOTE — CARE UPDATE
Dr. Kahn discussed surgical options with patient yesterday afternoon. Plan to see her in clinic Tuesday April 11 for pre-op with surgery planned for Thursday, April 13. Ok with patient being sent home on ASA and Plavix when deemed safe by the primary team.

## 2023-04-06 NOTE — PLAN OF CARE
CHW met with patient/family at bedside. Patient experience rounding completed and reviewed the following.     Do you know your discharge plan? Yes         If yes, what is the plan? Home    If you are discharging home, do you have help at home? No    Do you think you will need help at home at discharge? No     Have you discussed your needs and preferences with your SW/CM? Yes      Assigned SW/CM notified of any patient/family needs or concerns.

## 2023-04-06 NOTE — DISCHARGE SUMMARY
"[facility] [address] [place] [address] Medicine  Discharge Summary      Patient Name: Jud Villa  MRN: [id number]  BOBBY: [id number]  Patient Class: IP- Inpatient  Admission Date: 4/1/2023  Hospital Length of Stay: 2 days  Discharge Date and Time: 4/6/2023  5:32 PM  Attending Physician: No att. providers found   Discharging Provider: Peggy Tinsley PA-C  Primary Care Provider: Bo Lopez MD  [facility] Medicine Team: [facility] [place] Peggy Tinsley PA-C  Primary Care Team: [facility] [place] [facility] [place]    HPI:   64 yo F with PMHx acute CVA (admitted to [facility] 3/13-3/16), DM2, and breast Ca in remission who presents to the ED complaining of chest pain x 1 day. Pt. Reports that she developed crushing chest pain this morning around 10 am when she started cleaning around the house. Pt. Reports that the pain was constant, pressure-like and without radiation. She denies any associated nausea, lightheadedness, SOB, or palpitations. She states that she has never had pain like this before. The pain was unrelenting and did not improve until she came to the ED and was given nitroglycerin.    TTE obtained here 3/22/2023 showed "segmental left ventricular wall motion abnormalities consistent with anteroapical MI." Pt. Currently has an outpatient cardiology appointment scheduled for 4/13/2023.      Procedure(s) (LRB):  Left heart cath (Left)  ANGIOGRAM, CORONARY ARTERY (N/A)      Hospital Course:   Jud Villa is placed in  Observation for management of chest pain. Troponins trended and all negative. EKG reviewed. Discussion with cardiology who recommended inpatient stress trest, ordered. PET stress 04/03 showing large sized, severe intensity mid to apical anterior, septal, anteroseptal and inferoapical resting perfusion abnormality involving 29% of LV myocardium in the distribution of the proximal  LAD that worsened with stress. Interventional cardiology consulted, [facility] done on 04/05 showing severe coronary artery disease. No " stents placed in lieu of consult to CTS to evaluate for possible CABG. Increased statin to high intensity, added metoprolol and plavix to complete GDMT. CTS evaluated patient, Dr. De Leon to review films and see patient in clinic on 04/11/2023 for pre-op clearance with surgery planned for 04/13/2023. Discussed plan with CTS, will continue patient on ASA and plavix until this follow up appointment. Referrals to cardiology and CTS made prior to discharge.     On day of discharge patient's vital signs were stable and patient appeared clinically ready for discharge. Hospital course, discharge plan and return precautions discussed with patient and her daughter Annetta via phone call- they expressed understanding. All questions answered at that time.          Goals of Care Treatment Preferences:  Code Status: Full Code      Consults:   Consults (From admission, onward)        Status Ordering Provider     Inpatient consult to Cardiothoracic Surgery  Once        Provider:  (Not yet assigned)    Completed ROBIN CORNEJO     Inpatient consult to Interventional Cardiology  Once        Provider:  (Not yet assigned)    Completed MAYTE CAZARES     Inpatient consult to Cardiology  Once        Provider:  Sunshine Benson MD    Completed MIRANDA FU          Neuro  History of CVA (cerebrovascular accident)  -Pt. dx'd 3/2023 with Left PCA territory acute infarct, only symptoms headache and dizziness, no clear residual deficits  -Continue ASA and statin, plavix added given concerns of CAD as above    Pulmonary  COPD (chronic obstructive pulmonary disease)  -No acute issues, duo-nebs ordered PRN    Cardiac/Vascular  * Chest pain due to myocardial ischemia  Coronary artery disease of native heart with stable angina pectoris   -Pt. With pressure-like chest pain reported, relieved with NG known WMA noted on recent TTE consistent with likely anteroapical MI  -Troponin WNL x2, EKG wihtout ST elevations or other significant ST  findings, do not suspect ACS  - started patient on max GDMT with ASA, plavix, statin, and B-blocker  - cardiology consulted, following    - 04/03 - abnormal PET stress   Interventional Cardiology consulted, appreciate assistance    - [facility] 04/05 showing severe coronary artery disease, no stents place  - CTS consulted, appreciate recommendations    Dr. Kahn discussed surgical options with patient yesterday afternoon. Plan to see her in clinic Tuesday April 11 for pre-op  with surgery planned for Thursday, April 13. Ok with patient being sent home on ASA and Plavix    Coronary artery disease of native heart with stable angina pectoris        Oncology  Breast cancer metastasized to axillary lymph node, left  -No acute issues, pt. S/p B.L mastectomy 2018    Endocrine  Controlled type 2 diabetes mellitus with complication, with long-term current use of insulin  -A1c 6.8, well-controlled on home PO meds  -SSI ordered while inpatient      Final Active Diagnoses:    Diagnosis Date Noted POA    PRINCIPAL PROBLEM:  Chest pain due to myocardial ischemia [I25.9] 04/01/2023 Yes    Coronary artery disease of native heart with stable angina pectoris [I25.118] 04/04/2023 Yes    History of CVA (cerebrovascular accident) [Z86.73] 04/01/2023 Not Applicable    Cigarette nicotine dependence without complication [F17.210] 02/06/2020 Yes    Controlled type 2 diabetes mellitus with complication, with long-term current use of insulin [E11.8, Z79.4] 01/29/2020 Not Applicable     Chronic    Breast cancer metastasized to axillary lymph node, left [C50.912, C77.3] 03/13/2019 Yes    COPD (chronic obstructive pulmonary disease) [J44.9] 11/04/2015 Yes      Problems Resolved During this Admission:       Discharged Condition: stable    Disposition: Home or Self Care    Follow Up:   Follow-up Information     Bo Lopez MD Follow up.    Specialty: Family Medicine  Contact information:  [address] [place]  [address]  [phone]             Leeann Painter MD Follow up.    Specialties: Cardiothoracic Surgery, Transplant  Contact information:  [address] [facility] [address]  [phone]             Francisco Barahona MD Follow up.    Specialties: Cardiology, Interventional Cardiology  [facility] information[facility]  [address] [facility] [address]  [phone]                       Patient Instructions:      Ambulatory referral/consult to Outpatient Case Management   Referral Priority: Routine Referral Type: Consultation   Referral Reason: Specialty Services Required   Number of Visits Requested: 1     Ambulatory referral/consult to Cardiology   Standing Status: Future   Referral Priority: Urgent Referral Type: Consultation   Referral Reason: Specialty Services Required   Requested Specialty: Cardiology   Number of Visits Requested: 1     Ambulatory referral/consult to Cardiothoracic Surgery   Standing Status: Future   Referral Priority: Urgent Referral Type: Consultation   Referral Reason: Specialty Services Required   Referred to Provider: LEEANN PAINTER Specialty: Cardiothoracic Surgery   Number of Visits Requested: 1     Ambulatory referral/consult to Cardiac Rehab   Standing Status: Future   Referral Priority: Urgent Referral Type: Consultation   Referral Reason: Specialty Services Required   Requested Specialty: Cardiac Rehabilitation   Number of Visits Requested: 1     Diet diabetic     Diet Cardiac     Notify your health care provider if you experience any of the following:     Notify your health care provider if you experience any of the following:  increased confusion or weakness     Notify your health care provider if you experience any of the following:  persistent dizziness, light-headedness, or visual disturbances     Notify your health care provider if you experience any of the following:  worsening rash     Notify your health care provider if you experience any of the  following:  severe persistent headache     Notify your health care provider if you experience any of the following:  difficulty breathing or increased cough     Notify your health care provider if you experience any of the following:  redness, tenderness, or signs of infection (pain, swelling, redness, odor or green/yellow discharge around incision site)     Notify your health care provider if you experience any of the following:  severe uncontrolled pain     Notify your health care provider if you experience any of the following:  persistent nausea and vomiting or diarrhea     Notify your health care provider if you experience any of the following:  temperature >100.4     Activity as tolerated       Significant Diagnostic Studies:     Lab Results   Component Value Date    WBC 7.70 04/05/2023    HGB 13.4 04/05/2023    HCT 42.2 04/05/2023    MCV 90 04/05/2023     04/05/2023       BMP  Lab Results   Component Value Date     04/06/2023    K 3.9 04/06/2023     04/06/2023    CO2 20 (L) 04/06/2023    BUN 16 04/06/2023    CREATININE 0.7 04/06/2023    CALCIUM 9.0 04/06/2023    ANIONGAP 8 04/06/2023    EGFRNORACEVR >60.0 04/06/2023     Recent Labs   Lab 04/01/23  1901   TROPONINI <0.006     Cardiac PET Stress    There is a large sized, severe intensity mid to apical anterior, septal, anteroseptal and inferoapical resting perfusion abnormality involving 29% of LV myocardium in the distribution of the proximal  LAD. After pharmacologic stress, this perfusion abnormality is larger and more severe involving 38% of the LV myocardium, indicative of ischemia with underlying scar.    There are no other significant perfusion abnormalities.    The whole heart absolute myocardial perfusion values averaged 0.52 cc/min/g at rest, which is reduced; 1.15 cc/min/g at stress, which is mildly reduced; and CFR is 2.13 , which is mildly reduced.    CT attenuation images demonstrate mild diffuse coronary calcifications in  the LAD, LCX, RCA and LM territory and mild diffuse aortic calcifications in the descending aorta.    The gated perfusion images showed an ejection fraction of 54% at rest and 55% during stress. A normal ejection fraction is greater than 47%.    There is mid to apical anterior and anteroseptal wall moderate hypokinesis at rest and mid to apical anterior and anteroseptal wall akinesis during stress.    There is mid to apical inferior and inferoseptal wall hypokinesis at rest and mid to apical inferior and inferoseptal wall akinesis during stress.    The LV cavity size is normal at rest and stress.    The ECG portion of the study is negative for ischemia.    There were no arrhythmias during stress.    The patient reported chest pain during the stress test.    There are no prior studies for comparison.    Cardiac Catheterization     The Mid LAD lesion was 95% stenosed.    The 1st Mrg lesion was 95% stenosed.    The estimated blood loss was none.    Refer for CABG.      Pending Diagnostic Studies:     None         Medications:  Reconciled Home Medications:      Medication List      START taking these medications    clopidogreL 75 mg tablet  Commonly known as: PLAVIX  Take 1 tablet (75 mg total) by mouth once daily.  Start taking on: April 7, 2023     metoprolol tartrate 25 MG tablet  Commonly known as: LOPRESSOR  Take 0.5 tablets (12.5 mg total) by mouth 2 (two) times daily.        CONTINUE taking these medications    * albuterol 90 mcg/actuation inhaler  Commonly known as: VENTOLIN HFA  INHALE 2 PUFFS BY MOUTH INTO THE LUNGS EVERY 6 HOURS AS NEEDED FOR WHEEZING     * albuterol 2.5 mg /3 mL (0.083 %) nebulizer solution  Commonly known as: PROVENTIL  Take 3 mLs (2.5 mg total) by nebulization every 4 to 6 hours as needed for Wheezing or Shortness of Breath. Rescue     aspirin 81 MG Chew  Take 81 mg by mouth once daily.     atorvastatin 80 MG tablet  Commonly known as: LIPITOR  Take 1 tablet (80 mg total) by  "mouth every evening.     BASAGLAR KWIKPEN U-100 INSULIN glargine 100 units/mL SubQ pen  Generic drug: insulin  INJECT 10 UNITS UNDER THE SKIN EVERY MORNING     BD ULTRA-FINE SHORT PEN NEEDLE 31 gauge x 5/16" Ndle  Generic drug: pen needle, diabetic  Inject 1 pen into the skin once daily.     clonazePAM 0.5 MG tablet  Commonly known as: KlonoPIN  TAKE 1 TABLET(0.5 MG) BY MOUTH EVERY NIGHT AS NEEDED FOR ANXIETY     ergocalciferol 50,000 unit Cap  Commonly known as: ERGOCALCIFEROL  TAKE 1 CAPSULE BY MOUTH EVERY 7 DAYS     fluticasone propionate 50 mcg/actuation nasal spray  Commonly known as: FLONASE  1 spray (50 mcg total) by Each Nostril route once daily.     gabapentin 300 MG capsule  Commonly known as: NEURONTIN  Take 1 capsule (300 mg total) by mouth 3 (three) times daily.     glipiZIDE 10 MG tablet  Commonly known as: GLUCOTROL  TAKE 1 TABLET(10 MG) BY MOUTH TWICE DAILY     JARDIANCE 10 mg tablet  Generic drug: empagliflozin  TAKE 1 TABLET(10 MG) BY MOUTH EVERY DAY     metFORMIN 1000 MG tablet  Commonly known as: GLUCOPHAGE  TAKE 1 TABLET(1000 MG) BY MOUTH TWICE DAILY WITH MEALS     nicotine 21 mg/24 hr  Commonly known as: NICODERM CQ  PLACE 1 PATCH ONTO THE SKIN DAILY     ondansetron 4 MG Tbdl  Commonly known as: ZOFRAN-ODT  Take 1 tablet (4 mg total) by mouth 3 (three) times daily.     pantoprazole 40 MG tablet  Commonly known as: PROTONIX  Take 1 tablet (40 mg total) by mouth before breakfast.     sertraline 50 MG tablet  Commonly known as: ZOLOFT  Take 1 tablet (50 mg total) by mouth once daily.     sucralfate 1 gram tablet  Commonly known as: CARAFATE  TAKE 1 TABLET(1 GRAM) BY MOUTH THREE TIMES DAILY BEFORE MEALS     topiramate 50 MG tablet  Commonly known as: TOPAMAX  Take 2 tablets (100 mg total) by mouth 2 (two) times daily. TAKE 1 TABLET BY MOUTH TWICE DAILY FOR 7 DAYS, THEN 1 TABLET EVERY MORNING AND 2 TABLETS EVERY EVENING FOR 7 DAYS, THEN 2 TABLETS TWICE DAILY     TRADJENTA 5 mg Tab tablet  Generic " drug: linaGLIPtin  Take 1 tablet (5 mg total) by mouth once daily.     traMADoL 50 mg tablet  Commonly known as: ULTRAM  Take 1 tablet (50 mg total) by mouth every 6 (six) hours as needed for Pain.     TRELEGY ELLIPTA 100-62.5-25 mcg Dsdv  Generic drug: fluticasone-umeclidin-vilanter  INHALE 1 PUFF INTO THE LUNGS EVERY DAY     TRUE METRIX GLUCOSE TEST STRIP Strp  Generic drug: blood sugar diagnostic  USE AS DIRECTED FOUR TIMES DAILY         * This list has 2 medication(s) that are the same as other medications prescribed for you. Read the directions carefully, and ask your doctor or other care provider to review them with you.                Indwelling Lines/Drains at time of discharge:   Lines/Drains/Airways     None                 Time spent on the discharge of patient: 36 minutes         ePggy Tinsley PA-C  Department of Hospital Medicine  [facility] 11H

## 2023-04-07 ENCOUNTER — HOSPITAL ENCOUNTER (EMERGENCY)
Facility: HOSPITAL | Age: 63
Discharge: HOME | End: 2023-04-08
Attending: EMERGENCY MEDICINE
Payer: MEDICARE

## 2023-04-07 DIAGNOSIS — T50.901A ACCIDENTAL DRUG OVERDOSE, INITIAL ENCOUNTER: Primary | ICD-10-CM

## 2023-04-07 PROCEDURE — 99284 PR EMERGENCY DEPT VISIT,LEVEL IV: ICD-10-PCS | Mod: ,,, | Performed by: EMERGENCY MEDICINE

## 2023-04-07 PROCEDURE — 93010 EKG 12-LEAD: ICD-10-PCS | Mod: HCNC,,, | Performed by: INTERNAL MEDICINE

## 2023-04-07 PROCEDURE — 99283 EMERGENCY DEPT VISIT LOW MDM: CPT | Mod: 25,HCNC

## 2023-04-07 PROCEDURE — 99284 EMERGENCY DEPT VISIT MOD MDM: CPT | Mod: ,,, | Performed by: EMERGENCY MEDICINE

## 2023-04-07 PROCEDURE — 93005 ELECTROCARDIOGRAM TRACING: CPT | Mod: HCNC

## 2023-04-07 PROCEDURE — 93010 ELECTROCARDIOGRAM REPORT: CPT | Mod: HCNC,,, | Performed by: INTERNAL MEDICINE

## 2023-04-08 VITALS
SYSTOLIC BLOOD PRESSURE: 144 MMHG | RESPIRATION RATE: 18 BRPM | TEMPERATURE: 98 F | OXYGEN SATURATION: 99 % | DIASTOLIC BLOOD PRESSURE: 68 MMHG | HEART RATE: 61 BPM

## 2023-04-08 NOTE — ED PROVIDER NOTES
Encounter Date: 4/7/2023       History     Chief Complaint   Patient presents with    Drug Overdose     Pt states she took 50mg of metoprolol PTA on accident, she is supposed to take a half of a pill, and she took 2 on accident. Pt states she has a heart block and is scheduled to come for an open heart surgery on Friday. Pt denies any symptoms at this time.     HPI  63-year-old female with past medical history as noted, discharged 2 days ago was admitted for chest pain with cardiac catheterization showing multiple obstructions referred for CABG scheduled for CABG next week, she was discharged with metoprolol 12.5 b.i.d..  Tonight she by mistake took 2 pills instead of half a pill of metoprolol about 1 hour prior to presentation.  She is asymptomatic from this.  She was worried that this was dangerous so came to the hospital.  She denies any new dizziness, weakness, chest pain, shortness of breath, lightheadedness.  She does have a headache which is unchanged from her baseline usual headache which she gets frequently.    Review of patient's allergies indicates:  No Known Allergies  Past Medical History:   Diagnosis Date    Asthma     Breast carcinoma     Cataract     Coronary artery disease of native heart with stable angina pectoris 4/4/2023    Diabetes mellitus     Moderate episode of recurrent major depressive disorder 5/19/2021    Osteoporosis      Past Surgical History:   Procedure Laterality Date    BILATERAL MASTECTOMY  04/26/2018    CHOLECYSTECTOMY      COLONOSCOPY N/A 10/27/2015    Procedure: COLONOSCOPY;  Surgeon: Johnny Hurley MD;  Location: [facility] ENDO;  Service: Endoscopy;  Laterality: N/A;    CORONARY ANGIOGRAPHY N/A 4/5/2023    Procedure: ANGIOGRAM, CORONARY ARTERY;  Surgeon: Francisco Barahona MD;  Location: [facility] CATH LAB;  Service: Cardiology;  Laterality: N/A;    ESOPHAGOGASTRODUODENOSCOPY N/A 01/24/2022    Procedure: ESOPHAGOGASTRODUODENOSCOPY (EGD);  Surgeon: Mili Katz MD;  Location: [facility]  ENDO (4TH FLR);  Service: Endoscopy;  Laterality: N/A;  rapid in AM, instr portal -ml    HYSTERECTOMY      LASER PERIPHERAL IRIDOTOMY Bilateral         LEFT HEART CATHETERIZATION Left 2023    Procedure: Left heart cath;  Surgeon: Francisco Barahona MD;  Location: [facility] CATH LAB;  Service: Cardiology;  Laterality: Left;     Family History   Problem Relation Age of Onset    Diabetes Father     Amblyopia Neg Hx     Blindness Neg Hx     Cataracts Neg Hx     Glaucoma Neg Hx     Macular degeneration Neg Hx     Retinal detachment Neg Hx     Strabismus Neg Hx      Social History     Tobacco Use    Smoking status: Former     Packs/day: 1.00     Years: 40.00     Pack years: 40.00     Types: Cigarettes     Quit date: 3/12/2023     Years since quittin.0     Passive exposure: Current    Smokeless tobacco: Never   Substance Use Topics    Alcohol use: No     Alcohol/week: 0.0 standard drinks    Drug use: No     Review of Systems    Physical Exam     Initial Vitals [23 2155]   BP Pulse Resp Temp SpO2   (!) 141/66 67 20 98.2 °F (36.8 °C) 97 %      MAP       --         Physical Exam    Physical Exam:  CONSTITUTIONAL: Well developed, well nourished, in no acute distress.  HENT: Normocephalic, atraumatic    EYES: Sclerae anicteric   NECK: Supple, no thyroid enlargement  CARDIOVASCULAR: Regular rate and rhythm without any murmurs, gallops, rubs.  No lower extremity swelling or tenderness to palpation.  RESPIRATORY: Speaking in full sentences. Breathing comfortably. Auscultation of the lungs revealed normal breath sounds b/l, no wheezing, no rales, no rhonchi.  ABDOMEN: Soft and nontender, no masses, no rebound or guarding   NEUROLOGIC: Alert, interacting normally. No facial droop.   MSK: Moving all four extremities.  Skin: Warm and dry. No visible rash on exposed areas of skin.    Psych: Mood and affect normal.       ED Course   Procedures  Labs Reviewed   HIV 1 / 2 ANTIBODY   HEPATITIS C ANTIBODY           Imaging Results    None          Medications - No data to display  Medical Decision Making:   History:   I obtained history from: someone other than patient.       <> Summary of History: Part of history obtained from family members.  Old Medical Records: I decided to obtain old medical records.  Old Records Summarized: records from previous admission(s).       <> Summary of Records: Discharge summary from recent visit:Jud Villa is placed in  Observation for management of chest pain. Troponins trended and all negative. EKG reviewed. Discussion with cardiology who recommended inpatient stress trest, ordered. PET stress 04/03 showing large sized, severe intensity mid to apical anterior, septal, anteroseptal and inferoapical resting perfusion abnormality involving 29% of LV myocardium in the distribution of the proximal  LAD that worsened with stress. Interventional cardiology consulted, [facility] done on 04/05 showing severe coronary artery disease. No stents placed in lieu of consult to CTS to evaluate for possible CABG. Increased statin to high intensity, added metoprolol and plavix to complete GDMT. CTS evaluated patient, Dr. De Leon to review films and see patient in clinic on 04/11/2023 for pre-op clearance with surgery planned for 04/13/2023. Discussed plan with CTS, will continue patient on ASA and plavix until this follow up appointment. Referrals to cardiology and CTS made prior to discharge.      On day of discharge patient's vital signs were stable and patient appeared clinically ready for discharge. Hospital course, discharge plan and return precautions discussed with patient and her daughter Annetta via phone call- they expressed understanding. All questions answered at that time.      63-year-old female with past medical history as noted coming in with accidentally taking 50 mg of metoprolol about 1 hour prior to presentation.  She is asymptomatic from it.  He is normally on 12.5 b.i.d..    Peak  onset oral metoprolol is 1-2 hours.  Will continue to monitor for any development of symptoms, bradycardia, hypotension.  Low suspicion for this as 50 mg is still a therapeutic dose for some people.    No indication for labs or imaging at this time.    This was incidental overdose and there was no intention for self-harm.  No concerning cold ingestions.    If she remains asymptomatic and does not become bradycardic hypotensive anticipate discharge home in 1-2 hours after monitoring in the emergency department.    Signed out to Dr. Boogie pending he monitoring discharge she continues to be asymptomatic.                          Clinical Impression:   Final diagnoses:  [T50.901A] Accidental drug overdose, initial encounter - Accidentally took more metoprolol (Primary)        ED Disposition Condition    Discharge Stable          ED Prescriptions    None       Follow-up Information       Follow up With Specialties Details Why Contact Info    Bo Lopez MD Family Medicine In 2 days  [address] [facility] [address]  [phone]      [facility] - Emergency Dept Emergency Medicine  If symptoms worsen [address] [facility] [address] [phone]             Elijah Weber MD  04/08/23 0140

## 2023-04-08 NOTE — ED TRIAGE NOTES
62 yo F presents to the ED after accidental overdose of metoprolol - pt prescribed 12.5, took 50mg approx 1 hr PTA. Placed on monitoring, VSS. Pt recently seen here for CP, diagnosed with occlusion of multi-vessels - scheduled for sgy Thursday. Denies CP, SOB, dizziness, HA, N/V, abdominal pain, urinary or bowel complaints.

## 2023-04-08 NOTE — DISCHARGE INSTRUCTIONS
Diagnosis:   1. Accidental drug overdose, initial encounter        Tests you had showed:   Labs Reviewed   HIV 1 / 2 ANTIBODY   HEPATITIS C ANTIBODY      No orders to display       Treatments you received were:   Medications - No data to display    Home Care Instructions:  - Medications: Continue taking your home medications as prescribed    Follow-Up Plan:  - Follow-up with: Primary care doctor within 1-2  days  - Additional testing and/or evaluation will be directed by your primary doctor    Return to the Emergency Department for symptoms including but not limited to: worsening symptoms, severe back pain, shortness of breath or chest pain, vomiting with inability to hold down fluids, blood in vomit or poop, fevers greater than 100.4°F, passing out/fainting/unconsciousness, or other concerning symptoms.     Future Appointments   Date Time Provider Department Center   4/11/2023  8:15 AM PULMONARY FUNCTION [facility] PULMLAB [facility]   4/11/2023  8:30 AM PULMONARY FUNCTION [facility] PULMLAB [facility]   4/11/2023  8:45 AM PULMONARY FUNCTION [facility] PULMLAB [facility]   4/11/2023  9:00 AM VASCULAR, LAB [facility] VASCLAB [facility]   4/11/2023 10:15 AM EKG, APPT [facility] EKG [facility]   4/11/2023 10:30 AM LAB, APPOINTMENT [facility] LAB VNP [facility]   4/11/2023 10:45 AM SPECIMEN, [facility] SPECLAB [facility]   4/11/2023 11:00 AM [facility] [id number] 485 LB LIMIT [facility] XRAY OP [facility]   4/11/2023 12:00 PM Igor Kahn MD [facility] CARDVAS [facility]   4/12/2023  8:00 AM [facility] Arguello, [facility] OPT[facility]TY [place]   4/13/2023  1:20 PM Jillian Arredondo MD [facility] CARDIO [place] [facility]   4/13/2023  4:30 PM Bo Lopez MD [facility] MED [place]   4/18/2023  2:40 PM Kelly Mckeon MD [facility] STROKE8 [facility]   5/1/2023 10:15 AM SHAH, VISUAL FIELDS [facility] OPHTHAL [facility]   5/1/2023 10:45 AM Claribel Pereira MD [facility] BECKY [place]   5/23/2023  7:00 AM SPECIMEN, JAYA [facility]HER SPECLAB [place]   5/23/2023  7:15 AM LAB, [place] [facility] LAB  [place]   6/8/2023  3:00 PM Bo Lopez MD [facility]

## 2023-04-08 NOTE — PROVIDER PROGRESS NOTES - EMERGENCY DEPT.
Signout Note  I received signout from the previous providers.     Chief complaint:  Drug Overdose (Pt states she took 50mg of metoprolol PTA on accident, she is supposed to take a half of a pill, and she took 2 on accident. Pt states she has a heart block and is scheduled to come for an open heart surgery on Friday. Pt denies any symptoms at this time.)    Pertinent History, ED course, Disposition:    Per sign out and chart review: Jud Villa is a 63 y.o. female with pertinent PMH of asthma, smoking (quit), migraines, DM, breast carcinoma, stroke 2 weeks ago, on aspirin who presents to the ED with a complaint of constant, localized left sided chest pain that began this morning while she was exerting herself.     Pt signed out to me pending:  Reassessment with anticipated discharge home after observation for 2 additional hours.    Update/ Disposition:  After sign-out, patient was reassessed and has not been symptomatic while in the ED.  She did have 1 lower systolic blood pressure which spontaneously resolved.  Her heart rate has been within normal limits.  Her EKG was reviewed and there was no noted signs of ischemia or arrhythmia.  Patient noted that she was recently started on metoprolol and she was confused when talking to her cousin.  She states that she took 1 25 mg tablet this morning and 1 full tab this night  for a total of 50 mg in 1 day but now understands that she should take half a tablet in the morning and half at night for 12.5 mg b.i.d..  She denied any chest pain or shortness of breath.  She states that this is purely incidental.  After reviewing her medications with her and formulating a plan I believe that she is safe for discharge home.  Strict return precautions were discussed.  She voiced verbal understanding agreement the plan.  She states that she will touch base with her primary care doctor on Monday.      Patient,caregiver and/or family understands the plan and verbalized agreement. All  questions answered.     Diagnostic Impression:    1. Accidental drug overdose, initial encounter         ED Disposition Condition    Discharge Stable          ED Prescriptions    None       Follow-up Information       Follow up With Specialties Details Why Contact Info    Bo Lopez MD Family Medicine In 2 days  [address]  [place] [address]  [phone]      [facility] - Emergency Dept Emergency Medicine  If symptoms worsen [address] [facility] [address] [phone]                       Vitals:    04/08/23 0006   BP: (!) 144/68   Pulse: 61   Resp: 18   Temp:        Labs Reviewed   HIV 1 / 2 ANTIBODY   HEPATITIS C ANTIBODY       Imaging Studies:    No orders to display       Medications Given:  Medications - No data to display             Please note that this dictation was completed with computer voice recognition software.  Quite often unanticipated grammatical, syntax, homophones, and other interpretive errors are inadvertently transcribed by the computer software.  Please disregard these errors.  Please excuse any errors that have escaped final proofreading.

## 2023-04-10 ENCOUNTER — TELEPHONE (OUTPATIENT)
Dept: CARDIOTHORACIC SURGERY | Facility: CLINIC | Age: 63
End: 2023-04-10
Payer: MEDICARE

## 2023-04-10 ENCOUNTER — TELEPHONE (OUTPATIENT)
Dept: CARDIAC REHAB | Facility: CLINIC | Age: 63
End: 2023-04-10
Payer: MEDICARE

## 2023-04-10 NOTE — TELEPHONE ENCOUNTER
Spoke with patient's sister, Katarina and confirmed that patient's surgery date is Friday, 4/14 with 5 am arrival time. Pt's sister had additional questions about the surgical techniques that will be used during surgery. Encouraged her to write those down and ask Dr Kahn during pre-op visit. She is aware patient has appointments on Tuesday, 4/11 and other family members plan to be there with her tomorrow.    Julie Haase RN  CTS Nurse Navigator [phone]          ----- Message from Tracy Diana sent at 4/10/2023  9:26 AM CDT -----  Regarding: pt sister called  Name of Who is Calling: ABELINO DEMARCO [[id number]] Nicole ( pt sister)      What is the request in detail:  requesting to clarify sister surgery date. Please advise       Can the clinic reply by [facility]: No      What Number to Call Back if not in [facility]: [phone]

## 2023-04-10 NOTE — TELEPHONE ENCOUNTER
Letter regarding Phase II cardiac rehab was sent to patient.  Will contact patient in 4 weeks to see if interested.  Also, information letter sent to [facility].  She will be having CABG on 4/14/2023.  Agnieszka Pereira RN  Cardiac Rehab Nurse

## 2023-04-10 NOTE — PLAN OF CARE
[facility] - Observation 11H  Discharge Final Note    Primary Care Provider: Bo Lopez MD    Expected Discharge Date: 4/6/2023    Patient discharged to home via personal transportation.     Patient's bedside nurse and patient notified of the above.      Final Discharge Note (most recent)       Final Note - 04/10/23 1605          Final Note    Assessment Type Final Discharge Note (P)      Anticipated Discharge Disposition Home or Self Care (P)         Post-Acute Status    Post-Acute Authorization Other (P)      Other Status No Post-Acute Service Needs (P)                      Important Message from Medicare  Important Message from Medicare regarding Discharge Appeal Rights: Given to patient/caregiver, Explained to patient/caregiver, Signed/date by patient/caregiver     Date IMM was signed: 04/06/23  Time IMM was signed: 0838    Contact Info       Bo Lopez MD   Specialty: Family Medicine   Relationship: PCP - General    [address]  [place] [address]   Phone: [phone]       Next Steps: Follow up    Igor Kahn MD   Specialty: Cardiothoracic Surgery, Transplant    [address] [address]   Phone: [phone]       Next Steps: Follow up    Francisco Barahona MD   Specialty: Cardiology, Interventional Cardiology    [address]  [place] [address]   Phone: [phone]       Next Steps: Follow up            Future Appointments   Date Time Provider Department Center   4/11/2023  8:15 AM PULMONARY FUNCTION [facility] PULMLAB [facility]   4/11/2023  8:30 AM PULMONARY FUNCTION [facility] PULMLAB [facility]   4/11/2023  8:45 AM PULMONARY FUNCTION [facility] PULMLAB [facility]   4/11/2023  9:00 AM VASCULAR, LAB [facility] VASCLAB [facility]   4/11/2023 10:15 AM EKG, APPT [facility] EKG [facility]   4/11/2023 10:30 AM LAB, APPOINTMENT [facility] LAB VNP [facility]   4/11/2023 10:45 AM SPECIMEN, [facility] NOM SPECLAB [facility]   4/11/2023 11:00 AM NOM [id number] 485 LB LIMIT [facility] XRAY OP  [facility]   4/11/2023 12:00 PM Igor Kahn MD [facility] CARDVAS [facility]   4/12/2023  8:00 AM Westley Arguello, OD [facility] OPTOMTY [place]   4/13/2023  1:20 PM Jillian Arredondo MD [facility] CARDIO [place] [facility]   4/13/2023  4:30 PM Bo Lopez MD [facility] MED [place]   4/18/2023  2:40 PM Kelly Mckeon MD [facility] STROKE8 [facility]   5/1/2023 10:15 AM SHAH, VISUAL FIELDS [facility] OPHTHAL [facility]   5/1/2023 10:45 AM Claribel Pereira MD [facility] OPHTHAL [facility]   5/23/2023  7:00 AM SPECIMEN, JAYA MAHER SPECLAB [place]   5/23/2023  7:15 AM [facility] [place]   6/8/2023  3:00 PM Bo Lopez MD [facility] scheduled post-discharge follow-up appointment and information added to AVS.     Marguerite Escobar LMSW  [facility]  Ext. [phone]

## 2023-04-11 ENCOUNTER — HOSPITAL ENCOUNTER (OUTPATIENT)
Dept: VASCULAR SURGERY | Facility: CLINIC | Age: 63
Discharge: HOME | End: 2023-04-11
Payer: MEDICARE

## 2023-04-11 ENCOUNTER — OFFICE VISIT (OUTPATIENT)
Dept: CARDIOTHORACIC SURGERY | Facility: CLINIC | Age: 63
End: 2023-04-11
Payer: MEDICARE

## 2023-04-11 ENCOUNTER — HOSPITAL ENCOUNTER (OUTPATIENT)
Dept: PULMONOLOGY | Facility: CLINIC | Age: 63
Discharge: HOME | End: 2023-04-11
Payer: MEDICARE

## 2023-04-11 ENCOUNTER — HOSPITAL ENCOUNTER (OUTPATIENT)
Dept: CARDIOLOGY | Facility: CLINIC | Age: 63
Discharge: HOME | End: 2023-04-11
Payer: MEDICARE

## 2023-04-11 VITALS
WEIGHT: 129.75 LBS | RESPIRATION RATE: 18 BRPM | SYSTOLIC BLOOD PRESSURE: 137 MMHG | BODY MASS INDEX: 23.88 KG/M2 | HEART RATE: 75 BPM | OXYGEN SATURATION: 98 % | DIASTOLIC BLOOD PRESSURE: 63 MMHG | HEIGHT: 62 IN

## 2023-04-11 DIAGNOSIS — I25.118 CORONARY ARTERY DISEASE OF NATIVE HEART WITH STABLE ANGINA PECTORIS, UNSPECIFIED VESSEL OR LESION TYPE: ICD-10-CM

## 2023-04-11 DIAGNOSIS — R94.2 ABNORMAL RESULTS OF PULMONARY FUNCTION STUDIES: ICD-10-CM

## 2023-04-11 DIAGNOSIS — I25.10 CORONARY ARTERY DISEASE, UNSPECIFIED VESSEL OR LESION TYPE, UNSPECIFIED WHETHER ANGINA PRESENT, UNSPECIFIED WHETHER NATIVE OR TRANSPLANTED HEART: ICD-10-CM

## 2023-04-11 LAB
DLCO ADJ PRE: 10.08 ML/(MIN*MMHG) (ref 15.04–26.51)
DLCO SINGLE BREATH LLN: 15.04
DLCO SINGLE BREATH PRE REF: 48.5 %
DLCO SINGLE BREATH REF: 20.77
DLCOC SBVA LLN: 2.93
DLCOC SBVA PRE REF: 69.4 %
DLCOC SBVA REF: 4.51
DLCOC SINGLE BREATH LLN: 15.04
DLCOC SINGLE BREATH PRE REF: 48.5 %
DLCOC SINGLE BREATH REF: 20.77
DLCOCSBVAULN: 6.09
DLCOCSINGLEBREATHULN: 26.51
DLCOSINGLEBREATHULN: 26.51
DLCOVA LLN: 2.93
DLCOVA PRE REF: 69.4 %
DLCOVA PRE: 3.13 ML/(MIN*MMHG*L) (ref 2.93–6.09)
DLCOVA REF: 4.51
DLCOVAULN: 6.09
DLVAADJ PRE: 3.13 ML/(MIN*MMHG*L) (ref 2.93–6.09)
FEF 25 75 LLN: 0.96
FEF 25 75 PRE REF: 42 %
FEF 25 75 REF: 1.97
FEV05 LLN: 0.82
FEV05 REF: 1.68
FEV1 FVC LLN: 66
FEV1 FVC PRE REF: 85.8 %
FEV1 FVC REF: 79
FEV1 LLN: 1.63
FEV1 PRE REF: 65.7 %
FEV1 REF: 2.2
FVC LLN: 2.08
FVC PRE REF: 76.2 %
FVC REF: 2.8
IVC PRE: 1.77 L (ref 2.08–3.55)
IVC SINGLE BREATH LLN: 2.08
IVC SINGLE BREATH PRE REF: 63.4 %
IVC SINGLE BREATH REF: 2.8
IVCSINGLEBREATHULN: 3.55
MVV LLN: 68
MVV PRE REF: 63.9 %
MVV REF: 80
PEF LLN: 4.09
PEF PRE REF: 73.1 %
PEF REF: 5.69
PHYSICIAN COMMENT: ABNORMAL
PRE DLCO: 10.08 ML/(MIN*MMHG) (ref 15.04–26.51)
PRE FEF 25 75: 0.83 L/S (ref 0.96–3.37)
PRE FET 100: 7.34 SEC
PRE FEV05 REF: 66.8 %
PRE FEV1 FVC: 67.76 % (ref 66.35–89.86)
PRE FEV1: 1.45 L (ref 1.63–2.75)
PRE FEV5: 1.12 L (ref 0.82–2.53)
PRE FVC: 2.13 L (ref 2.08–3.55)
PRE MVV: 51.01 L/MIN (ref 67.85–91.79)
PRE PEF: 4.16 L/S (ref 4.09–7.3)
VA PRE: 3.22 L (ref 4.45–4.45)
VA SINGLE BREATH LLN: 4.45
VA SINGLE BREATH PRE REF: 72.3 %
VA SINGLE BREATH REF: 4.45
VASINGLEBREATHULN: 4.45

## 2023-04-11 PROCEDURE — 3075F PR MOST RECENT SYSTOLIC BLOOD PRESS GE 130-139MM HG: ICD-10-PCS | Mod: HCNC,CPTII,S$GLB, | Performed by: THORACIC SURGERY (CARDIOTHORACIC VASCULAR SURGERY)

## 2023-04-11 PROCEDURE — 1159F MED LIST DOCD IN RCRD: CPT | Mod: HCNC,CPTII,S$GLB, | Performed by: THORACIC SURGERY (CARDIOTHORACIC VASCULAR SURGERY)

## 2023-04-11 PROCEDURE — 99999 PR PBB SHADOW E&M-EST. PATIENT-LVL V: ICD-10-PCS | Mod: PBBFAC,HCNC,, | Performed by: THORACIC SURGERY (CARDIOTHORACIC VASCULAR SURGERY)

## 2023-04-11 PROCEDURE — 99499 UNLISTED E&M SERVICE: CPT | Mod: HCNC,S$GLB,, | Performed by: THORACIC SURGERY (CARDIOTHORACIC VASCULAR SURGERY)

## 2023-04-11 PROCEDURE — 3078F PR MOST RECENT DIASTOLIC BLOOD PRESSURE < 80 MM HG: ICD-10-PCS | Mod: HCNC,CPTII,S$GLB, | Performed by: THORACIC SURGERY (CARDIOTHORACIC VASCULAR SURGERY)

## 2023-04-11 PROCEDURE — 99999 PR PBB SHADOW E&M-EST. PATIENT-LVL V: CPT | Mod: PBBFAC,HCNC,, | Performed by: THORACIC SURGERY (CARDIOTHORACIC VASCULAR SURGERY)

## 2023-04-11 PROCEDURE — 94729 DIFFUSING CAPACITY: CPT | Mod: HCNC,S$GLB,, | Performed by: INTERNAL MEDICINE

## 2023-04-11 PROCEDURE — 3008F BODY MASS INDEX DOCD: CPT | Mod: HCNC,CPTII,S$GLB, | Performed by: THORACIC SURGERY (CARDIOTHORACIC VASCULAR SURGERY)

## 2023-04-11 PROCEDURE — 3044F PR MOST RECENT HEMOGLOBIN A1C LEVEL <7.0%: ICD-10-PCS | Mod: HCNC,CPTII,S$GLB, | Performed by: THORACIC SURGERY (CARDIOTHORACIC VASCULAR SURGERY)

## 2023-04-11 PROCEDURE — 3075F SYST BP GE 130 - 139MM HG: CPT | Mod: HCNC,CPTII,S$GLB, | Performed by: THORACIC SURGERY (CARDIOTHORACIC VASCULAR SURGERY)

## 2023-04-11 PROCEDURE — 3078F DIAST BP <80 MM HG: CPT | Mod: HCNC,CPTII,S$GLB, | Performed by: THORACIC SURGERY (CARDIOTHORACIC VASCULAR SURGERY)

## 2023-04-11 PROCEDURE — 93010 EKG 12-LEAD: ICD-10-PCS | Mod: HCNC,S$GLB,, | Performed by: INTERNAL MEDICINE

## 2023-04-11 PROCEDURE — 3044F HG A1C LEVEL LT 7.0%: CPT | Mod: HCNC,CPTII,S$GLB, | Performed by: THORACIC SURGERY (CARDIOTHORACIC VASCULAR SURGERY)

## 2023-04-11 PROCEDURE — 93880 EXTRACRANIAL BILAT STUDY: CPT | Mod: HCNC,S$GLB,, | Performed by: SURGERY

## 2023-04-11 PROCEDURE — 94010 BREATHING CAPACITY TEST: ICD-10-PCS | Mod: HCNC,S$GLB,, | Performed by: INTERNAL MEDICINE

## 2023-04-11 PROCEDURE — 93880 PR DUPLEX SCAN EXTRACRANIAL,BILAT: ICD-10-PCS | Mod: HCNC,S$GLB,, | Performed by: SURGERY

## 2023-04-11 PROCEDURE — 94729 PR C02/MEMBANE DIFFUSE CAPACITY: ICD-10-PCS | Mod: HCNC,S$GLB,, | Performed by: INTERNAL MEDICINE

## 2023-04-11 PROCEDURE — 94010 BREATHING CAPACITY TEST: CPT | Mod: HCNC,S$GLB,, | Performed by: INTERNAL MEDICINE

## 2023-04-11 PROCEDURE — 3008F PR BODY MASS INDEX (BMI) DOCUMENTED: ICD-10-PCS | Mod: HCNC,CPTII,S$GLB, | Performed by: THORACIC SURGERY (CARDIOTHORACIC VASCULAR SURGERY)

## 2023-04-11 PROCEDURE — 93005 ELECTROCARDIOGRAM TRACING: CPT | Mod: HCNC,S$GLB,, | Performed by: THORACIC SURGERY (CARDIOTHORACIC VASCULAR SURGERY)

## 2023-04-11 PROCEDURE — 1159F PR MEDICATION LIST DOCUMENTED IN MEDICAL RECORD: ICD-10-PCS | Mod: HCNC,CPTII,S$GLB, | Performed by: THORACIC SURGERY (CARDIOTHORACIC VASCULAR SURGERY)

## 2023-04-11 PROCEDURE — 93005 EKG 12-LEAD: ICD-10-PCS | Mod: HCNC,S$GLB,, | Performed by: THORACIC SURGERY (CARDIOTHORACIC VASCULAR SURGERY)

## 2023-04-11 PROCEDURE — 99499 NO LOS: ICD-10-PCS | Mod: HCNC,S$GLB,, | Performed by: THORACIC SURGERY (CARDIOTHORACIC VASCULAR SURGERY)

## 2023-04-11 PROCEDURE — 93010 ELECTROCARDIOGRAM REPORT: CPT | Mod: HCNC,S$GLB,, | Performed by: INTERNAL MEDICINE

## 2023-04-11 RX ORDER — PROPOFOL 10 MG/ML
0-50 INJECTION, EMULSION INTRAVENOUS CONTINUOUS
Status: CANCELLED | OUTPATIENT
Start: 2023-04-11

## 2023-04-11 RX ORDER — MAGNESIUM SULFATE/D5W 2 G/50 ML
4 INTRAVENOUS SOLUTION, PIGGYBACK (ML) INTRAVENOUS
Status: CANCELLED | OUTPATIENT
Start: 2023-04-11

## 2023-04-11 RX ORDER — POLYETHYLENE GLYCOL 3350 17 G/17G
17 POWDER, FOR SOLUTION ORAL DAILY
Status: CANCELLED | OUTPATIENT
Start: 2023-04-11

## 2023-04-11 RX ORDER — ASPIRIN 300 MG/1
300 SUPPOSITORY RECTAL ONCE AS NEEDED
Status: CANCELLED | OUTPATIENT
Start: 2023-04-11 | End: 2034-09-07

## 2023-04-11 RX ORDER — ACETAMINOPHEN 325 MG/1
650 TABLET ORAL EVERY 4 HOURS PRN
Status: CANCELLED | OUTPATIENT
Start: 2023-04-11

## 2023-04-11 RX ORDER — FAMOTIDINE 10 MG/ML
20 INJECTION INTRAVENOUS 2 TIMES DAILY
Status: CANCELLED | OUTPATIENT
Start: 2023-04-11

## 2023-04-11 RX ORDER — ALBUMIN HUMAN 50 G/1000ML
25 SOLUTION INTRAVENOUS ONCE AS NEEDED
Status: CANCELLED | OUTPATIENT
Start: 2023-04-11 | End: 2034-09-07

## 2023-04-11 RX ORDER — METOCLOPRAMIDE HYDROCHLORIDE 5 MG/ML
5 INJECTION INTRAMUSCULAR; INTRAVENOUS EVERY 6 HOURS PRN
Status: CANCELLED | OUTPATIENT
Start: 2023-04-11

## 2023-04-11 RX ORDER — OXYCODONE HYDROCHLORIDE 5 MG/1
5 TABLET ORAL EVERY 4 HOURS PRN
Status: CANCELLED | OUTPATIENT
Start: 2023-04-11

## 2023-04-11 RX ORDER — ASPIRIN 325 MG
325 TABLET, DELAYED RELEASE (ENTERIC COATED) ORAL DAILY
Status: CANCELLED | OUTPATIENT
Start: 2023-04-11

## 2023-04-11 RX ORDER — BISACODYL 10 MG
10 SUPPOSITORY, RECTAL RECTAL DAILY PRN
Status: CANCELLED | OUTPATIENT
Start: 2023-04-11

## 2023-04-11 RX ORDER — POTASSIUM CHLORIDE 14.9 MG/ML
20 INJECTION INTRAVENOUS
Status: CANCELLED | OUTPATIENT
Start: 2023-04-11

## 2023-04-11 RX ORDER — LIDOCAINE HYDROCHLORIDE 10 MG/ML
1 INJECTION, SOLUTION EPIDURAL; INFILTRATION; INTRACAUDAL; PERINEURAL
Status: CANCELLED | OUTPATIENT
Start: 2023-04-11

## 2023-04-11 RX ORDER — FENTANYL CITRATE 50 UG/ML
25 INJECTION, SOLUTION INTRAMUSCULAR; INTRAVENOUS
Status: CANCELLED | OUTPATIENT
Start: 2023-04-11

## 2023-04-11 RX ORDER — SODIUM CHLORIDE 0.9 % (FLUSH) 0.9 %
10 SYRINGE (ML) INJECTION
Status: CANCELLED | OUTPATIENT
Start: 2023-04-11

## 2023-04-11 RX ORDER — DOCUSATE SODIUM 100 MG/1
100 CAPSULE, LIQUID FILLED ORAL 2 TIMES DAILY
Status: CANCELLED | OUTPATIENT
Start: 2023-04-11

## 2023-04-11 RX ORDER — FENTANYL CITRATE 50 UG/ML
25 INJECTION, SOLUTION INTRAMUSCULAR; INTRAVENOUS
Status: CANCELLED | OUTPATIENT
Start: 2023-04-11 | End: 2023-04-15

## 2023-04-11 RX ORDER — IPRATROPIUM BROMIDE AND ALBUTEROL SULFATE 2.5; .5 MG/3ML; MG/3ML
3 SOLUTION RESPIRATORY (INHALATION) EVERY 4 HOURS PRN
Status: CANCELLED | OUTPATIENT
Start: 2023-04-11 | End: 2023-04-12

## 2023-04-11 RX ORDER — MUPIROCIN 20 MG/G
1 OINTMENT TOPICAL
Status: CANCELLED | OUTPATIENT
Start: 2023-04-11

## 2023-04-11 RX ORDER — ONDANSETRON 2 MG/ML
4 INJECTION INTRAMUSCULAR; INTRAVENOUS EVERY 12 HOURS PRN
Status: CANCELLED | OUTPATIENT
Start: 2023-04-11

## 2023-04-11 RX ORDER — POTASSIUM CHLORIDE 29.8 MG/ML
40 INJECTION INTRAVENOUS
Status: CANCELLED | OUTPATIENT
Start: 2023-04-11

## 2023-04-11 RX ORDER — DEXTROSE MONOHYDRATE, SODIUM CHLORIDE, AND POTASSIUM CHLORIDE 50; 1.49; 4.5 G/1000ML; G/1000ML; G/1000ML
INJECTION, SOLUTION INTRAVENOUS CONTINUOUS
Status: CANCELLED | OUTPATIENT
Start: 2023-04-11

## 2023-04-11 RX ORDER — CEFAZOLIN SODIUM 2 G/50ML
2 SOLUTION INTRAVENOUS
Status: CANCELLED | OUTPATIENT
Start: 2023-04-11 | End: 2023-04-13

## 2023-04-11 RX ORDER — CEFAZOLIN SODIUM 2 G/50ML
2 SOLUTION INTRAVENOUS
Status: CANCELLED | OUTPATIENT
Start: 2023-04-11

## 2023-04-11 RX ORDER — POTASSIUM CHLORIDE 14.9 MG/ML
60 INJECTION INTRAVENOUS
Status: CANCELLED | OUTPATIENT
Start: 2023-04-11

## 2023-04-11 RX ORDER — ATORVASTATIN CALCIUM 40 MG/1
40 TABLET, FILM COATED ORAL NIGHTLY
Status: CANCELLED | OUTPATIENT
Start: 2023-04-11

## 2023-04-11 RX ORDER — METOPROLOL TARTRATE 25 MG/1
25 TABLET, FILM COATED ORAL
Status: CANCELLED | OUTPATIENT
Start: 2023-04-11

## 2023-04-11 RX ORDER — ASPIRIN 325 MG
325 TABLET ORAL DAILY
Status: CANCELLED | OUTPATIENT
Start: 2023-04-11

## 2023-04-11 RX ORDER — MUPIROCIN 20 MG/G
1 OINTMENT TOPICAL 2 TIMES DAILY
Status: CANCELLED | OUTPATIENT
Start: 2023-04-11 | End: 2023-04-16

## 2023-04-11 RX ORDER — POTASSIUM CHLORIDE 20 MEQ/1
20 TABLET, EXTENDED RELEASE ORAL EVERY 12 HOURS
Status: CANCELLED | OUTPATIENT
Start: 2023-04-11

## 2023-04-11 RX ORDER — SODIUM CHLORIDE 9 MG/ML
INJECTION, SOLUTION INTRAVENOUS CONTINUOUS
Status: CANCELLED | OUTPATIENT
Start: 2023-04-11

## 2023-04-11 RX ORDER — IPRATROPIUM BROMIDE AND ALBUTEROL SULFATE 2.5; .5 MG/3ML; MG/3ML
3 SOLUTION RESPIRATORY (INHALATION) EVERY 4 HOURS
Status: CANCELLED | OUTPATIENT
Start: 2023-04-11 | End: 2023-04-12

## 2023-04-11 RX ORDER — MAGNESIUM SULFATE HEPTAHYDRATE 40 MG/ML
2 INJECTION, SOLUTION INTRAVENOUS
Status: CANCELLED | OUTPATIENT
Start: 2023-04-11

## 2023-04-11 RX ORDER — FENTANYL CITRATE 50 UG/ML
50 INJECTION, SOLUTION INTRAMUSCULAR; INTRAVENOUS
Status: CANCELLED | OUTPATIENT
Start: 2023-04-16

## 2023-04-11 RX ORDER — OXYCODONE HYDROCHLORIDE 10 MG/1
10 TABLET ORAL EVERY 4 HOURS PRN
Status: CANCELLED | OUTPATIENT
Start: 2023-04-11

## 2023-04-11 RX ORDER — FAMOTIDINE 20 MG/1
20 TABLET, FILM COATED ORAL 2 TIMES DAILY
Status: CANCELLED | OUTPATIENT
Start: 2023-04-11

## 2023-04-11 RX ORDER — ASPIRIN 325 MG
325 TABLET ORAL ONCE
Status: CANCELLED | OUTPATIENT
Start: 2023-04-11 | End: 2023-04-11

## 2023-04-11 NOTE — H&P (VIEW-ONLY)
Subjective:      Patient ID: Jud Villa is a 63 y.o. female.    Chief Complaint: No chief complaint on file.      HPI:  Jud Villa is a 63 y.o. female who presents for pre-op CABG. Medical conditions include CVA (admitted to [facility] 3/13-3/16), DM2, and breast Ca in remission who presented to the ED complaining of chest pain x 1 day. Pt. Reports that she developed crushing chest pain when she started cleaning around the house. Pt. Reports that the pain was constant, pressure-like and without radiation. She denies any associated nausea, lightheadedness, SOB, or palpitations. She states that she has never had pain like this before. The pain was unrelenting and did not improve until she came to the ED and was given nitroglycerin.         Family and social history reviewed    Review of patient's allergies indicates:  No Known Allergies  Past Medical History:   Diagnosis Date    Asthma     Breast carcinoma     Cataract     Coronary artery disease of native heart with stable angina pectoris 4/4/2023    Diabetes mellitus     Moderate episode of recurrent major depressive disorder 5/19/2021    Osteoporosis      Past Surgical History:   Procedure Laterality Date    BILATERAL MASTECTOMY  04/26/2018    CHOLECYSTECTOMY      COLONOSCOPY N/A 10/27/2015    Procedure: COLONOSCOPY;  Surgeon: Johnny Hurley MD;  Location: [facility];  Service: Endoscopy;  Laterality: N/A;    CORONARY ANGIOGRAPHY N/A 4/5/2023    Procedure: ANGIOGRAM, CORONARY ARTERY;  Surgeon: Francisco Barahona MD;  Location: [facility] CATH LAB;  Service: Cardiology;  Laterality: N/A;    ESOPHAGOGASTRODUODENOSCOPY N/A 01/24/2022    Procedure: ESOPHAGOGASTRODUODENOSCOPY (EGD);  Surgeon: Mili Katz MD;  Location: [facility] ([address] [place] [address]);  Service: Endoscopy;  Laterality: N/A;  rapid in AM, instr portal -ml    HYSTERECTOMY      LASER PERIPHERAL IRIDOTOMY Bilateral 2019        LEFT HEART CATHETERIZATION Left 4/5/2023    Procedure: Left heart cath;   Surgeon: Francisco Barahona MD;  Location: [facility] CATH LAB;  Service: Cardiology;  Laterality: Left;     Family History       Problem Relation (Age of Onset)    Diabetes Father          Social History     Socioeconomic History    Marital status:    Tobacco Use    Smoking status: Former     Packs/day: 1.00     Years: 40.00     Pack years: 40.00     Types: Cigarettes     Quit date: 3/12/2023     Years since quittin.0     Passive exposure: Current    Smokeless tobacco: Never   Substance and Sexual Activity    Alcohol use: No     Alcohol/week: 0.0 standard drinks    Drug use: No     Social Determinants of Health     Financial Resource Strain: Low Risk     Difficulty of Paying Living Expenses: Not hard at all   Food Insecurity: No Food Insecurity    Worried About Running Out of Food in the Last Year: Never true    Ran Out of Food in the Last Year: Never true   Transportation Needs: No Transportation Needs    Lack of Transportation (Medical): No    Lack of Transportation (Non-Medical): No   Physical Activity: Sufficiently Active    Days of Exercise per Week: 7 days    Minutes of Exercise per Session: 90 min   Stress: Stress Concern Present    Feeling of Stress : Rather much   Social Connections: Unknown    Frequency of Communication with Friends and Family: Never    Frequency of Social Gatherings with Friends and Family: Never    Attends Faith Services: Never    Active Member of Clubs or Organizations: No    Attends Club or Organization Meetings: Never    Marital Status: Patient refused   Housing Stability: Unknown    Unable to Pay for Housing in the Last Year: No    Unstable Housing in the Last Year: No       Current medications Reviewed    Review of Systems   Constitutional:  Negative for activity change.   Eyes:  Negative for visual disturbance.   Respiratory:  Negative for shortness of breath.    Cardiovascular:  Negative for chest pain.   Musculoskeletal:  Negative for gait problem.   Skin:  Negative for  color change.   Neurological:  Negative for dizziness.   Hematological:  Does not bruise/bleed easily.   Psychiatric/Behavioral:  Negative for sleep disturbance.    Objective:   Physical Exam  Constitutional:       General: She is not in acute distress.  HENT:      Head: Normocephalic and atraumatic.   Eyes:      Pupils: Pupils are equal, round, and reactive to light.   Cardiovascular:      Rate and Rhythm: Normal rate.   Pulmonary:      Effort: Pulmonary effort is normal. No respiratory distress.   Musculoskeletal:         General: Normal range of motion.      Cervical back: Normal range of motion.   Skin:     Coloration: Skin is not pale.   Neurological:      General: No focal deficit present.      Mental Status: She is alert.   Psychiatric:         Mood and Affect: Mood normal.         Behavior: Behavior normal.       Diagnostic Results:   [facility] images to be reviewed by Dr. Kahn     The Mid LAD lesion was 95% stenosed.    The 1st Mrg lesion was 95% stenosed.    The estimated blood loss was none.    Refer for CABG.  Carotid US 4/4/23  There is 0-19% right Internal Carotid Stenosis.  There is 0-19% left Internal Carotid Stenosis.     Cardiac PET 4/3/23    There is a large sized, severe intensity mid to apical anterior, septal, anteroseptal and inferoapical resting perfusion abnormality involving 29% of LV myocardium in the distribution of the proximal  LAD. After pharmacologic stress, this perfusion abnormality is larger and more severe involving 38% of the LV myocardium, indicative of ischemia with underlying scar.    There are no other significant perfusion abnormalities.    The whole heart absolute myocardial perfusion values averaged 0.52 cc/min/g at rest, which is reduced; 1.15 cc/min/g at stress, which is mildly reduced; and CFR is 2.13 , which is mildly reduced.    CT attenuation images demonstrate mild diffuse coronary calcifications in the LAD, LCX, RCA and LM territory and mild diffuse aortic  calcifications in the descending aorta.    The gated perfusion images showed an ejection fraction of 54% at rest and 55% during stress. A normal ejection fraction is greater than 47%.    There is mid to apical anterior and anteroseptal wall moderate hypokinesis at rest and mid to apical anterior and anteroseptal wall akinesis during stress.    There is mid to apical inferior and inferoseptal wall hypokinesis at rest and mid to apical inferior and inferoseptal wall akinesis during stress.    The LV cavity size is normal at rest and stress.    The ECG portion of the study is negative for ischemia.    There were no arrhythmias during stress.    The patient reported chest pain during the stress test.    There are no prior studies for comparison.     TTE 3/22/23  The left ventricle is normal in size with normal systolic function.  The estimated ejection fraction is 55%.  There are segmental left ventricular wall motion abnormalities consistent with anteroapical MI.  Normal left ventricular diastolic function.  The estimated PA systolic pressure is 32 mmHg.  Normal right ventricular size with normal right ventricular systolic function.  Normal central venous pressure (3 mmHg).  There is no pulmonary hypertension.     Assessment:   CAD  Plan:     CTS Attending Note:    I have personally taken the history and examined this patient and agree with the CLAUDINE's note as stated above.  Very pleasant 63-year-old woman who initially presented with chest pain.  She had a PET which demonstrated significant viability.  Coronary angiography demonstrated subtotal occlusion of the left anterior descending and a lesion in the 1st obtuse marginal.  I recommended 2 vessel coronary artery bypass grafting, with left internal mammary artery to left anterior descending and saphenous vein graft to the 1st obtuse marginal.  We had a good discussion regarding the rationale for this recommendation.  Her daughters were present for the discussion. We  discussed the risks and benefits of the proposed procedure, including but not limited to death, stroke, respiratory complications, renal complications, arrythmia,  and infection. We discussed the STS predicted risk.  Her questions have been answered, and she wishes to proceed.

## 2023-04-11 NOTE — PATIENT INSTRUCTIONS
"Pt verbally instructed and written instructions given for surgery.      PREPARING FOR SURGERY    Your surgery has been scheduled for:    Day: Friday   Date:  April 14    Arrival Time: 0500    Start Time: 0700    You should report to the second floor surgery center, located on the [facility] side of the second floor of the [facility]. The phone number is [phone].         PLEASE NOTE    If you are allergic to any medications, please inform your doctor or the nurse responsible for your care.  Tell the doctor if you take aspirin, products containing aspirin, herbal medications or blood thinners, such as Coumadin, Pradaxa, or Plavix.  Notify your doctor if you are diabetic and provide information about the medications you take.  Arrange for someone to drive you home following surgery. You will not be allowed to leave the surgical facility alone or drive yourself home following sedation and anesthesia.  If you have not already done so, please bring a list of your medications with you the day of your surgery.          THE NIGHT BEFORE SURGERY    Eat a light supper on the night before your surgery, no greasy or fatty foods.    DO NOT EAT OR DRINK ANYTHING AFTER MIDNIGHT, INCLUDING GUM, HARD CANDY, MINTS, OR CHEWING TOBACCO    Take a complete shower. Wash your body from the neck down with Hibiclens (chlorhexidine gluconate) soap. Hibiclens soap may be purchased over the counter at the pharmacy. Keep the soap away from your eyes, ears, and mouth. After washing with Hibiclens, rinse thoroughly. You may also use any soap labeled "antibacterial". Shampoo your hair with your regular shampoo.         THE DAY OF SURGERY    Take another shower with Hibiclens or any antibacterial soap, to reduce the change of infection.    Medications to take the morning of surgery: metoprolol with a small sip of water.     Diabetic medication instructions will be given by the preop center. They will call you before your " surgery.    You may brush your teeth and rinse your mouth, but do not shallow any water.    Do not apply perfume, powder, body lotions or deodorant on the day of surgery.    Do not wear any makeup, including mascara and false eyelashes.    Nail polish should be removed.    Wear comfortable clothes, such as button front shirt and loose-fitting pants.    Leave all jewelry, including body piercings and valuables at home.    Hairpins and clasps must be removed before you enter the operating room.    You may wear glasses, dentures and hearing aids before and after surgery. They may need to be removed before going into the operating room. Contact lenses worn before surgery must be removed before entering the operating room. Please bring a case for your hearing aids, glasses and/or contacts.    Bring any devices you will need after surgery such as crutches or canes.    If you have sleep apnea, please bring your CPAP machine.    If you have an implantable device, such as a pacemaker or AICD, please bring the device information card, if you have one.       If you have any questions or concerns, please don't hesitate to call.

## 2023-04-11 NOTE — PROGRESS NOTES
Subjective:      Patient ID: Jud Villa is a 63 y.o. female.    Chief Complaint: No chief complaint on file.      HPI:  Jud Villa is a 63 y.o. female who presents for pre-op CABG. Medical conditions include CVA (admitted to [facility] 3/13-3/16), DM2, and breast Ca in remission who presented to the ED complaining of chest pain x 1 day. Pt. Reports that she developed crushing chest pain when she started cleaning around the house. Pt. Reports that the pain was constant, pressure-like and without radiation. She denies any associated nausea, lightheadedness, SOB, or palpitations. She states that she has never had pain like this before. The pain was unrelenting and did not improve until she came to the ED and was given nitroglycerin.         Family and social history reviewed    Review of patient's allergies indicates:  No Known Allergies  Past Medical History:   Diagnosis Date    Asthma     Breast carcinoma     Cataract     Coronary artery disease of native heart with stable angina pectoris 4/4/2023    Diabetes mellitus     Moderate episode of recurrent major depressive disorder 5/19/2021    Osteoporosis      Past Surgical History:   Procedure Laterality Date    BILATERAL MASTECTOMY  04/26/2018    CHOLECYSTECTOMY      COLONOSCOPY N/A 10/27/2015    Procedure: COLONOSCOPY;  Surgeon: Johnny Hurley MD;  Location: [facility];  Service: Endoscopy;  Laterality: N/A;    CORONARY ANGIOGRAPHY N/A 4/5/2023    Procedure: ANGIOGRAM, CORONARY ARTERY;  Surgeon: Francisco Barahona MD;  Location: [facility] CATH LAB;  Service: Cardiology;  Laterality: N/A;    ESOPHAGOGASTRODUODENOSCOPY N/A 01/24/2022    Procedure: ESOPHAGOGASTRODUODENOSCOPY (EGD);  Surgeon: Mili Katz MD;  Location: [facility] ([address] [place] [address]);  Service: Endoscopy;  Laterality: N/A;  rapid in AM, instr portal -ml    HYSTERECTOMY      LASER PERIPHERAL IRIDOTOMY Bilateral 2019        LEFT HEART CATHETERIZATION Left 4/5/2023    Procedure: Left heart cath;   Surgeon: Francisco Barahona MD;  Location: [facility] CATH LAB;  Service: Cardiology;  Laterality: Left;     Family History       Problem Relation (Age of Onset)    Diabetes Father          Social History     Socioeconomic History    Marital status:    Tobacco Use    Smoking status: Former     Packs/day: 1.00     Years: 40.00     Pack years: 40.00     Types: Cigarettes     Quit date: 3/12/2023     Years since quittin.0     Passive exposure: Current    Smokeless tobacco: Never   Substance and Sexual Activity    Alcohol use: No     Alcohol/week: 0.0 standard drinks    Drug use: No     Social Determinants of Health     Financial Resource Strain: Low Risk     Difficulty of Paying Living Expenses: Not hard at all   Food Insecurity: No Food Insecurity    Worried About Running Out of Food in the Last Year: Never true    Ran Out of Food in the Last Year: Never true   Transportation Needs: No Transportation Needs    Lack of Transportation (Medical): No    Lack of Transportation (Non-Medical): No   Physical Activity: Sufficiently Active    Days of Exercise per Week: 7 days    Minutes of Exercise per Session: 90 min   Stress: Stress Concern Present    Feeling of Stress : Rather much   Social Connections: Unknown    Frequency of Communication with Friends and Family: Never    Frequency of Social Gatherings with Friends and Family: Never    Attends Buddhism Services: Never    Active Member of Clubs or Organizations: No    Attends Club or Organization Meetings: Never    Marital Status: Patient refused   Housing Stability: Unknown    Unable to Pay for Housing in the Last Year: No    Unstable Housing in the Last Year: No       Current medications Reviewed    Review of Systems   Constitutional:  Negative for activity change.   Eyes:  Negative for visual disturbance.   Respiratory:  Negative for shortness of breath.    Cardiovascular:  Negative for chest pain.   Musculoskeletal:  Negative for gait problem.   Skin:  Negative for  color change.   Neurological:  Negative for dizziness.   Hematological:  Does not bruise/bleed easily.   Psychiatric/Behavioral:  Negative for sleep disturbance.    Objective:   Physical Exam  Constitutional:       General: She is not in acute distress.  HENT:      Head: Normocephalic and atraumatic.   Eyes:      Pupils: Pupils are equal, round, and reactive to light.   Cardiovascular:      Rate and Rhythm: Normal rate.   Pulmonary:      Effort: Pulmonary effort is normal. No respiratory distress.   Musculoskeletal:         General: Normal range of motion.      Cervical back: Normal range of motion.   Skin:     Coloration: Skin is not pale.   Neurological:      General: No focal deficit present.      Mental Status: She is alert.   Psychiatric:         Mood and Affect: Mood normal.         Behavior: Behavior normal.       Diagnostic Results:   [facility] images to be reviewed by Dr. Kahn     The Mid LAD lesion was 95% stenosed.    The 1st Mrg lesion was 95% stenosed.    The estimated blood loss was none.    Refer for CABG.  Carotid US 4/4/23  There is 0-19% right Internal Carotid Stenosis.  There is 0-19% left Internal Carotid Stenosis.     Cardiac PET 4/3/23    There is a large sized, severe intensity mid to apical anterior, septal, anteroseptal and inferoapical resting perfusion abnormality involving 29% of LV myocardium in the distribution of the proximal  LAD. After pharmacologic stress, this perfusion abnormality is larger and more severe involving 38% of the LV myocardium, indicative of ischemia with underlying scar.    There are no other significant perfusion abnormalities.    The whole heart absolute myocardial perfusion values averaged 0.52 cc/min/g at rest, which is reduced; 1.15 cc/min/g at stress, which is mildly reduced; and CFR is 2.13 , which is mildly reduced.    CT attenuation images demonstrate mild diffuse coronary calcifications in the LAD, LCX, RCA and LM territory and mild diffuse aortic  calcifications in the descending aorta.    The gated perfusion images showed an ejection fraction of 54% at rest and 55% during stress. A normal ejection fraction is greater than 47%.    There is mid to apical anterior and anteroseptal wall moderate hypokinesis at rest and mid to apical anterior and anteroseptal wall akinesis during stress.    There is mid to apical inferior and inferoseptal wall hypokinesis at rest and mid to apical inferior and inferoseptal wall akinesis during stress.    The LV cavity size is normal at rest and stress.    The ECG portion of the study is negative for ischemia.    There were no arrhythmias during stress.    The patient reported chest pain during the stress test.    There are no prior studies for comparison.     TTE 3/22/23  The left ventricle is normal in size with normal systolic function.  The estimated ejection fraction is 55%.  There are segmental left ventricular wall motion abnormalities consistent with anteroapical MI.  Normal left ventricular diastolic function.  The estimated PA systolic pressure is 32 mmHg.  Normal right ventricular size with normal right ventricular systolic function.  Normal central venous pressure (3 mmHg).  There is no pulmonary hypertension.     Assessment:   CAD  Plan:     CTS Attending Note:    I have personally taken the history and examined this patient and agree with the CLAUDINE's note as stated above.  Very pleasant 63-year-old woman who initially presented with chest pain.  She had a PET which demonstrated significant viability.  Coronary angiography demonstrated subtotal occlusion of the left anterior descending and a lesion in the 1st obtuse marginal.  I recommended 2 vessel coronary artery bypass grafting, with left internal mammary artery to left anterior descending and saphenous vein graft to the 1st obtuse marginal.  We had a good discussion regarding the rationale for this recommendation.  Her daughters were present for the discussion. We  discussed the risks and benefits of the proposed procedure, including but not limited to death, stroke, respiratory complications, renal complications, arrythmia,  and infection. We discussed the STS predicted risk.  Her questions have been answered, and she wishes to proceed.

## 2023-04-12 ENCOUNTER — TELEPHONE (OUTPATIENT)
Dept: FAMILY MEDICINE | Facility: CLINIC | Age: 63
End: 2023-04-12
Payer: MEDICARE

## 2023-04-12 ENCOUNTER — OFFICE VISIT (OUTPATIENT)
Dept: OPTOMETRY | Facility: CLINIC | Age: 63
End: 2023-04-12
Payer: COMMERCIAL

## 2023-04-12 DIAGNOSIS — H52.4 PRESBYOPIA: Primary | ICD-10-CM

## 2023-04-12 DIAGNOSIS — Z13.5 GLAUCOMA SCREENING: ICD-10-CM

## 2023-04-12 PROCEDURE — 99999 PR PBB SHADOW E&M-EST. PATIENT-LVL IV: ICD-10-PCS | Mod: PBBFAC,,, | Performed by: OPTOMETRIST

## 2023-04-12 PROCEDURE — 92015 PR REFRACTION: ICD-10-PCS | Mod: S$GLB,,, | Performed by: OPTOMETRIST

## 2023-04-12 PROCEDURE — 99203 PR OFFICE/OUTPT VISIT, NEW, LEVL III, 30-44 MIN: ICD-10-PCS | Mod: S$GLB,,, | Performed by: OPTOMETRIST

## 2023-04-12 PROCEDURE — 92015 DETERMINE REFRACTIVE STATE: CPT | Mod: S$GLB,,, | Performed by: OPTOMETRIST

## 2023-04-12 PROCEDURE — 99203 OFFICE O/P NEW LOW 30 MIN: CPT | Mod: S$GLB,,, | Performed by: OPTOMETRIST

## 2023-04-12 PROCEDURE — 99999 PR PBB SHADOW E&M-EST. PATIENT-LVL IV: CPT | Mod: PBBFAC,,, | Performed by: OPTOMETRIST

## 2023-04-12 NOTE — PROGRESS NOTES
HPI    Presents today for refraction--per Dr. Pereira.  Last edited by Liza Mishra MA on 4/12/2023  8:13 AM.            Assessment /Plan     For exam results, see Encounter Report.    Presbyopia    Glaucoma screening      Small cats OU--pt wearing otc readers, but now needs distance correction.  Wrote bifocal Rx  Sp PI OU--followed by Dr Pereira  DM--mac clear OU    PLAN:    Rtc as marcus w Dr Pereira

## 2023-04-13 ENCOUNTER — ANESTHESIA EVENT (OUTPATIENT)
Dept: SURGERY | Facility: HOSPITAL | Age: 63
DRG: 236 | End: 2023-04-13
Payer: MEDICARE

## 2023-04-13 ENCOUNTER — TELEPHONE (OUTPATIENT)
Dept: CARDIOTHORACIC SURGERY | Facility: CLINIC | Age: 63
End: 2023-04-13
Payer: MEDICARE

## 2023-04-13 ENCOUNTER — NURSE TRIAGE (OUTPATIENT)
Dept: ADMINISTRATIVE | Facility: CLINIC | Age: 63
End: 2023-04-13
Payer: MEDICARE

## 2023-04-13 DIAGNOSIS — Z79.4 TYPE 2 DIABETES MELLITUS WITH HYPERGLYCEMIA, WITH LONG-TERM CURRENT USE OF INSULIN: Primary | ICD-10-CM

## 2023-04-13 DIAGNOSIS — E11.65 TYPE 2 DIABETES MELLITUS WITH HYPERGLYCEMIA, WITH LONG-TERM CURRENT USE OF INSULIN: Primary | ICD-10-CM

## 2023-04-13 RX ORDER — INSULIN GLARGINE 100 [IU]/ML
10 INJECTION, SOLUTION SUBCUTANEOUS NIGHTLY
Qty: 30 EACH | Refills: 1 | Status: CANCELLED | OUTPATIENT
Start: 2023-04-13

## 2023-04-13 RX ORDER — INSULIN GLARGINE 100 [IU]/ML
10 INJECTION, SOLUTION SUBCUTANEOUS NIGHTLY
Qty: 9 ML | Refills: 0 | Status: CANCELLED | OUTPATIENT
Start: 2023-04-13 | End: 2023-07-12

## 2023-04-13 RX ORDER — INSULIN GLARGINE 100 [IU]/ML
10 INJECTION, SOLUTION SUBCUTANEOUS EVERY MORNING
Qty: 15 ML | Refills: 0 | Status: ON HOLD | OUTPATIENT
Start: 2023-04-13 | End: 2023-04-22 | Stop reason: SDUPTHER

## 2023-04-13 NOTE — TELEPHONE ENCOUNTER
Called pt and informed her of arrival time for surgery.  Pt instructed to report to [facility] at 5:00 tomorrow morning.  Pt reminded to perform Hibiclens shower tonight and tomorrow morning, and to become NPO at midnight.  Pt instructed to only take Metoprolol tomorrow morning with a small sip of water.  Pt verbalized understanding to all instructions.

## 2023-04-13 NOTE — ANESTHESIA PREPROCEDURE EVALUATION
[facility]  Anesthesia Pre-Operative Evaluation   04/13/2023        Jud Villa, 1960  [id number]  Procedure(s) (LRB):  CABGx2 (N/A)  HARVEST-VEIN-ENDOVASCULAR (N/A)    Subjective    Jud Villa is a 63 y.o. female w/ a significant PMHx of: COPD (former smoker), prior CVA, DM2, anxiety, breast cancer s/p bilateral mastectomy and CAD.    She had a PET which demonstrated significant viability.  Coronary angiography demonstrated subtotal occlusion of the left anterior descending and a lesion in the 1st obtuse marginal  Patient now presents for above procedure(s).     LHC    The Mid LAD lesion was 95% stenosed.    The 1st Mrg lesion was 95% stenosed.    Echo   The left ventricle is normal in size with normal systolic function.   The estimated ejection fraction is 55%.   There are segmental left ventricular wall motion abnormalities consistent with anteroapical MI.   Normal left ventricular diastolic function.   The estimated PA systolic pressure is 32 mmHg.   Normal right ventricular size with normal right ventricular systolic function.      Prev Airway:  Blade: Gutierrez  Blade size: #2  Placement verified by: auscultation, CO2 detection, chest rise and direct visualization  Breath Sounds: Equal  Cormack-Lehane Classification: grade I - full view of glottis  ETT size: 7.0mm        Patient Active Problem List   Diagnosis    Vertigo    Chronic migraine without aura, intractable, with status migrainosus    Dizziness    Anxiety state, unspecified    Smoker    History of colon polyps    COPD (chronic obstructive pulmonary disease)    Breast cancer metastasized to axillary lymph node, left    Former smoker    Chronic frontal sinusitis    Controlled type 2 diabetes mellitus with complication, with long-term current use of insulin    COPD exacerbation    Cigarette nicotine dependence without complication    Impaired mobility and activities of daily living    Chronic right  "shoulder pain    Weakness    Moderate episode of recurrent major depressive disorder    Medication overuse headache    Drug-induced polyneuropathy    Chest pain due to myocardial ischemia    History of CVA (cerebrovascular accident)    Coronary artery disease       Review of patient's allergies indicates:  No Known Allergies    Current Inpatient Medications:       No current facility-administered medications on file prior to encounter.     Current Outpatient Medications on File Prior to Encounter   Medication Sig Dispense Refill    albuterol (PROVENTIL) 2.5 mg /3 mL (0.083 %) nebulizer solution Take 3 mLs (2.5 mg total) by nebulization every 4 to 6 hours as needed for Wheezing or Shortness of Breath. Rescue 1 each 0    albuterol (VENTOLIN HFA) 90 mcg/actuation inhaler INHALE 2 PUFFS BY MOUTH INTO THE LUNGS EVERY 6 HOURS AS NEEDED FOR WHEEZING 18 g 11    aspirin 81 MG Chew Take 81 mg by mouth once daily.      atorvastatin (LIPITOR) 80 MG tablet Take 1 tablet (80 mg total) by mouth every evening. 90 tablet 3    BASAGLAR KWIKPEN U-100 INSULIN glargine 100 units/mL (3mL) SubQ pen INJECT 10 UNITS UNDER THE SKIN EVERY MORNING 15 mL 0    BD ULTRA-FINE SHORT PEN NEEDLE 31 gauge x 5/16" Ndle Inject 1 pen into the skin once daily. 100 each 3    clonazePAM (KLONOPIN) 0.5 MG tablet TAKE 1 TABLET(0.5 MG) BY MOUTH EVERY NIGHT AS NEEDED FOR ANXIETY 30 tablet 0    clopidogreL (PLAVIX) 75 mg tablet Take 1 tablet (75 mg total) by mouth once daily. 30 tablet 11    ergocalciferol (ERGOCALCIFEROL) 50,000 unit Cap TAKE 1 CAPSULE BY MOUTH EVERY 7 DAYS 12 capsule 3    fluticasone propionate (FLONASE) 50 mcg/actuation nasal spray 1 spray (50 mcg total) by Each Nostril route once daily. 9.9 mL 0    gabapentin (NEURONTIN) 300 MG capsule Take 1 capsule (300 mg total) by mouth 3 (three) times daily. 270 capsule 3    glipiZIDE (GLUCOTROL) 10 MG tablet TAKE 1 TABLET(10 MG) BY MOUTH TWICE DAILY 180 tablet 3    JARDIANCE 10 mg " tablet TAKE 1 TABLET(10 MG) BY MOUTH EVERY DAY 90 tablet 3    metFORMIN (GLUCOPHAGE) 1000 MG tablet TAKE 1 TABLET(1000 MG) BY MOUTH TWICE DAILY WITH MEALS 180 tablet 3    metoprolol tartrate (LOPRESSOR) 25 MG tablet Take 0.5 tablets (12.5 mg total) by mouth 2 (two) times daily. 30 tablet 11    nicotine (NICODERM CQ) 21 mg/24 hr PLACE 1 PATCH ONTO THE SKIN DAILY 28 patch 3    ondansetron (ZOFRAN-ODT) 4 MG TbDL Take 1 tablet (4 mg total) by mouth 3 (three) times daily. 30 tablet 0    pantoprazole (PROTONIX) 40 MG tablet Take 1 tablet (40 mg total) by mouth before breakfast. 90 tablet 3    sertraline (ZOLOFT) 50 MG tablet Take 1 tablet (50 mg total) by mouth once daily. 90 tablet 3    sucralfate (CARAFATE) 1 gram tablet TAKE 1 TABLET(1 GRAM) BY MOUTH THREE TIMES DAILY BEFORE MEALS 270 tablet 3    topiramate (TOPAMAX) 50 MG tablet Take 2 tablets (100 mg total) by mouth 2 (two) times daily. TAKE 1 TABLET BY MOUTH TWICE DAILY FOR 7 DAYS, THEN 1 TABLET EVERY MORNING AND 2 TABLETS EVERY EVENING FOR 7 DAYS, THEN 2 TABLETS TWICE DAILY 360 tablet 1    TRADJENTA 5 mg Tab tablet Take 1 tablet (5 mg total) by mouth once daily. 90 tablet 3    traMADoL (ULTRAM) 50 mg tablet Take 1 tablet (50 mg total) by mouth every 6 (six) hours as needed for Pain. 12 tablet 0    TRELEGY ELLIPTA 100-62.5-25 mcg DsDv INHALE 1 PUFF INTO THE LUNGS EVERY DAY 60 each 2    TRUE METRIX GLUCOSE TEST STRIP Strp USE AS DIRECTED FOUR TIMES DAILY 50 each 0       Past Surgical History:   Procedure Laterality Date    BILATERAL MASTECTOMY  04/26/2018    CHOLECYSTECTOMY      COLONOSCOPY N/A 10/27/2015    Procedure: COLONOSCOPY;  Surgeon: Johnny Hurley MD;  Location: [facility];  Service: Endoscopy;  Laterality: N/A;    CORONARY ANGIOGRAPHY N/A 4/5/2023    Procedure: ANGIOGRAM, CORONARY ARTERY;  Surgeon: Francisco Barahona MD;  Location: [facility] CATH LAB;  Service: Cardiology;  Laterality: N/A;    ESOPHAGOGASTRODUODENOSCOPY N/A 01/24/2022     Procedure: ESOPHAGOGASTRODUODENOSCOPY (EGD);  Surgeon: Mili Katz MD;  Location: [facility] ENDO ([address] [place] [address]);  Service: Endoscopy;  Laterality: N/A;  rapid in AM, instr portal -ml    HYSTERECTOMY      LASER PERIPHERAL IRIDOTOMY Bilateral 2019        LEFT HEART CATHETERIZATION Left 4/5/2023    Procedure: Left heart cath;  Surgeon: Francisco Barahona MD;  Location: [facility] CATH LAB;  Service: Cardiology;  Laterality: Left;       Social History:  Tobacco Use: Medium Risk    Smoking Tobacco Use: Former    Smokeless Tobacco Use: Never    Passive Exposure: Current       Alcohol Use: Not At Risk    Frequency of Alcohol Consumption: Never    Average Number of Drinks: Patient does not drink    Frequency of Binge Drinking: Never       Objective    Vital Signs Range:  BMI Readings from Last 1 Encounters:   04/11/23 23.73 kg/m²               Significant Labs:        Component Value Date/Time    WBC 8.61 04/11/2023 1009    HGB 13.7 04/11/2023 1009    HCT 42.8 04/11/2023 1009    HCT 40 09/22/2022 0950     04/11/2023 1009     04/11/2023 1009    K 4.5 04/11/2023 1009     04/11/2023 1009    CO2 25 04/11/2023 1009     (H) 04/11/2023 1009    BUN 21 04/11/2023 1009    CREATININE 0.8 04/11/2023 1009    MG 1.6 04/06/2023 0333    PHOS 3.0 04/06/2023 0333    CALCIUM 9.9 04/11/2023 1009    ALBUMIN 3.8 04/11/2023 1009    PROT 7.1 04/11/2023 1009    ALKPHOS 85 04/11/2023 1009    BILITOT 0.4 04/11/2023 1009    AST 14 04/11/2023 1009    ALT 14 04/11/2023 1009    INR 1.0 04/11/2023 1009    HGBA1C 6.9 (H) 04/11/2023 1009        Please see Results Review for additional labs.     Diagnostic Studies: All relevant studies, reviewed.      EKG:   Results for orders placed or performed during the hospital encounter of 04/11/23   EKG 12-lead    Collection Time: 04/11/23 10:15 AM    Narrative    Test Reason : I25.10,    Vent. Rate : 072 BPM     Atrial Rate : 072 BPM     P-R Int : 126 ms          QRS Dur : 088 ms       QT Int : 404 ms       P-R-T Axes : 077 068 078 degrees     QTc Int : 442 ms    Normal sinus rhythm  Anteroseptal infarct (cited on or before 07-APR-2023)  Abnormal ECG  When compared with ECG of 07-APR-2023 22:42,  No significant change was found  Confirmed by LEEANN OCONNOR MD ([id number]) on 4/11/2023 10:40:26 AM    Referred By: LEEANN PAINTER           Confirmed By:LEEANN OCONNOR MD       [facility] images to be reviewed by Dr. Painter     The Mid LAD lesion was 95% stenosed.    The 1st Mrg lesion was 95% stenosed.      Carotid US 4/4/23   There is 0-19% right Internal Carotid Stenosis.   There is 0-19% left Internal Carotid Stenosis.     Cardiac PET 4/3/23    There is a large sized, severe intensity mid to apical anterior, septal, anteroseptal and inferoapical resting perfusion abnormality involving 29% of LV myocardium in the distribution of the proximal  LAD. After pharmacologic stress, this perfusion abnormality is larger and more severe involving 38% of the LV myocardium, indicative of ischemia with underlying scar.    There are no other significant perfusion abnormalities.    The whole heart absolute myocardial perfusion values averaged 0.52 cc/min/g at rest, which is reduced; 1.15 cc/min/g at stress, which is mildly reduced; and CFR is 2.13 , which is mildly reduced.    CT attenuation images demonstrate mild diffuse coronary calcifications in the LAD, LCX, RCA and LM territory and mild diffuse aortic calcifications in the descending aorta.    The gated perfusion images showed an ejection fraction of 54% at rest and 55% during stress. A normal ejection fraction is greater than 47%.    There is mid to apical anterior and anteroseptal wall moderate hypokinesis at rest and mid to apical anterior and anteroseptal wall akinesis during stress.    There is mid to apical inferior and inferoseptal wall hypokinesis at rest and mid to apical inferior and inferoseptal wall akinesis during stress.    The LV cavity  size is normal at rest and stress.    The ECG portion of the study is negative for ischemia.    There were no arrhythmias during stress.    The patient reported chest pain during the stress test.    There are no prior studies for comparison.     TTE 3/22/23   The left ventricle is normal in size with normal systolic function.   The estimated ejection fraction is 55%.   There are segmental left ventricular wall motion abnormalities consistent with anteroapical MI.   Normal left ventricular diastolic function.   The estimated PA systolic pressure is 32 mmHg.   Normal right ventricular size with normal right ventricular systolic function.   Normal central venous pressure (3 mmHg).   There is no pulmonary hypertension.      Pre-op Assessment    I have reviewed the Patient Summary Reports.     I have reviewed the Nursing Notes.    I have reviewed the Medications.     Review of Systems  Anesthesia Hx:  No problems with previous Anesthesia  History of prior surgery of interest to airway management or planning: Denies Family Hx of Anesthesia complications.   Denies Personal Hx of Anesthesia complications.   Social:  Smoker    Hematology/Oncology:  Hematology Normal       -- Cancer in past history:  Breast bilateral axillary node dissection   EENT/Dental:EENT/Dental Normal   Cardiovascular:   CAD   Angina    Pulmonary:   COPD Asthma    Renal/:  Renal/ Normal     Hepatic/GI:  Hepatic/GI Normal    Musculoskeletal:  Musculoskeletal Normal    Neurological:   Neuromuscular Disease, Headaches    Endocrine:   Diabetes    Psych:   Psychiatric History          Physical Exam  General: Well nourished and Cooperative    Airway:  Mallampati: II   Mouth Opening: Normal  TM Distance: Normal  Tongue: Normal  Neck ROM: Normal ROM    Dental:  Intact    Chest/Lungs:  Normal Respiratory Rate    Heart:  Rate: Normal  Rhythm: Regular Rhythm        Anesthesia Plan  Type of Anesthesia, risks & benefits discussed:    Anesthesia Type:  Gen ETT  Intra-op Monitoring Plan: Standard ASA Monitors, Art Line, Central Line and BHUMIKA  Post Op Pain Control Plan: multimodal analgesia, IV/PO Opioids PRN and peripheral nerve block  Induction:  IV  Airway Plan: Video, Post-Induction  Informed Consent: Informed consent signed with the Patient and all parties understand the risks and agree with anesthesia plan.  All questions answered. Patient consented to blood products? Yes  ASA Score: 4  Day of Surgery Review of History & Physical: H&P Update referred to the surgeon/provider.I have interviewed and examined the patient. I have reviewed the patient's H&P dated: There are no significant changes.   Anesthesia Plan Notes: Discussed plan for awake arterial line placement, intraoperative BHUMIKA, central line insertion, and post operative ICU care    Ready For Surgery From Anesthesia Perspective.     .

## 2023-04-13 NOTE — TELEPHONE ENCOUNTER
No new care gaps identified.  [facility] Embedded Care Gaps. Reference number: [id number]. 4/13/2023   11:19:44 AM KRISTAT

## 2023-04-13 NOTE — TELEPHONE ENCOUNTER
Refill Routing Note   Medication(s) are not appropriate for processing by [facility] for the following reason(s):      Patient seen in ED/Hospital since LOV with PCP    [facility] action(s):  Defer          Medication reconciliation completed: No     Appointments  past 12m or future 3m with PCP    Date Provider   Last Visit   3/22/2023 Bo Lopez MD   Next Visit   6/8/2023 Bo Lopez MD   ED visits in past 90 days: 2        Note composed:2:56 PM 04/13/2023

## 2023-04-14 ENCOUNTER — ANESTHESIA (OUTPATIENT)
Dept: SURGERY | Facility: HOSPITAL | Age: 63
DRG: 236 | End: 2023-04-14
Payer: MEDICARE

## 2023-04-14 ENCOUNTER — HOSPITAL ENCOUNTER (INPATIENT)
Facility: HOSPITAL | Age: 63
LOS: 8 days | Discharge: HOME HEALTH | DRG: 236 | End: 2023-04-22
Attending: THORACIC SURGERY (CARDIOTHORACIC VASCULAR SURGERY) | Admitting: THORACIC SURGERY (CARDIOTHORACIC VASCULAR SURGERY)
Payer: MEDICARE

## 2023-04-14 DIAGNOSIS — I49.9 CARDIAC RHYTHM DISORDER OR DISTURBANCE OR CHANGE: ICD-10-CM

## 2023-04-14 DIAGNOSIS — Z79.4 TYPE 2 DIABETES MELLITUS WITH HYPERGLYCEMIA, WITH LONG-TERM CURRENT USE OF INSULIN: ICD-10-CM

## 2023-04-14 DIAGNOSIS — I97.89 POSTOPERATIVE ATRIAL FIBRILLATION: ICD-10-CM

## 2023-04-14 DIAGNOSIS — I49.9 IRREGULAR CARDIAC RHYTHM: ICD-10-CM

## 2023-04-14 DIAGNOSIS — I25.118 CORONARY ARTERY DISEASE OF NATIVE HEART WITH STABLE ANGINA PECTORIS, UNSPECIFIED VESSEL OR LESION TYPE: ICD-10-CM

## 2023-04-14 DIAGNOSIS — I49.9 ARRHYTHMIA: ICD-10-CM

## 2023-04-14 DIAGNOSIS — I25.10 CORONARY ARTERY DISEASE: ICD-10-CM

## 2023-04-14 DIAGNOSIS — Z95.1 S/P CABG X 2: Primary | ICD-10-CM

## 2023-04-14 DIAGNOSIS — I48.91 POSTOPERATIVE ATRIAL FIBRILLATION: ICD-10-CM

## 2023-04-14 DIAGNOSIS — R00.0 TACHYCARDIA: ICD-10-CM

## 2023-04-14 DIAGNOSIS — I47.10 SVT (SUPRAVENTRICULAR TACHYCARDIA): ICD-10-CM

## 2023-04-14 DIAGNOSIS — Z98.890 HISTORY OF HEART SURGERY: ICD-10-CM

## 2023-04-14 DIAGNOSIS — Z95.1 S/P CABG (CORONARY ARTERY BYPASS GRAFT): Primary | ICD-10-CM

## 2023-04-14 DIAGNOSIS — E11.65 TYPE 2 DIABETES MELLITUS WITH HYPERGLYCEMIA, WITH LONG-TERM CURRENT USE OF INSULIN: ICD-10-CM

## 2023-04-14 PROBLEM — D62 ACUTE BLOOD LOSS ANEMIA: Status: ACTIVE | Noted: 2023-04-14

## 2023-04-14 LAB
ALBUMIN SERPL BCP-MCNC: 2.2 G/DL (ref 3.5–5.2)
ALLENS TEST: ABNORMAL
ALP SERPL-CCNC: 59 U/L (ref 55–135)
ALT SERPL W/O P-5'-P-CCNC: 62 U/L (ref 10–44)
ANION GAP SERPL CALC-SCNC: 7 MMOL/L (ref 8–16)
APTT PPP: 29.1 SEC (ref 21–32)
AST SERPL-CCNC: 105 U/L (ref 10–40)
BASOPHILS # BLD AUTO: 0.1 K/UL (ref 0–0.2)
BASOPHILS NFR BLD: 0.4 % (ref 0–1.9)
BILIRUB SERPL-MCNC: 0.3 MG/DL (ref 0.1–1)
BUN SERPL-MCNC: 25 MG/DL (ref 8–23)
CA-I BLDV-SCNC: 1.14 MMOL/L (ref 1.06–1.42)
CALCIUM SERPL-MCNC: 7.8 MG/DL (ref 8.7–10.5)
CHLORIDE SERPL-SCNC: 111 MMOL/L (ref 95–110)
CO2 SERPL-SCNC: 21 MMOL/L (ref 23–29)
CREAT SERPL-MCNC: 0.6 MG/DL (ref 0.5–1.4)
DELSYS: ABNORMAL
DIFFERENTIAL METHOD: ABNORMAL
EOSINOPHIL # BLD AUTO: 0.1 K/UL (ref 0–0.5)
EOSINOPHIL NFR BLD: 0.3 % (ref 0–8)
ERYTHROCYTE [DISTWIDTH] IN BLOOD BY AUTOMATED COUNT: 13.1 % (ref 11.5–14.5)
EST. GFR  (NO RACE VARIABLE): >60 ML/MIN/1.73 M^2
GLUCOSE SERPL-MCNC: 199 MG/DL (ref 70–110)
GLUCOSE SERPL-MCNC: 286 MG/DL (ref 70–110)
GLUCOSE SERPL-MCNC: 312 MG/DL (ref 70–110)
HCO3 UR-SCNC: 20.8 MMOL/L (ref 24–28)
HCO3 UR-SCNC: 21.3 MMOL/L (ref 24–28)
HCO3 UR-SCNC: 24.1 MMOL/L (ref 24–28)
HCO3 UR-SCNC: 25.7 MMOL/L (ref 24–28)
HCO3 UR-SCNC: 30 MMOL/L (ref 24–28)
HCT VFR BLD AUTO: 27.8 % (ref 37–48.5)
HCT VFR BLD CALC: 23 %PCV (ref 36–54)
HCT VFR BLD CALC: 23 %PCV (ref 36–54)
HCT VFR BLD CALC: 25 %PCV (ref 36–54)
HCT VFR BLD CALC: 29 %PCV (ref 36–54)
HCT VFR BLD CALC: 35 %PCV (ref 36–54)
HGB BLD-MCNC: 9.2 G/DL (ref 12–16)
IMM GRANULOCYTES # BLD AUTO: 0.18 K/UL (ref 0–0.04)
IMM GRANULOCYTES NFR BLD AUTO: 0.7 % (ref 0–0.5)
INR PPP: 1.1 (ref 0.8–1.2)
LDH SERPL L TO P-CCNC: 1.46 MMOL/L (ref 0.36–1.25)
LDH SERPL L TO P-CCNC: 1.78 MMOL/L (ref 0.36–1.25)
LDH SERPL L TO P-CCNC: 1.96 MMOL/L (ref 0.36–1.25)
LDH SERPL L TO P-CCNC: 2.24 MMOL/L (ref 0.36–1.25)
LYMPHOCYTES # BLD AUTO: 2.5 K/UL (ref 1–4.8)
LYMPHOCYTES NFR BLD: 9.4 % (ref 18–48)
MAGNESIUM SERPL-MCNC: 1.8 MG/DL (ref 1.6–2.6)
MAGNESIUM SERPL-MCNC: 2.4 MG/DL (ref 1.6–2.6)
MCH RBC QN AUTO: 29.3 PG (ref 27–31)
MCHC RBC AUTO-ENTMCNC: 33.1 G/DL (ref 32–36)
MCV RBC AUTO: 89 FL (ref 82–98)
MODE: ABNORMAL
MONOCYTES # BLD AUTO: 1 K/UL (ref 0.3–1)
MONOCYTES NFR BLD: 3.7 % (ref 4–15)
NEUTROPHILS # BLD AUTO: 22.9 K/UL (ref 1.8–7.7)
NEUTROPHILS NFR BLD: 85.5 % (ref 38–73)
NRBC BLD-RTO: 0 /100 WBC
PCO2 BLDA: 37.2 MMHG (ref 35–45)
PCO2 BLDA: 38.3 MMHG (ref 35–45)
PCO2 BLDA: 42.6 MMHG (ref 35–45)
PCO2 BLDA: 48.1 MMHG (ref 35–45)
PCO2 BLDA: 50.1 MMHG (ref 35–45)
PH SMN: 7.29 [PH] (ref 7.35–7.45)
PH SMN: 7.31 [PH] (ref 7.35–7.45)
PH SMN: 7.36 [PH] (ref 7.35–7.45)
PH SMN: 7.4 [PH] (ref 7.35–7.45)
PH SMN: 7.44 [PH] (ref 7.35–7.45)
PHOSPHATE SERPL-MCNC: 3.1 MG/DL (ref 2.7–4.5)
PLATELET # BLD AUTO: 189 K/UL (ref 150–450)
PLATELET BLD QL SMEAR: ABNORMAL
PMV BLD AUTO: 9.9 FL (ref 9.2–12.9)
PO2 BLDA: 137 MMHG (ref 80–100)
PO2 BLDA: 153 MMHG (ref 80–100)
PO2 BLDA: 271 MMHG (ref 80–100)
PO2 BLDA: 419 MMHG (ref 80–100)
PO2 BLDA: 64 MMHG (ref 80–100)
POC BE: -3 MMOL/L
POC BE: -5 MMOL/L
POC BE: -5 MMOL/L
POC BE: 2 MMOL/L
POC BE: 5 MMOL/L
POC IONIZED CALCIUM: 1.06 MMOL/L (ref 1.06–1.42)
POC IONIZED CALCIUM: 1.1 MMOL/L (ref 1.06–1.42)
POC IONIZED CALCIUM: 1.17 MMOL/L (ref 1.06–1.42)
POC IONIZED CALCIUM: 1.18 MMOL/L (ref 1.06–1.42)
POC IONIZED CALCIUM: 1.21 MMOL/L (ref 1.06–1.42)
POC SATURATED O2: 100 % (ref 95–100)
POC SATURATED O2: 100 % (ref 95–100)
POC SATURATED O2: 91 % (ref 95–100)
POC SATURATED O2: 99 % (ref 95–100)
POC SATURATED O2: 99 % (ref 95–100)
POC TCO2: 22 MMOL/L (ref 23–27)
POC TCO2: 23 MMOL/L (ref 23–27)
POC TCO2: 26 MMOL/L (ref 23–27)
POC TCO2: 27 MMOL/L (ref 23–27)
POC TCO2: 31 MMOL/L (ref 23–27)
POCT GLUCOSE: 104 MG/DL (ref 70–110)
POCT GLUCOSE: 105 MG/DL (ref 70–110)
POCT GLUCOSE: 113 MG/DL (ref 70–110)
POCT GLUCOSE: 142 MG/DL (ref 70–110)
POCT GLUCOSE: 143 MG/DL (ref 70–110)
POCT GLUCOSE: 145 MG/DL (ref 70–110)
POCT GLUCOSE: 156 MG/DL (ref 70–110)
POCT GLUCOSE: 167 MG/DL (ref 70–110)
POCT GLUCOSE: 184 MG/DL (ref 70–110)
POCT GLUCOSE: 190 MG/DL (ref 70–110)
POCT GLUCOSE: 202 MG/DL (ref 70–110)
POCT GLUCOSE: 221 MG/DL (ref 70–110)
POCT GLUCOSE: 91 MG/DL (ref 70–110)
POTASSIUM BLD-SCNC: 3 MMOL/L (ref 3.5–5.1)
POTASSIUM BLD-SCNC: 3.8 MMOL/L (ref 3.5–5.1)
POTASSIUM BLD-SCNC: 4.1 MMOL/L (ref 3.5–5.1)
POTASSIUM BLD-SCNC: 4.6 MMOL/L (ref 3.5–5.1)
POTASSIUM BLD-SCNC: 4.8 MMOL/L (ref 3.5–5.1)
POTASSIUM SERPL-SCNC: 3.8 MMOL/L (ref 3.5–5.1)
POTASSIUM SERPL-SCNC: 4.3 MMOL/L (ref 3.5–5.1)
PROT SERPL-MCNC: 4.1 G/DL (ref 6–8.4)
PROTHROMBIN TIME: 11.6 SEC (ref 9–12.5)
RBC # BLD AUTO: 3.14 M/UL (ref 4–5.4)
SAMPLE: ABNORMAL
SITE: ABNORMAL
SODIUM BLD-SCNC: 139 MMOL/L (ref 136–145)
SODIUM BLD-SCNC: 141 MMOL/L (ref 136–145)
SODIUM BLD-SCNC: 144 MMOL/L (ref 136–145)
SODIUM SERPL-SCNC: 139 MMOL/L (ref 136–145)
WBC # BLD AUTO: 26.78 K/UL (ref 3.9–12.7)

## 2023-04-14 PROCEDURE — 27000191 HC C-V MONITORING: Mod: HCNC

## 2023-04-14 PROCEDURE — 93005 ELECTROCARDIOGRAM TRACING: CPT | Mod: HCNC

## 2023-04-14 PROCEDURE — 33533 CABG ARTERIAL SINGLE: CPT | Mod: ,,, | Performed by: THORACIC SURGERY (CARDIOTHORACIC VASCULAR SURGERY)

## 2023-04-14 PROCEDURE — 93325 DOPPLER ECHO COLOR FLOW MAPG: CPT | Mod: 26,HCNC,, | Performed by: ANESTHESIOLOGY

## 2023-04-14 PROCEDURE — 25000003 PHARM REV CODE 250: Mod: HCNC | Performed by: STUDENT IN AN ORGANIZED HEALTH CARE EDUCATION/TRAINING PROGRAM

## 2023-04-14 PROCEDURE — 36000712 HC OR TIME LEV V 1ST 15 MIN: Mod: HCNC | Performed by: THORACIC SURGERY (CARDIOTHORACIC VASCULAR SURGERY)

## 2023-04-14 PROCEDURE — 64450 NJX AA&/STRD OTHER PN/BRANCH: CPT | Mod: 50,59,HCNC,GC | Performed by: ANESTHESIOLOGY

## 2023-04-14 PROCEDURE — 25000003 PHARM REV CODE 250: Mod: HCNC | Performed by: PHYSICIAN ASSISTANT

## 2023-04-14 PROCEDURE — 25000003 PHARM REV CODE 250: Mod: HCNC | Performed by: THORACIC SURGERY (CARDIOTHORACIC VASCULAR SURGERY)

## 2023-04-14 PROCEDURE — 27201037 HC PRESSURE MONITORING SET UP: Mod: HCNC

## 2023-04-14 PROCEDURE — 27000492 HC SLEEVE, SCD T/L: Mod: HCNC

## 2023-04-14 PROCEDURE — 33517 CABG ARTERY-VEIN SINGLE: CPT | Mod: ,,, | Performed by: THORACIC SURGERY (CARDIOTHORACIC VASCULAR SURGERY)

## 2023-04-14 PROCEDURE — 27201423 OPTIME MED/SURG SUP & DEVICES STERILE SUPPLY: Mod: HCNC | Performed by: THORACIC SURGERY (CARDIOTHORACIC VASCULAR SURGERY)

## 2023-04-14 PROCEDURE — 80053 COMPREHEN METABOLIC PANEL: CPT | Mod: HCNC | Performed by: STUDENT IN AN ORGANIZED HEALTH CARE EDUCATION/TRAINING PROGRAM

## 2023-04-14 PROCEDURE — 84132 ASSAY OF SERUM POTASSIUM: CPT | Mod: HCNC

## 2023-04-14 PROCEDURE — 93010 EKG 12-LEAD: ICD-10-PCS | Mod: HCNC,,, | Performed by: INTERNAL MEDICINE

## 2023-04-14 PROCEDURE — 63600175 PHARM REV CODE 636 W HCPCS: Mod: HCNC | Performed by: THORACIC SURGERY (CARDIOTHORACIC VASCULAR SURGERY)

## 2023-04-14 PROCEDURE — 63600175 PHARM REV CODE 636 W HCPCS: Mod: HCNC | Performed by: PHYSICIAN ASSISTANT

## 2023-04-14 PROCEDURE — 36556 INSERT NON-TUNNEL CV CATH: CPT | Mod: 59,HCNC,GC, | Performed by: ANESTHESIOLOGY

## 2023-04-14 PROCEDURE — 27000445 HC TEMPORARY PACEMAKER LEADS: Mod: HCNC

## 2023-04-14 PROCEDURE — 85520 HEPARIN ASSAY: CPT | Mod: HCNC

## 2023-04-14 PROCEDURE — 27100088 HC CELL SAVER: Mod: HCNC

## 2023-04-14 PROCEDURE — 27200953 HC CARDIOPLEGIA SYSTEM: Mod: HCNC

## 2023-04-14 PROCEDURE — 82962 GLUCOSE BLOOD TEST: CPT | Mod: HCNC | Performed by: THORACIC SURGERY (CARDIOTHORACIC VASCULAR SURGERY)

## 2023-04-14 PROCEDURE — 85610 PROTHROMBIN TIME: CPT | Mod: HCNC | Performed by: PHYSICIAN ASSISTANT

## 2023-04-14 PROCEDURE — 82565 ASSAY OF CREATININE: CPT | Mod: HCNC

## 2023-04-14 PROCEDURE — 36591 DRAW BLOOD OFF VENOUS DEVICE: CPT | Mod: HCNC

## 2023-04-14 PROCEDURE — 63600175 PHARM REV CODE 636 W HCPCS: Mod: HCNC | Performed by: STUDENT IN AN ORGANIZED HEALTH CARE EDUCATION/TRAINING PROGRAM

## 2023-04-14 PROCEDURE — 85025 COMPLETE CBC W/AUTO DIFF WBC: CPT | Mod: HCNC | Performed by: PHYSICIAN ASSISTANT

## 2023-04-14 PROCEDURE — 93312 TEE: ICD-10-PCS | Mod: 26,59,HCNC, | Performed by: ANESTHESIOLOGY

## 2023-04-14 PROCEDURE — 33508 ENDOSCOPIC VEIN HARVEST: CPT | Mod: 59,,, | Performed by: THORACIC SURGERY (CARDIOTHORACIC VASCULAR SURGERY)

## 2023-04-14 PROCEDURE — 94761 N-INVAS EAR/PLS OXIMETRY MLT: CPT | Mod: HCNC

## 2023-04-14 PROCEDURE — 85730 THROMBOPLASTIN TIME PARTIAL: CPT | Mod: HCNC | Performed by: PHYSICIAN ASSISTANT

## 2023-04-14 PROCEDURE — 27000175 HC ADULT BYPASS PUMP: Mod: HCNC

## 2023-04-14 PROCEDURE — 64450 PR NERVE BLOCK INJ, ANES/STEROID, OTHER PERIPHERAL: ICD-10-PCS | Mod: 50,59,HCNC,GC | Performed by: ANESTHESIOLOGY

## 2023-04-14 PROCEDURE — 85014 HEMATOCRIT: CPT | Mod: HCNC

## 2023-04-14 PROCEDURE — 27202608 HC CANNULA, MISC: Mod: HCNC

## 2023-04-14 PROCEDURE — 84100 ASSAY OF PHOSPHORUS: CPT | Mod: HCNC | Performed by: PHYSICIAN ASSISTANT

## 2023-04-14 PROCEDURE — 94640 AIRWAY INHALATION TREATMENT: CPT | Mod: HCNC

## 2023-04-14 PROCEDURE — 37799 UNLISTED PX VASCULAR SURGERY: CPT | Mod: HCNC

## 2023-04-14 PROCEDURE — 25000242 PHARM REV CODE 250 ALT 637 W/ HCPCS: Mod: HCNC | Performed by: PHYSICIAN ASSISTANT

## 2023-04-14 PROCEDURE — 93320 PR DOPPLER ECHO HEART,COMPLETE: ICD-10-PCS | Mod: 26,HCNC,, | Performed by: ANESTHESIOLOGY

## 2023-04-14 PROCEDURE — 64999 TTP NERVE BLOCK: ICD-10-PCS | Mod: HCNC,,, | Performed by: ANESTHESIOLOGY

## 2023-04-14 PROCEDURE — 99900035 HC TECH TIME PER 15 MIN (STAT): Mod: HCNC

## 2023-04-14 PROCEDURE — 82330 ASSAY OF CALCIUM: CPT | Mod: HCNC | Performed by: THORACIC SURGERY (CARDIOTHORACIC VASCULAR SURGERY)

## 2023-04-14 PROCEDURE — D9220A PRA ANESTHESIA: Mod: HCNC,,, | Performed by: ANESTHESIOLOGY

## 2023-04-14 PROCEDURE — 82330 ASSAY OF CALCIUM: CPT | Mod: HCNC

## 2023-04-14 PROCEDURE — 64999 UNLISTED PX NERVOUS SYSTEM: CPT | Mod: HCNC,,, | Performed by: ANESTHESIOLOGY

## 2023-04-14 PROCEDURE — 25000003 PHARM REV CODE 250: Mod: HCNC

## 2023-04-14 PROCEDURE — 82803 BLOOD GASES ANY COMBINATION: CPT | Mod: HCNC

## 2023-04-14 PROCEDURE — 25000003 PHARM REV CODE 250: Mod: HCNC | Performed by: ANESTHESIOLOGY

## 2023-04-14 PROCEDURE — 33533 PR CABG, ARTERIAL, SINGLE: ICD-10-PCS | Mod: ,,, | Performed by: THORACIC SURGERY (CARDIOTHORACIC VASCULAR SURGERY)

## 2023-04-14 PROCEDURE — 82962 GLUCOSE BLOOD TEST: CPT | Mod: HCNC

## 2023-04-14 PROCEDURE — 83605 ASSAY OF LACTIC ACID: CPT | Mod: HCNC

## 2023-04-14 PROCEDURE — 93010 ELECTROCARDIOGRAM REPORT: CPT | Mod: HCNC,,, | Performed by: INTERNAL MEDICINE

## 2023-04-14 PROCEDURE — 27200667 HC PACEMAKER, TEMPORARY MONITORING, PER SHIFT: Mod: HCNC

## 2023-04-14 PROCEDURE — C1729 CATH, DRAINAGE: HCPCS | Mod: HCNC | Performed by: THORACIC SURGERY (CARDIOTHORACIC VASCULAR SURGERY)

## 2023-04-14 PROCEDURE — 36000713 HC OR TIME LEV V EA ADD 15 MIN: Mod: HCNC | Performed by: THORACIC SURGERY (CARDIOTHORACIC VASCULAR SURGERY)

## 2023-04-14 PROCEDURE — 82800 BLOOD PH: CPT | Mod: HCNC

## 2023-04-14 PROCEDURE — 84132 ASSAY OF SERUM POTASSIUM: CPT | Mod: HCNC | Performed by: PHYSICIAN ASSISTANT

## 2023-04-14 PROCEDURE — 93325 PR DOPPLER COLOR FLOW VELOCITY MAP: ICD-10-PCS | Mod: 26,HCNC,, | Performed by: ANESTHESIOLOGY

## 2023-04-14 PROCEDURE — 99499 UNLISTED E&M SERVICE: CPT | Mod: HCNC,GC,, | Performed by: ANESTHESIOLOGY

## 2023-04-14 PROCEDURE — 84295 ASSAY OF SERUM SODIUM: CPT | Mod: HCNC

## 2023-04-14 PROCEDURE — 37000009 HC ANESTHESIA EA ADD 15 MINS: Mod: HCNC | Performed by: THORACIC SURGERY (CARDIOTHORACIC VASCULAR SURGERY)

## 2023-04-14 PROCEDURE — 93320 DOPPLER ECHO COMPLETE: CPT | Mod: 26,HCNC,, | Performed by: ANESTHESIOLOGY

## 2023-04-14 PROCEDURE — 93312 ECHO TRANSESOPHAGEAL: CPT | Mod: 26,59,HCNC, | Performed by: ANESTHESIOLOGY

## 2023-04-14 PROCEDURE — 20000000 HC ICU ROOM: Mod: HCNC

## 2023-04-14 PROCEDURE — 27000221 HC OXYGEN, UP TO 24 HOURS: Mod: HCNC

## 2023-04-14 PROCEDURE — 63600175 PHARM REV CODE 636 W HCPCS: Mod: HCNC

## 2023-04-14 PROCEDURE — 99499 NO LOS: ICD-10-PCS | Mod: HCNC,GC,, | Performed by: ANESTHESIOLOGY

## 2023-04-14 PROCEDURE — 37000008 HC ANESTHESIA 1ST 15 MINUTES: Mod: HCNC | Performed by: THORACIC SURGERY (CARDIOTHORACIC VASCULAR SURGERY)

## 2023-04-14 PROCEDURE — 83735 ASSAY OF MAGNESIUM: CPT | Mod: HCNC | Performed by: PHYSICIAN ASSISTANT

## 2023-04-14 PROCEDURE — D9220A PRA ANESTHESIA: ICD-10-PCS | Mod: HCNC,,, | Performed by: ANESTHESIOLOGY

## 2023-04-14 PROCEDURE — 33508 PR ENDOSCOPY W/VIDEO-ASST VEIN HARVEST,CABG: ICD-10-PCS | Mod: 59,,, | Performed by: THORACIC SURGERY (CARDIOTHORACIC VASCULAR SURGERY)

## 2023-04-14 PROCEDURE — 36620 ARTERIAL: ICD-10-PCS | Mod: 59,HCNC,GC, | Performed by: ANESTHESIOLOGY

## 2023-04-14 PROCEDURE — 36620 INSERTION CATHETER ARTERY: CPT | Mod: 59,HCNC,GC, | Performed by: ANESTHESIOLOGY

## 2023-04-14 PROCEDURE — 36556 CENTRAL LINE: ICD-10-PCS | Mod: 59,HCNC,GC, | Performed by: ANESTHESIOLOGY

## 2023-04-14 PROCEDURE — 33517 PR CABG, ARTERY-VEIN, SINGLE: ICD-10-PCS | Mod: ,,, | Performed by: THORACIC SURGERY (CARDIOTHORACIC VASCULAR SURGERY)

## 2023-04-14 PROCEDURE — 83735 ASSAY OF MAGNESIUM: CPT | Mod: 91,HCNC | Performed by: THORACIC SURGERY (CARDIOTHORACIC VASCULAR SURGERY)

## 2023-04-14 RX ORDER — POLYETHYLENE GLYCOL 3350 17 G/17G
17 POWDER, FOR SOLUTION ORAL DAILY
Status: DISCONTINUED | OUTPATIENT
Start: 2023-04-14 | End: 2023-04-22 | Stop reason: HOSPADM

## 2023-04-14 RX ORDER — NITROGLYCERIN 5 MG/ML
INJECTION, SOLUTION INTRAVENOUS
Status: DISCONTINUED | OUTPATIENT
Start: 2023-04-14 | End: 2023-04-14

## 2023-04-14 RX ORDER — PAPAVERINE HYDROCHLORIDE 30 MG/ML
INJECTION INTRAMUSCULAR; INTRAVENOUS
Status: DISCONTINUED | OUTPATIENT
Start: 2023-04-14 | End: 2023-04-14 | Stop reason: HOSPADM

## 2023-04-14 RX ORDER — ACETAMINOPHEN 500 MG
1000 TABLET ORAL EVERY 8 HOURS
Status: DISCONTINUED | OUTPATIENT
Start: 2023-04-14 | End: 2023-04-14

## 2023-04-14 RX ORDER — FENTANYL CITRATE 50 UG/ML
25 INJECTION, SOLUTION INTRAMUSCULAR; INTRAVENOUS
Status: DISCONTINUED | OUTPATIENT
Start: 2023-04-14 | End: 2023-04-14

## 2023-04-14 RX ORDER — ACETAMINOPHEN 10 MG/ML
INJECTION, SOLUTION INTRAVENOUS
Status: DISCONTINUED | OUTPATIENT
Start: 2023-04-14 | End: 2023-04-14

## 2023-04-14 RX ORDER — SODIUM CHLORIDE 9 MG/ML
INJECTION, SOLUTION INTRAVENOUS CONTINUOUS
Status: DISCONTINUED | OUTPATIENT
Start: 2023-04-14 | End: 2023-04-19

## 2023-04-14 RX ORDER — ACETAMINOPHEN 500 MG
1000 TABLET ORAL EVERY 8 HOURS
Status: DISCONTINUED | OUTPATIENT
Start: 2023-04-15 | End: 2023-04-16

## 2023-04-14 RX ORDER — IPRATROPIUM BROMIDE AND ALBUTEROL SULFATE 2.5; .5 MG/3ML; MG/3ML
3 SOLUTION RESPIRATORY (INHALATION) EVERY 4 HOURS PRN
Status: ACTIVE | OUTPATIENT
Start: 2023-04-14 | End: 2023-04-15

## 2023-04-14 RX ORDER — DEXMEDETOMIDINE HYDROCHLORIDE 100 UG/ML
INJECTION, SOLUTION INTRAVENOUS
Status: DISCONTINUED | OUTPATIENT
Start: 2023-04-14 | End: 2023-04-14

## 2023-04-14 RX ORDER — METOCLOPRAMIDE HYDROCHLORIDE 5 MG/ML
5 INJECTION INTRAMUSCULAR; INTRAVENOUS EVERY 6 HOURS PRN
Status: DISCONTINUED | OUTPATIENT
Start: 2023-04-14 | End: 2023-04-22 | Stop reason: HOSPADM

## 2023-04-14 RX ORDER — PROPOFOL 10 MG/ML
0-50 INJECTION, EMULSION INTRAVENOUS CONTINUOUS
Status: DISCONTINUED | OUTPATIENT
Start: 2023-04-14 | End: 2023-04-14

## 2023-04-14 RX ORDER — DEXTROSE MONOHYDRATE, SODIUM CHLORIDE, AND POTASSIUM CHLORIDE 50; 1.49; 4.5 G/1000ML; G/1000ML; G/1000ML
INJECTION, SOLUTION INTRAVENOUS CONTINUOUS
Status: DISCONTINUED | OUTPATIENT
Start: 2023-04-14 | End: 2023-04-19

## 2023-04-14 RX ORDER — MUPIROCIN 20 MG/G
1 OINTMENT TOPICAL 2 TIMES DAILY
Status: DISPENSED | OUTPATIENT
Start: 2023-04-14 | End: 2023-04-19

## 2023-04-14 RX ORDER — AMOXICILLIN 250 MG
1 CAPSULE ORAL DAILY
Status: DISCONTINUED | OUTPATIENT
Start: 2023-04-15 | End: 2023-04-22 | Stop reason: HOSPADM

## 2023-04-14 RX ORDER — ACETAMINOPHEN 325 MG/1
650 TABLET ORAL EVERY 4 HOURS PRN
Status: DISCONTINUED | OUTPATIENT
Start: 2023-04-14 | End: 2023-04-14

## 2023-04-14 RX ORDER — FENTANYL CITRATE 50 UG/ML
INJECTION, SOLUTION INTRAMUSCULAR; INTRAVENOUS
Status: DISCONTINUED | OUTPATIENT
Start: 2023-04-14 | End: 2023-04-14

## 2023-04-14 RX ORDER — FAMOTIDINE 10 MG/ML
20 INJECTION INTRAVENOUS 2 TIMES DAILY
Status: DISCONTINUED | OUTPATIENT
Start: 2023-04-14 | End: 2023-04-15

## 2023-04-14 RX ORDER — MUPIROCIN 20 MG/G
1 OINTMENT TOPICAL
Status: COMPLETED | OUTPATIENT
Start: 2023-04-14 | End: 2023-04-14

## 2023-04-14 RX ORDER — HYDROMORPHONE HYDROCHLORIDE 1 MG/ML
0.2 INJECTION, SOLUTION INTRAMUSCULAR; INTRAVENOUS; SUBCUTANEOUS
Status: DISCONTINUED | OUTPATIENT
Start: 2023-04-14 | End: 2023-04-18

## 2023-04-14 RX ORDER — BUPIVACAINE HYDROCHLORIDE 5 MG/ML
INJECTION, SOLUTION EPIDURAL; INTRACAUDAL
Status: COMPLETED | OUTPATIENT
Start: 2023-04-14 | End: 2023-04-14

## 2023-04-14 RX ORDER — ACETAMINOPHEN 10 MG/ML
1000 INJECTION, SOLUTION INTRAVENOUS ONCE
Status: COMPLETED | OUTPATIENT
Start: 2023-04-14 | End: 2023-04-14

## 2023-04-14 RX ORDER — OXYCODONE HYDROCHLORIDE 10 MG/1
10 TABLET ORAL EVERY 4 HOURS PRN
Status: DISCONTINUED | OUTPATIENT
Start: 2023-04-14 | End: 2023-04-15

## 2023-04-14 RX ORDER — ROCURONIUM BROMIDE 10 MG/ML
INJECTION, SOLUTION INTRAVENOUS
Status: DISCONTINUED | OUTPATIENT
Start: 2023-04-14 | End: 2023-04-14

## 2023-04-14 RX ORDER — POTASSIUM CHLORIDE 14.9 MG/ML
60 INJECTION INTRAVENOUS
Status: DISCONTINUED | OUTPATIENT
Start: 2023-04-14 | End: 2023-04-20

## 2023-04-14 RX ORDER — ALBUMIN HUMAN 50 G/1000ML
25 SOLUTION INTRAVENOUS ONCE AS NEEDED
Status: DISCONTINUED | OUTPATIENT
Start: 2023-04-14 | End: 2023-04-22 | Stop reason: HOSPADM

## 2023-04-14 RX ORDER — LIDOCAINE HYDROCHLORIDE 10 MG/ML
1 INJECTION, SOLUTION EPIDURAL; INFILTRATION; INTRACAUDAL; PERINEURAL
Status: DISCONTINUED | OUTPATIENT
Start: 2023-04-14 | End: 2023-04-14 | Stop reason: HOSPADM

## 2023-04-14 RX ORDER — IPRATROPIUM BROMIDE AND ALBUTEROL SULFATE 2.5; .5 MG/3ML; MG/3ML
3 SOLUTION RESPIRATORY (INHALATION) EVERY 4 HOURS
Status: DISCONTINUED | OUTPATIENT
Start: 2023-04-14 | End: 2023-04-15

## 2023-04-14 RX ORDER — FENTANYL CITRATE 50 UG/ML
25 INJECTION, SOLUTION INTRAMUSCULAR; INTRAVENOUS EVERY 5 MIN PRN
Status: DISCONTINUED | OUTPATIENT
Start: 2023-04-14 | End: 2023-04-14 | Stop reason: HOSPADM

## 2023-04-14 RX ORDER — OXYCODONE HYDROCHLORIDE 5 MG/1
5 TABLET ORAL EVERY 4 HOURS PRN
Status: DISCONTINUED | OUTPATIENT
Start: 2023-04-14 | End: 2023-04-15

## 2023-04-14 RX ORDER — ONDANSETRON 2 MG/ML
4 INJECTION INTRAMUSCULAR; INTRAVENOUS EVERY 12 HOURS PRN
Status: DISCONTINUED | OUTPATIENT
Start: 2023-04-14 | End: 2023-04-21

## 2023-04-14 RX ORDER — CEFAZOLIN SODIUM 1 G/3ML
INJECTION, POWDER, FOR SOLUTION INTRAMUSCULAR; INTRAVENOUS
Status: DISCONTINUED | OUTPATIENT
Start: 2023-04-14 | End: 2023-04-14

## 2023-04-14 RX ORDER — DEXAMETHASONE SODIUM PHOSPHATE 4 MG/ML
INJECTION, SOLUTION INTRA-ARTICULAR; INTRALESIONAL; INTRAMUSCULAR; INTRAVENOUS; SOFT TISSUE
Status: DISCONTINUED | OUTPATIENT
Start: 2023-04-14 | End: 2023-04-14

## 2023-04-14 RX ORDER — LIDOCAINE HYDROCHLORIDE 20 MG/ML
INJECTION, SOLUTION EPIDURAL; INFILTRATION; INTRACAUDAL; PERINEURAL
Status: DISCONTINUED | OUTPATIENT
Start: 2023-04-14 | End: 2023-04-14

## 2023-04-14 RX ORDER — KETAMINE HCL IN 0.9 % NACL 50 MG/5 ML
SYRINGE (ML) INTRAVENOUS
Status: DISCONTINUED | OUTPATIENT
Start: 2023-04-14 | End: 2023-04-14

## 2023-04-14 RX ORDER — ACETAMINOPHEN 10 MG/ML
1000 INJECTION, SOLUTION INTRAVENOUS ONCE
Status: DISCONTINUED | OUTPATIENT
Start: 2023-04-14 | End: 2023-04-14

## 2023-04-14 RX ORDER — POTASSIUM CHLORIDE 29.8 MG/ML
40 INJECTION INTRAVENOUS
Status: DISCONTINUED | OUTPATIENT
Start: 2023-04-14 | End: 2023-04-20

## 2023-04-14 RX ORDER — HEPARIN SODIUM 1000 [USP'U]/ML
INJECTION, SOLUTION INTRAVENOUS; SUBCUTANEOUS
Status: DISCONTINUED | OUTPATIENT
Start: 2023-04-14 | End: 2023-04-14

## 2023-04-14 RX ORDER — BISACODYL 10 MG
10 SUPPOSITORY, RECTAL RECTAL DAILY PRN
Status: DISCONTINUED | OUTPATIENT
Start: 2023-04-14 | End: 2023-04-22 | Stop reason: HOSPADM

## 2023-04-14 RX ORDER — LIDOCAINE 50 MG/G
1 PATCH TOPICAL
Status: DISCONTINUED | OUTPATIENT
Start: 2023-04-14 | End: 2023-04-22 | Stop reason: HOSPADM

## 2023-04-14 RX ORDER — ONDANSETRON 2 MG/ML
INJECTION INTRAMUSCULAR; INTRAVENOUS
Status: DISCONTINUED | OUTPATIENT
Start: 2023-04-14 | End: 2023-04-14

## 2023-04-14 RX ORDER — EPINEPHRINE 1 MG/ML
INJECTION, SOLUTION, CONCENTRATE INTRAVENOUS
Status: DISCONTINUED | OUTPATIENT
Start: 2023-04-14 | End: 2023-04-14

## 2023-04-14 RX ORDER — HYDROMORPHONE HYDROCHLORIDE 1 MG/ML
0.5 INJECTION, SOLUTION INTRAMUSCULAR; INTRAVENOUS; SUBCUTANEOUS
Status: DISCONTINUED | OUTPATIENT
Start: 2023-04-14 | End: 2023-04-14

## 2023-04-14 RX ORDER — HEPARIN SODIUM 200 [USP'U]/100ML
INJECTION, SOLUTION INTRAVENOUS
Status: COMPLETED | OUTPATIENT
Start: 2023-04-14 | End: 2023-04-14

## 2023-04-14 RX ORDER — MIDAZOLAM HYDROCHLORIDE 1 MG/ML
INJECTION INTRAMUSCULAR; INTRAVENOUS
Status: DISCONTINUED | OUTPATIENT
Start: 2023-04-14 | End: 2023-04-14

## 2023-04-14 RX ORDER — ACETAMINOPHEN 500 MG
1000 TABLET ORAL
Status: COMPLETED | OUTPATIENT
Start: 2023-04-14 | End: 2023-04-14

## 2023-04-14 RX ORDER — PROPOFOL 10 MG/ML
VIAL (ML) INTRAVENOUS
Status: DISCONTINUED | OUTPATIENT
Start: 2023-04-14 | End: 2023-04-14

## 2023-04-14 RX ORDER — METOPROLOL TARTRATE 25 MG/1
25 TABLET, FILM COATED ORAL
Status: COMPLETED | OUTPATIENT
Start: 2023-04-14 | End: 2023-04-14

## 2023-04-14 RX ORDER — SODIUM CHLORIDE 0.9 % (FLUSH) 0.9 %
10 SYRINGE (ML) INJECTION
Status: DISCONTINUED | OUTPATIENT
Start: 2023-04-14 | End: 2023-04-20

## 2023-04-14 RX ORDER — TRANEXAMIC ACID 100 MG/ML
INJECTION, SOLUTION INTRAVENOUS CONTINUOUS PRN
Status: DISCONTINUED | OUTPATIENT
Start: 2023-04-14 | End: 2023-04-14

## 2023-04-14 RX ORDER — SODIUM CHLORIDE 0.9 % (FLUSH) 0.9 %
10 SYRINGE (ML) INJECTION
Status: DISCONTINUED | OUTPATIENT
Start: 2023-04-14 | End: 2023-04-14 | Stop reason: HOSPADM

## 2023-04-14 RX ORDER — NOREPINEPHRINE BITARTRATE 1 MG/ML
INJECTION, SOLUTION INTRAVENOUS
Status: DISCONTINUED | OUTPATIENT
Start: 2023-04-14 | End: 2023-04-14

## 2023-04-14 RX ORDER — MAGNESIUM SULFATE HEPTAHYDRATE 40 MG/ML
4 INJECTION, SOLUTION INTRAVENOUS
Status: DISCONTINUED | OUTPATIENT
Start: 2023-04-14 | End: 2023-04-20

## 2023-04-14 RX ORDER — ASPIRIN 300 MG/1
300 SUPPOSITORY RECTAL ONCE AS NEEDED
Status: DISCONTINUED | OUTPATIENT
Start: 2023-04-14 | End: 2023-04-22 | Stop reason: HOSPADM

## 2023-04-14 RX ORDER — NALOXONE HCL 0.4 MG/ML
0.4 VIAL (ML) INJECTION ONCE
Status: COMPLETED | OUTPATIENT
Start: 2023-04-14 | End: 2023-04-14

## 2023-04-14 RX ORDER — ASPIRIN 325 MG
325 TABLET ORAL DAILY
Status: DISCONTINUED | OUTPATIENT
Start: 2023-04-15 | End: 2023-04-16

## 2023-04-14 RX ORDER — DOCUSATE SODIUM 100 MG/1
100 CAPSULE, LIQUID FILLED ORAL 2 TIMES DAILY
Status: DISCONTINUED | OUTPATIENT
Start: 2023-04-14 | End: 2023-04-14

## 2023-04-14 RX ORDER — MAGNESIUM SULFATE HEPTAHYDRATE 40 MG/ML
2 INJECTION, SOLUTION INTRAVENOUS
Status: DISCONTINUED | OUTPATIENT
Start: 2023-04-14 | End: 2023-04-20

## 2023-04-14 RX ORDER — TRANEXAMIC ACID 100 MG/ML
INJECTION, SOLUTION INTRAVENOUS
Status: DISCONTINUED | OUTPATIENT
Start: 2023-04-14 | End: 2023-04-14

## 2023-04-14 RX ORDER — ASPIRIN 325 MG
325 TABLET ORAL ONCE
Status: COMPLETED | OUTPATIENT
Start: 2023-04-14 | End: 2023-04-14

## 2023-04-14 RX ORDER — FENTANYL CITRATE 50 UG/ML
50 INJECTION, SOLUTION INTRAMUSCULAR; INTRAVENOUS
Status: DISCONTINUED | OUTPATIENT
Start: 2023-04-19 | End: 2023-04-14

## 2023-04-14 RX ORDER — POTASSIUM CHLORIDE 14.9 MG/ML
20 INJECTION INTRAVENOUS
Status: DISCONTINUED | OUTPATIENT
Start: 2023-04-14 | End: 2023-04-20

## 2023-04-14 RX ORDER — ATORVASTATIN CALCIUM 40 MG/1
80 TABLET, FILM COATED ORAL NIGHTLY
Status: DISCONTINUED | OUTPATIENT
Start: 2023-04-14 | End: 2023-04-22 | Stop reason: HOSPADM

## 2023-04-14 RX ORDER — ONDANSETRON 2 MG/ML
4 INJECTION INTRAMUSCULAR; INTRAVENOUS DAILY PRN
Status: DISCONTINUED | OUTPATIENT
Start: 2023-04-14 | End: 2023-04-14 | Stop reason: HOSPADM

## 2023-04-14 RX ADMIN — ONDANSETRON 0.5 G: 2 INJECTION INTRAMUSCULAR; INTRAVENOUS at 11:04

## 2023-04-14 RX ADMIN — ONDANSETRON 4 MG: 2 INJECTION INTRAMUSCULAR; INTRAVENOUS at 11:04

## 2023-04-14 RX ADMIN — HEPARIN SODIUM 10000 UNITS: 1000 INJECTION, SOLUTION INTRAVENOUS; SUBCUTANEOUS at 09:04

## 2023-04-14 RX ADMIN — BUPIVACAINE HYDROCHLORIDE 20 ML: 5 INJECTION, SOLUTION EPIDURAL; INTRACAUDAL; PERINEURAL at 07:04

## 2023-04-14 RX ADMIN — ROCURONIUM BROMIDE 20 MG: 50 INJECTION, SOLUTION INTRAVENOUS at 09:04

## 2023-04-14 RX ADMIN — METOPROLOL TARTRATE 25 MG: 25 TABLET, FILM COATED ORAL at 06:04

## 2023-04-14 RX ADMIN — NITROGLYCERIN 50 MCG: 5 INJECTION, SOLUTION INTRAVENOUS at 11:04

## 2023-04-14 RX ADMIN — DEXAMETHASONE SODIUM PHOSPHATE 4 MG: 4 INJECTION INTRA-ARTICULAR; INTRALESIONAL; INTRAMUSCULAR; INTRAVENOUS; SOFT TISSUE at 07:04

## 2023-04-14 RX ADMIN — SODIUM CHLORIDE 4 UNITS/HR: 9 INJECTION, SOLUTION INTRAVENOUS at 10:04

## 2023-04-14 RX ADMIN — CEFAZOLIN 2 G: 330 INJECTION, POWDER, FOR SOLUTION INTRAMUSCULAR; INTRAVENOUS at 11:04

## 2023-04-14 RX ADMIN — CEFAZOLIN 2 G: 330 INJECTION, POWDER, FOR SOLUTION INTRAMUSCULAR; INTRAVENOUS at 07:04

## 2023-04-14 RX ADMIN — MUPIROCIN 1 G: 20 OINTMENT TOPICAL at 08:04

## 2023-04-14 RX ADMIN — NOREPINEPHRINE BITARTRATE 8 MCG: 1 INJECTION, SOLUTION, CONCENTRATE INTRAVENOUS at 11:04

## 2023-04-14 RX ADMIN — LIDOCAINE HYDROCHLORIDE 60 MG: 20 INJECTION, SOLUTION EPIDURAL; INFILTRATION; INTRACAUDAL; PERINEURAL at 07:04

## 2023-04-14 RX ADMIN — NOREPINEPHRINE BITARTRATE 8 MCG: 1 INJECTION, SOLUTION, CONCENTRATE INTRAVENOUS at 08:04

## 2023-04-14 RX ADMIN — EPINEPHRINE 10 MCG: 1 INJECTION, SOLUTION, CONCENTRATE INTRAVENOUS at 09:04

## 2023-04-14 RX ADMIN — Medication 10 MG: at 11:04

## 2023-04-14 RX ADMIN — MUPIROCIN 1 G: 20 OINTMENT TOPICAL at 02:04

## 2023-04-14 RX ADMIN — IPRATROPIUM BROMIDE AND ALBUTEROL SULFATE 3 ML: .5; 3 SOLUTION RESPIRATORY (INHALATION) at 07:04

## 2023-04-14 RX ADMIN — FAMOTIDINE 20 MG: 10 INJECTION, SOLUTION INTRAVENOUS at 02:04

## 2023-04-14 RX ADMIN — FENTANYL CITRATE 100 MCG: 50 INJECTION, SOLUTION INTRAMUSCULAR; INTRAVENOUS at 08:04

## 2023-04-14 RX ADMIN — TRANEXAMIC ACID 600 MG: 100 INJECTION, SOLUTION INTRAVENOUS at 07:04

## 2023-04-14 RX ADMIN — ATORVASTATIN CALCIUM 80 MG: 40 TABLET, FILM COATED ORAL at 08:04

## 2023-04-14 RX ADMIN — FENTANYL CITRATE 100 MCG: 50 INJECTION, SOLUTION INTRAMUSCULAR; INTRAVENOUS at 07:04

## 2023-04-14 RX ADMIN — NOREPINEPHRINE BITARTRATE 0.02 MCG/KG/MIN: 1 INJECTION, SOLUTION, CONCENTRATE INTRAVENOUS at 07:04

## 2023-04-14 RX ADMIN — CEFAZOLIN 2 G: 2 INJECTION, POWDER, FOR SOLUTION INTRAMUSCULAR; INTRAVENOUS at 07:04

## 2023-04-14 RX ADMIN — SODIUM CHLORIDE, SODIUM GLUCONATE, SODIUM ACETATE, POTASSIUM CHLORIDE, MAGNESIUM CHLORIDE, SODIUM PHOSPHATE, DIBASIC, AND POTASSIUM PHOSPHATE: .53; .5; .37; .037; .03; .012; .00082 INJECTION, SOLUTION INTRAVENOUS at 06:04

## 2023-04-14 RX ADMIN — HYDROMORPHONE HYDROCHLORIDE 0.2 MG: 1 INJECTION, SOLUTION INTRAMUSCULAR; INTRAVENOUS; SUBCUTANEOUS at 03:04

## 2023-04-14 RX ADMIN — NOREPINEPHRINE BITARTRATE 8 MCG: 1 INJECTION, SOLUTION, CONCENTRATE INTRAVENOUS at 09:04

## 2023-04-14 RX ADMIN — Medication 20 MG: at 08:04

## 2023-04-14 RX ADMIN — METHOCARBAMOL 500 MG: 100 INJECTION, SOLUTION INTRAMUSCULAR; INTRAVENOUS at 03:04

## 2023-04-14 RX ADMIN — ONDANSETRON 4 MG: 2 INJECTION INTRAMUSCULAR; INTRAVENOUS at 04:04

## 2023-04-14 RX ADMIN — IPRATROPIUM BROMIDE AND ALBUTEROL SULFATE 3 ML: .5; 3 SOLUTION RESPIRATORY (INHALATION) at 03:04

## 2023-04-14 RX ADMIN — EPINEPHRINE 5 MCG: 1 INJECTION, SOLUTION, CONCENTRATE INTRAVENOUS at 11:04

## 2023-04-14 RX ADMIN — TRANEXAMIC ACID 1 MG/KG/HR: 100 INJECTION, SOLUTION INTRAVENOUS at 07:04

## 2023-04-14 RX ADMIN — LIDOCAINE 1 PATCH: 50 PATCH TOPICAL at 02:04

## 2023-04-14 RX ADMIN — FAMOTIDINE 20 MG: 10 INJECTION, SOLUTION INTRAVENOUS at 08:04

## 2023-04-14 RX ADMIN — NITROGLYCERIN 25 MCG: 5 INJECTION, SOLUTION INTRAVENOUS at 09:04

## 2023-04-14 RX ADMIN — PROPOFOL 50 MG: 10 INJECTION, EMULSION INTRAVENOUS at 07:04

## 2023-04-14 RX ADMIN — EPINEPHRINE 0.04 MCG/KG/MIN: 1 INJECTION INTRAMUSCULAR; INTRAVENOUS; SUBCUTANEOUS at 11:04

## 2023-04-14 RX ADMIN — OXYCODONE HYDROCHLORIDE 5 MG: 5 TABLET ORAL at 05:04

## 2023-04-14 RX ADMIN — POLYETHYLENE GLYCOL 3350 17 G: 17 POWDER, FOR SOLUTION ORAL at 05:04

## 2023-04-14 RX ADMIN — ROCURONIUM BROMIDE 60 MG: 50 INJECTION, SOLUTION INTRAVENOUS at 07:04

## 2023-04-14 RX ADMIN — MIDAZOLAM HYDROCHLORIDE 1 MG: 1 INJECTION, SOLUTION INTRAMUSCULAR; INTRAVENOUS at 07:04

## 2023-04-14 RX ADMIN — ACETAMINOPHEN 1000 MG: 10 INJECTION INTRAVENOUS at 06:04

## 2023-04-14 RX ADMIN — DEXMEDETOMIDINE 8 MCG: 100 INJECTION, SOLUTION, CONCENTRATE INTRAVENOUS at 12:04

## 2023-04-14 RX ADMIN — HEPARIN SODIUM 16000 UNITS: 1000 INJECTION, SOLUTION INTRAVENOUS; SUBCUTANEOUS at 09:04

## 2023-04-14 RX ADMIN — MUPIROCIN 1 G: 20 OINTMENT TOPICAL at 06:04

## 2023-04-14 RX ADMIN — DEXTROSE, SODIUM CHLORIDE, AND POTASSIUM CHLORIDE: 5; .45; .15 INJECTION INTRAVENOUS at 01:04

## 2023-04-14 RX ADMIN — NOREPINEPHRINE BITARTRATE 8 MCG: 1 INJECTION, SOLUTION, CONCENTRATE INTRAVENOUS at 07:04

## 2023-04-14 RX ADMIN — FENTANYL CITRATE 100 MCG: 50 INJECTION, SOLUTION INTRAMUSCULAR; INTRAVENOUS at 12:04

## 2023-04-14 RX ADMIN — ACETAMINOPHEN 1000 MG: 10 INJECTION INTRAVENOUS at 11:04

## 2023-04-14 RX ADMIN — EPINEPHRINE 10 MCG: 1 INJECTION, SOLUTION, CONCENTRATE INTRAVENOUS at 08:04

## 2023-04-14 RX ADMIN — NALXONE HYDROCHLORIDE 0.4 MG: 0.4 INJECTION INTRAMUSCULAR; INTRAVENOUS; SUBCUTANEOUS at 01:04

## 2023-04-14 RX ADMIN — FENTANYL CITRATE 50 MCG: 50 INJECTION, SOLUTION INTRAMUSCULAR; INTRAVENOUS at 11:04

## 2023-04-14 RX ADMIN — DEXMEDETOMIDINE 8 MCG: 100 INJECTION, SOLUTION, CONCENTRATE INTRAVENOUS at 11:04

## 2023-04-14 RX ADMIN — ASPIRIN 325 MG ORAL TABLET 325 MG: 325 PILL ORAL at 08:04

## 2023-04-14 RX ADMIN — ACETAMINOPHEN 1000 MG: 500 TABLET ORAL at 06:04

## 2023-04-14 RX ADMIN — Medication 20 MG: at 07:04

## 2023-04-14 RX ADMIN — POTASSIUM CHLORIDE 20 MEQ: 14.9 INJECTION, SOLUTION INTRAVENOUS at 03:04

## 2023-04-14 RX ADMIN — FENTANYL CITRATE 50 MCG: 50 INJECTION, SOLUTION INTRAMUSCULAR; INTRAVENOUS at 08:04

## 2023-04-14 NOTE — ANESTHESIA PROCEDURE NOTES
Arterial    Diagnosis: Cornonary Artery Disease    Patient location during procedure: done in OR    Staffing  Authorizing Provider: Brandon Ortiz MD  Performing Provider: Stewart Oliva DO    Anesthesiologist was present at the time of the procedure.    Preanesthetic Checklist  Completed: patient identified, IV checked, site marked, risks and benefits discussed, surgical consent, monitors and equipment checked, pre-op evaluation, timeout performed and anesthesia consent givenArterial  Skin Prep: chlorhexidine gluconate  Local Infiltration: lidocaine  Orientation: right  Location: radial    Catheter Size: 20 G Insertion Attempts: 1  Assessment  Dressing: secured with tape and tegaderm  Patient: Tolerated well

## 2023-04-14 NOTE — TELEPHONE ENCOUNTER
Duplicate triage  Reason for Disposition   Caller has already spoken with another triager and has no further questions.    Protocols used: No Contact or Duplicate Contact Call-A-AH

## 2023-04-14 NOTE — PROGRESS NOTES
Patient admitted to SICU  via OR transport by OR staff and anesthesiologist.       Patient was connected to bedside monitor. VS stable on 6L simple face mask. Orders released and completed. Dr. Becerra and Gay Rivers NP at bedside,aware of VS, gtts, outputs, and ABG values.

## 2023-04-14 NOTE — HPI
Ms. Villa is a very pleasant 63-year-old woman with PMH CVA (on DAPT) this year, HTN, HLD, DM, smoking who initially presented with chest pain while cleaning her house.  She went to the emergency department and was admitted.  She had a PET stress which was positive, and follow-up coronary angiography demonstrated 2 vessel disease.  She now presents for coronary artery bypass grafting.    The patient presents to the SICU s/p CABGx2 (LIMA-LAD, SVG-OM) with Dr. Goyal on 04/14/2023. On admission, they extubated to face mask and in stable condition. Arrived off vasopressors, requiring Cleviprex to maintain blood pressure at a MAP 60-80 and SBP < 140. Central access includes a left subclavian, arterial access includes a right radial arterial line. They also have 2 pleural and 2 mediastinal chest tubes with appropriate output.    Intraoperatively, they received 2L of crystalloid, 500ml of cell saver. Urine output intraoperatively was 750cc. The pre-operative echocardiogram was notable for normal BiV function. Post-operative echo was unchanged from prior with normal BiV function.    Immediate post-operative plans include hemodynamic stabilization and weaning of cardiac and respiratory support. Plan to wean vasopressors and inotropes as tolerated, closely monitor fluid status with strict Is/Os and continued fluid resuscitation as needed.

## 2023-04-14 NOTE — ANESTHESIA PROCEDURE NOTES
Intubation    Date/Time: 4/14/2023 7:16 AM  Performed by: Jeremias Calderon MD  Authorized by: Brandon Ortiz MD     Intubation:     Induction:  Intravenous    Intubated:  Postinduction    Mask Ventilation:  Easy with oral airway    Attempts:  1    Attempted By:  Resident anesthesiologist    Method of Intubation:  Video laryngoscopy    Blade:  Guy 3    Laryngeal View Grade: Grade I - full view of cords      Difficult Airway Encountered?: No      Complications:  None    Airway Device:  Oral endotracheal tube    Airway Device Size:  7.0    Style/Cuff Inflation:  Cuffed (inflated to minimal occlusive pressure)    Tube secured:  20    Secured at:  The teeth    Placement Verified By:  Capnometry    Complicating Factors:  None    Findings Post-Intubation:  BS equal bilateral and atraumatic/condition of teeth unchanged

## 2023-04-14 NOTE — PLAN OF CARE
Endocrinology Plan of Care:  04/14/2023    New consult received for MG management for this type 2 diabetic patient who is now status post CABG x 2.      Chart reviewed showing A1c controlled on home regimen for diabetes as below.  A1c has been in the diabetic range since at least 2016, possibly diagnosed even prior to that time.      Home medication Regimen includes:  Glipizide 10 mg BID  Basaglar 10 units daily  Jardiance 10 mg daily  Metformin 1000 mg BID  Tradjenta 5 mg daily          Plan:    Cardiac/Vascular  * Coronary artery disease  CAD s/p CABGx2 with Dr. Kahn on 4/13/23  Will target tight glycemic control given cardiac surgery      Mixed hyperlipidemia  Continues on Atorvastatin 80 mg      Endocrine  Stress hyperglycemia  Hyperglycemia primarily stress response s/p CABG, however steroids given intra op also contributing to hyperglycemia     Endocrinology consulted for BG management.   BG goal 110-140 (CTS Protocol)      - Intensive insulin drip per CTS guidelines  - May consider transition insulin drip in the coming days versus transition to MDI therapy based on insulin use overnight and dietary adjustments.    - BG checks q1hr while on drip  - Hypoglycemia protocol in place  - If blood glucose greater than 300 and diet ordered, please ask patient not to eat food or drink anything other than water until correctional insulin has brought it back below 250     ** Please notify Endocrine for any change and/or advance in diet**  ** Please call Endocrine for any BG related issues **    Expect full consult note to follow tomorrow morning.      Kenyon Whittaker,

## 2023-04-14 NOTE — TELEPHONE ENCOUNTER
Caller states that she Spoke with PCP office  today in the am. She is Scheduled for cardiothoracis surgery in the morning. States that her insulin is . Spoke with MD office today to order her insulin. Insulin was called in and the pharmacy states that the medication called in is not covered by the insurance. Pharmacy is trying to get in touch with the MD that ordered the insulin. Triage nurse attempted to contact the on call provider Dr. Gomes. Left message for MD to contact the patient, pharmacy or triage nurse. Provided information regarding pt's current CBG stated as 201 mg/dl and she states that she take lantus for CBG over 200.   Reason for Disposition   [1] Caller has URGENT medication or insulin pump question AND [2] triager unable to answer question    Additional Information   Negative: Unconscious or difficult to awaken   Negative: Acting confused (e.g., disoriented, slurred speech)   Negative: Very weak (e.g., can't stand)   Negative: Sounds like a life-threatening emergency to the triager   Negative: [1] Vomiting AND [2] signs of dehydration (e.g., very dry mouth, lightheaded, dark urine)   Negative: [1] Blood glucose > 240 mg/dL (13.3 mmol/L) AND [2] rapid breathing   Negative: Blood glucose > 500 mg/dL (27.8 mmol/L)   Negative: [1] Blood glucose > 240 mg/dL (13.3 mmol/L) AND [2] urine ketones moderate-large (or more than 1+)   Negative: [1] Blood glucose > 240 mg/dL (13.3 mmol/L) AND [2] blood ketones > 1.4 mmol/L   Negative: [1] Blood glucose > 240 mg/dL (13.3 mmol/L) AND [2] vomiting AND [3] unable to check for ketones (in blood or urine)   Negative: [1] New-onset diabetes suspected (e.g., frequent urination, weak, weight loss) AND [2] vomiting or rapid breathing   Negative: Vomiting lasts > 4 hours   Negative: Patient sounds very sick or weak to the triager   Negative: Fever > 100.4 F (38.0 C)   Negative: Blood glucose > 400 mg/dL (22.2 mmol/L)   Negative: [1] Blood glucose > 300 mg/dL (16.7  mmol/L) AND [2] two or more times in a row   Negative: Urine ketones moderate - large (or blood ketones > 1.4 mmol/L)    Protocols used: Diabetes - High Blood Sugar-A-AH

## 2023-04-14 NOTE — PROGRESS NOTES
Patient became less responsive to physical stimuli, O2 sats <90% on simple face mask. Nasal trumpet placed and jaw thrust performed. Ambu bag applied to keep o2 sats >95. 0.4 mg of narcan administered, responsiveness/sats responded well. Dr. Becerra, respiratory, and charge nurse at bedside during events.

## 2023-04-14 NOTE — OP NOTE
DATE OF PROCEDURE:  04/14/2023     ATTENDING SURGEON:  Igor Kahn M.D.    ASSISTANT: Shaun Becerra MD, (Cardiothoracic Resident)     PREOPERATIVE DIAGNOSES:  1. Coronary artery disease.  2. Diabetes mellitus.  3. Hypertension.  4. Hyperlipidemia.  5. Previous stroke     POSTOPERATIVE DIAGNOSES:  1. Coronary artery disease.  2. Diabetes mellitus.  3. Hypertension.  4. Hyperlipidemia.  5. Previous stroke     OPERATION PERFORMED:  Coronary artery bypass grafting x2, with left internal   mammary artery to left anterior descending and saphenous vein graft to the first obtuse marginal     ANESTHESIA:  General endotracheal.     ESTIMATED BLOOD LOSS:  100 mL.     BRIEF HISTORY:  Ms. Villa is a very pleasant 63-year-old woman who initially presented with chest pain while cleaning her house.  She went to the emergency department and was admitted.  She had a PET stress which was positive, and follow-up coronary angiography demonstrated 2 vessel disease.  She now presents for coronary artery bypass grafting.     PROCEDURE IN DETAIL:  After obtaining informed and written consent, the patient   was brought to the Operating Room and placed on the operating room table in   supine position.  After induction of adequate general endotracheal anesthesia,   the patient's chest, abdomen, pelvis and bilateral lower extremities were   prepped and draped in the usual sterile fashion.  Skin incisions were made over   the chest and left lower extremity.  Through the left lower extremity skin   incision, greater saphenous vein was harvested using endoscopic technique.  Once   the vein was out, the leg was closed in multiple layers of absorbable suture   material.  Through the chest incision, a median sternotomy was performed.  The   left internal mammary artery was completely dissected, but not divided.  A   pericardial well was created.  The patient was systemically heparinized.    Cannulation sutures were placed in the aorta and  in the right atrial appendage.    The aortic cannula was inserted, followed by the venous.  Antegrade and   retrograde cardioplegia catheters were placed.  The mammary was divided   distally, and its end was prepared.  The patient was then put on cardiopulmonary   bypass.  Once on bypass, the aortic crossclamp was applied and the heart was   arrested using cold blood enhanced antegrade cardioplegia.  A prompt   electromechanical arrest was achieved.  Once the cardioplegia was all in, we turned our attention to the lateral wall.  The obtuse marginal was identified and an arteriotomy was made.  The vein was brought up, its end was   spatulated, and the anastomosis was performed using a running 7-0 Prolene   suture.  After completion of the anastomosis, it was tested.  It was hemostatic.    Another dose of cardioplegia was given retrograde and down this vein.  We then   turned our attention to the anterior wall.  A site suitable for grafting in the LAD was identified, and an arteriotomy was made.  The   mammary was brought up, and the anastomosis was performed using a running 7-0   Prolene suture.  After completion of the anastomosis, it was tested.  It was   hemostatic.  Another dose of cardioplegia was given retrograde and down the vein.  The mammary pedicle was tacked to the heart using two 5-0   Prolene sutures.  We then prepared to do the venous proximal. A #11 blade was used to incise the aorta and an aortic   punch to enlarge the aortotomy.  The vein was checked for rotation, and cut to   an appropriate length.  The end was spatulated, and the proximal   anastomosis was performed using a running 5-0 Prolene suture.  After completion   of the proximal, the hot shot was given retrograde.  Root de-airing maneuvers   were performed, and the aortic cross-clamp was removed.  The patient was   subsequently weaned from cardiopulmonary bypass.  The patient did separate   easily from bypass.  Once off bypass, all surgical  sites were inspected.  There   was good hemostasis.  The test dose of protamine was administered, and this was   well tolerated.  The total dose was then given.  intermediate through the total dose,   all cannulas were removed.  All surgical sites were again inspected, again   there was good hemostasis.  Ventricular pacing wires were placed.  Drains   were placed.  After again   confirming adequate hemostasis, the patient's chest was closed using #6   stainless steel wires to reapproximate the sternum.  The overlying soft tissues   were reapproximated using absorbable suture material.  The patient's chest was   washed and dried, and a dry dressing was applied.  The patient tolerated the   procedure well, there were no complications.  At the conclusion of the case,   sponge and instrument counts were correct.

## 2023-04-14 NOTE — ANESTHESIA PROCEDURE NOTES
Central Line    Diagnosis: Cornonary Artery Disease  Patient location during procedure: done in OR    Staffing  Authorizing Provider: Brandon Ortiz MD  Performing Provider: Stewart Oliva DO    Anesthesiologist was present at the time of the procedure.  Preanesthetic Checklist  Completed: patient identified, IV checked, site marked, risks and benefits discussed, surgical consent, monitors and equipment checked, pre-op evaluation, timeout performed and anesthesia consent given  Indication   Indication: med administration, vascular access     Anesthesia     Central Line   Skin Prep: skin prepped with Betadine, skin prep agent completely dried prior to procedure  Sterile Barriers Followed: Yes    All five maximal barriers used- gloves, gown, cap, mask, and large sterile sheet    hand hygiene performed prior to central venous catheter insertion  Location: left subclavian.   Catheter type: quad lumen  Catheter Size: 8.5 Fr  Ultrasound: vascular probe with ultrasound   Vessel Caliber: medium, patent  Needle advanced into vessel with real time Ultrasound guidance.     Manometry: Venous cannualation confirmed by visual estimation of blood vessel pressure using manometry.  Insertion Attempts: 1   Securement:line sutured, chlorhexidine patch, sterile dressing applied and blood return through all ports    Post-Procedure    Adverse Events:none      Guidewire Guidewire removed intact.

## 2023-04-14 NOTE — PROGRESS NOTES
Nurses Note -- 4 Eyes      4/14/2023   4:18 PM      Skin assessed during: Admit      [x] No Pressure Injuries Present    [x]Prevention Measures Documented      [] Yes- Altered Skin Integrity Present or Discovered   [] LDA Added if Not in Epic (Describe Wound)   [] New Altered Skin Integrity was Present on Admit and Documented in LDA   [] Wound Image Taken    Wound Care Consulted? No    Attending Nurse:  Marysol Gutierrez RN     Second RN/Staff Member:  Mandy Vaughan RN

## 2023-04-14 NOTE — BRIEF OP NOTE
[facility] - Surgery ([facility])  Cardiothoracic Surgery  Operative Note    SUMMARY     Date of Procedure: 4/14/2023     Procedure: Procedure(s) (LRB):  CORONARY ARTERY BYPASS GRAFT (CABG) x 2 with LIMA-LAD and SVG-OM1  21594, [id number], [id number]      Surgeon(s) and Role:     * Igor Kahn MD - Primary     * Jaosn Yap MD - Resident - Assisting     * Shaun Becerra MD - Fellow        Pre-Operative Diagnosis: Coronary artery disease, unspecified vessel or lesion type, unspecified whether angina present, unspecified whether native or transplanted heart [I25.10]    Post-Operative Diagnosis: Post-Op Diagnosis Codes:     * Coronary artery disease, unspecified vessel or lesion type, unspecified whether angina present, unspecified whether native or transplanted heart [I25.10]    Anesthesia: General    Operative Findings (including complications, if any): Good conduit good targets.     Estimated Blood Loss (EBL): 100 mL               Specimens:   Specimen (24h ago, onward)      None           Attestation:  I was present and scrubbed for the procedure.

## 2023-04-14 NOTE — PROGRESS NOTES
Patient has surgery on   Lantus is    Basaglar not covered  Called pharmacy and took a verbal for lantus

## 2023-04-14 NOTE — SUBJECTIVE & OBJECTIVE
Follow-up For: Procedure(s) (LRB):  CORONARY ARTERY BYPASS GRAFT (CABG) x 2 (N/A)  SURGICAL PROCUREMENT, VEIN, ENDOSCOPIC (Left)    Post-Operative Day: Day of Surgery     Past Medical History:   Diagnosis Date    Asthma     Breast carcinoma     Cataract     Coronary artery disease of native heart with stable angina pectoris 2023    Diabetes mellitus     Moderate episode of recurrent major depressive disorder 2021    Osteoporosis        Past Surgical History:   Procedure Laterality Date    BILATERAL MASTECTOMY  2018    CHOLECYSTECTOMY      COLONOSCOPY N/A 10/27/2015    Procedure: COLONOSCOPY;  Surgeon: Johnny Hurley MD;  Location: [facility] ENDO;  Service: Endoscopy;  Laterality: N/A;    CORONARY ANGIOGRAPHY N/A 2023    Procedure: ANGIOGRAM, CORONARY ARTERY;  Surgeon: Francisco Barahona MD;  Location: [facility] CATH LAB;  Service: Cardiology;  Laterality: N/A;    ESOPHAGOGASTRODUODENOSCOPY N/A 2022    Procedure: ESOPHAGOGASTRODUODENOSCOPY (EGD);  Surgeon: Mili Katz MD;  Location: [facility] ENDO ([address] [place] [address]);  Service: Endoscopy;  Laterality: N/A;  rapid in AM, instr portal -ml    HYSTERECTOMY      LASER PERIPHERAL IRIDOTOMY Bilateral         LEFT HEART CATHETERIZATION Left 2023    Procedure: Left heart cath;  Surgeon: Francisco Barahona MD;  Location: [facility] CATH LAB;  Service: Cardiology;  Laterality: Left;       Review of patient's allergies indicates:  No Known Allergies    Family History       Problem Relation (Age of Onset)    Diabetes Father          Tobacco Use    Smoking status: Former     Packs/day: 1.00     Years: 40.00     Pack years: 40.00     Types: Cigarettes     Quit date: 3/12/2023     Years since quittin.0     Passive exposure: Current    Smokeless tobacco: Never   Substance and Sexual Activity    Alcohol use: No     Alcohol/week: 0.0 standard drinks    Drug use: No    Sexual activity: Not on file      Review of Systems   Unable to perform ROS: Acuity of  condition   Objective:     Vital Signs (Most Recent):  Temp: 98 °F (36.7 °C) (04/14/23 0606)  Pulse: 76 (04/14/23 1300)  Resp: 16 (04/14/23 1300)  BP: (!) 162/74 (04/14/23 1300)  SpO2: 95 % (04/14/23 1300)   Vital Signs (24h Range):  Temp:  [98 °F (36.7 °C)] 98 °F (36.7 °C)  Pulse:  [64-76] 76  Resp:  [16-29] 16  SpO2:  [86 %-98 %] 95 %  BP: (147-162)/(70-74) 162/74  Arterial Line BP: (160-167)/(62-74) 167/74     Weight: 57.6 kg (127 lb)  Body mass index is 23.23 kg/m².      Intake/Output Summary (Last 24 hours) at 4/14/2023 1311  Last data filed at 4/14/2023 1300  Gross per 24 hour   Intake 2750 ml   Output 445 ml   Net 2305 ml       Physical Exam  Vitals reviewed.   Constitutional:       General: She is not in acute distress.     Interventions: She is sedated and intubated.   Eyes:      Extraocular Movements: Extraocular movements intact.      Pupils: Pupils are equal, round, and reactive to light.   Cardiovascular:      Rate and Rhythm: Normal rate and regular rhythm.      Pulses: Normal pulses.      Comments: Epicardial wires  Pulmonary:      Effort: No respiratory distress. She is intubated.      Breath sounds: No wheezing.   Abdominal:      General: There is no distension.      Palpations: Abdomen is soft.   Genitourinary:     Comments: Yang in place  Musculoskeletal:      Right lower leg: No edema.      Left lower leg: No edema.   Skin:     General: Skin is warm and dry.      Capillary Refill: Capillary refill takes less than 2 seconds.      Findings: No bruising.      Comments: MSI clean, dry and intact   Chest tubes in place with sanguinous output    Neurological:      General: No focal deficit present.       Vents:       Lines/Drains/Airways       Central Venous Catheter Line  Duration                  Percutaneous Central Line Insertion/Assessment - Quad lumen  04/14/23 0800 Subclavian Left <1 day    Percutaneous Central Line Insertion/Assessment - Quad Lumen  04/14/23 0800 Subclavian Left <1 day               Drain  Duration                  Urethral Catheter 04/14/23 0735 Non-latex;Straight-tip;Temperature probe 14 Fr. <1 day         Y Chest Tube 1 and 2 04/14/23 1115 1 Anterior Mediastinal 19 Fr. 2 Anterior Mediastinal 19 Fr. <1 day         Y Chest Tube 3 and 4 04/14/23 1115 3 Right Pleural 19 Fr. 4 Left Pleural 19 Fr. <1 day              Arterial Line  Duration             Arterial Line 04/14/23 0759 Right Radial <1 day              Line  Duration                  Pacer Wires 04/14/23 1139 <1 day              Peripheral Intravenous Line  Duration                  Peripheral IV - Single Lumen 04/14/23 0615 18 G Anterior;Right Wrist <1 day                    Significant Labs:    CBC/Anemia Profile:  Recent Labs   Lab 04/14/23  1015 04/14/23  1048 04/14/23  1244   HCT 23* 23* 25*        Chemistries:  pending    All pertinent labs within the past 24 hours have been reviewed.    Significant Imaging: I have reviewed all pertinent imaging results/findings within the past 24 hours.

## 2023-04-14 NOTE — H&P
[facility] - Surgical Intensive Care  Critical Care - Surgery  History & Physical    Patient Name: Jud Villa  MRN: [id number]  Admission Date: 4/14/2023  Code Status: Full Code  Attending Physician: Igor Kahn MD   Primary Care Provider: Bo Lopez MD   Principal Problem: Coronary artery disease    Subjective:     HPI:  Ms. Villa is a very pleasant 63-year-old woman with PMH CVA (on DAPT) this year, HTN, HLD, DM, smoking who initially presented with chest pain while cleaning her house.  She went to the emergency department and was admitted.  She had a PET stress which was positive, and follow-up coronary angiography demonstrated 2 vessel disease.  She now presents for coronary artery bypass grafting.    The patient presents to the SICU s/p CABGx2 (LIMA-LAD, SVG-OM) with Dr. Goyal on 04/14/2023. On admission, they extubated to face mask and in stable condition. Arrived off vasopressors, requiring Cleviprex to maintain blood pressure at a MAP 60-80 and SBP < 140. Central access includes a left subclavian, arterial access includes a right radial arterial line. They also have 2 pleural and 2 mediastinal chest tubes with appropriate output.    Intraoperatively, they received 2L of crystalloid, 500ml of cell saver. Urine output intraoperatively was 750cc. The pre-operative echocardiogram was notable for normal BiV function. Post-operative echo was unchanged from prior with normal BiV function.    Immediate post-operative plans include hemodynamic stabilization and weaning of cardiac and respiratory support. Plan to wean vasopressors and inotropes as tolerated, closely monitor fluid status with strict Is/Os and continued fluid resuscitation as needed.       Hospital/ICU Course:  No notes on file    Follow-up For: Procedure(s) (LRB):  CORONARY ARTERY BYPASS GRAFT (CABG) x 2 (N/A)  SURGICAL PROCUREMENT, VEIN, ENDOSCOPIC (Left)    Post-Operative Day: Day of Surgery     Past Medical History:    Diagnosis Date    Asthma     Breast carcinoma     Cataract     Coronary artery disease of native heart with stable angina pectoris 2023    Diabetes mellitus     Moderate episode of recurrent major depressive disorder 2021    Osteoporosis        Past Surgical History:   Procedure Laterality Date    BILATERAL MASTECTOMY  2018    CHOLECYSTECTOMY      COLONOSCOPY N/A 10/27/2015    Procedure: COLONOSCOPY;  Surgeon: Johnny Hulrey MD;  Location: [facility] ENDO;  Service: Endoscopy;  Laterality: N/A;    CORONARY ANGIOGRAPHY N/A 2023    Procedure: ANGIOGRAM, CORONARY ARTERY;  Surgeon: Francisco Barahona MD;  Location: [facility] CATH LAB;  Service: Cardiology;  Laterality: N/A;    ESOPHAGOGASTRODUODENOSCOPY N/A 2022    Procedure: ESOPHAGOGASTRODUODENOSCOPY (EGD);  Surgeon: Mili Katz MD;  Location: [facility] ([address] [place] [address]);  Service: Endoscopy;  Laterality: N/A;  rapid in AM, instr portal -ml    HYSTERECTOMY      LASER PERIPHERAL IRIDOTOMY Bilateral         LEFT HEART CATHETERIZATION Left 2023    Procedure: Left heart cath;  Surgeon: Francisco Barahona MD;  Location: [facility] CATH LAB;  Service: Cardiology;  Laterality: Left;       Review of patient's allergies indicates:  No Known Allergies    Family History       Problem Relation (Age of Onset)    Diabetes Father          Tobacco Use    Smoking status: Former     Packs/day: 1.00     Years: 40.00     Pack years: 40.00     Types: Cigarettes     Quit date: 3/12/2023     Years since quittin.0     Passive exposure: Current    Smokeless tobacco: Never   Substance and Sexual Activity    Alcohol use: No     Alcohol/week: 0.0 standard drinks    Drug use: No    Sexual activity: Not on file      Review of Systems   Unable to perform ROS: Acuity of condition   Objective:     Vital Signs (Most Recent):  Temp: 98 °F (36.7 °C) (23 0606)  Pulse: 76 (23 1300)  Resp: 16 (23 1300)  BP: (!) 162/74 (23  1300)  SpO2: 95 % (04/14/23 1300)   Vital Signs (24h Range):  Temp:  [98 °F (36.7 °C)] 98 °F (36.7 °C)  Pulse:  [64-76] 76  Resp:  [16-29] 16  SpO2:  [86 %-98 %] 95 %  BP: (147-162)/(70-74) 162/74  Arterial Line BP: (160-167)/(62-74) 167/74     Weight: 57.6 kg (127 lb)  Body mass index is 23.23 kg/m².      Intake/Output Summary (Last 24 hours) at 4/14/2023 1311  Last data filed at 4/14/2023 1300  Gross per 24 hour   Intake 2750 ml   Output 445 ml   Net 2305 ml       Physical Exam  Vitals reviewed.   Constitutional:       General: She is not in acute distress.     Interventions: She is sedated and intubated.   Eyes:      Extraocular Movements: Extraocular movements intact.      Pupils: Pupils are equal, round, and reactive to light.   Cardiovascular:      Rate and Rhythm: Normal rate and regular rhythm.      Pulses: Normal pulses.      Comments: Epicardial wires  Pulmonary:      Effort: No respiratory distress. She is intubated.      Breath sounds: No wheezing.   Abdominal:      General: There is no distension.      Palpations: Abdomen is soft.   Genitourinary:     Comments: Yang in place  Musculoskeletal:      Right lower leg: No edema.      Left lower leg: No edema.   Skin:     General: Skin is warm and dry.      Capillary Refill: Capillary refill takes less than 2 seconds.      Findings: No bruising.      Comments: MSI clean, dry and intact   Chest tubes in place with sanguinous output    Neurological:      General: No focal deficit present.       Vents:       Lines/Drains/Airways       Central Venous Catheter Line  Duration                  Percutaneous Central Line Insertion/Assessment - Quad lumen  04/14/23 0800 Subclavian Left <1 day    Percutaneous Central Line Insertion/Assessment - Quad Lumen  04/14/23 0800 Subclavian Left <1 day              Drain  Duration                  Urethral Catheter 04/14/23 0735 Non-latex;Straight-tip;Temperature probe 14 Fr. <1 day         Y Chest Tube 1 and 2 04/14/23 1115 1  Anterior Mediastinal 19 Fr. 2 Anterior Mediastinal 19 Fr. <1 day         Y Chest Tube 3 and 4 04/14/23 1115 3 Right Pleural 19 Fr. 4 Left Pleural 19 Fr. <1 day              Arterial Line  Duration             Arterial Line 04/14/23 0759 Right Radial <1 day              Line  Duration                  Pacer Wires 04/14/23 1139 <1 day              Peripheral Intravenous Line  Duration                  Peripheral IV - Single Lumen 04/14/23 0615 18 G Anterior;Right Wrist <1 day                    Significant Labs:    CBC/Anemia Profile:  Recent Labs   Lab 04/14/23  1015 04/14/23  1048 04/14/23  1244   HCT 23* 23* 25*        Chemistries:  pending    All pertinent labs within the past 24 hours have been reviewed.    Significant Imaging: I have reviewed all pertinent imaging results/findings within the past 24 hours.    Assessment/Plan:     Neuro  History of CVA (cerebrovascular accident)  DAPT x1 year, CVA in March 2023.     Pulmonary  COPD (chronic obstructive pulmonary disease)  Documented PFTs with decreased FEV1/     -- duonebs   -- goal SPO2 >88%    Cardiac/Vascular  * Coronary artery disease    Neuro/Psych:     - Hx CVA this year   - Pain:    - Scheduled Tylenol 1g q8h   - dilaudid PRN, Oxy PRN             Cardiac:     - S/P CABGx2 (LIMA-LAD, SVG-OM) with Dr. Kahn on 4/14/2023    - BP Goal: MAP 60-80, SBP < 140     - Cleviprex initiated upon arrival     - Anti-HTNs: Will start when appropriate    - Rhythm: NSR    - Beta blocker: Will start when appropriate    - Statin: Atorvastatin 80 mg QD (home dose)      Pulmonary:     - Goal SpO2 >92%    - Will wean ventilator support as tolerated to extubate    - Chest Tubes x 4 (2 Meds & 2 Pleural)    - ABGs PRN      Renal:    - Trend BUN/Cr     - Maintain Yang, record strict Is/Os    Recent Labs   Lab 04/11/23  1009   BUN 21   CREATININE 0.8         FEN / GI:     - Daily CMP, PRN K/Mag/Phos per protocol     - Replace electrolytes as needed    - Nutrition: NPO pending  swallow evaluation     - Bowel Regimen: Miralax, docusate      ID:     - Afebrile    - WBC stable    - Abx: Complete perioperative cefazolin 2g Q8H x 5 doses    Recent Labs   Lab 04/11/23  1009 04/14/23  1249   WBC 8.61 26.78*         Heme/Onc:     - Hgb 13.7 pre-operatively    - CBC daily    - ASA 325mg tonight, will discuss DAPT due to prior CVA with surgeon tomorrow     Recent Labs   Lab 04/11/23  1009 04/14/23  1249   HGB 13.7 9.2*    189   APTT 25.6 29.1   INR 1.0 1.1         Endocrine:     - CTS Goal -140    - HgbA1c: 6.9    - Endocrinology consulted for insulin management      PPx:   Feeding: npo pending swallow  Analgesia/Sedation: multimodal   Thromboembolic Prevention: SCDs  HOB >30: Yes  Stress Ulcer: pepcid  Glucose Control: Yes, insulin management per Endocrinology     Lines/Drains/Airway:   Right radial arterial line   Lft subclavian CVC   Yang   Chest Tubes: 4 (2 Mediastinal, 2 Pleural)    Pacing Wires: Temporary ventricular pacing wires      Dispo/Code Status/Palliative:     - Continue SICU Care    - Full Code      Oncology  Acute blood loss anemia  Initial hgb 13.7, arrived to SICU with hgb 9.2. expected post-operative anemia.     -- daily CBC   -- transfuse hgb < 7     Endocrine  Controlled type 2 diabetes mellitus with complication, with long-term current use of insulin  A1c 6.9    -- endocrinology consulted for insulin management, holding oral hypoglycemics  -- BG goal 110-140   -- hypoglycemia protocol           Critical care was time spent personally by me on the following activities: development of treatment plan with patient or surrogate and bedside caregivers, discussions with consultants, evaluation of patient's response to treatment, examination of patient, ordering and performing treatments and interventions, ordering and review of laboratory studies, ordering and review of radiographic studies, pulse oximetry, re-evaluation of patient's condition.  This critical care  time did not overlap with that of any other provider or involve time for any procedures.     Gay Rodriguez NP  Critical Care - Surgery  [facility] - [facility] Intensive Care

## 2023-04-14 NOTE — CARE UPDATE
"      SICU PLAN OF CARE NOTE    Dx: Coronary artery disease    Shift Events: S/p CABG x 2. Narcan given x 1 and patient responsed well. PRN pain regimen adjusted per primary team. Electrolytes replaced per orders.     Plan of care reviewed with the patient and her family. All questions answered at thsi time.     Goals of Care: SBP < 140, MAP 60-80, Pain control, promote independence and mobility    Neuro: AAO x4, Arouses to Voice, Follows Commands, and Moves All Extremities    Vital Signs: /63 (BP Location: Right arm, Patient Position: Lying)   Pulse 71   Temp 97.7 °F (36.5 °C)   Resp 18   Ht 5' 2" (1.575 m)   Wt 62 kg (136 lb 11 oz)   SpO2 (!) 94%   Breastfeeding No   BMI 25.00 kg/m²     Respiratory: Room Air    Diet: Clear Liquid (no sugar)    Gtts: Insulin and Cleviprex    Urine Output: Urinary Catheter 435 cc/shift    Drains:   Mediastinal Chest Tube, total output 120 cc /  shift  Pleural Chest Tube, total output 130 cc /  shift     Labs/Accuchecks: Daily Labs / Accuchecks Q1H    Skin: No signs of skin breakdown or redness noted to bony prominences. Sacral and heel foam dressings in place. Yulisa bed plugged in and working.   "

## 2023-04-14 NOTE — ASSESSMENT & PLAN NOTE
  Neuro/Psych:     - Hx CVA this year   - Pain:    - Scheduled Tylenol 1g q8h   - dilaudid PRN, Oxy PRN             Cardiac:     - S/P CABGx2 (LIMA-LAD, SVG-OM) with Dr. Kahn on 4/14/2023    - BP Goal: MAP 60-80, SBP < 140     - Cleviprex initiated upon arrival     - Anti-HTNs: Will start when appropriate    - Rhythm: NSR    - Beta blocker: Will start when appropriate    - Statin: Atorvastatin 80 mg QD (home dose)      Pulmonary:     - Goal SpO2 >92%    - Will wean ventilator support as tolerated to extubate    - Chest Tubes x 4 (2 Meds & 2 Pleural)    - ABGs PRN      Renal:    - Trend BUN/Cr     - Maintain Yang, record strict Is/Os    Recent Labs   Lab 04/11/23  1009   BUN 21   CREATININE 0.8         FEN / GI:     - Daily CMP, PRN K/Mag/Phos per protocol     - Replace electrolytes as needed    - Nutrition: NPO pending swallow evaluation     - Bowel Regimen: Miralax, docusate      ID:     - Afebrile    - WBC stable    - Abx: Complete perioperative cefazolin 2g Q8H x 5 doses    Recent Labs   Lab 04/11/23  1009 04/14/23  1249   WBC 8.61 26.78*         Heme/Onc:     - Hgb 13.7 pre-operatively    - CBC daily    - ASA 325mg tonight, will discuss DAPT due to prior CVA with surgeon tomorrow     Recent Labs   Lab 04/11/23  1009 04/14/23  1249   HGB 13.7 9.2*    189   APTT 25.6 29.1   INR 1.0 1.1         Endocrine:     - CTS Goal -140    - HgbA1c: 6.9    - Endocrinology consulted for insulin management      PPx:   Feeding: npo pending swallow  Analgesia/Sedation: multimodal   Thromboembolic Prevention: SCDs  HOB >30: Yes  Stress Ulcer: pepcid  Glucose Control: Yes, insulin management per Endocrinology     Lines/Drains/Airway:   Right radial arterial line   Lft subclavian CVC   Yang   Chest Tubes: 4 (2 Mediastinal, 2 Pleural)    Pacing Wires: Temporary ventricular pacing wires      Dispo/Code Status/Palliative:     - Continue SICU Care    - Full Code

## 2023-04-14 NOTE — PROGRESS NOTES
Autotransfusion/Rapid Infusion Record:      04/14/2023  Autotransfusionist:  Wolfgang Minaya    Surgeon(s) and Role:     * Igor Kahn MD - Primary     * Jason Yap MD - Resident - Assisting     * Shaun Becerra MD - Fellow  Anesthesiologist:  Brandon Ortiz MD    Past Medical History:   Diagnosis Date    Asthma     Breast carcinoma     Cataract     Coronary artery disease of native heart with stable angina pectoris 4/4/2023    Diabetes mellitus     Moderate episode of recurrent major depressive disorder 5/19/2021    Osteoporosis        Procedure(s) (LRB):  CORONARY ARTERY BYPASS GRAFT (CABG) x 2 (N/A)  SURGICAL PROCUREMENT, VEIN, ENDOSCOPIC (Left)     12:20 PM    Equipment:    Cell Saver     R.I.S.  : [employer] Model: CATSmart or CATSplus : Minerva   Model: [id number]     Serial number: [id number]   Serial number:    Disposable lot #: [id number]   Disposable lot #:      Were extra cardiotomies used for cell saver?  if yes, #:      Solutions:  Anticoagulant: ACD-A   Expiration date: 2/24 Volume used: 300   Wash solution: 0.9% NaCl   Expiration date: 3/25 Volume used: 4092     Cell saver checklist  Time completed:           [x]   Circuit assembled correctly     [x]   Cell saver powered and operational     [x]   Vacuum connected, functional, adjust to max -150mmHg     [x]   Anticoagulant drip rate adjusted     [x]   Transfer bag properly labeled with patient name, expiration time, volume,       anticoagulant, OR number, and initials     [x]   Cell saver disinfected after use (completed at end of case)       Cell Saver volumes:    Total volume processed:     3412 mL     Total volume pRBCs recovered     572 mL     Volume pRBCs infused      572 mL         RIS checklist   Time completed:  []   RIS circuit assembled correctly     []   RIS power and operational     []   RIS disinfected after use (completed at end of case)       RIS volumes:    Total volume infused:    (see anesthesia record for blood        product information)    mL       Additional comments:

## 2023-04-14 NOTE — ANESTHESIA PROCEDURE NOTES
TTP Nerve Block    Patient location during procedure: OR   Block not for primary anesthetic.  Reason for block: at surgeon's request and post-op pain management   Post-op Pain Location: Bilateral Chest Wall   Start time: 4/14/2023 7:31 AM  Timeout: 4/14/2023 7:30 AM   End time: 4/14/2023 7:40 AM    Staffing  Authorizing Provider: Brandon Ortiz MD  Performing Provider: Stewart Oliva DO    Preanesthetic Checklist  Completed: patient identified, IV checked, site marked, risks and benefits discussed, surgical consent, monitors and equipment checked, pre-op evaluation and timeout performed  Peripheral Block  Patient position: supine  Prep: ChloraPrep  Patient monitoring: heart rate, cardiac monitor, continuous pulse ox, continuous capnometry and frequent blood pressure checks  Block type: Transversus thoracis  Laterality: bilateral  Injection technique: single shot  Needle  Needle type: Stimuplex   Needle gauge: 20 G  Needle length: 2 in  Needle localization: ultrasound guidance     Assessment  Pain Tolerance: comfortable throughout block and no complaints  Medications:    Medications: bupivacaine (pf) (MARCAINE) injection 0.5% - Perineural   20 mL - 4/14/2023 7:43:00 AM

## 2023-04-14 NOTE — ASSESSMENT & PLAN NOTE
Initial hgb 13.7, arrived to SICU with hgb 9.2. expected post-operative anemia.     -- daily CBC   -- transfuse hgb < 7

## 2023-04-14 NOTE — TRANSFER OF CARE
"Anesthesia Transfer of Care Note    Patient: Jud Villa    Procedure(s) Performed: Procedure(s) (LRB):  CORONARY ARTERY BYPASS GRAFT (CABG) x 2 (N/A)  SURGICAL PROCUREMENT, VEIN, ENDOSCOPIC (Left)    Patient location: ICU    Anesthesia Type: general    Transport from OR: Transported from OR intubated on 100% O2 by AMBU with assisted ventilation    Post pain: adequate analgesia    Post assessment: tolerated procedure well and no apparent anesthetic complications    Post vital signs: stable    Level of consciousness: sedated    Nausea/Vomiting: no nausea/vomiting    Complications: none    Transfer of care protocol was followed      Last vitals:   Visit Vitals  BP (!) 147/70 (BP Location: Right arm, Patient Position: Lying)   Pulse 64   Temp 36.7 °C (98 °F) (Oral)   Resp 18   Ht 5' 2" (1.575 m)   Wt 57.6 kg (127 lb)   SpO2 98%   Breastfeeding No   BMI 23.23 kg/m²     "

## 2023-04-14 NOTE — ANESTHESIA PROCEDURE NOTES
BHUMIKA    Diagnosis: CADCoronary artery disease, unspecified vessel or lesion type, unspecified whether angina present, unspecified whether native or transplanted heart [I25.10]  Patient location during procedure: OR  Procedure start time: 4/14/2023 7:45 AM  Procedure end time: 4/14/2023 12:10 PM  Exam type: Baseline    Staffing  Performed: anesthesiologist and fellow     Anesthesiologist: Brandon Ortiz MD        Anesthesiologist Present  Yes      Setup & Induction  Patient preparation: bite block inserted  Probe Insertion: easy  Exam: completeDoppler Echo: 2D, 3D, color flow mapping, pulse wave Doppler and continuous wave Doppler.  Exam     Left Heart  Left Atruim: dilated    Left Ventricle: cm, normal (men 4.2-5.9; women 3.8-5.2)    LVAD:no  Estimated Ejection Fraction: 45-54% mild            Right Heart  Right Ventricle: normal  Right Ventricle Function: normal    Intra Atrial Septum  PFO: no shunt by color flow doppler  no IAS aneurysm  no lipomatous hypertrophy  no Atrial Septal Defect (Asd)    Right Ventricle  Size: normal, Free Wall Thickness: normal    Aortic Valve:  Stenosis: none.  Morphology: trileaflet    Regurgitation: no aortic valve regurgitation      Mitral Valve:   Morphology:normal  Prolapse: none  Flail: no flail  Jet Description: none    Tricuspid Valve:  Morphology: normal  Regurgitation: none    Pulmonic Valve:  Morphology:normal  Regurgitation(color flow): none    Great Vessels  Ascending Aorta Atherosclerosis: 4=protruding dz (>5mm)  Aortic Arch Atherosclerosis: 3=sessile dz (3-5mm)  IABP: no  Descending Aorta Atherosclerosis: 3=sessile dz (3-5mm)  Aorta    Descending aorta IABP: no    Effusions none    SummaryFindings discussed with surgeon.    Other Findings   BHUMIKA intraop for   CABG under CPB  Pre:  Normal Biventricular function  Baldo and anteroseptal hypokinesis  AV trileaflet, no AI or AS.   Trace central MR, mild TR.  No PFO no intracardiac shunts  No pleural or  pericardial effusions.   Aorta with grade III atherosclerotic disease in descending thoracic and aortic Arch.     Post bypass:  No changes in ventricular function  No valvular changes  No aortic dissection or effusions  Probe removed atraumatically

## 2023-04-14 NOTE — TELEPHONE ENCOUNTER
Triage nurse communicated with Dr. Gomes through secure chat. MD states that the medication requires a prior authorization and that can not be handled tonight. Call back placed to the patient to advise caller on what MD stated. Triage nurse request the patient check her CBG at this time. Caller states CBG is 275 mg/dl. Caller advised that triage nurse will call on call for cardiothoracic surgery at this time. Caller declining and states that she will take  insulin. Caller advised to wait and allow triage nurse to contact the on call for cardiothoracic surgery. Surgery intern paged at this time. Caller declined and stated that she is injecting the  insulin because she does not want her blood glucose to go up any further. Triage nurse awaiting a call back from the surgery intern. Dr. Covington surgery intern returned the call. MD advised on the present situation regarding all documented information with this triage call. Dr. Covington advised that patient does not take anymore  Lantus. Advised for the patient to call back if CBG goes between  350-400 mg/dl before the patient leaves for surgery in the morning. MD states to advise the patient that they will give her insulin in the morning if needed.

## 2023-04-14 NOTE — ASSESSMENT & PLAN NOTE
A1c 6.9    -- endocrinology consulted for insulin management, holding oral hypoglycemics  -- BG goal 110-140   -- hypoglycemia protocol

## 2023-04-15 LAB
ANION GAP SERPL CALC-SCNC: 8 MMOL/L (ref 8–16)
APTT PPP: 22.3 SEC (ref 21–32)
BASOPHILS # BLD AUTO: 0.02 K/UL (ref 0–0.2)
BASOPHILS NFR BLD: 0.1 % (ref 0–1.9)
BUN SERPL-MCNC: 19 MG/DL (ref 8–23)
CALCIUM SERPL-MCNC: 8 MG/DL (ref 8.7–10.5)
CHLORIDE SERPL-SCNC: 106 MMOL/L (ref 95–110)
CO2 SERPL-SCNC: 20 MMOL/L (ref 23–29)
CREAT SERPL-MCNC: 0.6 MG/DL (ref 0.5–1.4)
DIFFERENTIAL METHOD: ABNORMAL
EOSINOPHIL # BLD AUTO: 0 K/UL (ref 0–0.5)
EOSINOPHIL NFR BLD: 0 % (ref 0–8)
ERYTHROCYTE [DISTWIDTH] IN BLOOD BY AUTOMATED COUNT: 13.2 % (ref 11.5–14.5)
EST. GFR  (NO RACE VARIABLE): >60 ML/MIN/1.73 M^2
GLUCOSE SERPL-MCNC: 147 MG/DL (ref 70–110)
GLUCOSE SERPL-MCNC: 187 MG/DL (ref 70–110)
GLUCOSE SERPL-MCNC: 263 MG/DL (ref 70–110)
HCO3 UR-SCNC: 23.9 MMOL/L (ref 24–28)
HCO3 UR-SCNC: 26.5 MMOL/L (ref 24–28)
HCT VFR BLD AUTO: 29.2 % (ref 37–48.5)
HCT VFR BLD CALC: 19 %PCV (ref 36–54)
HCT VFR BLD CALC: 31 %PCV (ref 36–54)
HGB BLD-MCNC: 9.5 G/DL (ref 12–16)
IMM GRANULOCYTES # BLD AUTO: 0.07 K/UL (ref 0–0.04)
IMM GRANULOCYTES NFR BLD AUTO: 0.5 % (ref 0–0.5)
INR PPP: 1 (ref 0.8–1.2)
LYMPHOCYTES # BLD AUTO: 1.4 K/UL (ref 1–4.8)
LYMPHOCYTES NFR BLD: 8.7 % (ref 18–48)
MAGNESIUM SERPL-MCNC: 1.7 MG/DL (ref 1.6–2.6)
MAGNESIUM SERPL-MCNC: 2 MG/DL (ref 1.6–2.6)
MCH RBC QN AUTO: 28.5 PG (ref 27–31)
MCHC RBC AUTO-ENTMCNC: 32.5 G/DL (ref 32–36)
MCV RBC AUTO: 88 FL (ref 82–98)
MONOCYTES # BLD AUTO: 1.2 K/UL (ref 0.3–1)
MONOCYTES NFR BLD: 8 % (ref 4–15)
NEUTROPHILS # BLD AUTO: 12.8 K/UL (ref 1.8–7.7)
NEUTROPHILS NFR BLD: 82.7 % (ref 38–73)
NRBC BLD-RTO: 0 /100 WBC
PCO2 BLDA: 45.3 MMHG (ref 35–45)
PCO2 BLDA: 50.1 MMHG (ref 35–45)
PH SMN: 7.33 [PH] (ref 7.35–7.45)
PH SMN: 7.33 [PH] (ref 7.35–7.45)
PHOSPHATE SERPL-MCNC: 3.8 MG/DL (ref 2.7–4.5)
PLATELET # BLD AUTO: 174 K/UL (ref 150–450)
PMV BLD AUTO: 9.8 FL (ref 9.2–12.9)
PO2 BLDA: 217 MMHG (ref 80–100)
PO2 BLDA: 509 MMHG (ref 80–100)
POC BE: -2 MMOL/L
POC BE: 1 MMOL/L
POC IONIZED CALCIUM: 1.3 MMOL/L (ref 1.06–1.42)
POC IONIZED CALCIUM: 1.43 MMOL/L (ref 1.06–1.42)
POC SATURATED O2: 100 % (ref 95–100)
POC SATURATED O2: 100 % (ref 95–100)
POC TCO2: 25 MMOL/L (ref 23–27)
POC TCO2: 28 MMOL/L (ref 23–27)
POCT GLUCOSE: 155 MG/DL (ref 70–110)
POCT GLUCOSE: 156 MG/DL (ref 70–110)
POCT GLUCOSE: 162 MG/DL (ref 70–110)
POCT GLUCOSE: 174 MG/DL (ref 70–110)
POCT GLUCOSE: 178 MG/DL (ref 70–110)
POCT GLUCOSE: 179 MG/DL (ref 70–110)
POCT GLUCOSE: 183 MG/DL (ref 70–110)
POCT GLUCOSE: 188 MG/DL (ref 70–110)
POCT GLUCOSE: 207 MG/DL (ref 70–110)
POCT GLUCOSE: 211 MG/DL (ref 70–110)
POTASSIUM BLD-SCNC: 3.8 MMOL/L (ref 3.5–5.1)
POTASSIUM BLD-SCNC: 4.3 MMOL/L (ref 3.5–5.1)
POTASSIUM SERPL-SCNC: 4.1 MMOL/L (ref 3.5–5.1)
POTASSIUM SERPL-SCNC: 4.2 MMOL/L (ref 3.5–5.1)
POTASSIUM SERPL-SCNC: 4.3 MMOL/L (ref 3.5–5.1)
PROTHROMBIN TIME: 10.3 SEC (ref 9–12.5)
RBC # BLD AUTO: 3.33 M/UL (ref 4–5.4)
SAMPLE: ABNORMAL
SAMPLE: ABNORMAL
SODIUM BLD-SCNC: 141 MMOL/L (ref 136–145)
SODIUM BLD-SCNC: 141 MMOL/L (ref 136–145)
SODIUM SERPL-SCNC: 134 MMOL/L (ref 136–145)
WBC # BLD AUTO: 15.47 K/UL (ref 3.9–12.7)

## 2023-04-15 PROCEDURE — 25000003 PHARM REV CODE 250: Mod: HCNC | Performed by: PHYSICIAN ASSISTANT

## 2023-04-15 PROCEDURE — 97166 OT EVAL MOD COMPLEX 45 MIN: CPT | Mod: HCNC

## 2023-04-15 PROCEDURE — 63600175 PHARM REV CODE 636 W HCPCS: Mod: HCNC | Performed by: STUDENT IN AN ORGANIZED HEALTH CARE EDUCATION/TRAINING PROGRAM

## 2023-04-15 PROCEDURE — 27000221 HC OXYGEN, UP TO 24 HOURS: Mod: HCNC

## 2023-04-15 PROCEDURE — 96372 THER/PROPH/DIAG INJ SC/IM: CPT | Mod: HCNC

## 2023-04-15 PROCEDURE — 83735 ASSAY OF MAGNESIUM: CPT | Mod: 91,HCNC | Performed by: PHYSICIAN ASSISTANT

## 2023-04-15 PROCEDURE — 25000003 PHARM REV CODE 250: Mod: HCNC | Performed by: STUDENT IN AN ORGANIZED HEALTH CARE EDUCATION/TRAINING PROGRAM

## 2023-04-15 PROCEDURE — 25000242 PHARM REV CODE 250 ALT 637 W/ HCPCS: Mod: HCNC | Performed by: PHYSICIAN ASSISTANT

## 2023-04-15 PROCEDURE — 99232 SBSQ HOSP IP/OBS MODERATE 35: CPT | Mod: HCNC,,, | Performed by: INTERNAL MEDICINE

## 2023-04-15 PROCEDURE — 94761 N-INVAS EAR/PLS OXIMETRY MLT: CPT | Mod: HCNC

## 2023-04-15 PROCEDURE — 99232 PR SUBSEQUENT HOSPITAL CARE,LEVL II: ICD-10-PCS | Mod: HCNC,,, | Performed by: INTERNAL MEDICINE

## 2023-04-15 PROCEDURE — 99900035 HC TECH TIME PER 15 MIN (STAT): Mod: HCNC

## 2023-04-15 PROCEDURE — 85610 PROTHROMBIN TIME: CPT | Mod: HCNC | Performed by: PHYSICIAN ASSISTANT

## 2023-04-15 PROCEDURE — 84100 ASSAY OF PHOSPHORUS: CPT | Mod: HCNC | Performed by: PHYSICIAN ASSISTANT

## 2023-04-15 PROCEDURE — 94640 AIRWAY INHALATION TREATMENT: CPT | Mod: HCNC

## 2023-04-15 PROCEDURE — 99499 NO LOS: ICD-10-PCS | Mod: HCNC,GC,, | Performed by: ANESTHESIOLOGY

## 2023-04-15 PROCEDURE — 63600175 PHARM REV CODE 636 W HCPCS: Mod: HCNC | Performed by: PHYSICIAN ASSISTANT

## 2023-04-15 PROCEDURE — 20000000 HC ICU ROOM: Mod: HCNC

## 2023-04-15 PROCEDURE — 85730 THROMBOPLASTIN TIME PARTIAL: CPT | Mod: HCNC | Performed by: PHYSICIAN ASSISTANT

## 2023-04-15 PROCEDURE — 85025 COMPLETE CBC W/AUTO DIFF WBC: CPT | Mod: HCNC | Performed by: PHYSICIAN ASSISTANT

## 2023-04-15 PROCEDURE — 99499 UNLISTED E&M SERVICE: CPT | Mod: HCNC,GC,, | Performed by: ANESTHESIOLOGY

## 2023-04-15 PROCEDURE — 80048 BASIC METABOLIC PNL TOTAL CA: CPT | Mod: HCNC | Performed by: THORACIC SURGERY (CARDIOTHORACIC VASCULAR SURGERY)

## 2023-04-15 PROCEDURE — 84132 ASSAY OF SERUM POTASSIUM: CPT | Mod: 91,HCNC | Performed by: PHYSICIAN ASSISTANT

## 2023-04-15 PROCEDURE — 25000003 PHARM REV CODE 250: Mod: HCNC

## 2023-04-15 RX ORDER — PROCHLORPERAZINE EDISYLATE 5 MG/ML
5 INJECTION INTRAMUSCULAR; INTRAVENOUS EVERY 6 HOURS PRN
Status: DISCONTINUED | OUTPATIENT
Start: 2023-04-15 | End: 2023-04-22 | Stop reason: HOSPADM

## 2023-04-15 RX ORDER — FAMOTIDINE 20 MG/1
20 TABLET, FILM COATED ORAL 2 TIMES DAILY
Status: DISCONTINUED | OUTPATIENT
Start: 2023-04-15 | End: 2023-04-15

## 2023-04-15 RX ORDER — CLOPIDOGREL BISULFATE 75 MG/1
75 TABLET ORAL DAILY
Status: DISCONTINUED | OUTPATIENT
Start: 2023-04-15 | End: 2023-04-21

## 2023-04-15 RX ORDER — SUCRALFATE 1 G/1
1 TABLET ORAL
Status: DISCONTINUED | OUTPATIENT
Start: 2023-04-15 | End: 2023-04-21

## 2023-04-15 RX ORDER — HYDROCODONE BITARTRATE AND ACETAMINOPHEN 10; 325 MG/1; MG/1
1 TABLET ORAL EVERY 4 HOURS PRN
Status: DISCONTINUED | OUTPATIENT
Start: 2023-04-15 | End: 2023-04-19

## 2023-04-15 RX ORDER — FUROSEMIDE 10 MG/ML
20 INJECTION INTRAMUSCULAR; INTRAVENOUS 2 TIMES DAILY
Status: DISCONTINUED | OUTPATIENT
Start: 2023-04-15 | End: 2023-04-15

## 2023-04-15 RX ORDER — IBUPROFEN 200 MG
1 TABLET ORAL DAILY PRN
Status: DISCONTINUED | OUTPATIENT
Start: 2023-04-15 | End: 2023-04-22 | Stop reason: HOSPADM

## 2023-04-15 RX ORDER — INSULIN ASPART 100 [IU]/ML
0-5 INJECTION, SOLUTION INTRAVENOUS; SUBCUTANEOUS
Status: DISCONTINUED | OUTPATIENT
Start: 2023-04-15 | End: 2023-04-22 | Stop reason: HOSPADM

## 2023-04-15 RX ORDER — FLUTICASONE PROPIONATE 50 MCG
1 SPRAY, SUSPENSION (ML) NASAL DAILY
Status: DISCONTINUED | OUTPATIENT
Start: 2023-04-15 | End: 2023-04-22 | Stop reason: HOSPADM

## 2023-04-15 RX ORDER — GLUCAGON 1 MG
1 KIT INJECTION
Status: DISCONTINUED | OUTPATIENT
Start: 2023-04-15 | End: 2023-04-17

## 2023-04-15 RX ORDER — INSULIN ASPART 100 [IU]/ML
3-5 INJECTION, SOLUTION INTRAVENOUS; SUBCUTANEOUS
Status: DISCONTINUED | OUTPATIENT
Start: 2023-04-15 | End: 2023-04-15

## 2023-04-15 RX ORDER — SERTRALINE HYDROCHLORIDE 50 MG/1
50 TABLET, FILM COATED ORAL DAILY
Status: DISCONTINUED | OUTPATIENT
Start: 2023-04-15 | End: 2023-04-17

## 2023-04-15 RX ORDER — METOPROLOL TARTRATE 25 MG/1
12.5 TABLET ORAL 2 TIMES DAILY
Status: DISCONTINUED | OUTPATIENT
Start: 2023-04-15 | End: 2023-04-17

## 2023-04-15 RX ORDER — GABAPENTIN 300 MG/1
300 CAPSULE ORAL 3 TIMES DAILY
Status: DISCONTINUED | OUTPATIENT
Start: 2023-04-15 | End: 2023-04-21

## 2023-04-15 RX ORDER — PANTOPRAZOLE SODIUM 40 MG/1
40 TABLET, DELAYED RELEASE ORAL
Status: DISCONTINUED | OUTPATIENT
Start: 2023-04-15 | End: 2023-04-22 | Stop reason: HOSPADM

## 2023-04-15 RX ORDER — DEXTROSE 40 %
15 GEL (GRAM) ORAL
Status: DISCONTINUED | OUTPATIENT
Start: 2023-04-15 | End: 2023-04-17

## 2023-04-15 RX ORDER — DEXTROSE 40 %
30 GEL (GRAM) ORAL
Status: DISCONTINUED | OUTPATIENT
Start: 2023-04-15 | End: 2023-04-17

## 2023-04-15 RX ORDER — HYDROCODONE BITARTRATE AND ACETAMINOPHEN 5; 325 MG/1; MG/1
1 TABLET ORAL EVERY 4 HOURS PRN
Status: DISCONTINUED | OUTPATIENT
Start: 2023-04-15 | End: 2023-04-19

## 2023-04-15 RX ORDER — ONDANSETRON 2 MG/ML
4 INJECTION INTRAMUSCULAR; INTRAVENOUS ONCE
Status: COMPLETED | OUTPATIENT
Start: 2023-04-15 | End: 2023-04-15

## 2023-04-15 RX ADMIN — ACETAMINOPHEN 1000 MG: 500 TABLET ORAL at 06:04

## 2023-04-15 RX ADMIN — POLYETHYLENE GLYCOL 3350 17 G: 17 POWDER, FOR SOLUTION ORAL at 08:04

## 2023-04-15 RX ADMIN — IPRATROPIUM BROMIDE AND ALBUTEROL SULFATE 3 ML: .5; 3 SOLUTION RESPIRATORY (INHALATION) at 04:04

## 2023-04-15 RX ADMIN — GABAPENTIN 300 MG: 300 CAPSULE ORAL at 08:04

## 2023-04-15 RX ADMIN — INSULIN ASPART 1 UNITS: 100 INJECTION, SOLUTION INTRAVENOUS; SUBCUTANEOUS at 12:04

## 2023-04-15 RX ADMIN — MUPIROCIN 1 G: 20 OINTMENT TOPICAL at 08:04

## 2023-04-15 RX ADMIN — INSULIN ASPART 1 UNITS: 100 INJECTION, SOLUTION INTRAVENOUS; SUBCUTANEOUS at 08:04

## 2023-04-15 RX ADMIN — METOPROLOL TARTRATE 12.5 MG: 25 TABLET, FILM COATED ORAL at 08:04

## 2023-04-15 RX ADMIN — INSULIN HUMAN 1 UNITS/HR: 1 INJECTION, SOLUTION INTRAVENOUS at 08:04

## 2023-04-15 RX ADMIN — PANTOPRAZOLE SODIUM 40 MG: 40 TABLET, DELAYED RELEASE ORAL at 08:04

## 2023-04-15 RX ADMIN — OXYCODONE HYDROCHLORIDE 5 MG: 5 TABLET ORAL at 04:04

## 2023-04-15 RX ADMIN — ATORVASTATIN CALCIUM 80 MG: 40 TABLET, FILM COATED ORAL at 08:04

## 2023-04-15 RX ADMIN — HYDROCODONE BITARTRATE AND ACETAMINOPHEN 1 TABLET: 10; 325 TABLET ORAL at 12:04

## 2023-04-15 RX ADMIN — CLOPIDOGREL BISULFATE 75 MG: 75 TABLET ORAL at 08:04

## 2023-04-15 RX ADMIN — HYDROCODONE BITARTRATE AND ACETAMINOPHEN 1 TABLET: 10; 325 TABLET ORAL at 08:04

## 2023-04-15 RX ADMIN — ASPIRIN 325 MG ORAL TABLET 325 MG: 325 PILL ORAL at 08:04

## 2023-04-15 RX ADMIN — PROCHLORPERAZINE EDISYLATE 5 MG: 5 INJECTION INTRAMUSCULAR; INTRAVENOUS at 05:04

## 2023-04-15 RX ADMIN — CEFAZOLIN 2 G: 2 INJECTION, POWDER, FOR SOLUTION INTRAMUSCULAR; INTRAVENOUS at 04:04

## 2023-04-15 RX ADMIN — MAGNESIUM SULFATE 2 G: 2 INJECTION INTRAVENOUS at 05:04

## 2023-04-15 RX ADMIN — CEFAZOLIN 2 G: 2 INJECTION, POWDER, FOR SOLUTION INTRAMUSCULAR; INTRAVENOUS at 07:04

## 2023-04-15 RX ADMIN — GABAPENTIN 300 MG: 300 CAPSULE ORAL at 02:04

## 2023-04-15 RX ADMIN — SERTRALINE HYDROCHLORIDE 50 MG: 50 TABLET ORAL at 08:04

## 2023-04-15 RX ADMIN — SUCRALFATE 1 G: 1 TABLET ORAL at 12:04

## 2023-04-15 RX ADMIN — ONDANSETRON 4 MG: 2 INJECTION INTRAMUSCULAR; INTRAVENOUS at 08:04

## 2023-04-15 RX ADMIN — INSULIN ASPART 1 UNITS: 100 INJECTION, SOLUTION INTRAVENOUS; SUBCUTANEOUS at 05:04

## 2023-04-15 RX ADMIN — ACETAMINOPHEN 1000 MG: 500 TABLET ORAL at 10:04

## 2023-04-15 RX ADMIN — IPRATROPIUM BROMIDE AND ALBUTEROL SULFATE 3 ML: .5; 3 SOLUTION RESPIRATORY (INHALATION) at 12:04

## 2023-04-15 RX ADMIN — OXYCODONE HYDROCHLORIDE 10 MG: 10 TABLET ORAL at 12:04

## 2023-04-15 RX ADMIN — LIDOCAINE 1 PATCH: 50 PATCH TOPICAL at 03:04

## 2023-04-15 RX ADMIN — CEFAZOLIN 2 G: 2 INJECTION, POWDER, FOR SOLUTION INTRAMUSCULAR; INTRAVENOUS at 12:04

## 2023-04-15 RX ADMIN — ACETAMINOPHEN 1000 MG: 500 TABLET ORAL at 02:04

## 2023-04-15 RX ADMIN — ONDANSETRON 4 MG: 2 INJECTION INTRAMUSCULAR; INTRAVENOUS at 03:04

## 2023-04-15 RX ADMIN — SENNOSIDES AND DOCUSATE SODIUM 1 TABLET: 8.6; 5 TABLET ORAL at 08:04

## 2023-04-15 RX ADMIN — SUCRALFATE 1 G: 1 TABLET ORAL at 05:04

## 2023-04-15 NOTE — HPI
Reason for Consult: Management of T2DM, Hyperglycemia     Surgical Procedure and Date: 04/13/2023 - CAD s/p CABGx2 with Dr. Kahn     Diabetes diagnosis year: 15-20 years ago    Home Diabetes Medications:    Glipizide 10 mg once daily with breakfast (listed as BID)  Basaglar 10 units daily (Patient states she has Levemir and only uses around 6 units on as needed basis)  Jardiance 10 mg daily  Metformin 1000 mg BID  Tradjenta 5 mg daily    How often checking glucose at home? 1-3 x day   BG readings on regimen: 110-150s  Hypoglycemia on the regimen?  No  Missed doses on regimen?  No    Diabetes Complications include:     Hyperglycemia    Complicating diabetes co morbidities:   History of CVA, CAD      HPI:   Patient is a 63 y.o. female with a past medical history of type 2 diabetes mellitus, prior CVA (on DAPT), HTN., HLD, tobacco abuse and CAD.  Initially presented with chest pain while cleaning her house.  She went to the emergency department and was admitted with PET stress which was positive, and follow-up coronary angiography demonstrated 2 vessel disease (now status post CABG x 2).  She is on multi drug regimen at home for diabetes mellitus and has good A1c/glucose control based on labs.  Endocrinology consulted for BG management while hospitalized.    Lab Results   Component Value Date    HGBA1C 6.9 (H) 04/11/2023

## 2023-04-15 NOTE — PROGRESS NOTES
[facility]  Critical Care - Surgery  Progress Note    Patient Name: Jud Villa  MRN: [id number]  Admission Date: 4/14/2023  Hospital Length of Stay: 1 days  Code Status: Full Code  Attending Provider: Igor Kahn MD  Primary Care Provider: Bo Lopez MD   Principal Problem: Coronary artery disease    Subjective:     Hospital/ICU Course:  Patient admitted to SICU, extubated, following CABG. Apneic upon arrival, improved with narcan. Started on ASA and statin on POD0. POD1 started lopressor, diet advanced.      Interval History/Significant Events: Patient seen and examined, complaining of nausea and pain, Denies chest pain, dyspnea, dizziness, fever, chills, or any further symptoms at this time.      Follow-up For: Procedure(s) (LRB):  CORONARY ARTERY BYPASS GRAFT (CABG) x 2 (N/A)  SURGICAL PROCUREMENT, VEIN, ENDOSCOPIC (Left)    Post-Operative Day: 1 Day Post-Op    Objective:     Vital Signs (Most Recent):  Temp: 99.3 °F (37.4 °C) (04/15/23 0745)  Pulse: 80 (04/15/23 0745)  Resp: 15 (04/15/23 0745)  BP: (!) 104/56 (04/15/23 0700)  SpO2: (!) 94 % (04/15/23 0745)   Vital Signs (24h Range):  Temp:  [95.2 °F (35.1 °C)-100.2 °F (37.9 °C)] 99.3 °F (37.4 °C)  Pulse:  [62-91] 80  Resp:  [11-29] 15  SpO2:  [86 %-100 %] 94 %  BP: (104-162)/(56-75) 104/56  Arterial Line BP: (101-167)/(42-74) 110/44     Weight: 67 kg (147 lb 11.3 oz)  Body mass index is 27.02 kg/m².      Intake/Output Summary (Last 24 hours) at 4/15/2023 0818  Last data filed at 4/15/2023 0708  Gross per 24 hour   Intake 3769.81 ml   Output 2077 ml   Net 1692.81 ml       Physical Exam  Vitals and nursing note reviewed.   Constitutional:       Appearance: Normal appearance.   HENT:      Mouth/Throat:      Mouth: Mucous membranes are moist.      Pharynx: Oropharynx is clear. No oropharyngeal exudate.   Eyes:      Conjunctiva/sclera: Conjunctivae normal.      Pupils: Pupils are equal, round, and reactive to light.    Cardiovascular:      Rate and Rhythm: Normal rate and regular rhythm.      Pulses: Normal pulses.      Heart sounds: Normal heart sounds. No murmur heard.     Comments: Midling sternotomy c/d/I with chest tubes with serosanguinous drainage  Pulmonary:      Effort: Pulmonary effort is normal.      Breath sounds: Normal breath sounds.   Abdominal:      General: Abdomen is flat. There is no distension.      Palpations: Abdomen is soft.      Tenderness: There is no abdominal tenderness.   Skin:     General: Skin is warm and dry.      Coloration: Skin is not pale.   Neurological:      General: No focal deficit present.      Mental Status: She is alert and oriented to person, place, and time.   Psychiatric:         Mood and Affect: Mood normal.         Behavior: Behavior normal.         Lines/Drains/Airways       Central Venous Catheter Line  Duration                  Percutaneous Central Line Insertion/Assessment - Quad lumen  04/14/23 0800 Subclavian Left 1 day    Percutaneous Central Line Insertion/Assessment - Quad Lumen  04/14/23 0800 Subclavian Left 1 day              Drain  Duration                  Urethral Catheter 04/14/23 0735 Non-latex;Straight-tip;Temperature probe 14 Fr. 1 day         Y Chest Tube 1 and 2 04/14/23 1115 1 Anterior Mediastinal 19 Fr. 2 Anterior Mediastinal 19 Fr. <1 day         Y Chest Tube 3 and 4 04/14/23 1115 3 Right Pleural 19 Fr. 4 Left Pleural 19 Fr. <1 day              Arterial Line  Duration             Arterial Line 04/14/23 0759 Right Radial 1 day              Line  Duration                  Pacer Wires 04/14/23 1139 <1 day              Peripheral Intravenous Line  Duration                  Peripheral IV - Single Lumen 04/14/23 1501 20 G Anterior;Proximal;Right Forearm <1 day                    Significant Labs:    CBC/Anemia Profile:  Recent Labs   Lab 04/14/23  1249 04/14/23  1422 04/14/23  2223 04/15/23  0258   WBC 26.78*  --   --  15.47*   HGB 9.2*  --   --  9.5*   HCT 27.8*  35* 29* 29.2*     --   --  174   MCV 89  --   --  88   RDW 13.1  --   --  13.2        Chemistries:  Recent Labs   Lab 04/14/23  1249 04/14/23  1918 04/15/23  0258 04/15/23  0504     --   --  134*   K 3.8 4.3  --  4.1   *  --   --  106   CO2 21*  --   --  20*   BUN 25*  --   --  19   CREATININE 0.6  --   --  0.6   CALCIUM 7.8*  --   --  8.0*   ALBUMIN 2.2*  --   --   --    PROT 4.1*  --   --   --    BILITOT 0.3  --   --   --    ALKPHOS 59  --   --   --    ALT 62*  --   --   --    *  --   --   --    MG 2.4 1.8 1.7  --    PHOS 3.1  --  3.8  --        A1C: No results for input(s): HGBA1C in the last 48 hours.  ABGs:   Recent Labs   Lab 04/14/23  2223   PH 7.357   PCO2 37.2   HCO3 20.8*   POCSATURATED 91*   BE -5     CMP:   Recent Labs   Lab 04/14/23  1249 04/14/23  1918 04/15/23  0504     --  134*   K 3.8 4.3 4.1   *  --  106   CO2 21*  --  20*   *  --  187*   BUN 25*  --  19   CREATININE 0.6  --  0.6   CALCIUM 7.8*  --  8.0*   PROT 4.1*  --   --    ALBUMIN 2.2*  --   --    BILITOT 0.3  --   --    ALKPHOS 59  --   --    *  --   --    ALT 62*  --   --    ANIONGAP 7*  --  8     Coagulation:   Recent Labs   Lab 04/15/23  0258   INR 1.0   APTT 22.3     Lactic Acid: No results for input(s): LACTATE in the last 48 hours.  All pertinent labs within the past 24 hours have been reviewed.    Significant Imaging:  I have reviewed all pertinent imaging results/findings within the past 24 hours.    Assessment/Plan:     Cardiac/Vascular  * Coronary artery disease    Neuro/Psych:     - Hx CVA this year   - Pain:    - Scheduled Tylenol 1g q8h   - dilaudid PRN, Oxy PRN             Cardiac:     - S/P CABGx2 (LIMA-LAD, SVG-OM) with Dr. Kahn on 4/14/2023    - BP Goal: MAP 60-80, SBP < 140     - off pressors and cleviprex  - continue ASA and statin  - start lopressor 12.5mg bid   - start home plavix (hx of CVA)    - Anti-HTNs: Will start when appropriate    - Rhythm: NSR       Pulmonary:     - Goal SpO2 >92%    - Chest Tubes x 4 (2 Meds & 2 Pleural)    - ABGs PRN      Renal:    - Trend BUN/Cr     - Maintain Yang, record strict Is/Os      FEN / GI:     - Daily CMP, PRN K/Mag/Phos per protocol   - zofran and compazine for nausea, daily ekgs to monitor prolonged qtc    - Replace electrolytes as needed    - Nutrition: NPO pending swallow evaluation     - Bowel Regimen: Miralax, docusate      ID:     - Afebrile    - WBC stable    - Abx: Complete perioperative cefazolin 2g Q8H x 5 doses    Recent Labs   Lab 04/11/23  1009 04/14/23  1249 04/15/23  0258   WBC 8.61 26.78* 15.47*         Heme/Onc:     - Hgb 13.7 pre-operatively    - CBC daily     Endocrine:     - CTS Goal -140    - HgbA1c: 6.9    - Endocrinology consulted for insulin management      PPx:   Feeding: ADAT  Analgesia/Sedation: multimodal   Thromboembolic Prevention: SCDs  HOB >30: Yes  Stress Ulcer: pepcid  Glucose Control: Yes, insulin management per Endocrinology     Lines/Drains/Airway:   Right radial arterial line   Lft subclavian CVC   Yang   Chest Tubes: 4 (2 Mediastinal, 2 Pleural)    Pacing Wires: Temporary ventricular pacing wires      Dispo/Code Status/Palliative:     - Continue SICU Care    - Full Code        Critical secondary to Patient has a condition that poses threat to life and bodily function: post-CABG     Critical care was time spent personally by me on the following activities: development of treatment plan with patient or surrogate and bedside caregivers, discussions with consultants, evaluation of patient's response to treatment, examination of patient, ordering and performing treatments and interventions, ordering and review of laboratory studies, ordering and review of radiographic studies, pulse oximetry, re-evaluation of patient's condition.  This critical care time did not overlap with that of any other provider or involve time for any procedures.     Sania Agrawal MD   PGY3  Critical Care  - Surgery  [facility] - [facility]

## 2023-04-15 NOTE — SUBJECTIVE & OBJECTIVE
Interval HPI:   Overnight events:  Blood glucose slightly above goal this morning while on intensive drip overnight.  Diet started yesterday afternoon/evening.  Adjustment to insulin planned this AM.    Eating:   Bariatric sugar free clear diet    PMH, PSH, FH, SH updated and reviewed     ROS:  As above    Current Medications and/or Treatments Impacting Glycemic Control  Immunotherapy:    Immunosuppressants       None          Steroids:   Hormones (From admission, onward)      None          Pressors:    Autonomic Drugs (From admission, onward)      Start     Stop Route Frequency Ordered    04/15/23 0900  metoprolol tartrate (LOPRESSOR) split tablet 12.5 mg         -- Oral 2 times daily 04/15/23 0603          Hyperglycemia/Diabetes Medications:   Antihyperglycemics (From admission, onward)      Start     Stop Route Frequency Ordered    04/15/23 0745  insulin regular in 0.9 % NaCl 100 unit/100 mL (1 unit/mL) infusion        Question:  Enter initial dose (Units/hr):  Answer:  1    -- IV Continuous 04/15/23 0645    04/15/23 0743  insulin aspart U-100 pen 0-5 Units         -- SubQ As needed (PRN) 04/15/23 0645             PHYSICAL EXAMINATION:  Vitals:    04/15/23 0630   BP:    Pulse: 88   Resp: (!) 22   Temp: 100 °F (37.8 °C)     Body mass index is 27.02 kg/m².    Physical Exam  Vitals reviewed.   Constitutional:       Appearance: Normal appearance.   HENT:      Head: Normocephalic and atraumatic.   Eyes:      Extraocular Movements: Extraocular movements intact.   Cardiovascular:      Rate and Rhythm: Normal rate.   Pulmonary:      Effort: Pulmonary effort is normal.   Neurological:      Mental Status: She is alert.   Psychiatric:         Mood and Affect: Mood normal.         Behavior: Behavior normal.

## 2023-04-15 NOTE — ASSESSMENT & PLAN NOTE
CAD s/p CABGx2 with Dr. Kahn on 4/13/23  Will target tight glycemic control given cardiac surgery

## 2023-04-15 NOTE — PLAN OF CARE
"      SICU PLAN OF CARE NOTE    Dx: Coronary artery disease    Shift Events: OOBTC. Diet held to sips and ice chips only d/t nausea, tx with PRNs. Insulin gtt transition to AC/HS    Goals of Care: MAP 60-80, SBP <140    Neuro: AAO x4, Follows Commands, and Moves All Extremities    Vital Signs: BP (!) 113/57 (BP Location: Right arm, Patient Position: Lying)   Pulse 67   Temp 99.1 °F (37.3 °C) (Core Bladder)   Resp 14   Ht 5' 2" (1.575 m)   Wt 67 kg (147 lb 11.3 oz)   SpO2 95%   Breastfeeding No   BMI 27.02 kg/m²     Respiratory: Room Air    Diet: Sips with Meds    Gtts: Insulin    Urine Output: Urinary Catheter 40 cc/hour (470 ml/shift)    Drains: Chest Tube, total output 240 cc /  shift    Cardiac: NSR     Labs/Accuchecks: Daily labs. Accuchecks AC/HS         "

## 2023-04-15 NOTE — ASSESSMENT & PLAN NOTE
Hyperglycemia from type 2 diabetes mellitus and stress response s/p CABG, steroids given intra op also contributing to hyperglycemia     Endocrinology consulted for BG management.   BG goal 110-140 (CTS Protocol)      - Intensive insulin drip after surgery per CTS guidelines, plans to adjust today   - Hypoglycemia protocol in place  - If blood glucose greater than 300 and diet ordered, please ask patient not to eat food or drink anything other than water until correctional insulin has brought it back below 250    - Will order transition insulin drip (1 u/hr)  this morning with start of low dose sliding scale insulin before meals and at bedtime.  If diet is advanced beyond bariatric sugar free liquid diet then additional prandial insulin will be needed.  For now transition drip and sliding scale alone with POCT adjusted to ACHS.     ** Please notify Endocrine for any change and/or advance in diet**  ** Please call Endocrine for any BG related issues **

## 2023-04-15 NOTE — HOSPITAL COURSE
Patient admitted to SICU, extubated, following CABG. Apneic upon arrival, improved with narcan. Started on ASA and statin on POD0. POD1 started lopressor, antiemetics with daily ekg, transitioned oxy to norco with improvement in nausea. POD2 mildly febrile with leukocytosis, bazzi removed and UA obtained, lasix started, advance diet as tolerated.

## 2023-04-15 NOTE — CONSULTS
[facility]  Endocrinology Consult Note    Consult Requested by: Igor Kahn MD   Reason for admit: Coronary artery disease    HISTORY OF PRESENT ILLNESS:  Reason for Consult: Management of T2DM, Hyperglycemia     Surgical Procedure and Date: 04/13/2023 - CAD s/p CABGx2 with Dr. Kahn     Diabetes diagnosis year: 15-20 years ago    Home Diabetes Medications:    Glipizide 10 mg once daily with breakfast (listed as BID)  Basaglar 10 units daily (Patient states she has Levemir and only uses around 6 units on as needed basis)  Jardiance 10 mg daily  Metformin 1000 mg BID  Tradjenta 5 mg daily    How often checking glucose at home? 1-3 x day   BG readings on regimen: 110-150s  Hypoglycemia on the regimen?  No  Missed doses on regimen?  No    Diabetes Complications include:     Hyperglycemia    Complicating diabetes co morbidities:   History of CVA, CAD      HPI:   Patient is a 63 y.o. female with a past medical history of type 2 diabetes mellitus, prior CVA (on DAPT), HTN., HLD, tobacco abuse and CAD.  Initially presented with chest pain while cleaning her house.  She went to the emergency department and was admitted with PET stress which was positive, and follow-up coronary angiography demonstrated 2 vessel disease (now status post CABG x 2).  She is on multi drug regimen at home for diabetes mellitus and has good A1c/glucose control based on labs.  Endocrinology consulted for BG management while hospitalized.    Lab Results   Component Value Date    HGBA1C 6.9 (H) 04/11/2023             Interval HPI:   Overnight events:  Blood glucose slightly above goal this morning while on intensive drip overnight.  Diet started yesterday afternoon/evening.  Adjustment to insulin planned this AM.    Eating:   Bariatric sugar free clear diet    PMH, PSH, FH, SH updated and reviewed     ROS:  As above    Current Medications and/or Treatments Impacting Glycemic Control  Immunotherapy:     Immunosuppressants       None          Steroids:   Hormones (From admission, onward)      None          Pressors:    Autonomic Drugs (From admission, onward)      Start     Stop Route Frequency Ordered    04/15/23 0900  metoprolol tartrate (LOPRESSOR) split tablet 12.5 mg         -- Oral 2 times daily 04/15/23 0603          Hyperglycemia/Diabetes Medications:   Antihyperglycemics (From admission, onward)      Start     Stop Route Frequency Ordered    04/15/23 0745  insulin regular in 0.9 % NaCl 100 unit/100 mL (1 unit/mL) infusion        Question:  Enter initial dose (Units/hr):  Answer:  1    -- IV Continuous 04/15/23 0645    04/15/23 0743  insulin aspart U-100 pen 0-5 Units         -- SubQ As needed (PRN) 04/15/23 0645             PHYSICAL EXAMINATION:  Vitals:    04/15/23 0630   BP:    Pulse: 88   Resp: (!) 22   Temp: 100 °F (37.8 °C)     Body mass index is 27.02 kg/m².    Physical Exam  Vitals reviewed.   Constitutional:       Appearance: Normal appearance.   HENT:      Head: Normocephalic and atraumatic.   Eyes:      Extraocular Movements: Extraocular movements intact.   Cardiovascular:      Rate and Rhythm: Normal rate.   Pulmonary:      Effort: Pulmonary effort is normal.   Neurological:      Mental Status: She is alert.   Psychiatric:         Mood and Affect: Mood normal.         Behavior: Behavior normal.       Labs Reviewed and Include   Recent Labs   Lab 04/14/23  1249 04/14/23  1918 04/15/23  0504 04/15/23  0920   *  --  187*  --    CALCIUM 7.8*  --  8.0*  --    ALBUMIN 2.2*  --   --   --    PROT 4.1*  --   --   --      --  134*  --    K 3.8   < > 4.1 4.3   CO2 21*  --  20*  --    *  --  106  --    BUN 25*  --  19  --    CREATININE 0.6  --  0.6  --    ALKPHOS 59  --   --   --    ALT 62*  --   --   --    *  --   --   --    BILITOT 0.3  --   --   --     < > = values in this interval not displayed.     Lab Results   Component Value Date    WBC 15.47 (H) 04/15/2023    HGB 9.5 (L)  04/15/2023    HCT 29.2 (L) 04/15/2023    MCV 88 04/15/2023     04/15/2023     No results for input(s): TSH, FREET4 in the last 168 hours.  Lab Results   Component Value Date    HGBA1C 6.9 (H) 04/11/2023       Nutritional status:   Body mass index is 27.02 kg/m².  Lab Results   Component Value Date    ALBUMIN 2.2 (L) 04/14/2023    ALBUMIN 3.8 04/11/2023    ALBUMIN 3.3 (L) 04/06/2023     No results found for: PREALBUMIN    Estimated Creatinine Clearance: 84 mL/min (based on SCr of 0.6 mg/dL).    Accu-Checks  Recent Labs     04/14/23  2317 04/15/23  0005 04/15/23  0104 04/15/23  0105 04/15/23  0211 04/15/23  0303 04/15/23  0412 04/15/23  0509 04/15/23  0634 04/15/23  0818   POCTGLUCOSE 184* 188* 211* 207* 179* 178* 155* 188* 174* 162*        ASSESSMENT and PLAN    Neuro  History of CVA (cerebrovascular accident)   Continues on Atorvastatin 80 mg       Cardiac/Vascular  * Coronary artery disease  CAD s/p CABGx2 with Dr. Kahn on 4/13/23  Will target tight glycemic control given cardiac surgery      Endocrine  Controlled type 2 diabetes mellitus with complication, with long-term current use of insulin  Hyperglycemia from type 2 diabetes mellitus and stress response s/p CABG, steroids given intra op also contributing to hyperglycemia     Endocrinology consulted for BG management.   BG goal 110-140 (CTS Protocol)      - Intensive insulin drip after surgery per CTS guidelines, plans to adjust today   - Hypoglycemia protocol in place  - If blood glucose greater than 300 and diet ordered, please ask patient not to eat food or drink anything other than water until correctional insulin has brought it back below 250    - Will order transition insulin drip (1 u/hr)  this morning with start of low dose sliding scale insulin before meals and at bedtime.  If diet is advanced beyond bariatric sugar free liquid diet then additional prandial insulin will be needed.  For now transition drip and sliding scale alone with POCT  adjusted to ACHS.     ** Please notify Endocrine for any change and/or advance in diet**  ** Please call Endocrine for any BG related issues **          Kenyon Whittaker, DO  Endocrinology

## 2023-04-15 NOTE — PROGRESS NOTES
Cardiac Surgery Progress Note     ALEX overnight  Nausea this AM     Gtts  -     A/P  1 Day Post-Op s/p CABG     ASA/plavix,statin  BB  Nausea meds  Ice chips  OOBTC  Continue chest tubes    Sania Cortes MD, PGY-7  Cardiothoracic Surgery   [phone]

## 2023-04-15 NOTE — SUBJECTIVE & OBJECTIVE
Interval History/Significant Events: Patient seen and examined, complaining of nausea and pain, Denies chest pain, dyspnea, dizziness, fever, chills, or any further symptoms at this time.      Follow-up For: Procedure(s) (LRB):  CORONARY ARTERY BYPASS GRAFT (CABG) x 2 (N/A)  SURGICAL PROCUREMENT, VEIN, ENDOSCOPIC (Left)    Post-Operative Day: 1 Day Post-Op    Objective:     Vital Signs (Most Recent):  Temp: 99.3 °F (37.4 °C) (04/15/23 0745)  Pulse: 80 (04/15/23 0745)  Resp: 15 (04/15/23 0745)  BP: (!) 104/56 (04/15/23 0700)  SpO2: (!) 94 % (04/15/23 0745)   Vital Signs (24h Range):  Temp:  [95.2 °F (35.1 °C)-100.2 °F (37.9 °C)] 99.3 °F (37.4 °C)  Pulse:  [62-91] 80  Resp:  [11-29] 15  SpO2:  [86 %-100 %] 94 %  BP: (104-162)/(56-75) 104/56  Arterial Line BP: (101-167)/(42-74) 110/44     Weight: 67 kg (147 lb 11.3 oz)  Body mass index is 27.02 kg/m².      Intake/Output Summary (Last 24 hours) at 4/15/2023 0818  Last data filed at 4/15/2023 0708  Gross per 24 hour   Intake 3769.81 ml   Output 2077 ml   Net 1692.81 ml       Physical Exam  Vitals and nursing note reviewed.   Constitutional:       Appearance: Normal appearance.   HENT:      Mouth/Throat:      Mouth: Mucous membranes are moist.      Pharynx: Oropharynx is clear. No oropharyngeal exudate.   Eyes:      Conjunctiva/sclera: Conjunctivae normal.      Pupils: Pupils are equal, round, and reactive to light.   Cardiovascular:      Rate and Rhythm: Normal rate and regular rhythm.      Pulses: Normal pulses.      Heart sounds: Normal heart sounds. No murmur heard.     Comments: Midling sternotomy c/d/I with chest tubes with serosanguinous drainage  Pulmonary:      Effort: Pulmonary effort is normal.      Breath sounds: Normal breath sounds.   Abdominal:      General: Abdomen is flat. There is no distension.      Palpations: Abdomen is soft.      Tenderness: There is no abdominal tenderness.   Skin:     General: Skin is warm and dry.      Coloration: Skin is not  pale.   Neurological:      General: No focal deficit present.      Mental Status: She is alert and oriented to person, place, and time.   Psychiatric:         Mood and Affect: Mood normal.         Behavior: Behavior normal.         Lines/Drains/Airways       Central Venous Catheter Line  Duration                  Percutaneous Central Line Insertion/Assessment - Quad lumen  04/14/23 0800 Subclavian Left 1 day    Percutaneous Central Line Insertion/Assessment - Quad Lumen  04/14/23 0800 Subclavian Left 1 day              Drain  Duration                  Urethral Catheter 04/14/23 0735 Non-latex;Straight-tip;Temperature probe 14 Fr. 1 day         Y Chest Tube 1 and 2 04/14/23 1115 1 Anterior Mediastinal 19 Fr. 2 Anterior Mediastinal 19 Fr. <1 day         Y Chest Tube 3 and 4 04/14/23 1115 3 Right Pleural 19 Fr. 4 Left Pleural 19 Fr. <1 day              Arterial Line  Duration             Arterial Line 04/14/23 0759 Right Radial 1 day              Line  Duration                  Pacer Wires 04/14/23 1139 <1 day              Peripheral Intravenous Line  Duration                  Peripheral IV - Single Lumen 04/14/23 1501 20 G Anterior;Proximal;Right Forearm <1 day                    Significant Labs:    CBC/Anemia Profile:  Recent Labs   Lab 04/14/23  1249 04/14/23  1422 04/14/23  2223 04/15/23  0258   WBC 26.78*  --   --  15.47*   HGB 9.2*  --   --  9.5*   HCT 27.8* 35* 29* 29.2*     --   --  174   MCV 89  --   --  88   RDW 13.1  --   --  13.2        Chemistries:  Recent Labs   Lab 04/14/23  1249 04/14/23  1918 04/15/23  0258 04/15/23  0504     --   --  134*   K 3.8 4.3  --  4.1   *  --   --  106   CO2 21*  --   --  20*   BUN 25*  --   --  19   CREATININE 0.6  --   --  0.6   CALCIUM 7.8*  --   --  8.0*   ALBUMIN 2.2*  --   --   --    PROT 4.1*  --   --   --    BILITOT 0.3  --   --   --    ALKPHOS 59  --   --   --    ALT 62*  --   --   --    *  --   --   --    MG 2.4 1.8 1.7  --    PHOS 3.1  --   3.8  --        A1C: No results for input(s): HGBA1C in the last 48 hours.  ABGs:   Recent Labs   Lab 04/14/23  2223   PH 7.357   PCO2 37.2   HCO3 20.8*   POCSATURATED 91*   BE -5     CMP:   Recent Labs   Lab 04/14/23  1249 04/14/23  1918 04/15/23  0504     --  134*   K 3.8 4.3 4.1   *  --  106   CO2 21*  --  20*   *  --  187*   BUN 25*  --  19   CREATININE 0.6  --  0.6   CALCIUM 7.8*  --  8.0*   PROT 4.1*  --   --    ALBUMIN 2.2*  --   --    BILITOT 0.3  --   --    ALKPHOS 59  --   --    *  --   --    ALT 62*  --   --    ANIONGAP 7*  --  8     Coagulation:   Recent Labs   Lab 04/15/23  0258   INR 1.0   APTT 22.3     Lactic Acid: No results for input(s): LACTATE in the last 48 hours.  All pertinent labs within the past 24 hours have been reviewed.    Significant Imaging:  I have reviewed all pertinent imaging results/findings within the past 24 hours.

## 2023-04-15 NOTE — PLAN OF CARE
"      SICU PLAN OF CARE NOTE    Dx: Coronary artery disease    Shift Events: Difficulty controlling nausea, team aware, orders placed    Goals of Care: OOBTC    Neuro: AAO x4, Follows Commands, and Moves All Extremities    Vital Signs: BP (!) 118/58 (BP Location: Right arm, Patient Position: Lying)   Pulse 88   Temp 100 °F (37.8 °C)   Resp (!) 22   Ht 5' 2" (1.575 m)   Wt 67 kg (147 lb 11.3 oz)   SpO2 95%   Breastfeeding No   BMI 27.02 kg/m²     Respiratory: Room Air    Diet: Sips with Meds    Gtts: Insulin    Urine Output: Urinary Catheter 925 cc/shift    Drains: Chest Tube, total output 92 cc /  shift       Labs/Accuchecks: daily/achs    Skin: MSI and CT site dressings CDI, foams to heels and sacrum, specialty mattress in place, attempted to get pt OOBTC this am and pt stated "That's bullshit", left pt resting comfortably in bed       "

## 2023-04-15 NOTE — ASSESSMENT & PLAN NOTE
  Neuro/Psych:     - Hx CVA this year   - Pain:    - Scheduled Tylenol 1g q8h   - dilaudid PRN, Oxy PRN             Cardiac:     - S/P CABGx2 (LIMA-LAD, SVG-OM) with Dr. Kahn on 4/14/2023    - BP Goal: MAP 60-80, SBP < 140     - off pressors and cleviprex  - continue ASA and statin  - start lopressor 12.5mg bid   - start home plavix (hx of CVA)    - Anti-HTNs: Will start when appropriate    - Rhythm: NSR      Pulmonary:     - Goal SpO2 >92%    - Chest Tubes x 4 (2 Meds & 2 Pleural)    - ABGs PRN      Renal:    - Trend BUN/Cr     - Maintain Yang, record strict Is/Os      FEN / GI:     - Daily CMP, PRN K/Mag/Phos per protocol   - zofran and compazine for nausea, daily ekgs to monitor prolonged qtc    - Replace electrolytes as needed    - Nutrition: NPO pending swallow evaluation     - Bowel Regimen: Miralax, docusate      ID:     - Afebrile    - WBC stable    - Abx: Complete perioperative cefazolin 2g Q8H x 5 doses    Recent Labs   Lab 04/11/23  1009 04/14/23  1249 04/15/23  0258   WBC 8.61 26.78* 15.47*         Heme/Onc:     - Hgb 13.7 pre-operatively    - CBC daily     Endocrine:     - CTS Goal -140    - HgbA1c: 6.9    - Endocrinology consulted for insulin management      PPx:   Feeding: ADAT  Analgesia/Sedation: multimodal   Thromboembolic Prevention: SCDs  HOB >30: Yes  Stress Ulcer: pepcid  Glucose Control: Yes, insulin management per Endocrinology     Lines/Drains/Airway:   Right radial arterial line   Lft subclavian CVC   Yang   Chest Tubes: 4 (2 Mediastinal, 2 Pleural)    Pacing Wires: Temporary ventricular pacing wires      Dispo/Code Status/Palliative:     - Continue SICU Care    - Full Code

## 2023-04-16 LAB
ANION GAP SERPL CALC-SCNC: 8 MMOL/L (ref 8–16)
APTT PPP: 25.3 SEC (ref 21–32)
BACTERIA #/AREA URNS AUTO: ABNORMAL /HPF
BASOPHILS # BLD AUTO: 0.06 K/UL (ref 0–0.2)
BASOPHILS NFR BLD: 0.4 % (ref 0–1.9)
BILIRUB UR QL STRIP: NEGATIVE
BUN SERPL-MCNC: 19 MG/DL (ref 8–23)
CALCIUM SERPL-MCNC: 8.1 MG/DL (ref 8.7–10.5)
CHLORIDE SERPL-SCNC: 102 MMOL/L (ref 95–110)
CLARITY UR REFRACT.AUTO: CLEAR
CO2 SERPL-SCNC: 24 MMOL/L (ref 23–29)
COLOR UR AUTO: YELLOW
CREAT SERPL-MCNC: 0.6 MG/DL (ref 0.5–1.4)
DIFFERENTIAL METHOD: ABNORMAL
EOSINOPHIL # BLD AUTO: 0 K/UL (ref 0–0.5)
EOSINOPHIL NFR BLD: 0.1 % (ref 0–8)
ERYTHROCYTE [DISTWIDTH] IN BLOOD BY AUTOMATED COUNT: 13.3 % (ref 11.5–14.5)
EST. GFR  (NO RACE VARIABLE): >60 ML/MIN/1.73 M^2
GLUCOSE SERPL-MCNC: 119 MG/DL (ref 70–110)
GLUCOSE UR QL STRIP: ABNORMAL
HCT VFR BLD AUTO: 25.5 % (ref 37–48.5)
HGB BLD-MCNC: 8 G/DL (ref 12–16)
HGB UR QL STRIP: ABNORMAL
HYALINE CASTS UR QL AUTO: 2 /LPF
IMM GRANULOCYTES # BLD AUTO: 0.06 K/UL (ref 0–0.04)
IMM GRANULOCYTES NFR BLD AUTO: 0.4 % (ref 0–0.5)
INR PPP: 1 (ref 0.8–1.2)
KETONES UR QL STRIP: ABNORMAL
LEUKOCYTE ESTERASE UR QL STRIP: ABNORMAL
LYMPHOCYTES # BLD AUTO: 2.5 K/UL (ref 1–4.8)
LYMPHOCYTES NFR BLD: 14.7 % (ref 18–48)
MAGNESIUM SERPL-MCNC: 1.8 MG/DL (ref 1.6–2.6)
MAGNESIUM SERPL-MCNC: 2 MG/DL (ref 1.6–2.6)
MCH RBC QN AUTO: 28.2 PG (ref 27–31)
MCHC RBC AUTO-ENTMCNC: 31.4 G/DL (ref 32–36)
MCV RBC AUTO: 90 FL (ref 82–98)
MICROSCOPIC COMMENT: ABNORMAL
MONOCYTES # BLD AUTO: 1.6 K/UL (ref 0.3–1)
MONOCYTES NFR BLD: 9.2 % (ref 4–15)
NEUTROPHILS # BLD AUTO: 12.7 K/UL (ref 1.8–7.7)
NEUTROPHILS NFR BLD: 75.2 % (ref 38–73)
NITRITE UR QL STRIP: NEGATIVE
NRBC BLD-RTO: 0 /100 WBC
PH UR STRIP: 6 [PH] (ref 5–8)
PHOSPHATE SERPL-MCNC: 1.9 MG/DL (ref 2.7–4.5)
PHOSPHATE SERPL-MCNC: 2 MG/DL (ref 2.7–4.5)
PLATELET # BLD AUTO: 188 K/UL (ref 150–450)
PMV BLD AUTO: 9.9 FL (ref 9.2–12.9)
POCT GLUCOSE: 151 MG/DL (ref 70–110)
POCT GLUCOSE: 161 MG/DL (ref 70–110)
POCT GLUCOSE: 174 MG/DL (ref 70–110)
POTASSIUM SERPL-SCNC: 4 MMOL/L (ref 3.5–5.1)
POTASSIUM SERPL-SCNC: 4.1 MMOL/L (ref 3.5–5.1)
PROT UR QL STRIP: ABNORMAL
PROTHROMBIN TIME: 10.7 SEC (ref 9–12.5)
RBC # BLD AUTO: 2.84 M/UL (ref 4–5.4)
RBC #/AREA URNS AUTO: 27 /HPF (ref 0–4)
SODIUM SERPL-SCNC: 134 MMOL/L (ref 136–145)
SP GR UR STRIP: 1.02 (ref 1–1.03)
SQUAMOUS #/AREA URNS AUTO: 7 /HPF
URN SPEC COLLECT METH UR: ABNORMAL
WBC # BLD AUTO: 16.85 K/UL (ref 3.9–12.7)
WBC #/AREA URNS AUTO: 4 /HPF (ref 0–5)
YEAST UR QL AUTO: ABNORMAL

## 2023-04-16 PROCEDURE — 25000003 PHARM REV CODE 250: Mod: HCNC | Performed by: STUDENT IN AN ORGANIZED HEALTH CARE EDUCATION/TRAINING PROGRAM

## 2023-04-16 PROCEDURE — 63600175 PHARM REV CODE 636 W HCPCS: Mod: HCNC | Performed by: STUDENT IN AN ORGANIZED HEALTH CARE EDUCATION/TRAINING PROGRAM

## 2023-04-16 PROCEDURE — 20000000 HC ICU ROOM: Mod: HCNC

## 2023-04-16 PROCEDURE — 94761 N-INVAS EAR/PLS OXIMETRY MLT: CPT | Mod: HCNC

## 2023-04-16 PROCEDURE — 25000003 PHARM REV CODE 250: Mod: HCNC | Performed by: PHYSICIAN ASSISTANT

## 2023-04-16 PROCEDURE — 27000221 HC OXYGEN, UP TO 24 HOURS: Mod: HCNC

## 2023-04-16 PROCEDURE — 85730 THROMBOPLASTIN TIME PARTIAL: CPT | Mod: HCNC | Performed by: PHYSICIAN ASSISTANT

## 2023-04-16 PROCEDURE — 99232 SBSQ HOSP IP/OBS MODERATE 35: CPT | Mod: HCNC,,, | Performed by: INTERNAL MEDICINE

## 2023-04-16 PROCEDURE — 99232 SBSQ HOSP IP/OBS MODERATE 35: CPT | Mod: HCNC,GC,, | Performed by: ANESTHESIOLOGY

## 2023-04-16 PROCEDURE — 80048 BASIC METABOLIC PNL TOTAL CA: CPT | Mod: HCNC | Performed by: THORACIC SURGERY (CARDIOTHORACIC VASCULAR SURGERY)

## 2023-04-16 PROCEDURE — 25000003 PHARM REV CODE 250: Mod: HCNC

## 2023-04-16 PROCEDURE — 94799 UNLISTED PULMONARY SVC/PX: CPT | Mod: HCNC

## 2023-04-16 PROCEDURE — 99232 PR SUBSEQUENT HOSPITAL CARE,LEVL II: ICD-10-PCS | Mod: HCNC,GC,, | Performed by: ANESTHESIOLOGY

## 2023-04-16 PROCEDURE — 84132 ASSAY OF SERUM POTASSIUM: CPT | Mod: HCNC | Performed by: PHYSICIAN ASSISTANT

## 2023-04-16 PROCEDURE — 25000242 PHARM REV CODE 250 ALT 637 W/ HCPCS: Mod: HCNC | Performed by: STUDENT IN AN ORGANIZED HEALTH CARE EDUCATION/TRAINING PROGRAM

## 2023-04-16 PROCEDURE — 85025 COMPLETE CBC W/AUTO DIFF WBC: CPT | Mod: HCNC | Performed by: PHYSICIAN ASSISTANT

## 2023-04-16 PROCEDURE — 96372 THER/PROPH/DIAG INJ SC/IM: CPT | Mod: HCNC

## 2023-04-16 PROCEDURE — 99900035 HC TECH TIME PER 15 MIN (STAT): Mod: HCNC

## 2023-04-16 PROCEDURE — 93005 ELECTROCARDIOGRAM TRACING: CPT | Mod: HCNC

## 2023-04-16 PROCEDURE — 93010 ELECTROCARDIOGRAM REPORT: CPT | Mod: HCNC,,, | Performed by: INTERNAL MEDICINE

## 2023-04-16 PROCEDURE — 93010 EKG 12-LEAD: ICD-10-PCS | Mod: HCNC,,, | Performed by: INTERNAL MEDICINE

## 2023-04-16 PROCEDURE — 63600175 PHARM REV CODE 636 W HCPCS: Mod: HCNC | Performed by: PHYSICIAN ASSISTANT

## 2023-04-16 PROCEDURE — 99232 PR SUBSEQUENT HOSPITAL CARE,LEVL II: ICD-10-PCS | Mod: HCNC,,, | Performed by: INTERNAL MEDICINE

## 2023-04-16 PROCEDURE — 97162 PT EVAL MOD COMPLEX 30 MIN: CPT | Mod: HCNC

## 2023-04-16 PROCEDURE — 81001 URINALYSIS AUTO W/SCOPE: CPT | Mod: HCNC | Performed by: STUDENT IN AN ORGANIZED HEALTH CARE EDUCATION/TRAINING PROGRAM

## 2023-04-16 PROCEDURE — 83735 ASSAY OF MAGNESIUM: CPT | Mod: HCNC | Performed by: PHYSICIAN ASSISTANT

## 2023-04-16 PROCEDURE — 85610 PROTHROMBIN TIME: CPT | Mod: HCNC | Performed by: PHYSICIAN ASSISTANT

## 2023-04-16 PROCEDURE — 97116 GAIT TRAINING THERAPY: CPT | Mod: HCNC

## 2023-04-16 PROCEDURE — 84100 ASSAY OF PHOSPHORUS: CPT | Mod: 91,HCNC | Performed by: PHYSICIAN ASSISTANT

## 2023-04-16 PROCEDURE — 82962 GLUCOSE BLOOD TEST: CPT | Mod: HCNC

## 2023-04-16 RX ORDER — NAPROXEN SODIUM 220 MG/1
81 TABLET, FILM COATED ORAL DAILY
Status: DISCONTINUED | OUTPATIENT
Start: 2023-04-16 | End: 2023-04-22 | Stop reason: HOSPADM

## 2023-04-16 RX ORDER — FUROSEMIDE 10 MG/ML
20 INJECTION INTRAMUSCULAR; INTRAVENOUS ONCE
Status: COMPLETED | OUTPATIENT
Start: 2023-04-16 | End: 2023-04-16

## 2023-04-16 RX ORDER — DEXTROSE 40 %
30 GEL (GRAM) ORAL
Status: DISCONTINUED | OUTPATIENT
Start: 2023-04-16 | End: 2023-04-22 | Stop reason: HOSPADM

## 2023-04-16 RX ORDER — INSULIN ASPART 100 [IU]/ML
4 INJECTION, SOLUTION INTRAVENOUS; SUBCUTANEOUS
Status: DISCONTINUED | OUTPATIENT
Start: 2023-04-16 | End: 2023-04-16

## 2023-04-16 RX ORDER — GLUCAGON 1 MG
1 KIT INJECTION
Status: DISCONTINUED | OUTPATIENT
Start: 2023-04-16 | End: 2023-04-22 | Stop reason: HOSPADM

## 2023-04-16 RX ORDER — ACETAMINOPHEN 325 MG/1
650 TABLET ORAL EVERY 8 HOURS
Status: DISCONTINUED | OUTPATIENT
Start: 2023-04-16 | End: 2023-04-22 | Stop reason: HOSPADM

## 2023-04-16 RX ORDER — DEXTROSE 40 %
15 GEL (GRAM) ORAL
Status: DISCONTINUED | OUTPATIENT
Start: 2023-04-16 | End: 2023-04-22 | Stop reason: HOSPADM

## 2023-04-16 RX ORDER — ACETAMINOPHEN 500 MG
1000 TABLET ORAL EVERY 8 HOURS
Status: DISCONTINUED | OUTPATIENT
Start: 2023-04-16 | End: 2023-04-16

## 2023-04-16 RX ORDER — INSULIN ASPART 100 [IU]/ML
2-4 INJECTION, SOLUTION INTRAVENOUS; SUBCUTANEOUS
Status: DISCONTINUED | OUTPATIENT
Start: 2023-04-16 | End: 2023-04-16

## 2023-04-16 RX ORDER — FUROSEMIDE 10 MG/ML
20 INJECTION INTRAMUSCULAR; INTRAVENOUS ONCE
Status: DISCONTINUED | OUTPATIENT
Start: 2023-04-16 | End: 2023-04-16

## 2023-04-16 RX ORDER — ACETAMINOPHEN 325 MG/1
650 TABLET ORAL EVERY 8 HOURS
Status: DISCONTINUED | OUTPATIENT
Start: 2023-04-16 | End: 2023-04-16

## 2023-04-16 RX ORDER — INSULIN ASPART 100 [IU]/ML
2-4 INJECTION, SOLUTION INTRAVENOUS; SUBCUTANEOUS
Status: DISCONTINUED | OUTPATIENT
Start: 2023-04-16 | End: 2023-04-18

## 2023-04-16 RX ADMIN — SUCRALFATE 1 G: 1 TABLET ORAL at 08:04

## 2023-04-16 RX ADMIN — ATORVASTATIN CALCIUM 80 MG: 40 TABLET, FILM COATED ORAL at 08:04

## 2023-04-16 RX ADMIN — INSULIN ASPART 2 UNITS: 100 INJECTION, SOLUTION INTRAVENOUS; SUBCUTANEOUS at 09:04

## 2023-04-16 RX ADMIN — HYDROCODONE BITARTRATE AND ACETAMINOPHEN 1 TABLET: 5; 325 TABLET ORAL at 06:04

## 2023-04-16 RX ADMIN — INSULIN ASPART 2 UNITS: 100 INJECTION, SOLUTION INTRAVENOUS; SUBCUTANEOUS at 02:04

## 2023-04-16 RX ADMIN — ACETAMINOPHEN 650 MG: 325 TABLET ORAL at 05:04

## 2023-04-16 RX ADMIN — METOPROLOL TARTRATE 12.5 MG: 25 TABLET, FILM COATED ORAL at 08:04

## 2023-04-16 RX ADMIN — INSULIN ASPART 2 UNITS: 100 INJECTION, SOLUTION INTRAVENOUS; SUBCUTANEOUS at 06:04

## 2023-04-16 RX ADMIN — FLUTICASONE PROPIONATE 50 MCG: 50 SPRAY, METERED NASAL at 09:04

## 2023-04-16 RX ADMIN — ASPIRIN 81 MG CHEWABLE TABLET 81 MG: 81 TABLET CHEWABLE at 09:04

## 2023-04-16 RX ADMIN — ACETAMINOPHEN 650 MG: 325 TABLET ORAL at 10:04

## 2023-04-16 RX ADMIN — METOPROLOL TARTRATE 12.5 MG: 25 TABLET, FILM COATED ORAL at 09:04

## 2023-04-16 RX ADMIN — PANTOPRAZOLE SODIUM 40 MG: 40 TABLET, DELAYED RELEASE ORAL at 05:04

## 2023-04-16 RX ADMIN — MAGNESIUM SULFATE 2 G: 2 INJECTION INTRAVENOUS at 04:04

## 2023-04-16 RX ADMIN — SODIUM PHOSPHATE, MONOBASIC, MONOHYDRATE AND SODIUM PHOSPHATE, DIBASIC, ANHYDROUS 20.01 MMOL: 142; 276 INJECTION, SOLUTION INTRAVENOUS at 06:04

## 2023-04-16 RX ADMIN — MUPIROCIN 1 G: 20 OINTMENT TOPICAL at 09:04

## 2023-04-16 RX ADMIN — SENNOSIDES AND DOCUSATE SODIUM 1 TABLET: 8.6; 5 TABLET ORAL at 09:04

## 2023-04-16 RX ADMIN — GABAPENTIN 300 MG: 300 CAPSULE ORAL at 03:04

## 2023-04-16 RX ADMIN — HYDROCODONE BITARTRATE AND ACETAMINOPHEN 1 TABLET: 10; 325 TABLET ORAL at 03:04

## 2023-04-16 RX ADMIN — GABAPENTIN 300 MG: 300 CAPSULE ORAL at 09:04

## 2023-04-16 RX ADMIN — GABAPENTIN 300 MG: 300 CAPSULE ORAL at 08:04

## 2023-04-16 RX ADMIN — SUCRALFATE 1 G: 1 TABLET ORAL at 11:04

## 2023-04-16 RX ADMIN — HYDROCODONE BITARTRATE AND ACETAMINOPHEN 1 TABLET: 10; 325 TABLET ORAL at 02:04

## 2023-04-16 RX ADMIN — CLOPIDOGREL BISULFATE 75 MG: 75 TABLET ORAL at 09:04

## 2023-04-16 RX ADMIN — HYDROCODONE BITARTRATE AND ACETAMINOPHEN 1 TABLET: 10; 325 TABLET ORAL at 10:04

## 2023-04-16 RX ADMIN — FUROSEMIDE 20 MG: 10 INJECTION, SOLUTION INTRAMUSCULAR; INTRAVENOUS at 06:04

## 2023-04-16 RX ADMIN — SUCRALFATE 1 G: 1 TABLET ORAL at 04:04

## 2023-04-16 RX ADMIN — POLYETHYLENE GLYCOL 3350 17 G: 17 POWDER, FOR SOLUTION ORAL at 09:04

## 2023-04-16 RX ADMIN — CEFAZOLIN 2 G: 2 INJECTION, POWDER, FOR SOLUTION INTRAMUSCULAR; INTRAVENOUS at 04:04

## 2023-04-16 RX ADMIN — INSULIN DETEMIR 13 UNITS: 100 INJECTION, SOLUTION SUBCUTANEOUS at 09:04

## 2023-04-16 RX ADMIN — ONDANSETRON 4 MG: 2 INJECTION INTRAMUSCULAR; INTRAVENOUS at 12:04

## 2023-04-16 RX ADMIN — HYDROCODONE BITARTRATE AND ACETAMINOPHEN 1 TABLET: 10; 325 TABLET ORAL at 08:04

## 2023-04-16 NOTE — PLAN OF CARE
Problem: Occupational Therapy  Goal: Occupational Therapy Goal  Description: Goals to be met by: 05/15/2023      Patient will increase functional independence with ADLs by performing:    UE Dressing with Modified [place].  LE Dressing with Modified [place].  Grooming while standing with Modified [place].  Toileting from toilet with Modified [place] for hygiene and clothing management.   Toilet transfer to toilet with Modified [place].  Increased functional strength to WFL for self care.  Upper extremity exercise program x10 reps per handout, with supervision.     Outcome: Ongoing, Progressing  Pt would benefit from continued OT to address deficits in self care and functional mobility. Recommending HHOT/PT with near 24/7 supervision and assistance from daughter at daughter's home as pt's home is fairly inaccessible; DME needs likely none at this time as pt and daughter decline need for BSC but will continue to assess.

## 2023-04-16 NOTE — PLAN OF CARE
Problem: Physical Therapy  Goal: Physical Therapy Goal  Description: Goals to be met by: 23    Patient will increase functional independence with mobility by performin. Supine to sit with [place] and Maintaining sternal precautions  2. Sit to supine with [place] and Maintaining sternal precautions  3. Sit to stand transfer with [place] and Maintaining sternal precautions  4. Gait  x 300 feet with [place] and Maintaining sternal precautions  5. Ascend/Descend 6 inch curb step with [place] and Maintaining sternal precautions  6. Lower extremity exercise program x15 reps per handout, with independence    Outcome: Ongoing, Progressing     Pt tolerated PT session well.      All needs met, all questions answered.

## 2023-04-16 NOTE — ASSESSMENT & PLAN NOTE
  Neuro/Psych:     - Hx CVA this year, continue plavix  - Pain:    - Scheduled Tylenol 1g q8h   - dilaudid PRN, Oxy PRN             Cardiac:     - S/P CABGx2 (LIMA-LAD, SVG-OM) with Dr. Kahn on 4/14/2023    - BP Goal: MAP 60-80, SBP < 140     - off pressors and cleviprex  - start IV lasix 20mg   - continue ASA/plavix, statin, lopressor    - Anti-HTNs: Will start when appropriate    - Rhythm: NSR      Pulmonary:     - Goal SpO2 >92%    - Chest Tubes x 4 (2 Meds & 2 Pleural), will remove meds today    - ABGs PRN      Renal:    - Trend BUN/Cr     - Maintain Bazzi, record strict Is/Os      FEN / GI:     - Daily CMP, PRN K/Mag/Phos per protocol   - zofran and compazine for nausea, daily ekgs to monitor prolonged qtc    - Replace electrolytes as needed    - Nutrition: CLD, ADAT     - Bowel Regimen: Miralax, docusate      ID:     - mildly febrile with mild leukocytosis, denies cough, cxr with possible developing consolidation RML, no headache, will d/c bazzi and obtain clean catch UA    - Abx: Complete perioperative cefazolin 2g Q8H x 5 doses      Heme/Onc:     - Hgb 13.7 pre-operatively  - no anemia or signs of bleeding    - CBC daily     Endocrine:     - CTS Goal -140    - HgbA1c: 6.9    - Endocrinology consulted for insulin management      PPx:   Feeding: ADAT  Analgesia/Sedation: multimodal   Thromboembolic Prevention: SCDs  HOB >30: Yes  Stress Ulcer: pepcid  Glucose Control: Yes, insulin management per Endocrinology     Lines/Drains/Airway:   Right radial arterial line   Lft subclavian CVC   Bazzi   Chest Tubes: 4 (2 Mediastinal, 2 Pleural)    Pacing Wires: Temporary ventricular pacing wires      Dispo/Code Status/Palliative:     - Continue SICU Care    - Full Code

## 2023-04-16 NOTE — PROGRESS NOTES
[facility] - Surgical Intensive Care  Critical Care - Surgery  Progress Note    Patient Name: Jud Villa  MRN: [id number]  Admission Date: 4/14/2023  Hospital Length of Stay: 2 days  Code Status: Full Code  Attending Provider: Igor Kahn MD  Primary Care Provider: Bo Lopez MD   Principal Problem: Coronary artery disease    Subjective:     Hospital/ICU Course:  Patient admitted to SICU, extubated, following CABG. Apneic upon arrival, improved with narcan. Started on ASA and statin on POD0. POD1 started lopressor, antiemetics with daily ekg, transitioned oxy to norco with improvement in nausea. POD2 mildly febrile with leukocytosis, bazzi removed and UA obtained, lasix started, advance diet as tolerated.      Interval History/Significant Events: Patient seen and examined, nausea improving with transition from oxy to norco. Denies cough, chest pain, dyspnea, dizziness, fever, chills, or any further symptoms at this time.    Follow-up For: Procedure(s) (LRB):  CORONARY ARTERY BYPASS GRAFT (CABG) x 2 (N/A)  SURGICAL PROCUREMENT, VEIN, ENDOSCOPIC (Left)    Post-Operative Day: 2 Days Post-Op    Objective:     Vital Signs (Most Recent):  Temp: 99.1 °F (37.3 °C) (04/16/23 0730)  Pulse: 70 (04/16/23 0730)  Resp: 13 (04/16/23 0730)  BP: (!) 117/57 (04/16/23 0707)  SpO2: 95 % (04/16/23 0730)   Vital Signs (24h Range):  Temp:  [98.4 °F (36.9 °C)-100.4 °F (38 °C)] 99.1 °F (37.3 °C)  Pulse:  [64-92] 70  Resp:  [11-36] 13  SpO2:  [90 %-99 %] 95 %  BP: ()/(52-61) 117/57  Arterial Line BP: ()/(31-52) 137/49     Weight: 64 kg (141 lb 1.5 oz)  Body mass index is 25.81 kg/m².      Intake/Output Summary (Last 24 hours) at 4/16/2023 0822  Last data filed at 4/16/2023 0734  Gross per 24 hour   Intake 258.71 ml   Output 1595 ml   Net -1336.29 ml       Physical Exam  Vitals and nursing note reviewed.   Constitutional:       Appearance: Normal appearance.   HENT:      Mouth/Throat:      Mouth: Mucous  membranes are moist.      Pharynx: Oropharynx is clear. No oropharyngeal exudate.   Eyes:      Conjunctiva/sclera: Conjunctivae normal.      Pupils: Pupils are equal, round, and reactive to light.   Cardiovascular:      Rate and Rhythm: Normal rate and regular rhythm.      Pulses: Normal pulses.      Heart sounds: Normal heart sounds. No murmur heard.     Comments: [place] sternotomy c/d/I with chest tubes with serosanguinous drainage  Pulmonary:      Effort: Pulmonary effort is normal.      Breath sounds: Normal breath sounds.   Abdominal:      General: Abdomen is flat. There is no distension.      Palpations: Abdomen is soft.      Tenderness: There is no abdominal tenderness.   Skin:     General: Skin is warm and dry.      Coloration: Skin is not pale.   Neurological:      General: No focal deficit present.      Mental Status: She is alert and oriented to person, place, and time.   Psychiatric:         Mood and Affect: Mood normal.         Behavior: Behavior normal.       Vents:  Oxygen Concentration (%): 24 (04/15/23 0442)    Lines/Drains/Airways       Central Venous Catheter Line  Duration                  Percutaneous Central Line Insertion/Assessment - Quad lumen  04/14/23 0800 Subclavian Left 2 days    Percutaneous Central Line Insertion/Assessment - Quad Lumen  04/14/23 0800 Subclavian Left 2 days              Drain  Duration                  Urethral Catheter 04/14/23 0735 Non-latex;Straight-tip;Temperature probe 14 Fr. 2 days         Y Chest Tube 1 and 2 04/14/23 1115 1 Anterior Mediastinal 19 Fr. 2 Anterior Mediastinal 19 Fr. 1 day         Y Chest Tube 3 and 4 04/14/23 1115 3 Right Pleural 19 Fr. 4 Left Pleural 19 Fr. 1 day              Line  Duration                  Pacer Wires 04/14/23 1139 1 day              Peripheral Intravenous Line  Duration                  Peripheral IV - Single Lumen 04/14/23 1501 20 G Anterior;Proximal;Right Forearm 1 day                    Significant Labs:    CBC/Anemia  Profile:  Recent Labs   Lab 04/14/23  1249 04/14/23  1422 04/14/23  2223 04/15/23  0258 04/16/23  0312   WBC 26.78*  --   --  15.47* 16.85*   HGB 9.2*  --   --  9.5* 8.0*   HCT 27.8*   < > 29* 29.2* 25.5*     --   --  174 188   MCV 89  --   --  88 90   RDW 13.1  --   --  13.2 13.3    < > = values in this interval not displayed.        Chemistries:  Recent Labs   Lab 04/14/23  1249 04/14/23  1918 04/15/23  0258 04/15/23  0504 04/15/23  0920 04/15/23  1935 04/16/23  0312     --   --  134*  --   --  134*   K 3.8   < >  --  4.1 4.3 4.2 4.1   *  --   --  106  --   --  102   CO2 21*  --   --  20*  --   --  24   BUN 25*  --   --  19  --   --  19   CREATININE 0.6  --   --  0.6  --   --  0.6   CALCIUM 7.8*  --   --  8.0*  --   --  8.1*   ALBUMIN 2.2*  --   --   --   --   --   --    PROT 4.1*  --   --   --   --   --   --    BILITOT 0.3  --   --   --   --   --   --    ALKPHOS 59  --   --   --   --   --   --    ALT 62*  --   --   --   --   --   --    *  --   --   --   --   --   --    MG 2.4   < > 1.7  --   --  2.0 1.8   PHOS 3.1  --  3.8  --   --   --  2.0*    < > = values in this interval not displayed.       ABGs:   Recent Labs   Lab 04/14/23 2223   PH 7.357   PCO2 37.2   HCO3 20.8*   POCSATURATED 91*   BE -5     CMP:   Recent Labs   Lab 04/14/23  1249 04/14/23  1918 04/15/23  0504 04/15/23  0920 04/15/23  1935 04/16/23  0312     --  134*  --   --  134*   K 3.8   < > 4.1 4.3 4.2 4.1   *  --  106  --   --  102   CO2 21*  --  20*  --   --  24   *  --  187*  --   --  119*   BUN 25*  --  19  --   --  19   CREATININE 0.6  --  0.6  --   --  0.6   CALCIUM 7.8*  --  8.0*  --   --  8.1*   PROT 4.1*  --   --   --   --   --    ALBUMIN 2.2*  --   --   --   --   --    BILITOT 0.3  --   --   --   --   --    ALKPHOS 59  --   --   --   --   --    *  --   --   --   --   --    ALT 62*  --   --   --   --   --    ANIONGAP 7*  --  8  --   --  8    < > = values in this interval not displayed.      Coagulation:   Recent Labs   Lab 04/16/23  0312   INR 1.0   APTT 25.3     All pertinent labs within the past 24 hours have been reviewed.    Significant Imaging:  I have reviewed all pertinent imaging results/findings within the past 24 hours.    Assessment/Plan:     Cardiac/Vascular  * Coronary artery disease    Neuro/Psych:     - Hx CVA this year, continue plavix  - Pain:    - Scheduled Tylenol 1g q8h   - dilaudid PRN, Oxy PRN             Cardiac:     - S/P CABGx2 (LIMA-LAD, SVG-OM) with Dr. Kahn on 4/14/2023    - BP Goal: MAP 60-80, SBP < 140     - off pressors and cleviprex  - start IV lasix 20mg   - continue ASA/plavix, statin, lopressor    - Anti-HTNs: Will start when appropriate    - Rhythm: NSR      Pulmonary:     - Goal SpO2 >92%    - Chest Tubes x 4 (2 Meds & 2 Pleural), will remove meds today    - ABGs PRN      Renal:    - Trend BUN/Cr     - Maintain Bazzi, record strict Is/Os      FEN / GI:     - Daily CMP, PRN K/Mag/Phos per protocol   - zofran and compazine for nausea, daily ekgs to monitor prolonged qtc    - Replace electrolytes as needed    - Nutrition: CLD, ADAT     - Bowel Regimen: Miralax, docusate      ID:     - mildly febrile with mild leukocytosis, denies cough, cxr with possible developing consolidation RML, no headache, will d/c bazzi and obtain clean catch UA    - Abx: Complete perioperative cefazolin 2g Q8H x 5 doses      Heme/Onc:     - Hgb 13.7 pre-operatively  - no anemia or signs of bleeding    - CBC daily     Endocrine:     - CTS Goal -140    - HgbA1c: 6.9    - Endocrinology consulted for insulin management      PPx:   Feeding: ADAT  Analgesia/Sedation: multimodal   Thromboembolic Prevention: SCDs  HOB >30: Yes  Stress Ulcer: pepcid  Glucose Control: Yes, insulin management per Endocrinology     Lines/Drains/Airway:   Right radial arterial line   Lft subclavian CVC   Bazzi   Chest Tubes: 4 (2 Mediastinal, 2 Pleural)    Pacing Wires: Temporary ventricular pacing  wires      Dispo/Code Status/Palliative:     - Continue SICU Care    - Full Code        Critical secondary to Patient has a condition that poses threat to life and bodily function: post-cabg     Critical care was time spent personally by me on the following activities: development of treatment plan with patient or surrogate and bedside caregivers, discussions with consultants, evaluation of patient's response to treatment, examination of patient, ordering and performing treatments and interventions, ordering and review of laboratory studies, ordering and review of radiographic studies, pulse oximetry, re-evaluation of patient's condition.  This critical care time did not overlap with that of any other provider or involve time for any procedures.     Sania Agrawal MD   PGY3  Critical Care - Surgery  [facility] - Surgical Intensive Care

## 2023-04-16 NOTE — SUBJECTIVE & OBJECTIVE
"Interval HPI:   Overnight events: BG improved overnight.  Diet to be starting today.  No acute overnight events.    Eating:    Clear liquid diet  Nausea: No  Hypoglycemia and intervention: No  Fever: No  TPN and/or TF: No  If yes, type of TF/TPN and rate: NA    BP (!) 114/55 (BP Location: Right arm, Patient Position: Lying)   Pulse 77   Temp 99 °F (37.2 °C)   Resp 18   Ht 5' 2" (1.575 m)   Wt 64 kg (141 lb 1.5 oz)   SpO2 (!) 91%   Breastfeeding No   BMI 25.81 kg/m²     Labs Reviewed and Include    Recent Labs   Lab 04/16/23  0312   *   CALCIUM 8.1*   *   K 4.1   CO2 24      BUN 19   CREATININE 0.6     Lab Results   Component Value Date    WBC 16.85 (H) 04/16/2023    HGB 8.0 (L) 04/16/2023    HCT 25.5 (L) 04/16/2023    MCV 90 04/16/2023     04/16/2023     No results for input(s): TSH, FREET4 in the last 168 hours.  Lab Results   Component Value Date    HGBA1C 6.9 (H) 04/11/2023       Nutritional status:   Body mass index is 25.81 kg/m².  Lab Results   Component Value Date    ALBUMIN 2.2 (L) 04/14/2023    ALBUMIN 3.8 04/11/2023    ALBUMIN 3.3 (L) 04/06/2023     No results found for: PREALBUMIN    Estimated Creatinine Clearance: 82.2 mL/min (based on SCr of 0.6 mg/dL).    Accu-Checks  Recent Labs     04/15/23  0105 04/15/23  0211 04/15/23  0303 04/15/23  0412 04/15/23  0509 04/15/23  0634 04/15/23  0818 04/15/23  1223 04/15/23  1724 04/15/23  2029   POCTGLUCOSE 207* 179* 178* 155* 188* 174* 162* 188* 183* 156*       Current Medications and/or Treatments Impacting Glycemic Control  Immunotherapy:    Immunosuppressants       None          Steroids:   Hormones (From admission, onward)      None          Pressors:    Autonomic Drugs (From admission, onward)      Start     Stop Route Frequency Ordered    04/15/23 0900  metoprolol tartrate (LOPRESSOR) split tablet 12.5 mg         -- Oral 2 times daily 04/15/23 0603          Hyperglycemia/Diabetes Medications:   Antihyperglycemics (From " admission, onward)      Start     Stop Route Frequency Ordered    04/16/23 0945  insulin detemir U-100 (Levemir) pen 13 Units         -- SubQ Daily 04/16/23 0842    04/16/23 0900  insulin aspart U-100 pen 2-4 Units         -- SubQ 3 times daily after meals 04/16/23 0852    04/16/23 0845  insulin regular in 0.9 % NaCl 100 unit/100 mL (1 unit/mL) infusion        Question:  Enter initial dose (Units/hr):  Answer:  0.5    04/16 1000 IV Continuous 04/16/23 0843    04/15/23 0743  insulin aspart U-100 pen 0-5 Units         -- SubQ As needed (PRN) 04/15/23 0645

## 2023-04-16 NOTE — ASSESSMENT & PLAN NOTE
Hyperglycemia from type 2 diabetes mellitus and stress response s/p CABG, steroids given intra op also contributing to hyperglycemia     Endocrinology consulted for BG management.   BG goal 110-140 (CTS Protocol)      - Intensive insulin drip after surgery per CTS guidelines, adjusted to transition drip 04/15/2023   - Hypoglycemia protocol in place  - If blood glucose greater than 300 and diet ordered, please ask patient not to eat food or drink anything other than water until correctional insulin has brought it back below 250    Plan:  - Will start basal/bolus insulin.     Levemir 13 units daily starting this morning   Stop transition drip 1 hour after Levemir given (transition adjusted to 0.5 u/hr for now)   Novolog with meals 2-4 units based on percent eaten   Low dose sliding scale with Accuchecks ACHS     ** Please notify Endocrine for any change and/or advance in diet**  ** Please call Endocrine for any BG related issues **

## 2023-04-16 NOTE — PLAN OF CARE
"      SICU PLAN OF CARE NOTE    Dx: Coronary artery disease    Shift Events: NAEON    Goals of Care: remove CT, remove Vwires, PT/OT    Neuro: AAO x4, Follows Commands, and Moves All Extremities    Vital Signs: BP (!) 115/58 (BP Location: Right arm, Patient Position: Lying)   Pulse 67   Temp 99.7 °F (37.6 °C)   Resp 12   Ht 5' 2" (1.575 m)   Wt 67 kg (147 lb 11.3 oz)   SpO2 98%   Breastfeeding No   BMI 27.02 kg/m²     Respiratory: Room Air    Diet: Sips with Meds    Gtts: Insulin    Urine Output: Urinary Catheter 470 cc/shift    Drains: Chest Tube, total output 60 cc /  shift     Labs/Accuchecks: daily/ACHS    Skin: MSI and CT site dressings all CDI, foams to heels and sacrum, waffle mattress in use, pt able to shift weight independently       "

## 2023-04-16 NOTE — PT/OT/SLP EVAL
Occupational Therapy   Evaluation    Name: Jud Villa  MRN: [id number]  Admitting Diagnosis: Coronary artery disease  Recent Surgery: Procedure(s) (LRB):  CORONARY ARTERY BYPASS GRAFT (CABG) x 2 (N/A)  SURGICAL PROCUREMENT, VEIN, ENDOSCOPIC (Left) 1 Day Post-Op    Recommendations:     Discharge Recommendations: home health OT, home health PT (with near 24/7 supervision and assistance from daughter)  Discharge Equipment Recommendations:   (liekly none as pt and daughter declined need for BSC at this time)  Barriers to discharge:   (Pt's own home inaccessible but per daughter she will return to daughter's home)    Assessment:     Jud Villa is a 63 y.o. female with a medical diagnosis of Coronary artery disease.  She presents with deconditioning, maintained eyes closed throughout much of session and some impulsivity noted with pt attempting to return to bed despite multiple v/c to allow for line management prior to transfer. Performance deficits affecting function: weakness, impaired endurance, impaired self care skills, impaired balance, gait instability, impaired functional mobility, decreased coordination, decreased upper extremity function, decreased lower extremity function, decreased ROM, decreased safety awareness, pain, impaired skin, orthopedic precautions, edema, impaired cardiopulmonary response to activity.      Rehab Prognosis: Good; patient would benefit from acute skilled OT services to address these deficits and reach maximum level of function.       Plan:     Patient to be seen 4 x/week to address the above listed problems via self-care/home management, therapeutic activities, therapeutic exercises  Plan of Care Expires: 05/15/23  Plan of Care Reviewed with:      Subjective     Chief Complaint: Pt reports she is tired and wants to get back to bed  Patient/Family Comments/goals: Pain relief, to return to PLOF    Occupational Profile:  Living Environment: Pt lives alone in 2 , 18 LEONEL with RHR,  bed/bath on second floor with WIS; full flight to second floor with landing.   Will D/C to daughter's SSH, 1STE, WIS with SC and adjustable bed available  Previous level of function: Indep to Mod I ADLs and functional mobility   Roles and Routines: Caretaker to self and home. Cooks, cleans, drives, completes own grocery shopping.   Equipment Used at Home:  has SC but does not use, adjustable bed at daughter's home  Assistance upon Discharge: Daughter    Pain/Comfort:  Pain Rating 1:  (did not rate but stated that pain increased in session)  Location - Orientation 1: generalized  Location 1: sternal  Pain Addressed 1: Reposition, Distraction, Cessation of Activity, Nurse notified  Pain Rating Post-Intervention 1:  (did not rate)    Patients cultural, spiritual, Jewish conflicts given the current situation:      Objective:     Communicated with: nsmaki prior to session.  Patient found up in chair with telemetry, pulse ox (continuous), peripheral IV, chest tube, blood pressure cuff, arterial line, oxygen, central line, bazzi catheter (pacer wires) upon OT entry to room.    General Precautions: Standard, fall, sternal  Orthopedic Precautions: N/A  Braces: N/A  Respiratory Status: Room air    Occupational Performance:    Bed Mobility:    Patient completed Rolling/Turning to Left with  minimum assistance  Patient completed Scooting/Bridging with minimum assistance and moderate assistance  Patient completed Sit to Supine with contact guard assistance    Functional Mobility/Transfers:  Patient completed Sit <> Stand Transfer with stand by assistance and contact guard assistance  with  hand-held assist   Patient completed Bed <> Chair Transfer using Step Transfer technique with contact guard assistance with hand-held assist and increased assist for line management as pt did not wait for lines to be placed for transfer  Functional Mobility: Pt with fair to fair+ dynamic seated and standing balance.     Activities of Daily  Living:  Declined LE dressing in session    Cognitive/Visual Perceptual:  Cognitive/Psychosocial Skills:     -       Oriented to: Person, Place, Time and Situation   -       Follows Commands/attention:Follows multistep  commands  -       Communication: clear/fluent  -       Memory: No Deficits noted  -       Safety awareness/insight to disability: impaired  -       Mood/Affect/Coping skills/emotional control: Appropriate to situation though somewhat impulsive    Physical Exam:  Postural examination/scapula alignment:    -       Rounded shoulders  Skin integrity: surgical wound sternum, dressing with some light drainage. LLE dressing c/d/I. Blood noted on seat pad but no apparent location  Sensation:    -       Intact  Motor Planning:    -       WFL  Dominant hand:    -       R handed  Upper Extremity Range of Motion: BUE WFL for current self care needs    Upper Extremity Strength:   BUE appear WFL   Strength:  B hands WFL  Fine Motor Coordination:    -       Intact  Gross motor coordination:   WFL     AMPAC 6 Click ADL:  AMPAC Total Score: 18    Treatment & Education:  Pt educated on role of OT and POC.   Pt performing skills as listed above.     Patient left HOB elevated with all lines intact, call button in reach, nsg notified, and daughter present    GOALS:   Multidisciplinary Problems       Occupational Therapy Goals          Problem: Occupational Therapy    Goal Priority Disciplines Outcome Interventions   Occupational Therapy Goal     OT, PT/OT Ongoing, Progressing    Description: Goals to be met by: 05/15/2023      Patient will increase functional independence with ADLs by performing:    UE Dressing with Modified [place].  LE Dressing with Modified [place].  Grooming while standing with Modified [place].  Toileting from toilet with Modified [place] for hygiene and clothing management.   Toilet transfer to toilet with Modified [place].  Increased functional strength to WFL for self  care.  Upper extremity exercise program x10 reps per handout, with supervision.                          History:     Past Medical History:   Diagnosis Date    Asthma     Breast carcinoma     Cataract     Coronary artery disease of native heart with stable angina pectoris 4/4/2023    Diabetes mellitus     Moderate episode of recurrent major depressive disorder 5/19/2021    Osteoporosis          Past Surgical History:   Procedure Laterality Date    BILATERAL MASTECTOMY  04/26/2018    CHOLECYSTECTOMY      COLONOSCOPY N/A 10/27/2015    Procedure: COLONOSCOPY;  Surgeon: Johnny Hurley MD;  Location: [facility] ENDO;  Service: Endoscopy;  Laterality: N/A;    CORONARY ANGIOGRAPHY N/A 4/5/2023    Procedure: ANGIOGRAM, CORONARY ARTERY;  Surgeon: Francisco Barahona MD;  Location: [facility] CATH LAB;  Service: Cardiology;  Laterality: N/A;    ESOPHAGOGASTRODUODENOSCOPY N/A 01/24/2022    Procedure: ESOPHAGOGASTRODUODENOSCOPY (EGD);  Surgeon: Mili Katz MD;  Location: [facility] ([address] [place] [address]);  Service: Endoscopy;  Laterality: N/A;  rapid in AM, instr portal -ml    HYSTERECTOMY      LASER PERIPHERAL IRIDOTOMY Bilateral 2019        LEFT HEART CATHETERIZATION Left 4/5/2023    Procedure: Left heart cath;  Surgeon: Francisco Barahona MD;  Location: [facility] CATH LAB;  Service: Cardiology;  Laterality: Left;       Time Tracking:     OT Date of Treatment: 04/15/23  OT Start Time: 1614  OT Stop Time: 1629  OT Total Time (min): 15 min    Billable Minutes:Evaluation 15    4/15/2023

## 2023-04-16 NOTE — PT/OT/SLP EVAL
Physical Therapy  Evaluation and Treatment    Patient Name:  Jud Villa   MRN:  [id number]    Recommendations:     Discharge Recommendations:  other (see comments) (HHPT)   Discharge Equipment Recommendations: none   Barriers to discharge: decreased functional mobility, fall risk, and inaccessible home    Assessment:     Jud Villa is a 63 y.o. female admitted with a medical diagnosis of Coronary artery disease.  Pt demonstrates the below listed impairments with decreased tolerance to functional mobility, impaired endurance, and pain being the most limiting.  Pt demonstrates fair tolerance to out of bed mobility, she has severe pain in her L Abdomen limiting the session.  Pt willing to ambulate to her recliner, some dizziness.  She recalls 0/3 sternal precautions.  Pt requires skilled PT due to patient's status as: a fall risk and patient has increased pain to allow safe d/c home.     Impairments and functional limitations:  weakness, impaired endurance, impaired self care skills, impaired functional mobility, gait instability, impaired balance, decreased upper extremity function, decreased lower extremity function, pain, impaired cardiopulmonary response to activity, impaired skin.  These deficits affect their roles and responsibilities in which they were able to complete prior to admit.  Rehab Prognosis:   Good ; patient would benefit from acute skilled PT services 5 x/week to address these deficits, improve quality of life, focus on recovery of impairments, provide patient/caregiver education, reduce fall risk, and reach maximum level of function.  Pt is highly  motivated to participated in skilled PT.    Recent Surgery:   Procedure(s) (LRB):  CORONARY ARTERY BYPASS GRAFT (CABG) x 2 (N/A)  SURGICAL PROCUREMENT, VEIN, ENDOSCOPIC (Left) 2 Days Post-Op    Plan:     During this hospitalization, patient to be seen 5 x/week to address the identified rehab impairments via gait training, therapeutic activities,  "therapeutic exercises, neuromuscular re-education and progress toward the following goals:    Plan of Care Expires:  05/16/23    Subjective     Chief Complaint: "Oh it hurts, I need to sit"  Patient/Family Comments/Goals: Return home  Pain/Comfort:  Pain Rating 1: 4/10  Location - Side 1: Left  Location 1: abdomen  Pain Addressed 1: Reposition, Distraction, Cessation of Activity, Pre-medicate for activity, Nurse notified  Pain Rating Post-Intervention 1:  (not rated)    Patients cultural, spiritual, Quaker conflicts given the current situation: no    Living Environment:  Patient lives with their daughter in single story home, threshold, walk in shower.   Pt utilizes No AD for ambulation of all distances.    Prior to admission, patient was independent with ADLs.   DME owned: none.   DME not currently used: shower chair.   Upon discharge, patient will have assistance from family with 24/7 assist.       Pt normally lives alone with 18 steps to enter her home but will be staying at her daughters.      Objective:     Communicated with nursing prior to session.  Patient found HOB elevated with blood pressure cuff, pulse ox (continuous), telemetry, peripheral IV, central line, arterial line, bazzi catheter, chest tube  upon PT entry to room.    General Precautions: Standard, fall, sternal   Orthopedic Precautions:N/A   Braces: N/A   Oxygen Device:      Exams:  Cognitive Exam:  Patient is oriented to Person, Place, Time, and Situation (year not month)  Command following: Patient follows 100% of commands   RLE ROM: WFL  RLE Strength: WFL  LLE ROM: WFL  LLE Strength: WFL  Postural Exam:  Patient presented with the following abnormalities:    -       Rounded shoulders  -       Forward head  Sensation:    -       Intact    Functional Mobility:  Bed Mobility:  Rolling Right: Min A  Scooting: CGA  Supine to Sit: Min A  Head of bed position: HOB elevated    Transfers:  Sit to Stand: CGA with No AD    Gait: Patient ambulated 6' " with No AD and CGA. Patient demonstrates occasional unsteady gait, decreased step length, decreased arm swing, flexed posture, and decreased josie. All lines remained intact throughout ambulation trial.  Some dizziness, vitals remained therapeutic  Pain increases     Balance:   Position Score Time   Static Sitting GOOD-: Takes MODERATE challenges but inconstantly  n/a   Dynamic Sitting FAIR+: Maintains balance through MINIMAL excursions of active trunk motion n/a   Static Standing FAIR: Maintains without assist, but is unable to take any challenges n/a   Dynamic Standing FAIR-: Maintains without assist but is inconsistent n/a       Therapeutic Activities:  Patient educated on role of acute care PT and PT POC, safety while in hospital including calling nurse for mobility, call light usage, benefits of out of bed mobility, walker management, breathing technique, fall risk, bed mobility , transfers, gait technique, positioning, posture, risks of prolonged bed rest, possible discharge disposition , sternal precautions, and benefits of continued PT by explanation and demonstration.    Patient demonstrates fair understanding of education provided this day.   Whiteboard updated    Therapeutic Exercises:  n/a    AM-PAC 6 CLICK MOBILITY  Total Score:17     Patient left up in chair with all lines intact, call button in reach, Rn notified, and family present.    GOALS:   Multidisciplinary Problems       Physical Therapy Goals          Problem: Physical Therapy    Goal Priority Disciplines Outcome Goal Variances Interventions   Physical Therapy Goal     PT, PT/OT Ongoing, Progressing     Description: Goals to be met by: 23    Patient will increase functional independence with mobility by performin. Supine to sit with [place] and Maintaining sternal precautions  2. Sit to supine with [place] and Maintaining sternal precautions  3. Sit to stand transfer with [place] and Maintaining sternal  precautions  4. Gait  x 300 feet with [place] and Maintaining sternal precautions  5. Ascend/Descend 6 inch curb step with [place] and Maintaining sternal precautions  6. Lower extremity exercise program x15 reps per handout, with independence                         History:     Past Medical History:   Diagnosis Date    Asthma     Breast carcinoma     Cataract     Coronary artery disease of native heart with stable angina pectoris 4/4/2023    Diabetes mellitus     Moderate episode of recurrent major depressive disorder 5/19/2021    Osteoporosis        Past Surgical History:   Procedure Laterality Date    BILATERAL MASTECTOMY  04/26/2018    CHOLECYSTECTOMY      COLONOSCOPY N/A 10/27/2015    Procedure: COLONOSCOPY;  Surgeon: Johnny Hurley MD;  Location: [facility] ENDO;  Service: Endoscopy;  Laterality: N/A;    CORONARY ANGIOGRAPHY N/A 4/5/2023    Procedure: ANGIOGRAM, CORONARY ARTERY;  Surgeon: Francisco Barahona MD;  Location: [facility] CATH LAB;  Service: Cardiology;  Laterality: N/A;    ESOPHAGOGASTRODUODENOSCOPY N/A 01/24/2022    Procedure: ESOPHAGOGASTRODUODENOSCOPY (EGD);  Surgeon: Mili Katz MD;  Location: [facility] ([address] [place] [address]);  Service: Endoscopy;  Laterality: N/A;  rapid in AM, instr portal -ml    HYSTERECTOMY      LASER PERIPHERAL IRIDOTOMY Bilateral 2019        LEFT HEART CATHETERIZATION Left 4/5/2023    Procedure: Left heart cath;  Surgeon: Francisco Barahona MD;  Location: [facility] CATH LAB;  Service: Cardiology;  Laterality: Left;       Time Tracking:     PT Received On: 04/16/23  PT Start Time: 0905     PT Stop Time: 0927  PT Total Time (min): 22 min     Billable Minutes: Evaluation 10 and Gait Training 12    04/16/2023

## 2023-04-16 NOTE — PLAN OF CARE
"      SICU PLAN OF CARE NOTE    Dx: Coronary artery disease    Shift Events: OOBTC multiple time with x 1 assist. D/c Mediastinal CT and bazzi, adequate UOP. ATC pain meds and c/o nausea. Attempting to adv diet to CL. SQ insulin per endocrine.     Goals of Care: MAP 60-80, SBP <140    Neuro: AAO x4, Follows Commands, and Moves All Extremities    Vital Signs: BP (!) 113/56 (BP Location: Right arm, Patient Position: Lying)   Pulse 68   Temp 97.6 °F (36.4 °C) (Axillary)   Resp 15   Ht 5' 2" (1.575 m)   Wt 64 kg (141 lb 1.5 oz)   SpO2 (!) 91%   Breastfeeding No   BMI 25.81 kg/m²     Respiratory: Room Air    Diet: Clear Liquid    Gtts: none    Urine Output: Voids Spontaneously 1375 cc/shift    Drains: Chest Tube, total output 180 cc /  shift    Cardiac: NSR     Labs/Accuchecks: Daily labs. Accuchecks AC/HS           "

## 2023-04-16 NOTE — PROGRESS NOTES
Cardiac Surgery Progress Note     NAEON     Gtts  -     A/P  2 Days Post-Op s/p CABG     ASA/plavix,statin  BB  Lasix 20 BID  D/c mediastinal chest tube  Nausea meds  OOBTC, ambulate      Sania Cortes MD, PGY-7  Cardiothoracic Surgery   [phone]

## 2023-04-16 NOTE — RESPIRATORY THERAPY
04/16/23 1329   Patient Assessment/Suction   Level of Consciousness (AVPU) alert   Respiratory Effort Normal;Unlabored   Expansion/Accessory Muscles/Retractions no retractions;no use of accessory muscles   All Lung Fields Breath Sounds Anterior:;Lateral:;Posterior:   RUL Breath Sounds crackles, fine   RML Breath Sounds crackles, fine   RLL Breath Sounds crackles, fine   Rhythm/Pattern, Respiratory no shortness of breath reported;depth regular;pattern regular;unlabored   Cough Frequency no cough   Skin Integrity   $ Wound Care Tech Time 15 min   Area Observed Cheek;Upper lip;Nares   Skin Appearance without discoloration   PRE-TX-O2   Device (Oxygen Therapy) nasal cannula   $ Is the patient on Low Flow Oxygen? Yes   Flow (L/min) 1   Oxygen Concentration (%) 24   SpO2 95 %   Pulse Oximetry Type Continuous   $ Pulse Oximetry - Multiple Charge Pulse Oximetry - Multiple   Pulse 69   Resp 19   Incentive Spirometer   $ Incentive Spirometer Charges done with encouragement   Administration (IS) instruction provided, follow-up   Number of Repetitions (IS) 10   Level Incentive Spirometer (mL) 750   Patient Tolerance (IS) fair;no adverse signs/symptoms present

## 2023-04-16 NOTE — SUBJECTIVE & OBJECTIVE
Interval History/Significant Events: Patient seen and examined, nausea improving with transition from oxy to norco. Denies cough, chest pain, dyspnea, dizziness, fever, chills, or any further symptoms at this time.    Follow-up For: Procedure(s) (LRB):  CORONARY ARTERY BYPASS GRAFT (CABG) x 2 (N/A)  SURGICAL PROCUREMENT, VEIN, ENDOSCOPIC (Left)    Post-Operative Day: 2 Days Post-Op    Objective:     Vital Signs (Most Recent):  Temp: 99.1 °F (37.3 °C) (04/16/23 0730)  Pulse: 70 (04/16/23 0730)  Resp: 13 (04/16/23 0730)  BP: (!) 117/57 (04/16/23 0707)  SpO2: 95 % (04/16/23 0730)   Vital Signs (24h Range):  Temp:  [98.4 °F (36.9 °C)-100.4 °F (38 °C)] 99.1 °F (37.3 °C)  Pulse:  [64-92] 70  Resp:  [11-36] 13  SpO2:  [90 %-99 %] 95 %  BP: ()/(52-61) 117/57  Arterial Line BP: ()/(31-52) 137/49     Weight: 64 kg (141 lb 1.5 oz)  Body mass index is 25.81 kg/m².      Intake/Output Summary (Last 24 hours) at 4/16/2023 0822  Last data filed at 4/16/2023 0734  Gross per 24 hour   Intake 258.71 ml   Output 1595 ml   Net -1336.29 ml       Physical Exam  Vitals and nursing note reviewed.   Constitutional:       Appearance: Normal appearance.   HENT:      Mouth/Throat:      Mouth: Mucous membranes are moist.      Pharynx: Oropharynx is clear. No oropharyngeal exudate.   Eyes:      Conjunctiva/sclera: Conjunctivae normal.      Pupils: Pupils are equal, round, and reactive to light.   Cardiovascular:      Rate and Rhythm: Normal rate and regular rhythm.      Pulses: Normal pulses.      Heart sounds: Normal heart sounds. No murmur heard.     Comments: [place] sternotomy c/d/I with chest tubes with serosanguinous drainage  Pulmonary:      Effort: Pulmonary effort is normal.      Breath sounds: Normal breath sounds.   Abdominal:      General: Abdomen is flat. There is no distension.      Palpations: Abdomen is soft.      Tenderness: There is no abdominal tenderness.   Skin:     General: Skin is warm and dry.      Coloration:  Skin is not pale.   Neurological:      General: No focal deficit present.      Mental Status: She is alert and oriented to person, place, and time.   Psychiatric:         Mood and Affect: Mood normal.         Behavior: Behavior normal.       Vents:  Oxygen Concentration (%): 24 (04/15/23 0442)    Lines/Drains/Airways       Central Venous Catheter Line  Duration                  Percutaneous Central Line Insertion/Assessment - Quad lumen  04/14/23 0800 Subclavian Left 2 days    Percutaneous Central Line Insertion/Assessment - Quad Lumen  04/14/23 0800 Subclavian Left 2 days              Drain  Duration                  Urethral Catheter 04/14/23 0735 Non-latex;Straight-tip;Temperature probe 14 Fr. 2 days         Y Chest Tube 1 and 2 04/14/23 1115 1 Anterior Mediastinal 19 Fr. 2 Anterior Mediastinal 19 Fr. 1 day         Y Chest Tube 3 and 4 04/14/23 1115 3 Right Pleural 19 Fr. 4 Left Pleural 19 Fr. 1 day              Line  Duration                  Pacer Wires 04/14/23 1139 1 day              Peripheral Intravenous Line  Duration                  Peripheral IV - Single Lumen 04/14/23 1501 20 G Anterior;Proximal;Right Forearm 1 day                    Significant Labs:    CBC/Anemia Profile:  Recent Labs   Lab 04/14/23  1249 04/14/23  1422 04/14/23  2223 04/15/23  0258 04/16/23  0312   WBC 26.78*  --   --  15.47* 16.85*   HGB 9.2*  --   --  9.5* 8.0*   HCT 27.8*   < > 29* 29.2* 25.5*     --   --  174 188   MCV 89  --   --  88 90   RDW 13.1  --   --  13.2 13.3    < > = values in this interval not displayed.        Chemistries:  Recent Labs   Lab 04/14/23  1249 04/14/23  1918 04/15/23  0258 04/15/23  0504 04/15/23  0920 04/15/23  1935 04/16/23  0312     --   --  134*  --   --  134*   K 3.8   < >  --  4.1 4.3 4.2 4.1   *  --   --  106  --   --  102   CO2 21*  --   --  20*  --   --  24   BUN 25*  --   --  19  --   --  19   CREATININE 0.6  --   --  0.6  --   --  0.6   CALCIUM 7.8*  --   --  8.0*  --   --   8.1*   ALBUMIN 2.2*  --   --   --   --   --   --    PROT 4.1*  --   --   --   --   --   --    BILITOT 0.3  --   --   --   --   --   --    ALKPHOS 59  --   --   --   --   --   --    ALT 62*  --   --   --   --   --   --    *  --   --   --   --   --   --    MG 2.4   < > 1.7  --   --  2.0 1.8   PHOS 3.1  --  3.8  --   --   --  2.0*    < > = values in this interval not displayed.       ABGs:   Recent Labs   Lab 04/14/23 2223   PH 7.357   PCO2 37.2   HCO3 20.8*   POCSATURATED 91*   BE -5     CMP:   Recent Labs   Lab 04/14/23  1249 04/14/23  1918 04/15/23  0504 04/15/23  0920 04/15/23  1935 04/16/23 0312     --  134*  --   --  134*   K 3.8   < > 4.1 4.3 4.2 4.1   *  --  106  --   --  102   CO2 21*  --  20*  --   --  24   *  --  187*  --   --  119*   BUN 25*  --  19  --   --  19   CREATININE 0.6  --  0.6  --   --  0.6   CALCIUM 7.8*  --  8.0*  --   --  8.1*   PROT 4.1*  --   --   --   --   --    ALBUMIN 2.2*  --   --   --   --   --    BILITOT 0.3  --   --   --   --   --    ALKPHOS 59  --   --   --   --   --    *  --   --   --   --   --    ALT 62*  --   --   --   --   --    ANIONGAP 7*  --  8  --   --  8    < > = values in this interval not displayed.     Coagulation:   Recent Labs   Lab 04/16/23 0312   INR 1.0   APTT 25.3     All pertinent labs within the past 24 hours have been reviewed.    Significant Imaging:  I have reviewed all pertinent imaging results/findings within the past 24 hours.

## 2023-04-16 NOTE — NURSING
Pt R arm noticeably more swollen than L arm with non-pitting edema, A-Line present in R radial artery, pt no longer requiring pressors or anti hypertensive gtts, per MD Sourav ok to remove a-line, line removed with catheter intact,no sutures present, pressure held at the site for 10 minutes with gauze and then covered with tegaderm, pt tolerated procedure well, R arm elevated.

## 2023-04-16 NOTE — PROGRESS NOTES
"[facility] - Surgical Intensive Care  Endocrinology  Progress Note    Admit Date: 4/14/2023     Reason for Consult: Management of T2DM, Hyperglycemia     Surgical Procedure and Date: 04/13/2023 - CAD s/p CABGx2 with Dr. Kahn     Diabetes diagnosis year: 15-20 years ago    Home Diabetes Medications:    Glipizide 10 mg once daily with breakfast (listed as BID)  Basaglar 10 units daily (Patient states she has Levemir and only uses around 6 units on as needed basis)  Jardiance 10 mg daily  Metformin 1000 mg BID  Tradjenta 5 mg daily    How often checking glucose at home? 1-3 x day   BG readings on regimen: 110-150s  Hypoglycemia on the regimen?  No  Missed doses on regimen?  No    Diabetes Complications include:     Hyperglycemia    Complicating diabetes co morbidities:   History of CVA, CAD      HPI:   Patient is a 63 y.o. female with a past medical history of type 2 diabetes mellitus, prior CVA (on DAPT), HTN., HLD, tobacco abuse and CAD.  Initially presented with chest pain while cleaning her house.  She went to the emergency department and was admitted with PET stress which was positive, and follow-up coronary angiography demonstrated 2 vessel disease (now status post CABG x 2).  She is on multi drug regimen at home for diabetes mellitus and has good A1c/glucose control based on labs.  Endocrinology consulted for BG management while hospitalized.    Lab Results   Component Value Date    HGBA1C 6.9 (H) 04/11/2023               Interval HPI:   Overnight events: BG improved overnight.  Diet to be starting today.  No acute overnight events.    Eating:    Clear liquid diet  Nausea: No  Hypoglycemia and intervention: No  Fever: No  TPN and/or TF: No  If yes, type of TF/TPN and rate: NA    BP (!) 114/55 (BP Location: Right arm, Patient Position: Lying)   Pulse 77   Temp 99 °F (37.2 °C)   Resp 18   Ht 5' 2" (1.575 m)   Wt 64 kg (141 lb 1.5 oz)   SpO2 (!) 91%   Breastfeeding No   BMI 25.81 kg/m²     Labs Reviewed " and Include    Recent Labs   Lab 04/16/23  0312   *   CALCIUM 8.1*   *   K 4.1   CO2 24      BUN 19   CREATININE 0.6     Lab Results   Component Value Date    WBC 16.85 (H) 04/16/2023    HGB 8.0 (L) 04/16/2023    HCT 25.5 (L) 04/16/2023    MCV 90 04/16/2023     04/16/2023     No results for input(s): TSH, FREET4 in the last 168 hours.  Lab Results   Component Value Date    HGBA1C 6.9 (H) 04/11/2023       Nutritional status:   Body mass index is 25.81 kg/m².  Lab Results   Component Value Date    ALBUMIN 2.2 (L) 04/14/2023    ALBUMIN 3.8 04/11/2023    ALBUMIN 3.3 (L) 04/06/2023     No results found for: PREALBUMIN    Estimated Creatinine Clearance: 82.2 mL/min (based on SCr of 0.6 mg/dL).    Accu-Checks  Recent Labs     04/15/23  0105 04/15/23  0211 04/15/23  0303 04/15/23  0412 04/15/23  0509 04/15/23  0634 04/15/23  0818 04/15/23  1223 04/15/23  1724 04/15/23  2029   POCTGLUCOSE 207* 179* 178* 155* 188* 174* 162* 188* 183* 156*       Current Medications and/or Treatments Impacting Glycemic Control  Immunotherapy:    Immunosuppressants       None          Steroids:   Hormones (From admission, onward)      None          Pressors:    Autonomic Drugs (From admission, onward)      Start     Stop Route Frequency Ordered    04/15/23 0900  metoprolol tartrate (LOPRESSOR) split tablet 12.5 mg         -- Oral 2 times daily 04/15/23 0603          Hyperglycemia/Diabetes Medications:   Antihyperglycemics (From admission, onward)      Start     Stop Route Frequency Ordered    04/16/23 0945  insulin detemir U-100 (Levemir) pen 13 Units         -- SubQ Daily 04/16/23 0842    04/16/23 0900  insulin aspart U-100 pen 2-4 Units         -- SubQ 3 times daily after meals 04/16/23 0852    04/16/23 0845  insulin regular in 0.9 % NaCl 100 unit/100 mL (1 unit/mL) infusion        Question:  Enter initial dose (Units/hr):  Answer:  0.5    04/16 1000 IV Continuous 04/16/23 0843    04/15/23 0743  insulin aspart U-100  pen 0-5 Units         -- SubQ As needed (PRN) 04/15/23 0645            ASSESSMENT and PLAN    Neuro  History of CVA (cerebrovascular accident)   Continues on Atorvastatin 80 mg       Cardiac/Vascular  * Coronary artery disease  CAD s/p CABGx2 with Dr. Kahn on 4/13/23  Will target tight glycemic control given cardiac surgery      Endocrine  Controlled type 2 diabetes mellitus with complication, with long-term current use of insulin  Hyperglycemia from type 2 diabetes mellitus and stress response s/p CABG, steroids given intra op also contributing to hyperglycemia     Endocrinology consulted for BG management.   BG goal 110-140 (CTS Protocol)      - Intensive insulin drip after surgery per CTS guidelines, adjusted to transition drip 04/15/2023   - Hypoglycemia protocol in place  - If blood glucose greater than 300 and diet ordered, please ask patient not to eat food or drink anything other than water until correctional insulin has brought it back below 250    Plan:  - Will start basal/bolus insulin.     Levemir 13 units daily starting this morning   Stop transition drip 1 hour after Levemir given (transition adjusted to 0.5 u/hr for now)   Novolog with meals 2-4 units based on percent eaten   Low dose sliding scale with Accuchecks ACHS     ** Please notify Endocrine for any change and/or advance in diet**  ** Please call Endocrine for any BG related issues **          Kenyon Whittaker, DO  Endocrinology

## 2023-04-16 NOTE — RESPIRATORY THERAPY
04/16/23 0825   Patient Assessment/Suction   Level of Consciousness (AVPU) alert   Respiratory Effort Normal;Unlabored   Expansion/Accessory Muscles/Retractions no use of accessory muscles;no retractions   All Lung Fields Breath Sounds diminished   Rhythm/Pattern, Respiratory no shortness of breath reported;depth regular;pattern regular;unlabored   Cough Frequency frequent;no cough   Skin Integrity   $ Wound Care Tech Time 15 min   Area Observed Cheek;Behind ear;Nares   Skin Appearance without discoloration   PRE-TX-O2   Device (Oxygen Therapy) room air  (found patient on RA with 92% sao2)   SpO2 (!) 92 %   Pulse Oximetry Type Continuous   $ Pulse Oximetry - Multiple Charge Pulse Oximetry - Multiple   Pulse 74   Resp (!) 23   Temp 99.1 °F (37.3 °C)   Incentive Spirometer   $ Incentive Spirometer Charges done with encouragement   Administration (IS) instruction provided, initial   Number of Repetitions (IS) 10   Level Incentive Spirometer (mL) 750   Patient Tolerance (IS) fair;no adverse signs/symptoms present

## 2023-04-17 PROBLEM — Z98.890 HISTORY OF HEART SURGERY: Status: ACTIVE | Noted: 2023-04-17

## 2023-04-17 LAB
ALBUMIN SERPL BCP-MCNC: 2.6 G/DL (ref 3.5–5.2)
ALP SERPL-CCNC: 65 U/L (ref 55–135)
ALT SERPL W/O P-5'-P-CCNC: 18 U/L (ref 10–44)
ANION GAP SERPL CALC-SCNC: 10 MMOL/L (ref 8–16)
APTT PPP: 24.9 SEC (ref 21–32)
AST SERPL-CCNC: 20 U/L (ref 10–40)
BASOPHILS # BLD AUTO: 0.05 K/UL (ref 0–0.2)
BASOPHILS NFR BLD: 0.3 % (ref 0–1.9)
BILIRUB SERPL-MCNC: 0.5 MG/DL (ref 0.1–1)
BUN SERPL-MCNC: 13 MG/DL (ref 8–23)
CALCIUM SERPL-MCNC: 8.3 MG/DL (ref 8.7–10.5)
CHLORIDE SERPL-SCNC: 99 MMOL/L (ref 95–110)
CO2 SERPL-SCNC: 25 MMOL/L (ref 23–29)
CREAT SERPL-MCNC: 0.5 MG/DL (ref 0.5–1.4)
DIFFERENTIAL METHOD: ABNORMAL
EOSINOPHIL # BLD AUTO: 0.1 K/UL (ref 0–0.5)
EOSINOPHIL NFR BLD: 0.5 % (ref 0–8)
ERYTHROCYTE [DISTWIDTH] IN BLOOD BY AUTOMATED COUNT: 13.1 % (ref 11.5–14.5)
EST. GFR  (NO RACE VARIABLE): >60 ML/MIN/1.73 M^2
GLUCOSE SERPL-MCNC: 134 MG/DL (ref 70–110)
HCT VFR BLD AUTO: 25.6 % (ref 37–48.5)
HGB BLD-MCNC: 8.2 G/DL (ref 12–16)
IMM GRANULOCYTES # BLD AUTO: 0.06 K/UL (ref 0–0.04)
IMM GRANULOCYTES NFR BLD AUTO: 0.4 % (ref 0–0.5)
INR PPP: 1 (ref 0.8–1.2)
LYMPHOCYTES # BLD AUTO: 2.3 K/UL (ref 1–4.8)
LYMPHOCYTES NFR BLD: 15.1 % (ref 18–48)
MAGNESIUM SERPL-MCNC: 2 MG/DL (ref 1.6–2.6)
MCH RBC QN AUTO: 29 PG (ref 27–31)
MCHC RBC AUTO-ENTMCNC: 32 G/DL (ref 32–36)
MCV RBC AUTO: 91 FL (ref 82–98)
MONOCYTES # BLD AUTO: 1.5 K/UL (ref 0.3–1)
MONOCYTES NFR BLD: 9.9 % (ref 4–15)
NEUTROPHILS # BLD AUTO: 11.1 K/UL (ref 1.8–7.7)
NEUTROPHILS NFR BLD: 73.8 % (ref 38–73)
NRBC BLD-RTO: 0 /100 WBC
PHOSPHATE SERPL-MCNC: 3.4 MG/DL (ref 2.7–4.5)
PLATELET # BLD AUTO: 220 K/UL (ref 150–450)
PMV BLD AUTO: 9.6 FL (ref 9.2–12.9)
POCT GLUCOSE: 143 MG/DL (ref 70–110)
POCT GLUCOSE: 174 MG/DL (ref 70–110)
POCT GLUCOSE: 201 MG/DL (ref 70–110)
POCT GLUCOSE: 212 MG/DL (ref 70–110)
POTASSIUM SERPL-SCNC: 3.9 MMOL/L (ref 3.5–5.1)
POTASSIUM SERPL-SCNC: 4 MMOL/L (ref 3.5–5.1)
POTASSIUM SERPL-SCNC: 4.1 MMOL/L (ref 3.5–5.1)
PROT SERPL-MCNC: 5.7 G/DL (ref 6–8.4)
PROTHROMBIN TIME: 10.5 SEC (ref 9–12.5)
RBC # BLD AUTO: 2.83 M/UL (ref 4–5.4)
SODIUM SERPL-SCNC: 134 MMOL/L (ref 136–145)
WBC # BLD AUTO: 15.07 K/UL (ref 3.9–12.7)

## 2023-04-17 PROCEDURE — 11000001 HC ACUTE MED/SURG PRIVATE ROOM: Mod: HCNC

## 2023-04-17 PROCEDURE — 63600175 PHARM REV CODE 636 W HCPCS: Mod: HCNC

## 2023-04-17 PROCEDURE — 99291 CRITICAL CARE FIRST HOUR: CPT | Mod: HCNC,GC,, | Performed by: INTERNAL MEDICINE

## 2023-04-17 PROCEDURE — 25000003 PHARM REV CODE 250: Mod: HCNC | Performed by: THORACIC SURGERY (CARDIOTHORACIC VASCULAR SURGERY)

## 2023-04-17 PROCEDURE — 80053 COMPREHEN METABOLIC PANEL: CPT | Mod: HCNC

## 2023-04-17 PROCEDURE — 63600175 PHARM REV CODE 636 W HCPCS: Mod: HCNC | Performed by: PHYSICIAN ASSISTANT

## 2023-04-17 PROCEDURE — 25000003 PHARM REV CODE 250: Mod: HCNC

## 2023-04-17 PROCEDURE — 25000003 PHARM REV CODE 250: Mod: HCNC | Performed by: STUDENT IN AN ORGANIZED HEALTH CARE EDUCATION/TRAINING PROGRAM

## 2023-04-17 PROCEDURE — 93010 ELECTROCARDIOGRAM REPORT: CPT | Mod: HCNC,,, | Performed by: INTERNAL MEDICINE

## 2023-04-17 PROCEDURE — 94799 UNLISTED PULMONARY SVC/PX: CPT | Mod: HCNC

## 2023-04-17 PROCEDURE — 83735 ASSAY OF MAGNESIUM: CPT | Mod: HCNC | Performed by: PHYSICIAN ASSISTANT

## 2023-04-17 PROCEDURE — 85730 THROMBOPLASTIN TIME PARTIAL: CPT | Mod: HCNC | Performed by: PHYSICIAN ASSISTANT

## 2023-04-17 PROCEDURE — 85025 COMPLETE CBC W/AUTO DIFF WBC: CPT | Mod: HCNC | Performed by: PHYSICIAN ASSISTANT

## 2023-04-17 PROCEDURE — 84132 ASSAY OF SERUM POTASSIUM: CPT | Mod: 91,HCNC | Performed by: PHYSICIAN ASSISTANT

## 2023-04-17 PROCEDURE — 25000003 PHARM REV CODE 250: Mod: HCNC | Performed by: PHYSICIAN ASSISTANT

## 2023-04-17 PROCEDURE — 99291 PR CRITICAL CARE, E/M 30-74 MINUTES: ICD-10-PCS | Mod: HCNC,GC,, | Performed by: INTERNAL MEDICINE

## 2023-04-17 PROCEDURE — 84100 ASSAY OF PHOSPHORUS: CPT | Mod: HCNC | Performed by: PHYSICIAN ASSISTANT

## 2023-04-17 PROCEDURE — 94761 N-INVAS EAR/PLS OXIMETRY MLT: CPT | Mod: HCNC

## 2023-04-17 PROCEDURE — 93010 EKG 12-LEAD: ICD-10-PCS | Mod: HCNC,,, | Performed by: INTERNAL MEDICINE

## 2023-04-17 PROCEDURE — 85610 PROTHROMBIN TIME: CPT | Mod: HCNC | Performed by: PHYSICIAN ASSISTANT

## 2023-04-17 PROCEDURE — 93005 ELECTROCARDIOGRAM TRACING: CPT | Mod: HCNC

## 2023-04-17 RX ORDER — METOPROLOL TARTRATE 25 MG/1
25 TABLET, FILM COATED ORAL 2 TIMES DAILY
Status: DISCONTINUED | OUTPATIENT
Start: 2023-04-17 | End: 2023-04-19

## 2023-04-17 RX ORDER — METOPROLOL TARTRATE 1 MG/ML
INJECTION, SOLUTION INTRAVENOUS
Status: DISPENSED
Start: 2023-04-17 | End: 2023-04-18

## 2023-04-17 RX ORDER — ADENOSINE 3 MG/ML
6 INJECTION, SOLUTION INTRAVENOUS EVERY 5 MIN PRN
Status: DISCONTINUED | OUTPATIENT
Start: 2034-09-13 | End: 2023-04-22 | Stop reason: HOSPADM

## 2023-04-17 RX ORDER — POLYETHYLENE GLYCOL 3350 17 G/17G
17 POWDER, FOR SOLUTION ORAL 2 TIMES DAILY
Status: CANCELLED | OUTPATIENT
Start: 2023-04-17

## 2023-04-17 RX ORDER — ADENOSINE 3 MG/ML
6 INJECTION, SOLUTION INTRAVENOUS
Status: DISCONTINUED | OUTPATIENT
Start: 2023-04-17 | End: 2023-04-17

## 2023-04-17 RX ORDER — ADENOSINE 3 MG/ML
3 INJECTION, SOLUTION INTRAVENOUS EVERY 5 MIN PRN
Status: DISCONTINUED | OUTPATIENT
Start: 2023-04-17 | End: 2023-04-17

## 2023-04-17 RX ORDER — ADENOSINE 3 MG/ML
3 INJECTION, SOLUTION INTRAVENOUS ONCE AS NEEDED
Status: DISCONTINUED | OUTPATIENT
Start: 2023-04-17 | End: 2023-04-22 | Stop reason: HOSPADM

## 2023-04-17 RX ORDER — METOPROLOL TARTRATE 1 MG/ML
5 INJECTION, SOLUTION INTRAVENOUS EVERY 5 MIN PRN
Status: DISCONTINUED | OUTPATIENT
Start: 2023-04-17 | End: 2023-04-20

## 2023-04-17 RX ORDER — AMOXICILLIN 250 MG
2 CAPSULE ORAL 2 TIMES DAILY
Status: CANCELLED | OUTPATIENT
Start: 2023-04-17

## 2023-04-17 RX ADMIN — HYDROCODONE BITARTRATE AND ACETAMINOPHEN 1 TABLET: 10; 325 TABLET ORAL at 03:04

## 2023-04-17 RX ADMIN — HYDROCODONE BITARTRATE AND ACETAMINOPHEN 1 TABLET: 10; 325 TABLET ORAL at 12:04

## 2023-04-17 RX ADMIN — INSULIN ASPART 1 UNITS: 100 INJECTION, SOLUTION INTRAVENOUS; SUBCUTANEOUS at 04:04

## 2023-04-17 RX ADMIN — GABAPENTIN 300 MG: 300 CAPSULE ORAL at 03:04

## 2023-04-17 RX ADMIN — POTASSIUM CHLORIDE 20 MEQ: 14.9 INJECTION, SOLUTION INTRAVENOUS at 10:04

## 2023-04-17 RX ADMIN — METOCLOPRAMIDE 5 MG: 5 INJECTION, SOLUTION INTRAMUSCULAR; INTRAVENOUS at 02:04

## 2023-04-17 RX ADMIN — SUCRALFATE 1 G: 1 TABLET ORAL at 04:04

## 2023-04-17 RX ADMIN — GABAPENTIN 300 MG: 300 CAPSULE ORAL at 08:04

## 2023-04-17 RX ADMIN — AMIODARONE HYDROCHLORIDE 1 MG/MIN: 1.8 INJECTION, SOLUTION INTRAVENOUS at 02:04

## 2023-04-17 RX ADMIN — ATORVASTATIN CALCIUM 80 MG: 40 TABLET, FILM COATED ORAL at 08:04

## 2023-04-17 RX ADMIN — ASPIRIN 81 MG CHEWABLE TABLET 81 MG: 81 TABLET CHEWABLE at 08:04

## 2023-04-17 RX ADMIN — INSULIN ASPART 2 UNITS: 100 INJECTION, SOLUTION INTRAVENOUS; SUBCUTANEOUS at 01:04

## 2023-04-17 RX ADMIN — ACETAMINOPHEN 650 MG: 325 TABLET ORAL at 01:04

## 2023-04-17 RX ADMIN — METOCLOPRAMIDE 5 MG: 5 INJECTION, SOLUTION INTRAMUSCULAR; INTRAVENOUS at 09:04

## 2023-04-17 RX ADMIN — METOPROLOL TARTRATE 25 MG: 25 TABLET, FILM COATED ORAL at 08:04

## 2023-04-17 RX ADMIN — PANTOPRAZOLE SODIUM 40 MG: 40 TABLET, DELAYED RELEASE ORAL at 06:04

## 2023-04-17 RX ADMIN — INSULIN ASPART 2 UNITS: 100 INJECTION, SOLUTION INTRAVENOUS; SUBCUTANEOUS at 07:04

## 2023-04-17 RX ADMIN — MUPIROCIN 1 G: 20 OINTMENT TOPICAL at 08:04

## 2023-04-17 RX ADMIN — INSULIN ASPART 2 UNITS: 100 INJECTION, SOLUTION INTRAVENOUS; SUBCUTANEOUS at 12:04

## 2023-04-17 RX ADMIN — AMIODARONE HYDROCHLORIDE 0.5 MG/MIN: 1.8 INJECTION, SOLUTION INTRAVENOUS at 08:04

## 2023-04-17 RX ADMIN — HYDROCODONE BITARTRATE AND ACETAMINOPHEN 1 TABLET: 10; 325 TABLET ORAL at 08:04

## 2023-04-17 RX ADMIN — INSULIN DETEMIR 13 UNITS: 100 INJECTION, SOLUTION SUBCUTANEOUS at 09:04

## 2023-04-17 RX ADMIN — CLOPIDOGREL BISULFATE 75 MG: 75 TABLET ORAL at 08:04

## 2023-04-17 RX ADMIN — ONDANSETRON 4 MG: 2 INJECTION INTRAMUSCULAR; INTRAVENOUS at 08:04

## 2023-04-17 RX ADMIN — HYDROMORPHONE HYDROCHLORIDE 0.2 MG: 1 INJECTION, SOLUTION INTRAMUSCULAR; INTRAVENOUS; SUBCUTANEOUS at 06:04

## 2023-04-17 RX ADMIN — HYDROCODONE BITARTRATE AND ACETAMINOPHEN 1 TABLET: 10; 325 TABLET ORAL at 11:04

## 2023-04-17 RX ADMIN — HYDROCODONE BITARTRATE AND ACETAMINOPHEN 1 TABLET: 10; 325 TABLET ORAL at 07:04

## 2023-04-17 RX ADMIN — ONDANSETRON 4 MG: 2 INJECTION INTRAMUSCULAR; INTRAVENOUS at 09:04

## 2023-04-17 RX ADMIN — SODIUM PHOSPHATE, MONOBASIC, MONOHYDRATE AND SODIUM PHOSPHATE, DIBASIC, ANHYDROUS 20.01 MMOL: 142; 276 INJECTION, SOLUTION INTRAVENOUS at 01:04

## 2023-04-17 RX ADMIN — SUCRALFATE 1 G: 1 TABLET ORAL at 12:04

## 2023-04-17 RX ADMIN — SUCRALFATE 1 G: 1 TABLET ORAL at 08:04

## 2023-04-17 RX ADMIN — ACETAMINOPHEN 650 MG: 325 TABLET ORAL at 06:04

## 2023-04-17 NOTE — ANESTHESIA POSTPROCEDURE EVALUATION
Anesthesia Post Evaluation    Patient: Jud Villa    Procedure(s) Performed: Procedure(s) (LRB):  CORONARY ARTERY BYPASS GRAFT (CABG) x 2 (N/A)  SURGICAL PROCUREMENT, VEIN, ENDOSCOPIC (Left)    Final Anesthesia Type: general      Patient location during evaluation: ICU  Patient participation: Yes- Able to Participate  Level of consciousness: awake and alert  Post-procedure vital signs: reviewed and stable  Pain management: adequate  Airway patency: patent    PONV status at discharge: No PONV  Anesthetic complications: no      Cardiovascular status: hemodynamically stable  Respiratory status: unassisted  Hydration status: euvolemic  Follow-up not needed.          Vitals Value Taken Time   /56 04/17/23 0748   Temp 36.8 °C (98.2 °F) 04/17/23 0701   Pulse 75 04/17/23 0758   Resp 22 04/17/23 0758   SpO2 89 % 04/17/23 0758   Vitals shown include unvalidated device data.      No case tracking events are documented in the log.      Pain/Javed Score: Pain Rating Prior to Med Admin: 10 (4/17/2023  6:27 AM)  Pain Rating Post Med Admin: 2 (4/17/2023  4:39 AM)

## 2023-04-17 NOTE — PT/OT/SLP PROGRESS
Physical Therapy      Patient Name:  Jud Villa   MRN:  [id number]    Patient not seen today secondary to  (AM- pt not seen due to being nauseated. PT was not able to make 2nd attempt to treat pt.). Will follow-up at a later date.    4/17/2023  .

## 2023-04-17 NOTE — CARE UPDATE
SICU Staff Addendum  Please see resident note from 4/17 for full progress note     Reason for admission:  Coronary artery disease  Present on Admission:   COPD (chronic obstructive pulmonary disease)   Coronary artery disease   Acute blood loss anemia      Goals for Today:   - POD 3 s/p CABG   - OOB to chair. Multimodal analgesia. No additional episodes of apnea 2/2 to opiates   - elevated HR periodically, will review tele. Ensure electrolytes K>4 Mg >2. Inc BB  - DAPT (recent stroke Feb of this year); Statin; increasing BB   - chest tubes per CTS   - advance diet, nausea persists but this is a chronic issue (hx of gastritis)   - autodiuresing; if she slows down push lasix later on this afternoon   - UA not concerning for infection     40  minutes of critical care time was spent personally by me on the following activities: development of treatment plan with patient or surrogate and bedside caregivers, discussions with consultants, evaluation of patient's response to treatment, examination of patient, ordering and performing treatments and interventions, ordering and review of laboratory studies, ordering and review of radiographic studies, pulse oximetry, re-evaluation of patient's condition.  This critical care time did not overlap with that of any other provider or involve time for any procedures.    Brisa Oropeza MD  Anesthesia Critical Care  Spectra [phone]

## 2023-04-17 NOTE — PLAN OF CARE
Endocrine Plan of Care  04/17/2023       BG 130s-170s in last 24h on current regimen. No changes to insulin regimen today. Now has cardiac diet ordered.      ASSESSMENT and PLAN     Neuro  History of CVA (cerebrovascular accident)   Continues on Atorvastatin 80 mg         Cardiac/Vascular  * Coronary artery disease  CAD s/p CABGx2 with Dr. Kahn on 4/13/23  Will target tight glycemic control given cardiac surgery        Endocrine  Controlled type 2 diabetes mellitus with complication, with long-term current use of insulin  Hyperglycemia from type 2 diabetes mellitus and stress response s/p CABG, steroids given intra op also contributing to hyperglycemia     Endocrinology consulted for BG management.   BG goal 110-140 (CTS Protocol)      - Intensive insulin drip after surgery per CTS guidelines, adjusted to transition drip 04/15/2023 and then switched to MDI on 4/16/2023  - Hypoglycemia protocol in place  - If blood glucose greater than 300 and diet ordered, please ask patient not to eat food or drink anything other than water until correctional insulin has brought it back below 250     Plan:     Continue Levemir 13 units daily      Continue Novolog with meals 2-4 units based on percent eaten     Continue Novolog low dose correction scale prn   Accuchecks ACHS       ** Please notify Endocrine for any change and/or advance in diet**  ** Please call Endocrine for any BG related issues **              Nicole Gonzalez MD  Endocrinology

## 2023-04-17 NOTE — SUBJECTIVE & OBJECTIVE
Interval History/Significant Events:   No acute events overnight. Brief runs of SVT this morning, adenosine at bedside. Pain better controlled than days previous. Still with nausea requiring prns. Tolerating clears.     Follow-up For: Procedure(s) (LRB):  CORONARY ARTERY BYPASS GRAFT (CABG) x 2 (N/A)  SURGICAL PROCUREMENT, VEIN, ENDOSCOPIC (Left)    Post-Operative Day: 3 Days Post-Op    Objective:     Vital Signs (Most Recent):  Temp: 98.2 °F (36.8 °C) (04/17/23 0701)  Pulse: 61 (04/17/23 1000)  Resp: 15 (04/17/23 1000)  BP: (!) 115/53 (04/17/23 1000)  SpO2: (!) 88 % (04/17/23 1000)   Vital Signs (24h Range):  Temp:  [97.6 °F (36.4 °C)-99.7 °F (37.6 °C)] 98.2 °F (36.8 °C)  Pulse:  [] 61  Resp:  [10-29] 15  SpO2:  [87 %-100 %] 88 %  BP: (104-163)/(52-78) 115/53     Weight: 64 kg (141 lb 1.5 oz)  Body mass index is 25.81 kg/m².      Intake/Output Summary (Last 24 hours) at 4/17/2023 1104  Last data filed at 4/17/2023 0528  Gross per 24 hour   Intake 0.12 ml   Output 1600 ml   Net -1599.88 ml       Physical Exam  Vitals and nursing note reviewed.   Constitutional:       Appearance: Normal appearance.   HENT:      Mouth/Throat:      Mouth: Mucous membranes are moist.      Pharynx: Oropharynx is clear. No oropharyngeal exudate.   Eyes:      Conjunctiva/sclera: Conjunctivae normal.      Pupils: Pupils are equal, round, and reactive to light.   Cardiovascular:      Rate and Rhythm: Normal rate and regular rhythm.      Pulses: Normal pulses.      Heart sounds: Normal heart sounds. No murmur heard.     Comments: Midling sternotomy c/d/I with chest tubes with serosanguinous drainage  Pulmonary:      Effort: Pulmonary effort is normal.      Breath sounds: Normal breath sounds.   Abdominal:      General: Abdomen is flat. There is no distension.      Palpations: Abdomen is soft.      Tenderness: There is no abdominal tenderness.   Skin:     General: Skin is warm and dry.      Coloration: Skin is not pale.   Neurological:       General: No focal deficit present.      Mental Status: She is alert and oriented to person, place, and time.   Psychiatric:         Mood and Affect: Mood normal.         Behavior: Behavior normal.       Vents:  Oxygen Concentration (%): 24 (04/16/23 2015)    Lines/Drains/Airways       Central Venous Catheter Line  Duration                  Percutaneous Central Line Insertion/Assessment - Quad lumen  04/14/23 0800 Subclavian Left 3 days    Percutaneous Central Line Insertion/Assessment - Quad Lumen  04/14/23 0800 Subclavian Left 3 days              Drain  Duration                  Y Chest Tube 3 and 4 04/14/23 1115 3 Right Pleural 19 Fr. 4 Left Pleural 19 Fr. 2 days              Line  Duration                  Pacer Wires 04/14/23 1139 2 days              Peripheral Intravenous Line  Duration                  Peripheral IV - Single Lumen 04/14/23 1501 20 G Anterior;Proximal;Right Forearm 2 days                    Significant Labs:    CBC/Anemia Profile:  Recent Labs   Lab 04/16/23 0312 04/17/23  0339   WBC 16.85* 15.07*   HGB 8.0* 8.2*   HCT 25.5* 25.6*    220   MCV 90 91   RDW 13.3 13.1        Chemistries:  Recent Labs   Lab 04/16/23 0312 04/16/23 2003 04/17/23  0339 04/17/23  0619 04/17/23  0752   *  --   --  134*  --    K 4.1 4.0  --  4.1 3.9     --   --  99  --    CO2 24  --   --  25  --    BUN 19  --   --  13  --    CREATININE 0.6  --   --  0.5  --    CALCIUM 8.1*  --   --  8.3*  --    ALBUMIN  --   --   --  2.6*  --    PROT  --   --   --  5.7*  --    BILITOT  --   --   --  0.5  --    ALKPHOS  --   --   --  65  --    ALT  --   --   --  18  --    AST  --   --   --  20  --    MG 1.8 2.0 2.0  --   --    PHOS 2.0* 1.9* 3.4  --   --        All pertinent labs within the past 24 hours have been reviewed.    Significant Imaging:  I have reviewed all pertinent imaging results/findings within the past 24 hours.

## 2023-04-17 NOTE — ASSESSMENT & PLAN NOTE
  Neuro/Psych:     - Hx CVA this year, continue plavix  - Pain:    - Scheduled Tylenol 1g q8h   - dilaudid PRN, norco PRN             Cardiac:     - S/P CABGx2 (LIMA-LAD, SVG-OM) with Dr. Kahn on 4/14/2023    - BP Goal: MAP 60-80, SBP < 140     - off pressors and cleviprex    - continue ASA/plavix, statin    - increase lopressor to 25 BID    - Anti-HTNs: Will start when appropriate    - Rhythm: NSR, short runs of SVT (adenosine at bedside)       Pulmonary:     - Goal SpO2 >92%    - pleural chest tube with 210cc over 24 hour    - ABGs PRN      Renal:    - Trend BUN/Cr     - Maintain Yang, record strict Is/Os      FEN / GI:     - Daily CMP, PRN K/Mag/Phos per protocol    - zofran and compazine for nausea, daily ekgs to monitor prolonged qtc    - Replace electrolytes as needed    - Nutrition: cardiac     - Bowel Regimen: Miralax, docusate      ID:     - Afebrile, WBC 15 (16)    - UA negative     - daily CBC      Heme/Onc:     - Hgb 8.2 from 8    - DAPT    - CBC daily     Endocrine:     - CTS Goal -140    - HgbA1c: 6.9    - Endocrinology consulted for insulin management      PPx:   Feeding: cardiac  Analgesia/Sedation: multimodal   Thromboembolic Prevention: SCDs  HOB >30: Yes  Stress Ulcer: pepcid  Glucose Control: Yes, insulin management per Endocrinology     Lines/Drains/Airway:   Lft subclavian CVC   Chest Tubes: 2 Pleural    Pacing Wires: Temporary ventricular pacing wires      Dispo/Code Status/Palliative:     - Continue SICU Care    - Full Code

## 2023-04-17 NOTE — PLAN OF CARE
"      SICU PLAN OF CARE NOTE    Dx: Coronary artery disease    Shift Events: NAEON    Goals of Care: remove remaining CT, SD/d'c    Neuro: AAO x4, Follows Commands, and Moves All Extremities    Vital Signs: BP (!) 163/67 (BP Location: Right arm, Patient Position: Lying)   Pulse 68   Temp 98.2 °F (36.8 °C) (Oral)   Resp 12   Ht 5' 2" (1.575 m)   Wt 64 kg (141 lb 1.5 oz)   SpO2 97%   Breastfeeding No   BMI 25.81 kg/m²     Respiratory: Room Air    Diet: Clear Liquid    Gtts: none    Urine Output: Voids Spontaneously 1200 cc/shift    Drains: Chest Tube, total output 30 cc /  shift     Labs/Accuchecks: daily/achs    Skin:MSI and CT dressings CDI, foams to heels and sacrum, waffle mattress in place and inflated properly, pt able to shift weight independently       "

## 2023-04-17 NOTE — PLAN OF CARE
"      SICU PLAN OF CARE NOTE    Dx: Coronary artery disease    Shift Events: Pt with rapid heart rate in SVT and Afib- PO Metoprolol dose increased Amiodarone continuous infusion infusion started. Pain management addressed and encouraged to help pt with comfort and to be able to cough and deep breathe. Pt also encouraged to performed IS- 10x/hr as tolerated. 20 mEQ KCL IVPB replaced for K- 3.9; Pt has been refusing stool softeners with LBM per pt on 4/14- pt agreed to take stool softners. Diet advanced from clear liquids to Cardiac. Pt still with nausea- zofran and reglan given per MD order- per pt Reglan is the most effective      Goals of Care: Diurese, increase PO intake as tolerated, pain management, OOB to chair    Neuro: AAO x4, Follows Commands, and Moves All Extremities    Vital Signs: /61 (BP Location: Right arm, Patient Position: Sitting)   Pulse 71   Temp 98.2 °F (36.8 °C) (Oral)   Resp 20   Ht 5' 2" (1.575 m)   Wt 64 kg (141 lb 1.5 oz)   SpO2 98%   Breastfeeding No   BMI 25.81 kg/m²     Respiratory: Nasal Cannula    Diet: Cardiac Diet    Gtts: Amiodarone    Urine Output: Voids Spontaneously 1550cc/shift    Drains: Chest Tube, total output 20 cc /  shift                 Labs/Accuchecks: Potassium q 12hr, Accuchecks ACHS    Skin: Midsternal incision, Pleural chest tubes in place- otherwise intact      "

## 2023-04-17 NOTE — PLAN OF CARE
[facility] - Surgical Intensive Care  Initial Discharge Assessment       Primary Care Provider: Bo Lopez MD    Admission Diagnosis: Coronary artery disease, unspecified vessel or lesion type, unspecified whether angina present, unspecified whether native or transplanted heart [I25.10]  Coronary artery disease of native heart with stable angina pectoris, unspecified vessel or lesion type [I25.118]    Admission Date: 4/14/2023  Expected Discharge Date: 4/21/2023    Discharge Barriers Identified: None    Payor: HU[employer] MANAGED MEDICARE / Plan: [employer] [place] HMO PPO SPECIAL NEEDS / Product Type: Medicare Advantage /     Extended Emergency Contact Information  Primary Emergency Contact: Katarina Villa  Address: [address]           [place] [address] [place] [facility] [place] [facility] [place]  Mobile Phone: [phone]  Relation: Daughter    Discharge Plan A: Home  Discharge Plan B: Home Health      [id number] - [place] - [address]  [address] [facility] [address]  [address] [place] [address]  Phone: [phone] Fax: [phone]    [id number] - [place], [place] - [address] AT [address]  [address]  [address]  [place] [address]  Phone: [phone] Fax: [phone]      Initial Assessment (most recent)       Adult Discharge Assessment - 04/17/23 1026          Discharge Assessment    Assessment Type Discharge Planning Assessment     Confirmed/corrected address, phone number and insurance Yes     Confirmed Demographics Correct on Facesheet     Source of Information patient     Communicated JORGE with patient/caregiver Date not available/Unable to determine     Reason For Admission Coronary artery disease     People in Home alone     Do you expect to return to your current living situation? Yes     Do you have help at home or someone to help you manage your care at home? Yes     Who are your caregiver(s) and their phone number(s)? Katarina  Allen [phone]     Home Layout Able to live on 1st floor     Equipment Currently Used at Home none     Readmission within 30 days? Yes     Patient currently being followed by outpatient case management? No     Do you currently have service(s) that help you manage your care at home? No     Do you take prescription medications? Yes     Do you have prescription coverage? Yes     Coverage [employer] MANAGED MEDICARE - [employer] [facility] HMO PPO SPECIAL NEEDS     Is the patient taking medications as prescribed? yes     How do you get to doctors appointments? family or friend will provide     Are you on dialysis? No     Do you take coumadin? No     Discharge Plan A Home     Discharge Plan B Home Health     DME Needed Upon Discharge  other (see comments)   TBD    Discharge Plan discussed with: Patient     Discharge Barriers Identified None        Physical Activity    On average, how many days per week do you engage in moderate to strenuous exercise (like a brisk walk)? Patient refused        Financial Resource Strain    How hard is it for you to pay for the very basics like food, housing, medical care, and heating? Patient refused        Housing Stability    In the last 12 months, was there a time when you were not able to pay the mortgage or rent on time? Patient refused     In the last 12 months, was there a time when you did not have a steady place to sleep or slept in a shelter (including now)? Patient refused        Transportation Needs    In the past 12 months, has lack of transportation kept you from medical appointments or from getting medications? Patient refused     In the past 12 months, has lack of transportation kept you from meetings, work, or from getting things needed for daily living? Patient refused        Food Insecurity    Within the past 12 months, you worried that your food would run out before you got the money to buy more. Patient refused     Within the past 12 months, the food you bought just didn't  last and you didn't have money to get more. Patient refused        Stress    Do you feel stress - tense, restless, nervous, or anxious, or unable to sleep at night because your mind is troubled all the time - these days? Patient refused        Social Connections    In a typical week, how many times do you talk on the phone with family, friends, or neighbors? Patient refused     How often do you get together with friends or relatives? Patient refused     How often do you attend Taoism or Gnosticism services? Patient refused     How often do you attend meetings of the clubs or organizations you belong to? Patient refused     Are you , , , , never , or living with a partner? Patient refused        Alcohol Use    Q1: How often do you have a drink containing alcohol? Patient refused     Q2: How many drinks containing alcohol do you have on a typical day when you are drinking? Patient refused     Q3: How often do you have six or more drinks on one occasion? Patient refused                      This SW met with patient at bedside to complete DPA. Questions answered / contact numbers provided.  Use PREFERRED PHARMACY / BEDSIDE DELIVERY for any necessary medications at time of discharge. The patient is independent with all ADLs - does not use DME, In-home equipment, is not on HD, BTs or home oxygen.The patient's daughter will be assisting with help upon discharge. The patient reported that she would need transportation upon d/c.  Hospital follow up will be scheduled with PCP. Will continue to follow for course of hospitalization.     Terrance Velasquez LMSW  Case Management [facility]  Ext: [phone]

## 2023-04-17 NOTE — PROGRESS NOTES
[facility]  Critical Care - Surgery  Progress Note    Patient Name: Jud Villa  MRN: [id number]  Admission Date: 4/14/2023  Hospital Length of Stay: 3 days  Code Status: Full Code  Attending Provider: Igor Kahn MD  Primary Care Provider: Bo Lopez MD   Principal Problem: Coronary artery disease    Subjective:     Hospital/ICU Course:  Patient admitted to SICU, extubated, following CABG. Apneic upon arrival, improved with narcan. Started on ASA and statin on POD0. POD1 started lopressor, antiemetics with daily ekg, transitioned oxy to norco with improvement in nausea. POD2 mildly febrile with leukocytosis, bazzi removed and UA obtained, lasix started, advance diet as tolerated.      Interval History/Significant Events:   No acute events overnight. Brief runs of SVT this morning, adenosine at bedside. Pain better controlled than days previous. Still with nausea requiring prns. Tolerating clears.     Follow-up For: Procedure(s) (LRB):  CORONARY ARTERY BYPASS GRAFT (CABG) x 2 (N/A)  SURGICAL PROCUREMENT, VEIN, ENDOSCOPIC (Left)    Post-Operative Day: 3 Days Post-Op    Objective:     Vital Signs (Most Recent):  Temp: 98.2 °F (36.8 °C) (04/17/23 0701)  Pulse: 61 (04/17/23 1000)  Resp: 15 (04/17/23 1000)  BP: (!) 115/53 (04/17/23 1000)  SpO2: (!) 88 % (04/17/23 1000)   Vital Signs (24h Range):  Temp:  [97.6 °F (36.4 °C)-99.7 °F (37.6 °C)] 98.2 °F (36.8 °C)  Pulse:  [] 61  Resp:  [10-29] 15  SpO2:  [87 %-100 %] 88 %  BP: (104-163)/(52-78) 115/53     Weight: 64 kg (141 lb 1.5 oz)  Body mass index is 25.81 kg/m².      Intake/Output Summary (Last 24 hours) at 4/17/2023 1104  Last data filed at 4/17/2023 0528  Gross per 24 hour   Intake 0.12 ml   Output 1600 ml   Net -1599.88 ml       Physical Exam  Vitals and nursing note reviewed.   Constitutional:       Appearance: Normal appearance.   HENT:      Mouth/Throat:      Mouth: Mucous membranes are moist.      Pharynx:  Oropharynx is clear. No oropharyngeal exudate.   Eyes:      Conjunctiva/sclera: Conjunctivae normal.      Pupils: Pupils are equal, round, and reactive to light.   Cardiovascular:      Rate and Rhythm: Normal rate and regular rhythm.      Pulses: Normal pulses.      Heart sounds: Normal heart sounds. No murmur heard.     Comments: [place] sternotomy c/d/I with chest tubes with serosanguinous drainage  Pulmonary:      Effort: Pulmonary effort is normal.      Breath sounds: Normal breath sounds.   Abdominal:      General: Abdomen is flat. There is no distension.      Palpations: Abdomen is soft.      Tenderness: There is no abdominal tenderness.   Skin:     General: Skin is warm and dry.      Coloration: Skin is not pale.   Neurological:      General: No focal deficit present.      Mental Status: She is alert and oriented to person, place, and time.   Psychiatric:         Mood and Affect: Mood normal.         Behavior: Behavior normal.       Vents:  Oxygen Concentration (%): 24 (04/16/23 2015)    Lines/Drains/Airways       Central Venous Catheter Line  Duration                  Percutaneous Central Line Insertion/Assessment - Quad lumen  04/14/23 0800 Subclavian Left 3 days    Percutaneous Central Line Insertion/Assessment - Quad Lumen  04/14/23 0800 Subclavian Left 3 days              Drain  Duration                  Y Chest Tube 3 and 4 04/14/23 1115 3 Right Pleural 19 Fr. 4 Left Pleural 19 Fr. 2 days              Line  Duration                  Pacer Wires 04/14/23 1139 2 days              Peripheral Intravenous Line  Duration                  Peripheral IV - Single Lumen 04/14/23 1501 20 G Anterior;Proximal;Right Forearm 2 days                    Significant Labs:    CBC/Anemia Profile:  Recent Labs   Lab 04/16/23 0312 04/17/23  0339   WBC 16.85* 15.07*   HGB 8.0* 8.2*   HCT 25.5* 25.6*    220   MCV 90 91   RDW 13.3 13.1        Chemistries:  Recent Labs   Lab 04/16/23 0312 04/16/23 2003 04/17/23  0339  04/17/23  0619 04/17/23  0752   *  --   --  134*  --    K 4.1 4.0  --  4.1 3.9     --   --  99  --    CO2 24  --   --  25  --    BUN 19  --   --  13  --    CREATININE 0.6  --   --  0.5  --    CALCIUM 8.1*  --   --  8.3*  --    ALBUMIN  --   --   --  2.6*  --    PROT  --   --   --  5.7*  --    BILITOT  --   --   --  0.5  --    ALKPHOS  --   --   --  65  --    ALT  --   --   --  18  --    AST  --   --   --  20  --    MG 1.8 2.0 2.0  --   --    PHOS 2.0* 1.9* 3.4  --   --        All pertinent labs within the past 24 hours have been reviewed.    Significant Imaging:  I have reviewed all pertinent imaging results/findings within the past 24 hours.    Assessment/Plan:     Cardiac/Vascular  * Coronary artery disease    Neuro/Psych:     - Hx CVA this year, continue plavix  - Pain:    - Scheduled Tylenol 1g q8h   - dilaudid PRN, norco PRN             Cardiac:     - S/P CABGx2 (LIMA-LAD, SVG-OM) with Dr. Kahn on 4/14/2023    - BP Goal: MAP 60-80, SBP < 140     - off pressors and cleviprex    - continue ASA/plavix, statin    - increase lopressor to 25 BID    - Anti-HTNs: Will start when appropriate    - Rhythm: NSR, short runs of SVT (adenosine at bedside)       Pulmonary:     - Goal SpO2 >92%    - pleural chest tube with 210cc over 24 hour    - ABGs PRN      Renal:    - Trend BUN/Cr     - Maintain Yang, record strict Is/Os      FEN / GI:     - Daily CMP, PRN K/Mag/Phos per protocol    - zofran and compazine for nausea, daily ekgs to monitor prolonged qtc    - Replace electrolytes as needed    - Nutrition: cardiac     - Bowel Regimen: Miralax, docusate      ID:     - Afebrile, WBC 15 (16)    - UA negative     - daily CBC      Heme/Onc:     - Hgb 8.2 from 8    - DAPT    - CBC daily     Endocrine:     - CTS Goal -140    - HgbA1c: 6.9    - Endocrinology consulted for insulin management      PPx:   Feeding: cardiac  Analgesia/Sedation: multimodal   Thromboembolic Prevention: SCDs  HOB >30:  Yes  Stress Ulcer: pepcid  Glucose Control: Yes, insulin management per Endocrinology     Lines/Drains/Airway:   Lft subclavian CVC   Chest Tubes: 2 Pleural    Pacing Wires: Temporary ventricular pacing wires      Dispo/Code Status/Palliative:     - Continue SICU Care    - Full Code           Critical care was time spent personally by me on the following activities: development of treatment plan with patient or surrogate and bedside caregivers, discussions with consultants, evaluation of patient's response to treatment, examination of patient, ordering and performing treatments and interventions, ordering and review of laboratory studies, ordering and review of radiographic studies, pulse oximetry, re-evaluation of patient's condition.  This critical care time did not overlap with that of any other provider or involve time for any procedures.     Nga Purvis MD  Critical Care - Surgery  [facility]

## 2023-04-18 LAB
ALBUMIN SERPL BCP-MCNC: 2.4 G/DL (ref 3.5–5.2)
ALP SERPL-CCNC: 63 U/L (ref 55–135)
ALT SERPL W/O P-5'-P-CCNC: 16 U/L (ref 10–44)
ANION GAP SERPL CALC-SCNC: 10 MMOL/L (ref 8–16)
APTT PPP: 24.8 SEC (ref 21–32)
AST SERPL-CCNC: 19 U/L (ref 10–40)
BASOPHILS # BLD AUTO: 0.04 K/UL (ref 0–0.2)
BASOPHILS NFR BLD: 0.3 % (ref 0–1.9)
BILIRUB SERPL-MCNC: 0.4 MG/DL (ref 0.1–1)
BUN SERPL-MCNC: 11 MG/DL (ref 8–23)
CALCIUM SERPL-MCNC: 8.2 MG/DL (ref 8.7–10.5)
CHLORIDE SERPL-SCNC: 97 MMOL/L (ref 95–110)
CO2 SERPL-SCNC: 28 MMOL/L (ref 23–29)
CREAT SERPL-MCNC: 0.5 MG/DL (ref 0.5–1.4)
DIFFERENTIAL METHOD: ABNORMAL
EOSINOPHIL # BLD AUTO: 0.1 K/UL (ref 0–0.5)
EOSINOPHIL NFR BLD: 0.5 % (ref 0–8)
ERYTHROCYTE [DISTWIDTH] IN BLOOD BY AUTOMATED COUNT: 13.2 % (ref 11.5–14.5)
EST. GFR  (NO RACE VARIABLE): >60 ML/MIN/1.73 M^2
GLUCOSE SERPL-MCNC: 168 MG/DL (ref 70–110)
HCT VFR BLD AUTO: 24.5 % (ref 37–48.5)
HGB BLD-MCNC: 7.9 G/DL (ref 12–16)
IMM GRANULOCYTES # BLD AUTO: 0.05 K/UL (ref 0–0.04)
IMM GRANULOCYTES NFR BLD AUTO: 0.4 % (ref 0–0.5)
INR PPP: 1 (ref 0.8–1.2)
LYMPHOCYTES # BLD AUTO: 2.1 K/UL (ref 1–4.8)
LYMPHOCYTES NFR BLD: 16.1 % (ref 18–48)
MAGNESIUM SERPL-MCNC: 2 MG/DL (ref 1.6–2.6)
MCH RBC QN AUTO: 28.9 PG (ref 27–31)
MCHC RBC AUTO-ENTMCNC: 32.2 G/DL (ref 32–36)
MCV RBC AUTO: 90 FL (ref 82–98)
MONOCYTES # BLD AUTO: 1.3 K/UL (ref 0.3–1)
MONOCYTES NFR BLD: 9.9 % (ref 4–15)
NEUTROPHILS # BLD AUTO: 9.6 K/UL (ref 1.8–7.7)
NEUTROPHILS NFR BLD: 72.8 % (ref 38–73)
NRBC BLD-RTO: 0 /100 WBC
PHOSPHATE SERPL-MCNC: 2 MG/DL (ref 2.7–4.5)
PLATELET # BLD AUTO: 232 K/UL (ref 150–450)
PMV BLD AUTO: 9.5 FL (ref 9.2–12.9)
POCT GLUCOSE: 176 MG/DL (ref 70–110)
POCT GLUCOSE: 199 MG/DL (ref 70–110)
POCT GLUCOSE: 202 MG/DL (ref 70–110)
POTASSIUM SERPL-SCNC: 3.7 MMOL/L (ref 3.5–5.1)
POTASSIUM SERPL-SCNC: 4.1 MMOL/L (ref 3.5–5.1)
POTASSIUM SERPL-SCNC: 4.1 MMOL/L (ref 3.5–5.1)
PROT SERPL-MCNC: 5.4 G/DL (ref 6–8.4)
PROTHROMBIN TIME: 10.7 SEC (ref 9–12.5)
RBC # BLD AUTO: 2.73 M/UL (ref 4–5.4)
SODIUM SERPL-SCNC: 135 MMOL/L (ref 136–145)
WBC # BLD AUTO: 13.21 K/UL (ref 3.9–12.7)

## 2023-04-18 PROCEDURE — 25000003 PHARM REV CODE 250: Mod: HCNC

## 2023-04-18 PROCEDURE — 99233 SBSQ HOSP IP/OBS HIGH 50: CPT | Mod: HCNC,GC,, | Performed by: INTERNAL MEDICINE

## 2023-04-18 PROCEDURE — 84100 ASSAY OF PHOSPHORUS: CPT | Mod: HCNC | Performed by: PHYSICIAN ASSISTANT

## 2023-04-18 PROCEDURE — 93010 ELECTROCARDIOGRAM REPORT: CPT | Mod: HCNC,,, | Performed by: INTERNAL MEDICINE

## 2023-04-18 PROCEDURE — 99233 PR SUBSEQUENT HOSPITAL CARE,LEVL III: ICD-10-PCS | Mod: HCNC,GC,, | Performed by: INTERNAL MEDICINE

## 2023-04-18 PROCEDURE — 84132 ASSAY OF SERUM POTASSIUM: CPT | Mod: HCNC | Performed by: PHYSICIAN ASSISTANT

## 2023-04-18 PROCEDURE — 83735 ASSAY OF MAGNESIUM: CPT | Mod: HCNC | Performed by: PHYSICIAN ASSISTANT

## 2023-04-18 PROCEDURE — 63600175 PHARM REV CODE 636 W HCPCS: Mod: HCNC

## 2023-04-18 PROCEDURE — 85610 PROTHROMBIN TIME: CPT | Mod: HCNC | Performed by: PHYSICIAN ASSISTANT

## 2023-04-18 PROCEDURE — 25000003 PHARM REV CODE 250: Mod: HCNC | Performed by: STUDENT IN AN ORGANIZED HEALTH CARE EDUCATION/TRAINING PROGRAM

## 2023-04-18 PROCEDURE — 63600175 PHARM REV CODE 636 W HCPCS: Mod: HCNC | Performed by: STUDENT IN AN ORGANIZED HEALTH CARE EDUCATION/TRAINING PROGRAM

## 2023-04-18 PROCEDURE — 84132 ASSAY OF SERUM POTASSIUM: CPT | Mod: 91,HCNC | Performed by: THORACIC SURGERY (CARDIOTHORACIC VASCULAR SURGERY)

## 2023-04-18 PROCEDURE — 25000003 PHARM REV CODE 250: Mod: HCNC | Performed by: INTERNAL MEDICINE

## 2023-04-18 PROCEDURE — 11000001 HC ACUTE MED/SURG PRIVATE ROOM: Mod: HCNC

## 2023-04-18 PROCEDURE — 97530 THERAPEUTIC ACTIVITIES: CPT | Mod: HCNC

## 2023-04-18 PROCEDURE — 25000003 PHARM REV CODE 250: Mod: HCNC | Performed by: PHYSICIAN ASSISTANT

## 2023-04-18 PROCEDURE — 63600175 PHARM REV CODE 636 W HCPCS: Mod: HCNC | Performed by: PHYSICIAN ASSISTANT

## 2023-04-18 PROCEDURE — 93010 EKG 12-LEAD: ICD-10-PCS | Mod: HCNC,,, | Performed by: INTERNAL MEDICINE

## 2023-04-18 PROCEDURE — 27000221 HC OXYGEN, UP TO 24 HOURS: Mod: HCNC

## 2023-04-18 PROCEDURE — 93005 ELECTROCARDIOGRAM TRACING: CPT | Mod: HCNC

## 2023-04-18 PROCEDURE — 85730 THROMBOPLASTIN TIME PARTIAL: CPT | Mod: HCNC | Performed by: PHYSICIAN ASSISTANT

## 2023-04-18 PROCEDURE — 25000003 PHARM REV CODE 250: Mod: HCNC | Performed by: THORACIC SURGERY (CARDIOTHORACIC VASCULAR SURGERY)

## 2023-04-18 PROCEDURE — 94761 N-INVAS EAR/PLS OXIMETRY MLT: CPT | Mod: HCNC

## 2023-04-18 PROCEDURE — 97535 SELF CARE MNGMENT TRAINING: CPT | Mod: HCNC

## 2023-04-18 PROCEDURE — 85025 COMPLETE CBC W/AUTO DIFF WBC: CPT | Mod: HCNC | Performed by: PHYSICIAN ASSISTANT

## 2023-04-18 PROCEDURE — 99900035 HC TECH TIME PER 15 MIN (STAT): Mod: HCNC

## 2023-04-18 PROCEDURE — 80053 COMPREHEN METABOLIC PANEL: CPT | Mod: HCNC

## 2023-04-18 RX ORDER — INSULIN ASPART 100 [IU]/ML
3-5 INJECTION, SOLUTION INTRAVENOUS; SUBCUTANEOUS
Status: DISCONTINUED | OUTPATIENT
Start: 2023-04-18 | End: 2023-04-19

## 2023-04-18 RX ORDER — AMIODARONE HYDROCHLORIDE 200 MG/1
200 TABLET ORAL DAILY
Status: DISCONTINUED | OUTPATIENT
Start: 2023-05-11 | End: 2023-04-19

## 2023-04-18 RX ORDER — AMIODARONE HYDROCHLORIDE 200 MG/1
200 TABLET ORAL 2 TIMES DAILY
Status: DISCONTINUED | OUTPATIENT
Start: 2023-04-18 | End: 2023-04-19

## 2023-04-18 RX ORDER — HALOPERIDOL 2 MG/ML
0.5 SOLUTION ORAL EVERY 6 HOURS PRN
Status: DISCONTINUED | OUTPATIENT
Start: 2023-04-18 | End: 2023-04-22 | Stop reason: HOSPADM

## 2023-04-18 RX ORDER — HYDROMORPHONE HYDROCHLORIDE 1 MG/ML
0.2 INJECTION, SOLUTION INTRAMUSCULAR; INTRAVENOUS; SUBCUTANEOUS
Status: DISCONTINUED | OUTPATIENT
Start: 2023-04-18 | End: 2023-04-19

## 2023-04-18 RX ADMIN — ACETAMINOPHEN 650 MG: 325 TABLET ORAL at 10:04

## 2023-04-18 RX ADMIN — SODIUM PHOSPHATE, MONOBASIC, MONOHYDRATE AND SODIUM PHOSPHATE, DIBASIC, ANHYDROUS 20.01 MMOL: 142; 276 INJECTION, SOLUTION INTRAVENOUS at 09:04

## 2023-04-18 RX ADMIN — INSULIN ASPART 3 UNITS: 100 INJECTION, SOLUTION INTRAVENOUS; SUBCUTANEOUS at 03:04

## 2023-04-18 RX ADMIN — CLOPIDOGREL BISULFATE 75 MG: 75 TABLET ORAL at 09:04

## 2023-04-18 RX ADMIN — ACETAMINOPHEN 650 MG: 325 TABLET ORAL at 02:04

## 2023-04-18 RX ADMIN — SENNOSIDES AND DOCUSATE SODIUM 1 TABLET: 8.6; 5 TABLET ORAL at 09:04

## 2023-04-18 RX ADMIN — ATORVASTATIN CALCIUM 80 MG: 40 TABLET, FILM COATED ORAL at 08:04

## 2023-04-18 RX ADMIN — INSULIN ASPART 2 UNITS: 100 INJECTION, SOLUTION INTRAVENOUS; SUBCUTANEOUS at 08:04

## 2023-04-18 RX ADMIN — AMIODARONE HYDROCHLORIDE 200 MG: 200 TABLET ORAL at 08:04

## 2023-04-18 RX ADMIN — GABAPENTIN 300 MG: 300 CAPSULE ORAL at 09:04

## 2023-04-18 RX ADMIN — HYDROCODONE BITARTRATE AND ACETAMINOPHEN 1 TABLET: 10; 325 TABLET ORAL at 10:04

## 2023-04-18 RX ADMIN — METOCLOPRAMIDE 5 MG: 5 INJECTION, SOLUTION INTRAMUSCULAR; INTRAVENOUS at 11:04

## 2023-04-18 RX ADMIN — METOPROLOL TARTRATE 25 MG: 25 TABLET, FILM COATED ORAL at 09:04

## 2023-04-18 RX ADMIN — METOCLOPRAMIDE 5 MG: 5 INJECTION, SOLUTION INTRAMUSCULAR; INTRAVENOUS at 03:04

## 2023-04-18 RX ADMIN — HYDROCODONE BITARTRATE AND ACETAMINOPHEN 1 TABLET: 10; 325 TABLET ORAL at 01:04

## 2023-04-18 RX ADMIN — POTASSIUM BICARBONATE 40 MEQ: 391 TABLET, EFFERVESCENT ORAL at 11:04

## 2023-04-18 RX ADMIN — GABAPENTIN 300 MG: 300 CAPSULE ORAL at 03:04

## 2023-04-18 RX ADMIN — POLYETHYLENE GLYCOL 3350 17 G: 17 POWDER, FOR SOLUTION ORAL at 09:04

## 2023-04-18 RX ADMIN — INSULIN ASPART 2 UNITS: 100 INJECTION, SOLUTION INTRAVENOUS; SUBCUTANEOUS at 09:04

## 2023-04-18 RX ADMIN — SUCRALFATE 1 G: 1 TABLET ORAL at 11:04

## 2023-04-18 RX ADMIN — HALOPERIDOL 0.5 MG: 2 SOLUTION ORAL at 10:04

## 2023-04-18 RX ADMIN — PROCHLORPERAZINE EDISYLATE 5 MG: 5 INJECTION INTRAMUSCULAR; INTRAVENOUS at 12:04

## 2023-04-18 RX ADMIN — HYDROCODONE BITARTRATE AND ACETAMINOPHEN 1 TABLET: 10; 325 TABLET ORAL at 09:04

## 2023-04-18 RX ADMIN — GABAPENTIN 300 MG: 300 CAPSULE ORAL at 08:04

## 2023-04-18 RX ADMIN — PROCHLORPERAZINE EDISYLATE 5 MG: 5 INJECTION INTRAMUSCULAR; INTRAVENOUS at 06:04

## 2023-04-18 RX ADMIN — AMIODARONE HYDROCHLORIDE 0.5 MG/MIN: 1.8 INJECTION, SOLUTION INTRAVENOUS at 07:04

## 2023-04-18 RX ADMIN — SUCRALFATE 1 G: 1 TABLET ORAL at 06:04

## 2023-04-18 RX ADMIN — METOPROLOL TARTRATE 25 MG: 25 TABLET, FILM COATED ORAL at 08:04

## 2023-04-18 RX ADMIN — ACETAMINOPHEN 650 MG: 325 TABLET ORAL at 06:04

## 2023-04-18 RX ADMIN — MUPIROCIN 1 G: 20 OINTMENT TOPICAL at 09:04

## 2023-04-18 RX ADMIN — INSULIN ASPART 3 UNITS: 100 INJECTION, SOLUTION INTRAVENOUS; SUBCUTANEOUS at 07:04

## 2023-04-18 RX ADMIN — INSULIN DETEMIR 13 UNITS: 100 INJECTION, SOLUTION SUBCUTANEOUS at 09:04

## 2023-04-18 RX ADMIN — AMIODARONE HYDROCHLORIDE 200 MG: 200 TABLET ORAL at 09:04

## 2023-04-18 RX ADMIN — PROCHLORPERAZINE EDISYLATE 5 MG: 5 INJECTION INTRAMUSCULAR; INTRAVENOUS at 03:04

## 2023-04-18 RX ADMIN — ASPIRIN 81 MG CHEWABLE TABLET 81 MG: 81 TABLET CHEWABLE at 09:04

## 2023-04-18 RX ADMIN — SUCRALFATE 1 G: 1 TABLET ORAL at 07:04

## 2023-04-18 RX ADMIN — ONDANSETRON 4 MG: 2 INJECTION INTRAMUSCULAR; INTRAVENOUS at 02:04

## 2023-04-18 RX ADMIN — INSULIN ASPART 1 UNITS: 100 INJECTION, SOLUTION INTRAVENOUS; SUBCUTANEOUS at 11:04

## 2023-04-18 RX ADMIN — PANTOPRAZOLE SODIUM 40 MG: 40 TABLET, DELAYED RELEASE ORAL at 06:04

## 2023-04-18 NOTE — PT/OT/SLP PROGRESS
"Physical Therapy Treatment    Patient Name:  Jud Villa   MRN:  [id number]  Admitting Diagnosis:  Coronary artery disease   Recent Surgery: Procedure(s) (LRB):  CORONARY ARTERY BYPASS GRAFT (CABG) x 2 (N/A)  SURGICAL PROCUREMENT, VEIN, ENDOSCOPIC (Left) 4 Days Post-Op  Admit Date: 4/14/2023  Length of Stay: 4 days    Recommendations:     Discharge Recommendations:  other (see comments)   Discharge Equipment Recommendations: none   Barriers to discharge:  trending medical condition post-op    Plan:     During this hospitalization, patient to be seen 5 x/week to address the identified rehab impairments via gait training, therapeutic activities, therapeutic exercises, neuromuscular re-education and progress towards the established goals.  Plan of Care Expires:  05/16/23  Plan of Care Reviewed with: patient, daughter    Assessment:     Jud Villa is a 63 y.o. female admitted with a medical diagnosis of Coronary artery disease. Pt tolerated session fairly today. Pt reporting pain in sternum but was premedicated prior to PT session.  Pt with quick transition from sitting to walking, able to ambulate ~8 ft but reporting increased dizziness and onset of nausea. Pt returned to sitting. BP found to be 143/61. Pt reporting symptoms improved. Attempted ambulation again with increased time spent in standing prior to ambulation pt reporting continued increasing dizziness which further worsened in time. Pt returned to sitting and /60. Pt stating "I need to lay down" and not receptive to encouragement for further activity/mobility on this date. Pt will continue to benefit from skilled PT services during this hospital admit to continue to improve transfer ability and efficiency as well as continue to progress pt's ambulation distance and cardiopulmonary endurance to maximize pt's functional independence and return to PLOF.    Problem List: weakness, impaired endurance, impaired self care skills, impaired functional " "mobility, gait instability, impaired balance, decreased lower extremity function, decreased upper extremity function, decreased safety awareness, pain, impaired skin, edema, impaired cardiopulmonary response to activity.  Rehab Prognosis: Good; patient would benefit from acute skilled PT services to address these deficits and reach maximum level of function.      Goals:   Multidisciplinary Problems       Physical Therapy Goals          Problem: Physical Therapy    Goal Priority Disciplines Outcome Goal Variances Interventions   Physical Therapy Goal     PT, PT/OT Ongoing, Progressing     Description: Goals to be met by: 23    Patient will increase functional independence with mobility by performin. Supine to sit with [place] and Maintaining sternal precautions  2. Sit to supine with [place] and Maintaining sternal precautions  3. Sit to stand transfer with [place] and Maintaining sternal precautions  4. Gait  x 300 feet with [place] and Maintaining sternal precautions  5. Ascend/Descend 6 inch curb step with [place] and Maintaining sternal precautions  6. Lower extremity exercise program x15 reps per handout, with independence                         Subjective     RN notified prior to session. Daughter present upon PT entrance into room. Patient agreeable to PT treatment session.    Chief Complaint: "I need to lay down"  Patient/Family Comments/goals: none reported this treatment date  Pain/Comfort:  Pain Rating 1: 6/10  Location - Orientation 1: generalized  Location 1: sternal  Pain Addressed 1: Pre-medicate for activity, Distraction  Pain Rating Post-Intervention 1: other (see comments) (did not report increase in pain with mobility)      Objective:     Additional staff present: N/A    Patient found HOB elevated with: telemetry, blood pressure cuff, pulse ox (continuous), oxygen, peripheral IV, chest tube   Cognition:   Alert  AxOx4  Command following: Follows multistep " "verbal commands  Fluency: clear/fluent  General Precautions: Standard, Cardiac fall, sternal   Orthopedic Precautions:N/A   Braces: N/A   Body mass index is 25.81 kg/m².  Oxygen Device: Nasal Cannula 1L  Vitals: BP (!) 106/53 (BP Location: Right arm, Patient Position: Sitting)   Pulse 64   Temp 98 °F (36.7 °C) (Oral)   Resp 18   Ht 5' 2" (1.575 m)   Wt 64 kg (141 lb 1.5 oz)   SpO2 (!) 92%   Breastfeeding No   BMI 25.81 kg/m²     Outcome Measures:  AM-PAC 6 CLICK MOBILITY  Turning over in bed (including adjusting bedclothes, sheets and blankets)?: 3  Sitting down on and standing up from a chair with arms (e.g., wheelchair, bedside commode, etc.): 3  Moving from lying on back to sitting on the side of the bed?: 3  Moving to and from a bed to a chair (including a wheelchair)?: 3  Need to walk in hospital room?: 3  Climbing 3-5 steps with a railing?: 2  Basic Mobility Total Score: 17     Functional Mobility:    Bed Mobility:   Supine to Sit: minimum assistance; from Rt side of bed  Scooting anteriorly to EOB to have both feet planted on floor: stand by assistance  Sit to Supine: stand by assistance; to Lt side of bed    Sitting Balance at Edge of Bed:  Static Sitting Balance: Good- : able to accept minimal resistance  Dynamic Sitting Balance: Good- : able to sit unsupported and weight shift across midline minimally  Assistance Level Required: Stand-by Assistance    Transfers:   Sit <> Stand Transfer: stand by assistance with no assistive device   Stand <> Sit Transfer: stand by assistance with no assistive device   [id number] from EOB    Standing Balance:  Static Standing Balance: Fair : able to stand unsupported without UE support and without LOB for 1 minute  Dynamic Standing Balance: Fair : stand independently unsupported, weight shift, and reach ipsilaterally. LOB noted when crossing midline.  Assistance Level Required: Contact Guard Assistance  Patient used: no assistive device       Gait:  Patient ambulated: " "8 ft (seated break) ~2 ft (seated break) ~4 side steps to HOB   Patient required: contact guard  Patient used:  hand-held assist   Gait Pattern observed: reciprocal gait  Gait Deviation(s): decreased step length, narrow base of support, decreased arm swing, shuffle gait, flexed posture, and decreased josie  Impairments due to: pain, decreased strength, and decreased endurance  all lines remained intact throughout ambulation trial  Comments: on 1st trial pt with quick transition from sitting to walking. Pt with narrow KERRI, decreased step length and reporting increased dizziness and onset of nausea during ambulation so pt returned to sitting. BP found to be 143/61. Pt reporting symptoms improved. Attempted ambulation again with increased time spent in standing prior to ambulation pt reporting continued increasing dizziness which further worsened in time. Pt returned to sitting and /60. Pt stating "I need to lay down" and not receptive to encouragement for further activity/mobility on this date but was able to take side steps with CGA and no LOB.     Education:  Time provided for education, counseling and discussion of health disposition in regards to patient's current status  All questions answered within PT scope of practice and to patient's satisfaction  PT role in POC to address current functional deficits  Pt educated on proper body mechanics, safety techniques, and energy conservation with PT facilitation and cueing throughout session  Call nursing/pct to transfer to chair/use bathroom. Pt stated understanding.  Whiteboard updated with therapist name and pt's current mobility status documented above  Safe to perform step transfer to/from chair/bedside commode assist x 1 and HHA w/ nursing/PCT present  Importance of OOB tolerance prn hrs/day to improve lung ventilation and expansion as well as strengthen postural musculature    Patient left HOB elevated with all lines intact, call button in reach, RN " notified, and dtr present.    Time Tracking:     PT Received On: 04/18/23  PT Start Time: 1435     PT Stop Time: 1459  PT Total Time (min): 24 min     Billable Minutes:   Therapeutic Activity 24 minutes    Treatment Type: Treatment  PT/PTA: PT       4/18/2023

## 2023-04-18 NOTE — PT/OT/SLP PROGRESS
Occupational Therapy   Treatment    Name: Jud Villa  MRN: [id number]  Admitting Diagnosis:  Coronary artery disease  4 Days Post-Op    Recommendations:     Discharge Recommendations: other (see comments)  Discharge Equipment Recommendations:  none  Barriers to discharge:  None    Assessment:     Jud Villa is a 63 y.o. female with a medical diagnosis of Coronary artery disease.  She able to independently recall 1/3 sternal precautions but did not push/pull with upper extremities during functional transfers. Patient able to complete basic ADLs in bathroom on this date although with decreased safety awarness and attempting to rush through tasks.  Patient required encouragement but completed all therapeutic tasks. Performance deficits affecting function are weakness, gait instability, decreased upper extremity function, impaired cardiopulmonary response to activity, impaired endurance, impaired sensation, decreased safety awareness, impaired balance, impaired self care skills, impaired skin, impaired functional mobility. Patient would benefit from continued skilled OT to improve independence in ADLs and IADLs and overall activity tolerance.     Rehab Prognosis:  Fair; patient would benefit from acute skilled OT services to address these deficits and reach maximum level of function.       Plan:     Patient to be seen 4 x/week to address the above listed problems via self-care/home management, therapeutic activities, therapeutic exercises  Plan of Care Expires: 05/15/23  Plan of Care Reviewed with: patient, daughter    Subjective     Chief Complaint: patient C/O pain in L lung, rushing through functional mobility aspects of session likely due to pain   Patient/Family Comments/goals: go home and get better  Pain/Comfort:  Pain Rating 1: 7/10  Location - Side 1: Left  Location - Orientation 1: generalized  Location 1: other (see comments) (lung)  Pain Addressed 1: Distraction, Nurse notified  Pain Rating  Post-Intervention 1: 0/10    Objective:     Communicated with: nursing prior to session, who encouraged out of bed activities and remaining upright in chair after session.  Patient found up in chair with blood pressure cuff, oxygen, telemetry, chest tube, peripheral IV upon OT entry to room. Patient's daughter present throughout entire session.     General Precautions: Standard, sternal, fall    Orthopedic Precautions:N/A  Braces: N/A  Respiratory Status: Nasal cannula, flow 1 L/min     Occupational Performance:     Bed Mobility:  not completed on this date due to focus on out of bed activity and initial position of patient upright in chair    Functional Mobility/Transfers:  Patient completed Sit <> Stand Transfer with contact guard assistance  with  no assistive device   Patient completed Toilet Transfer Stand Pivot technique with contact guard assistance with  no AD  Functional Mobility: Patient ambulated to bathroom with contact guard assistance with no assistive device to complete hygiene activities standing at sink. Patient demonstrated decreased safety awareness in the bathroom, quickly progressing though tasks.    Activities of Daily Living:  Grooming: contact guard assistance to complete hand washing and oral hygiene standing at sink   Toileting: set up assistance patient did not complete clothing management due to hospital gown. Patient able to complete alexa hygiene once provided with toilet paper (due to location of dispenser and sternal precautions).       [facility] 6 Click ADL: 21    Treatment & Education:    Patient educated on the importance of mobilization to improve functional capabilities and movement of fluid through chest tube, as instructed by nursing.    Patient educated on:   -purpose of OT and OT POC  -facilitation and education on proper body mechanics, energy conservation, and safety  -importance of early mobility and out of bed activities with staff assist  -overall benefits of therapy       Patient left up in chair with all lines intact, call button in reach, and nursing and daughter present    GOALS:   Multidisciplinary Problems       Occupational Therapy Goals          Problem: Occupational Therapy    Goal Priority Disciplines Outcome Interventions   Occupational Therapy Goal     OT, PT/OT Ongoing, Progressing    Description: Goals to be met by: 05/15/2023      Patient will increase functional independence with ADLs by performing:    UE Dressing with Modified [place].  LE Dressing with Modified [place].  Grooming while standing with Modified [place].  Toileting from toilet with Modified [place] for hygiene and clothing management.   Toilet transfer to toilet with Modified [place].  Increased functional strength to WFL for self care.  Upper extremity exercise program x10 reps per handout, with supervision.                          Time Tracking:     OT Date of Treatment: 04/18/23  OT Start Time: 0845  OT Stop Time: 0904  OT Total Time (min): 19 min    Billable Minutes:Self Care/Home Management 19    OT/NANCY: OT     Number of NANCY visits since last OT visit: 0    4/18/2023

## 2023-04-18 NOTE — CARE UPDATE
SICU Staff Addendum  Please see resident/CLAUDINE note from 4/18 for full progress note     Reason for admission:  Coronary artery disease  Present on Admission:   COPD (chronic obstructive pulmonary disease)   Coronary artery disease   Acute blood loss anemia      Goals for Today:   - POD 4 s/p CABG   - no additional tachyarryhtmias overnight. Tranitioning to  PO amiodarone today   - Pt refusing stool softeners, passing gas   - cardiac diet   - Chest tubes per CTS   - DAPT, statin, BB     Brisa Oropeza MD  Anesthesia Critical Care  Spectra [phone]

## 2023-04-18 NOTE — PLAN OF CARE
Endocrine Plan of Care  04/18/2023       BG 130s-low 200s in last 24h on current regimen. She has not been consistently receiving prn correction Aspart as ordered to be given with scheduled prandial aspart before meals.      ASSESSMENT and PLAN     Neuro  History of CVA (cerebrovascular accident)   Continues on Atorvastatin 80 mg         Cardiac/Vascular  * Coronary artery disease  CAD s/p CABGx2 with Dr. Kahn on 4/13/23  Will target tight glycemic control given cardiac surgery        Endocrine  Controlled type 2 diabetes mellitus with complication, with long-term current use of insulin  Hyperglycemia from type 2 diabetes mellitus and stress response s/p CABG, steroids given intra op also contributing to hyperglycemia     Endocrinology consulted for BG management.   BG goal 110-140 (CTS Protocol)      - Intensive insulin drip after surgery per CTS guidelines, adjusted to transition drip 04/15/2023 and then switched to MDI on 4/16/2023  - Hypoglycemia protocol in place  - If blood glucose greater than 300 and diet ordered, please ask patient not to eat food or drink anything other than water until correctional insulin has brought it back below 250     Plan:     Continue Levemir 13 units daily      Continue Novolog with meals 2-4 units based on percent eaten     Continue Novolog low dose correction scale prn - Please make sure this is being given correctly when indicated per orders.  Accuchecks ACHS       ** Please notify Endocrine for any change and/or advance in diet**  ** Please call Endocrine for any BG related issues **              Nicole Gonzalez MD  Endocrinology

## 2023-04-18 NOTE — PLAN OF CARE
[facility]    HOME HEALTH ORDERS  FACE TO FACE ENCOUNTER    Patient Name: Jud Villa  YOB: 1960    PCP: Bo Lopez MD   PCP Address: [address] / [place] [address]  PCP Phone Number: [phone]  PCP Fax: [phone]    Encounter Date: 04/18/2023    Admit to Home Health    Diagnoses:  Active Hospital Problems    Diagnosis  POA    *Coronary artery disease [I25.10]  Yes    History of heart surgery [Z98.890]  Not Applicable    Acute blood loss anemia [D62]  Yes    History of CVA (cerebrovascular accident) [Z86.73]  Not Applicable    Controlled type 2 diabetes mellitus with complication, with long-term current use of insulin [E11.8, Z79.4]  Not Applicable     Chronic    COPD (chronic obstructive pulmonary disease) [J44.9]  Yes     Add ICS to Laba/lama combination (trelegy daily)  Albuterol HFA or nebulizer  for rescue and prevention.    Moderate obstruction on FEV1 (58% predicted), normal diffusion.          Resolved Hospital Problems   No resolved problems to display.       Future Appointments   Date Time Provider Department Center   4/18/2023  2:40 PM Kelly Mckeon MD [facility] [id number] [facility]   5/1/2023 10:15 AM SHAH, VISUAL FIELDS [facility] OPHTHAL [facility]   5/1/2023 10:45 AM Claribel Pereira MD [facility] OPHTHAL [facility]   5/11/2023  2:30 PM EKG, APPT [facility] EKG [facility]   5/11/2023  2:45 PM [facility] [id number] LB LIMIT [facility] XRAY OP [facility]   5/11/2023  3:00 PM Igor Kahn MD [facility] CARDVAS [facility]   5/23/2023  7:00 AM MAGALI, JAYA MAHER SPECLAB [place]   5/23/2023  7:15 AM LAB, CATHERINE KENH LAB [place]   6/8/2023  3:00 PM Bo Lopez MD [facility]           I have seen and examined this patient face to face today. My clinical findings that support the need for the home health skilled services and home bound status are the following:  Medical restrictions requiring assistance of another human to leave home due to Post surgery  monitoring.    Allergies:Review of patient's allergies indicates:  No Known Allergies    Diet: regular diet    Activities: activity as tolerated    Nursing:   SN to complete comprehensive assessment including routine vital signs. Instruct on disease process and s/s of complications to report to MD. Review/verify medication list sent home with the patient at time of discharge  and instruct patient/caregiver as needed. Frequency may be adjusted depending on start of care date. If patient has enteral feeding tube (NG, PEG, J-tube, G-tube), flush tube before and after feeding and/or medication administration with 20-30 mL of water.    Notify MD if SBP > 160 or < 90; DBP > 90 or < 50; HR > 120 or < 50; Temp > 101; Other:          CONSULTS:    Physical Therapy to evaluate and treat. Evaluate for home safety and equipment needs; Establish/upgrade home exercise program. Perform / instruct on therapeutic exercises, gait training, transfer training, and Range of Motion.  Occupational Therapy to evaluate and treat. Evaluate home environment for safety and equipment needs. Perform/Instruct on transfers, ADL training, ROM, and therapeutic exercises.    MISCELLANEOUS CARE:  N/A    WOUND CARE ORDERS  no      Medications: Review discharge medications with patient and family and provide education.      Current Discharge Medication List        CONTINUE these medications which have NOT CHANGED    Details   albuterol (PROVENTIL) 2.5 mg /3 mL (0.083 %) nebulizer solution Take 3 mLs (2.5 mg total) by nebulization every 4 to 6 hours as needed for Wheezing or Shortness of Breath. Rescue  Qty: 1 each, Refills: 0    Comments: Please disp 1 box  Associated Diagnoses: Bronchitis      albuterol (VENTOLIN HFA) 90 mcg/actuation inhaler INHALE 2 PUFFS BY MOUTH INTO THE LUNGS EVERY 6 HOURS AS NEEDED FOR WHEEZING  Qty: 18 g, Refills: 11    Associated Diagnoses: COPD exacerbation; Centrilobular emphysema      aspirin 81 MG Chew Take 81 mg by mouth  once daily.      clonazePAM (KLONOPIN) 0.5 MG tablet TAKE 1 TABLET(0.5 MG) BY MOUTH EVERY NIGHT AS NEEDED FOR ANXIETY  Qty: 30 tablet, Refills: 0    Associated Diagnoses: Moderate episode of recurrent major depressive disorder      fluticasone propionate (FLONASE) 50 mcg/actuation nasal spray 1 spray (50 mcg total) by Each Nostril route once daily.  Qty: 9.9 mL, Refills: 0    Associated Diagnoses: COVID-19      gabapentin (NEURONTIN) 300 MG capsule Take 1 capsule (300 mg total) by mouth 3 (three) times daily.  Qty: 270 capsule, Refills: 3    Associated Diagnoses: Neuropathy      glipiZIDE (GLUCOTROL) 10 MG tablet TAKE 1 TABLET(10 MG) BY MOUTH TWICE DAILY  Qty: 180 tablet, Refills: 3    Associated Diagnoses: Type 2 diabetes mellitus with hyperglycemia, with long-term current use of insulin      insulin (BASAGLAR KWIKPEN U-100 INSULIN) glargine 100 units/mL SubQ pen Inject 10 Units into the skin every morning.  Qty: 15 mL, Refills: 0    Associated Diagnoses: Type 2 diabetes mellitus with hyperglycemia, with long-term current use of insulin      JARDIANCE 10 mg tablet TAKE 1 TABLET(10 MG) BY MOUTH EVERY DAY  Qty: 90 tablet, Refills: 3    Associated Diagnoses: Type 2 diabetes mellitus with hyperglycemia, with long-term current use of insulin      metFORMIN (GLUCOPHAGE) 1000 MG tablet TAKE 1 TABLET(1000 MG) BY MOUTH TWICE DAILY WITH MEALS  Qty: 180 tablet, Refills: 3    Associated Diagnoses: Type 2 diabetes mellitus with hyperglycemia, with long-term current use of insulin      metoprolol tartrate (LOPRESSOR) 25 MG tablet Take 0.5 tablets (12.5 mg total) by mouth 2 (two) times daily.  Qty: 30 tablet, Refills: 11    Comments: .      nicotine (NICODERM CQ) 21 mg/24 hr PLACE 1 PATCH ONTO THE SKIN DAILY  Qty: 28 patch, Refills: 3    Associated Diagnoses: Smoker      sucralfate (CARAFATE) 1 gram tablet TAKE 1 TABLET(1 GRAM) BY MOUTH THREE TIMES DAILY BEFORE MEALS  Qty: 270 tablet, Refills: 3    Associated Diagnoses:  "Gastroesophageal reflux disease without esophagitis; Acute gastritis without hemorrhage, unspecified gastritis type      TRADJENTA 5 mg Tab tablet Take 1 tablet (5 mg total) by mouth once daily.  Qty: 90 tablet, Refills: 3    Associated Diagnoses: Type 2 diabetes mellitus without complication, without long-term current use of insulin      traMADoL (ULTRAM) 50 mg tablet Take 1 tablet (50 mg total) by mouth every 6 (six) hours as needed for Pain.  Qty: 12 tablet, Refills: 0    Comments: Quantity prescribed more than 7 day supply? No      TRELEGY ELLIPTA 100-62.5-25 mcg DsDv INHALE 1 PUFF INTO THE LUNGS EVERY DAY  Qty: 60 each, Refills: 2    Associated Diagnoses: COPD exacerbation; Centrilobular emphysema      atorvastatin (LIPITOR) 80 MG tablet Take 1 tablet (80 mg total) by mouth every evening.  Qty: 90 tablet, Refills: 3      BD ULTRA-FINE SHORT PEN NEEDLE 31 gauge x 5/16" Ndle Inject 1 pen into the skin once daily.  Qty: 100 each, Refills: 3      clopidogreL (PLAVIX) 75 mg tablet Take 1 tablet (75 mg total) by mouth once daily.  Qty: 30 tablet, Refills: 11      ergocalciferol (ERGOCALCIFEROL) 50,000 unit Cap TAKE 1 CAPSULE BY MOUTH EVERY 7 DAYS  Qty: 12 capsule, Refills: 3      ondansetron (ZOFRAN-ODT) 4 MG TbDL Take 1 tablet (4 mg total) by mouth 3 (three) times daily.  Qty: 30 tablet, Refills: 0      pantoprazole (PROTONIX) 40 MG tablet Take 1 tablet (40 mg total) by mouth before breakfast.  Qty: 90 tablet, Refills: 3    Associated Diagnoses: Gastroesophageal reflux disease without esophagitis      sertraline (ZOLOFT) 50 MG tablet Take 1 tablet (50 mg total) by mouth once daily.  Qty: 90 tablet, Refills: 3    Associated Diagnoses: Moderate episode of recurrent major depressive disorder      topiramate (TOPAMAX) 50 MG tablet Take 2 tablets (100 mg total) by mouth 2 (two) times daily. TAKE 1 TABLET BY MOUTH TWICE DAILY FOR 7 DAYS, THEN 1 TABLET EVERY MORNING AND 2 TABLETS EVERY EVENING FOR 7 DAYS, THEN 2 TABLETS " TWICE DAILY  Qty: 360 tablet, Refills: 1    Comments: **Patient requests 90 days supply**      TRUE METRIX GLUCOSE TEST STRIP Strp USE AS DIRECTED FOUR TIMES DAILY  Qty: 50 each, Refills: 0    Associated Diagnoses: Controlled type 2 diabetes mellitus with complication, with long-term current use of insulin             I certify that this patient is confined to her home and needs physical therapy and occupational therapy.

## 2023-04-18 NOTE — SUBJECTIVE & OBJECTIVE
Interval History/Significant Events:   No acute events overnight. NSR overnight without any further runs of SVT. Amio gtt. Tolerating diet. Some nausea, controlled with PRNs. CT with minimal output. Ambulate in halls today.     Follow-up For: Procedure(s) (LRB):  CORONARY ARTERY BYPASS GRAFT (CABG) x 2 (N/A)  SURGICAL PROCUREMENT, VEIN, ENDOSCOPIC (Left)    Post-Operative Day: 4 Days Post-Op    Objective:     Vital Signs (Most Recent):  Temp: 98 °F (36.7 °C) (04/18/23 0701)  Pulse: 65 (04/18/23 0920)  Resp: 10 (04/18/23 0920)  BP: (!) 119/56 (04/18/23 0920)  SpO2: 99 % (04/18/23 0920)   Vital Signs (24h Range):  Temp:  [97.7 °F (36.5 °C)-98.1 °F (36.7 °C)] 98 °F (36.7 °C)  Pulse:  [] 65  Resp:  [10-28] 10  SpO2:  [87 %-99 %] 99 %  BP: (102-151)/(53-88) 119/56     Weight: 64 kg (141 lb 1.5 oz)  Body mass index is 25.81 kg/m².      Intake/Output Summary (Last 24 hours) at 4/18/2023 1012  Last data filed at 4/18/2023 0930  Gross per 24 hour   Intake 1423.13 ml   Output 2715 ml   Net -1291.87 ml       Physical Exam  Vitals and nursing note reviewed.   Constitutional:       Appearance: Normal appearance.   HENT:      Mouth/Throat:      Mouth: Mucous membranes are moist.      Pharynx: Oropharynx is clear. No oropharyngeal exudate.   Eyes:      Conjunctiva/sclera: Conjunctivae normal.      Pupils: Pupils are equal, round, and reactive to light.   Cardiovascular:      Rate and Rhythm: Normal rate and regular rhythm.      Pulses: Normal pulses.      Heart sounds: Normal heart sounds. No murmur heard.     Comments: Midling sternotomy c/d/I with chest tubes with serosanguinous drainage  Pulmonary:      Effort: Pulmonary effort is normal.      Breath sounds: Normal breath sounds.   Abdominal:      General: Abdomen is flat. There is no distension.      Palpations: Abdomen is soft.      Tenderness: There is no abdominal tenderness.   Skin:     General: Skin is warm and dry.      Coloration: Skin is not pale.   Neurological:       General: No focal deficit present.      Mental Status: She is alert and oriented to person, place, and time.   Psychiatric:         Mood and Affect: Mood normal.         Behavior: Behavior normal.       Vents:  Oxygen Concentration (%): 24 (04/16/23 2015)    Lines/Drains/Airways       Central Venous Catheter Line  Duration                  Percutaneous Central Line Insertion/Assessment - Quad lumen  04/14/23 0800 Subclavian Left 4 days    Percutaneous Central Line Insertion/Assessment - Quad Lumen  04/14/23 0800 Subclavian Left 4 days              Drain  Duration                  Y Chest Tube 3 and 4 04/14/23 1115 3 Right Pleural 19 Fr. 4 Left Pleural 19 Fr. 3 days              Line  Duration                  Pacer Wires 04/14/23 1139 3 days              Peripheral Intravenous Line  Duration                  Peripheral IV - Single Lumen 04/14/23 1501 20 G Anterior;Proximal;Right Forearm 3 days                    Significant Labs:    CBC/Anemia Profile:  Recent Labs   Lab 04/17/23 0339 04/18/23  0325   WBC 15.07* 13.21*   HGB 8.2* 7.9*   HCT 25.6* 24.5*    232   MCV 91 90   RDW 13.1 13.2        Chemistries:  Recent Labs   Lab 04/16/23 2003 04/17/23  0339 04/17/23  0619 04/17/23  0752 04/17/23  2044 04/18/23  0325 04/18/23  0807   NA  --   --  134*  --   --  135*  --    K 4.0  --  4.1   < > 4.0 4.1 4.1   CL  --   --  99  --   --  97  --    CO2  --   --  25  --   --  28  --    BUN  --   --  13  --   --  11  --    CREATININE  --   --  0.5  --   --  0.5  --    CALCIUM  --   --  8.3*  --   --  8.2*  --    ALBUMIN  --   --  2.6*  --   --  2.4*  --    PROT  --   --  5.7*  --   --  5.4*  --    BILITOT  --   --  0.5  --   --  0.4  --    ALKPHOS  --   --  65  --   --  63  --    ALT  --   --  18  --   --  16  --    AST  --   --  20  --   --  19  --    MG 2.0 2.0  --   --   --  2.0  --    PHOS 1.9* 3.4  --   --   --  2.0*  --     < > = values in this interval not displayed.       All pertinent labs within the past  24 hours have been reviewed.    Significant Imaging:  I have reviewed all pertinent imaging results/findings within the past 24 hours.

## 2023-04-18 NOTE — NURSING
POC reviewed with Jud Villa and family at 0300. Pt verbalized understanding. Questions and concerns addressed. No acute events overnight. Pt very nauseated overnight. Nausea treated with zofran, reglan, and compazine. Pt progressing toward goals. Will continue to monitor. See below and flowsheets for full assessment and VS info.             Neuro:  Jessica Coma Scale  Best Eye Response: 4-->(E4) spontaneous  Best Motor Response: 6-->(M6) obeys commands  Best Verbal Response: 5-->(V5) oriented  [place] Coma Scale Score: 15  Assessment Qualifiers: patient not sedated/intubated, no eye obstruction present  Pupil PERRLA: yes     24hr Temp:  [97.7 °F (36.5 °C)-98.2 °F (36.8 °C)]     CV:   Rhythm: normal sinus rhythm  BP goals:   SBP < 160  MAP > 65    Resp:      Oxygen Concentration (%): 24    Plan: N/A    GI/:     Diet/Nutrition Received: low saturated fat/low cholesterol  Last Bowel Movement: 04/13/23  Voiding Characteristics: voids spontaneously without difficulty    Intake/Output Summary (Last 24 hours) at 4/18/2023 0532  Last data filed at 4/17/2023 2300  Gross per 24 hour   Intake 1391.64 ml   Output 1870 ml   Net -478.36 ml          Labs/Accuchecks:  Recent Labs   Lab 04/18/23  0325   WBC 13.21*   RBC 2.73*   HGB 7.9*   HCT 24.5*         Recent Labs   Lab 04/18/23  0325   *   K 4.1   CO2 28   CL 97   BUN 11   CREATININE 0.5   ALKPHOS 63   ALT 16   AST 19   BILITOT 0.4      Recent Labs   Lab 04/18/23  0325   INR 1.0   APTT 24.8    No results for input(s): CPK, CPKMB, TROPONINI, MB in the last 168 hours.    Electrolytes: N/A - electrolytes WDL  Accuchecks: ACHS    Gtts:   sodium chloride 0.9%      amiodarone in dextrose 5% 0.5 mg/min (04/17/23 2038)    clevidipine Stopped (04/15/23 0037)    dextrose 5 % and 0.45 % NaCl with KCl 20 mEq Stopped (04/14/23 2013)       LDA/Wounds:  Lines/Drains/Airways       Central Venous Catheter Line  Duration                  Percutaneous Central Line  Insertion/Assessment - Quad lumen  04/14/23 0800 Subclavian Left 3 days    Percutaneous Central Line Insertion/Assessment - Quad Lumen  04/14/23 0800 Subclavian Left 3 days              Drain  Duration                  Y Chest Tube 3 and 4 04/14/23 1115 3 Right Pleural 19 Fr. 4 Left Pleural 19 Fr. 3 days              Line  Duration                  Pacer Wires 04/14/23 1139 3 days              Peripheral Intravenous Line  Duration                  Peripheral IV - Single Lumen 04/14/23 1501 20 G Anterior;Proximal;Right Forearm 3 days                  Wounds: Yes  Wound care consulted: No

## 2023-04-18 NOTE — ASSESSMENT & PLAN NOTE
  Neuro/Psych:     - Hx CVA this year, continue plavix  - Pain:    - Scheduled Tylenol 650g q8h, gabapentin 300 TID   - norco PRN             Cardiac:     - S/P CABGx2 (LIMA-LAD, SVG-OM) with Dr. Kahn on 4/14/2023    - BP Goal: MAP 60-80, SBP < 140     - off pressors and cleviprex    - continue ASA/plavix, statin    - increase lopressor to 25 BID    - Anti-HTNs: Will start when appropriate    - transition amio gtt to PO    - Rhythm: NSR      Pulmonary:     - Goal SpO2 >92%    - pleural chest tube with 20cc over 24 hour    - will remove chest tubes when patient is able to ambulate halls     - ABGs PRN      Renal:    - Trend BUN/Cr     - Maintain Yang, record strict Is/Os      FEN / GI:     - Daily CMP, PRN K/Mag/Phos per protocol     - zofran and compazine for nausea, daily ekgs to monitor prolonged qtc    - Replace electrolytes as needed    - Nutrition: cardiac     - Bowel Regimen: Miralax, docusate (has been refusing bowel regimen)      ID:     - Afebrile, WBC 13 (15)     - UA negative     - daily CBC      Heme/Onc:     - Hgb 7.9 (8.2)     - DAPT    - CBC daily     Endocrine:     - CTS Goal -140    - HgbA1c: 6.9    - Endocrinology consulted for insulin management      PPx:   Feeding: cardiac  Analgesia/Sedation: multimodal   Thromboembolic Prevention: SCDs  HOB >30: Yes  Stress Ulcer: pepcid  Glucose Control: Yes, insulin management per Endocrinology     Lines/Drains/Airway:   Lft subclavian CVC   Chest Tubes: 2 Pleural    Pacing Wires: Temporary ventricular pacing wires      Dispo/Code Status/Palliative:     - stepdown to CSU    - Full Code

## 2023-04-18 NOTE — PROGRESS NOTES
[facility]  Critical Care - Surgery  Progress Note    Patient Name: Jud Villa  MRN: [id number]  Admission Date: 4/14/2023  Hospital Length of Stay: 4 days  Code Status: Full Code  Attending Provider: Igor Kahn MD  Primary Care Provider: Bo Lopez MD   Principal Problem: Coronary artery disease    Subjective:     Hospital/ICU Course:  Patient admitted to SICU, extubated, following CABG. Apneic upon arrival, improved with narcan. Started on ASA and statin on POD0. POD1 started lopressor, antiemetics with daily ekg, transitioned oxy to norco with improvement in nausea. POD2 mildly febrile with leukocytosis, bazzi removed and UA obtained, lasix started, advance diet as tolerated.      Interval History/Significant Events:   No acute events overnight. NSR overnight without any further runs of SVT. Amio gtt. Tolerating diet. Some nausea, controlled with PRNs. CT with minimal output. Ambulate in halls today.     Follow-up For: Procedure(s) (LRB):  CORONARY ARTERY BYPASS GRAFT (CABG) x 2 (N/A)  SURGICAL PROCUREMENT, VEIN, ENDOSCOPIC (Left)    Post-Operative Day: 4 Days Post-Op    Objective:     Vital Signs (Most Recent):  Temp: 98 °F (36.7 °C) (04/18/23 0701)  Pulse: 65 (04/18/23 0920)  Resp: 10 (04/18/23 0920)  BP: (!) 119/56 (04/18/23 0920)  SpO2: 99 % (04/18/23 0920)   Vital Signs (24h Range):  Temp:  [97.7 °F (36.5 °C)-98.1 °F (36.7 °C)] 98 °F (36.7 °C)  Pulse:  [] 65  Resp:  [10-28] 10  SpO2:  [87 %-99 %] 99 %  BP: (102-151)/(53-88) 119/56     Weight: 64 kg (141 lb 1.5 oz)  Body mass index is 25.81 kg/m².      Intake/Output Summary (Last 24 hours) at 4/18/2023 1012  Last data filed at 4/18/2023 0930  Gross per 24 hour   Intake 1423.13 ml   Output 2715 ml   Net -1291.87 ml       Physical Exam  Vitals and nursing note reviewed.   Constitutional:       Appearance: Normal appearance.   HENT:      Mouth/Throat:      Mouth: Mucous membranes are moist.      Pharynx:  Oropharynx is clear. No oropharyngeal exudate.   Eyes:      Conjunctiva/sclera: Conjunctivae normal.      Pupils: Pupils are equal, round, and reactive to light.   Cardiovascular:      Rate and Rhythm: Normal rate and regular rhythm.      Pulses: Normal pulses.      Heart sounds: Normal heart sounds. No murmur heard.     Comments: Midling sternotomy c/d/I with chest tubes with serosanguinous drainage  Pulmonary:      Effort: Pulmonary effort is normal.      Breath sounds: Normal breath sounds.   Abdominal:      General: Abdomen is flat. There is no distension.      Palpations: Abdomen is soft.      Tenderness: There is no abdominal tenderness.   Skin:     General: Skin is warm and dry.      Coloration: Skin is not pale.   Neurological:      General: No focal deficit present.      Mental Status: She is alert and oriented to person, place, and time.   Psychiatric:         Mood and Affect: Mood normal.         Behavior: Behavior normal.       Vents:  Oxygen Concentration (%): 24 (04/16/23 2015)    Lines/Drains/Airways       Central Venous Catheter Line  Duration                  Percutaneous Central Line Insertion/Assessment - Quad lumen  04/14/23 0800 Subclavian Left 4 days    Percutaneous Central Line Insertion/Assessment - Quad Lumen  04/14/23 0800 Subclavian Left 4 days              Drain  Duration                  Y Chest Tube 3 and 4 04/14/23 1115 3 Right Pleural 19 Fr. 4 Left Pleural 19 Fr. 3 days              Line  Duration                  Pacer Wires 04/14/23 1139 3 days              Peripheral Intravenous Line  Duration                  Peripheral IV - Single Lumen 04/14/23 1501 20 G Anterior;Proximal;Right Forearm 3 days                    Significant Labs:    CBC/Anemia Profile:  Recent Labs   Lab 04/17/23  0339 04/18/23  0325   WBC 15.07* 13.21*   HGB 8.2* 7.9*   HCT 25.6* 24.5*    232   MCV 91 90   RDW 13.1 13.2        Chemistries:  Recent Labs   Lab 04/16/23 2003 04/17/23  0339 04/17/23  0619  04/17/23  0752 04/17/23 2044 04/18/23  0325 04/18/23  0807   NA  --   --  134*  --   --  135*  --    K 4.0  --  4.1   < > 4.0 4.1 4.1   CL  --   --  99  --   --  97  --    CO2  --   --  25  --   --  28  --    BUN  --   --  13  --   --  11  --    CREATININE  --   --  0.5  --   --  0.5  --    CALCIUM  --   --  8.3*  --   --  8.2*  --    ALBUMIN  --   --  2.6*  --   --  2.4*  --    PROT  --   --  5.7*  --   --  5.4*  --    BILITOT  --   --  0.5  --   --  0.4  --    ALKPHOS  --   --  65  --   --  63  --    ALT  --   --  18  --   --  16  --    AST  --   --  20  --   --  19  --    MG 2.0 2.0  --   --   --  2.0  --    PHOS 1.9* 3.4  --   --   --  2.0*  --     < > = values in this interval not displayed.       All pertinent labs within the past 24 hours have been reviewed.    Significant Imaging:  I have reviewed all pertinent imaging results/findings within the past 24 hours.    Assessment/Plan:     Cardiac/Vascular  * Coronary artery disease    Neuro/Psych:     - Hx CVA this year, continue plavix  - Pain:    - Scheduled Tylenol 650g q8h, gabapentin 300 TID   - norco PRN             Cardiac:     - S/P CABGx2 (LIMA-LAD, SVG-OM) with Dr. Kahn on 4/14/2023    - BP Goal: MAP 60-80, SBP < 140     - off pressors and cleviprex    - continue ASA/plavix, statin    - increase lopressor to 25 BID    - Anti-HTNs: Will start when appropriate    - transition amio gtt to PO    - Rhythm: NSR      Pulmonary:     - Goal SpO2 >92%    - pleural chest tube with 20cc over 24 hour    - will remove chest tubes when patient is able to ambulate halls     - ABGs PRN      Renal:    - Trend BUN/Cr     - Maintain Yang, record strict Is/Os      FEN / GI:     - Daily CMP, PRN K/Mag/Phos per protocol     - zofran and compazine for nausea, daily ekgs to monitor prolonged qtc    - Replace electrolytes as needed    - Nutrition: cardiac     - Bowel Regimen: Miralax, docusate (has been refusing bowel regimen)      ID:     - Afebrile, WBC 13 (15)      - UA negative     - daily CBC      Heme/Onc:     - Hgb 7.9 (8.2)     - DAPT    - CBC daily     Endocrine:     - CTS Goal -140    - HgbA1c: 6.9    - Endocrinology consulted for insulin management      PPx:   Feeding: cardiac  Analgesia/Sedation: multimodal   Thromboembolic Prevention: SCDs  HOB >30: Yes  Stress Ulcer: pepcid  Glucose Control: Yes, insulin management per Endocrinology     Lines/Drains/Airway:   Lft subclavian CVC   Chest Tubes: 2 Pleural    Pacing Wires: Temporary ventricular pacing wires      Dispo/Code Status/Palliative:     - stepdown to CSU    - Full Code          Critical care was time spent personally by me on the following activities: development of treatment plan with patient or surrogate and bedside caregivers, discussions with consultants, evaluation of patient's response to treatment, examination of patient, ordering and performing treatments and interventions, ordering and review of laboratory studies, ordering and review of radiographic studies, pulse oximetry, re-evaluation of patient's condition.  This critical care time did not overlap with that of any other provider or involve time for any procedures.     Nga Purvis MD  Critical Care - Surgery  [facility]

## 2023-04-19 LAB
ALBUMIN SERPL BCP-MCNC: 2.8 G/DL (ref 3.5–5.2)
ALP SERPL-CCNC: 78 U/L (ref 55–135)
ALT SERPL W/O P-5'-P-CCNC: 18 U/L (ref 10–44)
ANION GAP SERPL CALC-SCNC: 10 MMOL/L (ref 8–16)
APTT PPP: 23.9 SEC (ref 21–32)
AST SERPL-CCNC: 19 U/L (ref 10–40)
BASOPHILS # BLD AUTO: 0.04 K/UL (ref 0–0.2)
BASOPHILS NFR BLD: 0.3 % (ref 0–1.9)
BILIRUB SERPL-MCNC: 0.6 MG/DL (ref 0.1–1)
BUN SERPL-MCNC: 9 MG/DL (ref 8–23)
CALCIUM SERPL-MCNC: 8.9 MG/DL (ref 8.7–10.5)
CHLORIDE SERPL-SCNC: 93 MMOL/L (ref 95–110)
CO2 SERPL-SCNC: 30 MMOL/L (ref 23–29)
CREAT SERPL-MCNC: 0.6 MG/DL (ref 0.5–1.4)
DIFFERENTIAL METHOD: ABNORMAL
EOSINOPHIL # BLD AUTO: 0.1 K/UL (ref 0–0.5)
EOSINOPHIL NFR BLD: 0.6 % (ref 0–8)
ERYTHROCYTE [DISTWIDTH] IN BLOOD BY AUTOMATED COUNT: 13.3 % (ref 11.5–14.5)
EST. GFR  (NO RACE VARIABLE): >60 ML/MIN/1.73 M^2
GLUCOSE SERPL-MCNC: 170 MG/DL (ref 70–110)
HCT VFR BLD AUTO: 27.9 % (ref 37–48.5)
HGB BLD-MCNC: 8.7 G/DL (ref 12–16)
IMM GRANULOCYTES # BLD AUTO: 0.05 K/UL (ref 0–0.04)
IMM GRANULOCYTES NFR BLD AUTO: 0.3 % (ref 0–0.5)
INR PPP: 1 (ref 0.8–1.2)
LYMPHOCYTES # BLD AUTO: 1.8 K/UL (ref 1–4.8)
LYMPHOCYTES NFR BLD: 11.6 % (ref 18–48)
MAGNESIUM SERPL-MCNC: 1.8 MG/DL (ref 1.6–2.6)
MCH RBC QN AUTO: 28.2 PG (ref 27–31)
MCHC RBC AUTO-ENTMCNC: 31.2 G/DL (ref 32–36)
MCV RBC AUTO: 91 FL (ref 82–98)
MONOCYTES # BLD AUTO: 1.4 K/UL (ref 0.3–1)
MONOCYTES NFR BLD: 9.2 % (ref 4–15)
NEUTROPHILS # BLD AUTO: 11.8 K/UL (ref 1.8–7.7)
NEUTROPHILS NFR BLD: 78 % (ref 38–73)
NRBC BLD-RTO: 0 /100 WBC
PHOSPHATE SERPL-MCNC: 2.3 MG/DL (ref 2.7–4.5)
PLATELET # BLD AUTO: 305 K/UL (ref 150–450)
PMV BLD AUTO: 9.3 FL (ref 9.2–12.9)
POCT GLUCOSE: 180 MG/DL (ref 70–110)
POCT GLUCOSE: 182 MG/DL (ref 70–110)
POCT GLUCOSE: 233 MG/DL (ref 70–110)
POTASSIUM SERPL-SCNC: 4.1 MMOL/L (ref 3.5–5.1)
PROT SERPL-MCNC: 6.4 G/DL (ref 6–8.4)
PROTHROMBIN TIME: 10.6 SEC (ref 9–12.5)
RBC # BLD AUTO: 3.08 M/UL (ref 4–5.4)
SODIUM SERPL-SCNC: 133 MMOL/L (ref 136–145)
WBC # BLD AUTO: 15.06 K/UL (ref 3.9–12.7)

## 2023-04-19 PROCEDURE — 25000003 PHARM REV CODE 250: Mod: HCNC | Performed by: PHYSICIAN ASSISTANT

## 2023-04-19 PROCEDURE — 80053 COMPREHEN METABOLIC PANEL: CPT | Mod: HCNC

## 2023-04-19 PROCEDURE — 93010 ELECTROCARDIOGRAM REPORT: CPT | Mod: HCNC,,, | Performed by: INTERNAL MEDICINE

## 2023-04-19 PROCEDURE — 63600175 PHARM REV CODE 636 W HCPCS: Mod: HCNC | Performed by: STUDENT IN AN ORGANIZED HEALTH CARE EDUCATION/TRAINING PROGRAM

## 2023-04-19 PROCEDURE — 63600175 PHARM REV CODE 636 W HCPCS: Mod: HCNC | Performed by: PHYSICIAN ASSISTANT

## 2023-04-19 PROCEDURE — 94761 N-INVAS EAR/PLS OXIMETRY MLT: CPT | Mod: HCNC

## 2023-04-19 PROCEDURE — 99233 SBSQ HOSP IP/OBS HIGH 50: CPT | Mod: HCNC,GC,, | Performed by: INTERNAL MEDICINE

## 2023-04-19 PROCEDURE — 85025 COMPLETE CBC W/AUTO DIFF WBC: CPT | Mod: HCNC | Performed by: PHYSICIAN ASSISTANT

## 2023-04-19 PROCEDURE — 25000003 PHARM REV CODE 250: Mod: HCNC | Performed by: THORACIC SURGERY (CARDIOTHORACIC VASCULAR SURGERY)

## 2023-04-19 PROCEDURE — 99232 SBSQ HOSP IP/OBS MODERATE 35: CPT | Mod: HCNC,GC,, | Performed by: INTERNAL MEDICINE

## 2023-04-19 PROCEDURE — 85730 THROMBOPLASTIN TIME PARTIAL: CPT | Mod: HCNC | Performed by: PHYSICIAN ASSISTANT

## 2023-04-19 PROCEDURE — 94799 UNLISTED PULMONARY SVC/PX: CPT | Mod: HCNC

## 2023-04-19 PROCEDURE — 99900035 HC TECH TIME PER 15 MIN (STAT): Mod: HCNC

## 2023-04-19 PROCEDURE — 99232 PR SUBSEQUENT HOSPITAL CARE,LEVL II: ICD-10-PCS | Mod: HCNC,GC,, | Performed by: INTERNAL MEDICINE

## 2023-04-19 PROCEDURE — 83735 ASSAY OF MAGNESIUM: CPT | Mod: HCNC | Performed by: PHYSICIAN ASSISTANT

## 2023-04-19 PROCEDURE — 85610 PROTHROMBIN TIME: CPT | Mod: HCNC | Performed by: PHYSICIAN ASSISTANT

## 2023-04-19 PROCEDURE — 25000003 PHARM REV CODE 250: Mod: HCNC | Performed by: STUDENT IN AN ORGANIZED HEALTH CARE EDUCATION/TRAINING PROGRAM

## 2023-04-19 PROCEDURE — 11000001 HC ACUTE MED/SURG PRIVATE ROOM: Mod: HCNC

## 2023-04-19 PROCEDURE — 99233 PR SUBSEQUENT HOSPITAL CARE,LEVL III: ICD-10-PCS | Mod: HCNC,GC,, | Performed by: INTERNAL MEDICINE

## 2023-04-19 PROCEDURE — 93010 EKG 12-LEAD: ICD-10-PCS | Mod: HCNC,,, | Performed by: INTERNAL MEDICINE

## 2023-04-19 PROCEDURE — 25000003 PHARM REV CODE 250: Mod: HCNC

## 2023-04-19 PROCEDURE — 97535 SELF CARE MNGMENT TRAINING: CPT | Mod: HCNC

## 2023-04-19 PROCEDURE — 84100 ASSAY OF PHOSPHORUS: CPT | Mod: HCNC | Performed by: PHYSICIAN ASSISTANT

## 2023-04-19 PROCEDURE — 93005 ELECTROCARDIOGRAM TRACING: CPT | Mod: HCNC

## 2023-04-19 PROCEDURE — 27000221 HC OXYGEN, UP TO 24 HOURS: Mod: HCNC

## 2023-04-19 PROCEDURE — 97116 GAIT TRAINING THERAPY: CPT | Mod: HCNC

## 2023-04-19 RX ORDER — SODIUM,POTASSIUM PHOSPHATES 280-250MG
2 POWDER IN PACKET (EA) ORAL
Status: DISCONTINUED | OUTPATIENT
Start: 2023-04-19 | End: 2023-04-20

## 2023-04-19 RX ORDER — BISACODYL 10 MG
10 SUPPOSITORY, RECTAL RECTAL ONCE
Status: COMPLETED | OUTPATIENT
Start: 2023-04-19 | End: 2023-04-19

## 2023-04-19 RX ORDER — INSULIN ASPART 100 [IU]/ML
4-6 INJECTION, SOLUTION INTRAVENOUS; SUBCUTANEOUS
Status: DISCONTINUED | OUTPATIENT
Start: 2023-04-19 | End: 2023-04-21

## 2023-04-19 RX ORDER — LANOLIN ALCOHOL/MO/W.PET/CERES
800 CREAM (GRAM) TOPICAL
Status: DISCONTINUED | OUTPATIENT
Start: 2023-04-19 | End: 2023-04-20

## 2023-04-19 RX ORDER — POLYETHYLENE GLYCOL 3350 17 G/17G
17 POWDER, FOR SOLUTION ORAL 2 TIMES DAILY
Status: CANCELLED | OUTPATIENT
Start: 2023-04-19

## 2023-04-19 RX ORDER — HYDROCODONE BITARTRATE AND ACETAMINOPHEN 5; 325 MG/1; MG/1
1 TABLET ORAL EVERY 6 HOURS PRN
Status: DISCONTINUED | OUTPATIENT
Start: 2023-04-19 | End: 2023-04-22

## 2023-04-19 RX ORDER — METOPROLOL TARTRATE 50 MG/1
50 TABLET ORAL 2 TIMES DAILY
Status: DISCONTINUED | OUTPATIENT
Start: 2023-04-19 | End: 2023-04-22 | Stop reason: HOSPADM

## 2023-04-19 RX ORDER — HALOPERIDOL 2 MG/ML
0.5 SOLUTION ORAL EVERY 8 HOURS PRN
Status: CANCELLED | OUTPATIENT
Start: 2023-04-19

## 2023-04-19 RX ORDER — AMIODARONE HYDROCHLORIDE 200 MG/1
200 TABLET ORAL 2 TIMES DAILY
Status: DISCONTINUED | OUTPATIENT
Start: 2023-04-19 | End: 2023-04-22 | Stop reason: HOSPADM

## 2023-04-19 RX ORDER — HYDROCODONE BITARTRATE AND ACETAMINOPHEN 10; 325 MG/1; MG/1
1 TABLET ORAL EVERY 6 HOURS PRN
Status: DISCONTINUED | OUTPATIENT
Start: 2023-04-19 | End: 2023-04-20

## 2023-04-19 RX ORDER — AMOXICILLIN 250 MG
2 CAPSULE ORAL 2 TIMES DAILY
Status: CANCELLED | OUTPATIENT
Start: 2023-04-19

## 2023-04-19 RX ADMIN — ASPIRIN 81 MG CHEWABLE TABLET 81 MG: 81 TABLET CHEWABLE at 08:04

## 2023-04-19 RX ADMIN — GABAPENTIN 300 MG: 300 CAPSULE ORAL at 02:04

## 2023-04-19 RX ADMIN — METOCLOPRAMIDE 5 MG: 5 INJECTION, SOLUTION INTRAMUSCULAR; INTRAVENOUS at 01:04

## 2023-04-19 RX ADMIN — CLOPIDOGREL BISULFATE 75 MG: 75 TABLET ORAL at 08:04

## 2023-04-19 RX ADMIN — METOROPROLOL TARTRATE 5 MG: 5 INJECTION, SOLUTION INTRAVENOUS at 06:04

## 2023-04-19 RX ADMIN — ONDANSETRON 4 MG: 2 INJECTION INTRAMUSCULAR; INTRAVENOUS at 11:04

## 2023-04-19 RX ADMIN — ACETAMINOPHEN 650 MG: 325 TABLET ORAL at 02:04

## 2023-04-19 RX ADMIN — ONDANSETRON 4 MG: 2 INJECTION INTRAMUSCULAR; INTRAVENOUS at 02:04

## 2023-04-19 RX ADMIN — AMIODARONE HYDROCHLORIDE 200 MG: 200 TABLET ORAL at 08:04

## 2023-04-19 RX ADMIN — SUCRALFATE 1 G: 1 TABLET ORAL at 12:04

## 2023-04-19 RX ADMIN — SUCRALFATE 1 G: 1 TABLET ORAL at 05:04

## 2023-04-19 RX ADMIN — HYDROCODONE BITARTRATE AND ACETAMINOPHEN 1 TABLET: 5; 325 TABLET ORAL at 06:04

## 2023-04-19 RX ADMIN — HYDROCODONE BITARTRATE AND ACETAMINOPHEN 1 TABLET: 5; 325 TABLET ORAL at 12:04

## 2023-04-19 RX ADMIN — PANTOPRAZOLE SODIUM 40 MG: 40 TABLET, DELAYED RELEASE ORAL at 06:04

## 2023-04-19 RX ADMIN — SUCRALFATE 1 G: 1 TABLET ORAL at 08:04

## 2023-04-19 RX ADMIN — POLYETHYLENE GLYCOL 3350 17 G: 17 POWDER, FOR SOLUTION ORAL at 08:04

## 2023-04-19 RX ADMIN — METOPROLOL TARTRATE 50 MG: 50 TABLET, FILM COATED ORAL at 09:04

## 2023-04-19 RX ADMIN — SENNOSIDES AND DOCUSATE SODIUM 1 TABLET: 8.6; 5 TABLET ORAL at 08:04

## 2023-04-19 RX ADMIN — METOCLOPRAMIDE 5 MG: 5 INJECTION, SOLUTION INTRAMUSCULAR; INTRAVENOUS at 10:04

## 2023-04-19 RX ADMIN — INSULIN ASPART 1 UNITS: 100 INJECTION, SOLUTION INTRAVENOUS; SUBCUTANEOUS at 12:04

## 2023-04-19 RX ADMIN — INSULIN ASPART 4 UNITS: 100 INJECTION, SOLUTION INTRAVENOUS; SUBCUTANEOUS at 06:04

## 2023-04-19 RX ADMIN — LIDOCAINE 1 PATCH: 50 PATCH TOPICAL at 02:04

## 2023-04-19 RX ADMIN — INSULIN DETEMIR 13 UNITS: 100 INJECTION, SOLUTION SUBCUTANEOUS at 08:04

## 2023-04-19 RX ADMIN — PROCHLORPERAZINE EDISYLATE 5 MG: 5 INJECTION INTRAMUSCULAR; INTRAVENOUS at 04:04

## 2023-04-19 RX ADMIN — GABAPENTIN 300 MG: 300 CAPSULE ORAL at 08:04

## 2023-04-19 RX ADMIN — INSULIN ASPART 2 UNITS: 100 INJECTION, SOLUTION INTRAVENOUS; SUBCUTANEOUS at 08:04

## 2023-04-19 RX ADMIN — POTASSIUM & SODIUM PHOSPHATES POWDER PACK 280-160-250 MG 2 PACKET: 280-160-250 PACK at 12:04

## 2023-04-19 RX ADMIN — METOCLOPRAMIDE 5 MG: 5 INJECTION, SOLUTION INTRAMUSCULAR; INTRAVENOUS at 12:04

## 2023-04-19 RX ADMIN — BISACODYL 10 MG: 10 SUPPOSITORY RECTAL at 08:04

## 2023-04-19 RX ADMIN — ATORVASTATIN CALCIUM 80 MG: 40 TABLET, FILM COATED ORAL at 08:04

## 2023-04-19 RX ADMIN — MAGNESIUM OXIDE TAB 400 MG (241.3 MG ELEMENTAL MG) 800 MG: 400 (241.3 MG) TAB at 12:04

## 2023-04-19 RX ADMIN — INSULIN ASPART 1 UNITS: 100 INJECTION, SOLUTION INTRAVENOUS; SUBCUTANEOUS at 07:04

## 2023-04-19 RX ADMIN — ACETAMINOPHEN 650 MG: 325 TABLET ORAL at 06:04

## 2023-04-19 RX ADMIN — HYDROCODONE BITARTRATE AND ACETAMINOPHEN 1 TABLET: 5; 325 TABLET ORAL at 04:04

## 2023-04-19 RX ADMIN — ACETAMINOPHEN 650 MG: 325 TABLET ORAL at 08:04

## 2023-04-19 RX ADMIN — METOPROLOL TARTRATE 25 MG: 25 TABLET, FILM COATED ORAL at 08:04

## 2023-04-19 NOTE — PT/OT/SLP PROGRESS
Physical Therapy Treatment    Patient Name:  Jud Villa   MRN:  [id number]    Recommendations:     Discharge Recommendations: home  Discharge Equipment Recommendations: none  Barriers to discharge: None    Assessment:     Jud Villa is a 63 y.o. female admitted with a medical diagnosis of Coronary artery disease.  She presents with the following impairments/functional limitations: impaired endurance, impaired functional mobility, impaired balance, gait instability, decreased safety awareness pt tolerated treatment better being able to gait train farther. Pt will benefit from cont skilled PT 5x/wk to progress physically. Pt will be able to discharge home with no needs when medically stable. Pt is s/p CABG 4/14.    Rehab Prognosis: Good; patient would benefit from acute skilled PT services to address these deficits and reach maximum level of function.    Recent Surgery: Procedure(s) (LRB):  CORONARY ARTERY BYPASS GRAFT (CABG) x 2 (N/A)  SURGICAL PROCUREMENT, VEIN, ENDOSCOPIC (Left) 5 Days Post-Op    Plan:     During this hospitalization, patient to be seen 5 x/week to address the identified rehab impairments via gait training, therapeutic activities and progress toward the following goals:    Plan of Care Expires:  05/16/23    Subjective     Chief Complaint: pt c/o being tired after gait training.   Patient/Family Comments/goals: to go home.   Pain/Comfort:  Pain Rating 1: 5/10 (abdomen)  Pain Addressed 1: Reposition, Distraction  Pain Rating Post-Intervention 1: 5/10 (abdomen)      Objective:     Communicated with nurse  prior to session.  Patient found up in chair with telemetry, pulse ox (continuous), blood pressure cuff, chest tube (hep lock IV) upon PT entry to room.     General Precautions: Standard, fall, sternal  Orthopedic Precautions: N/A  Braces: N/A  Respiratory Status: Room air     Functional Mobility:  Transfers:   pt needed verbal cues for functional mobility with sternal precautions.   Sit to  Stand:  stand by assistance with no AD    Gait: pt received gait training ~ 74 ft  with CGA.    Pt white board updated with current therapists name and level of mobility assistance needed.       AM-PAC 6 CLICK MOBILITY  Turning over in bed (including adjusting bedclothes, sheets and blankets)?: 3  Sitting down on and standing up from a chair with arms (e.g., wheelchair, bedside commode, etc.): 3  Moving from lying on back to sitting on the side of the bed?: 3  Moving to and from a bed to a chair (including a wheelchair)?: 3  Need to walk in hospital room?: 3  Climbing 3-5 steps with a railing?: 1  Basic Mobility Total Score: 16       Treatment & Education:  Pt received verbal instructions in PT POC and verbally expressed understanding of such.     Patient left up in chair with all lines intact and call button in reach..    GOALS:   Multidisciplinary Problems       Physical Therapy Goals          Problem: Physical Therapy    Goal Priority Disciplines Outcome Goal Variances Interventions   Physical Therapy Goal     PT, PT/OT Ongoing, Progressing     Description: Goals to be met by: 23    Patient will increase functional independence with mobility by performin. Supine to sit with [place] and Maintaining sternal precautions-not met  2. Sit to supine with [place] and Maintaining sternal precautions -not met  3. Sit to stand transfer with [place] and Maintaining sternal precautions -not met  4. Gait  x 300 feet with [place] and Maintaining sternal precautions -not met  5. Ascend/Descend 6 inch curb step with [place] and Maintaining sternal precautions -not met  6. Lower extremity exercise program x15 reps per handout, with independence-not met                         Time Tracking:     PT Received On: 23  PT Start Time: 1123     PT Stop Time: 1138  PT Total Time (min): 15 min     Billable Minutes: Gait Training 15 min     Treatment Type: Treatment  PT/PTA: PT     Number of PTA  visits since last PT visit: 0     04/19/2023

## 2023-04-19 NOTE — ASSESSMENT & PLAN NOTE
Hyperglycemia from type 2 diabetes mellitus and stress response s/p CABG, steroids given intra op also contributing to hyperglycemia     Endocrinology consulted for BG management.   BG goal 110-140 (CTS Protocol)      - Intensive insulin drip after surgery per CTS guidelines, adjusted to transition drip 04/15/2023 and then switched to MDI on 4/16/2023 with small adjustments in prandial insulin since then.  - Hypoglycemia protocol in place  - If blood glucose greater than 300 and diet ordered, please ask patient not to eat food or drink anything other than water until correctional insulin has brought it back below 250     Plan:     Continue Levemir 13 units daily      Increase Novolog with meals to 4-6 units based on percent eaten, as she is slightly above goal on 3-5u and only eating 25% of her meals, so I anticipate increased prandial requirement as she begins to eat more      Continue Novolog low dose correction scale prn   Accuchecks ACHS        ** Please notify Endocrine for any change and/or advance in diet**  ** Please call Endocrine for any BG related issues **          **Please notify endocrine of discharge plan in advance if possible, so we can provide discharge recommendations**

## 2023-04-19 NOTE — PROGRESS NOTES
[facility]  Critical Care - Surgery  Progress Note    Patient Name: Jud Villa  MRN: [id number]  Admission Date: 4/14/2023  Hospital Length of Stay: 5 days  Code Status: Full Code  Attending Provider: Igor Kahn MD  Primary Care Provider: Bo Lopez MD   Principal Problem: Coronary artery disease    Subjective:     Hospital/ICU Course:  Patient admitted to SICU, extubated, following CABG. Apneic upon arrival, improved with narcan. Started on ASA and statin on POD0. POD1 started lopressor, antiemetics with daily ekg, transitioned oxy to norco with improvement in nausea. POD2 mildly febrile with leukocytosis, bazzi removed and UA obtained, lasix started, advance diet as tolerated.      No new subjective & objective note has been filed under this hospital service since the last note was generated.    Assessment/Plan:     Cardiac/Vascular  * Coronary artery disease    Neuro/Psych:     - Hx CVA this year, continue plavix  - Pain:    - Scheduled Tylenol 650g q8h, gabapentin 300 TID   - norco PRN             Cardiac:     - S/P CABGx2 (LIMA-LAD, SVG-OM) with Dr. Kahn on 4/14/2023    - BP Goal: MAP 60-80, SBP < 140     - continue ASA/plavix, statin    -  lopressor to 25 BID    - PO amio load.     - Rhythm: NSR      Pulmonary:     - Goal SpO2 >92%    - pleural chest tubes, will discuss removal with surgeon    - ABGs PRN      Renal:    - Trend BUN/Cr     - Maintain Bazzi, record strict Is/Os      FEN / GI:     - Daily CMP, PRN K/Mag/Phos per protocol     - zofran and compazine for nausea, daily ekgs to monitor prolonged qtc    - Replace electrolytes as needed    - Nutrition: cardiac     - Bowel Regimen: Miralax, senna. Discussed increasing bowel regimen today, patient in agreement      ID:     - Afebrile, WBC stable    - daily CBC      Heme/Onc:     - Hgb stable    - DAPT    - CBC daily     Endocrine:     - CTS Goal -140    - HgbA1c: 6.9    - Endocrinology  consulted for insulin management      PPx:   Feeding: cardiac  Analgesia/Sedation: multimodal   Thromboembolic Prevention: SCDs  HOB >30: Yes  Stress Ulcer: pepcid  Glucose Control: Yes, insulin management per Endocrinology     Lines/Drains/Airway:   Chest Tubes: 2 Pleural    Pacing Wires: Temporary ventricular pacing wires      Dispo/Code Status/Palliative:     - stepdown to CSU    - Full Code        Critical care was time spent personally by me on the following activities: development of treatment plan with patient or surrogate and bedside caregivers, discussions with consultants, evaluation of patient's response to treatment, examination of patient, ordering and performing treatments and interventions, ordering and review of laboratory studies, ordering and review of radiographic studies, pulse oximetry, re-evaluation of patient's condition.  This critical care time did not overlap with that of any other provider or involve time for any procedures.     Gay Rodriguez NP  Critical Care - Surgery  [facility]

## 2023-04-19 NOTE — PLAN OF CARE
Problem: Physical Therapy  Goal: Physical Therapy Goal  Description: Goals to be met by: 23    Patient will increase functional independence with mobility by performin. Supine to sit with [place] and Maintaining sternal precautions-not met  2. Sit to supine with [place] and Maintaining sternal precautions -not met  3. Sit to stand transfer with [place] and Maintaining sternal precautions -not met  4. Gait  x 300 feet with [place] and Maintaining sternal precautions -not met  5. Ascend/Descend 6 inch curb step with [place] and Maintaining sternal precautions -not met  6. Lower extremity exercise program x15 reps per handout, with independence-not met    Outcome: Ongoing, Progressing   Goals remain appropriate. 2023

## 2023-04-19 NOTE — PLAN OF CARE
Problem: Adult Inpatient Plan of Care  Goal: Plan of Care Review  Outcome: Ongoing, Progressing  Goal: Patient-Specific Goal (Individualized)  Outcome: Ongoing, Progressing  Goal: Absence of Hospital-Acquired Illness or Injury  Outcome: Ongoing, Progressing  Goal: Optimal Comfort and Wellbeing  Outcome: Ongoing, Progressing  Goal: Readiness for Transition of Care  Outcome: Ongoing, Progressing     Problem: Diabetes Comorbidity  Goal: Blood Glucose Level Within Targeted Range  Outcome: Ongoing, Progressing     Problem: Infection  Goal: Absence of Infection Signs and Symptoms  Outcome: Ongoing, Progressing     Problem: Activity Intolerance (Cardiovascular Surgery)  Goal: Improved Activity Tolerance  Outcome: Ongoing, Progressing     Problem: Adjustment to Surgery (Cardiovascular Surgery)  Goal: Optimal Coping with Heart Surgery  Outcome: Ongoing, Progressing     Problem: Bleeding (Cardiovascular Surgery)  Goal: Bleeding (Cardiovascular Surgery)  Outcome: Ongoing, Progressing     Problem: Bowel Motility Impaired (Cardiovascular Surgery)  Goal: Effective Bowel Elimination (Cardiovascular Surgery)  Outcome: Ongoing, Progressing     Problem: Cardiac Function Impaired (Cardiovascular Surgery)  Goal: Effective Cardiac Function  Outcome: Ongoing, Progressing     Problem: Cerebral Tissue Perfusion (Cardiovascular Surgery)  Goal: Optimal Cerebral Tissue Perfusion (Cardiovascular Surgery)  Outcome: Ongoing, Progressing     Problem: Fluid and Electrolyte Imbalance (Cardiovascular Surgery)  Goal: Fluid and Electrolyte Balance (Cardiovascular Surgery)  Outcome: Ongoing, Progressing     Problem: Glycemic Control Impaired (Cardiovascular Surgery)  Goal: Blood Glucose Level Within Targeted Range (Cardiovascular Surgery)  Outcome: Ongoing, Progressing     Problem: Infection (Cardiovascular Surgery)  Goal: Absence of Infection Signs and Symptoms  Outcome: Ongoing, Progressing     Problem: Ongoing Anesthesia Effects (Cardiovascular  Surgery)  Goal: Anesthesia/Sedation Recovery  Outcome: Ongoing, Progressing     Problem: Pain (Cardiovascular Surgery)  Goal: Acceptable Pain Control  Outcome: Ongoing, Progressing     Problem: Postoperative Nausea and Vomiting (Cardiovascular Surgery)  Goal: Nausea and Vomiting Relief (Cardiovascular Surgery)  Outcome: Ongoing, Progressing     Problem: Postoperative Urinary Retention (Cardiovascular Surgery)  Goal: Effective Urinary Elimination (Cardiovascular Surgery)  Outcome: Ongoing, Progressing     Problem: Respiratory Compromise (Cardiovascular Surgery)  Goal: Effective Oxygenation and Ventilation (Cardiovascular Surgery)  Outcome: Ongoing, Progressing     Problem: Fall Injury Risk  Goal: Absence of Fall and Fall-Related Injury  Outcome: Ongoing, Progressing     Problem: Skin Injury Risk Increased  Goal: Skin Health and Integrity  Outcome: Ongoing, Progressing

## 2023-04-19 NOTE — SUBJECTIVE & OBJECTIVE
"Interval HPI:   Overnight events: -202 with increase in novolog yesterday. Still only eating 25% of meals.  Eatin%  Nausea: No  Hypoglycemia and intervention: No  Fever: No  TPN and/or TF: No  If yes, type of TF/TPN and rate: n/a    BP (!) 171/82   Pulse 84   Temp 97.6 °F (36.4 °C) (Oral)   Resp 20   Ht 5' 2" (1.575 m)   Wt 64 kg (141 lb 1.5 oz)   SpO2 (!) 94%   Breastfeeding No   BMI 25.81 kg/m²     Labs Reviewed and Include    Recent Labs   Lab 23  0422   *   CALCIUM 8.9   ALBUMIN 2.8*   PROT 6.4   *   K 4.1   CO2 30*   CL 93*   BUN 9   CREATININE 0.6   ALKPHOS 78   ALT 18   AST 19   BILITOT 0.6     Lab Results   Component Value Date    WBC 15.06 (H) 2023    HGB 8.7 (L) 2023    HCT 27.9 (L) 2023    MCV 91 2023     2023     No results for input(s): TSH, FREET4 in the last 168 hours.  Lab Results   Component Value Date    HGBA1C 6.9 (H) 2023       Nutritional status:   Body mass index is 25.81 kg/m².  Lab Results   Component Value Date    ALBUMIN 2.8 (L) 2023    ALBUMIN 2.4 (L) 2023    ALBUMIN 2.6 (L) 2023     No results found for: PREALBUMIN    Estimated Creatinine Clearance: 82.2 mL/min (based on SCr of 0.6 mg/dL).    Accu-Checks  Recent Labs     23  1749 23  0749 23  1157 23  1624 23  2044 23  0806 23  1129 23  1834 23  2033 23  0833   POCTGLUCOSE 151* 143* 201* 174* 212* 202* 199* 176* 233* 182*       Current Medications and/or Treatments Impacting Glycemic Control  Immunotherapy:    Immunosuppressants       None          Steroids:   Hormones (From admission, onward)      None          Pressors:    Autonomic Drugs (From admission, onward)      None          Hyperglycemia/Diabetes Medications:   Antihyperglycemics (From admission, onward)      Start     Stop Route Frequency Ordered    23 0945  insulin aspart U-100 pen 4-6 Units         -- SubQ 3 " times daily after meals 04/19/23 0932    04/16/23 0945  insulin detemir U-100 (Levemir) pen 13 Units         -- SubQ Daily 04/16/23 0842    04/16/23 0845  insulin regular in 0.9 % NaCl 100 unit/100 mL (1 unit/mL) infusion        Question:  Enter initial dose (Units/hr):  Answer:  0.5    04/16 1000 IV Continuous 04/16/23 0843    04/15/23 0743  insulin aspart U-100 pen 0-5 Units         -- SubQ As needed (PRN) 04/15/23 0645

## 2023-04-19 NOTE — ASSESSMENT & PLAN NOTE
  Neuro/Psych:     - Hx CVA this year, continue plavix  - Pain:    - Scheduled Tylenol 650g q8h, gabapentin 300 TID   - norco PRN             Cardiac:     - S/P CABGx2 (LIMA-LAD, SVG-OM) with Dr. Kahn on 4/14/2023    - BP Goal: MAP 60-80, SBP < 140     - continue ASA/plavix, statin    -  lopressor to 25 BID    - PO amio load.     - Rhythm: NSR      Pulmonary:     - Goal SpO2 >92%    - pleural chest tubes, will discuss removal with surgeon    - ABGs PRN      Renal:    - Trend BUN/Cr     - Maintain [place], record strict Is/Os      FEN / GI:     - Daily CMP, PRN K/Mag/Phos per protocol     - zofran and compazine for nausea, daily ekgs to monitor prolonged qtc    - Replace electrolytes as needed    - Nutrition: cardiac     - Bowel Regimen: Miralax, senna. Discussed increasing bowel regimen today, patient in agreement      ID:     - Afebrile, WBC stable    - daily CBC      Heme/Onc:     - Hgb stable    - DAPT    - CBC daily     Endocrine:     - CTS Goal -140    - HgbA1c: 6.9    - Endocrinology consulted for insulin management      PPx:   Feeding: cardiac  Analgesia/Sedation: multimodal   Thromboembolic Prevention: SCDs  HOB >30: Yes  Stress Ulcer: pepcid  Glucose Control: Yes, insulin management per Endocrinology     Lines/Drains/Airway:   Chest Tubes: 2 Pleural    Pacing Wires: Temporary ventricular pacing wires      Dispo/Code Status/Palliative:     - stepdown to CSU    - Full Code

## 2023-04-19 NOTE — SUBJECTIVE & OBJECTIVE
Interval History/Significant Events: no acute events overnight, now 7 days without BM. Abdomen soft, patient nauseated.     Follow-up For: Procedure(s) (LRB):  CORONARY ARTERY BYPASS GRAFT (CABG) x 2 (N/A)  SURGICAL PROCUREMENT, VEIN, ENDOSCOPIC (Left)    Post-Operative Day: 5 Days Post-Op    Objective:     Vital Signs (Most Recent):  Temp: 98.1 °F (36.7 °C) (04/19/23 0330)  Pulse: 73 (04/19/23 0600)  Resp: (!) 32 (04/19/23 0600)  BP: 136/71 (04/19/23 0600)  SpO2: 95 % (04/19/23 0600)   Vital Signs (24h Range):  Temp:  [98.1 °F (36.7 °C)-98.6 °F (37 °C)] 98.1 °F (36.7 °C)  Pulse:  [] 73  Resp:  [9-32] 32  SpO2:  [88 %-100 %] 95 %  BP: ()/(53-71) 136/71     Weight: 64 kg (141 lb 1.5 oz)  Body mass index is 25.81 kg/m².      Intake/Output Summary (Last 24 hours) at 4/19/2023 0722  Last data filed at 4/19/2023 0600  Gross per 24 hour   Intake 1002.01 ml   Output 2230 ml   Net -1227.99 ml       Physical Exam  Constitutional:       General: She is not in acute distress.  Eyes:      Extraocular Movements: Extraocular movements intact.      Pupils: Pupils are equal, round, and reactive to light.   Cardiovascular:      Rate and Rhythm: Normal rate and regular rhythm.      Pulses: Normal pulses.   Pulmonary:      Effort: Pulmonary effort is normal.   Abdominal:      General: There is no distension.      Palpations: Abdomen is soft.   Musculoskeletal:      Right lower leg: No edema.      Left lower leg: No edema.   Skin:     General: Skin is warm and dry.      Capillary Refill: Capillary refill takes less than 2 seconds.      Findings: No bruising.      Comments: MSI clean, dry and intact  Chest tubes in place   Neurological:      General: No focal deficit present.      Mental Status: She is alert.       Vents:  Oxygen Concentration (%): 24 (04/16/23 2015)    Lines/Drains/Airways       Drain  Duration                  Y Chest Tube 3 and 4 04/14/23 1115 3 Right Pleural 19 Fr. 4 Left Pleural 19 Fr. 4 days               Line  Duration                  Pacer Wires 04/14/23 1139 4 days              Peripheral Intravenous Line  Duration                  Peripheral IV - Single Lumen 04/18/23 0830 20 G Anterior;Right Upper Arm <1 day         Peripheral IV - Single Lumen 04/19/23 0430 20 G Anterior;Proximal;Right Forearm <1 day                    Significant Labs:    CBC/Anemia Profile:  Recent Labs   Lab 04/18/23  0325 04/19/23  0422   WBC 13.21* 15.06*   HGB 7.9* 8.7*   HCT 24.5* 27.9*    305   MCV 90 91   RDW 13.2 13.3        Chemistries:  Recent Labs   Lab 04/18/23  0325 04/18/23  0807 04/18/23  2204 04/19/23  0422   *  --   --  133*   K 4.1 4.1 3.7 4.1   CL 97  --   --  93*   CO2 28  --   --  30*   BUN 11  --   --  9   CREATININE 0.5  --   --  0.6   CALCIUM 8.2*  --   --  8.9   ALBUMIN 2.4*  --   --  2.8*   PROT 5.4*  --   --  6.4   BILITOT 0.4  --   --  0.6   ALKPHOS 63  --   --  78   ALT 16  --   --  18   AST 19  --   --  19   MG 2.0  --   --  1.8   PHOS 2.0*  --   --  2.3*       All pertinent labs within the past 24 hours have been reviewed.    Significant Imaging:  I have reviewed all pertinent imaging results/findings within the past 24 hours.

## 2023-04-19 NOTE — PROGRESS NOTES
"[facility]  Endocrinology  Progress Note    Admit Date: 2023     Reason for Consult: Management of T2DM, Hyperglycemia     Surgical Procedure and Date: 2023 - CAD s/p CABGx2 with Dr. Kahn     Diabetes diagnosis year: 15-20 years ago    Home Diabetes Medications:    Glipizide 10 mg once daily with breakfast (listed as BID)  Basaglar 10 units daily (Patient states she has Levemir and only uses around 6 units on as needed basis)  Jardiance 10 mg daily  Metformin 1000 mg BID  Tradjenta 5 mg daily    How often checking glucose at home? 1-3 x day   BG readings on regimen: 110-150s  Hypoglycemia on the regimen?  No  Missed doses on regimen?  No    Diabetes Complications include:     Hyperglycemia    Complicating diabetes co morbidities:   History of CVA, CAD      HPI:   Patient is a 63 y.o. female with a past medical history of type 2 diabetes mellitus, prior CVA (on DAPT), HTN., HLD, tobacco abuse and CAD.  Initially presented with chest pain while cleaning her house.  She went to the emergency department and was admitted with PET stress which was positive, and follow-up coronary angiography demonstrated 2 vessel disease (now status post CABG x 2).  She is on multi drug regimen at home for diabetes mellitus and has good A1c/glucose control based on labs.  Endocrinology consulted for BG management while hospitalized.    Lab Results   Component Value Date    HGBA1C 6.9 (H) 2023               Interval HPI:   Overnight events: -202 with increase in novolog yesterday. Still only eating 25% of meals.  Eatin%  Nausea: No  Hypoglycemia and intervention: No  Fever: No  TPN and/or TF: No  If yes, type of TF/TPN and rate: n/a    BP (!) 171/82   Pulse 84   Temp 97.6 °F (36.4 °C) (Oral)   Resp 20   Ht 5' 2" (1.575 m)   Wt 64 kg (141 lb 1.5 oz)   SpO2 (!) 94%   Breastfeeding No   BMI 25.81 kg/m²     Labs Reviewed and Include    Recent Labs   Lab 23  0812   * "   CALCIUM 8.9   ALBUMIN 2.8*   PROT 6.4   *   K 4.1   CO2 30*   CL 93*   BUN 9   CREATININE 0.6   ALKPHOS 78   ALT 18   AST 19   BILITOT 0.6     Lab Results   Component Value Date    WBC 15.06 (H) 04/19/2023    HGB 8.7 (L) 04/19/2023    HCT 27.9 (L) 04/19/2023    MCV 91 04/19/2023     04/19/2023     No results for input(s): TSH, FREET4 in the last 168 hours.  Lab Results   Component Value Date    HGBA1C 6.9 (H) 04/11/2023       Nutritional status:   Body mass index is 25.81 kg/m².  Lab Results   Component Value Date    ALBUMIN 2.8 (L) 04/19/2023    ALBUMIN 2.4 (L) 04/18/2023    ALBUMIN 2.6 (L) 04/17/2023     No results found for: PREALBUMIN    Estimated Creatinine Clearance: 82.2 mL/min (based on SCr of 0.6 mg/dL).    Accu-Checks  Recent Labs     04/16/23  1749 04/17/23  0749 04/17/23  1157 04/17/23  1624 04/17/23  2044 04/18/23  0806 04/18/23  1129 04/18/23  1834 04/18/23  2033 04/19/23  0833   POCTGLUCOSE 151* 143* 201* 174* 212* 202* 199* 176* 233* 182*       Current Medications and/or Treatments Impacting Glycemic Control  Immunotherapy:    Immunosuppressants       None          Steroids:   Hormones (From admission, onward)      None          Pressors:    Autonomic Drugs (From admission, onward)      None          Hyperglycemia/Diabetes Medications:   Antihyperglycemics (From admission, onward)      Start     Stop Route Frequency Ordered    04/19/23 0945  insulin aspart U-100 pen 4-6 Units         -- SubQ 3 times daily after meals 04/19/23 0932    04/16/23 0945  insulin detemir U-100 (Levemir) pen 13 Units         -- SubQ Daily 04/16/23 0842    04/16/23 0845  insulin regular in 0.9 % NaCl 100 unit/100 mL (1 unit/mL) infusion        Question:  Enter initial dose (Units/hr):  Answer:  0.5    04/16 1000 IV Continuous 04/16/23 0843    04/15/23 0743  insulin aspart U-100 pen 0-5 Units         -- SubQ As needed (PRN) 04/15/23 0645            ASSESSMENT and PLAN    Neuro  History of CVA (cerebrovascular  accident)   Continues on Atorvastatin 80 mg       Cardiac/Vascular  * Coronary artery disease  CAD s/p CABGx2 with Dr. Kahn on 4/13/23  Will target tight glycemic control given cardiac surgery      Endocrine  Controlled type 2 diabetes mellitus with complication, with long-term current use of insulin  Hyperglycemia from type 2 diabetes mellitus and stress response s/p CABG, steroids given intra op also contributing to hyperglycemia     Endocrinology consulted for BG management.   BG goal 110-140 (CTS Protocol)      - Intensive insulin drip after surgery per CTS guidelines, adjusted to transition drip 04/15/2023 and then switched to MDI on 4/16/2023 with small adjustments in prandial insulin since then.  - Hypoglycemia protocol in place  - If blood glucose greater than 300 and diet ordered, please ask patient not to eat food or drink anything other than water until correctional insulin has brought it back below 250     Plan:     Continue Levemir 13 units daily      Increase Novolog with meals to 4-6 units based on percent eaten, as she is slightly above goal on 3-5u and only eating 25% of her meals, so I anticipate increased prandial requirement as she begins to eat more      Continue Novolog low dose correction scale prn   Accuchecks ACHS        ** Please notify Endocrine for any change and/or advance in diet**  ** Please call Endocrine for any BG related issues **          **Please notify endocrine of discharge plan in advance if possible, so we can provide discharge recommendations**          Nicole Gonzalez MD  Endocrinology  [facility]

## 2023-04-19 NOTE — PT/OT/SLP PROGRESS
Occupational Therapy   Treatment    Name: Jud Villa  MRN: [id number]  Admitting Diagnosis:  Coronary artery disease  5 Days Post-Op    Recommendations:     Discharge Recommendations:   Assessment:     Jud Villa is a 63 y.o. female with a medical diagnosis of Coronary artery disease.   Performance deficits affecting function are weakness, impaired endurance, impaired self care skills, impaired functional mobility, gait instability, impaired balance, decreased upper extremity function, decreased lower extremity function. Pt tolerated session well with good effort and performance. Anticipate pt will continue to progress well and will be ready for d/c home with family to assist as needed.    Rehab Prognosis:  Good; patient would benefit from acute skilled OT services to address these deficits and reach maximum level of function.       Plan:     Patient to be seen 3 x/week to address the above listed problems via self-care/home management, therapeutic activities, therapeutic exercises  Plan of Care Expires: 05/15/23  Plan of Care Reviewed with: patient    Subjective     Pt agreeable to therapy     Pain/Comfort:  Pain Rating 1: 5/10  Location 1: abdomen  Pain Addressed 1: Reposition, Distraction    Objective:     Communicated with: nsg prior to session.  Patient found in chair with tele, pulse ox and BP cuff.     General Precautions: Standard, fall, sternal      Occupational Performance:     Functional Mobility/Transfers:  Sit>stand with SBA     Activities of Daily Living:  Feeding: independent  G/H standing for g/h skills with SBA. Pt tolerated standing x 6 min with good B UE integration with adequate standing balance and endurance for standing self care  LE dressing: set-up manage footwear. Education provided re: adaptive technique for LE dressing given sternal precautions and pt demo/verb good understanding.     [facility] 6 Click ADL: 18    Treatment & Education:  Pt unable to verbalize sternal precautions and  required minimal cues to follow precautions after education.   Pt without pain, SOB or LOB noted during functional activity.   Pt left in chair with PT present and lines intact.       GOALS:   Multidisciplinary Problems       Occupational Therapy Goals          Problem: Occupational Therapy    Goal Priority Disciplines Outcome Interventions   Occupational Therapy Goal     OT, PT/OT Ongoing, Progressing    Description: Goals to be met by: 05/15/2023      Patient will increase functional independence with ADLs by performing:    UE Dressing with Modified [place].  LE Dressing with Modified [place].  Grooming while standing with Modified [place].  Toileting from toilet with Modified [place] for hygiene and clothing management.   Toilet transfer to toilet with Modified [place].  Increased functional strength to WF for self care.  Upper extremity exercise program x10 reps per handout, with supervision.                          Time Tracking:     OT Date of Treatment: 04/19/23  OT Start Time: 1123  OT Stop Time: 1140  OT Total Time (min): 17 min    Billable Minutes:Self Care/Home Management 17    OT/NANCY: OT     Number of NANCY visits since last OT visit: 0    4/19/2023

## 2023-04-20 PROBLEM — I47.10 PAROXYSMAL SVT (SUPRAVENTRICULAR TACHYCARDIA): Status: ACTIVE | Noted: 2023-04-20

## 2023-04-20 LAB
ALBUMIN SERPL BCP-MCNC: 2.7 G/DL (ref 3.5–5.2)
ALP SERPL-CCNC: 81 U/L (ref 55–135)
ALT SERPL W/O P-5'-P-CCNC: 16 U/L (ref 10–44)
ANION GAP SERPL CALC-SCNC: 10 MMOL/L (ref 8–16)
APTT PPP: 24.2 SEC (ref 21–32)
AST SERPL-CCNC: 19 U/L (ref 10–40)
BASOPHILS # BLD AUTO: 0.05 K/UL (ref 0–0.2)
BASOPHILS NFR BLD: 0.4 % (ref 0–1.9)
BILIRUB SERPL-MCNC: 0.5 MG/DL (ref 0.1–1)
BUN SERPL-MCNC: 6 MG/DL (ref 8–23)
CALCIUM SERPL-MCNC: 9 MG/DL (ref 8.7–10.5)
CHLORIDE SERPL-SCNC: 96 MMOL/L (ref 95–110)
CO2 SERPL-SCNC: 30 MMOL/L (ref 23–29)
CREAT SERPL-MCNC: 0.6 MG/DL (ref 0.5–1.4)
DIFFERENTIAL METHOD: ABNORMAL
EOSINOPHIL # BLD AUTO: 0.3 K/UL (ref 0–0.5)
EOSINOPHIL NFR BLD: 2.8 % (ref 0–8)
ERYTHROCYTE [DISTWIDTH] IN BLOOD BY AUTOMATED COUNT: 13.8 % (ref 11.5–14.5)
EST. GFR  (NO RACE VARIABLE): >60 ML/MIN/1.73 M^2
GLUCOSE SERPL-MCNC: 173 MG/DL (ref 70–110)
HCT VFR BLD AUTO: 27.3 % (ref 37–48.5)
HGB BLD-MCNC: 8.8 G/DL (ref 12–16)
IMM GRANULOCYTES # BLD AUTO: 0.06 K/UL (ref 0–0.04)
IMM GRANULOCYTES NFR BLD AUTO: 0.5 % (ref 0–0.5)
INR PPP: 1 (ref 0.8–1.2)
LYMPHOCYTES # BLD AUTO: 2 K/UL (ref 1–4.8)
LYMPHOCYTES NFR BLD: 16.5 % (ref 18–48)
MAGNESIUM SERPL-MCNC: 1.8 MG/DL (ref 1.6–2.6)
MAGNESIUM SERPL-MCNC: 1.8 MG/DL (ref 1.6–2.6)
MCH RBC QN AUTO: 29.2 PG (ref 27–31)
MCHC RBC AUTO-ENTMCNC: 32.2 G/DL (ref 32–36)
MCV RBC AUTO: 91 FL (ref 82–98)
MONOCYTES # BLD AUTO: 1.2 K/UL (ref 0.3–1)
MONOCYTES NFR BLD: 10.2 % (ref 4–15)
NEUTROPHILS # BLD AUTO: 8.2 K/UL (ref 1.8–7.7)
NEUTROPHILS NFR BLD: 69.6 % (ref 38–73)
NRBC BLD-RTO: 0 /100 WBC
PHOSPHATE SERPL-MCNC: 2.4 MG/DL (ref 2.7–4.5)
PHOSPHATE SERPL-MCNC: 2.8 MG/DL (ref 2.7–4.5)
PLATELET # BLD AUTO: 340 K/UL (ref 150–450)
PMV BLD AUTO: 8.9 FL (ref 9.2–12.9)
POCT GLUCOSE: 145 MG/DL (ref 70–110)
POCT GLUCOSE: 202 MG/DL (ref 70–110)
POCT GLUCOSE: 203 MG/DL (ref 70–110)
POCT GLUCOSE: 212 MG/DL (ref 70–110)
POCT GLUCOSE: 250 MG/DL (ref 70–110)
POCT GLUCOSE: 269 MG/DL (ref 70–110)
POTASSIUM SERPL-SCNC: 3.6 MMOL/L (ref 3.5–5.1)
POTASSIUM SERPL-SCNC: 4.5 MMOL/L (ref 3.5–5.1)
PROT SERPL-MCNC: 6.2 G/DL (ref 6–8.4)
PROTHROMBIN TIME: 10.4 SEC (ref 9–12.5)
RBC # BLD AUTO: 3.01 M/UL (ref 4–5.4)
SODIUM SERPL-SCNC: 136 MMOL/L (ref 136–145)
WBC # BLD AUTO: 11.8 K/UL (ref 3.9–12.7)

## 2023-04-20 PROCEDURE — 63600175 PHARM REV CODE 636 W HCPCS: Mod: HCNC | Performed by: STUDENT IN AN ORGANIZED HEALTH CARE EDUCATION/TRAINING PROGRAM

## 2023-04-20 PROCEDURE — 25000003 PHARM REV CODE 250: Mod: HCNC | Performed by: PHYSICIAN ASSISTANT

## 2023-04-20 PROCEDURE — 99900035 HC TECH TIME PER 15 MIN (STAT): Mod: HCNC

## 2023-04-20 PROCEDURE — 99232 SBSQ HOSP IP/OBS MODERATE 35: CPT | Mod: HCNC,GC,, | Performed by: INTERNAL MEDICINE

## 2023-04-20 PROCEDURE — 99233 SBSQ HOSP IP/OBS HIGH 50: CPT | Mod: HCNC,GC,, | Performed by: INTERNAL MEDICINE

## 2023-04-20 PROCEDURE — 94799 UNLISTED PULMONARY SVC/PX: CPT | Mod: HCNC

## 2023-04-20 PROCEDURE — 20600001 HC STEP DOWN PRIVATE ROOM: Mod: HCNC

## 2023-04-20 PROCEDURE — 25000003 PHARM REV CODE 250: Mod: HCNC

## 2023-04-20 PROCEDURE — 99232 PR SUBSEQUENT HOSPITAL CARE,LEVL II: ICD-10-PCS | Mod: HCNC,GC,, | Performed by: INTERNAL MEDICINE

## 2023-04-20 PROCEDURE — 83735 ASSAY OF MAGNESIUM: CPT | Mod: HCNC | Performed by: PHYSICIAN ASSISTANT

## 2023-04-20 PROCEDURE — 84132 ASSAY OF SERUM POTASSIUM: CPT | Mod: HCNC | Performed by: PHYSICIAN ASSISTANT

## 2023-04-20 PROCEDURE — 99223 PR INITIAL HOSPITAL CARE,LEVL III: ICD-10-PCS | Mod: HCNC,GC,, | Performed by: STUDENT IN AN ORGANIZED HEALTH CARE EDUCATION/TRAINING PROGRAM

## 2023-04-20 PROCEDURE — 25000003 PHARM REV CODE 250: Mod: HCNC | Performed by: THORACIC SURGERY (CARDIOTHORACIC VASCULAR SURGERY)

## 2023-04-20 PROCEDURE — 63600175 PHARM REV CODE 636 W HCPCS: Mod: HCNC | Performed by: PHYSICIAN ASSISTANT

## 2023-04-20 PROCEDURE — 25000003 PHARM REV CODE 250: Mod: HCNC | Performed by: STUDENT IN AN ORGANIZED HEALTH CARE EDUCATION/TRAINING PROGRAM

## 2023-04-20 PROCEDURE — 85610 PROTHROMBIN TIME: CPT | Mod: HCNC | Performed by: PHYSICIAN ASSISTANT

## 2023-04-20 PROCEDURE — 99223 1ST HOSP IP/OBS HIGH 75: CPT | Mod: HCNC,GC,, | Performed by: STUDENT IN AN ORGANIZED HEALTH CARE EDUCATION/TRAINING PROGRAM

## 2023-04-20 PROCEDURE — 27000221 HC OXYGEN, UP TO 24 HOURS: Mod: HCNC

## 2023-04-20 PROCEDURE — 85730 THROMBOPLASTIN TIME PARTIAL: CPT | Mod: HCNC | Performed by: PHYSICIAN ASSISTANT

## 2023-04-20 PROCEDURE — 85025 COMPLETE CBC W/AUTO DIFF WBC: CPT | Mod: HCNC | Performed by: PHYSICIAN ASSISTANT

## 2023-04-20 PROCEDURE — 84100 ASSAY OF PHOSPHORUS: CPT | Mod: HCNC | Performed by: PHYSICIAN ASSISTANT

## 2023-04-20 PROCEDURE — 80053 COMPREHEN METABOLIC PANEL: CPT | Mod: HCNC | Performed by: PHYSICIAN ASSISTANT

## 2023-04-20 PROCEDURE — 94761 N-INVAS EAR/PLS OXIMETRY MLT: CPT | Mod: HCNC

## 2023-04-20 PROCEDURE — 99233 PR SUBSEQUENT HOSPITAL CARE,LEVL III: ICD-10-PCS | Mod: HCNC,GC,, | Performed by: INTERNAL MEDICINE

## 2023-04-20 RX ADMIN — METOCLOPRAMIDE 5 MG: 5 INJECTION, SOLUTION INTRAMUSCULAR; INTRAVENOUS at 09:04

## 2023-04-20 RX ADMIN — METOROPROLOL TARTRATE 5 MG: 5 INJECTION, SOLUTION INTRAVENOUS at 06:04

## 2023-04-20 RX ADMIN — METOPROLOL TARTRATE 50 MG: 50 TABLET, FILM COATED ORAL at 08:04

## 2023-04-20 RX ADMIN — AMIODARONE HYDROCHLORIDE 200 MG: 200 TABLET ORAL at 09:04

## 2023-04-20 RX ADMIN — PROCHLORPERAZINE EDISYLATE 5 MG: 5 INJECTION INTRAMUSCULAR; INTRAVENOUS at 06:04

## 2023-04-20 RX ADMIN — SUCRALFATE 1 G: 1 TABLET ORAL at 12:04

## 2023-04-20 RX ADMIN — GABAPENTIN 300 MG: 300 CAPSULE ORAL at 09:04

## 2023-04-20 RX ADMIN — SUCRALFATE 1 G: 1 TABLET ORAL at 07:04

## 2023-04-20 RX ADMIN — HYDROCODONE BITARTRATE AND ACETAMINOPHEN 1 TABLET: 5; 325 TABLET ORAL at 03:04

## 2023-04-20 RX ADMIN — ONDANSETRON 4 MG: 2 INJECTION INTRAMUSCULAR; INTRAVENOUS at 02:04

## 2023-04-20 RX ADMIN — INSULIN ASPART 2 UNITS: 100 INJECTION, SOLUTION INTRAVENOUS; SUBCUTANEOUS at 08:04

## 2023-04-20 RX ADMIN — AMIODARONE HYDROCHLORIDE 200 MG: 200 TABLET ORAL at 08:04

## 2023-04-20 RX ADMIN — POTASSIUM BICARBONATE 50 MEQ: 978 TABLET, EFFERVESCENT ORAL at 05:04

## 2023-04-20 RX ADMIN — GABAPENTIN 300 MG: 300 CAPSULE ORAL at 08:04

## 2023-04-20 RX ADMIN — MAGNESIUM OXIDE TAB 400 MG (241.3 MG ELEMENTAL MG) 800 MG: 400 (241.3 MG) TAB at 05:04

## 2023-04-20 RX ADMIN — POLYETHYLENE GLYCOL 3350 17 G: 17 POWDER, FOR SOLUTION ORAL at 08:04

## 2023-04-20 RX ADMIN — GABAPENTIN 300 MG: 300 CAPSULE ORAL at 02:04

## 2023-04-20 RX ADMIN — PANTOPRAZOLE SODIUM 40 MG: 40 TABLET, DELAYED RELEASE ORAL at 05:04

## 2023-04-20 RX ADMIN — SENNOSIDES AND DOCUSATE SODIUM 1 TABLET: 8.6; 5 TABLET ORAL at 08:04

## 2023-04-20 RX ADMIN — INSULIN ASPART 1 UNITS: 100 INJECTION, SOLUTION INTRAVENOUS; SUBCUTANEOUS at 06:04

## 2023-04-20 RX ADMIN — ACETAMINOPHEN 650 MG: 325 TABLET ORAL at 02:04

## 2023-04-20 RX ADMIN — ATORVASTATIN CALCIUM 80 MG: 40 TABLET, FILM COATED ORAL at 09:04

## 2023-04-20 RX ADMIN — INSULIN ASPART 6 UNITS: 100 INJECTION, SOLUTION INTRAVENOUS; SUBCUTANEOUS at 09:04

## 2023-04-20 RX ADMIN — ACETAMINOPHEN 650 MG: 325 TABLET ORAL at 09:04

## 2023-04-20 RX ADMIN — CLOPIDOGREL BISULFATE 75 MG: 75 TABLET ORAL at 08:04

## 2023-04-20 RX ADMIN — INSULIN ASPART 6 UNITS: 100 INJECTION, SOLUTION INTRAVENOUS; SUBCUTANEOUS at 07:04

## 2023-04-20 RX ADMIN — INSULIN DETEMIR 13 UNITS: 100 INJECTION, SOLUTION SUBCUTANEOUS at 08:04

## 2023-04-20 RX ADMIN — ACETAMINOPHEN 650 MG: 325 TABLET ORAL at 05:04

## 2023-04-20 RX ADMIN — INSULIN ASPART 3 UNITS: 100 INJECTION, SOLUTION INTRAVENOUS; SUBCUTANEOUS at 10:04

## 2023-04-20 RX ADMIN — METOPROLOL TARTRATE 50 MG: 50 TABLET, FILM COATED ORAL at 09:04

## 2023-04-20 RX ADMIN — SUCRALFATE 1 G: 1 TABLET ORAL at 04:04

## 2023-04-20 RX ADMIN — ASPIRIN 81 MG CHEWABLE TABLET 81 MG: 81 TABLET CHEWABLE at 08:04

## 2023-04-20 NOTE — SUBJECTIVE & OBJECTIVE
Past Medical History:   Diagnosis Date    Asthma     Breast carcinoma     Cataract     Coronary artery disease of native heart with stable angina pectoris 4/4/2023    Diabetes mellitus     Moderate episode of recurrent major depressive disorder 5/19/2021    Osteoporosis        Past Surgical History:   Procedure Laterality Date    BILATERAL MASTECTOMY  04/26/2018    CHOLECYSTECTOMY      COLONOSCOPY N/A 10/27/2015    Procedure: COLONOSCOPY;  Surgeon: Johnny Hurley MD;  Location: [facility];  Service: Endoscopy;  Laterality: N/A;    CORONARY ANGIOGRAPHY N/A 4/5/2023    Procedure: ANGIOGRAM, CORONARY ARTERY;  Surgeon: Francisco Barahona MD;  Location: [facility] CATH [facility];  Service: Cardiology;  Laterality: N/A;    CORONARY ARTERY BYPASS GRAFT (CABG) N/A 4/14/2023    Procedure: CORONARY ARTERY BYPASS GRAFT (CABG) x 2;  Surgeon: Igor Kahn MD;  Location: [facility] OR [address] [place] [address];  Service: Cardiothoracic;  Laterality: N/A;    ENDOSCOPIC HARVEST OF VEIN Left 4/14/2023    Procedure: SURGICAL PROCUREMENT, VEIN, ENDOSCOPIC;  Surgeon: Igor Kahn MD;  Location: [facility] OR [address] [place] [address];  Service: Cardiothoracic;  Laterality: Left;  Vein harvest start - stop: 0817 - 0856  Vein prep start - stop: 0857 - 0917    ESOPHAGOGASTRODUODENOSCOPY N/A 01/24/2022    Procedure: ESOPHAGOGASTRODUODENOSCOPY (EGD);  Surgeon: Mili Katz MD;  Location: [facility] (4TH FLR);  Service: Endoscopy;  Laterality: N/A;  rapid in AM, instr portal -ml    HYSTERECTOMY      LASER PERIPHERAL IRIDOTOMY Bilateral 2019        LEFT HEART CATHETERIZATION Left 4/5/2023    Procedure: Left heart cath;  Surgeon: Francisco Barahona MD;  Location: [facility]ATH [facility];  Service: Cardiology;  Laterality: Left;       Review of patient's allergies indicates:  No Known Allergies    No current facility-administered medications on file prior to encounter.     Current Outpatient Medications on File Prior to Encounter   Medication Sig    albuterol (PROVENTIL) 2.5 mg /3 mL  "(0.083 %) nebulizer solution Take 3 mLs (2.5 mg total) by nebulization every 4 to 6 hours as needed for Wheezing or Shortness of Breath. Rescue    albuterol (VENTOLIN HFA) 90 mcg/actuation inhaler INHALE 2 PUFFS BY MOUTH INTO THE LUNGS EVERY 6 HOURS AS NEEDED FOR WHEEZING    aspirin 81 MG Chew Take 81 mg by mouth once daily.    clonazePAM (KLONOPIN) 0.5 MG tablet TAKE 1 TABLET(0.5 MG) BY MOUTH EVERY NIGHT AS NEEDED FOR ANXIETY    fluticasone propionate (FLONASE) 50 mcg/actuation nasal spray 1 spray (50 mcg total) by Each Nostril route once daily.    gabapentin (NEURONTIN) 300 MG capsule Take 1 capsule (300 mg total) by mouth 3 (three) times daily.    glipiZIDE (GLUCOTROL) 10 MG tablet TAKE 1 TABLET(10 MG) BY MOUTH TWICE DAILY    JARDIANCE 10 mg tablet TAKE 1 TABLET(10 MG) BY MOUTH EVERY DAY    metFORMIN (GLUCOPHAGE) 1000 MG tablet TAKE 1 TABLET(1000 MG) BY MOUTH TWICE DAILY WITH MEALS    metoprolol tartrate (LOPRESSOR) 25 MG tablet Take 0.5 tablets (12.5 mg total) by mouth 2 (two) times daily.    nicotine (NICODERM CQ) 21 mg/24 hr PLACE 1 PATCH ONTO THE SKIN DAILY    sucralfate (CARAFATE) 1 gram tablet TAKE 1 TABLET(1 GRAM) BY MOUTH THREE TIMES DAILY BEFORE MEALS    TRADJENTA 5 mg Tab tablet Take 1 tablet (5 mg total) by mouth once daily.    traMADoL (ULTRAM) 50 mg tablet Take 1 tablet (50 mg total) by mouth every 6 (six) hours as needed for Pain.    TRELEGY ELLIPTA 100-62.5-25 mcg DsDv INHALE 1 PUFF INTO THE LUNGS EVERY DAY    atorvastatin (LIPITOR) 80 MG tablet Take 1 tablet (80 mg total) by mouth every evening.    BD ULTRA-FINE SHORT PEN NEEDLE 31 gauge x 5/16" Ndle Inject 1 pen into the skin once daily.    clopidogreL (PLAVIX) 75 mg tablet Take 1 tablet (75 mg total) by mouth once daily.    ergocalciferol (ERGOCALCIFEROL) 50,000 unit Cap TAKE 1 CAPSULE BY MOUTH EVERY 7 DAYS    ondansetron (ZOFRAN-ODT) 4 MG TbDL Take 1 tablet (4 mg total) by mouth 3 (three) times daily.    pantoprazole (PROTONIX) 40 MG tablet " Take 1 tablet (40 mg total) by mouth before breakfast.    sertraline (ZOLOFT) 50 MG tablet Take 1 tablet (50 mg total) by mouth once daily.    topiramate (TOPAMAX) 50 MG tablet Take 2 tablets (100 mg total) by mouth 2 (two) times daily. TAKE 1 TABLET BY MOUTH TWICE DAILY FOR 7 DAYS, THEN 1 TABLET EVERY MORNING AND 2 TABLETS EVERY EVENING FOR 7 DAYS, THEN 2 TABLETS TWICE DAILY    TRUE METRIX GLUCOSE TEST STRIP Strp USE AS DIRECTED FOUR TIMES DAILY     Family History       Problem Relation (Age of Onset)    Diabetes Father          Tobacco Use    Smoking status: Former     Packs/day: 1.00     Years: 40.00     Pack years: 40.00     Types: Cigarettes     Quit date: 3/12/2023     Years since quittin.1     Passive exposure: Current    Smokeless tobacco: Never   Substance and Sexual Activity    Alcohol use: No     Alcohol/week: 0.0 standard drinks    Drug use: No    Sexual activity: Not on file     Review of Systems   Constitutional: Negative for chills and fever.   HENT:  Negative for sore throat.    Cardiovascular:  Positive for dyspnea on exertion and palpitations. Negative for chest pain and near-syncope.   Respiratory:  Negative for cough and shortness of breath.    Musculoskeletal:  Negative for joint pain.   Gastrointestinal:  Positive for nausea. Negative for bloating and vomiting.   Genitourinary:  Negative for dysuria and flank pain.   Neurological:  Positive for light-headedness. Negative for headaches.   Psychiatric/Behavioral:  Negative for substance abuse. The patient is not nervous/anxious.    Objective:     Vital Signs (Most Recent):  Temp: 98.2 °F (36.8 °C) (23 1030)  Pulse: 70 (23 1030)  Resp: (!) 22 (23 1030)  BP: (!) 124/58 (23 1030)  SpO2: 99 % (23 1030)   Vital Signs (24h Range):  Temp:  [98 °F (36.7 °C)-98.6 °F (37 °C)] 98.2 °F (36.8 °C)  Pulse:  [] 70  Resp:  [12-41] 22  SpO2:  [87 %-100 %] 99 %  BP: (116-178)/() 124/58       Weight: 64 kg (141 lb 1.5  oz)  Body mass index is 25.81 kg/m².    SpO2: 99 %       Physical Exam  Vitals and nursing note reviewed.   Constitutional:       Appearance: Normal appearance.   HENT:      Mouth/Throat:      Mouth: Mucous membranes are moist.      Pharynx: Oropharynx is clear. No oropharyngeal exudate.   Eyes:      Conjunctiva/sclera: Conjunctivae normal.      Pupils: Pupils are equal, round, and reactive to light.   Cardiovascular:      Rate and Rhythm: Normal rate and regular rhythm.      Pulses: Normal pulses.      Heart sounds: Normal heart sounds. No murmur heard.     Comments: [place] sternotomy c/d/I with chest tubes   Pulmonary:      Effort: Pulmonary effort is normal.      Breath sounds: Normal breath sounds.   Abdominal:      General: Abdomen is flat. There is no distension.      Palpations: Abdomen is soft.      Tenderness: There is no abdominal tenderness.   Skin:     General: Skin is warm and dry.      Coloration: Skin is not pale.   Neurological:      General: No focal deficit present.      Mental Status: She is alert and oriented to person, place, and time.   Psychiatric:         Mood and Affect: Mood normal.         Behavior: Behavior normal.        Significant Labs:   Recent Lab Results  (Last 5 results in the past 24 hours)        04/20/23  0822   04/20/23  0402   04/19/23  2020   04/19/23  1757   04/19/23  1234        Albumin   2.7             Alkaline Phosphatase   81             ALT   16             Anion Gap   10             aPTT   24.2  Comment: aPTT therapeutic range = 39-69 seconds             AST   19             Baso #   0.05             Basophil %   0.4             BILIRUBIN TOTAL   0.5  Comment: For infants and newborns, interpretation of results should be based  on gestational age, weight and in agreement with clinical  observations.    Premature Infant recommended reference ranges:  Up to 24 hours.............<8.0 mg/dL  Up to 48 hours............<12.0 mg/dL  3-5 days..................<15.0  mg/dL  6-29 days.................<15.0 mg/dL               BUN   6             Calcium   9.0             Chloride   96             CO2   30             Creatinine   0.6             Differential Method   Automated             eGFR   >60.0             Eos #   0.3             Eosinophil %   2.8             Glucose   173             Gran # (ANC)   8.2             Gran %   69.6             Hematocrit   27.3             Hemoglobin   8.8             Immature Grans (Abs)   0.06  Comment: Mild elevation in immature granulocytes is non specific and   can be seen in a variety of conditions including stress response,   acute inflammation, trauma and pregnancy. Correlation with other   laboratory and clinical findings is essential.               Immature Granulocytes   0.5             INR   1.0  Comment: Coumadin Therapy:  2.0 - 3.0 for INR for all indicators except mechanical heart valves  and antiphospholipid syndromes which should use 2.5 - 3.5.               Lymph #   2.0             Lymph %   16.5             Magnesium   1.8             MCH   29.2             MCHC   32.2             MCV   91             Mono #   1.2             Mono %   10.2             MPV   8.9             nRBC   0             Phosphorus   2.8             Platelets   340             POCT Glucose 202     212   203   180       Potassium   3.6             PROTEIN TOTAL   6.2             Protime   10.4             RBC   3.01             RDW   13.8             Sodium   136             WBC   11.80

## 2023-04-20 NOTE — HPI
EP Consult: PSVT  Team: CTS      Ms. Villa is a 62 y/o W with PMHx mvCAD, CVA, HTN, HLD, DM2, Breast cancer s/p BL mastectomy who was admitted on 4/11/23 for CABG. She is now s/p CABG x2 (LIMA-LAD, SVG-OM) on 4/14/23 by Dr. Kahn. She was started on amio 200mg BID on 4/18 and on lopressor which was increased yesterday to 50mg BID due to runs of SVT. She admits feeling palpitations and dizziness which worsens when she is moving around. Has been having a lot of nausea for the past few days.     ECG and telemetry reviewed. SVT with aberrancy- AT and S. Tachycardia.       TTE 3/22/23  The left ventricle is normal in size with normal systolic function.  The estimated ejection fraction is 55%.  There are segmental left ventricular wall motion abnormalities consistent with anteroapical MI.  Normal left ventricular diastolic function.  The estimated PA systolic pressure is 32 mmHg.  Normal right ventricular size with normal right ventricular systolic function.  Normal central venous pressure (3 mmHg).  There is no pulmonary hypertension.

## 2023-04-20 NOTE — CONSULTS
[facility] - Cardiology Stepdown  Cardiac Electrophysiology  Consult Note    Admission Date: 4/14/2023  Code Status: Full Code   Attending Provider: Igor Kahn MD  Consulting Provider: Chavo Orozco MD  Principal Problem:Coronary artery disease    Inpatient consult to Electrophysiology  Consult performed by: Chavo Orozco MD  Consult ordered by: Nga Purvis MD        Subjective:     Chief Complaint:  palpitations    HPI:   EP Consult: PSVT  Team: CTS      Ms. Villa is a 62 y/o W with PMHx mvCAD, CVA, HTN, HLD, DM2, Breast cancer s/p BL mastectomy who was admitted on 4/11/23 for CABG. She is now s/p CABG x2 (LIMA-LAD, SVG-OM) on 4/14/23 by Dr. Kahn. She was started on amio 200mg BID on 4/18 and on lopressor which was increased yesterday to 50mg BID due to runs of SVT. She admits feeling palpitations and dizziness which worsens when she is moving around. Has been having a lot of nausea for the past few days.     ECG and telemetry reviewed. SVT with aberrancy- AT and S. Tachycardia.       TTE 3/22/23  The left ventricle is normal in size with normal systolic function.  The estimated ejection fraction is 55%.  There are segmental left ventricular wall motion abnormalities consistent with anteroapical MI.  Normal left ventricular diastolic function.  The estimated PA systolic pressure is 32 mmHg.  Normal right ventricular size with normal right ventricular systolic function.  Normal central venous pressure (3 mmHg).  There is no pulmonary hypertension.      Past Medical History:   Diagnosis Date    Asthma     Breast carcinoma     Cataract     Coronary artery disease of native heart with stable angina pectoris 4/4/2023    Diabetes mellitus     Moderate episode of recurrent major depressive disorder 5/19/2021    Osteoporosis        Past Surgical History:   Procedure Laterality Date    BILATERAL MASTECTOMY  04/26/2018    CHOLECYSTECTOMY      COLONOSCOPY N/A 10/27/2015    Procedure:  COLONOSCOPY;  Surgeon: Johnny Hurley MD;  Location: [facility] ENDO;  Service: Endoscopy;  Laterality: N/A;    CORONARY ANGIOGRAPHY N/A 4/5/2023    Procedure: ANGIOGRAM, CORONARY ARTERY;  Surgeon: Francisco Barahona MD;  Location: [facility] CATH LAB;  Service: Cardiology;  Laterality: N/A;    CORONARY ARTERY BYPASS GRAFT (CABG) N/A 4/14/2023    Procedure: CORONARY ARTERY BYPASS GRAFT (CABG) x 2;  Surgeon: Igor Kahn MD;  [facility];  Service: Cardiothoracic;  Laterality: N/A;    ENDOSCOPIC HARVEST OF VEIN Left 4/14/2023    Procedure: SURGICAL PROCUREMENT, VEIN, ENDOSCOPIC;  Surgeon: Igor Kahn MD;  [facility];  Service: Cardiothoracic;  Laterality: Left;  Vein harvest start - stop: 0817 - 0856  Vein prep start - stop: 0857 - 0917    ESOPHAGOGASTRODUODENOSCOPY N/A 01/24/2022    Procedure: ESOPHAGOGASTRODUODENOSCOPY (EGD);  Surgeon: Mili Katz MD;  Location: [facility] ENDO (4TH FLR);  Service: Endoscopy;  Laterality: N/A;  rapid in AM, instr portal -ml    HYSTERECTOMY      LASER PERIPHERAL IRIDOTOMY Bilateral 2019        LEFT HEART CATHETERIZATION Left 4/5/2023    Procedure: Left heart cath;  Surgeon: Francisco Barahona MD;  Location: [facility] CATH LAB;  Service: Cardiology;  Laterality: Left;       Review of patient's allergies indicates:  No Known Allergies    No current facility-administered medications on file prior to encounter.     Current Outpatient Medications on File Prior to Encounter   Medication Sig    albuterol (PROVENTIL) 2.5 mg /3 mL (0.083 %) nebulizer solution Take 3 mLs (2.5 mg total) by nebulization every 4 to 6 hours as needed for Wheezing or Shortness of Breath. Rescue    albuterol (VENTOLIN HFA) 90 mcg/actuation inhaler INHALE 2 PUFFS BY MOUTH INTO THE LUNGS EVERY 6 HOURS AS NEEDED FOR WHEEZING    aspirin 81 MG Chew Take 81 mg by mouth once daily.    clonazePAM (KLONOPIN) 0.5 MG tablet TAKE 1 TABLET(0.5 MG) BY MOUTH EVERY NIGHT AS NEEDED FOR ANXIETY  "   fluticasone propionate (FLONASE) 50 mcg/actuation nasal spray 1 spray (50 mcg total) by Each Nostril route once daily.    gabapentin (NEURONTIN) 300 MG capsule Take 1 capsule (300 mg total) by mouth 3 (three) times daily.    glipiZIDE (GLUCOTROL) 10 MG tablet TAKE 1 TABLET(10 MG) BY MOUTH TWICE DAILY    JARDIANCE 10 mg tablet TAKE 1 TABLET(10 MG) BY MOUTH EVERY DAY    metFORMIN (GLUCOPHAGE) 1000 MG tablet TAKE 1 TABLET(1000 MG) BY MOUTH TWICE DAILY WITH MEALS    metoprolol tartrate (LOPRESSOR) 25 MG tablet Take 0.5 tablets (12.5 mg total) by mouth 2 (two) times daily.    nicotine (NICODERM CQ) 21 mg/24 hr PLACE 1 PATCH ONTO THE SKIN DAILY    sucralfate (CARAFATE) 1 gram tablet TAKE 1 TABLET(1 GRAM) BY MOUTH THREE TIMES DAILY BEFORE MEALS    TRADJENTA 5 mg Tab tablet Take 1 tablet (5 mg total) by mouth once daily.    traMADoL (ULTRAM) 50 mg tablet Take 1 tablet (50 mg total) by mouth every 6 (six) hours as needed for Pain.    TRELEGY ELLIPTA 100-62.5-25 mcg DsDv INHALE 1 PUFF INTO THE LUNGS EVERY DAY    atorvastatin (LIPITOR) 80 MG tablet Take 1 tablet (80 mg total) by mouth every evening.    BD ULTRA-FINE SHORT PEN NEEDLE 31 gauge x 5/16" Ndle Inject 1 pen into the skin once daily.    clopidogreL (PLAVIX) 75 mg tablet Take 1 tablet (75 mg total) by mouth once daily.    ergocalciferol (ERGOCALCIFEROL) 50,000 unit Cap TAKE 1 CAPSULE BY MOUTH EVERY 7 DAYS    ondansetron (ZOFRAN-ODT) 4 MG TbDL Take 1 tablet (4 mg total) by mouth 3 (three) times daily.    pantoprazole (PROTONIX) 40 MG tablet Take 1 tablet (40 mg total) by mouth before breakfast.    sertraline (ZOLOFT) 50 MG tablet Take 1 tablet (50 mg total) by mouth once daily.    topiramate (TOPAMAX) 50 MG tablet Take 2 tablets (100 mg total) by mouth 2 (two) times daily. TAKE 1 TABLET BY MOUTH TWICE DAILY FOR 7 DAYS, THEN 1 TABLET EVERY MORNING AND 2 TABLETS EVERY EVENING FOR 7 DAYS, THEN 2 TABLETS TWICE DAILY    TRUE METRIX GLUCOSE TEST STRIP Strp USE AS DIRECTED " FOUR TIMES DAILY     Family History       Problem Relation (Age of Onset)    Diabetes Father          Tobacco Use    Smoking status: Former     Packs/day: 1.00     Years: 40.00     Pack years: 40.00     Types: Cigarettes     Quit date: 3/12/2023     Years since quittin.1     Passive exposure: Current    Smokeless tobacco: Never   Substance and Sexual Activity    Alcohol use: No     Alcohol/week: 0.0 standard drinks    Drug use: No    Sexual activity: Not on file     Review of Systems   Constitutional: Negative for chills and fever.   HENT:  Negative for sore throat.    Cardiovascular:  Positive for dyspnea on exertion and palpitations. Negative for chest pain and near-syncope.   Respiratory:  Negative for cough and shortness of breath.    Musculoskeletal:  Negative for joint pain.   Gastrointestinal:  Positive for nausea. Negative for bloating and vomiting.   Genitourinary:  Negative for dysuria and flank pain.   Neurological:  Positive for light-headedness. Negative for headaches.   Psychiatric/Behavioral:  Negative for substance abuse. The patient is not nervous/anxious.    Objective:     Vital Signs (Most Recent):  Temp: 98.2 °F (36.8 °C) (23 1030)  Pulse: 70 (23 1030)  Resp: (!) 22 (23 1030)  BP: (!) 124/58 (23 1030)  SpO2: 99 % (23 1030)   Vital Signs (24h Range):  Temp:  [98 °F (36.7 °C)-98.6 °F (37 °C)] 98.2 °F (36.8 °C)  Pulse:  [] 70  Resp:  [12-41] 22  SpO2:  [87 %-100 %] 99 %  BP: (116-178)/() 124/58       Weight: 64 kg (141 lb 1.5 oz)  Body mass index is 25.81 kg/m².    SpO2: 99 %       Physical Exam  Vitals and nursing note reviewed.   Constitutional:       Appearance: Normal appearance.   HENT:      Mouth/Throat:      Mouth: Mucous membranes are moist.      Pharynx: Oropharynx is clear. No oropharyngeal exudate.   Eyes:      Conjunctiva/sclera: Conjunctivae normal.      Pupils: Pupils are equal, round, and reactive to light.   Cardiovascular:      Rate and  Rhythm: Normal rate and regular rhythm.      Pulses: Normal pulses.      Heart sounds: Normal heart sounds. No murmur heard.     Comments: Midling sternotomy c/d/I with chest tubes   Pulmonary:      Effort: Pulmonary effort is normal.      Breath sounds: Normal breath sounds.   Abdominal:      General: Abdomen is flat. There is no distension.      Palpations: Abdomen is soft.      Tenderness: There is no abdominal tenderness.   Skin:     General: Skin is warm and dry.      Coloration: Skin is not pale.   Neurological:      General: No focal deficit present.      Mental Status: She is alert and oriented to person, place, and time.   Psychiatric:         Mood and Affect: Mood normal.         Behavior: Behavior normal.        Significant Labs:   Recent Lab Results  (Last 5 results in the past 24 hours)        04/20/23  0822   04/20/23  0402   04/19/23  2020   04/19/23  1757   04/19/23  1234        Albumin   2.7             Alkaline Phosphatase   81             ALT   16             Anion Gap   10             aPTT   24.2  Comment: aPTT therapeutic range = 39-69 seconds             AST   19             Baso #   0.05             Basophil %   0.4             BILIRUBIN TOTAL   0.5  Comment: For infants and newborns, interpretation of results should be based  on gestational age, weight and in agreement with clinical  observations.    Premature Infant recommended reference ranges:  Up to 24 hours.............<8.0 mg/dL  Up to 48 hours............<12.0 mg/dL  3-5 days..................<15.0 mg/dL  6-29 days.................<15.0 mg/dL               BUN   6             Calcium   9.0             Chloride   96             CO2   30             Creatinine   0.6             Differential Method   Automated             eGFR   >60.0             Eos #   0.3             Eosinophil %   2.8             Glucose   173             Gran # (ANC)   8.2             Gran %   69.6             Hematocrit   27.3             Hemoglobin   8.8              Immature Grans (Abs)   0.06  Comment: Mild elevation in immature granulocytes is non specific and   can be seen in a variety of conditions including stress response,   acute inflammation, trauma and pregnancy. Correlation with other   laboratory and clinical findings is essential.               Immature Granulocytes   0.5             INR   1.0  Comment: Coumadin Therapy:  2.0 - 3.0 for INR for all indicators except mechanical heart valves  and antiphospholipid syndromes which should use 2.5 - 3.5.               Lymph #   2.0             Lymph %   16.5             Magnesium   1.8             MCH   29.2             MCHC   32.2             MCV   91             Mono #   1.2             Mono %   10.2             MPV   8.9             nRBC   0             Phosphorus   2.8             Platelets   340             POCT Glucose 202     212   203   180       Potassium   3.6             PROTEIN TOTAL   6.2             Protime   10.4             RBC   3.01             RDW   13.8             Sodium   136             WBC   11.80                                                Assessment and Plan:     Paroxysmal SVT (supraventricular tachycardia)  Ms. Villa is a 62 y/o W with PMHx mvCAD, CVA, HTN, HLD, DM2, Breast cancer s/p BL mastectomy who was admitted on 4/11/23 for CABG. She is now s/p CABG x2 (LIMA-LAD, SVG-OM) on 4/14/23 by Dr. Kahn. Paroxysmal SVT post CABG. ECG and telemetry reviewed. SVT with aberrancy- AT and S. Tachycardia.     - continue lopressor and amiodarone  - given her CV is 5, recommend AC with eliquis 5mg BID  - f/u with Dr. Xavier after discharge for continued monitoring        Thank you for your consult.      Chavo Orozco MD  Cardiac Electrophysiology  [facility] - Cardiology Stepdown

## 2023-04-20 NOTE — ASSESSMENT & PLAN NOTE
  Neuro/Psych:     - Hx CVA this year, continue plavix  - Pain:    - Scheduled Tylenol 650g q8h, gabapentin 300 TID   - norco PRN             Cardiac:     - S/P CABGx2 (LIMA-LAD, SVG-OM) with Dr. Kahn on 4/14/2023    - BP Goal: MAP 60-80, SBP < 140     - continue ASA/plavix, statin    -  lopressor increased to 50 BID for pSVT, EP now consulted for further management.     - PO amio load.     - Rhythm: NSR      Pulmonary:     - Goal SpO2 >92%    - pleural chest tubes, persistent output >150. CXR stable without significant effusions. Reviewed with surgeon.     - ABGs PRN      Renal:    - Trend BUN/Cr     - record strict Is/Os    - auto diuresing, not currently on diuretics.       FEN / GI:     - Daily CMP, PRN K/Mag/Phos per protocol     - zofran and compazine for nausea, daily ekgs to monitor prolonged qtc (460 as of 4/20)    - Replace electrolytes as needed    - Nutrition: cardiac     - Bowel Regimen: Miralax, senna. +post op BM       ID:     - Afebrile, WBC stable    - daily CBC      Heme/Onc:     - Hgb stable    - DAPT    - CBC daily     Endocrine:     - CTS Goal -140    - HgbA1c: 6.9    - Endocrinology consulted for insulin management      PPx:   Feeding: cardiac  Analgesia/Sedation: multimodal   Thromboembolic Prevention: SCDs  HOB >30: Yes  Stress Ulcer: pepcid  Glucose Control: Yes, insulin management per Endocrinology     Lines/Drains/Airway:   Chest Tubes: 2 Pleural    Pacing Wires: Temporary ventricular pacing wires      Dispo/Code Status/Palliative:     - stepdown to CSU    - Full Code

## 2023-04-20 NOTE — ASSESSMENT & PLAN NOTE
Ms. Villa is a 64 y/o W with PMHx mvCAD, CVA, HTN, HLD, DM2, Breast cancer s/p BL mastectomy who was admitted on 4/11/23 for CABG. She is now s/p CABG x2 (LIMA-LAD, SVG-OM) on 4/14/23 by Dr. Kahn. Paroxysmal SVT post CABG. ECG and telemetry reviewed. SVT with aberrancy- AT and S. Tachycardia.     -Continue lopressor and uptitrate as tolerated

## 2023-04-20 NOTE — PROGRESS NOTES
[facility] - Surgical Intensive Care  Critical Care - Surgery  Progress Note    Patient Name: Jud Villa  MRN: [id number]  Admission Date: 4/14/2023  Hospital Length of Stay: 6 days  Code Status: Full Code  Attending Provider: Igor Kahn MD  Primary Care Provider: Bo Lopez MD   Principal Problem: Coronary artery disease    Subjective:     Hospital/ICU Course:  Patient admitted to SICU, extubated, following CABG. Apneic upon arrival, improved with narcan. Started on ASA and statin on POD0. POD1 started lopressor, antiemetics with daily ekg, transitioned oxy to norco with improvement in nausea. POD2 mildly febrile with leukocytosis, bazzi removed and UA obtained, lasix started, advance diet as tolerated.      Interval History/Significant Events: multiple BM yesterday with escalated bowel regimen, but nausea persists. Intermittent SVT in 110-120s, despite increased increased BB and amio. Hemodynamically stable. Pleural chest tube with persistent out, stable CXR. Working with PT/OT. On RA. Stable for stepdown.     Follow-up For: Procedure(s) (LRB):  CORONARY ARTERY BYPASS GRAFT (CABG) x 2 (N/A)  SURGICAL PROCUREMENT, VEIN, ENDOSCOPIC (Left)    Post-Operative Day: 5 Days Post-Op    Objective:     Vital Signs (Most Recent):  Temp: 98.4 °F (36.9 °C) (04/20/23 0715)  Pulse: (!) 117 (04/20/23 0945)  Resp: 15 (04/20/23 0945)  BP: 121/70 (04/20/23 0900)  SpO2: 100 % (04/20/23 0945)   Vital Signs (24h Range):  Temp:  [98 °F (36.7 °C)-98.6 °F (37 °C)] 98.4 °F (36.9 °C)  Pulse:  [] 117  Resp:  [12-41] 15  SpO2:  [87 %-100 %] 100 %  BP: (116-178)/() 121/70     Weight: 64 kg (141 lb 1.5 oz)  Body mass index is 25.81 kg/m².      Intake/Output Summary (Last 24 hours) at 4/20/2023 1012  Last data filed at 4/20/2023 0714  Gross per 24 hour   Intake 720 ml   Output 2330 ml   Net -1610 ml         Physical Exam  Constitutional:       General: She is not in acute distress.  Eyes:      Extraocular  Movements: Extraocular movements intact.      Pupils: Pupils are equal, round, and reactive to light.   Cardiovascular:      Rate and Rhythm: Normal rate and regular rhythm.      Pulses: Normal pulses.   Pulmonary:      Effort: Pulmonary effort is normal.   Abdominal:      General: There is no distension.      Palpations: Abdomen is soft.   Musculoskeletal:      Right lower leg: No edema.      Left lower leg: No edema.   Skin:     General: Skin is warm and dry.      Capillary Refill: Capillary refill takes less than 2 seconds.      Findings: No bruising.      Comments: MSI clean, dry and intact  Chest tubes in place   Neurological:      General: No focal deficit present.      Mental Status: She is alert.       Vents:  Oxygen Concentration (%): 24 (04/16/23 2015)    Lines/Drains/Airways       Drain  Duration                  Y Chest Tube 3 and 4 04/14/23 1115 3 Right Pleural 19 Fr. 4 Left Pleural 19 Fr. 5 days              Line  Duration                  Pacer Wires 04/14/23 1139 5 days              Peripheral Intravenous Line  Duration                  Peripheral IV - Single Lumen 04/19/23 1846 20 G Right Antecubital <1 day                    Significant Labs:    CBC/Anemia Profile:  Recent Labs   Lab 04/19/23  0422 04/20/23  0402   WBC 15.06* 11.80   HGB 8.7* 8.8*   HCT 27.9* 27.3*    340   MCV 91 91   RDW 13.3 13.8          Chemistries:  Recent Labs   Lab 04/18/23  2204 04/19/23  0422 04/20/23  0402   NA  --  133* 136   K 3.7 4.1 3.6   CL  --  93* 96   CO2  --  30* 30*   BUN  --  9 6*   CREATININE  --  0.6 0.6   CALCIUM  --  8.9 9.0   ALBUMIN  --  2.8* 2.7*   PROT  --  6.4 6.2   BILITOT  --  0.6 0.5   ALKPHOS  --  78 81   ALT  --  18 16   AST  --  19 19   MG  --  1.8 1.8   PHOS  --  2.3* 2.8         All pertinent labs within the past 24 hours have been reviewed.    Significant Imaging:  I have reviewed all pertinent imaging results/findings within the past 24 hours.    Assessment/Plan:      Cardiac/Vascular  * Coronary artery disease    Neuro/Psych:     - Hx CVA this year, continue plavix  - Pain:    - Scheduled Tylenol 650g q8h, gabapentin 300 TID   - norco PRN             Cardiac:     - S/P CABGx2 (LIMA-LAD, SVG-OM) with Dr. Kahn on 4/14/2023    - BP Goal: MAP 60-80, SBP < 140     - continue ASA/plavix, statin    -  lopressor increased to 50 BID for pSVT, EP now consulted for further management.     - PO amio load.     - Rhythm: NSR      Pulmonary:     - Goal SpO2 >92%    - pleural chest tubes, persistent output >150. CXR stable without significant effusions. Reviewed with surgeon.     - ABGs PRN      Renal:    - Trend BUN/Cr     - record strict Is/Os    - auto diuresing, not currently on diuretics.       FEN / GI:     - Daily CMP, PRN K/Mag/Phos per protocol     - zofran and compazine for nausea, daily ekgs to monitor prolonged qtc (460 as of 4/20)    - Replace electrolytes as needed    - Nutrition: cardiac     - Bowel Regimen: Miralax, senna. +post op BM       ID:     - Afebrile, WBC stable    - daily CBC      Heme/Onc:     - Hgb stable    - DAPT    - CBC daily     Endocrine:     - CTS Goal -140    - HgbA1c: 6.9    - Endocrinology consulted for insulin management      PPx:   Feeding: cardiac  Analgesia/Sedation: multimodal   Thromboembolic Prevention: SCDs  HOB >30: Yes  Stress Ulcer: pepcid  Glucose Control: Yes, insulin management per Endocrinology     Lines/Drains/Airway:   Chest Tubes: 2 Pleural    Pacing Wires: Temporary ventricular pacing wires      Dispo/Code Status/Palliative:     - stepdown to CSU    - Full Code           Critical care was time spent personally by me on the following activities: development of treatment plan with patient or surrogate and bedside caregivers, discussions with consultants, evaluation of patient's response to treatment, examination of patient, ordering and performing treatments and interventions, ordering and review of laboratory  studies, ordering and review of radiographic studies, pulse oximetry, re-evaluation of patient's condition.  This critical care time did not overlap with that of any other provider or involve time for any procedures.     Gay Rodriguez NP  Critical Care - Surgery  [facility] - Surgical Intensive Care

## 2023-04-20 NOTE — PROGRESS NOTES
patient seen and examined on morning rounds with critical care team. Still with some nausea. Occasional SVT. Will ask EP to see her. Will plan for continued ambulation. Will leave chest tubes in for now. Small right base effusion.

## 2023-04-20 NOTE — CARE UPDATE
SICU Staff Addendum  Please see resident/ [place] note from 4/20 for full progress note     Reason for admission:  Coronary artery disease  Present on Admission:   COPD (chronic obstructive pulmonary disease)   Coronary artery disease   Acute blood loss anemia      Goals for Today:   - POD 5 s/p CABG w/ Dr. Kahn  - short runs of SVT, EP consult; increased BB yesterday aftenroon  - Pt/Ot, walking  - >200 out in the pleural CT's over the last 24h, will keep these in place  - BM overnight  - continues to be stable for step down     Brisa Oropeza MD  Anesthesia Critical Care  [facility] [phone]

## 2023-04-20 NOTE — HPI
Ms. Villa is a very pleasant 63-year-old woman who initially presented with chest pain while cleaning her house.  She went to the emergency department and was admitted.  She had a PET stress which was positive, and follow-up coronary angiography demonstrated 2 vessel disease.  She now presents for coronary artery bypass grafting.

## 2023-04-20 NOTE — PROGRESS NOTES
"[facility] - Cardiology Stepdown  Endocrinology  Progress Note    Admit Date: 2023     Reason for Consult: Management of T2DM, Hyperglycemia     Surgical Procedure and Date: 2023 - CAD s/p CABGx2 with Dr. Kahn     Diabetes diagnosis year: 15-20 years ago    Home Diabetes Medications:    Glipizide 10 mg once daily with breakfast (listed as BID)  Basaglar 10 units daily (Patient states she has Levemir and only uses around 6 units on as needed basis)  Jardiance 10 mg daily  Metformin 1000 mg BID  Tradjenta 5 mg daily    How often checking glucose at home? 1-3 x day   BG readings on regimen: 110-150s  Hypoglycemia on the regimen?  No  Missed doses on regimen?  No    Diabetes Complications include:     Hyperglycemia    Complicating diabetes co morbidities:   History of CVA, CAD      HPI:   Patient is a 63 y.o. female with a past medical history of type 2 diabetes mellitus, prior CVA (on DAPT), HTN., HLD, tobacco abuse and CAD.  Initially presented with chest pain while cleaning her house.  She went to the emergency department and was admitted with PET stress which was positive, and follow-up coronary angiography demonstrated 2 vessel disease (now status post CABG x 2).  She is on multi drug regimen at home for diabetes mellitus and has good A1c/glucose control based on labs.  Endocrinology consulted for BG management while hospitalized.    Lab Results   Component Value Date    HGBA1C 6.9 (H) 2023               Interval HPI:   Overnight events: Stepped down out of the ICU. +nausea but starting to slowly get appetite back. Also noted to have intermittent SVT.  Eatin%  Nausea: Yes  Hypoglycemia and intervention: No  Fever: No  TPN and/or TF: No  If yes, type of TF/TPN and rate: n/a    BP (!) 124/58   Pulse 70   Temp 98.2 °F (36.8 °C) (Oral)   Resp (!) 22   Ht 5' 2" (1.575 m)   Wt 64 kg (141 lb 1.5 oz)   SpO2 99%   Breastfeeding No   BMI 25.81 kg/m²     Labs Reviewed and Include    Recent " Labs   Lab 04/20/23  0402 04/20/23  1012   *  --    CALCIUM 9.0  --    ALBUMIN 2.7*  --    PROT 6.2  --      --    K 3.6 4.5   CO2 30*  --    CL 96  --    BUN 6*  --    CREATININE 0.6  --    ALKPHOS 81  --    ALT 16  --    AST 19  --    BILITOT 0.5  --      Lab Results   Component Value Date    WBC 11.80 04/20/2023    HGB 8.8 (L) 04/20/2023    HCT 27.3 (L) 04/20/2023    MCV 91 04/20/2023     04/20/2023     No results for input(s): TSH, FREET4 in the last 168 hours.  Lab Results   Component Value Date    HGBA1C 6.9 (H) 04/11/2023       Nutritional status:   Body mass index is 25.81 kg/m².  Lab Results   Component Value Date    ALBUMIN 2.7 (L) 04/20/2023    ALBUMIN 2.8 (L) 04/19/2023    ALBUMIN 2.4 (L) 04/18/2023     No results found for: PREALBUMIN    Estimated Creatinine Clearance: 82.2 mL/min (based on SCr of 0.6 mg/dL).    Accu-Checks  Recent Labs     04/17/23  2044 04/18/23  0806 04/18/23  1129 04/18/23  1834 04/18/23  2033 04/19/23  0833 04/19/23  1234 04/19/23  1757 04/19/23  2020 04/20/23  0822   POCTGLUCOSE 212* 202* 199* 176* 233* 182* 180* 203* 212* 202*       Current Medications and/or Treatments Impacting Glycemic Control  Immunotherapy:    Immunosuppressants       None          Steroids:   Hormones (From admission, onward)      None          Pressors:    Autonomic Drugs (From admission, onward)      None          Hyperglycemia/Diabetes Medications:   Antihyperglycemics (From admission, onward)      Start     Stop Route Frequency Ordered    04/21/23 0900  insulin detemir U-100 (Levemir) pen 15 Units         -- SubQ Daily 04/20/23 0951    04/19/23 0945  insulin aspart U-100 pen 4-6 Units         -- SubQ 3 times daily after meals 04/19/23 0932    04/16/23 0845  insulin regular in 0.9 % NaCl 100 unit/100 mL (1 unit/mL) infusion        Question:  Enter initial dose (Units/hr):  Answer:  0.5    04/16 1000 IV Continuous 04/16/23 0843    04/15/23 0743  insulin aspart U-100 pen 0-5 Units          -- SubQ As needed (PRN) 04/15/23 0645            ASSESSMENT and PLAN    Neuro  History of CVA (cerebrovascular accident)   Continues on Atorvastatin 80 mg       Cardiac/Vascular  * Coronary artery disease  CAD s/p CABGx2 with Dr. Kahn on 4/13/23  Will target tight glycemic control given cardiac surgery      Endocrine  Controlled type 2 diabetes mellitus with complication, with long-term current use of insulin  Hyperglycemia from type 2 diabetes mellitus and stress response s/p CABG, steroids given intra op also contributing to hyperglycemia     Endocrinology consulted for BG management.   BG goal 110-140 (CTS Protocol)      - Intensive insulin drip after surgery per CTS guidelines, adjusted to transition drip 04/15/2023 and then switched to MDI on 4/16/2023 with small adjustments in insulin since then.  - Hypoglycemia protocol in place  - If blood glucose greater than 300 and diet ordered, please ask patient not to eat food or drink anything other than water until correctional insulin has brought it back below 250     Plan:   Increase Levemir to 15u daily beginning 4/21     Continue Novolog with meals 4-6 units based on percent eaten, still with some nausea and eating 25%-50% of her meals, so I anticipate increased prandial requirement as she begins to eat more. I will f/u her ac lunch accucheck today and if elevated will likely increase prandial insulin again today.     Continue Novolog low dose correction scale prn   Accuchecks ACHS        ** Please notify Endocrine for any change and/or advance in diet**  ** Please call Endocrine for any BG related issues **          **Please notify endocrine of discharge plan in advance if possible, so we can provide discharge recommendations**          Nicole Gonzalez MD  Endocrinology  [facility]

## 2023-04-20 NOTE — HOSPITAL COURSE
On 4/14/2023 the patient was taken to the Operating Room for the above stated procedure. Please see the previously dictated operative report for complete details. Postoperatively, the patient was taken from the  Operating Room to the ICU where the vital signs were monitored and pain was kept under control. The patient was weaned from the drips and extubated in the ICU per protocol. Once hemodynamically stable, the patient was transferred to the Cardiac Step-Down floor for continued strengthening and ambulation. On postoperative day 8, the patient was ready for discharge to home. At the time of discharge, the patient was ambulating unassisted. Pain was well controlled with oral analgesics and the patient was tolerating the diet.       MOBILITY AND ACTIVITY: As tolerated. Patient may shower. No heavy lifting of greater than 5 pounds and no driving.     DIET: A Cardiac diet      WOUND CARE INSTRUCTIONS: Check for redness, swelling and drainage around the  incision or wound. Patient is to call for any obvious bleeding, drainage, pus from the wound, unusual problems or difficulties or temperature of greater than 101   degrees.     FOLLOWUP: Follow up with Dr. Kahn in approximately 3 weeks. Prior to this  appointment, the patient will have a chest x-ray and EKG.     Patient not placed on Ace-Inhibitor at the time of discharge due to potential for hypotension      DISCHARGE CONDITION: At the time of discharge, the patient was in sinus rhythm and afebrile with stable vital signs.

## 2023-04-20 NOTE — NURSING TRANSFER
Nursing Transfer Note      4/20/2023     Reason patient is being transferred: Stepdown of care    Transfer To: [id number]    Transfer via wheelchair    Transfer with cardiac monitoring    Transported by SICU RN x2    Medicines sent: Insulin Aspart Pen, Insulin Detemir Pen    Any special needs or follow-up needed: Pending 10am lab results    Chart send with patient: Yes    Notified: daughter    Patient reassessed at: 4/20/23 1100    Upon arrival to floor: cardiac monitor applied, patient oriented to room, call bell in reach, and bed in lowest position

## 2023-04-20 NOTE — SUBJECTIVE & OBJECTIVE
Interval History/Significant Events: multiple BM yesterday with escalated bowel regimen, but nausea persists. Intermittent SVT in 110-120s, despite increased increased BB and amio. Hemodynamically stable. Pleural chest tube with persistent out, stable CXR. Working with PT/OT. On RA. Stable for stepdown.     Follow-up For: Procedure(s) (LRB):  CORONARY ARTERY BYPASS GRAFT (CABG) x 2 (N/A)  SURGICAL PROCUREMENT, VEIN, ENDOSCOPIC (Left)    Post-Operative Day: 5 Days Post-Op    Objective:     Vital Signs (Most Recent):  Temp: 98.4 °F (36.9 °C) (04/20/23 0715)  Pulse: (!) 117 (04/20/23 0945)  Resp: 15 (04/20/23 0945)  BP: 121/70 (04/20/23 0900)  SpO2: 100 % (04/20/23 0945)   Vital Signs (24h Range):  Temp:  [98 °F (36.7 °C)-98.6 °F (37 °C)] 98.4 °F (36.9 °C)  Pulse:  [] 117  Resp:  [12-41] 15  SpO2:  [87 %-100 %] 100 %  BP: (116-178)/() 121/70     Weight: 64 kg (141 lb 1.5 oz)  Body mass index is 25.81 kg/m².      Intake/Output Summary (Last 24 hours) at 4/20/2023 1012  Last data filed at 4/20/2023 0714  Gross per 24 hour   Intake 720 ml   Output 2330 ml   Net -1610 ml         Physical Exam  Constitutional:       General: She is not in acute distress.  Eyes:      Extraocular Movements: Extraocular movements intact.      Pupils: Pupils are equal, round, and reactive to light.   Cardiovascular:      Rate and Rhythm: Normal rate and regular rhythm.      Pulses: Normal pulses.   Pulmonary:      Effort: Pulmonary effort is normal.   Abdominal:      General: There is no distension.      Palpations: Abdomen is soft.   Musculoskeletal:      Right lower leg: No edema.      Left lower leg: No edema.   Skin:     General: Skin is warm and dry.      Capillary Refill: Capillary refill takes less than 2 seconds.      Findings: No bruising.      Comments: MSI clean, dry and intact  Chest tubes in place   Neurological:      General: No focal deficit present.      Mental Status: She is alert.       Vents:  Oxygen Concentration  (%): 24 (04/16/23 2015)    Lines/Drains/Airways       Drain  Duration                  Y Chest Tube 3 and 4 04/14/23 1115 3 Right Pleural 19 Fr. 4 Left Pleural 19 Fr. 5 days              Line  Duration                  Pacer Wires 04/14/23 1139 5 days              Peripheral Intravenous Line  Duration                  Peripheral IV - Single Lumen 04/19/23 1846 20 G Right Antecubital <1 day                    Significant Labs:    CBC/Anemia Profile:  Recent Labs   Lab 04/19/23  0422 04/20/23  0402   WBC 15.06* 11.80   HGB 8.7* 8.8*   HCT 27.9* 27.3*    340   MCV 91 91   RDW 13.3 13.8          Chemistries:  Recent Labs   Lab 04/18/23 2204 04/19/23  0422 04/20/23  0402   NA  --  133* 136   K 3.7 4.1 3.6   CL  --  93* 96   CO2  --  30* 30*   BUN  --  9 6*   CREATININE  --  0.6 0.6   CALCIUM  --  8.9 9.0   ALBUMIN  --  2.8* 2.7*   PROT  --  6.4 6.2   BILITOT  --  0.6 0.5   ALKPHOS  --  78 81   ALT  --  18 16   AST  --  19 19   MG  --  1.8 1.8   PHOS  --  2.3* 2.8         All pertinent labs within the past 24 hours have been reviewed.    Significant Imaging:  I have reviewed all pertinent imaging results/findings within the past 24 hours.

## 2023-04-20 NOTE — ASSESSMENT & PLAN NOTE
Hyperglycemia from type 2 diabetes mellitus and stress response s/p CABG, steroids given intra op also contributing to hyperglycemia     Endocrinology consulted for BG management.   BG goal 110-140 (CTS Protocol)      - Intensive insulin drip after surgery per CTS guidelines, adjusted to transition drip 04/15/2023 and then switched to MDI on 4/16/2023 with small adjustments in insulin since then.  - Hypoglycemia protocol in place  - If blood glucose greater than 300 and diet ordered, please ask patient not to eat food or drink anything other than water until correctional insulin has brought it back below 250     Plan:   Increase Levemir to 15u daily beginning 4/21     Continue Novolog with meals 4-6 units based on percent eaten, still with some nausea and eating 25%-50% of her meals, so I anticipate increased prandial requirement as she begins to eat more. I will f/u her ac lunch accucheck today and if elevated will likely increase prandial insulin again today.     Continue Novolog low dose correction scale prn   Accuchecks ACHS        ** Please notify Endocrine for any change and/or advance in diet**  ** Please call Endocrine for any BG related issues **          **Please notify endocrine of discharge plan in advance if possible, so we can provide discharge recommendations**

## 2023-04-20 NOTE — SUBJECTIVE & OBJECTIVE
"Interval HPI:   Overnight events: Stepped down out of the ICU. +nausea but starting to slowly get appetite back. Also noted to have intermittent SVT.  Eatin%  Nausea: Yes  Hypoglycemia and intervention: No  Fever: No  TPN and/or TF: No  If yes, type of TF/TPN and rate: n/a    BP (!) 124/58   Pulse 70   Temp 98.2 °F (36.8 °C) (Oral)   Resp (!) 22   Ht 5' 2" (1.575 m)   Wt 64 kg (141 lb 1.5 oz)   SpO2 99%   Breastfeeding No   BMI 25.81 kg/m²     Labs Reviewed and Include    Recent Labs   Lab 23  0402 23  1012   *  --    CALCIUM 9.0  --    ALBUMIN 2.7*  --    PROT 6.2  --      --    K 3.6 4.5   CO2 30*  --    CL 96  --    BUN 6*  --    CREATININE 0.6  --    ALKPHOS 81  --    ALT 16  --    AST 19  --    BILITOT 0.5  --      Lab Results   Component Value Date    WBC 11.80 2023    HGB 8.8 (L) 2023    HCT 27.3 (L) 2023    MCV 91 2023     2023     No results for input(s): TSH, FREET4 in the last 168 hours.  Lab Results   Component Value Date    HGBA1C 6.9 (H) 2023       Nutritional status:   Body mass index is 25.81 kg/m².  Lab Results   Component Value Date    ALBUMIN 2.7 (L) 2023    ALBUMIN 2.8 (L) 2023    ALBUMIN 2.4 (L) 2023     No results found for: PREALBUMIN    Estimated Creatinine Clearance: 82.2 mL/min (based on SCr of 0.6 mg/dL).    Accu-Checks  Recent Labs     23  2044 23  0806 23  1129 23  1834 23  2033 23  0833 23  1234 23  1757 23  0822   POCTGLUCOSE 212* 202* 199* 176* 233* 182* 180* 203* 212* 202*       Current Medications and/or Treatments Impacting Glycemic Control  Immunotherapy:    Immunosuppressants       None          Steroids:   Hormones (From admission, onward)      None          Pressors:    Autonomic Drugs (From admission, onward)      None          Hyperglycemia/Diabetes Medications:   Antihyperglycemics (From admission, onward) "      Start     Stop Route Frequency Ordered    04/21/23 0900  insulin detemir U-100 (Levemir) pen 15 Units         -- SubQ Daily 04/20/23 0951    04/19/23 0945  insulin aspart U-100 pen 4-6 Units         -- SubQ 3 times daily after meals 04/19/23 0932    04/16/23 0845  insulin regular in 0.9 % NaCl 100 unit/100 mL (1 unit/mL) infusion        Question:  Enter initial dose (Units/hr):  Answer:  0.5    04/16 1000 IV Continuous 04/16/23 0843    04/15/23 0743  insulin aspart U-100 pen 0-5 Units         -- SubQ As needed (PRN) 04/15/23 0645

## 2023-04-21 ENCOUNTER — DOCUMENTATION ONLY (OUTPATIENT)
Dept: CARDIOTHORACIC SURGERY | Facility: CLINIC | Age: 63
End: 2023-04-21
Payer: MEDICARE

## 2023-04-21 PROBLEM — I48.0 PAROXYSMAL A-FIB: Status: ACTIVE | Noted: 2023-04-21

## 2023-04-21 PROBLEM — I48.91 POSTOPERATIVE ATRIAL FIBRILLATION: Status: ACTIVE | Noted: 2023-04-21

## 2023-04-21 PROBLEM — I97.89 POSTOPERATIVE ATRIAL FIBRILLATION: Status: ACTIVE | Noted: 2023-04-21

## 2023-04-21 PROBLEM — Z95.1 S/P CABG X 2: Status: ACTIVE | Noted: 2023-04-17

## 2023-04-21 LAB
ALBUMIN SERPL BCP-MCNC: 2.6 G/DL (ref 3.5–5.2)
ALP SERPL-CCNC: 89 U/L (ref 55–135)
ALT SERPL W/O P-5'-P-CCNC: 13 U/L (ref 10–44)
ANION GAP SERPL CALC-SCNC: 8 MMOL/L (ref 8–16)
AST SERPL-CCNC: 14 U/L (ref 10–40)
BASOPHILS # BLD AUTO: 0.06 K/UL (ref 0–0.2)
BASOPHILS NFR BLD: 0.5 % (ref 0–1.9)
BILIRUB SERPL-MCNC: 0.3 MG/DL (ref 0.1–1)
BUN SERPL-MCNC: 10 MG/DL (ref 8–23)
CALCIUM SERPL-MCNC: 9.1 MG/DL (ref 8.7–10.5)
CHLORIDE SERPL-SCNC: 96 MMOL/L (ref 95–110)
CO2 SERPL-SCNC: 32 MMOL/L (ref 23–29)
CREAT SERPL-MCNC: 0.6 MG/DL (ref 0.5–1.4)
DIFFERENTIAL METHOD: ABNORMAL
EOSINOPHIL # BLD AUTO: 1 K/UL (ref 0–0.5)
EOSINOPHIL NFR BLD: 7.9 % (ref 0–8)
ERYTHROCYTE [DISTWIDTH] IN BLOOD BY AUTOMATED COUNT: 14.3 % (ref 11.5–14.5)
EST. GFR  (NO RACE VARIABLE): >60 ML/MIN/1.73 M^2
GLUCOSE SERPL-MCNC: 216 MG/DL (ref 70–110)
HCT VFR BLD AUTO: 26.8 % (ref 37–48.5)
HGB BLD-MCNC: 8.1 G/DL (ref 12–16)
IMM GRANULOCYTES # BLD AUTO: 0.06 K/UL (ref 0–0.04)
IMM GRANULOCYTES NFR BLD AUTO: 0.5 % (ref 0–0.5)
LYMPHOCYTES # BLD AUTO: 2.1 K/UL (ref 1–4.8)
LYMPHOCYTES NFR BLD: 16.1 % (ref 18–48)
MAGNESIUM SERPL-MCNC: 1.6 MG/DL (ref 1.6–2.6)
MCH RBC QN AUTO: 28.3 PG (ref 27–31)
MCHC RBC AUTO-ENTMCNC: 30.2 G/DL (ref 32–36)
MCV RBC AUTO: 94 FL (ref 82–98)
MONOCYTES # BLD AUTO: 1.1 K/UL (ref 0.3–1)
MONOCYTES NFR BLD: 8.6 % (ref 4–15)
NEUTROPHILS # BLD AUTO: 8.5 K/UL (ref 1.8–7.7)
NEUTROPHILS NFR BLD: 66.4 % (ref 38–73)
NRBC BLD-RTO: 0 /100 WBC
PHOSPHATE SERPL-MCNC: 3.7 MG/DL (ref 2.7–4.5)
PLATELET # BLD AUTO: 405 K/UL (ref 150–450)
PMV BLD AUTO: 9 FL (ref 9.2–12.9)
POCT GLUCOSE: 226 MG/DL (ref 70–110)
POCT GLUCOSE: 226 MG/DL (ref 70–110)
POCT GLUCOSE: 252 MG/DL (ref 70–110)
POCT GLUCOSE: 283 MG/DL (ref 70–110)
POTASSIUM SERPL-SCNC: 4.5 MMOL/L (ref 3.5–5.1)
PROT SERPL-MCNC: 6 G/DL (ref 6–8.4)
RBC # BLD AUTO: 2.86 M/UL (ref 4–5.4)
SODIUM SERPL-SCNC: 136 MMOL/L (ref 136–145)
WBC # BLD AUTO: 12.85 K/UL (ref 3.9–12.7)

## 2023-04-21 PROCEDURE — 84100 ASSAY OF PHOSPHORUS: CPT | Mod: HCNC | Performed by: NURSE PRACTITIONER

## 2023-04-21 PROCEDURE — 20600001 HC STEP DOWN PRIVATE ROOM: Mod: HCNC

## 2023-04-21 PROCEDURE — 99232 PR SUBSEQUENT HOSPITAL CARE,LEVL II: ICD-10-PCS | Mod: HCNC,,, | Performed by: INTERNAL MEDICINE

## 2023-04-21 PROCEDURE — 63600175 PHARM REV CODE 636 W HCPCS: Mod: HCNC | Performed by: PHYSICIAN ASSISTANT

## 2023-04-21 PROCEDURE — 25000003 PHARM REV CODE 250: Mod: HCNC | Performed by: THORACIC SURGERY (CARDIOTHORACIC VASCULAR SURGERY)

## 2023-04-21 PROCEDURE — 80053 COMPREHEN METABOLIC PANEL: CPT | Mod: HCNC | Performed by: NURSE PRACTITIONER

## 2023-04-21 PROCEDURE — 25000003 PHARM REV CODE 250: Mod: HCNC

## 2023-04-21 PROCEDURE — 25000003 PHARM REV CODE 250: Mod: HCNC | Performed by: NURSE PRACTITIONER

## 2023-04-21 PROCEDURE — 25000003 PHARM REV CODE 250: Mod: HCNC | Performed by: STUDENT IN AN ORGANIZED HEALTH CARE EDUCATION/TRAINING PROGRAM

## 2023-04-21 PROCEDURE — 25000003 PHARM REV CODE 250: Mod: HCNC | Performed by: PHYSICIAN ASSISTANT

## 2023-04-21 PROCEDURE — 83735 ASSAY OF MAGNESIUM: CPT | Mod: HCNC | Performed by: NURSE PRACTITIONER

## 2023-04-21 PROCEDURE — 99232 SBSQ HOSP IP/OBS MODERATE 35: CPT | Mod: HCNC,,, | Performed by: INTERNAL MEDICINE

## 2023-04-21 PROCEDURE — 63600175 PHARM REV CODE 636 W HCPCS: Mod: HCNC | Performed by: STUDENT IN AN ORGANIZED HEALTH CARE EDUCATION/TRAINING PROGRAM

## 2023-04-21 PROCEDURE — 97110 THERAPEUTIC EXERCISES: CPT | Mod: HCNC

## 2023-04-21 PROCEDURE — 85025 COMPLETE CBC W/AUTO DIFF WBC: CPT | Mod: HCNC | Performed by: NURSE PRACTITIONER

## 2023-04-21 PROCEDURE — 99900035 HC TECH TIME PER 15 MIN (STAT): Mod: HCNC

## 2023-04-21 PROCEDURE — 36415 COLL VENOUS BLD VENIPUNCTURE: CPT | Mod: HCNC | Performed by: NURSE PRACTITIONER

## 2023-04-21 PROCEDURE — 97530 THERAPEUTIC ACTIVITIES: CPT | Mod: HCNC

## 2023-04-21 RX ORDER — POTASSIUM CHLORIDE 20 MEQ/1
20 TABLET, EXTENDED RELEASE ORAL 2 TIMES DAILY
Status: DISCONTINUED | OUTPATIENT
Start: 2023-04-21 | End: 2023-04-22 | Stop reason: HOSPADM

## 2023-04-21 RX ORDER — ONDANSETRON 4 MG/1
4 TABLET, ORALLY DISINTEGRATING ORAL ONCE
Status: DISCONTINUED | OUTPATIENT
Start: 2023-04-21 | End: 2023-04-21

## 2023-04-21 RX ORDER — INSULIN ASPART 100 [IU]/ML
4-6 INJECTION, SOLUTION INTRAVENOUS; SUBCUTANEOUS
Status: DISCONTINUED | OUTPATIENT
Start: 2023-04-21 | End: 2023-04-22 | Stop reason: HOSPADM

## 2023-04-21 RX ORDER — ONDANSETRON 2 MG/ML
4 INJECTION INTRAMUSCULAR; INTRAVENOUS EVERY 4 HOURS PRN
Status: DISCONTINUED | OUTPATIENT
Start: 2023-04-21 | End: 2023-04-22 | Stop reason: HOSPADM

## 2023-04-21 RX ORDER — INSULIN ASPART 100 [IU]/ML
4-6 INJECTION, SOLUTION INTRAVENOUS; SUBCUTANEOUS
Status: DISCONTINUED | OUTPATIENT
Start: 2023-04-21 | End: 2023-04-21

## 2023-04-21 RX ORDER — ONDANSETRON 4 MG/1
4 TABLET, ORALLY DISINTEGRATING ORAL EVERY 4 HOURS PRN
Status: DISCONTINUED | OUTPATIENT
Start: 2023-04-21 | End: 2023-04-22 | Stop reason: HOSPADM

## 2023-04-21 RX ORDER — FUROSEMIDE 20 MG/1
20 TABLET ORAL 2 TIMES DAILY
Status: DISCONTINUED | OUTPATIENT
Start: 2023-04-21 | End: 2023-04-22 | Stop reason: HOSPADM

## 2023-04-21 RX ADMIN — ACETAMINOPHEN 650 MG: 325 TABLET ORAL at 09:04

## 2023-04-21 RX ADMIN — INSULIN ASPART 2 UNITS: 100 INJECTION, SOLUTION INTRAVENOUS; SUBCUTANEOUS at 04:04

## 2023-04-21 RX ADMIN — AMIODARONE HYDROCHLORIDE 200 MG: 200 TABLET ORAL at 08:04

## 2023-04-21 RX ADMIN — HYDROCODONE BITARTRATE AND ACETAMINOPHEN 1 TABLET: 5; 325 TABLET ORAL at 09:04

## 2023-04-21 RX ADMIN — METOPROLOL TARTRATE 50 MG: 50 TABLET, FILM COATED ORAL at 09:04

## 2023-04-21 RX ADMIN — GABAPENTIN 300 MG: 300 CAPSULE ORAL at 09:04

## 2023-04-21 RX ADMIN — HYDROCODONE BITARTRATE AND ACETAMINOPHEN 1 TABLET: 5; 325 TABLET ORAL at 08:04

## 2023-04-21 RX ADMIN — ONDANSETRON 4 MG: 4 TABLET, ORALLY DISINTEGRATING ORAL at 10:04

## 2023-04-21 RX ADMIN — INSULIN ASPART 4 UNITS: 100 INJECTION, SOLUTION INTRAVENOUS; SUBCUTANEOUS at 09:04

## 2023-04-21 RX ADMIN — INSULIN ASPART 2 UNITS: 100 INJECTION, SOLUTION INTRAVENOUS; SUBCUTANEOUS at 01:04

## 2023-04-21 RX ADMIN — AMIODARONE HYDROCHLORIDE 200 MG: 200 TABLET ORAL at 09:04

## 2023-04-21 RX ADMIN — ATORVASTATIN CALCIUM 80 MG: 40 TABLET, FILM COATED ORAL at 08:04

## 2023-04-21 RX ADMIN — POLYETHYLENE GLYCOL 3350 17 G: 17 POWDER, FOR SOLUTION ORAL at 09:04

## 2023-04-21 RX ADMIN — ONDANSETRON 4 MG: 4 TABLET, ORALLY DISINTEGRATING ORAL at 08:04

## 2023-04-21 RX ADMIN — METOCLOPRAMIDE 5 MG: 5 INJECTION, SOLUTION INTRAMUSCULAR; INTRAVENOUS at 11:04

## 2023-04-21 RX ADMIN — ACETAMINOPHEN 650 MG: 325 TABLET ORAL at 06:04

## 2023-04-21 RX ADMIN — METOPROLOL TARTRATE 50 MG: 50 TABLET, FILM COATED ORAL at 08:04

## 2023-04-21 RX ADMIN — ACETAMINOPHEN 650 MG: 325 TABLET ORAL at 01:04

## 2023-04-21 RX ADMIN — INSULIN ASPART 3 UNITS: 100 INJECTION, SOLUTION INTRAVENOUS; SUBCUTANEOUS at 09:04

## 2023-04-21 RX ADMIN — FUROSEMIDE 20 MG: 20 TABLET ORAL at 04:04

## 2023-04-21 RX ADMIN — POTASSIUM CHLORIDE 20 MEQ: 1500 TABLET, EXTENDED RELEASE ORAL at 09:04

## 2023-04-21 RX ADMIN — APIXABAN 5 MG: 5 TABLET, FILM COATED ORAL at 11:04

## 2023-04-21 RX ADMIN — INSULIN ASPART 4 UNITS: 100 INJECTION, SOLUTION INTRAVENOUS; SUBCUTANEOUS at 01:04

## 2023-04-21 RX ADMIN — CLOPIDOGREL BISULFATE 75 MG: 75 TABLET ORAL at 09:04

## 2023-04-21 RX ADMIN — BISACODYL 10 MG: 10 SUPPOSITORY RECTAL at 04:04

## 2023-04-21 RX ADMIN — ASPIRIN 81 MG CHEWABLE TABLET 81 MG: 81 TABLET CHEWABLE at 09:04

## 2023-04-21 RX ADMIN — FUROSEMIDE 20 MG: 20 TABLET ORAL at 09:04

## 2023-04-21 RX ADMIN — APIXABAN 5 MG: 5 TABLET, FILM COATED ORAL at 08:04

## 2023-04-21 RX ADMIN — ONDANSETRON 4 MG: 2 INJECTION INTRAMUSCULAR; INTRAVENOUS at 03:04

## 2023-04-21 RX ADMIN — PANTOPRAZOLE SODIUM 40 MG: 40 TABLET, DELAYED RELEASE ORAL at 06:04

## 2023-04-21 RX ADMIN — INSULIN DETEMIR 15 UNITS: 100 INJECTION, SOLUTION SUBCUTANEOUS at 09:04

## 2023-04-21 RX ADMIN — SENNOSIDES AND DOCUSATE SODIUM 1 TABLET: 8.6; 5 TABLET ORAL at 09:04

## 2023-04-21 RX ADMIN — POTASSIUM CHLORIDE 20 MEQ: 1500 TABLET, EXTENDED RELEASE ORAL at 08:04

## 2023-04-21 NOTE — SUBJECTIVE & OBJECTIVE
Interval History: Stepdown from ICU uneventful.  Will remove tubes and wires today.  Stopping Plavix and starting Eliquis per EP recommendations.     Review of Systems   Constitutional: Negative for malaise/fatigue.   Cardiovascular:  Negative for chest pain, claudication, dyspnea on exertion, irregular heartbeat, leg swelling and palpitations.   Respiratory:  Negative for cough and shortness of breath.    Hematologic/Lymphatic: Negative for bleeding problem.   Gastrointestinal:  Negative for abdominal pain.   Genitourinary:  Negative for dysuria.   Neurological:  Negative for headaches and weakness.   Medications:  Continuous Infusions:  Scheduled Meds:   acetaminophen  650 mg Oral Q8H    amiodarone  200 mg Oral BID    aspirin  81 mg Oral Daily    atorvastatin  80 mg Oral QHS    clopidogreL  75 mg Oral Daily    fluticasone propionate  1 spray Each Nostril Daily    furosemide  20 mg Oral BID    gabapentin  300 mg Oral TID    insulin aspart U-100  4-6 Units Subcutaneous TID PC    insulin detemir U-100  15 Units Subcutaneous Daily    LIDOcaine  1 patch Transdermal Q24H    metoprolol tartrate  50 mg Oral BID    pantoprazole  40 mg Oral Before breakfast    polyethylene glycol  17 g Oral Daily    potassium chloride  20 mEq Oral BID    senna-docusate 8.6-50 mg  1 tablet Oral Daily    sucralfate  1 g Oral TID WM     PRN Meds:adenosine **FOLLOWED BY** [START ON 9/13/2034] adenosine, albumin human 5%, aspirin, bisacodyL, dextrose 10%, dextrose 10%, dextrose, dextrose, glucagon (human recombinant), haloperidol, HYDROcodone-acetaminophen, insulin aspart U-100, metoclopramide HCl, nicotine, ondansetron, prochlorperazine     Objective:     Vital Signs (Most Recent):  Temp: 99.2 °F (37.3 °C) (04/21/23 0942)  Pulse: 80 (04/21/23 0942)  Resp: 20 (04/21/23 0954)  BP: (!) 115/57 (04/21/23 0942)  SpO2: 98 % (04/21/23 0942)   Vital Signs (24h Range):  Temp:  [96.6 °F (35.9 °C)-99.2 °F (37.3 °C)] 99.2 °F (37.3 °C)  Pulse:  [55-80]  80  Resp:  [16-22] 20  SpO2:  [93 %-100 %] 98 %  BP: (110-129)/(57-72) 115/57     Weight: 60.3 kg (132 lb 15 oz)  Body mass index is 24.31 kg/m².    SpO2: 98 %       Intake/Output - Last 3 Shifts         04/19 0700  04/20 0659 04/20 0700  04/21 0659 04/21 0700  04/22 0659    P.O. 960 480 120    I.V. (mL/kg)       IV Piggyback       Total Intake(mL/kg) 960 (15) 480 (7.5) 120 (2)    Urine (mL/kg/hr) 2000 (1.3) 1300 (0.8)     Stool 0 0     Chest Tube 230 50     Total Output 2230 1350     Net -1270 -870 +120           Urine Occurrence  1 x     Stool Occurrence 4 x 3 x             Lines/Drains/Airways       Drain  Duration                  Y Chest Tube 3 and 4 04/14/23 1115 3 Right Pleural 19 Fr. 4 Left Pleural 19 Fr. 6 days              Line  Duration                  Pacer Wires 04/14/23 1139 6 days              Peripheral Intravenous Line  Duration                  Peripheral IV - Single Lumen 04/19/23 1846 20 G Right Antecubital 1 day         Peripheral IV - Single Lumen 04/20/23 1015 20 G Anterior;Proximal;Right Upper Arm <1 day                    Physical Exam  Constitutional:       Appearance: Normal appearance.   HENT:      Head: Normocephalic.   Eyes:      Pupils: Pupils are equal, round, and reactive to light.   Cardiovascular:      Rate and Rhythm: Normal rate and regular rhythm.      Pulses: Normal pulses.   Pulmonary:      Effort: Pulmonary effort is normal.   Abdominal:      General: Abdomen is flat. Bowel sounds are normal.      Palpations: Abdomen is soft.   Musculoskeletal:         General: Normal range of motion.   Skin:     General: Skin is warm and dry.      Comments: MSI CDI  Bruising in different stages of healing noted to arms and sternal area    Neurological:      General: No focal deficit present.      Mental Status: She is alert.       Significant Labs:  BMP:   Recent Labs   Lab 04/20/23  0402 04/20/23  1012   *  --      --    K 3.6 4.5   CL 96  --    CO2 30*  --    BUN 6*  --     CREATININE 0.6  --    CALCIUM 9.0  --    MG 1.8 1.8     CBC:   Recent Labs   Lab 04/20/23  0402   WBC 11.80   RBC 3.01*   HGB 8.8*   HCT 27.3*      MCV 91   MCH 29.2   MCHC 32.2     CMP:   Recent Labs   Lab 04/20/23 0402 04/20/23  1012   *  --    CALCIUM 9.0  --    ALBUMIN 2.7*  --    PROT 6.2  --      --    K 3.6 4.5   CO2 30*  --    CL 96  --    BUN 6*  --    CREATININE 0.6  --    ALKPHOS 81  --    ALT 16  --    AST 19  --    BILITOT 0.5  --      Coagulation:   Recent Labs   Lab 04/20/23 0402   INR 1.0   APTT 24.2       Significant Diagnostics:  I have reviewed and interpreted all pertinent imaging results/findings within the past 24 hours.

## 2023-04-21 NOTE — PROGRESS NOTES
Met with pt at bedside and reviewed wound care instructions for pt's midsternal and chest tube site incisions, s/p CABG.  Reviewed daily inspection and cleaning of surgical incisions and instructed pt to call the clinic with any questions or concerns.  Pt provided with a hand-out (see below) which contains detailed instructions on daily wound care inspection and care.  Pt reminded of her 5 pound lifting, pushing, and pulling restrictions for the first 6 weeks following his surgery and to refrain from driving until released by Dr. Kahn. Pt instructed to monitor her BP daily and to notify the clinic if she has a SBP consistently higher than 140 or less than 90. Pt also instructed to take a dry weight daily, instructed on process, and to notify the clinic if she has a weight gain of 3 lbs or more within 24 hours. Pt informed of her post-op appts on May 11 and was provided with a copy of her appts for that day.  Pt verbalized understanding of all instructions.          Showering   If your incisions are healing and there is no drainage - it is ok to take a shower.  Shower with your back to the shower spray.  It is ok for your incision to get wet, but the shower spray should not directly hit your chest.  Do not soak in a tub.  The water temperature should be warm -- not too hot or cold. Extreme water temperatures can cause you to feel faint.  It is expected that you wash your incisions when you shower, however only use soap and water to cleanse the sites.  -Use normal bar soap, not perfumed soap or body wash. During your recovery, do not try a new brand of soap.  -Place soapy water on your hand or a clean washcloth and gently wash your incisions using an up-and-down motion.  -Do not apply ointments, oils, or salves to your incisions.  -Pat the skin gently to dry.     1. Wash your hands before and after caring for or touching your incisions.  2. Look in the mirror daily. Inspect your incisions for redness, drainage and  warmth. Your incisions should be healing; there should NOT be an increase in opening.  3. Gently wash your incisions everyday with soap and warm water using a clean washcloth, or your hand and light touch.  4. Gently pat dry with a clean towel.  5. You do not need to cover your incisions unless they are draining.  If you are experiencing drainage, it is important to call your doctor.  Monday - Friday, from 8:00am - 5pm, call [phone].  Outside of these hours, please call [phone], which is a 24-hour nurse care advice line.     When to call your doctor:  -Increased drainage or oozing from the incision(s)  -Increased opening of the incision line(s) - there should not be gaps or pulling apart areas of the incision(s)  -Redness along the incision(s) - if the incisions were red or pink when you left the hospital, they should be improving and not becoming more red  -Warmth along the incision line(s)  -Increased body temperature / Fever - greater than 101 degrees Fahrenheit  -If you have diabetes and your blood sugar levels begin to vary     Imani Hyman RN  CTS Navigator  [id number]

## 2023-04-21 NOTE — ASSESSMENT & PLAN NOTE
"- Ep consulted for paroxysmal SVT however Dr. Xavier with EP characterizes rhythm as "periods of postoperative atrial fibrillation, episodes of nonsustained atrial tachycardia, and frequent PACs following her recent CABG on 4/11/2023"  - Recommendations given and addressed  - Continue BB  - Amiodarone at current dose   - Will stop Plavix today and start Eliquis  - Continue ASA  - Tele monitoring  - Expected postoperatively  - Paroxsymal   "

## 2023-04-21 NOTE — ASSESSMENT & PLAN NOTE
- Expected postoperatively   - Daily CBC to monitor trends   - HH this morning not provided as patient is now refusing labs

## 2023-04-21 NOTE — SUBJECTIVE & OBJECTIVE
Interval History: Telemetry reviewed w/ some SVT overnight at a rate 120's. Continues to feel some palpitations and some dizziness.     Objective:     Vital Signs (Most Recent):  Temp: 99.2 °F (37.3 °C) (04/21/23 0942)  Pulse: 80 (04/21/23 0942)  Resp: 20 (04/21/23 0954)  BP: (!) 115/57 (04/21/23 0942)  SpO2: 98 % (04/21/23 0942)   Vital Signs (24h Range):  Temp:  [96.6 °F (35.9 °C)-99.2 °F (37.3 °C)] 99.2 °F (37.3 °C)  Pulse:  [55-80] 80  Resp:  [16-20] 20  SpO2:  [93 %-99 %] 98 %  BP: (110-129)/(57-72) 115/57     Weight: 60.3 kg (132 lb 15 oz)  Body mass index is 24.31 kg/m².     SpO2: 98 %       Physical Exam    Vitals and nursing note reviewed.   Constitutional:       Appearance: Normal appearance.   HENT:      Mouth/Throat:      Mouth: Mucous membranes are moist.      Pharynx: Oropharynx is clear. No oropharyngeal exudate.   Eyes:      Conjunctiva/sclera: Conjunctivae normal.      Pupils: Pupils are equal, round, and reactive to light.   Cardiovascular:      Rate and Rhythm: Normal rate and regular rhythm.      Pulses: Normal pulses.      Heart sounds: Normal heart sounds. No murmur heard.     Comments: Midling sternotomy c/d/I with chest tubes   Pulmonary:      Effort: Pulmonary effort is normal.      Breath sounds: Normal breath sounds.   Abdominal:      General: Abdomen is flat. There is no distension.      Palpations: Abdomen is soft.      Tenderness: There is no abdominal tenderness.   Skin:     General: Skin is warm and dry.      Coloration: Skin is not pale.   Neurological:      General: No focal deficit present.      Mental Status: She is alert and oriented to person, place, and time.   Psychiatric:         Mood and Affect: Mood normal.         Behavior: Behavior normal.   Significant Labs:   Recent Lab Results         04/21/23  0951   04/20/23  2119   04/20/23  1622        POCT Glucose 283   269   250

## 2023-04-21 NOTE — PT/OT/SLP PROGRESS
Occupational Therapy   Co-Treatment    Name: Jud Villa  MRN: [id number]  Admitting Diagnosis:  Coronary artery disease  7 Days Post-Op    Recommendations:     Discharge Recommendations: other (see comments)  Discharge Equipment Recommendations:  bedside commode  Barriers to discharge:  None    Assessment:     Jud Villa is a 63 y.o. female with a medical diagnosis of Coronary artery disease. Pt tolerated PT/OT session well with good participation and motivation. Pt is progressing well overall, but does remain limited by decreased tolerance to activity, generalized weakness, and pain. Pt able to recall 3/3 sternal precautions with no cueing required to adhere. Pt would continue to benefit from skilled OT services to maximize functional (I) & facilitate safe discharge . Performance deficits affecting function are weakness, impaired endurance, impaired self care skills, impaired functional mobility, gait instability, decreased safety awareness, impaired cardiopulmonary response to activity, pain, impaired balance.     Rehab Prognosis:  Good; patient would benefit from acute skilled OT services to address these deficits and reach maximum level of function.       Plan:     Patient to be seen 3 x/week to address the above listed problems via therapeutic activities, therapeutic exercises, self-care/home management  Plan of Care Expires: 05/15/23  Plan of Care Reviewed with: patient    Subjective     Chief Complaint: nausea  Patient/Family Comments/goals: to d/c home with daughter  Pain/Comfort:  Pain Rating 1: 0/10  Pain Rating Post-Intervention 1: 0/10    Objective:   Co-treatment performed due to patient's multiple deficits requiring two skilled therapists to appropriately and safely assess patient's strength and endurance while facilitating functional tasks in addition to accommodating for patient's activity tolerance.      Additional staff present:  YOSSI Kim    Communicated with: RN prior to session.  Patient  found up in chair with telemetry, oxygen, chest tube upon OT entry to room.    General Precautions: Standard, fall, sternal    Orthopedic Precautions:N/A  Braces: N/A  Respiratory Status: Nasal cannula, flow 2 L/min     Occupational Performance:     Bed Mobility:    Not assessed- pt met sitting upright on bedside chair    Functional Mobility/Transfers:  Patient completed Sit <> Stand Transfer with stand by assistance  with  no assistive device   Functional Mobility: Pt engaged in functional mobility to simulate household/community distances across room/hallway with CGA progressing to close SBA using no AD in order to maximize functional endurance and standing balance required for engagement in occupations of choice   No LOB or SOB noted    Activities of Daily Living:  Upper Body Dressing: moderate assistance required to don gown posteriorly with inclusion of sternal precautions  OT provided visual demonstration on technique       [facility] 6 Click ADL: 19    Treatment & Education:  -Sternal precautions and application to functional tasks / ADLs reviewed:  including no lifting > 5 lbs, pulling or pushing with BUEs  -Education on energy conservation and task modification to maximize safety and (I) during ADLs and mobility  -Education on importance of OOB activity to improve overall activity tolerance and promote recovery  -Pt educated to call for assistance and to transfer with hospital staff only  -Provided education regarding role of OT, POC, & discharge recommendations with pt verbalizing understanding.  Pt had no further questions & when asked whether there were any concerns pt reported none.     Patient left up in chair with all lines intact, call button in reach, and RN notified    GOALS:   Multidisciplinary Problems       Occupational Therapy Goals          Problem: Occupational Therapy    Goal Priority Disciplines Outcome Interventions   Occupational Therapy Goal     OT, PT/OT Ongoing, Progressing    Description:  Goals to be met by: 05/15/2023      Patient will increase functional independence with ADLs by performing:    UE Dressing with Modified [place].  LE Dressing with Modified [place].  Grooming while standing with Modified [place].  Toileting from toilet with Modified [place] for hygiene and clothing management.   Toilet transfer to toilet with Modified [place].  Increased functional strength to WFL for self care.  Upper extremity exercise program x10 reps per handout, with supervision.                          Time Tracking:     OT Date of Treatment: 04/21/23  OT Start Time: 1047  OT Stop Time: 1105  OT Total Time (min): 18 min    Billable Minutes:Therapeutic Activity 18    OT/NANCY: OT     Number of NANCY visits since last OT visit: 0    4/21/2023

## 2023-04-21 NOTE — ASSESSMENT & PLAN NOTE
- Maintain sternal precautions   - ASA 81  - Starting Eliquis 5 mg BID  - BB - Continue current dose per EP  - Continue amiodarone per EP  - Statin  - Stopping Plavix  - Starting lasix with scheduled potassium ordered   - Encourage ambulation  - PT/OT  - Multimodal pain control  - Cardiac diet with 1500 cc fluid restriction   - Bowel regimen in place   - Protonix for ulcer prevention   - Pacer wires removing today  - Chest tubes removing today  - Monitor electrolytes to keep Mag above 2 and Potassium above 4  - OOBTO   - Encourage IS use

## 2023-04-21 NOTE — PROGRESS NOTES
[facility] - Cardiology Stepdown  Cardiac Electrophysiology  Progress Note    Admission Date: 4/14/2023  Code Status: Full Code   Attending Physician: Igor Kahn MD   Expected Discharge Date: 4/24/2023  Principal Problem:Coronary artery disease    Subjective:     Interval History: Telemetry reviewed w/ some SVT overnight at a rate 120's. Continues to feel some palpitations and some dizziness.     Objective:     Vital Signs (Most Recent):  Temp: 99.2 °F (37.3 °C) (04/21/23 0942)  Pulse: 80 (04/21/23 0942)  Resp: 20 (04/21/23 0954)  BP: (!) 115/57 (04/21/23 0942)  SpO2: 98 % (04/21/23 0942)   Vital Signs (24h Range):  Temp:  [96.6 °F (35.9 °C)-99.2 °F (37.3 °C)] 99.2 °F (37.3 °C)  Pulse:  [55-80] 80  Resp:  [16-20] 20  SpO2:  [93 %-99 %] 98 %  BP: (110-129)/(57-72) 115/57     Weight: 60.3 kg (132 lb 15 oz)  Body mass index is 24.31 kg/m².     SpO2: 98 %       Physical Exam    Vitals and nursing note reviewed.   Constitutional:       Appearance: Normal appearance.   HENT:      Mouth/Throat:      Mouth: Mucous membranes are moist.      Pharynx: Oropharynx is clear. No oropharyngeal exudate.   Eyes:      Conjunctiva/sclera: Conjunctivae normal.      Pupils: Pupils are equal, round, and reactive to light.   Cardiovascular:      Rate and Rhythm: Normal rate and regular rhythm.      Pulses: Normal pulses.      Heart sounds: Normal heart sounds. No murmur heard.     Comments: Midling sternotomy c/d/I with chest tubes   Pulmonary:      Effort: Pulmonary effort is normal.      Breath sounds: Normal breath sounds.   Abdominal:      General: Abdomen is flat. There is no distension.      Palpations: Abdomen is soft.      Tenderness: There is no abdominal tenderness.   Skin:     General: Skin is warm and dry.      Coloration: Skin is not pale.   Neurological:      General: No focal deficit present.      Mental Status: She is alert and oriented to person, place, and time.   Psychiatric:         Mood and Affect: Mood normal.          Behavior: Behavior normal.   Significant Labs:   Recent Lab Results         04/21/23  0951   04/20/23  2119   04/20/23  1622        POCT Glucose 283   269   250             Assessment and Plan:     Paroxysmal SVT (supraventricular tachycardia)  Ms. Villa is a 62 y/o W with PMHx mvCAD, CVA, HTN, HLD, DM2, Breast cancer s/p BL mastectomy who was admitted on 4/11/23 for CABG. She is now s/p CABG x2 (LIMA-LAD, SVG-OM) on 4/14/23 by Dr. Kahn. Paroxysmal SVT post CABG. ECG and telemetry reviewed. SVT with aberrancy- AT and S. Tachycardia.     - continue amiodarone and up-titrate lopressor as tolerated  - given her CV is 5, recommend AC with eliquis 5mg BID when ok by primary team  -  We will continue monitoring.        Chavo Orozco MD  Cardiac Electrophysiology  [facility] - Cardiology Stepdown

## 2023-04-21 NOTE — ASSESSMENT & PLAN NOTE
"- Noted in the ICU, EP consulted and found patient to be in pAFIB with occasional AT and PVC's  - Ep consulted for paroxysmal SVT however Dr. Xavier with EP characterizes rhythm as "periods of postoperative atrial fibrillation, episodes of nonsustained atrial tachycardia, and frequent PACs following her recent CABG on 4/11/2023"  "

## 2023-04-21 NOTE — PROGRESS NOTES
[facility] - Cardiology Stepdown  Cardiothoracic Surgery  Progress Note    Patient Name: Jud Villa  MRN: [id number]  Admission Date: 4/14/2023  Hospital Length of Stay: 7 days  Code Status: Full Code   Attending Physician: Igor Kahn MD   Referring Provider: Igor Kahn MD  Principal Problem:Coronary artery disease    Subjective:     Post-Op Info:  Procedure(s) (LRB):  CORONARY ARTERY BYPASS GRAFT (CABG) x 2 (N/A)  SURGICAL PROCUREMENT, VEIN, ENDOSCOPIC (Left)   7 Days Post-Op     Interval History: Stepdown from ICU uneventful.  Will remove tubes and wires today.  Stopping Plavix and starting Eliquis per EP recommendations.     Review of Systems   Constitutional: Negative for malaise/fatigue.   Cardiovascular:  Negative for chest pain, claudication, dyspnea on exertion, irregular heartbeat, leg swelling and palpitations.   Respiratory:  Negative for cough and shortness of breath.    Hematologic/Lymphatic: Negative for bleeding problem.   Gastrointestinal:  Negative for abdominal pain.   Genitourinary:  Negative for dysuria.   Neurological:  Negative for headaches and weakness.   Medications:  Continuous Infusions:  Scheduled Meds:   acetaminophen  650 mg Oral Q8H    amiodarone  200 mg Oral BID    aspirin  81 mg Oral Daily    atorvastatin  80 mg Oral QHS    clopidogreL  75 mg Oral Daily    fluticasone propionate  1 spray Each Nostril Daily    furosemide  20 mg Oral BID    gabapentin  300 mg Oral TID    insulin aspart U-100  4-6 Units Subcutaneous TID PC    insulin detemir U-100  15 Units Subcutaneous Daily    LIDOcaine  1 patch Transdermal Q24H    metoprolol tartrate  50 mg Oral BID    pantoprazole  40 mg Oral Before breakfast    polyethylene glycol  17 g Oral Daily    potassium chloride  20 mEq Oral BID    senna-docusate 8.6-50 mg  1 tablet Oral Daily    sucralfate  1 g Oral TID WM     PRN Meds:adenosine **FOLLOWED BY** [START ON 9/13/2034] adenosine, albumin human 5%, aspirin,  bisacodyL, dextrose 10%, dextrose 10%, dextrose, dextrose, glucagon (human recombinant), haloperidol, HYDROcodone-acetaminophen, insulin aspart U-100, metoclopramide HCl, nicotine, ondansetron, prochlorperazine     Objective:     Vital Signs (Most Recent):  Temp: 99.2 °F (37.3 °C) (04/21/23 0942)  Pulse: 80 (04/21/23 0942)  Resp: 20 (04/21/23 0954)  BP: (!) 115/57 (04/21/23 0942)  SpO2: 98 % (04/21/23 0942)   Vital Signs (24h Range):  Temp:  [96.6 °F (35.9 °C)-99.2 °F (37.3 °C)] 99.2 °F (37.3 °C)  Pulse:  [55-80] 80  Resp:  [16-22] 20  SpO2:  [93 %-100 %] 98 %  BP: (110-129)/(57-72) 115/57     Weight: 60.3 kg (132 lb 15 oz)  Body mass index is 24.31 kg/m².    SpO2: 98 %       Intake/Output - Last 3 Shifts         04/19 0700  04/20 0659 04/20 0700  04/21 0659 04/21 0700  04/22 0659    P.O. 960 480 120    I.V. (mL/kg)       IV Piggyback       Total Intake(mL/kg) 960 (15) 480 (7.5) 120 (2)    Urine (mL/kg/hr) 2000 (1.3) 1300 (0.8)     Stool 0 0     Chest Tube 230 50     Total Output 2230 1350     Net -1270 -870 +120           Urine Occurrence  1 x     Stool Occurrence 4 x 3 x             Lines/Drains/Airways       Drain  Duration                  Y Chest Tube 3 and 4 04/14/23 1115 3 Right Pleural 19 Fr. 4 Left Pleural 19 Fr. 6 days              Line  Duration                  Pacer Wires 04/14/23 1139 6 days              Peripheral Intravenous Line  Duration                  Peripheral IV - Single Lumen 04/19/23 1846 20 G Right Antecubital 1 day         Peripheral IV - Single Lumen 04/20/23 1015 20 G Anterior;Proximal;Right Upper Arm <1 day                    Physical Exam  Constitutional:       Appearance: Normal appearance.   HENT:      Head: Normocephalic.   Eyes:      Pupils: Pupils are equal, round, and reactive to light.   Cardiovascular:      Rate and Rhythm: Normal rate and regular rhythm.      Pulses: Normal pulses.   Pulmonary:      Effort: Pulmonary effort is normal.   Abdominal:      General: Abdomen is  "flat. Bowel sounds are normal.      Palpations: Abdomen is soft.   Musculoskeletal:         General: Normal range of motion.   Skin:     General: Skin is warm and dry.      Comments: MSI CDI  Bruising in different stages of healing noted to arms and sternal area    Neurological:      General: No focal deficit present.      Mental Status: She is alert.       Significant Labs:  BMP:   Recent Labs   Lab 04/20/23  0402 04/20/23  1012   *  --      --    K 3.6 4.5   CL 96  --    CO2 30*  --    BUN 6*  --    CREATININE 0.6  --    CALCIUM 9.0  --    MG 1.8 1.8     CBC:   Recent Labs   Lab 04/20/23  0402   WBC 11.80   RBC 3.01*   HGB 8.8*   HCT 27.3*      MCV 91   MCH 29.2   MCHC 32.2     CMP:   Recent Labs   Lab 04/20/23  0402 04/20/23  1012   *  --    CALCIUM 9.0  --    ALBUMIN 2.7*  --    PROT 6.2  --      --    K 3.6 4.5   CO2 30*  --    CL 96  --    BUN 6*  --    CREATININE 0.6  --    ALKPHOS 81  --    ALT 16  --    AST 19  --    BILITOT 0.5  --      Coagulation:   Recent Labs   Lab 04/20/23  0402   INR 1.0   APTT 24.2       Significant Diagnostics:  I have reviewed and interpreted all pertinent imaging results/findings within the past 24 hours.    Assessment/Plan:     * Coronary artery disease  - See S/P CABG x2 for details       Postoperative atrial fibrillation  - Ep consulted for paroxysmal SVT however Dr. Xavier with EP characterizes rhythm as "periods of postoperative atrial fibrillation, episodes of nonsustained atrial tachycardia, and frequent PACs following her recent CABG on 4/11/2023"  - Recommendations given and addressed  - Continue BB  - Amiodarone at current dose   - Will stop Plavix today and start Eliquis  - Continue ASA  - Tele monitoring  - Expected postoperatively  - Paroxsymal     Paroxysmal SVT (supraventricular tachycardia)  - Noted in the ICU, EP consulted and found patient to be in pAFIB with occasional AT and PVC's  - Ep consulted for paroxysmal SVT however " "Dr. Xavier with EP characterizes rhythm as "periods of postoperative atrial fibrillation, episodes of nonsustained atrial tachycardia, and frequent PACs following her recent CABG on 4/11/2023"    S/P CABG x 2  - Maintain sternal precautions   - ASA 81  - Starting Eliquis 5 mg BID  - BB - Continue current dose per EP  - Continue amiodarone per EP  - Statin  - Stopping Plavix  - Starting lasix with scheduled potassium ordered   - Encourage ambulation  - PT/OT  - Multimodal pain control  - Cardiac diet with 1500 cc fluid restriction   - Bowel regimen in place   - Protonix for ulcer prevention   - Pacer wires removing today  - Chest tubes removing today  - Monitor electrolytes to keep Mag above 2 and Potassium above 4  - OOBTO   - Encourage IS use      Acute blood loss anemia  - Expected postoperatively   - Daily CBC to monitor trends   - HH this morning not provided as patient is now refusing labs       History of CVA (cerebrovascular accident)  - Continue ASA  - Starting Eliquis for a CV of 5    Controlled type 2 diabetes mellitus with complication, with long-term current use of insulin  - Endocrine following    COPD (chronic obstructive pulmonary disease)  - IS  - Pulmonary toileting   - Encourage OOBTC  - Monitoring sats   - Intermittent oxygen requirements   - Use oxygen only if sats are below 88%        Peggy Emerson, LISA  Cardiothoracic Surgery  [facility] - [facility]  "

## 2023-04-21 NOTE — PT/OT/SLP PROGRESS
Physical Therapy Co-Treatment    Patient Name:  Jud Villa   MRN:  [id number]  Admitting Diagnosis:  Coronary artery disease   Recent Surgery: Procedure(s) (LRB):  CORONARY ARTERY BYPASS GRAFT (CABG) x 2 (N/A)  SURGICAL PROCUREMENT, VEIN, ENDOSCOPIC (Left) 7 Days Post-Op  Admit Date: 4/14/2023  Length of Stay: 7 days    Recommendations:     Discharge Recommendations:  other (see comments)   Discharge Equipment Recommendations: bedside commode   Barriers to discharge: None    Plan:     During this hospitalization, patient to be seen 5 x/week to address the identified rehab impairments via gait training, therapeutic activities, therapeutic exercises, neuromuscular re-education and progress towards the established goals.  Plan of Care Expires:  05/16/23  Plan of Care Reviewed with: patient, daughter    Assessment:     Jud Villa is a 63 y.o. female admitted with a medical diagnosis of Coronary artery disease. Pt tolerated session well today. Pt with improved endurance as seen by increased distance ambulated and decreased level of assist required. Pt reporting improved pain control but increased SOB with ambulation and endurance training on this date. Pt will continue to benefit from skilled PT services during this hospital admit to continue to improve transfer ability and efficiency as well as continue to progress pt's ambulation distance and cardiopulmonary endurance to maximize pt's functional independence and return to PLOF     Problem List: impaired endurance, impaired functional mobility, impaired self care skills, gait instability, impaired balance, decreased safety awareness, pain, impaired cardiopulmonary response to activity.  Rehab Prognosis: Good; patient would benefit from acute skilled PT services to address these deficits and reach maximum level of function.      Goals:   Multidisciplinary Problems       Physical Therapy Goals          Problem: Physical Therapy    Goal Priority Disciplines Outcome  "Goal Variances Interventions   Physical Therapy Goal     PT, PT/OT Ongoing, Progressing     Description: Goals to be met by: 23    Patient will increase functional independence with mobility by performin. Supine to sit with [place] and Maintaining sternal precautions-not met  2. Sit to supine with [place] and Maintaining sternal precautions -not met  3. Sit to stand transfer with [place] and Maintaining sternal precautions -not met  4. Gait  x 300 feet with [place] and Maintaining sternal precautions -not met  5. Ascend/Descend 6 inch curb step with [place] and Maintaining sternal precautions -not met  6. Lower extremity exercise program x15 reps per handout, with independence-not met                         Subjective     RN notified prior to session. No family/friends present upon PT entrance into room. Patient agreeable to PT treatment session.    Chief Complaint: "I walked with my daughter yesterday"  Patient/Family Comments/goals: go home  Pain/Comfort:  Pain Rating 1: 0/10  Pain Rating Post-Intervention 1: 0/10      Objective:     Additional staff present: OT; OT for cotx due to pt's multiple medical comorbidities and functional deficits req'ing two skilled therapists to appropriately maximize functional potential by progressing pt's musculoskeletal strength and endurance, facilitating neuromuscular postural balance and control ,and accommodating for patient's impaired cardiopulmonary tolerance to activities due to recent CABG as well as increased fatigue and SOB in previous sessions.       Patient found up in chair with: telemetry, chest tube, oxygen   Cognition:   Alert and Cooperative  AxOx4  Command following: Follows multistep verbal commands  Fluency: clear/fluent  General Precautions: Standard, Cardiac fall, sternal   Orthopedic Precautions:N/A   Braces: N/A   Body mass index is 24.31 kg/m².  Oxygen Device: Nasal Cannula 2L  Vitals: /63   Pulse 62   Temp 98.4 " "°F (36.9 °C)   Resp 20   Ht 5' 2" (1.575 m)   Wt 60.3 kg (132 lb 15 oz)   SpO2 98%   Breastfeeding No   BMI 24.31 kg/m²     Outcome Measures:  AM-PAC 6 CLICK MOBILITY  Turning over in bed (including adjusting bedclothes, sheets and blankets)?: 3  Sitting down on and standing up from a chair with arms (e.g., wheelchair, bedside commode, etc.): 3  Moving from lying on back to sitting on the side of the bed?: 3  Moving to and from a bed to a chair (including a wheelchair)?: 3  Need to walk in hospital room?: 3  Climbing 3-5 steps with a railing?: 1  Basic Mobility Total Score: 16     Functional Mobility:    Bed Mobility:   Pt found/returned to bedside chair    Transfers:   Sit <> Stand Transfer: stand by assistance with no assistive device   Stand <> Sit Transfer: stand by assistance with no assistive device   [id number] from EOB    Standing Balance:  Static Standing Balance: Good- : able to maintain standing balance against minimal resistance  Dynamic Standing Balance: Good- : able to stand independently unsupported and weight shift across midline minimally  Assistance Level Required: Stand-by Assistance  Patient used: no assistive device       Gait:  Patient ambulated: 128 ft   Patient required: contact guard progressing to stand-by assist  Patient used:  no assistive device   Gait Pattern observed: reciprocal gait  Gait Deviation(s): decreased step length, wide base of support, steady gait, flexed posture, and decreased josie  Impairments due to: pain and decreased endurance  Portable Supplemental O2 2L utilized  all lines remained intact throughout ambulation trial  Comments: Pt with x1 standing rest break 2/2 SOB but SPO2 ranged between 97-98% throughout duration of activity. Initially requiring CGA but progressed to close SBA due to improving step length and upright posture. Reported activity as a "medium" difficulty    Education:  Time provided for education, counseling and discussion of health disposition " in regards to patient's current status  All questions answered within PT scope of practice and to patient's satisfaction  PT role in POC to address current functional deficits  Pt educated on proper body mechanics, safety techniques, and energy conservation with PT facilitation and cueing throughout session  Call nursing/pct to transfer to chair/use bathroom. Pt stated understanding.  Whiteboard updated with therapist name and pt's current mobility status documented above  Safe to perform step transfer to/from chair/bedside commode CGA and no AD w/ nursing/PCT present  Pt to ambulate 2-3x/day CGA and no AD with nsg/fmaily in order to maintain functional mobility  Importance of OOB tolerance 8-10 hrs/day to improve lung ventilation and expansion as well as strengthen postural musculature  Pt educated on Post-op sternal precautions, including no lifting > 5 lbs, pulling or pushing with BUEs. Able to move arms within a pain free range.    Patient left up in chair with all lines intact and call button in reach.    Time Tracking:     PT Received On: 04/21/23  PT Start Time: 1047     PT Stop Time: 1104  PT Total Time (min): 17 min     Billable Minutes:   Therapeutic Exercise 17 minutes    Treatment Type: Treatment  PT/PTA: PT       4/21/2023

## 2023-04-21 NOTE — ASSESSMENT & PLAN NOTE
- IS  - Pulmonary toileting   - Encourage OOBTC  - Monitoring sats   - Intermittent oxygen requirements   - Use oxygen only if sats are below 88%

## 2023-04-21 NOTE — ASSESSMENT & PLAN NOTE
Ms. Villa is a 62 y/o W with PMHx mvCAD, CVA, HTN, HLD, DM2, Breast cancer s/p BL mastectomy who was admitted on 4/11/23 for CABG. She is now s/p CABG x2 (LIMA-LAD, SVG-OM) on 4/14/23 by Dr. Kahn. Paroxysmal SVT post CABG. ECG and telemetry reviewed. SVT with aberrancy- AT and S. Tachycardia.     - continue amiodarone and up-titrate lopressor as tolerated  - given her CV is 5, recommend AC with eliquis 5mg BID when ok by primary team  -  We will continue monitoring.

## 2023-04-21 NOTE — PLAN OF CARE
Endocrine Plan of Care:  04/21/2023      Interval hx: appetite improving. Slightly above goal, but occasionally not receiving the correct prandial insulin dose. (Ex: pt ate 75% of breakfast, should have received 6u scheduled plus correction, but instead received 4u + correction).  BG 140s-280s last 24h.       ASSESSMENT and PLAN     Neuro  History of CVA (cerebrovascular accident)   Continues on Atorvastatin 80 mg         Cardiac/Vascular  * Coronary artery disease  CAD s/p CABGx2 with Dr. Kahn on 4/13/23  Will target tight glycemic control given cardiac surgery        Endocrine  Controlled type 2 diabetes mellitus with complication, with long-term current use of insulin  Hyperglycemia from type 2 diabetes mellitus and stress response s/p CABG, steroids given intra op also contributing to hyperglycemia     Endocrinology consulted for BG management.   BG goal 110-140 (CTS Protocol)      - Intensive insulin drip after surgery per CTS guidelines, adjusted to transition drip 04/15/2023 and then switched to MDI on 4/16/2023 with small adjustments in insulin since then.  - Hypoglycemia protocol in place  - If blood glucose greater than 300 and diet ordered, please ask patient not to eat food or drink anything other than water until correctional insulin has brought it back below 250     Plan:     Continue Levemir to 15u daily beginning 4/21     Continue Novolog with meals 4-6 units based on percent eaten, please ensure correct doses are being given     Continue Novolog low dose correction scale prn   Accuchecks ACHS        ** Please notify Endocrine for any change and/or advance in diet**  ** Please call Endocrine for any BG related issues **            **Please notify endocrine of discharge plan in advance if possible, so we can provide discharge recommendations**       Primary team planning for discharge tomorrow, 4/22/2023. At this time we recommend stopping home Glipizide, but resuming the rest of her previous home  regimen (Jardiance, metformin, Tradjenta, Basaglar) at time of discharge with outpatient follow up with her endocrinologist, Dr Betsey Franco.        Nicole Gonzalez MD  Endocrinology  [facility] - Cardiology Stepdown

## 2023-04-22 VITALS
WEIGHT: 132.94 LBS | OXYGEN SATURATION: 97 % | DIASTOLIC BLOOD PRESSURE: 58 MMHG | RESPIRATION RATE: 20 BRPM | HEIGHT: 62 IN | SYSTOLIC BLOOD PRESSURE: 113 MMHG | BODY MASS INDEX: 24.46 KG/M2 | HEART RATE: 59 BPM | TEMPERATURE: 98 F

## 2023-04-22 LAB
ALBUMIN SERPL BCP-MCNC: 2.4 G/DL (ref 3.5–5.2)
ALP SERPL-CCNC: 67 U/L (ref 55–135)
ALT SERPL W/O P-5'-P-CCNC: 11 U/L (ref 10–44)
ANION GAP SERPL CALC-SCNC: 10 MMOL/L (ref 8–16)
AST SERPL-CCNC: 13 U/L (ref 10–40)
BASOPHILS # BLD AUTO: 0.05 K/UL (ref 0–0.2)
BASOPHILS NFR BLD: 0.4 % (ref 0–1.9)
BILIRUB SERPL-MCNC: 0.3 MG/DL (ref 0.1–1)
BUN SERPL-MCNC: 9 MG/DL (ref 8–23)
CALCIUM SERPL-MCNC: 8.8 MG/DL (ref 8.7–10.5)
CHLORIDE SERPL-SCNC: 95 MMOL/L (ref 95–110)
CO2 SERPL-SCNC: 29 MMOL/L (ref 23–29)
CREAT SERPL-MCNC: 0.6 MG/DL (ref 0.5–1.4)
DIFFERENTIAL METHOD: ABNORMAL
EOSINOPHIL # BLD AUTO: 1 K/UL (ref 0–0.5)
EOSINOPHIL NFR BLD: 8.8 % (ref 0–8)
ERYTHROCYTE [DISTWIDTH] IN BLOOD BY AUTOMATED COUNT: 14.4 % (ref 11.5–14.5)
EST. GFR  (NO RACE VARIABLE): >60 ML/MIN/1.73 M^2
GLUCOSE SERPL-MCNC: 267 MG/DL (ref 70–110)
HCT VFR BLD AUTO: 25.1 % (ref 37–48.5)
HGB BLD-MCNC: 7.9 G/DL (ref 12–16)
IMM GRANULOCYTES # BLD AUTO: 0.05 K/UL (ref 0–0.04)
IMM GRANULOCYTES NFR BLD AUTO: 0.4 % (ref 0–0.5)
LYMPHOCYTES # BLD AUTO: 2 K/UL (ref 1–4.8)
LYMPHOCYTES NFR BLD: 17.6 % (ref 18–48)
MAGNESIUM SERPL-MCNC: 1.5 MG/DL (ref 1.6–2.6)
MCH RBC QN AUTO: 29 PG (ref 27–31)
MCHC RBC AUTO-ENTMCNC: 31.5 G/DL (ref 32–36)
MCV RBC AUTO: 92 FL (ref 82–98)
MONOCYTES # BLD AUTO: 1 K/UL (ref 0.3–1)
MONOCYTES NFR BLD: 8.3 % (ref 4–15)
NEUTROPHILS # BLD AUTO: 7.3 K/UL (ref 1.8–7.7)
NEUTROPHILS NFR BLD: 64.5 % (ref 38–73)
NRBC BLD-RTO: 0 /100 WBC
PHOSPHATE SERPL-MCNC: 4.2 MG/DL (ref 2.7–4.5)
PLATELET # BLD AUTO: 375 K/UL (ref 150–450)
PMV BLD AUTO: 9.1 FL (ref 9.2–12.9)
POCT GLUCOSE: 167 MG/DL (ref 70–110)
POCT GLUCOSE: 240 MG/DL (ref 70–110)
POCT GLUCOSE: 259 MG/DL (ref 70–110)
POTASSIUM SERPL-SCNC: 4.3 MMOL/L (ref 3.5–5.1)
PROT SERPL-MCNC: 5.4 G/DL (ref 6–8.4)
RBC # BLD AUTO: 2.72 M/UL (ref 4–5.4)
SODIUM SERPL-SCNC: 134 MMOL/L (ref 136–145)
WBC # BLD AUTO: 11.39 K/UL (ref 3.9–12.7)

## 2023-04-22 PROCEDURE — 85025 COMPLETE CBC W/AUTO DIFF WBC: CPT | Mod: HCNC | Performed by: PHYSICIAN ASSISTANT

## 2023-04-22 PROCEDURE — 25000003 PHARM REV CODE 250: Mod: HCNC | Performed by: PHYSICIAN ASSISTANT

## 2023-04-22 PROCEDURE — 25000003 PHARM REV CODE 250: Mod: HCNC

## 2023-04-22 PROCEDURE — 84100 ASSAY OF PHOSPHORUS: CPT | Mod: HCNC | Performed by: PHYSICIAN ASSISTANT

## 2023-04-22 PROCEDURE — 25000003 PHARM REV CODE 250: Mod: HCNC | Performed by: STUDENT IN AN ORGANIZED HEALTH CARE EDUCATION/TRAINING PROGRAM

## 2023-04-22 PROCEDURE — 93010 ELECTROCARDIOGRAM REPORT: CPT | Mod: HCNC,,, | Performed by: INTERNAL MEDICINE

## 2023-04-22 PROCEDURE — 83735 ASSAY OF MAGNESIUM: CPT | Mod: HCNC | Performed by: PHYSICIAN ASSISTANT

## 2023-04-22 PROCEDURE — 25000003 PHARM REV CODE 250: Mod: HCNC | Performed by: NURSE PRACTITIONER

## 2023-04-22 PROCEDURE — 93010 EKG 12-LEAD: ICD-10-PCS | Mod: HCNC,,, | Performed by: INTERNAL MEDICINE

## 2023-04-22 PROCEDURE — 99900035 HC TECH TIME PER 15 MIN (STAT): Mod: HCNC

## 2023-04-22 PROCEDURE — 36415 COLL VENOUS BLD VENIPUNCTURE: CPT | Mod: HCNC

## 2023-04-22 PROCEDURE — 63600175 PHARM REV CODE 636 W HCPCS: Mod: HCNC | Performed by: STUDENT IN AN ORGANIZED HEALTH CARE EDUCATION/TRAINING PROGRAM

## 2023-04-22 PROCEDURE — 80053 COMPREHEN METABOLIC PANEL: CPT | Mod: HCNC

## 2023-04-22 PROCEDURE — 25000003 PHARM REV CODE 250: Mod: HCNC | Performed by: THORACIC SURGERY (CARDIOTHORACIC VASCULAR SURGERY)

## 2023-04-22 PROCEDURE — 93005 ELECTROCARDIOGRAM TRACING: CPT | Mod: HCNC

## 2023-04-22 PROCEDURE — 63600175 PHARM REV CODE 636 W HCPCS: Mod: HCNC | Performed by: NURSE PRACTITIONER

## 2023-04-22 RX ORDER — TRAMADOL HYDROCHLORIDE 50 MG/1
100 TABLET ORAL EVERY 6 HOURS PRN
Status: DISCONTINUED | OUTPATIENT
Start: 2023-04-22 | End: 2023-04-22 | Stop reason: HOSPADM

## 2023-04-22 RX ORDER — POLYETHYLENE GLYCOL 3350 17 G/17G
17 POWDER, FOR SOLUTION ORAL 2 TIMES DAILY PRN
Refills: 0 | COMMUNITY
Start: 2023-04-22 | End: 2023-05-11 | Stop reason: ALTCHOICE

## 2023-04-22 RX ORDER — POTASSIUM CHLORIDE 20 MEQ/1
20 TABLET, EXTENDED RELEASE ORAL DAILY
Qty: 14 TABLET | Refills: 0 | Status: SHIPPED | OUTPATIENT
Start: 2023-04-22 | End: 2023-05-11 | Stop reason: ALTCHOICE

## 2023-04-22 RX ORDER — SCOLOPAMINE TRANSDERMAL SYSTEM 1 MG/1
1 PATCH, EXTENDED RELEASE TRANSDERMAL
Status: DISCONTINUED | OUTPATIENT
Start: 2023-04-22 | End: 2023-04-22 | Stop reason: HOSPADM

## 2023-04-22 RX ORDER — FUROSEMIDE 20 MG/1
20 TABLET ORAL DAILY
Qty: 14 TABLET | Refills: 0 | Status: SHIPPED | OUTPATIENT
Start: 2023-04-22 | End: 2023-05-11 | Stop reason: ALTCHOICE

## 2023-04-22 RX ORDER — LANOLIN ALCOHOL/MO/W.PET/CERES
400 CREAM (GRAM) TOPICAL ONCE
Status: COMPLETED | OUTPATIENT
Start: 2023-04-22 | End: 2023-04-22

## 2023-04-22 RX ORDER — INSULIN GLARGINE 100 [IU]/ML
15 INJECTION, SOLUTION SUBCUTANEOUS EVERY MORNING
Qty: 15 ML | Refills: 0 | Status: SHIPPED | OUTPATIENT
Start: 2023-04-23 | End: 2023-05-08

## 2023-04-22 RX ORDER — METOPROLOL TARTRATE 50 MG/1
50 TABLET ORAL 2 TIMES DAILY
Qty: 60 TABLET | Refills: 11 | Status: SHIPPED | OUTPATIENT
Start: 2023-04-22 | End: 2024-04-21

## 2023-04-22 RX ORDER — AMIODARONE HYDROCHLORIDE 200 MG/1
200 TABLET ORAL 2 TIMES DAILY
Qty: 180 TABLET | Refills: 3 | Status: SHIPPED | OUTPATIENT
Start: 2023-04-22 | End: 2023-05-11 | Stop reason: ALTCHOICE

## 2023-04-22 RX ORDER — TRAMADOL HYDROCHLORIDE 50 MG/1
50 TABLET ORAL EVERY 6 HOURS PRN
Status: DISCONTINUED | OUTPATIENT
Start: 2023-04-22 | End: 2023-04-22 | Stop reason: HOSPADM

## 2023-04-22 RX ORDER — TRAMADOL HYDROCHLORIDE 50 MG/1
50 TABLET ORAL EVERY 6 HOURS PRN
Qty: 30 TABLET | Refills: 0 | Status: SHIPPED | OUTPATIENT
Start: 2023-04-22 | End: 2023-04-26 | Stop reason: ALTCHOICE

## 2023-04-22 RX ADMIN — POLYETHYLENE GLYCOL 3350 17 G: 17 POWDER, FOR SOLUTION ORAL at 10:04

## 2023-04-22 RX ADMIN — LIDOCAINE 1 PATCH: 50 PATCH TOPICAL at 02:04

## 2023-04-22 RX ADMIN — ACETAMINOPHEN 650 MG: 325 TABLET ORAL at 06:04

## 2023-04-22 RX ADMIN — INSULIN ASPART 3 UNITS: 100 INJECTION, SOLUTION INTRAVENOUS; SUBCUTANEOUS at 08:04

## 2023-04-22 RX ADMIN — INSULIN ASPART 4 UNITS: 100 INJECTION, SOLUTION INTRAVENOUS; SUBCUTANEOUS at 10:04

## 2023-04-22 RX ADMIN — METOPROLOL TARTRATE 50 MG: 50 TABLET, FILM COATED ORAL at 10:04

## 2023-04-22 RX ADMIN — ONDANSETRON 4 MG: 2 INJECTION INTRAMUSCULAR; INTRAVENOUS at 03:04

## 2023-04-22 RX ADMIN — PANTOPRAZOLE SODIUM 40 MG: 40 TABLET, DELAYED RELEASE ORAL at 06:04

## 2023-04-22 RX ADMIN — TRAMADOL HYDROCHLORIDE 50 MG: 50 TABLET, COATED ORAL at 10:04

## 2023-04-22 RX ADMIN — AMIODARONE HYDROCHLORIDE 200 MG: 200 TABLET ORAL at 10:04

## 2023-04-22 RX ADMIN — PROCHLORPERAZINE EDISYLATE 5 MG: 5 INJECTION INTRAMUSCULAR; INTRAVENOUS at 04:04

## 2023-04-22 RX ADMIN — INSULIN ASPART 4 UNITS: 100 INJECTION, SOLUTION INTRAVENOUS; SUBCUTANEOUS at 01:04

## 2023-04-22 RX ADMIN — SENNOSIDES AND DOCUSATE SODIUM 1 TABLET: 8.6; 5 TABLET ORAL at 10:04

## 2023-04-22 RX ADMIN — APIXABAN 5 MG: 5 TABLET, FILM COATED ORAL at 10:04

## 2023-04-22 RX ADMIN — ASPIRIN 81 MG CHEWABLE TABLET 81 MG: 81 TABLET CHEWABLE at 10:04

## 2023-04-22 RX ADMIN — ACETAMINOPHEN 650 MG: 325 TABLET ORAL at 02:04

## 2023-04-22 RX ADMIN — PROCHLORPERAZINE EDISYLATE 5 MG: 5 INJECTION INTRAMUSCULAR; INTRAVENOUS at 07:04

## 2023-04-22 RX ADMIN — INSULIN DETEMIR 15 UNITS: 100 INJECTION, SOLUTION SUBCUTANEOUS at 08:04

## 2023-04-22 RX ADMIN — SCOPALAMINE 1 PATCH: 1 PATCH, EXTENDED RELEASE TRANSDERMAL at 10:04

## 2023-04-22 RX ADMIN — Medication 400 MG: at 01:04

## 2023-04-22 RX ADMIN — INSULIN ASPART 2 UNITS: 100 INJECTION, SOLUTION INTRAVENOUS; SUBCUTANEOUS at 01:04

## 2023-04-22 RX ADMIN — FUROSEMIDE 20 MG: 20 TABLET ORAL at 10:04

## 2023-04-22 RX ADMIN — POTASSIUM CHLORIDE 20 MEQ: 1500 TABLET, EXTENDED RELEASE ORAL at 10:04

## 2023-04-22 NOTE — PROGRESS NOTES
[facility] - Cardiology Stepdown  Cardiothoracic Surgery  Progress Note    Patient Name: Jud Villa  MRN: [id number]  Admission Date: 4/14/2023  Hospital Length of Stay: 8 days  Code Status: Full Code   Attending Physician: Igor Kahn MD   Referring Provider: Igor Kahn MD  Principal Problem:Coronary artery disease      Subjective:     Post-Op Info:  Procedure(s) (LRB):  CORONARY ARTERY BYPASS GRAFT (CABG) x 2 (N/A)  SURGICAL PROCUREMENT, VEIN, ENDOSCOPIC (Left)   8 Days Post-Op     Interval History: Did well overnight  AFVSS on RA  Some nausea related to pain medicine    Review of Systems   Constitutional: Negative for malaise/fatigue.   Cardiovascular:  Negative for chest pain, claudication, dyspnea on exertion, irregular heartbeat, leg swelling and palpitations.   Respiratory:  Negative for cough and shortness of breath.    Hematologic/Lymphatic: Negative for bleeding problem.   Gastrointestinal:  Negative for abdominal pain.   Genitourinary:  Negative for dysuria.   Neurological:  Negative for headaches and weakness.   Medications:  Continuous Infusions:  Scheduled Meds:   acetaminophen  650 mg Oral Q8H    amiodarone  200 mg Oral BID    apixaban  5 mg Oral BID    aspirin  81 mg Oral Daily    atorvastatin  80 mg Oral QHS    fluticasone propionate  1 spray Each Nostril Daily    furosemide  20 mg Oral BID    insulin aspart U-100  4-6 Units Subcutaneous TID PC    insulin detemir U-100  15 Units Subcutaneous Daily    LIDOcaine  1 patch Transdermal Q24H    metoprolol tartrate  50 mg Oral BID    pantoprazole  40 mg Oral Before breakfast    polyethylene glycol  17 g Oral Daily    potassium chloride  20 mEq Oral BID    scopolamine  1 patch Transdermal Q3 Days    senna-docusate 8.6-50 mg  1 tablet Oral Daily     PRN Meds:adenosine **FOLLOWED BY** [START ON 9/13/2034] adenosine, albumin human 5%, aspirin, bisacodyL, dextrose 10%, dextrose 10%, dextrose, dextrose, glucagon (human  recombinant), haloperidol, insulin aspart U-100, metoclopramide HCl, nicotine, ondansetron, ondansetron, prochlorperazine, traMADoL, traMADoL     Objective:     Vital Signs (Most Recent):  Temp: 98.1 °F (36.7 °C) (04/22/23 0755)  Pulse: 63 (04/22/23 0755)  Resp: 18 (04/22/23 0755)  BP: (!) 120/58 (04/22/23 0755)  SpO2: (!) 93 % (04/22/23 0755)   Vital Signs (24h Range):  Temp:  [96.8 °F (36 °C)-99 °F (37.2 °C)] 98.1 °F (36.7 °C)  Pulse:  [] 63  Resp:  [17-20] 18  SpO2:  [93 %-98 %] 93 %  BP: (114-134)/(58-66) 120/58     Weight: 60.3 kg (132 lb 15 oz)  Body mass index is 24.31 kg/m².    SpO2: (!) 93 %       Intake/Output - Last 3 Shifts         04/20 0700  04/21 0659 04/21 0700  04/22 0659 04/22 0700  04/23 0659    P.O. 480 720     Total Intake(mL/kg) 480 (7.5) 720 (11.9)     Urine (mL/kg/hr) 1300 (0.8) 400 (0.3)     Stool 0 0     Chest Tube 50      Total Output 1350 400     Net -870 +320            Urine Occurrence 1 x 3 x     Stool Occurrence 3 x 1 x             Lines/Drains/Airways       Drain  Duration                  Y Chest Tube 3 and 4 04/14/23 1115 3 Right Pleural 19 Fr. 4 Left Pleural 19 Fr. 7 days              Line  Duration                  Pacer Wires 04/14/23 1139 7 days              Peripheral Intravenous Line  Duration                  Peripheral IV - Single Lumen 04/19/23 1846 20 G Right Antecubital 2 days         Peripheral IV - Single Lumen 04/20/23 1015 20 G Anterior;Proximal;Right Upper Arm 2 days                    Physical Exam  Constitutional:       Appearance: Normal appearance.   HENT:      Head: Normocephalic.   Eyes:      Pupils: Pupils are equal, round, and reactive to light.   Cardiovascular:      Rate and Rhythm: Normal rate and regular rhythm.      Pulses: Normal pulses.   Pulmonary:      Effort: Pulmonary effort is normal.   Abdominal:      General: Abdomen is flat. Bowel sounds are normal.      Palpations: Abdomen is soft.   Musculoskeletal:         General: Normal range of  "motion.   Skin:     General: Skin is warm and dry.      Comments: MSI CDI  Bruising in different stages of healing noted to arms and sternal area    Neurological:      General: No focal deficit present.      Mental Status: She is alert.       Significant Labs:  All pertinent labs from the last 24 hours have been reviewed.    Significant Diagnostics:  I have reviewed all pertinent imaging results/findings within the past 24 hours.    Assessment/Plan:     * Coronary artery disease  - See S/P CABG x2 for details     Postoperative atrial fibrillation  - Ep consulted for paroxysmal SVT however Dr. Xavier with EP characterizes rhythm as "periods of postoperative atrial fibrillation, episodes of nonsustained atrial tachycardia, and frequent PACs following her recent CABG on 4/11/2023"  - Recommendations given and addressed  - Continue BB  - Amiodarone at current dose   - Continue ASA  - Tele monitoring  - Expected postoperatively  - Paroxsymal     Paroxysmal SVT (supraventricular tachycardia)  - Noted in the ICU, EP consulted and found patient to be in pAFIB with occasional AT and PVC's  - Ep consulted for paroxysmal SVT however Dr. Xavier with EP characterizes rhythm as "periods of postoperative atrial fibrillation, episodes of nonsustained atrial tachycardia, and frequent PACs following her recent CABG on 4/11/2023"    S/P CABG x 2  - Maintain sternal precautions   - ASA 81  - Eliquis 5 mg BID  - BB - Continue current dose per EP  - Continue amiodarone per EP  - Statin  - Lasix with scheduled potassium ordered   - Encourage ambulation  - PT/OT  - Multimodal pain control  - Cardiac diet with 1500 cc fluid restriction   - Bowel regimen in place   - Protonix for ulcer prevention   - Monitor electrolytes to keep Mag above 2 and Potassium above 4  - OOBTO   - Encourage IS use    - Dispo: d/c home this afternoon vs tomorrow      Acute blood loss anemia  - Expected postoperatively   - Daily CBC to monitor trends   - HH this " morning not provided as patient is now refusing labs       History of CVA (cerebrovascular accident)  - Continue ASA  - Starting Eliquis for a CV of 5    Controlled type 2 diabetes mellitus with complication, with long-term current use of insulin  - Endocrine following    COPD (chronic obstructive pulmonary disease)  - IS  - Pulmonary toileting   - Encourage OOBGISELE Becerra MD  Cardiothoracic Surgery  [facility] - Cardiology Stepdown

## 2023-04-22 NOTE — ASSESSMENT & PLAN NOTE
- Maintain sternal precautions   - ASA 81  - Eliquis 5 mg BID  - BB - Continue current dose per EP  - Continue amiodarone per EP  - Statin  - Lasix with scheduled potassium ordered   - Encourage ambulation  - PT/OT  - Multimodal pain control  - Cardiac diet with 1500 cc fluid restriction   - Bowel regimen in place   - Protonix for ulcer prevention   - Monitor electrolytes to keep Mag above 2 and Potassium above 4  - OOBTO   - Encourage IS use    - Dispo: d/c home this afternoon vs tomorrow

## 2023-04-22 NOTE — PLAN OF CARE
SW sending pt's information to [facility] for review through [facility].     Chase Martinez LMSW

## 2023-04-22 NOTE — PLAN OF CARE
[facility]    [facility] sent additional referrals via [facility] to the list below.    [facility]  [facility]  [facility]  [place]  [facility]  [facility]    [facility] will F/U with referrals.     ANDRÉS Schultz, LCSW  [facility]  [facility] [phone]

## 2023-04-22 NOTE — PLAN OF CARE
[facility] - Cardiology Stepdown  Discharge Final Note    Primary Care Provider: Bo Lopez MD    Expected Discharge Date: 4/22/2023    Final Discharge Note (most recent)       Final Note - 04/22/23 1752          Final Note    Assessment Type Final Discharge Note     Anticipated Dis[place] Disposition [place] [facility]     [place]ospital Resources/Appts/Education Provided Post-Acute resouces added to AVS        Post-Acute Status    Post-Acute Authorization Home Health     Home Health Status Set-up Complete/Auth obtained     Discharge Delays None known at this time                     Important Message from Medicare             After-discharge care                Home Medical Care       [facility]   Service: Home Rehabilitation, Home Nursing    [address]  [place] [address]   Phone: [phone]                     Lizzeth Chung  has accepted pt.    Liliam Plaza, MSW, LCSW  Weekend -[facility]  [facility] [phone]

## 2023-04-22 NOTE — NURSING
Pt getting in and out of bed by herself. Teaching reinforced of the importance of calling for help due to sternal precautions and fall risk. Verbalized understanding. Said she will call for help in the future

## 2023-04-22 NOTE — DISCHARGE SUMMARY
[facility] - Cardiology Stepdown  Cardiothoracic Surgery  Discharge Summary      Patient Name: Jud Villa  MRN: [id number]  Admission Date: 4/14/2023  Hospital Length of Stay: 8 days  Discharge Date and Time: 4/22/2023  5:04 PM  Attending Physician: Joyce att. providers found   Discharging Provider: Shaun Becerra MD  Primary Care Provider: Bo Lopez MD    HPI:   Ms. Villa is a very pleasant 63-year-old woman who initially presented with chest pain while cleaning her house.  She went to the emergency department and was admitted.  She had a PET stress which was positive, and follow-up coronary angiography demonstrated 2 vessel disease.  She now presents for coronary artery bypass grafting.      Procedure(s) (LRB):  CORONARY ARTERY BYPASS GRAFT (CABG) x 2 (N/A)  SURGICAL PROCUREMENT, VEIN, ENDOSCOPIC (Left)      Indwelling Lines/Drains at time of discharge:   Lines/Drains/Airways     Drain  Duration                Y Chest Tube 3 and 4 04/14/23 1115 3 Right Pleural 19 Fr. 4 Left Pleural 19 Fr. 8 days          Line  Duration                Pacer Wires 04/14/23 1139 8 days              Hospital Course: On 4/14/2023 the patient was taken to the Operating Room for the above stated procedure. Please see the previously dictated operative report for complete details. Postoperatively, the patient was taken from the  Operating Room to the ICU where the vital signs were monitored and pain was kept under control. The patient was weaned from the drips and extubated in the ICU per protocol. Once hemodynamically stable, the patient was transferred to the Cardiac Step-Down floor for continued strengthening and ambulation. On postoperative day 8, the patient was ready for discharge to home. At the time of discharge, the patient was ambulating unassisted. Pain was well controlled with oral analgesics and the patient was tolerating the diet.       MOBILITY AND ACTIVITY: As tolerated. Patient may shower. No heavy lifting of  greater than 5 pounds and no driving.     DIET: A Cardiac diet      WOUND CARE INSTRUCTIONS: Check for redness, swelling and drainage around the  incision or wound. Patient is to call for any obvious bleeding, drainage, pus from the wound, unusual problems or difficulties or temperature of greater than 101   degrees.     FOLLOWUP: Follow up with Dr. Kahn in approximately 3 weeks. Prior to this  appointment, the patient will have a chest x-ray and EKG.     Patient not placed on Ace-Inhibitor at the time of discharge due to potential for hypotension      DISCHARGE CONDITION: At the time of discharge, the patient was in sinus rhythm and afebrile with stable vital signs.        Goals of Care Treatment Preferences:  Code Status: Full Code      Consults (From admission, onward)        Status Ordering Provider     Inpatient consult to Electrophysiology  Once        Provider:  (Not yet assigned)    Completed OLGA LIDIA MOREJON     Consult to Endocrinology  Once        Provider:  (Not yet assigned)    Completed GORDO VALDEZ     Consult Case Management/Social Work  Once        Provider:  (Not yet assigned)    Acknowledged GORDO VALDEZ          Significant Diagnostic Studies: Labs: All labs within the past 24 hours have been reviewed    Pending Diagnostic Studies:     None          No new Assessment & Plan notes have been filed under this hospital service since the last note was generated.  Service: Cardiothoracic Surgery    Final Active Diagnoses:    Diagnosis Date Noted POA    PRINCIPAL PROBLEM:  Coronary artery disease [I25.10] 04/04/2023 Yes    Postoperative atrial fibrillation [I97.89, I48.91] 04/21/2023 No    Paroxysmal SVT (supraventricular tachycardia) [I47.1] 04/20/2023 No    S/P CABG x 2 [Z95.1] 04/17/2023 Not Applicable    Acute blood loss anemia [D62] 04/14/2023 Yes    History of CVA (cerebrovascular accident) [Z86.73] 04/01/2023 Not Applicable    Controlled type 2 diabetes mellitus with  complication, with long-term current use of insulin [E11.8, Z79.4] 01/29/2020 Not Applicable     Chronic    COPD (chronic obstructive pulmonary disease) [J44.9] 11/04/2015 Yes      Problems Resolved During this Admission:      Discharged Condition: good    Disposition: Home or Self Care    Follow Up:    Patient Instructions:      Ambulatory referral/consult to Home Health   Standing Status: Future   Referral Priority: Routine Referral Type: Home Health   Referral Reason: Specialty Services Required   Requested Specialty: Home Health Services   Number of Visits Requested: 1     Ambulatory referral/consult to Electrophysiology   Standing Status: Future   Referral Priority: Routine Referral Type: Consultation   Referral Reason: Specialty Services Required   Referred to Provider: KOLTON RODRIGUES Requested Specialty: Electrophysiology   Number of Visits Requested: 1     Ambulatory referral/consult to Smoking Cessation Program   Standing Status: Future   Referral Priority: Routine Referral Type: Consultation   Referral Reason: Specialty Services Required   Requested Specialty: CTTS   Number of Visits Requested: 1     Diet Cardiac     Other restrictions (specify):   Order Comments: Sternal precautions for 8 weeks post-sternotomy  - NO pushing or pulling (e.g. No pusing up from a chair or opening a heavy door).   - NO lifting more than 5 pounds ( the weight of a half gallon of milk).   - NO lifting one arm above your head.  - NO reaching behind your back (e.g. No tucking in your shirt, putting your wallet in your back pocket, pulling your trousers up from behind or reaching behind for toilet paper).  - No driving for 4 weeks post op.     Notify your health care provider if you experience any of the following:  difficulty breathing or increased cough     Notify your health care provider if you experience any of the following:  severe uncontrolled pain     Notify your health care provider if you experience any of the  following:  persistent nausea and vomiting or diarrhea     Notify your health care provider if you experience any of the following:  temperature >100.4     Notify your health care provider if you experience any of the following:  redness, tenderness, or signs of infection (pain, swelling, redness, odor or green/yellow discharge around incision site)     No dressing needed     Activity as tolerated     Medications:  Reconciled Home Medications:      Medication List      START taking these medications    amiodarone 200 MG Tab  Commonly known as: PACERONE  Take 1 tablet (200 mg total) by mouth 2 (two) times daily.     ELIQUIS 5 mg Tab  Generic drug: apixaban  Take 1 tablet (5 mg total) by mouth 2 (two) times daily.     furosemide 20 MG tablet  Commonly known as: LASIX  Take 1 tablet (20 mg total) by mouth once daily for 14 days     polyethylene glycol 17 gram Pwpk  Commonly known as: GLYCOLAX  Take 17 g by mouth 2 (two) times daily as needed (constipation).     potassium chloride SA 20 MEQ tablet  Commonly known as: K-DUR,KLOR-CON  Take 1 tablet (20 mEq total) by mouth once daily for 14 days        CHANGE how you take these medications    insulin glargine 100 units/mL SubQ pen  Commonly known as: BASAGLAR KWIKPEN U-100 INSULIN  Inject 15 Units into the skin every morning.  Start taking on: April 23, 2023  What changed: how much to take     metoprolol tartrate 50 MG tablet  Commonly known as: LOPRESSOR  Take 1 tablet (50 mg total) by mouth 2 (two) times daily.  What changed:   · medication strength  · how much to take        CONTINUE taking these medications    * albuterol 90 mcg/actuation inhaler  Commonly known as: VENTOLIN HFA  INHALE 2 PUFFS BY MOUTH INTO THE LUNGS EVERY 6 HOURS AS NEEDED FOR WHEEZING     * albuterol 2.5 mg /3 mL (0.083 %) nebulizer solution  Commonly known as: PROVENTIL  Take 3 mLs (2.5 mg total) by nebulization every 4 to 6 hours as needed for Wheezing or Shortness of Breath. Rescue     aspirin 81  "MG Chew  Take 81 mg by mouth once daily.     atorvastatin 80 MG tablet  Commonly known as: LIPITOR  Take 1 tablet (80 mg total) by mouth every evening.     BD ULTRA-FINE SHORT PEN NEEDLE 31 gauge x 5/16" Ndle  Generic drug: pen needle, diabetic  Inject 1 pen into the skin once daily.     clonazePAM 0.5 MG tablet  Commonly known as: KlonoPIN  TAKE 1 TABLET(0.5 MG) BY MOUTH EVERY NIGHT AS NEEDED FOR ANXIETY     ergocalciferol 50,000 unit Cap  Commonly known as: ERGOCALCIFEROL  TAKE 1 CAPSULE BY MOUTH EVERY 7 DAYS     fluticasone propionate 50 mcg/actuation nasal spray  Commonly known as: FLONASE  1 spray (50 mcg total) by Each Nostril route once daily.     gabapentin 300 MG capsule  Commonly known as: NEURONTIN  Take 1 capsule (300 mg total) by mouth 3 (three) times daily.     JARDIANCE 10 mg tablet  Generic drug: empagliflozin  TAKE 1 TABLET(10 MG) BY MOUTH EVERY DAY     metFORMIN 1000 MG tablet  Commonly known as: GLUCOPHAGE  TAKE 1 TABLET(1000 MG) BY MOUTH TWICE DAILY WITH MEALS     nicotine 21 mg/24 hr  Commonly known as: NICODERM CQ  PLACE 1 PATCH ONTO THE SKIN DAILY     ondansetron 4 MG Tbdl  Commonly known as: ZOFRAN-ODT  Take 1 tablet (4 mg total) by mouth 3 (three) times daily.     pantoprazole 40 MG tablet  Commonly known as: PROTONIX  Take 1 tablet (40 mg total) by mouth before breakfast.     sertraline 50 MG tablet  Commonly known as: ZOLOFT  Take 1 tablet (50 mg total) by mouth once daily.     sucralfate 1 gram tablet  Commonly known as: CARAFATE  TAKE 1 TABLET(1 GRAM) BY MOUTH THREE TIMES DAILY BEFORE MEALS     topiramate 50 MG tablet  Commonly known as: TOPAMAX  Take 2 tablets (100 mg total) by mouth 2 (two) times daily. TAKE 1 TABLET BY MOUTH TWICE DAILY FOR 7 DAYS, THEN 1 TABLET EVERY MORNING AND 2 TABLETS EVERY EVENING FOR 7 DAYS, THEN 2 TABLETS TWICE DAILY     TRADJENTA 5 mg Tab tablet  Generic drug: linaGLIPtin  Take 1 tablet (5 mg total) by mouth once daily.     traMADoL 50 mg tablet  Commonly " known as: ULTRAM  Take 1 tablet (50 mg total) by mouth every 6 (six) hours as needed for Pain.     TRELEGY ELLIPTA 100-62.5-25 mcg Dsdv  Generic drug: fluticasone-umeclidin-vilanter  INHALE 1 PUFF INTO THE LUNGS EVERY DAY     TRUE METRIX GLUCOSE TEST STRIP Strp  Generic drug: blood sugar diagnostic  USE AS DIRECTED FOUR TIMES DAILY         * This list has 2 medication(s) that are the same as other medications prescribed for you. Read the directions carefully, and ask your doctor or other care provider to review them with you.            STOP taking these medications    clopidogreL 75 mg tablet  Commonly known as: PLAVIX     glipiZIDE 10 MG tablet  Commonly known as: GLUCOTROL          Time spent on the discharge of patient: 20 minutes    Shaun Becerra MD  Cardiothoracic Surgery  [facility] - Cardiology Stepdown

## 2023-04-22 NOTE — NURSING
Iv and tele removed. Tele box returned to nurse's station. D/C paperwork reviewed at bedside with pt and daughter. Copy sent home. All questions answered.

## 2023-04-22 NOTE — PLAN OF CARE
Endocrine Plan of Care:  04/22/2023      Interval hx: appetite improving. Slightly above goal, but occasionally not receiving the correct prandial insulin dose. (Ex: pt ate 75% of breakfast, should have received 6u scheduled plus correction, but instead received 4u + correction).  BG 140s-280s last 24h.       ASSESSMENT and PLAN     Neuro  History of CVA (cerebrovascular accident)   Continues on Atorvastatin 80 mg         Cardiac/Vascular  * Coronary artery disease  CAD s/p CABGx2 with Dr. Kahn on 4/13/23  Will target tight glycemic control given cardiac surgery        Endocrine  Controlled type 2 diabetes mellitus with complication, with long-term current use of insulin  Hyperglycemia from type 2 diabetes mellitus and stress response s/p CABG, steroids given intra op also contributing to hyperglycemia     Endocrinology consulted for BG management.   BG goal 110-140 (CTS Protocol)      - Intensive insulin drip after surgery per CTS guidelines, adjusted to transition drip 04/15/2023 and then switched to MDI on 4/16/2023 with small adjustments in insulin since then.  - Hypoglycemia protocol in place  - If blood glucose greater than 300 and diet ordered, please ask patient not to eat food or drink anything other than water until correctional insulin has brought it back below 250     Plan:     Continue Levemir to 15u daily beginning 4/21     Continue Novolog with meals 4-6 units based on percent eaten, please ensure correct doses are being given     Continue Novolog low dose correction scale prn   Accuchecks ACHS        ** Please notify Endocrine for any change and/or advance in diet**  ** Please call Endocrine for any BG related issues **            **Please notify endocrine of discharge plan in advance if possible, so we can provide discharge recommendations**       Primary team planning for discharge today, 4/22/2023.   DM recs at time of discharge as follows:  STOP glipizide  Increase home basal insuline  (Basaglar) to 15 units daily (previous home dose was 10u qd)  Continue home doses of Metformin, Jardiance, and Tradjenta      Follow up with outpatient endocrinologist, Dr Betsey Franco.        Nicole Gonzalez MD  Endocrinology  [facility] - Cardiology Stepdown

## 2023-04-22 NOTE — PLAN OF CARE
No events on tele overnight    Continue amiodarone and up titrate Lopresor as tolerated by BP and HR  Continue anticoagulation with apixaban

## 2023-04-22 NOTE — DISCHARGE INSTRUCTIONS
Discharge Instructions    The hospital and staff members extend their best wishes to you as you are discharged. Because we are most concerned with your health, we suggest you carefully read the following instructions.    Activity Instructions  Ambulate.  No strenuous Exercise   May shower with soap and water.  Do not let shower hit incision directly.  Dry well.       Restrictions:   Sternal precautions.  No heavy lifting more than 5lbs for 6 weeks.  No driving for 4 weeks.     Diet:   Low Salt/Chol/Fat diet    Wound Care Instruction  Keep incision/wound dry-no tub baths.  Clean with soap and water daily. Do not apply ointments or powder to incision sites.   Remove old chest tube dressing tomorrow and leave open to air unless drainage noted then change daily.      When to call the doctors:  For any obvious bleeding, Fever over 101 degrees, Redness or swelling around the incision, Drainage(pus) from the wound, Unusual problems or difficulties, Persistent pain not relieved by the pain medication.    Call [phone] to have the operators page the doctor on call for Cardiothoracic surgery after hours with questions or concerns      Current pain management regimen    IF PAIN INTENSIFIES OR PAIN IS UNRELIEVED WITH MEDICATIONS AS ORDERED, CALL YOUR DOCTOR

## 2023-04-22 NOTE — SUBJECTIVE & OBJECTIVE
Interval History: Did well overnight  AFVSS on RA  Some nausea related to pain medicine    Review of Systems   Constitutional: Negative for malaise/fatigue.   Cardiovascular:  Negative for chest pain, claudication, dyspnea on exertion, irregular heartbeat, leg swelling and palpitations.   Respiratory:  Negative for cough and shortness of breath.    Hematologic/Lymphatic: Negative for bleeding problem.   Gastrointestinal:  Negative for abdominal pain.   Genitourinary:  Negative for dysuria.   Neurological:  Negative for headaches and weakness.   Medications:  Continuous Infusions:  Scheduled Meds:   acetaminophen  650 mg Oral Q8H    amiodarone  200 mg Oral BID    apixaban  5 mg Oral BID    aspirin  81 mg Oral Daily    atorvastatin  80 mg Oral QHS    fluticasone propionate  1 spray Each Nostril Daily    furosemide  20 mg Oral BID    insulin aspart U-100  4-6 Units Subcutaneous TID PC    insulin detemir U-100  15 Units Subcutaneous Daily    LIDOcaine  1 patch Transdermal Q24H    metoprolol tartrate  50 mg Oral BID    pantoprazole  40 mg Oral Before breakfast    polyethylene glycol  17 g Oral Daily    potassium chloride  20 mEq Oral BID    scopolamine  1 patch Transdermal Q3 Days    senna-docusate 8.6-50 mg  1 tablet Oral Daily     PRN Meds:adenosine **FOLLOWED BY** [START ON 9/13/2034] adenosine, albumin human 5%, aspirin, bisacodyL, dextrose 10%, dextrose 10%, dextrose, dextrose, glucagon (human recombinant), haloperidol, insulin aspart U-100, metoclopramide HCl, nicotine, ondansetron, ondansetron, prochlorperazine, traMADoL, traMADoL     Objective:     Vital Signs (Most Recent):  Temp: 98.1 °F (36.7 °C) (04/22/23 0755)  Pulse: 63 (04/22/23 0755)  Resp: 18 (04/22/23 0755)  BP: (!) 120/58 (04/22/23 0755)  SpO2: (!) 93 % (04/22/23 0755)   Vital Signs (24h Range):  Temp:  [96.8 °F (36 °C)-99 °F (37.2 °C)] 98.1 °F (36.7 °C)  Pulse:  [] 63  Resp:  [17-20] 18  SpO2:  [93 %-98 %] 93 %  BP: (114-134)/(58-66) 120/58      Weight: 60.3 kg (132 lb 15 oz)  Body mass index is 24.31 kg/m².    SpO2: (!) 93 %       Intake/Output - Last 3 Shifts         04/20 0700  04/21 0659 04/21 0700 04/22 0659 04/22 0700  04/23 0659    P.O. 480 720     Total Intake(mL/kg) 480 (7.5) 720 (11.9)     Urine (mL/kg/hr) 1300 (0.8) 400 (0.3)     Stool 0 0     Chest Tube 50      Total Output 1350 400     Net -870 +320            Urine Occurrence 1 x 3 x     Stool Occurrence 3 x 1 x             Lines/Drains/Airways       Drain  Duration                  Y Chest Tube 3 and 4 04/14/23 1115 3 Right Pleural 19 Fr. 4 Left Pleural 19 Fr. 7 days              Line  Duration                  Pacer Wires 04/14/23 1139 7 days              Peripheral Intravenous Line  Duration                  Peripheral IV - Single Lumen 04/19/23 1846 20 G Right Antecubital 2 days         Peripheral IV - Single Lumen 04/20/23 1015 20 G Anterior;Proximal;Right Upper Arm 2 days                    Physical Exam  Constitutional:       Appearance: Normal appearance.   HENT:      Head: Normocephalic.   Eyes:      Pupils: Pupils are equal, round, and reactive to light.   Cardiovascular:      Rate and Rhythm: Normal rate and regular rhythm.      Pulses: Normal pulses.   Pulmonary:      Effort: Pulmonary effort is normal.   Abdominal:      General: Abdomen is flat. Bowel sounds are normal.      Palpations: Abdomen is soft.   Musculoskeletal:         General: Normal range of motion.   Skin:     General: Skin is warm and dry.      Comments: MSI CDI  Bruising in different stages of healing noted to arms and sternal area    Neurological:      General: No focal deficit present.      Mental Status: She is alert.       Significant Labs:  All pertinent labs from the last 24 hours have been reviewed.    Significant Diagnostics:  I have reviewed all pertinent imaging results/findings within the past 24 hours.

## 2023-04-22 NOTE — PLAN OF CARE
[facility] - Cardiology Stepdown      HOME HEALTH ORDERS  FACE TO FACE ENCOUNTER    Patient Name: Jud Villa  YOB: 1960    PCP: Bo Lopez MD   PCP Address: [address] [facility] / [place] [address]  PCP Phone Number: [phone]  PCP Fax: [phone]    Encounter Date: 4/5/23    Admit to Home Health    Diagnoses:  Active Hospital Problems    Diagnosis  POA    *Coronary artery disease [I25.10]  Yes    Postoperative atrial fibrillation [I97.89, I48.91]  No    Paroxysmal SVT (supraventricular tachycardia) [I47.1]  No    S/P CABG x 2 [Z95.1]  Not Applicable    Acute blood loss anemia [D62]  Yes    History of CVA (cerebrovascular accident) [Z86.73]  Not Applicable    Controlled type 2 diabetes mellitus with complication, with long-term current use of insulin [E11.8, Z79.4]  Not Applicable     Chronic    COPD (chronic obstructive pulmonary disease) [J44.9]  Yes     Add ICS to Laba/lama combination (trelegy daily)  Albuterol HFA or nebulizer  for rescue and prevention.    Moderate obstruction on FEV1 (58% predicted), normal diffusion.          Resolved Hospital Problems   No resolved problems to display.       Follow Up Appointments:  Future Appointments   Date Time Provider Department Center   5/1/2023 10:15 AM SHAH, VISUAL FIELDS [facility]THAL [facility]   5/1/2023 10:45 AM Claribel Pereira MD [facility]   5/11/2023  2:30 PM EKG, APPT [facility] EKG [facility]   5/11/2023  2:45 PM [facility] [id number] LIMIT [facility] XRAY OP [facility]   5/11/2023  3:00 PM Igor Kahn MD [facility] CARDVAS [facility]   5/23/2023  7:00 AM [facility]   5/23/2023  7:15 AM LAB, [place] [facility] LAB [facility]   6/8/2023  3:00 PM Bo Lopez MD [facility]       Allergies:Review of patient's allergies indicates:  No Known Allergies    Medications: Review discharge medications with patient and family and provide education.    Current Facility-Administered Medications    Medication Dose Route Frequency Provider Last Rate Last Admin    acetaminophen tablet 650 mg  650 mg Oral Q8H Sania Agrawal MD   650 mg at 04/22/23 1414    adenosine injection 3 mg  3 mg Intravenous Once PRN Brisa Oropeza MD        Followed by    [START ON 9/13/2034] adenosine injection 6 mg  6 mg Intravenous Q5 Min PRN Brisa Oropeza MD        albumin human 5% bottle 25 g  25 g Intravenous Once PRN Pura Alcala PA-C        amiodarone tablet 200 mg  200 mg Oral BID Igor Kahn MD   200 mg at 04/22/23 1051    apixaban tablet 5 mg  5 mg Oral BID Peggy Emerson NP   5 mg at 04/22/23 1050    aspirin chewable tablet 81 mg  81 mg Oral Daily Sania Agrawal MD   81 mg at 04/22/23 1050    aspirin suppository 300 mg  300 mg Rectal Once PRN Pura Alcala PA-C        atorvastatin tablet 80 mg  80 mg Oral QHS Gay Rodriguez NP   80 mg at 04/21/23 2039    bisacodyL suppository 10 mg  10 mg Rectal Daily PRN Pura Alcala PA-C   10 mg at 04/21/23 1616    dextrose 10% bolus 125 mL 125 mL  12.5 g Intravenous PRN Kenyon Whittaker, DO        dextrose 10% bolus 250 mL 250 mL  25 g Intravenous PRN Kenyon Whittaker, DO        dextrose 40 % gel 15,000 mg  15 g Oral PRN Kenyon Whittaker, DO        dextrose 40 % gel 30,000 mg  30 g Oral PRN Kenyon Whittaker, DO        fluticasone propionate 50 mcg/actuation nasal spray 50 mcg  1 spray Each Nostril Daily Sania Agrawal MD   50 mcg at 04/16/23 0909    furosemide tablet 20 mg  20 mg Oral BID Peggy Emerson NP   20 mg at 04/22/23 1050    glucagon (human recombinant) injection 1 mg  1 mg Intramuscular PRN Kenyon Whittaker, DO        haloperidol 2 mg/mL solution 0.5 mg  0.5 mg Oral Q6H PRN Brisa Oropeza MD   0.5 mg at 04/18/23 2227    insulin aspart U-100 pen 0-5 Units  0-5 Units Subcutaneous PRN Kenyon Whittaker, DO   2 Units at 04/22/23 1340    insulin aspart U-100 pen 4-6 Units  4-6 Units Subcutaneous TID PIPO Gonzalez,  MD   4 Units at 04/22/23 1340    insulin detemir U-100 (Levemir) pen 15 Units  15 Units Subcutaneous Daily Nicole Gonzalez MD   15 Units at 04/22/23 0800    LIDOcaine 5 % patch 1 patch  1 patch Transdermal Q24H Gay Rodriguez NP   1 patch at 04/22/23 1414    metoclopramide HCl injection 5 mg  5 mg Intravenous Q6H PRN Pura Alcala PA-C   5 mg at 04/21/23 2322    metoprolol tartrate (LOPRESSOR) tablet 50 mg  50 mg Oral BID Gay Rodriguez NP   50 mg at 04/22/23 1050    nicotine 21 mg/24 hr 1 patch  1 patch Transdermal Daily PRN Sania Agrawal MD        ondansetron disintegrating tablet 4 mg  4 mg Oral Q4H PRN Peggy Emerson NP   4 mg at 04/21/23 2043    ondansetron injection 4 mg  4 mg Intravenous Q4H PRN Peggy Emerson NP   4 mg at 04/22/23 0341    pantoprazole EC tablet 40 mg  40 mg Oral Before breakfast Sania Agrawal MD   40 mg at 04/22/23 0630    polyethylene glycol packet 17 g  17 g Oral Daily Pura Alcala PA-C   17 g at 04/22/23 1050    potassium chloride SA CR tablet 20 mEq  20 mEq Oral BID Peggy Emerson NP   20 mEq at 04/22/23 1051    prochlorperazine injection Soln 5 mg  5 mg Intravenous Q6H PRN Jazmin Benjamin MD   5 mg at 04/22/23 1606    scopolamine 1.3-1.5 mg (1 mg over 3 days) 1 patch  1 patch Transdermal Q3 Days Shaun Becerra MD   1 patch at 04/22/23 1057    senna-docusate 8.6-50 mg per tablet 1 tablet  1 tablet Oral Daily Gay Rodriguez NP   1 tablet at 04/22/23 1051    traMADoL tablet 100 mg  100 mg Oral Q6H PRN Shaun Becerra MD        traMADoL tablet 50 mg  50 mg Oral Q6H PRN Shaun Becerra MD   50 mg at 04/22/23 1057     Current Outpatient Medications   Medication Sig Dispense Refill    albuterol (PROVENTIL) 2.5 mg /3 mL (0.083 %) nebulizer solution Take 3 mLs (2.5 mg total) by nebulization every 4 to 6 hours as needed for Wheezing or Shortness of Breath. Rescue 1 each 0    albuterol (VENTOLIN HFA) 90 mcg/actuation inhaler INHALE 2 PUFFS BY MOUTH INTO THE LUNGS EVERY 6  "HOURS AS NEEDED FOR WHEEZING 18 g 11    aspirin 81 MG Chew Take 81 mg by mouth once daily.      clonazePAM (KLONOPIN) 0.5 MG tablet TAKE 1 TABLET(0.5 MG) BY MOUTH EVERY NIGHT AS NEEDED FOR ANXIETY 30 tablet 0    fluticasone propionate (FLONASE) 50 mcg/actuation nasal spray 1 spray (50 mcg total) by Each Nostril route once daily. 9.9 mL 0    gabapentin (NEURONTIN) 300 MG capsule Take 1 capsule (300 mg total) by mouth 3 (three) times daily. 270 capsule 3    JARDIANCE 10 mg tablet TAKE 1 TABLET(10 MG) BY MOUTH EVERY DAY 90 tablet 3    metFORMIN (GLUCOPHAGE) 1000 MG tablet TAKE 1 TABLET(1000 MG) BY MOUTH TWICE DAILY WITH MEALS 180 tablet 3    nicotine (NICODERM CQ) 21 mg/24 hr PLACE 1 PATCH ONTO THE SKIN DAILY 28 patch 3    sucralfate (CARAFATE) 1 gram tablet TAKE 1 TABLET(1 GRAM) BY MOUTH THREE TIMES DAILY BEFORE MEALS 270 tablet 3    TRADJENTA 5 mg Tab tablet Take 1 tablet (5 mg total) by mouth once daily. 90 tablet 3    TRELEGY ELLIPTA 100-62.5-25 mcg DsDv INHALE 1 PUFF INTO THE LUNGS EVERY DAY 60 each 2    amiodarone (PACERONE) 200 MG Tab Take 1 tablet (200 mg total) by mouth 2 (two) times daily. 180 tablet 3    apixaban (ELIQUIS) 5 mg Tab Take 1 tablet (5 mg total) by mouth 2 (two) times daily. 60 tablet 2    atorvastatin (LIPITOR) 80 MG tablet Take 1 tablet (80 mg total) by mouth every evening. 90 tablet 3    BD ULTRA-FINE SHORT PEN NEEDLE 31 gauge x 5/16" Ndle Inject 1 pen into the skin once daily. 100 each 3    ergocalciferol (ERGOCALCIFEROL) 50,000 unit Cap TAKE 1 CAPSULE BY MOUTH EVERY 7 DAYS 12 capsule 3    furosemide (LASIX) 20 MG tablet Take 1 tablet (20 mg total) by mouth once daily for 14 days 14 tablet 0    [START ON 4/23/2023] insulin (BASAGLAR KWIKPEN U-100 INSULIN) glargine 100 units/mL SubQ pen Inject 15 Units into the skin every morning. 15 mL 0    metoprolol tartrate (LOPRESSOR) 50 MG tablet Take 1 tablet (50 mg total) by mouth 2 (two) times daily. 60 tablet 11    ondansetron (ZOFRAN-ODT) 4 MG TbARLENE " Take 1 tablet (4 mg total) by mouth 3 (three) times daily. 30 tablet 0    pantoprazole (PROTONIX) 40 MG tablet Take 1 tablet (40 mg total) by mouth before breakfast. 90 tablet 3    polyethylene glycol (GLYCOLAX) 17 gram PwPk Take 17 g by mouth 2 (two) times daily as needed (constipation).  0    potassium chloride SA (K-DUR,KLOR-CON) 20 MEQ tablet Take 1 tablet (20 mEq total) by mouth once daily for 14 days 14 tablet 0    sertraline (ZOLOFT) 50 MG tablet Take 1 tablet (50 mg total) by mouth once daily. 90 tablet 3    topiramate (TOPAMAX) 50 MG tablet Take 2 tablets (100 mg total) by mouth 2 (two) times daily. TAKE 1 TABLET BY MOUTH TWICE DAILY FOR 7 DAYS, THEN 1 TABLET EVERY MORNING AND 2 TABLETS EVERY EVENING FOR 7 DAYS, THEN 2 TABLETS TWICE DAILY 360 tablet 1    traMADoL (ULTRAM) 50 mg tablet Take 1 tablet (50 mg total) by mouth every 6 (six) hours as needed for Pain. 30 tablet 0    TRUE METRIX GLUCOSE TEST STRIP Strp USE AS DIRECTED FOUR TIMES DAILY 50 each 0     Discharge Medication List as of 4/22/2023  2:53 PM        START taking these medications    Details   amiodarone (PACERONE) 200 MG Tab Take 1 tablet (200 mg total) by mouth 2 (two) times daily., Starting Sat 4/22/2023, Until Sun 4/21/2024, Normal      apixaban (ELIQUIS) 5 mg Tab Take 1 tablet (5 mg total) by mouth 2 (two) times daily., Starting Sat 4/22/2023, Until Fri 7/21/2023, Normal      furosemide (LASIX) 20 MG tablet Take 1 tablet (20 mg total) by mouth once daily for 14 days, Starting Sat 4/22/2023, Until Sat 5/6/2023, Normal      polyethylene glycol (GLYCOLAX) 17 gram PwPk Take 17 g by mouth 2 (two) times daily as needed (constipation)., Starting Sat 4/22/2023, OTC      potassium chloride SA (K-DUR,KLOR-CON) 20 MEQ tablet Take 1 tablet (20 mEq total) by mouth once daily for 14 days, Starting Sat 4/22/2023, Until Sat 5/6/2023, Normal           CONTINUE these medications which have CHANGED    Details   insulin (BASAGLAR KWIKPEN U-100 INSULIN)  glargine 100 units/mL SubQ pen Inject 15 Units into the skin every morning., Starting Sun 4/23/2023, Normal      metoprolol tartrate (LOPRESSOR) 50 MG tablet Take 1 tablet (50 mg total) by mouth 2 (two) times daily., Starting Sat 4/22/2023, Until Sun 4/21/2024, Normal      traMADoL (ULTRAM) 50 mg tablet Take 1 tablet (50 mg total) by mouth every 6 (six) hours as needed for Pain., Starting Sat 4/22/2023, Normal           CONTINUE these medications which have NOT CHANGED    Details   albuterol (PROVENTIL) 2.5 mg /3 mL (0.083 %) nebulizer solution Take 3 mLs (2.5 mg total) by nebulization every 4 to 6 hours as needed for Wheezing or Shortness of Breath. Rescue, Starting Fri 1/6/2023, Until Sat 1/6/2024 at 2359, Normal      albuterol (VENTOLIN HFA) 90 mcg/actuation inhaler INHALE 2 PUFFS BY MOUTH INTO THE LUNGS EVERY 6 HOURS AS NEEDED FOR WHEEZING, Normal      aspirin 81 MG Chew Take 81 mg by mouth once daily., Starting Fri 3/17/2023, Until Sat 3/16/2024, Historical Med      clonazePAM (KLONOPIN) 0.5 MG tablet TAKE 1 TABLET(0.5 MG) BY MOUTH EVERY NIGHT AS NEEDED FOR ANXIETY, Normal      fluticasone propionate (FLONASE) 50 mcg/actuation nasal spray 1 spray (50 mcg total) by Each Nostril route once daily., Starting Sat 1/21/2023, Normal      gabapentin (NEURONTIN) 300 MG capsule Take 1 capsule (300 mg total) by mouth 3 (three) times daily., Starting Mon 2/7/2022, Normal      JARDIANCE 10 mg tablet TAKE 1 TABLET(10 MG) BY MOUTH EVERY DAY, Normal      metFORMIN (GLUCOPHAGE) 1000 MG tablet TAKE 1 TABLET(1000 MG) BY MOUTH TWICE DAILY WITH MEALS, Normal      nicotine (NICODERM CQ) 21 mg/24 hr PLACE 1 PATCH ONTO THE SKIN DAILY, Normal      sucralfate (CARAFATE) 1 gram tablet TAKE 1 TABLET(1 GRAM) BY MOUTH THREE TIMES DAILY BEFORE MEALS, Normal      TRADJENTA 5 mg Tab tablet Take 1 tablet (5 mg total) by mouth once daily., Starting Fri 5/20/2022, Until Sat 5/20/2023, Normal      TRELEGY ELLIPTA 100-62.5-25 mcg DsDv INHALE 1 PUFF  "INTO THE LUNGS EVERY DAY, Normal      atorvastatin (LIPITOR) 80 MG tablet Take 1 tablet (80 mg total) by mouth every evening., Starting Thu 4/6/2023, Until Fri 4/5/2024, Normal      BD ULTRA-FINE SHORT PEN NEEDLE 31 gauge x 5/16" Ndle Inject 1 pen into the skin once daily., Starting Fri 7/16/2021, Normal      ergocalciferol (ERGOCALCIFEROL) 50,000 unit Cap TAKE 1 CAPSULE BY MOUTH EVERY 7 DAYS, Normal      ondansetron (ZOFRAN-ODT) 4 MG TbDL Take 1 tablet (4 mg total) by mouth 3 (three) times daily., Starting Wed 9/22/2021, Normal      pantoprazole (PROTONIX) 40 MG tablet Take 1 tablet (40 mg total) by mouth before breakfast., Starting Thu 12/15/2022, Until Fri 12/15/2023, Normal      sertraline (ZOLOFT) 50 MG tablet Take 1 tablet (50 mg total) by mouth once daily., Starting Fri 5/20/2022, Until Sat 5/20/2023, Normal      topiramate (TOPAMAX) 50 MG tablet Take 2 tablets (100 mg total) by mouth 2 (two) times daily. TAKE 1 TABLET BY MOUTH TWICE DAILY FOR 7 DAYS, THEN 1 TABLET EVERY MORNING AND 2 TABLETS EVERY EVENING FOR 7 DAYS, THEN 2 TABLETS TWICE DAILY, Starting Mon 10/11/2021, Normal      TRUE METRIX GLUCOSE TEST STRIP Strp USE AS DIRECTED FOUR TIMES DAILY, Normal           STOP taking these medications       glipiZIDE (GLUCOTROL) 10 MG tablet Comments:   Reason for Stopping:         clopidogreL (PLAVIX) 75 mg tablet Comments:   Reason for Stopping:                 I have seen and examined this patient within the last 30 days. My clinical findings that support the need for the home health skilled services and home bound status are the following:no   Weakness/numbness causing balance and gait disturbance due to Surgery making it taxing to leave home.     Diet:   cardiac diet    Labs:  N/a    Referrals/ Consults  Physical Therapy to evaluate and treat. Evaluate for home safety and equipment needs; Establish/upgrade home exercise program. Perform / instruct on therapeutic exercises, gait training, transfer training, " and Range of Motion.  Occupational Therapy to evaluate and treat. Evaluate home environment for safety and equipment needs. Perform/Instruct on transfers, ADL training, ROM, and therapeutic exercises.    Activities:   activity as tolerated  Sternal precautions for 8 weeks post-sternotomy  - NO pushing or pulling (e.g. No pusing up from a chair or opening a heavy door).   - NO lifting more than 5 pounds ( the weight of a half gallon of milk).   - NO lifting one arm above your head.  - NO reaching behind your back (e.g. No tucking in your shirt, putting your wallet in your back pocket, pulling your trousers up from behind or reaching behind for toilet paper).  - No driving for 4 weeks post op.     Nursing:   Agency to admit patient within 24 hours of hospital discharge unless specified on physician order or at patient request    SN to complete comprehensive assessment including routine vital signs. Instruct on disease process and s/s of complications to report to MD. Review/verify medication list sent home with the patient at time of discharge  and instruct patient/caregiver as needed. Frequency may be adjusted depending on start of care date.     Skilled nurse to perform up to 3 visits PRN for symptoms related to diagnosis    Notify MD if SBP > 160 or < 90; DBP > 90 or < 50; HR > 120 or < 50; Temp > 101; O2 < 88%    Ok to schedule additional visits based on staff availability and patient request on consecutive days within the home health episode.    When multiple disciplines ordered:    Start of Care occurs on Sunday - Wednesday schedule remaining discipline evaluations as ordered on separate consecutive days following the start of care.    Thursday SOC -schedule subsequent evaluations Friday and Monday the following week.     Friday - Saturday SOC - schedule subsequent discipline evaluations on consecutive days starting Monday of the following week.    For all post-discharge communication and subsequent orders  please contact patient's primary care physician. If unable to reach primary care physician or do not receive response within 30 minutes, please contact Dr. Kahn's office for clinical staff order clarification    Miscellaneous   N/a    Home Health Aide:  N/a    Wound Care Orders  N/a    I certify that this patient is confined to her home and needs physical therapy and occupational therapy.

## 2023-04-22 NOTE — NURSING
Home Oxygen Evaluation    Date Performed:     1) Patient's Home O2 Sat on room air, while at rest: 96        If O2 sats on room air at rest are 88% or below, patient qualifies. No additional testing needed. Document N/A in steps 2 and 3. If 89% or above, complete steps 2.      2) Patient's O2 Sat on room air while exercisin

## 2023-04-23 ENCOUNTER — NURSE TRIAGE (OUTPATIENT)
Dept: ADMINISTRATIVE | Facility: CLINIC | Age: 63
End: 2023-04-23
Payer: MEDICARE

## 2023-04-23 DIAGNOSIS — J40 BRONCHITIS: ICD-10-CM

## 2023-04-23 DIAGNOSIS — J43.8 OTHER EMPHYSEMA: Primary | ICD-10-CM

## 2023-04-23 RX ORDER — ONDANSETRON 4 MG/1
4 TABLET, ORALLY DISINTEGRATING ORAL EVERY 6 HOURS PRN
Qty: 30 TABLET | Refills: 1 | Status: SHIPPED | OUTPATIENT
Start: 2023-04-23 | End: 2023-04-28 | Stop reason: SDUPTHER

## 2023-04-23 RX ORDER — SCOLOPAMINE TRANSDERMAL SYSTEM 1 MG/1
1 PATCH, EXTENDED RELEASE TRANSDERMAL
Qty: 4 EACH | Refills: 1 | Status: SHIPPED | OUTPATIENT
Start: 2023-04-23

## 2023-04-24 ENCOUNTER — TELEPHONE (OUTPATIENT)
Dept: CARDIOTHORACIC SURGERY | Facility: CLINIC | Age: 63
End: 2023-04-24
Payer: MEDICARE

## 2023-04-24 ENCOUNTER — PATIENT OUTREACH (OUTPATIENT)
Dept: ADMINISTRATIVE | Facility: CLINIC | Age: 63
End: 2023-04-24
Payer: MEDICARE

## 2023-04-24 NOTE — ADDENDUM NOTE
Addendum  created 04/24/23 1545 by Stewart Oliva DO    Clinical Note Signed, Diagnosis association updated, Intraprocedure Blocks edited

## 2023-04-24 NOTE — ADDENDUM NOTE
Addendum  created 04/24/23 1434 by Brandon Ortiz MD    Attestation recorded in Intraprocedure, Intraprocedure Attestations filed

## 2023-04-24 NOTE — ADDENDUM NOTE
Addendum  created 04/24/23 1543 by Stewart Oliva,     Clinical Note Signed, Diagnosis association updated, Intraprocedure Blocks edited, SmartForm saved

## 2023-04-24 NOTE — TELEPHONE ENCOUNTER
"Pt is s/p CABG x2 on 4/14/23. Pt was discharged from the hospital yesterday and was not prescribed any medication for nausea. Pt is extremely upset and angry on the phone and notes that she has been trying to get in touch with a provider all day. She notes extreme nausea and states, "I have been trying to throw up but can't. This is absolutely ridiculous, I can't even take my medication I'm so nauseous!"    NT attempts to reach OCP for further guidance. Dr. Shaun Becerra, OCP, advises that he will send medication to pt's preferred [facility] pharmacy on [address]. Pt is informed. She verbalizes understanding and is instructed to call back if she develops any new/worsening sxs, or if she has any additional questions or concerns.   Reason for Disposition   [1] Prescription refill request for ESSENTIAL medicine (i.e., likelihood of harm to patient if not taken) AND [2] triager unable to refill per department policy    Protocols used: Medication Refill and Renewal Call-A-AH    "

## 2023-04-24 NOTE — PROGRESS NOTES
C3 nurse attempted to contact Jud Villa's daughter for a TCC post hospital discharge follow up call. No answer. Left voicemail with callback information. The patient does not have a scheduled HOSFU appointment. Message sent to PCP staff for assistance with scheduling visit with patient.

## 2023-04-25 ENCOUNTER — OUTPATIENT CASE MANAGEMENT (OUTPATIENT)
Dept: ADMINISTRATIVE | Facility: OTHER | Age: 63
End: 2023-04-25
Payer: MEDICARE

## 2023-04-25 NOTE — PROGRESS NOTES
04/25/23 OPCM contacted patient explained and offered OPCM program and its benefits. Patient stated all her needs were being met declined participation. Patient requested ending call. Closing episode

## 2023-04-25 NOTE — PROGRESS NOTES
C3 nurse spoke with Jud Villa's daughter Katarina for a TCC post hospital discharge follow up call. The patient has a scheduled HOS appointment with Kami Barnes per Home NP visit on 05/08/23, NP will call with time of appointment.

## 2023-04-26 ENCOUNTER — TELEPHONE (OUTPATIENT)
Dept: CARDIOTHORACIC SURGERY | Facility: CLINIC | Age: 63
End: 2023-04-26
Payer: MEDICARE

## 2023-04-26 RX ORDER — METHOCARBAMOL 500 MG/1
500 TABLET, FILM COATED ORAL 3 TIMES DAILY PRN
Qty: 30 TABLET | Refills: 0 | Status: SHIPPED | OUTPATIENT
Start: 2023-04-26 | End: 2023-05-11 | Stop reason: ALTCHOICE

## 2023-04-26 RX ORDER — ACETAMINOPHEN 500 MG
1000 TABLET ORAL EVERY 6 HOURS PRN
Qty: 30 TABLET | Refills: 1 | Status: SHIPPED | OUTPATIENT
Start: 2023-04-26

## 2023-04-26 NOTE — TELEPHONE ENCOUNTER
"Returned call to pt's daughter who is asking if pt can take meloxicam for pain relief instead of the prescribed tramadol. Pt's daughter reports pt feels "loopy" taking tramadol and would prefer to take an NSAID for muscle pain across her chest. Reviewed pt's chart and medications and discussed with LUPE Alcala who advised pt should use Tylenol for pain relief and avoid NSAIDs at this time due to a potential for increased risk of bleeding. LUPE Alcala will send a prescription for extra strength Tylenol and methocarbamol for muscle pain relief to pt's home pharmacy. Relayed information to pt's daughter who is agreeable and verbalized understanding.       ----- Message from Joyce Luna sent at 4/26/2023 11:33 AM CDT -----  Regarding: pt advice  Contact: [phone]  Pt daughter Nicole needing call back from nurse. Foster olivarez    "

## 2023-04-27 ENCOUNTER — NURSE TRIAGE (OUTPATIENT)
Dept: ADMINISTRATIVE | Facility: CLINIC | Age: 63
End: 2023-04-27
Payer: MEDICARE

## 2023-04-28 ENCOUNTER — TELEPHONE (OUTPATIENT)
Dept: CARDIOTHORACIC SURGERY | Facility: CLINIC | Age: 63
End: 2023-04-28
Payer: MEDICARE

## 2023-04-28 RX ORDER — ALBUTEROL SULFATE 0.83 MG/ML
SOLUTION RESPIRATORY (INHALATION)
Qty: 360 ML | Refills: 11 | Status: SHIPPED | OUTPATIENT
Start: 2023-04-28

## 2023-04-28 NOTE — TELEPHONE ENCOUNTER
"Called pt's daughter and spoke with both pt and daughter. Pt and her daughter report that yesterday evening pt was feeling palpitations in her chest, denied any chest pain, nausea, SOB, or other symptoms during this episode. Pt's son who is a nurse was at the pt's home at the time of the episode and monitored her BP and HR which were "fine" but she was unable to provide me with measurements of either. Pt's daughter reports pt's BP has been running in 125/62, 104/68, 126/74, and 131/68. Pt has not felt palpitations since last night. Pt's only complaint at this time is some discomfort in her side and shoulders which she describes as muscle pain. Reviewed reasons to present for ED for evaluation which include chest pain or pressure or discomfort; arm, neck, or jaw pain that radiates with or without associated dizziness, confusion, n&v, sweating; SOB; low or high BP or HR outside of parameters previously provided to pt. Advised pt she may use a heating pad on her side and shoulders for muscle discomfort. Reminded pt and her daughter of the importance of continuing to monitor BP and HR especially during any episodes of discomfort or palpitations. Reviewed medications with pt's daughter who confirms pt is taking all medications as prescribed. Pt and her daughter verbalized understanding of all information and have no further questions at this time. Notifed LUPE Valencia with CTS of the above who verbalized understanding.   "

## 2023-04-28 NOTE — TELEPHONE ENCOUNTER
No care due was identified.  [facility] Embedded Care Due Messages. Reference number: [id number].   4/28/2023 3:22:06 PM CDT

## 2023-04-28 NOTE — TELEPHONE ENCOUNTER
"Daughter, Nicole, states pt is feeling "contractions" on upper rt shoulder.   Pt placed on line at this time. C/o chest pain. "Every time there is a beat there is a sharp pain".   Current pain 6/10. Comes and goes, "but when it comes, it's there. Like a pressure". Pt is having difficulty describing sensation being experienced. Recent CABG x2 4/14/23.  Care advice provided per protocol, with recommendation to go to ED at this time. Both pt and daughter VU.     Reason for Disposition   [1] Chest pain (or "angina") comes and goes AND [2] is happening more often (increasing in frequency) or getting worse (increasing in severity)  (Exception: Chest pains that last only a few seconds.)    Additional Information   Negative: SEVERE difficulty breathing (e.g., struggling for each breath, speaks in single words)   Negative: Difficult to awaken or acting confused (e.g., disoriented, slurred speech)   Negative: Shock suspected (e.g., cold/pale/clammy skin, too weak to stand, low BP, rapid pulse)   Negative: Passed out (i.e., lost consciousness, collapsed and was not responding)   Negative: [1] Chest pain lasts > 5 minutes AND [2] age > 44   Negative: [1] Chest pain lasts > 5 minutes AND [2] age > 30 AND [3] one or more cardiac risk factors (e.g., diabetes, high blood pressure, high cholesterol, smoker, or strong family history of heart disease)   Negative: [1] Chest pain lasts > 5 minutes AND [2] history of heart disease (i.e., angina, heart attack, heart failure, bypass surgery, takes nitroglycerin)   Negative: [1] Chest pain lasts > 5 minutes AND [2] described as crushing, pressure-like, or heavy   Negative: Heart beating < 50 beats per minute OR > 140 beats per minute   Negative: Visible sweat on face or sweat dripping down face   Negative: Sounds like a life-threatening emergency to the triager   Negative: SEVERE chest pain    Protocols used: Chest Pain-A-AH    "

## 2023-05-01 RX ORDER — ONDANSETRON 4 MG/1
4 TABLET, ORALLY DISINTEGRATING ORAL EVERY 6 HOURS PRN
Qty: 30 TABLET | Refills: 1 | Status: SHIPPED | OUTPATIENT
Start: 2023-05-01 | End: 2023-05-11 | Stop reason: ALTCHOICE

## 2023-05-02 ENCOUNTER — PATIENT OUTREACH (OUTPATIENT)
Dept: ADMINISTRATIVE | Facility: OTHER | Age: 63
End: 2023-05-02
Payer: MEDICARE

## 2023-05-02 ENCOUNTER — TELEPHONE (OUTPATIENT)
Dept: ADMINISTRATIVE | Facility: CLINIC | Age: 63
End: 2023-05-02
Payer: MEDICARE

## 2023-05-02 NOTE — PROGRESS NOTES
Pt asked daughter to finish answering SDOH questions and she hung up the phone after answering a couple.  No need to follow.

## 2023-05-02 NOTE — PROGRESS NOTES
CHW - Initial Contact    This Community Health Worker completed OR updated the Social Determinant of Health questionnaire with patient via telephone today.    Pt identified barriers of most importance are:  Pt asked daughter to finish answering SDOH questions and she hung up the phone.  Referrals to community agencies completed with patient/caregiver consent outside of [facility] include: no  Referrals were put through [facility] - no  Support and Services: nono  Other information discussed the patient needs / wants help with: SDOH  Follow up required: no  No future outreach task assigned

## 2023-05-02 NOTE — PROGRESS NOTES
CHW - Case Closure    This Community Health Worker spoke to caregiver via telephone today.   Pt/Caregiver reported: Pt asked daughter to finish answering SDOH questions and she hung up the phone.  Pt/Caregiver denied any additional needs at this time and agrees with episode closure at this time.  Provided caregiver with Community Health Worker's contact information and encouraged him/her to contact this Community Health Worker if additional needs arise.

## 2023-05-08 ENCOUNTER — TELEPHONE (OUTPATIENT)
Dept: CARDIAC REHAB | Facility: CLINIC | Age: 63
End: 2023-05-08
Payer: MEDICARE

## 2023-05-08 ENCOUNTER — OFFICE VISIT (OUTPATIENT)
Dept: HOME HEALTH SERVICES | Facility: CLINIC | Age: 63
End: 2023-05-08
Payer: MEDICARE

## 2023-05-08 DIAGNOSIS — E11.65 TYPE 2 DIABETES MELLITUS WITH HYPERGLYCEMIA, WITH LONG-TERM CURRENT USE OF INSULIN: ICD-10-CM

## 2023-05-08 DIAGNOSIS — J43.8 OTHER EMPHYSEMA: Primary | ICD-10-CM

## 2023-05-08 DIAGNOSIS — I25.10 CORONARY ARTERY DISEASE, UNSPECIFIED VESSEL OR LESION TYPE, UNSPECIFIED WHETHER ANGINA PRESENT, UNSPECIFIED WHETHER NATIVE OR TRANSPLANTED HEART: ICD-10-CM

## 2023-05-08 DIAGNOSIS — Z95.1 S/P CABG X 2: ICD-10-CM

## 2023-05-08 DIAGNOSIS — Z95.1 POSTSURGICAL AORTOCORONARY BYPASS STATUS: Primary | ICD-10-CM

## 2023-05-08 DIAGNOSIS — Z79.4 TYPE 2 DIABETES MELLITUS WITH HYPERGLYCEMIA, WITH LONG-TERM CURRENT USE OF INSULIN: ICD-10-CM

## 2023-05-08 PROCEDURE — 3044F HG A1C LEVEL LT 7.0%: CPT | Mod: CPTII,95,, | Performed by: NURSE PRACTITIONER

## 2023-05-08 PROCEDURE — 3044F PR MOST RECENT HEMOGLOBIN A1C LEVEL <7.0%: ICD-10-PCS | Mod: CPTII,95,, | Performed by: NURSE PRACTITIONER

## 2023-05-08 PROCEDURE — 99443 PR PHYSICIAN TELEPHONE EVALUATION 21-30 MIN: CPT | Mod: 95,,, | Performed by: NURSE PRACTITIONER

## 2023-05-08 PROCEDURE — 99443 PR PHYSICIAN TELEPHONE EVALUATION 21-30 MIN: ICD-10-PCS | Mod: 95,,, | Performed by: NURSE PRACTITIONER

## 2023-05-08 RX ORDER — INSULIN GLARGINE 100 [IU]/ML
10 INJECTION, SOLUTION SUBCUTANEOUS NIGHTLY
Qty: 9 ML | Refills: 0 | Status: SHIPPED | OUTPATIENT
Start: 2023-05-08 | End: 2023-09-29

## 2023-05-08 RX ORDER — METFORMIN HYDROCHLORIDE 1000 MG/1
1000 TABLET ORAL 2 TIMES DAILY WITH MEALS
Qty: 180 TABLET | Refills: 3 | Status: SHIPPED | OUTPATIENT
Start: 2023-05-08

## 2023-05-08 NOTE — PROGRESS NOTES
[facility]  Established Patient - Audio Only Post-Hospitalization Telehealth Visit with [facility] Provider    Visit Date: 5/8/23   Encounter Provider: Kami Barnes NP  PCP:  Bo Lopez MD    PRESENTING HISTORY      Patient ID: Jud Villa     Consult Requested By:  No ref. provider found  Reason for Consult:  Post-Hospitalization Telehealth Visit    Chief Complaint: Transitional Care     History of Present Illness:       Admission Date: 4/14/2023  Hospital Length of Stay: 8 days  Discharge Date and Time: 4/22/2023      HPI:   Ms. Villa is a very pleasant 63-year-old woman who initially presented with chest pain while cleaning her house.  She went to the emergency department and was admitted.  She had a PET stress which was positive, and follow-up coronary angiography demonstrated 2 vessel disease.  She now presents for coronary artery bypass grafting.        Procedure(s) (LRB):  CORONARY ARTERY BYPASS GRAFT (CABG) x 2 (N/A)  SURGICAL PROCUREMENT, VEIN, ENDOSCOPIC (Left)  _______________________________    Date of Service: 5/8/23        The patient location is: HOME  The chief complaint leading to consultation is: routine care for evaluation and management of chronic medial issues and medication review.     Visit type: Virtual visit with audio only (telephone)     The reason for the audio only service rather than synchronous audio and video virtual visit was related to technical difficulties or patient preference/necessity.     Each patient to whom I provide medical services by telemedicine is:  (1) informed of the relationship between the physician and patient and the respective role of any other health care provider with respect to management of the patient; and (2) notified that they may decline to receive medical services by telemedicine and may withdraw from such care at any time. Patient verbally consented to receive this service via voice-only telephone call.      Subjective:   Today:  Ms. Jud Villa is a 63 y.o. female being evaluated by telephone today for  transitional care visit to the home environment post-discharge from inpatient hospitalization encounter described above. Patient presents at baseline state of health as reported by patient and caregiver. Denies any acute issues, concerns or complaints to address on today's visit. Reports taking all medications as prescribed. No other needs identified at this time. Risks of environmental exposure to coronavirus discussed including: social distancing, hand hygiene, and limiting departures from the home for necessities only. Reports understanding and willingness to comevaluation and management of chronic medical issues and medication review. Patient denies any chest pain, SOB, nausea, vomiting, diarrhea, constipation, fever, chills, cough, known COVID exposure or illness. Reports taking all medications as prescribed. No other acute needs identified at this time. The patient is provided contact information to call to discuss any presenting concerns, questions or complaints until our next visit. Refills for all maintenance medications are confirmed on file at the patient's pharrmacy of choice.     OBJECTIVE:   Vital Signs: None Taken for this visit      Physical Exam   Laboratory  Lab Results   Component Value Date    WBC 11.39 04/22/2023    HGB 7.9 (L) 04/22/2023    HCT 25.1 (L) 04/22/2023    MCV 92 04/22/2023     04/22/2023     Lab Results   Component Value Date    INR 1.0 04/20/2023    INR 1.0 04/19/2023    INR 1.0 04/18/2023     Lab Results   Component Value Date    HGBA1C 6.9 (H) 04/11/2023     No results for input(s): POCTGLUCOSE in the last 72 hours.    TRANSITION OF CARE:     Family and/or Caretaker present at visit?  Yes.  Diagnostic tests reviewed/disposition: No diagnosic tests pending after this hospitalization.  Disease/illness education: Importance of Compliance with all prescribed medications  and treatments, COVID Precautions/Social Distancing/Mask Use  Home health/community services discussion/referrals: Patient does not have home health established from hospital visit.  They do not need home health.  If needed, we will set up home health for the patient.   Establishment or re-establishment of referral orders for community resources: No other necessary community resources.   Discussion with other health care providers: No discussion with other health care providers necessary.     Transition of Care Visit:  I have reviewed and updated the history and problem list. I have reconciled the medication list. I have discussed the hospitalization and current medical issues, prognosis and plans with the patient/family.     Medications Reconciliation:   I have reconciled the patient's home medications and discharge medications with the patient/family. I have updated all changes. Refer to After-Visit Medication List.    Discharge plans, follow-up instructions, future appointments, provider contact information, indicators to seek emergency treatment and encouragement to call for any questions, concerns or clarification of the patient's plan of care explained to patient and/or caregiver(s), whom confirm understanding of provided information and endorse willingness to comply.     Assessment:   Problem List Items Addressed on this Visit:  1. Other emphysema    2. S/P CABG x 2      Plan:     1. Other emphysema    2. S/P CABG x 2      - [facility] to schedule follow-up visit with patient  PRN.  -Continue Cardiac Rehab  -Home BP monitoring    Patient Instructions Given:  - Continue all medications, treatments and therapies as ordered.   - Follow all instructions, recommendations as discussed.  - Maintain Safety Precautions at all times.  - Attend all medical appointments as scheduled.  - For worsening symptoms: call Primary Care Physician or Nurse Practitioner.  - For emergencies, call 911 or immediately  "report to the nearest emergency room.    After Visit Medication List :     Medication List            Accurate as of May 8, 2023 11:59 PM. If you have any questions, ask your nurse or doctor.                CHANGE how you take these medications      insulin glargine 100 units/mL SubQ pen  Commonly known as: LANTUS SOLOSTAR U-100 INSULIN  Inject 10 Units into the skin every evening.  What changed:   how much to take  when to take this  Changed by: Bo Lopez MD            CONTINUE taking these medications      acetaminophen 500 MG tablet  Commonly known as: TYLENOL  Take 2 tablets (1,000 mg total) by mouth every 6 (six) hours as needed for Pain.     * albuterol 90 mcg/actuation inhaler  Commonly known as: VENTOLIN HFA  INHALE 2 PUFFS BY MOUTH INTO THE LUNGS EVERY 6 HOURS AS NEEDED FOR WHEEZING     * albuterol 2.5 mg /3 mL (0.083 %) nebulizer solution  Commonly known as: PROVENTIL  INHALE 1 VIAL PER NEBULIZER FOUR TIMES DAILY AS NEEDED FOR SHORTNESS OF BREATH/ WHEEZING     amiodarone 200 MG Tab  Commonly known as: PACERONE  Take 1 tablet (200 mg total) by mouth 2 (two) times daily.     aspirin 81 MG Chew     atorvastatin 80 MG tablet  Commonly known as: LIPITOR  Take 1 tablet (80 mg total) by mouth every evening.     BD ULTRA-FINE SHORT PEN NEEDLE 31 gauge x 5/16" Ndle  Generic drug: pen needle, diabetic  Inject 1 pen into the skin once daily.     clonazePAM 0.5 MG tablet  Commonly known as: KlonoPIN  TAKE 1 TABLET(0.5 MG) BY MOUTH EVERY NIGHT AS NEEDED FOR ANXIETY     ELIQUIS 5 mg Tab  Generic drug: apixaban  Take 1 tablet (5 mg total) by mouth 2 (two) times daily.     ergocalciferol 50,000 unit Cap  Commonly known as: ERGOCALCIFEROL  TAKE 1 CAPSULE BY MOUTH EVERY 7 DAYS     fluticasone propionate 50 mcg/actuation nasal spray  Commonly known as: FLONASE  SHAKE LIQUID AND USE 1 SPRAY(50 MCG) IN EACH NOSTRIL EVERY DAY     furosemide 20 MG tablet  Commonly known as: LASIX  Take 1 tablet (20 mg total) by " mouth once daily for 14 days     gabapentin 300 MG capsule  Commonly known as: NEURONTIN  TAKE 1 CAPSULE(300 MG) BY MOUTH THREE TIMES DAILY     JARDIANCE 10 mg tablet  Generic drug: empagliflozin  TAKE 1 TABLET(10 MG) BY MOUTH EVERY DAY     metFORMIN 1000 MG tablet  Commonly known as: GLUCOPHAGE  Take 1 tablet (1,000 mg total) by mouth 2 (two) times daily with meals.     methocarbamoL 500 MG Tab  Commonly known as: ROBAXIN  Take 1 tablet (500 mg total) by mouth 3 (three) times daily as needed (sternal discomfort).     metoprolol tartrate 50 MG tablet  Commonly known as: LOPRESSOR  Take 1 tablet (50 mg total) by mouth 2 (two) times daily.     nicotine 21 mg/24 hr  Commonly known as: NICODERM CQ  PLACE 1 PATCH ONTO THE SKIN DAILY     ondansetron 4 MG Tbdl  Commonly known as: ZOFRAN-ODT  Take 1 tablet (4 mg total) by mouth every 6 (six) hours as needed (nausea).     pantoprazole 40 MG tablet  Commonly known as: PROTONIX  Take 1 tablet (40 mg total) by mouth before breakfast.     polyethylene glycol 17 gram Pwpk  Commonly known as: GLYCOLAX  Take 17 g by mouth 2 (two) times daily as needed (constipation).     potassium chloride SA 20 MEQ tablet  Commonly known as: K-DUR,KLOR-CON  Take 1 tablet (20 mEq total) by mouth once daily for 14 days     scopolamine 1.3-1.5 mg (1 mg over 3 days)  Commonly known as: TRANSDERM-SCOP  Place 1 patch onto the skin Every 3 (three) days.     sertraline 50 MG tablet  Commonly known as: ZOLOFT  Take 1 tablet (50 mg total) by mouth once daily.     sucralfate 1 gram tablet  Commonly known as: CARAFATE  TAKE 1 TABLET(1 GRAM) BY MOUTH THREE TIMES DAILY BEFORE MEALS     topiramate 50 MG tablet  Commonly known as: TOPAMAX  Take 2 tablets (100 mg total) by mouth 2 (two) times daily. TAKE 1 TABLET BY MOUTH TWICE DAILY FOR 7 DAYS, THEN 1 TABLET EVERY MORNING AND 2 TABLETS EVERY EVENING FOR 7 DAYS, THEN 2 TABLETS TWICE DAILY     TRADJENTA 5 mg Tab tablet  Generic drug: linaGLIPtin  Take 1 tablet (5  mg total) by mouth once daily.     TRELEGY ELLIPTA 100-62.5-25 mcg Dsdv  Generic drug: fluticasone-umeclidin-vilanter  INHALE 1 PUFF INTO THE LUNGS EVERY DAY     TRUE METRIX GLUCOSE TEST STRIP Strp  Generic drug: blood sugar diagnostic  USE AS DIRECTED FOUR TIMES DAILY           * This list has 2 medication(s) that are the same as other medications prescribed for you. Read the directions carefully, and ask your doctor or other care provider to review them with you.                   Where to Get Your Medications        These medications were sent to [id number] - [place] - [address]  AT [facility] OF [facility] [address]  [address] [place] [id number]      Hours: 24-hours Phone: [phone]   insulin glargine 100 units/mL SubQ pen  metFORMIN 1000 MG tablet       Future Appointments   Date Time Provider Department Center   5/23/2023  7:00 AM [facility] [place]   5/23/2023  7:15 AM [facility] [place]   5/29/2023  2:30 PM PERIMETRY, HUMNOEL [facility]   5/29/2023  3:15 PM Claribel Pereira MD [facility]   6/8/2023  3:00 PM Bo Lopez MD [facility] MED [place]   6/13/2023  9:00 AM Marsha Andersen MD [facility] CARDIO [facility]     Risks of environmental exposure to coronavirus discussed including: social distancing, hand hygiene, and limiting departures from the home for necessities only. Reports understanding and willingness to comply.     Signature:    Kami Barnes, MSN, APRN, FNP-C  [facility]     Total time for this telephone encounter was 25 minutes. The following issues were discussed: primary and secondary diagnoses, co-morbidities, prescribed medications, treatment modalities, importance of compliance with medical advice and directives for follow-up care.    This service was not originating from a related E/M service provided within the previous 7 days nor will  to an E/M service or procedure within the  next 24 hours or my soonest available appointment.  Prevailing standard of care was able to be met in this audio-only visit.

## 2023-05-08 NOTE — TELEPHONE ENCOUNTER
No care due was identified.  Health [facility] Embedded Care Due Messages. Reference number: [id number].   5/08/2023 9:51:38 AM CDT

## 2023-05-10 NOTE — PROGRESS NOTES
Patient seen and examined. Patient is progressively increasing activity. Patient complains of fatigue, decrease stamina, and intermittent abdominal pain with decrease appetite.      Sternum: stable, incision CDI  Chest xray: Acceptable post op chest  EKG: NSR     Assessment:  CABGx 2 with LIMA-LAD and SVG-OM1 Surgery on 4/14/23     Plan:  Can begin driving as long as he has power steering  Can begin cardiac rehab in the next couple of weeks  We will refer to cardiology/PCP to assume care   DC lasix  DC potassium  Dc amiodarone         No scheduled appointment, RTC prn    CTS Attending Note:    I have personally taken the history and examined this patient and agree with the CLAUDINE's note as stated above.  Very pleasant 63-year-old woman status post two-vessel bypass.  She is in sinus rhythm and is having gastrointestinal symptoms.  We will stop the amiodarone and Eliquis.

## 2023-05-11 ENCOUNTER — HOSPITAL ENCOUNTER (OUTPATIENT)
Dept: CARDIOLOGY | Facility: CLINIC | Age: 63
Discharge: HOME | End: 2023-05-11
Payer: MEDICARE

## 2023-05-11 ENCOUNTER — OFFICE VISIT (OUTPATIENT)
Dept: CARDIOTHORACIC SURGERY | Facility: CLINIC | Age: 63
End: 2023-05-11
Payer: MEDICARE

## 2023-05-11 ENCOUNTER — HOSPITAL ENCOUNTER (OUTPATIENT)
Dept: RADIOLOGY | Facility: HOSPITAL | Age: 63
Discharge: HOME | End: 2023-05-11
Attending: THORACIC SURGERY (CARDIOTHORACIC VASCULAR SURGERY)
Payer: MEDICARE

## 2023-05-11 DIAGNOSIS — Z95.1 S/P CABG X 2: Primary | ICD-10-CM

## 2023-05-11 DIAGNOSIS — Z95.1 S/P CABG (CORONARY ARTERY BYPASS GRAFT): ICD-10-CM

## 2023-05-11 PROCEDURE — 71046 X-RAY EXAM CHEST 2 VIEWS: CPT | Mod: 26,,, | Performed by: RADIOLOGY

## 2023-05-11 PROCEDURE — 1159F MED LIST DOCD IN RCRD: CPT | Mod: CPTII,S$GLB,, | Performed by: THORACIC SURGERY (CARDIOTHORACIC VASCULAR SURGERY)

## 2023-05-11 PROCEDURE — 3044F HG A1C LEVEL LT 7.0%: CPT | Mod: CPTII,S$GLB,, | Performed by: THORACIC SURGERY (CARDIOTHORACIC VASCULAR SURGERY)

## 2023-05-11 PROCEDURE — 93005 ELECTROCARDIOGRAM TRACING: CPT | Mod: S$GLB,,, | Performed by: THORACIC SURGERY (CARDIOTHORACIC VASCULAR SURGERY)

## 2023-05-11 PROCEDURE — 71046 X-RAY EXAM CHEST 2 VIEWS: CPT | Mod: TC,FY

## 2023-05-11 PROCEDURE — 93010 ELECTROCARDIOGRAM REPORT: CPT | Mod: S$GLB,,, | Performed by: INTERNAL MEDICINE

## 2023-05-11 PROCEDURE — 99999 PR PBB SHADOW E&M-EST. PATIENT-LVL III: CPT | Mod: PBBFAC,,, | Performed by: THORACIC SURGERY (CARDIOTHORACIC VASCULAR SURGERY)

## 2023-05-11 PROCEDURE — 99024 PR POST-OP FOLLOW-UP VISIT: ICD-10-PCS | Mod: S$GLB,,, | Performed by: THORACIC SURGERY (CARDIOTHORACIC VASCULAR SURGERY)

## 2023-05-11 PROCEDURE — 71046 XR CHEST PA AND LATERAL: ICD-10-PCS | Mod: 26,,, | Performed by: RADIOLOGY

## 2023-05-11 PROCEDURE — 1159F PR MEDICATION LIST DOCUMENTED IN MEDICAL RECORD: ICD-10-PCS | Mod: CPTII,S$GLB,, | Performed by: THORACIC SURGERY (CARDIOTHORACIC VASCULAR SURGERY)

## 2023-05-11 PROCEDURE — 3044F PR MOST RECENT HEMOGLOBIN A1C LEVEL <7.0%: ICD-10-PCS | Mod: CPTII,S$GLB,, | Performed by: THORACIC SURGERY (CARDIOTHORACIC VASCULAR SURGERY)

## 2023-05-11 PROCEDURE — 93010 EKG 12-LEAD: ICD-10-PCS | Mod: S$GLB,,, | Performed by: INTERNAL MEDICINE

## 2023-05-11 PROCEDURE — 99024 POSTOP FOLLOW-UP VISIT: CPT | Mod: S$GLB,,, | Performed by: THORACIC SURGERY (CARDIOTHORACIC VASCULAR SURGERY)

## 2023-05-11 PROCEDURE — 99999 PR PBB SHADOW E&M-EST. PATIENT-LVL III: ICD-10-PCS | Mod: PBBFAC,,, | Performed by: THORACIC SURGERY (CARDIOTHORACIC VASCULAR SURGERY)

## 2023-05-11 PROCEDURE — 93005 EKG 12-LEAD: ICD-10-PCS | Mod: S$GLB,,, | Performed by: THORACIC SURGERY (CARDIOTHORACIC VASCULAR SURGERY)

## 2023-05-12 ENCOUNTER — TELEPHONE (OUTPATIENT)
Dept: CARDIOTHORACIC SURGERY | Facility: CLINIC | Age: 63
End: 2023-05-12
Payer: MEDICARE

## 2023-05-12 DIAGNOSIS — Z95.1 S/P CABG X 2: Primary | ICD-10-CM

## 2023-05-12 NOTE — TELEPHONE ENCOUNTER
Called pt's daughter and confirm appt scheduled with Dr. Andersen on 6/13 at 9:00 a.m. Pt's daughter verbalized understanding of appt date, time, and location.

## 2023-05-18 ENCOUNTER — EXTERNAL HOME HEALTH (OUTPATIENT)
Dept: HOME HEALTH SERVICES | Facility: HOSPITAL | Age: 63
End: 2023-05-18
Payer: MEDICARE

## 2023-05-18 NOTE — PATIENT INSTRUCTIONS
Instructions:  - [facility] Nurse Practitioner to schedule home follow-up visit with patient  as needed.  - Continue all medications, treatments and therapies as ordered.   - Follow all instructions, recommendations as discussed.  - Maintain Safety Precautions at all times.  - Attend all medical appointments as scheduled.  - For worsening symptoms: call Primary Care Physician or Nurse Practitioner.  - For emergencies, call 911 or immediately report to the nearest emergency room.  - Limit Risks of environmental exposure to coronavirus/COVID-19 as discussed including: social distancing, hand hygiene, and limiting departures from the home for necessities only.

## 2023-05-30 RX ORDER — MELOXICAM 15 MG/1
15 TABLET ORAL DAILY
Qty: 30 TABLET | Refills: 1 | Status: SHIPPED | OUTPATIENT
Start: 2023-05-30 | End: 2023-08-21 | Stop reason: SDUPTHER

## 2023-05-30 RX ORDER — PEN NEEDLE, DIABETIC 31 GX5/16"
1 NEEDLE, DISPOSABLE MISCELLANEOUS DAILY
Qty: 100 EACH | Refills: 3 | Status: SHIPPED | OUTPATIENT
Start: 2023-05-30

## 2023-05-30 NOTE — TELEPHONE ENCOUNTER
No care due was identified.  Health [facility] Embedded Care Due Messages. Reference number: [id number].   5/30/2023 4:27:51 PM CDT

## 2023-05-31 DIAGNOSIS — Z95.1 POSTSURGICAL AORTOCORONARY BYPASS STATUS: Primary | ICD-10-CM

## 2023-05-31 DIAGNOSIS — I25.10 CORONARY ARTERY DISEASE, UNSPECIFIED VESSEL OR LESION TYPE, UNSPECIFIED WHETHER ANGINA PRESENT, UNSPECIFIED WHETHER NATIVE OR TRANSPLANTED HEART: ICD-10-CM

## 2023-06-09 ENCOUNTER — TELEPHONE (OUTPATIENT)
Dept: FAMILY MEDICINE | Facility: CLINIC | Age: 63
End: 2023-06-09
Payer: MEDICARE

## 2023-06-09 NOTE — TELEPHONE ENCOUNTER
----- Message from Deniz Zuluaga sent at 6/9/2023 12:29 PM CDT -----  .Type:  Needs Medical Advice    Who Called: Pt  Would the patient rather a call back or a response via [facility]? call  Best Call Back Number: [phone]  Additional Information:   Pt requested a call back and said it was urgent.
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
## 2023-06-13 ENCOUNTER — TELEPHONE (OUTPATIENT)
Dept: NEUROLOGY | Facility: CLINIC | Age: 63
End: 2023-06-13
Payer: MEDICARE

## 2023-06-13 ENCOUNTER — TELEPHONE (OUTPATIENT)
Dept: FAMILY MEDICINE | Facility: CLINIC | Age: 63
End: 2023-06-13
Payer: MEDICARE

## 2023-06-13 ENCOUNTER — CLINICAL SUPPORT (OUTPATIENT)
Dept: CARDIOLOGY | Facility: HOSPITAL | Age: 63
End: 2023-06-13
Attending: INTERNAL MEDICINE
Payer: MEDICARE

## 2023-06-13 ENCOUNTER — OFFICE VISIT (OUTPATIENT)
Dept: CARDIOLOGY | Facility: CLINIC | Age: 63
End: 2023-06-13
Payer: MEDICARE

## 2023-06-13 VITALS
WEIGHT: 123.69 LBS | BODY MASS INDEX: 22.76 KG/M2 | SYSTOLIC BLOOD PRESSURE: 123 MMHG | OXYGEN SATURATION: 99 % | DIASTOLIC BLOOD PRESSURE: 68 MMHG | HEART RATE: 111 BPM | HEIGHT: 62 IN

## 2023-06-13 DIAGNOSIS — J43.8 OTHER EMPHYSEMA: ICD-10-CM

## 2023-06-13 DIAGNOSIS — I97.89 POSTOPERATIVE ATRIAL FIBRILLATION: ICD-10-CM

## 2023-06-13 DIAGNOSIS — E11.8 CONTROLLED TYPE 2 DIABETES MELLITUS WITH COMPLICATION, WITH LONG-TERM CURRENT USE OF INSULIN: Chronic | ICD-10-CM

## 2023-06-13 DIAGNOSIS — E78.00 PURE HYPERCHOLESTEROLEMIA: ICD-10-CM

## 2023-06-13 DIAGNOSIS — R55 SYNCOPE AND COLLAPSE: ICD-10-CM

## 2023-06-13 DIAGNOSIS — I48.91 POSTOPERATIVE ATRIAL FIBRILLATION: ICD-10-CM

## 2023-06-13 DIAGNOSIS — F17.200 SMOKER: ICD-10-CM

## 2023-06-13 DIAGNOSIS — C77.3 BREAST CANCER METASTASIZED TO AXILLARY LYMPH NODE, LEFT: ICD-10-CM

## 2023-06-13 DIAGNOSIS — Z86.73 HISTORY OF CVA (CEREBROVASCULAR ACCIDENT): ICD-10-CM

## 2023-06-13 DIAGNOSIS — Z95.1 S/P CABG X 2: ICD-10-CM

## 2023-06-13 DIAGNOSIS — Z86.73 HISTORY OF CVA (CEREBROVASCULAR ACCIDENT): Primary | ICD-10-CM

## 2023-06-13 DIAGNOSIS — I25.10 CORONARY ARTERY DISEASE INVOLVING NATIVE CORONARY ARTERY OF NATIVE HEART WITHOUT ANGINA PECTORIS: ICD-10-CM

## 2023-06-13 DIAGNOSIS — Z79.4 CONTROLLED TYPE 2 DIABETES MELLITUS WITH COMPLICATION, WITH LONG-TERM CURRENT USE OF INSULIN: Chronic | ICD-10-CM

## 2023-06-13 DIAGNOSIS — C50.912 BREAST CANCER METASTASIZED TO AXILLARY LYMPH NODE, LEFT: ICD-10-CM

## 2023-06-13 PROCEDURE — 3008F PR BODY MASS INDEX (BMI) DOCUMENTED: ICD-10-PCS | Mod: CPTII,S$GLB,, | Performed by: INTERNAL MEDICINE

## 2023-06-13 PROCEDURE — 1160F PR REVIEW ALL MEDS BY PRESCRIBER/CLIN PHARMACIST DOCUMENTED: ICD-10-PCS | Mod: CPTII,S$GLB,, | Performed by: INTERNAL MEDICINE

## 2023-06-13 PROCEDURE — 93272 ECG/REVIEW INTERPRET ONLY: CPT | Mod: HCNC,,, | Performed by: STUDENT IN AN ORGANIZED HEALTH CARE EDUCATION/TRAINING PROGRAM

## 2023-06-13 PROCEDURE — 99999 PR PBB SHADOW E&M-EST. PATIENT-LVL V: ICD-10-PCS | Mod: PBBFAC,,, | Performed by: INTERNAL MEDICINE

## 2023-06-13 PROCEDURE — 93271 ECG/MONITORING AND ANALYSIS: CPT

## 2023-06-13 PROCEDURE — 99214 OFFICE O/P EST MOD 30 MIN: CPT | Mod: S$GLB,,, | Performed by: INTERNAL MEDICINE

## 2023-06-13 PROCEDURE — 99214 PR OFFICE/OUTPT VISIT, EST, LEVL IV, 30-39 MIN: ICD-10-PCS | Mod: S$GLB,,, | Performed by: INTERNAL MEDICINE

## 2023-06-13 PROCEDURE — 1101F PT FALLS ASSESS-DOCD LE1/YR: CPT | Mod: CPTII,S$GLB,, | Performed by: INTERNAL MEDICINE

## 2023-06-13 PROCEDURE — 93270 REMOTE 30 DAY ECG REV/REPORT: CPT

## 2023-06-13 PROCEDURE — 3008F BODY MASS INDEX DOCD: CPT | Mod: CPTII,S$GLB,, | Performed by: INTERNAL MEDICINE

## 2023-06-13 PROCEDURE — 1101F PR PT FALLS ASSESS DOC 0-1 FALLS W/OUT INJ PAST YR: ICD-10-PCS | Mod: CPTII,S$GLB,, | Performed by: INTERNAL MEDICINE

## 2023-06-13 PROCEDURE — 3288F PR FALLS RISK ASSESSMENT DOCUMENTED: ICD-10-PCS | Mod: CPTII,S$GLB,, | Performed by: INTERNAL MEDICINE

## 2023-06-13 PROCEDURE — 93272 CARDIAC EVENT MONITOR (CUPID ONLY): ICD-10-PCS | Mod: HCNC,,, | Performed by: STUDENT IN AN ORGANIZED HEALTH CARE EDUCATION/TRAINING PROGRAM

## 2023-06-13 PROCEDURE — 1160F RVW MEDS BY RX/DR IN RCRD: CPT | Mod: CPTII,S$GLB,, | Performed by: INTERNAL MEDICINE

## 2023-06-13 PROCEDURE — 3078F DIAST BP <80 MM HG: CPT | Mod: CPTII,S$GLB,, | Performed by: INTERNAL MEDICINE

## 2023-06-13 PROCEDURE — 3044F PR MOST RECENT HEMOGLOBIN A1C LEVEL <7.0%: ICD-10-PCS | Mod: CPTII,S$GLB,, | Performed by: INTERNAL MEDICINE

## 2023-06-13 PROCEDURE — 1159F PR MEDICATION LIST DOCUMENTED IN MEDICAL RECORD: ICD-10-PCS | Mod: CPTII,S$GLB,, | Performed by: INTERNAL MEDICINE

## 2023-06-13 PROCEDURE — 3078F PR MOST RECENT DIASTOLIC BLOOD PRESSURE < 80 MM HG: ICD-10-PCS | Mod: CPTII,S$GLB,, | Performed by: INTERNAL MEDICINE

## 2023-06-13 PROCEDURE — 3074F SYST BP LT 130 MM HG: CPT | Mod: CPTII,S$GLB,, | Performed by: INTERNAL MEDICINE

## 2023-06-13 PROCEDURE — 3074F PR MOST RECENT SYSTOLIC BLOOD PRESSURE < 130 MM HG: ICD-10-PCS | Mod: CPTII,S$GLB,, | Performed by: INTERNAL MEDICINE

## 2023-06-13 PROCEDURE — 1159F MED LIST DOCD IN RCRD: CPT | Mod: CPTII,S$GLB,, | Performed by: INTERNAL MEDICINE

## 2023-06-13 PROCEDURE — 3288F FALL RISK ASSESSMENT DOCD: CPT | Mod: CPTII,S$GLB,, | Performed by: INTERNAL MEDICINE

## 2023-06-13 PROCEDURE — 99999 PR PBB SHADOW E&M-EST. PATIENT-LVL V: CPT | Mod: PBBFAC,,, | Performed by: INTERNAL MEDICINE

## 2023-06-13 PROCEDURE — 3044F HG A1C LEVEL LT 7.0%: CPT | Mod: CPTII,S$GLB,, | Performed by: INTERNAL MEDICINE

## 2023-06-13 NOTE — TELEPHONE ENCOUNTER
----- Message from Nicole Arriola MA sent at 6/13/2023 10:44 AM CDT -----  Regarding: FW: appt  Contact: @[phone]  Hello,    This patient reached out looking for an appt with us, however, Dr. Gómez doesn't do vascular and given her diagnosis, vascular seems appropriate. When you have a minute can you reach out and help schedule this appointment?  Thank you in advance for your help.     Thank you,  Nicole     ----- Message -----  From: Kenyon Winn  Sent: 6/13/2023  10:28 AM CDT  To: Dalia Benjamin Staff  Subject: appt                                             Cardiology calling to get pt sched for History of CVA (cerebrovascular accident) [Z86.73]  Postoperative atrial fibrillation [I97.89, I48.91]  Pure hypercholesterolemia [E78.00] reff in system pt has seen dr gómez in 2021 so is an established pt still ... pls call and adv@[phone]

## 2023-06-13 NOTE — TELEPHONE ENCOUNTER
Called and spoke w pt's daughter.     Explained I was calling to schedule appt for pt w vas neuro for stroke that happened in March. Daughter verbalized understanding and pt scheduled for 6/21 at 12pm w Leatha Atkinson. Pt's daughter confirmed date/time of appt.

## 2023-06-13 NOTE — TELEPHONE ENCOUNTER
----- Message from Nicole Arriola MA sent at 6/13/2023 10:44 AM CDT -----  Regarding: FW: appt  Contact: @[phone]  Hello,    This patient reached out looking for an appt with us, however, Dr. Gómez doesn't do vascular and given her diagnosis, vascular seems appropriate. When you have a minute can you reach out and help schedule this appointment?  Thank you in advance for your help.     Thank you,  Nicole     ----- Message -----  From: Kenyon Winn  Sent: 6/13/2023  10:28 AM CDT  To: Dalia Benjamin Staff  Subject: appt                                             Cardiology calling to get pt sched for History of CVA (cerebrovascular accident) [Z86.73]  Postoperative atrial fibrillation [I97.89, I48.91]  Pure hypercholesterolemia [E78.00] reff in system pt has seen dr gómez in 2021 so is an established pt still ... pls call and adv@[phone]

## 2023-06-13 NOTE — TELEPHONE ENCOUNTER
----- Message from Cielo James sent at 6/13/2023  9:02 AM CDT -----  Type:  Patient Returning Call    Who Called: pt  Who Left Message for Patient:LETTY MISSED CALL  Does the patient know what this is regarding?:  Would the patient rather a call back or a response via [facility]?   Best Call Back Number:[phone]  Additional Information:

## 2023-06-16 DIAGNOSIS — I49.8 OTHER CARDIAC ARRHYTHMIA: Primary | ICD-10-CM

## 2023-06-21 ENCOUNTER — HOSPITAL ENCOUNTER (OUTPATIENT)
Dept: CARDIOLOGY | Facility: HOSPITAL | Age: 63
Discharge: HOME | End: 2023-06-21
Attending: THORACIC SURGERY (CARDIOTHORACIC VASCULAR SURGERY)
Payer: MEDICARE

## 2023-06-21 ENCOUNTER — OFFICE VISIT (OUTPATIENT)
Dept: NEUROLOGY | Facility: CLINIC | Age: 63
End: 2023-06-21
Payer: MEDICARE

## 2023-06-21 VITALS
DIASTOLIC BLOOD PRESSURE: 55 MMHG | WEIGHT: 122 LBS | HEIGHT: 62 IN | BODY MASS INDEX: 22.45 KG/M2 | HEART RATE: 66 BPM | SYSTOLIC BLOOD PRESSURE: 89 MMHG

## 2023-06-21 VITALS
SYSTOLIC BLOOD PRESSURE: 90 MMHG | BODY MASS INDEX: 22.82 KG/M2 | DIASTOLIC BLOOD PRESSURE: 71 MMHG | WEIGHT: 124 LBS | HEIGHT: 62 IN | HEART RATE: 59 BPM

## 2023-06-21 DIAGNOSIS — Z79.4 CONTROLLED TYPE 2 DIABETES MELLITUS WITH COMPLICATION, WITH LONG-TERM CURRENT USE OF INSULIN: Chronic | ICD-10-CM

## 2023-06-21 DIAGNOSIS — Z95.1 POSTSURGICAL AORTOCORONARY BYPASS STATUS: ICD-10-CM

## 2023-06-21 DIAGNOSIS — I48.91 POSTOPERATIVE ATRIAL FIBRILLATION: ICD-10-CM

## 2023-06-21 DIAGNOSIS — I97.89 POSTOPERATIVE ATRIAL FIBRILLATION: ICD-10-CM

## 2023-06-21 DIAGNOSIS — Z86.73 HISTORY OF ISCHEMIC LEFT PCA STROKE: Primary | ICD-10-CM

## 2023-06-21 DIAGNOSIS — I25.10 CORONARY ARTERY DISEASE, UNSPECIFIED VESSEL OR LESION TYPE, UNSPECIFIED WHETHER ANGINA PRESENT, UNSPECIFIED WHETHER NATIVE OR TRANSPLANTED HEART: ICD-10-CM

## 2023-06-21 DIAGNOSIS — Z87.891 FORMER SMOKER: ICD-10-CM

## 2023-06-21 DIAGNOSIS — E78.00 PURE HYPERCHOLESTEROLEMIA: ICD-10-CM

## 2023-06-21 DIAGNOSIS — R51.9 NONINTRACTABLE HEADACHE, UNSPECIFIED CHRONICITY PATTERN, UNSPECIFIED HEADACHE TYPE: ICD-10-CM

## 2023-06-21 DIAGNOSIS — E11.8 CONTROLLED TYPE 2 DIABETES MELLITUS WITH COMPLICATION, WITH LONG-TERM CURRENT USE OF INSULIN: Chronic | ICD-10-CM

## 2023-06-21 LAB
CV STRESS BASE HR: 59 BPM
DIASTOLIC BLOOD PRESSURE: 71 MMHG
OHS CV CPX 1 MINUTE RECOVERY HEART RATE: 74 BPM
OHS CV CPX 85 PERCENT MAX PREDICTED HEART RATE MALE: 126
OHS CV CPX ABDOMINAL GIRTH: 34.2 CM
OHS CV CPX DATA GRADE - PEAK: 3.9
OHS CV CPX DATA O2 SAT - PEAK: 95
OHS CV CPX DATA O2 SAT - REST: 95
OHS CV CPX DATA SPEED - PEAK: 2.7
OHS CV CPX DATA TIME - PEAK: 5.87
OHS CV CPX DATA VE/VCO2 - PEAK: 55
OHS CV CPX DATA VE/VO2 - PEAK: 41
OHS CV CPX DATA VO2 - PEAK: 12.1
OHS CV CPX DATA VO2 - REST: 5.2
OHS CV CPX ESTIMATED METS: 5
OHS CV CPX FEV1/FVC: 0.66
OHS CV CPX FORCED EXPIRATORY VOLUME: 1.17
OHS CV CPX FORCED VITAL CAPACITY (FVC): 1.78
OHS CV CPX HIGHEST VO: 27.5
OHS CV CPX MAX PREDICTED HEART RATE: 149
OHS CV CPX MAXIMAL VOLUNTARY VENTILATION (MVV) PREDICTED: 46.8
OHS CV CPX MAXIMAL VOLUNTARY VENTILATION (MVV): 46
OHS CV CPX MAXIUMUM EXERCISE VENTILATION (VE MAX): 25.7
OHS CV CPX PATIENT AGE: 65
OHS CV CPX PATIENT HEIGHT IN: 62
OHS CV CPX PATIENT IS FEMALE AGE 11-19: 0
OHS CV CPX PATIENT IS FEMALE AGE GREATER THAN 19: 1
OHS CV CPX PATIENT IS FEMALE AGE LESS THAN 11: 0
OHS CV CPX PATIENT IS FEMALE: 1
OHS CV CPX PATIENT IS MALE AGE 11-25: 0
OHS CV CPX PATIENT IS MALE AGE GREATER THAN 25: 0
OHS CV CPX PATIENT IS MALE AGE LESS THAN 11: 0
OHS CV CPX PATIENT IS MALE GREATER THAN 18: 0
OHS CV CPX PATIENT IS MALE LESS THAN OR EQUAL TO 18: 0
OHS CV CPX PATIENT IS MALE: 0
OHS CV CPX PATIENT WEIGHT RETURNED IN OZ: 1984
OHS CV CPX PEAK DIASTOLIC BLOOD PRESSURE: 50 MMHG
OHS CV CPX PEAK HEAR RATE: 81 BPM
OHS CV CPX PEAK RATE PRESSURE PRODUCT: 7776
OHS CV CPX PEAK SYSTOLIC BLOOD PRESSURE: 96 MMHG
OHS CV CPX PERCENT BODY FAT: 13.9
OHS CV CPX PERCENT MAX PREDICTED HEART RATE ACHIEVED: 54
OHS CV CPX PREDICTED VO2: 27.5 ML/KG/MIN
OHS CV CPX RATE PRESSURE PRODUCT PRESENTING: 5310
OHS CV CPX REST PET CO2: 23
OHS CV CPX VE/VCO2 SLOPE: 43.6
STRESS ECHO POST EXERCISE DUR MIN: 5 MINUTES
STRESS ECHO POST EXERCISE DUR SEC: 52 SECONDS
SYSTOLIC BLOOD PRESSURE: 90 MMHG

## 2023-06-21 PROCEDURE — 99215 PR OFFICE/OUTPT VISIT, EST, LEVL V, 40-54 MIN: ICD-10-PCS | Mod: S$GLB,,, | Performed by: NURSE PRACTITIONER

## 2023-06-21 PROCEDURE — 3288F PR FALLS RISK ASSESSMENT DOCUMENTED: ICD-10-PCS | Mod: CPTII,S$GLB,, | Performed by: NURSE PRACTITIONER

## 2023-06-21 PROCEDURE — 94621 CARDIOPULM EXERCISE TESTING: CPT

## 2023-06-21 PROCEDURE — 3044F HG A1C LEVEL LT 7.0%: CPT | Mod: CPTII,S$GLB,, | Performed by: NURSE PRACTITIONER

## 2023-06-21 PROCEDURE — 3288F FALL RISK ASSESSMENT DOCD: CPT | Mod: CPTII,S$GLB,, | Performed by: NURSE PRACTITIONER

## 2023-06-21 PROCEDURE — 3078F PR MOST RECENT DIASTOLIC BLOOD PRESSURE < 80 MM HG: ICD-10-PCS | Mod: CPTII,S$GLB,, | Performed by: NURSE PRACTITIONER

## 2023-06-21 PROCEDURE — 1159F MED LIST DOCD IN RCRD: CPT | Mod: CPTII,S$GLB,, | Performed by: NURSE PRACTITIONER

## 2023-06-21 PROCEDURE — 94621 CARDIOPULMONARY EXERCISE TESTING (CUPID ONLY): ICD-10-PCS | Mod: 26,,, | Performed by: INTERNAL MEDICINE

## 2023-06-21 PROCEDURE — 1101F PR PT FALLS ASSESS DOC 0-1 FALLS W/OUT INJ PAST YR: ICD-10-PCS | Mod: CPTII,S$GLB,, | Performed by: NURSE PRACTITIONER

## 2023-06-21 PROCEDURE — 99999 PR PBB SHADOW E&M-EST. PATIENT-LVL IV: CPT | Mod: PBBFAC,,, | Performed by: NURSE PRACTITIONER

## 2023-06-21 PROCEDURE — 1160F PR REVIEW ALL MEDS BY PRESCRIBER/CLIN PHARMACIST DOCUMENTED: ICD-10-PCS | Mod: CPTII,S$GLB,, | Performed by: NURSE PRACTITIONER

## 2023-06-21 PROCEDURE — 3074F SYST BP LT 130 MM HG: CPT | Mod: CPTII,S$GLB,, | Performed by: NURSE PRACTITIONER

## 2023-06-21 PROCEDURE — 3008F BODY MASS INDEX DOCD: CPT | Mod: CPTII,S$GLB,, | Performed by: NURSE PRACTITIONER

## 2023-06-21 PROCEDURE — 3078F DIAST BP <80 MM HG: CPT | Mod: CPTII,S$GLB,, | Performed by: NURSE PRACTITIONER

## 2023-06-21 PROCEDURE — 3044F PR MOST RECENT HEMOGLOBIN A1C LEVEL <7.0%: ICD-10-PCS | Mod: CPTII,S$GLB,, | Performed by: NURSE PRACTITIONER

## 2023-06-21 PROCEDURE — 1101F PT FALLS ASSESS-DOCD LE1/YR: CPT | Mod: CPTII,S$GLB,, | Performed by: NURSE PRACTITIONER

## 2023-06-21 PROCEDURE — 94621 CARDIOPULM EXERCISE TESTING: CPT | Mod: 26,,, | Performed by: INTERNAL MEDICINE

## 2023-06-21 PROCEDURE — 3008F PR BODY MASS INDEX (BMI) DOCUMENTED: ICD-10-PCS | Mod: CPTII,S$GLB,, | Performed by: NURSE PRACTITIONER

## 2023-06-21 PROCEDURE — 99215 OFFICE O/P EST HI 40 MIN: CPT | Mod: S$GLB,,, | Performed by: NURSE PRACTITIONER

## 2023-06-21 PROCEDURE — 3074F PR MOST RECENT SYSTOLIC BLOOD PRESSURE < 130 MM HG: ICD-10-PCS | Mod: CPTII,S$GLB,, | Performed by: NURSE PRACTITIONER

## 2023-06-21 PROCEDURE — 1159F PR MEDICATION LIST DOCUMENTED IN MEDICAL RECORD: ICD-10-PCS | Mod: CPTII,S$GLB,, | Performed by: NURSE PRACTITIONER

## 2023-06-21 PROCEDURE — 1160F RVW MEDS BY RX/DR IN RCRD: CPT | Mod: CPTII,S$GLB,, | Performed by: NURSE PRACTITIONER

## 2023-06-21 PROCEDURE — 99999 PR PBB SHADOW E&M-EST. PATIENT-LVL IV: ICD-10-PCS | Mod: PBBFAC,,, | Performed by: NURSE PRACTITIONER

## 2023-06-22 ENCOUNTER — TELEPHONE (OUTPATIENT)
Dept: NEUROLOGY | Facility: CLINIC | Age: 63
End: 2023-06-22
Payer: MEDICARE

## 2023-06-22 PROBLEM — R51.9 NONINTRACTABLE HEADACHE: Status: ACTIVE | Noted: 2021-08-10

## 2023-06-22 NOTE — PROGRESS NOTES
Session: Orientation   Cardiac Rehab Individual Treatment Plan - Initial Assessment      Patient Name: Jud Villa MRN: [id number]   : 1960   Age: 63 y.o.   Primary Diagnosis: CABG  Date of Event: 23  EF: 47%  Risk Stratification: high  Referring Physician: SUMIT ACKERMAN   Exercise Assessment:     CPX/TM Date: 23 Results   RHR 59   Max HR 81   Peak VO2 (CPX only) 12.1   Actual METS (CPX only) 3.5   Estimated METS 5.0     Anthropometrics    Height 62 inches   Weight 124 lbs   BMI 22.7   Abdominal Girth 34.2   Body Composition 13.9%     ST Depression noted on Stress Test?:No  Angina with exercise?: No   Fall Risk: No   Assistive Devices:  independent   Currently exercising? No   There were no limitations noted by the patient.      Exercise Plan:   Goals:  CR Exercise Goals: Attend Cardiac Rehab 3 times/week: In Progress  Home Aerobic Exercise: 2 additional days/week for 30-60 minutes: In Progress  Intensity of 12-15 on the Rate of Perceived Exertion (RPE) scale: In Progress  30% increase in entry estimated METS: 6.5 : In Progress  5 days/week for 30-60 minutes: In Progress    Intervention:   Discussed importance of regular attendance to cardiac rehab class    Exercise Prescription:  THR Range 72-78   Mode: Treadmill  Recumbent Bike  Upright Bike  Nustep  Elliptical   Frequency:  3 days/week   Duration:  30 - 60 minutes   Intensity:  12 - 15 RPE   Resistance Training:  Yes: 3 lb weights with 10-15 reps based on strength and range of motion assessment     Home Prescription:  Mode Aerobic   Frequency: 2- 3 days/week   Duration: 30-60 minutes   Resistance Training: None        Education:  Orientation to Equipment; verbalizes understanding; Date: 23  Exercise Recommendations; verbalizes understanding; Date: 23  Exercise Safety; verbalizes understanding; Date: 23  Class Preparation: verbalizes understanding; Date: 23  Signs and symptoms to report: verbalizes understanding; Date:  6-27-23  Caffeine/Hydration: verbalizes understanding; Date: 6-27-23  Exercise Terminology: verbalizes understanding; Date: 6-27-23  Resistance Training: verbalizes understanding; Date: 6-27-23    Comments:  Miss Renner was encouraged to begin thinking about some type of aerobic exercise she can participate in at least 2 non-rehab days per week for at least 30 minutes in addition to attending Phase II cardiac rehab classes 3 days per week.  She stated understanding.     All consent forms were signed, proper attire and shoes were discussed.       Miss Renner will begin Cardiac Rehab on Wednesday, July 5 at 10:00 am but would like a spot in the 9:00 am class.    The exercise prescription will be adjusted based on tolerance of exercise intensity by patient.    Glen Silveira., CEP
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
## 2023-06-22 NOTE — PROGRESS NOTES
HISTORY: S/P CABG (4-14-23), CAD, DM, HTN, HLP, COPD, SMOKER, HX OF CVA, EF=47%(4-3-23)    ANTHROPOMETRICS:     PRE   Height (in) 62   Weight (lb) 124   BMI 22.7   Abdominal Girth (in) 34.2   % Body Fat 13.9%       LAB RESULTS:    Lab Results   Component Value Date    HGB 7.9 (L) 04/22/2023     Lab Results   Component Value Date    HCT 25.1 (L) 04/22/2023     Lab Results   Component Value Date    MPV 9.1 (L) 04/22/2023       Lab Results   Component Value Date    CHOL 109 (L) 06/21/2023     Lab Results   Component Value Date    HDL 46 06/21/2023     Lab Results   Component Value Date    LDLCALC 45.4 (L) 06/21/2023     Lab Results   Component Value Date    TRIG 88 06/21/2023     Lab Results   Component Value Date    CHOLHDL 42.2 06/21/2023       No results found for: GLUF  Lab Results   Component Value Date    HGBA1C 6.9 (H) 04/11/2023        Lab Results   Component Value Date    HSCRP 6.18 (H) 06/21/2023         ELSIE SCORES:     PRE   Anxiety 6   Depression 10   Somatic 8   Hostility 1     SF-36 SCORES:     PRE   Physical Function 17   Social Function 9   Mental Health 18   Pain 9   Change in Health 5   Physical Role Limitation 0   Mental Role Limitation 0   Energy/Fatigue 12   Health Perceptions 14   Total Score 84     PHQ-9:  PHQ-9 Depression Patient Health Questionnaire 6/27/2023   Over the last two weeks how often have you been bothered by little interest or pleasure in doing things 1   Over the last two weeks how often have you been bothered by feeling down, depressed or hopeless 1   Over the last two weeks how often have you been bothered by trouble falling or staying asleep, or sleeping too much 3   Over the last two weeks how often have you been bothered by feeling tired or having little energy 3   Over the last two weeks how often have you been bothered by a poor appetite or overeating 3   Over the last two weeks how often have you been bothered by feeling bad about yourself - or that you are a failure or  have let yourself or your family down 0   Over the last two weeks how often have you been bothered by trouble concentrating on things, such as reading the newspaper or watching television 1   Over the last two weeks how often have you been bothered by moving or speaking so slowly that other people could have noticed. 0   Over the last two weeks how often have you been bothered by thoughts that you would be better off dead, or of hurting yourself 0   If you checked off any problems, how difficult have these problems made it for you to do your work, take care of things at home or get along with other people? Not difficult at all   Total Score 12             EDUCATION SCORES:     PRE   Education Score 40         stated

## 2023-06-23 ENCOUNTER — TELEPHONE (OUTPATIENT)
Dept: FAMILY MEDICINE | Facility: CLINIC | Age: 63
End: 2023-06-23
Payer: MEDICARE

## 2023-06-26 ENCOUNTER — TELEPHONE (OUTPATIENT)
Dept: CARDIAC REHAB | Facility: CLINIC | Age: 63
End: 2023-06-26
Payer: MEDICARE

## 2023-06-27 ENCOUNTER — CLINICAL SUPPORT (OUTPATIENT)
Dept: CARDIAC REHAB | Facility: CLINIC | Age: 63
End: 2023-06-27
Payer: MEDICARE

## 2023-06-27 DIAGNOSIS — I25.10 CORONARY ARTERY DISEASE, UNSPECIFIED VESSEL OR LESION TYPE, UNSPECIFIED WHETHER ANGINA PRESENT, UNSPECIFIED WHETHER NATIVE OR TRANSPLANTED HEART: ICD-10-CM

## 2023-06-27 DIAGNOSIS — Z95.1 POSTSURGICAL AORTOCORONARY BYPASS STATUS: ICD-10-CM

## 2023-06-27 DIAGNOSIS — F32.A DEPRESSION, UNSPECIFIED DEPRESSION TYPE: Primary | ICD-10-CM

## 2023-06-27 PROCEDURE — 99999 PR PBB SHADOW E&M-EST. PATIENT-LVL III: ICD-10-PCS | Mod: PBBFAC,,,

## 2023-06-27 PROCEDURE — 93798 PHYS/QHP OP CAR RHAB W/ECG: CPT | Mod: S$GLB,,, | Performed by: INTERNAL MEDICINE

## 2023-06-27 PROCEDURE — 93798 PR CARDIAC REHAB/MONITOR: ICD-10-PCS | Mod: S$GLB,,, | Performed by: INTERNAL MEDICINE

## 2023-06-27 PROCEDURE — 99999 PR PBB SHADOW E&M-EST. PATIENT-LVL III: CPT | Mod: PBBFAC,,,

## 2023-06-27 NOTE — PROGRESS NOTES
Session: Orientation   Cardiac Rehab Individual Treatment Plan - Initial Assessment      Patient Name: Jud Villa MRN: [id number]   : 1960   Age: 63 y.o.   Date of Event: 2023   Primary Diagnosis: CABG    EF: 47%    Physical Assessment:   There were no vitals taken for this visit.    ASSESSMENT:  Heart Sounds: regular rate and rhythm  Prosthetic Valve: No  Lung Sounds: clear  Capillary Refill: normal  Left Radial Pulse: Normal (+2)  Right Radial Pulse: Normal (+2)  Left Pedal Pulse: Normal (+2)  Right Pedal Pulse: Normal (+2)  Right Edema: none  Left Edema none  Strength: normal  Range of Motion: full range of motion  Existing Limitations:      Site   [] Arthritis, bursitis    [] Amputation, atrophy    [x] Other: Mastectomy left breast   []       Diabetic patient's foot examination comments: Normal -  Bilateral  Incisional site: healing well  Special needs: none    Psychosocial Assessment:   Outcome Survey Tools:    ELSIE SCORES:   PRE   Anxiety 6   Depression 10   Somatic 8   Hostility 1     SF-36 SCORES:   PRE   Physical Function 17   Social Function 9   Mental Health 18   Pain 9   Change in Health 5   Physical Role Limitation 0   Mental Role Limitation 0   Energy/Fatigue 12   Health Perceptions 14   Total Score 84     PHQ-9:  PHQ-9 Depression Patient Health Questionnaire 2023   Over the last two weeks how often have you been bothered by little interest or pleasure in doing things 1   Over the last two weeks how often have you been bothered by feeling down, depressed or hopeless 1   Over the last two weeks how often have you been bothered by trouble falling or staying asleep, or sleeping too much 3   Over the last two weeks how often have you been bothered by feeling tired or having little energy 3   Over the last two weeks how often have you been bothered by a poor appetite or overeating 3   Over the last two weeks how often have you been bothered by feeling bad about yourself - or that you  are a failure or have let yourself or your family down 0   Over the last two weeks how often have you been bothered by trouble concentrating on things, such as reading the newspaper or watching television 1   Over the last two weeks how often have you been bothered by moving or speaking so slowly that other people could have noticed. 0   Over the last two weeks how often have you been bothered by thoughts that you would be better off dead, or of hurting yourself 0   If you checked off any problems, how difficult have these problems made it for you to do your work, take care of things at home or get along with other people? Not difficult at all   Total Score 12              Living Arrangements: Lives alone  Family Support: family  Self Reported: Anxiety and Depression  Displays: calmness  Medication: not applicable    Psychosocial Plan:   Goals:  Improved psychosocial coping strategies  Reduce manifestation of depression  Maintain positive support system  Maintain positive outlook  Improve overall quality of life    Interventions/Recommendations:  Discussed Results of Surveys  Patient to Self Report Emotional Changes at Session Check In  Recommend Physical Activity  Recommend Attending Education Lectures  Notify MD: No  Program Referral: Yes  Pharmaceutical Intervention/Therapy: No  Other Needs:  patient reports that she is prescribed Zoloft, but is no longer taking it.  Stage of Readiness to Change: Contemplation    Education:  Stress Management; verbalizes understanding; Date: 6/27/2023  Depression; verbalizes understanding; Date: 6/27/2023  Stress; verbalizes understanding; Date: 6/27/2023    Comments:  Patient reports to having issues with anxiety & depression.  PHQ scores indicate moderate depression.  Referral for behavioral medicine made & gave patient brochure for mental wellness program.  She states that she was taking Zoloft, but just decided to stop for unknown reason.  Spoke with patient about possibly  restarting Zoloft.  Encouraged for patient to follow up with behavioral medicine.  She has been instructed to notify staff in the event that circumstances worsen.  Patient verbalizes understanding.    Other Core Components/Risk Factors Assessment:   RISK FACTORS:  diabetes, hyperlipidemia, sedentary lifestyle, tobacco use, stress    Learning Barriers: None    Education Level:  Unknown    Pre-test Score: 40    Medication Compliance: has been compliant with taking medications    Other Core Components/Risk Factors Plan:   Goals:  Increase exercise tolerance: In Progress  Increase knowledge of CAD: In Progress  Identify and manage personal areas of stress: In Progress  Control diabetes by adjusting diet and exercise: In Progress  Learn more about healthy eating: In Progress    Interventions/Recommendations:  Recommend regular attendance for Cardiac Rehab: Exercise and Education Lectures  Encourage medication compliance  Individual Education/ Counseling: Yes  Physician Referral: No    Education:    diabetes, verbalizes understanding; Date: 6/27/2023  risk factors, verbalizes understanding; Date: 6/27/2023  smoking, verbalizes understanding; Date: 6/27/2023  stress, verbalizes understanding; Date: 6/27/2023         Education method adapted to patients education level and preferred method of learning.  Method: explanation    Comments:  Patient will be working on decreasing risk factors for CAD.    Other Core Components/Hypertension Assessment:   Resting BP: 102/58  BP Readings from Last 1 Encounters:   06/21/23 90/71     BP Diagnosis: Normotensive  Patient reported symptoms: none    Other Core Components/Hypertension Plan:   Goals:  Blood Pressure <130/80    Interventions/Recommendations:  Med Card Reconciled: Yes  Encourage medication compliance  Encourage sodium reduction  Recommend physical activity  Educate on contributory factors  Reduce stress, anxiety, anger, depression, and/or chronic pain  Encourage home blood  pressure monitoring  Recommend daily weights    Education:    Coronary Artery Disease; verbalizes understanding; Date: 6/27/2023  Risk Factors; verbalizes understanding; Date: 6/27/2023  Stroke; verbalizes understanding; Date: 6/27/2023  Stress; verbalizes understanding; Date: 6/27/2023         Comments:  Patient will be implementing recommendations to decrease risk factors for CAD.    Does the patient have Heart Failure?     Other Core Components/Tobacco Cessation Assessment:   Smoking Status: past smoker who quit 3/2023  Smoking Cessation Barriers:  N/A  Stage of Readiness to Change: Maintenance    Other Core Components/Tobacco Cessation Plan:   Goals:  Maintain non-smoking status    Interventions:  Maintains non-smoking status    Education:    Risk Factors; verbalizes understanding; Date: 6/27/2023         Comments:  Patient states that she is smoke free.  She states that she does still experience cravings, but remembers how painful her CABG was & will not go back to smoking.  Offered smoking cessation referral, but patient declines.    Discussed Cardiac Rehab program in depth with patient.  Medication list updated per patient & marked as reviewed.  Patient has been instructed to notify staff of any problems while attending rehab (ie: chest pain, shortness of breath, lightheadedness, dizziness).  Patient has been instructed to monitor blood pressure readings outside of rehab & to keep a daily log of the readings.  Patient verbalizes understanding.     Agnieszka Pereira RN  Cardiac Rehab Nurse

## 2023-06-27 NOTE — PATIENT INSTRUCTIONS
HISTORY: S/P CABG (4-14-23), CAD, DM, HTN, HLP, COPD, SMOKER, HX OF CVA, EF=47%(4-3-23)    ANTHROPOMETRICS:     PRE   Height (in) 62   Weight (lb) 124   BMI 22.7   Abdominal Girth (in) 34.2   % Body Fat 13.9%       LAB RESULTS:    Lab Results   Component Value Date    HGB 7.9 (L) 04/22/2023     Lab Results   Component Value Date    HCT 25.1 (L) 04/22/2023     Lab Results   Component Value Date    MPV 9.1 (L) 04/22/2023       Lab Results   Component Value Date    CHOL 109 (L) 06/21/2023     Lab Results   Component Value Date    HDL 46 06/21/2023     Lab Results   Component Value Date    LDLCALC 45.4 (L) 06/21/2023     Lab Results   Component Value Date    TRIG 88 06/21/2023     Lab Results   Component Value Date    CHOLHDL 42.2 06/21/2023       No results found for: GLUF  Lab Results   Component Value Date    HGBA1C 6.9 (H) 04/11/2023        Lab Results   Component Value Date    HSCRP 6.18 (H) 06/21/2023         ELSIE SCORES:     PRE   Anxiety 6   Depression 10   Somatic 8   Hostility 1     SF-36 SCORES:     PRE   Physical Function 17   Social Function 9   Mental Health 18   Pain 9   Change in Health 5   Physical Role Limitation 0   Mental Role Limitation 0   Energy/Fatigue 12   Health Perceptions 14   Total Score 84     PHQ-9:  PHQ-9 Depression Patient Health Questionnaire 6/27/2023   Over the last two weeks how often have you been bothered by little interest or pleasure in doing things 1   Over the last two weeks how often have you been bothered by feeling down, depressed or hopeless 1   Over the last two weeks how often have you been bothered by trouble falling or staying asleep, or sleeping too much 3   Over the last two weeks how often have you been bothered by feeling tired or having little energy 3   Over the last two weeks how often have you been bothered by a poor appetite or overeating 3   Over the last two weeks how often have you been bothered by feeling bad about yourself - or that you are a failure or  have let yourself or your family down 0   Over the last two weeks how often have you been bothered by trouble concentrating on things, such as reading the newspaper or watching television 1   Over the last two weeks how often have you been bothered by moving or speaking so slowly that other people could have noticed. 0   Over the last two weeks how often have you been bothered by thoughts that you would be better off dead, or of hurting yourself 0   If you checked off any problems, how difficult have these problems made it for you to do your work, take care of things at home or get along with other people? Not difficult at all   Total Score 12             EDUCATION SCORES:     PRE   Education Score 40

## 2023-06-30 ENCOUNTER — TELEPHONE (OUTPATIENT)
Dept: CARDIAC REHAB | Facility: CLINIC | Age: 63
End: 2023-06-30
Payer: MEDICARE

## 2023-06-30 NOTE — TELEPHONE ENCOUNTER
I attempted to contact Miss Renner about moving her cardiac rehab class time to 9:00 am.  A message was left.

## 2023-07-10 ENCOUNTER — OFFICE VISIT (OUTPATIENT)
Dept: ELECTROPHYSIOLOGY | Facility: CLINIC | Age: 63
End: 2023-07-10
Payer: MEDICARE

## 2023-07-10 ENCOUNTER — HOSPITAL ENCOUNTER (OUTPATIENT)
Dept: CARDIOLOGY | Facility: CLINIC | Age: 63
Discharge: HOME | End: 2023-07-10
Payer: MEDICARE

## 2023-07-10 VITALS
HEIGHT: 62 IN | HEART RATE: 69 BPM | WEIGHT: 129.19 LBS | DIASTOLIC BLOOD PRESSURE: 72 MMHG | BODY MASS INDEX: 23.77 KG/M2 | SYSTOLIC BLOOD PRESSURE: 100 MMHG

## 2023-07-10 DIAGNOSIS — G62.0 DRUG-INDUCED POLYNEUROPATHY: ICD-10-CM

## 2023-07-10 DIAGNOSIS — I49.8 OTHER CARDIAC ARRHYTHMIA: ICD-10-CM

## 2023-07-10 DIAGNOSIS — Z86.73 HISTORY OF CVA (CEREBROVASCULAR ACCIDENT): ICD-10-CM

## 2023-07-10 DIAGNOSIS — F33.41 RECURRENT MAJOR DEPRESSIVE DISORDER, IN PARTIAL REMISSION: ICD-10-CM

## 2023-07-10 DIAGNOSIS — Z79.4 TYPE 2 DIABETES MELLITUS WITH OTHER CIRCULATORY COMPLICATION, WITH LONG-TERM CURRENT USE OF INSULIN: ICD-10-CM

## 2023-07-10 DIAGNOSIS — C77.3 BREAST CANCER METASTASIZED TO AXILLARY LYMPH NODE, LEFT: ICD-10-CM

## 2023-07-10 DIAGNOSIS — Z95.1 HX OF CABG: ICD-10-CM

## 2023-07-10 DIAGNOSIS — J44.9 CHRONIC OBSTRUCTIVE PULMONARY DISEASE, UNSPECIFIED COPD TYPE: ICD-10-CM

## 2023-07-10 DIAGNOSIS — I97.89 POSTOPERATIVE ATRIAL FIBRILLATION: Primary | ICD-10-CM

## 2023-07-10 DIAGNOSIS — Z95.1 S/P CABG X 2: ICD-10-CM

## 2023-07-10 DIAGNOSIS — C50.912 BREAST CANCER METASTASIZED TO AXILLARY LYMPH NODE, LEFT: ICD-10-CM

## 2023-07-10 DIAGNOSIS — I48.91 POSTOPERATIVE ATRIAL FIBRILLATION: Primary | ICD-10-CM

## 2023-07-10 DIAGNOSIS — E11.59 TYPE 2 DIABETES MELLITUS WITH OTHER CIRCULATORY COMPLICATION, WITH LONG-TERM CURRENT USE OF INSULIN: ICD-10-CM

## 2023-07-10 DIAGNOSIS — M81.0 AGE-RELATED OSTEOPOROSIS WITHOUT CURRENT PATHOLOGICAL FRACTURE: ICD-10-CM

## 2023-07-10 DIAGNOSIS — E78.00 PURE HYPERCHOLESTEROLEMIA: ICD-10-CM

## 2023-07-10 DIAGNOSIS — I25.10 CORONARY ARTERY DISEASE INVOLVING NATIVE CORONARY ARTERY OF NATIVE HEART WITHOUT ANGINA PECTORIS: ICD-10-CM

## 2023-07-10 DIAGNOSIS — Z87.891 HISTORY OF TOBACCO ABUSE: ICD-10-CM

## 2023-07-10 PROCEDURE — 1159F MED LIST DOCD IN RCRD: CPT | Mod: HCNC,CPTII,S$GLB, | Performed by: STUDENT IN AN ORGANIZED HEALTH CARE EDUCATION/TRAINING PROGRAM

## 2023-07-10 PROCEDURE — 1101F PT FALLS ASSESS-DOCD LE1/YR: CPT | Mod: HCNC,CPTII,S$GLB, | Performed by: STUDENT IN AN ORGANIZED HEALTH CARE EDUCATION/TRAINING PROGRAM

## 2023-07-10 PROCEDURE — 3288F FALL RISK ASSESSMENT DOCD: CPT | Mod: HCNC,CPTII,S$GLB, | Performed by: STUDENT IN AN ORGANIZED HEALTH CARE EDUCATION/TRAINING PROGRAM

## 2023-07-10 PROCEDURE — 3074F SYST BP LT 130 MM HG: CPT | Mod: HCNC,CPTII,S$GLB, | Performed by: STUDENT IN AN ORGANIZED HEALTH CARE EDUCATION/TRAINING PROGRAM

## 2023-07-10 PROCEDURE — 3044F HG A1C LEVEL LT 7.0%: CPT | Mod: HCNC,CPTII,S$GLB, | Performed by: STUDENT IN AN ORGANIZED HEALTH CARE EDUCATION/TRAINING PROGRAM

## 2023-07-10 PROCEDURE — 93005 ELECTROCARDIOGRAM TRACING: CPT | Mod: HCNC,S$GLB,, | Performed by: STUDENT IN AN ORGANIZED HEALTH CARE EDUCATION/TRAINING PROGRAM

## 2023-07-10 PROCEDURE — 99999 PR PBB SHADOW E&M-EST. PATIENT-LVL IV: CPT | Mod: PBBFAC,HCNC,, | Performed by: STUDENT IN AN ORGANIZED HEALTH CARE EDUCATION/TRAINING PROGRAM

## 2023-07-10 PROCEDURE — 3044F PR MOST RECENT HEMOGLOBIN A1C LEVEL <7.0%: ICD-10-PCS | Mod: HCNC,CPTII,S$GLB, | Performed by: STUDENT IN AN ORGANIZED HEALTH CARE EDUCATION/TRAINING PROGRAM

## 2023-07-10 PROCEDURE — 1101F PR PT FALLS ASSESS DOC 0-1 FALLS W/OUT INJ PAST YR: ICD-10-PCS | Mod: HCNC,CPTII,S$GLB, | Performed by: STUDENT IN AN ORGANIZED HEALTH CARE EDUCATION/TRAINING PROGRAM

## 2023-07-10 PROCEDURE — 3008F PR BODY MASS INDEX (BMI) DOCUMENTED: ICD-10-PCS | Mod: HCNC,CPTII,S$GLB, | Performed by: STUDENT IN AN ORGANIZED HEALTH CARE EDUCATION/TRAINING PROGRAM

## 2023-07-10 PROCEDURE — 93005 RHYTHM STRIP: ICD-10-PCS | Mod: HCNC,S$GLB,, | Performed by: STUDENT IN AN ORGANIZED HEALTH CARE EDUCATION/TRAINING PROGRAM

## 2023-07-10 PROCEDURE — 93010 RHYTHM STRIP: ICD-10-PCS | Mod: HCNC,S$GLB,, | Performed by: INTERNAL MEDICINE

## 2023-07-10 PROCEDURE — 99214 PR OFFICE/OUTPT VISIT, EST, LEVL IV, 30-39 MIN: ICD-10-PCS | Mod: HCNC,S$GLB,, | Performed by: STUDENT IN AN ORGANIZED HEALTH CARE EDUCATION/TRAINING PROGRAM

## 2023-07-10 PROCEDURE — 3008F BODY MASS INDEX DOCD: CPT | Mod: HCNC,CPTII,S$GLB, | Performed by: STUDENT IN AN ORGANIZED HEALTH CARE EDUCATION/TRAINING PROGRAM

## 2023-07-10 PROCEDURE — 3288F PR FALLS RISK ASSESSMENT DOCUMENTED: ICD-10-PCS | Mod: HCNC,CPTII,S$GLB, | Performed by: STUDENT IN AN ORGANIZED HEALTH CARE EDUCATION/TRAINING PROGRAM

## 2023-07-10 PROCEDURE — 3078F DIAST BP <80 MM HG: CPT | Mod: HCNC,CPTII,S$GLB, | Performed by: STUDENT IN AN ORGANIZED HEALTH CARE EDUCATION/TRAINING PROGRAM

## 2023-07-10 PROCEDURE — 99214 OFFICE O/P EST MOD 30 MIN: CPT | Mod: HCNC,S$GLB,, | Performed by: STUDENT IN AN ORGANIZED HEALTH CARE EDUCATION/TRAINING PROGRAM

## 2023-07-10 PROCEDURE — 93010 ELECTROCARDIOGRAM REPORT: CPT | Mod: HCNC,S$GLB,, | Performed by: INTERNAL MEDICINE

## 2023-07-10 PROCEDURE — 3074F PR MOST RECENT SYSTOLIC BLOOD PRESSURE < 130 MM HG: ICD-10-PCS | Mod: HCNC,CPTII,S$GLB, | Performed by: STUDENT IN AN ORGANIZED HEALTH CARE EDUCATION/TRAINING PROGRAM

## 2023-07-10 PROCEDURE — 1159F PR MEDICATION LIST DOCUMENTED IN MEDICAL RECORD: ICD-10-PCS | Mod: HCNC,CPTII,S$GLB, | Performed by: STUDENT IN AN ORGANIZED HEALTH CARE EDUCATION/TRAINING PROGRAM

## 2023-07-10 PROCEDURE — 99999 PR PBB SHADOW E&M-EST. PATIENT-LVL IV: ICD-10-PCS | Mod: PBBFAC,HCNC,, | Performed by: STUDENT IN AN ORGANIZED HEALTH CARE EDUCATION/TRAINING PROGRAM

## 2023-07-10 PROCEDURE — 3078F PR MOST RECENT DIASTOLIC BLOOD PRESSURE < 80 MM HG: ICD-10-PCS | Mod: HCNC,CPTII,S$GLB, | Performed by: STUDENT IN AN ORGANIZED HEALTH CARE EDUCATION/TRAINING PROGRAM
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
## 2023-07-12 ENCOUNTER — CLINICAL SUPPORT (OUTPATIENT)
Dept: CARDIAC REHAB | Facility: CLINIC | Age: 63
End: 2023-07-12
Payer: MEDICARE

## 2023-07-12 DIAGNOSIS — I25.10 CORONARY ATHEROSCLEROSIS OF NATIVE CORONARY ARTERY: ICD-10-CM

## 2023-07-12 DIAGNOSIS — Z95.1 POSTSURGICAL AORTOCORONARY BYPASS STATUS: Primary | ICD-10-CM

## 2023-07-12 DIAGNOSIS — E11.8 DIABETIC COMPLICATION: Primary | ICD-10-CM

## 2023-07-12 PROCEDURE — 93798 PHYS/QHP OP CAR RHAB W/ECG: CPT | Mod: HCNC,S$GLB,, | Performed by: INTERNAL MEDICINE

## 2023-07-12 PROCEDURE — 93798 PR CARDIAC REHAB/MONITOR: ICD-10-PCS | Mod: HCNC,S$GLB,, | Performed by: INTERNAL MEDICINE

## 2023-07-12 NOTE — PROGRESS NOTES
Orientation   Cardiac Rehab Individual Treatment Plan - Initial Assessment      Patient Name: Jud Villa MRN: [id number]   : 1960   Age: 63 y.o.   Primary Diagnosis: s/p CABG, CAD, h/o CVA, HLD, COPD, DM    Nutrition Assessment:     Anthropometrics    Height 62 inches   Weight 124 lbs   BMI 22.7   Abdominal Girth 34.2   Body Fat 13.9     Drug Allergies and Intolerances:  Review of patient's allergies indicates:  No Known Allergies    Food Allergies and Intolerances:  NKA    Past Medical History:  Past Medical History:   Diagnosis Date    Asthma     Breast carcinoma     Cataract     Coronary artery disease of native heart with stable angina pectoris 2023    Diabetes mellitus     Hyperlipidemia     Moderate episode of recurrent major depressive disorder 2021    Osteoporosis     Stroke 2023    left PCA       Past Surgical History:  Past Surgical History:   Procedure Laterality Date    BILATERAL MASTECTOMY  2018    CHOLECYSTECTOMY      COLONOSCOPY N/A 10/27/2015    Procedure: COLONOSCOPY;  Surgeon: Johnny Hurley MD;  Location: [facility];  Service: Endoscopy;  Laterality: N/A;    CORONARY ANGIOGRAPHY N/A 2023    Procedure: ANGIOGRAM, CORONARY ARTERY;  Surgeon: Francisco Barahona MD;  Location: [facility] CATH LAB;  Service: Cardiology;  Laterality: N/A;    CORONARY ANGIOPLASTY      CORONARY ARTERY BYPASS GRAFT  2023    LIMA to LAD, SVG to OM    CORONARY ARTERY BYPASS GRAFT (CABG) N/A 2023    Procedure: CORONARY ARTERY BYPASS GRAFT (CABG) x 2;  Surgeon: Igor Kahn MD;  Location: [address];  Service: Cardiothoracic;  Laterality: N/A;    ENDOSCOPIC HARVEST OF VEIN Left 2023    Procedure: SURGICAL PROCUREMENT, VEIN, ENDOSCOPIC;  Surgeon: Igor Kahn MD;  Location: [address];  Service: Cardiothoracic;  Laterality: Left;  Vein harvest start - stop: 817  Vein prep start - stop: 857    ESOPHAGOGASTRODUODENOSCOPY N/A 2022     "Procedure: ESOPHAGOGASTRODUODENOSCOPY (EGD);  Surgeon: Mili Katz MD;  Location: [facility] ENDO (4TH FLR);  Service: Endoscopy;  Laterality: N/A;  rapid in AM, instr portal -ml    HYSTERECTOMY      LASER PERIPHERAL IRIDOTOMY Bilateral 2019        LEFT HEART CATHETERIZATION Left 04/05/2023    Procedure: Left heart cath;  Surgeon: Francisco Barahona MD;  Location: [facility] CATH LAB;  Service: Cardiology;  Laterality: Left;       Medications:  Current Outpatient Medications   Medication Sig    acetaminophen (TYLENOL) 500 MG tablet Take 2 tablets (1,000 mg total) by mouth every 6 (six) hours as needed for Pain.    albuterol (PROVENTIL) 2.5 mg /3 mL (0.083 %) nebulizer solution INHALE 1 VIAL PER NEBULIZER FOUR TIMES DAILY AS NEEDED FOR SHORTNESS OF BREATH/ WHEEZING    albuterol (VENTOLIN HFA) 90 mcg/actuation inhaler INHALE 2 PUFFS BY MOUTH INTO THE LUNGS EVERY 6 HOURS AS NEEDED FOR WHEEZING    apixaban (ELIQUIS) 5 mg Tab Take 1 tablet (5 mg total) by mouth 2 (two) times daily.    aspirin 81 MG Chew Take 81 mg by mouth once daily.    atorvastatin (LIPITOR) 80 MG tablet Take 1 tablet (80 mg total) by mouth every evening.    BD ULTRA-FINE SHORT PEN NEEDLE 31 gauge x 5/16" Ndle Inject 1 pen into the skin once daily.    clonazePAM (KLONOPIN) 0.5 MG tablet TAKE 1 TABLET(0.5 MG) BY MOUTH EVERY NIGHT AS NEEDED FOR ANXIETY    ergocalciferol (ERGOCALCIFEROL) 50,000 unit Cap TAKE 1 CAPSULE BY MOUTH EVERY 7 DAYS    fluticasone propionate (FLONASE) 50 mcg/actuation nasal spray SHAKE LIQUID AND USE 1 SPRAY(50 MCG) IN EACH NOSTRIL EVERY DAY    insulin (LANTUS SOLOSTAR U-100 INSULIN) glargine 100 units/mL SubQ pen Inject 10 Units into the skin every evening.    JARDIANCE 10 mg tablet TAKE 1 TABLET(10 MG) BY MOUTH EVERY DAY    meloxicam (MOBIC) 15 MG tablet Take 1 tablet (15 mg total) by mouth once daily.    metFORMIN (GLUCOPHAGE) 1000 MG tablet Take 1 tablet (1,000 mg total) by mouth 2 (two) times daily with meals.    " metoprolol tartrate (LOPRESSOR) 50 MG tablet Take 1 tablet (50 mg total) by mouth 2 (two) times daily.    nicotine (NICODERM CQ) 21 mg/24 hr PLACE 1 PATCH ONTO THE SKIN DAILY    pantoprazole (PROTONIX) 40 MG tablet Take 1 tablet (40 mg total) by mouth before breakfast.    scopolamine (TRANSDERM-SCOP) 1.3-1.5 mg (1 mg over 3 days) Place 1 patch onto the skin Every 3 (three) days.    sucralfate (CARAFATE) 1 gram tablet TAKE 1 TABLET(1 GRAM) BY MOUTH THREE TIMES DAILY BEFORE MEALS    topiramate (TOPAMAX) 50 MG tablet Take 2 tablets (100 mg total) by mouth 2 (two) times daily. TAKE 1 TABLET BY MOUTH TWICE DAILY FOR 7 DAYS, THEN 1 TABLET EVERY MORNING AND 2 TABLETS EVERY EVENING FOR 7 DAYS, THEN 2 TABLETS TWICE DAILY    TRELEGY ELLIPTA 100-62.5-25 mcg DsDv INHALE 1 PUFF INTO THE LUNGS EVERY DAY    TRUE METRIX GLUCOSE TEST STRIP Strp USE AS DIRECTED FOUR TIMES DAILY    sertraline (ZOLOFT) 50 MG tablet Take 1 tablet (50 mg total) by mouth once daily.    TRADJENTA 5 mg Tab tablet Take 1 tablet (5 mg total) by mouth once daily.     No current facility-administered medications for this visit.       Vitamins and Supplements:  Vit D weekly    Labs:  Patient confirms she is taking lipitor 80mg for cholesterol control.    Lab Results   Component Value Date    CHOL 109 (L) 06/21/2023     Lab Results   Component Value Date    HDL 46 06/21/2023     Lab Results   Component Value Date    LDLCALC 45.4 (L) 06/21/2023     Lab Results   Component Value Date    TRIG 88 06/21/2023     Lab Results   Component Value Date    CHOLHDL 42.2 06/21/2023         No results found for: GLUF  Lab Results   Component Value Date    HGBA1C 6.9 (H) 04/11/2023       Nutrition/Diet History:  Patient eats 2 meals daily.    [employer] food with garlic/onion/salt/pepper.  Patient admits to use of a salt shaker at the table on prepared foods.   Dines out 3-4 per week at restaurants such as [employer].    Chooses fried foods 2-3 time(s) per week.    Chooses fish  rarely (dislikes)  Beverages:  water and soda  Alcohol: none    24 Hour Recall:  Breakfast: egg sausage biscuit, coffee with cream and sugar ([employer])  Lunch:cheeseburger with pickle/onion/lettuce/tomato, fries, coke ([employer])  Dinner:chicken noodle soup  Other: avocado    Difficulty Chewing or Swallowing: No  Current Exercise: See Exercise Physiologist Note  Food Safety/Food Preparation: self  Living Arrangements/Family Support: Lives alone  Cultural/Spiritual/Personal Preferences: not applicable   Barriers to Education: none identified  Stage of Change Related to Diet Habits: Contemplation    Nutrition Diagnosis:  Food and nutrition related knowledge deficit related to the lack of prior nutrition education as evidenced by diet history and 24 hour recall    Nutrition Plan:   Goals:  LDL-C < 70 (for high risk patients)  Hgb A1c < 7%  BMI < 25 and abdominal girth < 40M/<35 F  2 gram sodium, Mediterranean diet  Rehab weight goal: maintenance  Fish intake (non-fried varieties) to a goal of 2-3 servings per week.   Increase fruit and vegetable intake    Interventions/Recommendations:  Lab results reviewed and discussed  Nutrition Prescription:  Total Energy Estimated Needs: 8997-5023 Kcal/d for weight maintenance  Method for Estimating Needs: 25-30kcal/kg  Total Protein Estimated Needs: 45-68 g/d  Method for Estimating Needs: 0.8-1.2 g/Kg BW  Total Fluid Estimated Needs: 1 mL/Kcal  Dietitian Consult: No  Patient to participate in Cardiac Rehab sessions three times a week  Weekly Dietitian Weight Check  Encouraged patient to complete 3 day food diary  Follow Up Plan for Ongoing Self-Management Support    Education:  Mediterranean Diet; verbalizes understanding; Date: 7/12/23  Person taught: patient  Preferred Learning Method: Verbal, Written  Education Needed/Provided: Nutrition counseling and education related to cardiac rehabilitation  Education Method: Weekly nutrition lectures on the Mediterranean diet, cooking,  shopping, and dining out  Written Materials Provided: 3 Day Food Record, Introduction to Mediterranean Diet  Strategies Implemented: Motivational interviewing, Goal setting, Self-Monitoring, and Problem Solving    Comments:   Discussed ways to incorporate healthy snacks, eating on a schedule, and monitoring sodium intake for heart health.  Encouraged less fast food/fried foods, more meal prep at home, reduced soda intake    Diabetes  Is the patient diabetic? Yes   Other Core Components/Diabetes Assessment:   Labs:  No results found for: GLUF  Lab Results   Component Value Date    HGBA1C 6.9 (H) 04/11/2023    HGBA1C 6.8 (H) 03/14/2023    HGBA1C 7.2 (H) 06/06/2022      Lab Results   Component Value Date    ESTIMATEDAVG 151 (H) 04/11/2023    ESTIMATEDAVG 160 (H) 06/06/2022    ESTIMATEDAVG 157 (H) 01/19/2022       History of diabetes since 2008  Diabetes medications: Tradjenta 5mg daily, Jardiance 10mg daily, metformin 1000mg BID, lantus 10u @[facility]  Blood Glucose Checks at Home: Yes: Other: TID  Typical morning range: 110-130 mg/dl  Endocrinologist or PCP following DM: PCP-changing no set endocrinologist yet    Other Core Components/Diabetes Plan:   Goals:  Hgb A1c < 7%  Exercise Blood Glucose: 100-300 mg/dl    Interventions:  Reviewed and Discussed Labs  Medication Compliance  Med Card Reconciled  Low Sodium, Mediterranean, ADA Diet  Physical Activity  Increase Knowledge of Contributory Factors  Referral to Diabetes Education    Education:  Mediterranean Diet; verbalizes understanding; Date: 7/12/23    Comments:   Patient verbalizes understanding to bring home glucometer and check glucose pre and post each exercise session.  Per cardiac rehab protocols, patient's glucose must be between 90 and 270 mg/dL to exercise.  Patient denies any recent glucose levels less than 60 mg/dL or greater than 300 mg/dL. Patient verbalizes importance of notifying rehab staff if symptoms of hypoglycemia occur while at cardiac rehab.   Abnormal labs will be reported to patient's PCP/Endocrinologist by rehab staff.    Noted updated glucose parameters of 100-300    RD contact information provided.      Madai Valladares MS, RDN/LDN

## 2023-07-14 DIAGNOSIS — E11.9 TYPE 2 DIABETES MELLITUS WITHOUT COMPLICATION, WITHOUT LONG-TERM CURRENT USE OF INSULIN: ICD-10-CM

## 2023-07-14 NOTE — TELEPHONE ENCOUNTER
Care Due:                  Date            Visit Type   Department     Provider  --------------------------------------------------------------------------------                                EP -                              PRIMARY      [facility]  Last Visit: 03-      CARE ([facility])   MEDICINE       Bo Lopez  Next Visit: None Scheduled  None         None Found                                                            Last  Test          Frequency    Reason                     Performed    Due Date  --------------------------------------------------------------------------------    HBA1C.......  6 months...  TRINH KEN,      04-   10-                             insulin, metFORMIN.......    [employer] Embedded Care Due Messages. Reference number: [id number].   7/14/2023 5:00:03 PM CDT

## 2023-07-17 ENCOUNTER — TELEPHONE (OUTPATIENT)
Dept: ELECTROPHYSIOLOGY | Facility: CLINIC | Age: 63
End: 2023-07-17
Payer: MEDICARE

## 2023-07-17 ENCOUNTER — CLINICAL SUPPORT (OUTPATIENT)
Dept: CARDIAC REHAB | Facility: CLINIC | Age: 63
End: 2023-07-17
Payer: MEDICARE

## 2023-07-17 DIAGNOSIS — I48.0 PAROXYSMAL ATRIAL FIBRILLATION: Primary | ICD-10-CM

## 2023-07-17 DIAGNOSIS — Z95.1 POSTSURGICAL AORTOCORONARY BYPASS STATUS: Primary | ICD-10-CM

## 2023-07-17 DIAGNOSIS — I25.10 ATHEROSCLEROSIS OF NATIVE CORONARY ARTERY OF NATIVE HEART WITHOUT ANGINA PECTORIS: ICD-10-CM

## 2023-07-17 PROCEDURE — 93798 PR CARDIAC REHAB/MONITOR: ICD-10-PCS | Mod: HCNC,S$GLB,, | Performed by: INTERNAL MEDICINE

## 2023-07-17 PROCEDURE — 93798 PHYS/QHP OP CAR RHAB W/ECG: CPT | Mod: HCNC,S$GLB,, | Performed by: INTERNAL MEDICINE

## 2023-07-17 RX ORDER — LINAGLIPTIN 5 MG/1
TABLET, FILM COATED ORAL
Qty: 90 TABLET | Refills: 0 | Status: SHIPPED | OUTPATIENT
Start: 2023-07-17 | End: 2023-10-30

## 2023-07-17 NOTE — TELEPHONE ENCOUNTER
Colleague Coverage while PCP out.   Chart Reviewed. Limited Refill.   Patient due for appt, routed to staff to schedule.

## 2023-07-17 NOTE — TELEPHONE ENCOUNTER
----- Message from Rl Zepeda MA sent at 7/17/2023  2:35 PM CDT -----  Regarding: FW: palpitations    ----- Message -----  From: Dee Ortiz  Sent: 7/17/2023   2:30 PM CDT  To: [facility] Staff  Subject: palpitations                                     The pt is calling to report that her heart is palpitating.  Please call her back @ [phone]. Thanks, Dee

## 2023-07-17 NOTE — TELEPHONE ENCOUNTER
Spoke with patient. She states that her palpitations have increased over the last few days. She is no longer wearing the 30 day monitor but has not mailed it back yet. Will get an EKG in am and call her once we see the results. She will mail back the 30 day monitor.

## 2023-07-18 ENCOUNTER — HOSPITAL ENCOUNTER (OUTPATIENT)
Dept: CARDIOLOGY | Facility: CLINIC | Age: 63
Discharge: HOME | End: 2023-07-18
Payer: MEDICARE

## 2023-07-18 ENCOUNTER — TELEPHONE (OUTPATIENT)
Dept: ELECTROPHYSIOLOGY | Facility: CLINIC | Age: 63
End: 2023-07-18
Payer: MEDICARE

## 2023-07-18 DIAGNOSIS — I48.0 PAROXYSMAL ATRIAL FIBRILLATION: ICD-10-CM

## 2023-07-18 PROCEDURE — 93005 ELECTROCARDIOGRAM TRACING: CPT | Mod: HCNC,S$GLB,, | Performed by: STUDENT IN AN ORGANIZED HEALTH CARE EDUCATION/TRAINING PROGRAM

## 2023-07-18 PROCEDURE — 93010 ELECTROCARDIOGRAM REPORT: CPT | Mod: HCNC,S$GLB,, | Performed by: INTERNAL MEDICINE

## 2023-07-18 PROCEDURE — 93005 RHYTHM STRIP: ICD-10-PCS | Mod: HCNC,S$GLB,, | Performed by: STUDENT IN AN ORGANIZED HEALTH CARE EDUCATION/TRAINING PROGRAM

## 2023-07-18 PROCEDURE — 93010 RHYTHM STRIP: ICD-10-PCS | Mod: HCNC,S$GLB,, | Performed by: INTERNAL MEDICINE

## 2023-07-18 NOTE — TELEPHONE ENCOUNTER
Spoke with patient's daughter and advised that her EKG showed normal sinus rhythm. Reminded her to send in the 30 day monitor to see if there were any arrhythmias. She verbalized understanding.

## 2023-07-20 ENCOUNTER — DOCUMENTATION ONLY (OUTPATIENT)
Dept: CARDIAC REHAB | Facility: CLINIC | Age: 63
End: 2023-07-20
Payer: MEDICARE

## 2023-07-20 NOTE — PROGRESS NOTES
Miss Renner has completed 3 out of 36 exercise session of Phase II cardiac rehab.  A follow up reassessment will be completed at 12 sessions.     Session: Orientation   Cardiac Rehab Individual Treatment Plan - Initial Assessment      Patient Name: Jud Villa MRN: [id number]   : 1960   Age: 63 y.o.   Primary Diagnosis: CABG  Date of Event: 23  EF: 47%  Risk Stratification: high  Referring Physician: SUMIT ACKERMAN   Exercise Assessment:     CPX/TM Date: 23 Results   RHR 59   Max HR 81   Peak VO2 (CPX only) 12.1   Actual METS (CPX only) 3.5   Estimated METS 5.0     Anthropometrics    Height 62 inches   Weight 124 lbs   BMI 22.7   Abdominal Girth 34.2   Body Composition 13.9%     ST Depression noted on Stress Test?:No  Angina with exercise?: No   Fall Risk: No   Assistive Devices:  independent   Currently exercising? No   There were no limitations noted by the patient.      Exercise Plan:   Goals:  CR Exercise Goals: Attend Cardiac Rehab 3 times/week: In Progress  Home Aerobic Exercise: 2 additional days/week for 30-60 minutes: In Progress  Intensity of 12-15 on the Rate of Perceived Exertion (RPE) scale: In Progress  30% increase in entry estimated METS: 6.5 : In Progress  5 days/week for 30-60 minutes: In Progress    Intervention:   Discussed importance of regular attendance to cardiac rehab class    Exercise Prescription:  THR Range 72-78   Mode: Treadmill  Recumbent Bike  Upright Bike  Nustep  Elliptical   Frequency:  3 days/week   Duration:  30 - 60 minutes   Intensity:  12 - 15 RPE   Resistance Training:  Yes: 3 lb weights with 10-15 reps based on strength and range of motion assessment     Home Prescription:  Mode Aerobic   Frequency: 2- 3 days/week   Duration: 30-60 minutes   Resistance Training: None        Education:  Orientation to Equipment; verbalizes understanding; Date: 23  Exercise Recommendations; verbalizes understanding; Date: 23  Exercise Safety; verbalizes understanding;  Date: 23  Class Preparation: verbalizes understanding; Date: 23  Signs and symptoms to report: verbalizes understanding; Date: 23  Caffeine/Hydration: verbalizes understanding; Date: 23  Exercise Terminology: verbalizes understanding; Date: 23  Resistance Training: verbalizes understanding; Date: 23    Comments:  Miss Renner was encouraged to begin thinking about some type of aerobic exercise she can participate in at least 2 non-rehab days per week for at least 30 minutes in addition to attending Phase II cardiac rehab classes 3 days per week.  She stated understanding.     All consent forms were signed, proper attire and shoes were discussed.       Miss Renner will begin Cardiac Rehab on  at 10:00 am but would like a spot in the 9:00 am class.    The exercise prescription will be adjusted based on tolerance of exercise intensity by patient.    Ernesto Silveira, CEP     Orientation   Cardiac Rehab Individual Treatment Plan - Initial Assessment      Patient Name: Jud Villa MRN: [id number]   : 1960   Age: 63 y.o.   Primary Diagnosis: s/p CABG, CAD, h/o CVA, HLD, COPD, DM    Nutrition Assessment:     Anthropometrics    Height 62 inches   Weight 124 lbs   BMI 22.7   Abdominal Girth 34.2   Body Fat 13.9     Drug Allergies and Intolerances:  Review of patient's allergies indicates:  No Known Allergies    Food Allergies and Intolerances:  NKA    Past Medical History:  Past Medical History:   Diagnosis Date    Asthma     Breast carcinoma     Cataract     Coronary artery disease of native heart with stable angina pectoris 2023    Diabetes mellitus     Hyperlipidemia     Moderate episode of recurrent major depressive disorder 2021    Osteoporosis     Stroke 2023    left PCA       Past Surgical History:  Past Surgical History:   Procedure Laterality Date    BILATERAL MASTECTOMY  2018    CHOLECYSTECTOMY      COLONOSCOPY N/A 10/27/2015    Procedure:  COLONOSCOPY;  Surgeon: Johnny Hurley MD;  Location: [facility] ENDO;  Service: Endoscopy;  Laterality: N/A;    CORONARY ANGIOGRAPHY N/A 04/05/2023    Procedure: ANGIOGRAM, CORONARY ARTERY;  Surgeon: Francisco Barahona MD;  Location: [facility] CATH LAB;  Service: Cardiology;  Laterality: N/A;    CORONARY ANGIOPLASTY      CORONARY ARTERY BYPASS GRAFT  04/2023    LIMA to LAD, SVG to OM    CORONARY ARTERY BYPASS GRAFT (CABG) N/A 04/14/2023    Procedure: CORONARY ARTERY BYPASS GRAFT (CABG) x 2;  Surgeon: Igor Kahn MD;  [facility];  Service: Cardiothoracic;  Laterality: N/A;    ENDOSCOPIC HARVEST OF VEIN Left 04/14/2023    Procedure: SURGICAL PROCUREMENT, VEIN, ENDOSCOPIC;  Surgeon: Igor Kahn MD;  Location: [facility] OR 2ND FLR;  Service: Cardiothoracic;  Laterality: Left;  Vein harvest start - stop: 0817 - 0856  Vein prep start - stop: 0857 - 0917    ESOPHAGOGASTRODUODENOSCOPY N/A 01/24/2022    Procedure: ESOPHAGOGASTRODUODENOSCOPY (EGD);  Surgeon: Mili Katz MD;  Location: [facility] ENDO (4TH FLR);  Service: Endoscopy;  Laterality: N/A;  rapid in AM, instr portal -ml    HYSTERECTOMY      LASER PERIPHERAL IRIDOTOMY Bilateral 2019        LEFT HEART CATHETERIZATION Left 04/05/2023    Procedure: Left heart cath;  Surgeon: Francisco Barahona MD;  Location: [facility] CATH LAB;  Service: Cardiology;  Laterality: Left;       Medications:  Current Outpatient Medications   Medication Sig    acetaminophen (TYLENOL) 500 MG tablet Take 2 tablets (1,000 mg total) by mouth every 6 (six) hours as needed for Pain.    albuterol (PROVENTIL) 2.5 mg /3 mL (0.083 %) nebulizer solution INHALE 1 VIAL PER NEBULIZER FOUR TIMES DAILY AS NEEDED FOR SHORTNESS OF BREATH/ WHEEZING    albuterol (VENTOLIN HFA) 90 mcg/actuation inhaler INHALE 2 PUFFS BY MOUTH INTO THE LUNGS EVERY 6 HOURS AS NEEDED FOR WHEEZING    apixaban (ELIQUIS) 5 mg Tab Take 1 tablet (5 mg total) by mouth 2 (two) times daily.    aspirin 81 MG Chew Take  "81 mg by mouth once daily.    atorvastatin (LIPITOR) 80 MG tablet Take 1 tablet (80 mg total) by mouth every evening.    BD ULTRA-FINE SHORT PEN NEEDLE 31 gauge x 5/16" Ndle Inject 1 pen into the skin once daily.    clonazePAM (KLONOPIN) 0.5 MG tablet TAKE 1 TABLET(0.5 MG) BY MOUTH EVERY NIGHT AS NEEDED FOR ANXIETY    ergocalciferol (ERGOCALCIFEROL) 50,000 unit Cap TAKE 1 CAPSULE BY MOUTH EVERY 7 DAYS    fluticasone propionate (FLONASE) 50 mcg/actuation nasal spray SHAKE LIQUID AND USE 1 SPRAY(50 MCG) IN EACH NOSTRIL EVERY DAY    insulin (LANTUS SOLOSTAR U-100 INSULIN) glargine 100 units/mL SubQ pen Inject 10 Units into the skin every evening.    JARDIANCE 10 mg tablet TAKE 1 TABLET(10 MG) BY MOUTH EVERY DAY    meloxicam (MOBIC) 15 MG tablet Take 1 tablet (15 mg total) by mouth once daily.    metFORMIN (GLUCOPHAGE) 1000 MG tablet Take 1 tablet (1,000 mg total) by mouth 2 (two) times daily with meals.    metoprolol tartrate (LOPRESSOR) 50 MG tablet Take 1 tablet (50 mg total) by mouth 2 (two) times daily.    nicotine (NICODERM CQ) 21 mg/24 hr PLACE 1 PATCH ONTO THE SKIN DAILY    pantoprazole (PROTONIX) 40 MG tablet Take 1 tablet (40 mg total) by mouth before breakfast.    scopolamine (TRANSDERM-SCOP) 1.3-1.5 mg (1 mg over 3 days) Place 1 patch onto the skin Every 3 (three) days.    sertraline (ZOLOFT) 50 MG tablet Take 1 tablet (50 mg total) by mouth once daily.    sucralfate (CARAFATE) 1 gram tablet TAKE 1 TABLET(1 GRAM) BY MOUTH THREE TIMES DAILY BEFORE MEALS    topiramate (TOPAMAX) 50 MG tablet Take 2 tablets (100 mg total) by mouth 2 (two) times daily. TAKE 1 TABLET BY MOUTH TWICE DAILY FOR 7 DAYS, THEN 1 TABLET EVERY MORNING AND 2 TABLETS EVERY EVENING FOR 7 DAYS, THEN 2 TABLETS TWICE DAILY    TRADJENTA 5 mg Tab tablet TAKE 1 TABLET(5 MG) BY MOUTH EVERY DAY    TRELEGY ELLIPTA 100-62.5-25 mcg DsDv INHALE 1 PUFF INTO THE LUNGS EVERY DAY    TRUE METRIX GLUCOSE TEST STRIP Strp USE AS DIRECTED FOUR TIMES DAILY     No " current facility-administered medications for this visit.       Vitamins and Supplements:  Vit D weekly    Labs:  Patient confirms she is taking lipitor 80mg for cholesterol control.    Lab Results   Component Value Date    CHOL 109 (L) 06/21/2023     Lab Results   Component Value Date    HDL 46 06/21/2023     Lab Results   Component Value Date    LDLCALC 45.4 (L) 06/21/2023     Lab Results   Component Value Date    TRIG 88 06/21/2023     Lab Results   Component Value Date    CHOLHDL 42.2 06/21/2023         No results found for: GLUF  Lab Results   Component Value Date    HGBA1C 6.9 (H) 04/11/2023       Nutrition/Diet History:  Patient eats 2 meals daily.    [employer] food with garlic/onion/salt/pepper.  Patient admits to use of a salt shaker at the table on prepared foods.   Dines out 3-4 per week at restaurants such as [employer].    Chooses fried foods 2-3 time(s) per week.    Chooses fish rarely (dislikes)  Beverages:  water and soda  Alcohol: none    24 Hour Recall:  Breakfast: egg sausage biscuit, coffee with cream and sugar ([employer])  Lunch:cheeseburger with pickle/onion/lettuce/tomato, fries, coke ([employer])  Dinner:chicken noodle soup  Other: avocado    Difficulty Chewing or Swallowing: No  Current Exercise: See Exercise Physiologist Note  Food Safety/Food Preparation: self  Living Arrangements/Family Support: Lives alone  Cultural/Spiritual/Personal Preferences: not applicable   Barriers to Education: none identified  Stage of Change Related to Diet Habits: Contemplation    Nutrition Diagnosis:  Food and nutrition related knowledge deficit related to the lack of prior nutrition education as evidenced by diet history and 24 hour recall    Nutrition Plan:   Goals:  LDL-C < 70 (for high risk patients)  Hgb A1c < 7%  BMI < 25 and abdominal girth < 40M/<35 F  2 gram sodium, Mediterranean diet  Rehab weight goal: maintenance  Fish intake (non-fried varieties) to a goal of 2-3 servings per week.   Increase fruit  and vegetable intake    Interventions/Recommendations:  Lab results reviewed and discussed  Nutrition Prescription:  Total Energy Estimated Needs: 1028-4777 Kcal/d for weight maintenance  Method for Estimating Needs: 25-30kcal/kg  Total Protein Estimated Needs: 45-68 g/d  Method for Estimating Needs: 0.8-1.2 g/Kg BW  Total Fluid Estimated Needs: 1 mL/Kcal  Dietitian Consult: No  Patient to participate in Cardiac Rehab sessions three times a week  Weekly Dietitian Weight Check  Encouraged patient to complete 3 day food diary  Follow Up Plan for Ongoing Self-Management Support    Education:  Mediterranean Diet; verbalizes understanding; Date: 7/12/23  Person taught: patient  Preferred Learning Method: Verbal, Written  Education Needed/Provided: Nutrition counseling and education related to cardiac rehabilitation  Education Method: Weekly nutrition lectures on the Mediterranean diet, cooking, shopping, and dining out  Written Materials Provided: 3 Day Food Record, Introduction to Mediterranean Diet  Strategies Implemented: Motivational interviewing, Goal setting, Self-Monitoring, and Problem Solving    Comments:   Discussed ways to incorporate healthy snacks, eating on a schedule, and monitoring sodium intake for heart health.  Encouraged less fast food/fried foods, more meal prep at home, reduced soda intake    Diabetes  Is the patient diabetic? Yes   Other Core Components/Diabetes Assessment:   Labs:  No results found for: GLUF  Lab Results   Component Value Date    HGBA1C 6.9 (H) 04/11/2023    HGBA1C 6.8 (H) 03/14/2023    HGBA1C 7.2 (H) 06/06/2022      Lab Results   Component Value Date    ESTIMATEDAVG 151 (H) 04/11/2023    ESTIMATEDAVG 160 (H) 06/06/2022    ESTIMATEDAVG 157 (H) 01/19/2022       History of diabetes since 2008  Diabetes medications: Tradjenta 5mg daily, Jardiance 10mg daily, metformin 1000mg BID, lantus 10u @[facility]  Blood Glucose Checks at Home: Yes: Other: TID  Typical morning range: 110-130  mg/dl  Endocrinologist or PCP following DM: PCP-changing no set endocrinologist yet    Other Core Components/Diabetes Plan:   Goals:  Hgb A1c < 7%  Exercise Blood Glucose: 100-300 mg/dl    Interventions:  Reviewed and Discussed Labs  Medication Compliance  Med Card Reconciled  Low Sodium, Mediterranean, ADA Diet  Physical Activity  Increase Knowledge of Contributory Factors  Referral to Diabetes Education    Education:  Mediterranean Diet; verbalizes understanding; Date: 23    Comments:   Patient verbalizes understanding to bring home glucometer and check glucose pre and post each exercise session.  Per cardiac rehab protocols, patient's glucose must be between 90 and 270 mg/dL to exercise.  Patient denies any recent glucose levels less than 60 mg/dL or greater than 300 mg/dL. Patient verbalizes importance of notifying rehab staff if symptoms of hypoglycemia occur while at cardiac rehab.  Abnormal labs will be reported to patient's PCP/Endocrinologist by rehab staff.    Noted updated glucose parameters of 100-300    RD contact information provided.      Madai Valladares MS, RDN/LDN     Session: Orientation   Cardiac Rehab Individual Treatment Plan - Initial Assessment      Patient Name: Jud Villa MRN: [id number]   : 1960   Age: 63 y.o.   Date of Event: 2023   Primary Diagnosis: CABG    EF: 47%    Physical Assessment:   There were no vitals taken for this visit.    ASSESSMENT:  Heart Sounds: regular rate and rhythm  Prosthetic Valve: No  Lung Sounds: clear  Capillary Refill: normal  Left Radial Pulse: Normal (+2)  Right Radial Pulse: Normal (+2)  Left Pedal Pulse: Normal (+2)  Right Pedal Pulse: Normal (+2)  Right Edema: none  Left Edema none  Strength: normal  Range of Motion: full range of motion  Existing Limitations:      Site   [] Arthritis, bursitis    [] Amputation, atrophy    [x] Other: Mastectomy left breast   []       Diabetic patient's foot examination comments: Normal -   Bilateral  Incisional site: healing well  Special needs: none    Psychosocial Assessment:   Outcome Survey Tools:    ELSIE SCORES:   PRE   Anxiety 6   Depression 10   Somatic 8   Hostility 1     SF-36 SCORES:   PRE   Physical Function 17   Social Function 9   Mental Health 18   Pain 9   Change in Health 5   Physical Role Limitation 0   Mental Role Limitation 0   Energy/Fatigue 12   Health Perceptions 14   Total Score 84     PHQ-9:  PHQ-9 Depression Patient Health Questionnaire 6/27/2023   Over the last two weeks how often have you been bothered by little interest or pleasure in doing things 1   Over the last two weeks how often have you been bothered by feeling down, depressed or hopeless 1   Over the last two weeks how often have you been bothered by trouble falling or staying asleep, or sleeping too much 3   Over the last two weeks how often have you been bothered by feeling tired or having little energy 3   Over the last two weeks how often have you been bothered by a poor appetite or overeating 3   Over the last two weeks how often have you been bothered by feeling bad about yourself - or that you are a failure or have let yourself or your family down 0   Over the last two weeks how often have you been bothered by trouble concentrating on things, such as reading the newspaper or watching television 1   Over the last two weeks how often have you been bothered by moving or speaking so slowly that other people could have noticed. 0   Over the last two weeks how often have you been bothered by thoughts that you would be better off dead, or of hurting yourself 0   If you checked off any problems, how difficult have these problems made it for you to do your work, take care of things at home or get along with other people? Not difficult at all   Total Score 12              Living Arrangements: Lives alone  Family Support: family  Self Reported: Anxiety and Depression  Displays: calmness  Medication: not  applicable    Psychosocial Plan:   Goals:  Improved psychosocial coping strategies  Reduce manifestation of depression  Maintain positive support system  Maintain positive outlook  Improve overall quality of life    Interventions/Recommendations:  Discussed Results of Surveys  Patient to Self Report Emotional Changes at Session Check In  Recommend Physical Activity  Recommend Attending Education Lectures  Notify MD: No  Program Referral: Yes  Pharmaceutical Intervention/Therapy: No  Other Needs:  patient reports that she is prescribed Zoloft, but is no longer taking it.  Stage of Readiness to Change: Contemplation    Education:  Stress Management; verbalizes understanding; Date: 6/27/2023  Depression; verbalizes understanding; Date: 6/27/2023  Stress; verbalizes understanding; Date: 6/27/2023    Comments:  Patient reports to having issues with anxiety & depression.  PHQ scores indicate moderate depression.  Referral for behavioral medicine made & gave patient brochure for mental wellness program.  She states that she was taking Zoloft, but just decided to stop for unknown reason.  Spoke with patient about possibly restarting Zoloft.  Encouraged for patient to follow up with behavioral medicine.  She has been instructed to notify staff in the event that circumstances worsen.  Patient verbalizes understanding.    Other Core Components/Risk Factors Assessment:   RISK FACTORS:  diabetes, hyperlipidemia, sedentary lifestyle, tobacco use, stress    Learning Barriers: None    Education Level:  Unknown    Pre-test Score: 40    Medication Compliance: has been compliant with taking medications    Other Core Components/Risk Factors Plan:   Goals:  Increase exercise tolerance: In Progress  Increase knowledge of CAD: In Progress  Identify and manage personal areas of stress: In Progress  Control diabetes by adjusting diet and exercise: In Progress  Learn more about healthy eating: In  Progress    Interventions/Recommendations:  Recommend regular attendance for Cardiac Rehab: Exercise and Education Lectures  Encourage medication compliance  Individual Education/ Counseling: Yes  Physician Referral: No    Education:    diabetes, verbalizes understanding; Date: 6/27/2023  risk factors, verbalizes understanding; Date: 6/27/2023  smoking, verbalizes understanding; Date: 6/27/2023  stress, verbalizes understanding; Date: 6/27/2023         Education method adapted to patients education level and preferred method of learning.  Method: explanation    Comments:  Patient will be working on decreasing risk factors for CAD.    Other Core Components/Hypertension Assessment:   Resting BP: 102/58  BP Readings from Last 1 Encounters:   07/10/23 100/72     BP Diagnosis: Normotensive  Patient reported symptoms: none    Other Core Components/Hypertension Plan:   Goals:  Blood Pressure <130/80    Interventions/Recommendations:  Med Card Reconciled: Yes  Encourage medication compliance  Encourage sodium reduction  Recommend physical activity  Educate on contributory factors  Reduce stress, anxiety, anger, depression, and/or chronic pain  Encourage home blood pressure monitoring  Recommend daily weights    Education:    Coronary Artery Disease; verbalizes understanding; Date: 6/27/2023  Risk Factors; verbalizes understanding; Date: 6/27/2023  Stroke; verbalizes understanding; Date: 6/27/2023  Stress; verbalizes understanding; Date: 6/27/2023         Comments:  Patient will be implementing recommendations to decrease risk factors for CAD.    Does the patient have Heart Failure?     Other Core Components/Tobacco Cessation Assessment:   Smoking Status: past smoker who quit 3/2023  Smoking Cessation Barriers:  N/A  Stage of Readiness to Change: Maintenance    Other Core Components/Tobacco Cessation Plan:   Goals:  Maintain non-smoking status    Interventions:  Maintains non-smoking status    Education:    Risk Factors;  verbalizes understanding; Date: 6/27/2023         Comments:  Patient states that she is smoke free.  She states that she does still experience cravings, but remembers how painful her CABG was & will not go back to smoking.  Offered smoking cessation referral, but patient declines.    Discussed Cardiac Rehab program in depth with patient.  Medication list updated per patient & marked as reviewed.  Patient has been instructed to notify staff of any problems while attending rehab (ie: chest pain, shortness of breath, lightheadedness, dizziness).  Patient has been instructed to monitor blood pressure readings outside of rehab & to keep a daily log of the readings.  Patient verbalizes understanding.     Agnieszka Pereira RN  Cardiac Rehab Nurse

## 2023-07-21 ENCOUNTER — CLINICAL SUPPORT (OUTPATIENT)
Dept: CARDIAC REHAB | Facility: CLINIC | Age: 63
End: 2023-07-21
Payer: MEDICARE

## 2023-07-21 DIAGNOSIS — I25.10 ATHEROSCLEROSIS OF NATIVE CORONARY ARTERY OF NATIVE HEART, UNSPECIFIED WHETHER ANGINA PRESENT: ICD-10-CM

## 2023-07-21 DIAGNOSIS — Z95.1 POSTSURGICAL AORTOCORONARY BYPASS STATUS: Primary | ICD-10-CM

## 2023-07-21 PROCEDURE — 93798 PR CARDIAC REHAB/MONITOR: ICD-10-PCS | Mod: HCNC,S$GLB,, | Performed by: INTERNAL MEDICINE

## 2023-07-21 PROCEDURE — 93798 PHYS/QHP OP CAR RHAB W/ECG: CPT | Mod: HCNC,S$GLB,, | Performed by: INTERNAL MEDICINE

## 2023-07-24 ENCOUNTER — CLINICAL SUPPORT (OUTPATIENT)
Dept: CARDIAC REHAB | Facility: CLINIC | Age: 63
End: 2023-07-24
Payer: MEDICARE

## 2023-07-24 DIAGNOSIS — I25.10 ATHEROSCLEROSIS OF NATIVE CORONARY ARTERY OF NATIVE HEART, UNSPECIFIED WHETHER ANGINA PRESENT: ICD-10-CM

## 2023-07-24 DIAGNOSIS — Z95.1 POSTSURGICAL AORTOCORONARY BYPASS STATUS: Primary | ICD-10-CM

## 2023-07-24 PROCEDURE — 93798 PHYS/QHP OP CAR RHAB W/ECG: CPT | Mod: HCNC,S$GLB,, | Performed by: INTERNAL MEDICINE

## 2023-07-24 PROCEDURE — 93798 PR CARDIAC REHAB/MONITOR: ICD-10-PCS | Mod: HCNC,S$GLB,, | Performed by: INTERNAL MEDICINE

## 2023-07-26 ENCOUNTER — CLINICAL SUPPORT (OUTPATIENT)
Dept: CARDIAC REHAB | Facility: CLINIC | Age: 63
End: 2023-07-26
Payer: MEDICARE

## 2023-07-26 DIAGNOSIS — Z95.1 POSTSURGICAL AORTOCORONARY BYPASS STATUS: Primary | ICD-10-CM

## 2023-07-26 DIAGNOSIS — I25.10 CORONARY ATHEROSCLEROSIS OF NATIVE CORONARY ARTERY: ICD-10-CM

## 2023-07-26 PROCEDURE — 93798 PR CARDIAC REHAB/MONITOR: ICD-10-PCS | Mod: HCNC,S$GLB,, | Performed by: INTERNAL MEDICINE

## 2023-07-26 PROCEDURE — 93798 PHYS/QHP OP CAR RHAB W/ECG: CPT | Mod: HCNC,S$GLB,, | Performed by: INTERNAL MEDICINE

## 2023-07-27 DIAGNOSIS — M81.0 SENILE OSTEOPOROSIS: Primary | ICD-10-CM

## 2023-08-01 ENCOUNTER — TELEPHONE (OUTPATIENT)
Dept: INTERNAL MEDICINE | Facility: CLINIC | Age: 63
End: 2023-08-01
Payer: MEDICARE

## 2023-08-01 NOTE — TELEPHONE ENCOUNTER
Called patient to schedule her Prolia injection. Patient refused, states she does not want the Prolia. Did request a FU visit w Dr. Yañez. Visit scheduled. Verbalized understanding.

## 2023-08-02 ENCOUNTER — CLINICAL SUPPORT (OUTPATIENT)
Dept: CARDIAC REHAB | Facility: CLINIC | Age: 63
End: 2023-08-02
Payer: MEDICARE

## 2023-08-02 DIAGNOSIS — Z95.1 POSTSURGICAL AORTOCORONARY BYPASS STATUS: Primary | ICD-10-CM

## 2023-08-02 DIAGNOSIS — I25.10 CORONARY ATHEROSCLEROSIS OF NATIVE CORONARY ARTERY: ICD-10-CM

## 2023-08-02 PROCEDURE — 93798 PHYS/QHP OP CAR RHAB W/ECG: CPT | Mod: HCNC,S$GLB,, | Performed by: INTERNAL MEDICINE

## 2023-08-02 PROCEDURE — 93798 PR CARDIAC REHAB/MONITOR: ICD-10-PCS | Mod: HCNC,S$GLB,, | Performed by: INTERNAL MEDICINE

## 2023-08-04 ENCOUNTER — CLINICAL SUPPORT (OUTPATIENT)
Dept: CARDIAC REHAB | Facility: CLINIC | Age: 63
End: 2023-08-04
Payer: MEDICARE

## 2023-08-04 DIAGNOSIS — I25.10 ATHEROSCLEROSIS OF NATIVE CORONARY ARTERY OF NATIVE HEART, UNSPECIFIED WHETHER ANGINA PRESENT: ICD-10-CM

## 2023-08-04 DIAGNOSIS — Z95.1 POSTSURGICAL AORTOCORONARY BYPASS STATUS: Primary | ICD-10-CM

## 2023-08-04 PROCEDURE — 93798 PHYS/QHP OP CAR RHAB W/ECG: CPT | Mod: HCNC,S$GLB,, | Performed by: INTERNAL MEDICINE

## 2023-08-04 PROCEDURE — 93798 PR CARDIAC REHAB/MONITOR: ICD-10-PCS | Mod: HCNC,S$GLB,, | Performed by: INTERNAL MEDICINE

## 2023-08-07 ENCOUNTER — CLINICAL SUPPORT (OUTPATIENT)
Dept: CARDIAC REHAB | Facility: CLINIC | Age: 63
End: 2023-08-07
Payer: MEDICARE

## 2023-08-07 DIAGNOSIS — Z95.1 POSTSURGICAL AORTOCORONARY BYPASS STATUS: Primary | ICD-10-CM

## 2023-08-07 DIAGNOSIS — I25.10 ATHEROSCLEROSIS OF NATIVE CORONARY ARTERY OF NATIVE HEART, UNSPECIFIED WHETHER ANGINA PRESENT: ICD-10-CM

## 2023-08-07 PROCEDURE — 93798 PHYS/QHP OP CAR RHAB W/ECG: CPT | Mod: HCNC,S$GLB,, | Performed by: INTERNAL MEDICINE

## 2023-08-07 PROCEDURE — 93798 PR CARDIAC REHAB/MONITOR: ICD-10-PCS | Mod: HCNC,S$GLB,, | Performed by: INTERNAL MEDICINE

## 2023-08-09 ENCOUNTER — PES CALL (OUTPATIENT)
Dept: ADMINISTRATIVE | Facility: CLINIC | Age: 63
End: 2023-08-09
Payer: MEDICARE

## 2023-08-09 ENCOUNTER — CLINICAL SUPPORT (OUTPATIENT)
Dept: CARDIAC REHAB | Facility: CLINIC | Age: 63
End: 2023-08-09
Payer: MEDICARE

## 2023-08-09 DIAGNOSIS — Z95.1 POSTSURGICAL AORTOCORONARY BYPASS STATUS: Primary | ICD-10-CM

## 2023-08-09 DIAGNOSIS — I25.10 CORONARY ATHEROSCLEROSIS OF NATIVE CORONARY ARTERY: ICD-10-CM

## 2023-08-09 PROCEDURE — 93798 PHYS/QHP OP CAR RHAB W/ECG: CPT | Mod: HCNC,S$GLB,, | Performed by: INTERNAL MEDICINE

## 2023-08-09 PROCEDURE — 93798 PR CARDIAC REHAB/MONITOR: ICD-10-PCS | Mod: HCNC,S$GLB,, | Performed by: INTERNAL MEDICINE

## 2023-08-10 ENCOUNTER — DOCUMENTATION ONLY (OUTPATIENT)
Dept: CARDIAC REHAB | Facility: CLINIC | Age: 63
End: 2023-08-10
Payer: MEDICARE

## 2023-08-10 NOTE — PROGRESS NOTES
Miss Renner has completed 10 out of 36 exercise session of Phase II cardiac rehab.  A follow up reassessment will be completed at 12 sessions.     Session: Orientation   Cardiac Rehab Individual Treatment Plan - Initial Assessment      Patient Name: Jud Villa MRN: [id number]   : 1960   Age: 63 y.o.   Primary Diagnosis: CABG  Date of Event: 23  EF: 47%  Risk Stratification: high  Referring Physician: SUMIT ACKERMAN   Exercise Assessment:     CPX/TM Date: 23 Results   RHR 59   Max HR 81   Peak VO2 (CPX only) 12.1   Actual METS (CPX only) 3.5   Estimated METS 5.0     Anthropometrics    Height 62 inches   Weight 124 lbs   BMI 22.7   Abdominal Girth 34.2   Body Composition 13.9%     ST Depression noted on Stress Test?:No  Angina with exercise?: No   Fall Risk: No   Assistive Devices:  independent   Currently exercising? No   There were no limitations noted by the patient.      Exercise Plan:   Goals:  CR Exercise Goals: Attend Cardiac Rehab 3 times/week: In Progress  Home Aerobic Exercise: 2 additional days/week for 30-60 minutes: In Progress  Intensity of 12-15 on the Rate of Perceived Exertion (RPE) scale: In Progress  30% increase in entry estimated METS: 6.5 : In Progress  5 days/week for 30-60 minutes: In Progress    Intervention:   Discussed importance of regular attendance to cardiac rehab class    Exercise Prescription:  THR Range 72-78   Mode: Treadmill  Recumbent Bike  Upright Bike  Nustep  Elliptical   Frequency:  3 days/week   Duration:  30 - 60 minutes   Intensity:  12 - 15 RPE   Resistance Training:  Yes: 3 lb weights with 10-15 reps based on strength and range of motion assessment     Home Prescription:  Mode Aerobic   Frequency: 2- 3 days/week   Duration: 30-60 minutes   Resistance Training: None        Education:  Orientation to Equipment; verbalizes understanding; Date: 23  Exercise Recommendations; verbalizes understanding; Date: 23  Exercise Safety; verbalizes understanding;  Date: 23  Class Preparation: verbalizes understanding; Date: 23  Signs and symptoms to report: verbalizes understanding; Date: 23  Caffeine/Hydration: verbalizes understanding; Date: 23  Exercise Terminology: verbalizes understanding; Date: 23  Resistance Training: verbalizes understanding; Date: 23    Comments:  Miss Renner was encouraged to begin thinking about some type of aerobic exercise she can participate in at least 2 non-rehab days per week for at least 30 minutes in addition to attending Phase II cardiac rehab classes 3 days per week.  She stated understanding.     All consent forms were signed, proper attire and shoes were discussed.       Miss Renner will begin Cardiac Rehab on  at 10:00 am but would like a spot in the 9:00 am class.    The exercise prescription will be adjusted based on tolerance of exercise intensity by patient.    Ernesto Silveira, CEP     Orientation   Cardiac Rehab Individual Treatment Plan - Initial Assessment      Patient Name: Jud Villa MRN: [id number]   : 1960   Age: 63 y.o.   Primary Diagnosis: s/p CABG, CAD, h/o CVA, HLD, COPD, DM    Nutrition Assessment:     Anthropometrics    Height 62 inches   Weight 124 lbs   BMI 22.7   Abdominal Girth 34.2   Body Fat 13.9     Drug Allergies and Intolerances:  Review of patient's allergies indicates:  No Known Allergies    Food Allergies and Intolerances:  NKA    Past Medical History:  Past Medical History:   Diagnosis Date    Asthma     Breast carcinoma     Cataract     Coronary artery disease of native heart with stable angina pectoris 2023    Diabetes mellitus     Hyperlipidemia     Moderate episode of recurrent major depressive disorder 2021    Osteoporosis     Stroke 2023    left PCA       Past Surgical History:  Past Surgical History:   Procedure Laterality Date    BILATERAL MASTECTOMY  2018    CHOLECYSTECTOMY      COLONOSCOPY N/A 10/27/2015    Procedure:  COLONOSCOPY;  Surgeon: Johnny Hurley MD;  Location: [facility] ENDO;  Service: Endoscopy;  Laterality: N/A;    CORONARY ANGIOGRAPHY N/A 04/05/2023    Procedure: ANGIOGRAM, CORONARY ARTERY;  Surgeon: Francisco Barahona MD;  Location: [facility] CATH LAB;  Service: Cardiology;  Laterality: N/A;    CORONARY ANGIOPLASTY      CORONARY ARTERY BYPASS GRAFT  04/2023    LIMA to LAD, SVG to OM    CORONARY ARTERY BYPASS GRAFT (CABG) N/A 04/14/2023    Procedure: CORONARY ARTERY BYPASS GRAFT (CABG) x 2;  Surgeon: Igor Kahn MD;  [facility];  Service: Cardiothoracic;  Laterality: N/A;    ENDOSCOPIC HARVEST OF VEIN Left 04/14/2023    Procedure: SURGICAL PROCUREMENT, VEIN, ENDOSCOPIC;  Surgeon: Igor Kahn MD;  Location: [facility] OR 2ND FLR;  Service: Cardiothoracic;  Laterality: Left;  Vein harvest start - stop: 0817 - 0856  Vein prep start - stop: 0857 - 0917    ESOPHAGOGASTRODUODENOSCOPY N/A 01/24/2022    Procedure: ESOPHAGOGASTRODUODENOSCOPY (EGD);  Surgeon: Mili Katz MD;  Location: [facility] ENDO (4TH FLR);  Service: Endoscopy;  Laterality: N/A;  rapid in AM, instr portal -ml    HYSTERECTOMY      LASER PERIPHERAL IRIDOTOMY Bilateral 2019        LEFT HEART CATHETERIZATION Left 04/05/2023    Procedure: Left heart cath;  Surgeon: Francisco Barahona MD;  Location: [facility] CATH LAB;  Service: Cardiology;  Laterality: Left;       Medications:  Current Outpatient Medications   Medication Sig    acetaminophen (TYLENOL) 500 MG tablet Take 2 tablets (1,000 mg total) by mouth every 6 (six) hours as needed for Pain.    albuterol (PROVENTIL) 2.5 mg /3 mL (0.083 %) nebulizer solution INHALE 1 VIAL PER NEBULIZER FOUR TIMES DAILY AS NEEDED FOR SHORTNESS OF BREATH/ WHEEZING    albuterol (VENTOLIN HFA) 90 mcg/actuation inhaler INHALE 2 PUFFS BY MOUTH INTO THE LUNGS EVERY 6 HOURS AS NEEDED FOR WHEEZING    apixaban (ELIQUIS) 5 mg Tab Take 1 tablet (5 mg total) by mouth 2 (two) times daily.    aspirin 81 MG Chew Take  "81 mg by mouth once daily.    atorvastatin (LIPITOR) 80 MG tablet Take 1 tablet (80 mg total) by mouth every evening.    BD ULTRA-FINE SHORT PEN NEEDLE 31 gauge x 5/16" Ndle Inject 1 pen into the skin once daily.    clonazePAM (KLONOPIN) 0.5 MG tablet TAKE 1 TABLET(0.5 MG) BY MOUTH EVERY NIGHT AS NEEDED FOR ANXIETY    ergocalciferol (ERGOCALCIFEROL) 50,000 unit Cap TAKE 1 CAPSULE BY MOUTH EVERY 7 DAYS    fluticasone propionate (FLONASE) 50 mcg/actuation nasal spray SHAKE LIQUID AND USE 1 SPRAY(50 MCG) IN EACH NOSTRIL EVERY DAY    insulin (LANTUS SOLOSTAR U-100 INSULIN) glargine 100 units/mL SubQ pen Inject 10 Units into the skin every evening.    JARDIANCE 10 mg tablet TAKE 1 TABLET(10 MG) BY MOUTH EVERY DAY    meloxicam (MOBIC) 15 MG tablet Take 1 tablet (15 mg total) by mouth once daily.    metFORMIN (GLUCOPHAGE) 1000 MG tablet Take 1 tablet (1,000 mg total) by mouth 2 (two) times daily with meals.    metoprolol tartrate (LOPRESSOR) 50 MG tablet Take 1 tablet (50 mg total) by mouth 2 (two) times daily.    nicotine (NICODERM CQ) 21 mg/24 hr PLACE 1 PATCH ONTO THE SKIN DAILY    pantoprazole (PROTONIX) 40 MG tablet Take 1 tablet (40 mg total) by mouth before breakfast.    scopolamine (TRANSDERM-SCOP) 1.3-1.5 mg (1 mg over 3 days) Place 1 patch onto the skin Every 3 (three) days.    sertraline (ZOLOFT) 50 MG tablet Take 1 tablet (50 mg total) by mouth once daily.    sucralfate (CARAFATE) 1 gram tablet TAKE 1 TABLET(1 GRAM) BY MOUTH THREE TIMES DAILY BEFORE MEALS    topiramate (TOPAMAX) 50 MG tablet Take 2 tablets (100 mg total) by mouth 2 (two) times daily. TAKE 1 TABLET BY MOUTH TWICE DAILY FOR 7 DAYS, THEN 1 TABLET EVERY MORNING AND 2 TABLETS EVERY EVENING FOR 7 DAYS, THEN 2 TABLETS TWICE DAILY    TRADJENTA 5 mg Tab tablet TAKE 1 TABLET(5 MG) BY MOUTH EVERY DAY    TRELEGY ELLIPTA 100-62.5-25 mcg DsDv INHALE 1 PUFF INTO THE LUNGS EVERY DAY    TRUE METRIX GLUCOSE TEST STRIP Strp USE AS DIRECTED FOUR TIMES DAILY     No " "current facility-administered medications for this visit.       Vitamins and Supplements:  Vit D weekly    Labs:  Patient confirms she is taking lipitor 80mg for cholesterol control.    Lab Results   Component Value Date    CHOL 109 (L) 06/21/2023     Lab Results   Component Value Date    HDL 46 06/21/2023     Lab Results   Component Value Date    LDLCALC 45.4 (L) 06/21/2023     Lab Results   Component Value Date    TRIG 88 06/21/2023     Lab Results   Component Value Date    CHOLHDL 42.2 06/21/2023         No results found for: "GLUF"  Lab Results   Component Value Date    HGBA1C 6.9 (H) 04/11/2023       Nutrition/Diet History:  Patient eats 2 meals daily.    Seasons food with garlic/onion/salt/pepper.  Patient admits to use of a salt shaker at the table on prepared foods.   Dines out 3-4 per week at restaurants such as [employer].    Chooses fried foods 2-3 time(s) per week.    Chooses fish rarely (dislikes)  Beverages:  water and soda  Alcohol: none    24 Hour Recall:  Breakfast: egg sausage biscuit, coffee with cream and sugar ([employer])  Lunch:cheeseburger with pickle/onion/lettuce/tomato, fries, coke ([employer])  Dinner:chicken noodle soup  Other: avocado    Difficulty Chewing or Swallowing: No  Current Exercise: See Exercise Physiologist Note  Food Safety/Food Preparation: self  Living Arrangements/Family Support: Lives alone  Cultural/Spiritual/Personal Preferences: not applicable   Barriers to Education: none identified  Stage of Change Related to Diet Habits: Contemplation    Nutrition Diagnosis:  Food and nutrition related knowledge deficit related to the lack of prior nutrition education as evidenced by diet history and 24 hour recall    Nutrition Plan:   Goals:  LDL-C < 70 (for high risk patients)  Hgb A1c < 7%  BMI < 25 and abdominal girth < 40M/<35 F  2 gram sodium, Mediterranean diet  Rehab weight goal: maintenance  Fish intake (non-fried varieties) to a goal of 2-3 servings per week.   Increase " "fruit and vegetable intake    Interventions/Recommendations:  Lab results reviewed and discussed  Nutrition Prescription:  Total Energy Estimated Needs: 2145-9301 Kcal/d for weight maintenance  Method for Estimating Needs: 25-30kcal/kg  Total Protein Estimated Needs: 45-68 g/d  Method for Estimating Needs: 0.8-1.2 g/Kg BW  Total Fluid Estimated Needs: 1 mL/Kcal  Dietitian Consult: No  Patient to participate in Cardiac Rehab sessions three times a week  Weekly Dietitian Weight Check  Encouraged patient to complete 3 day food diary  Follow Up Plan for Ongoing Self-Management Support    Education:  Mediterranean Diet; verbalizes understanding; Date: 7/12/23  Person taught: patient  Preferred Learning Method: Verbal, Written  Education Needed/Provided: Nutrition counseling and education related to cardiac rehabilitation  Education Method: Weekly nutrition lectures on the Mediterranean diet, cooking, shopping, and dining out  Written Materials Provided: 3 Day Food Record, Introduction to Mediterranean Diet  Strategies Implemented: Motivational interviewing, Goal setting, Self-Monitoring, and Problem Solving    Comments:   Discussed ways to incorporate healthy snacks, eating on a schedule, and monitoring sodium intake for heart health.  Encouraged less fast food/fried foods, more meal prep at home, reduced soda intake    Diabetes  Is the patient diabetic? Yes   Other Core Components/Diabetes Assessment:   Labs:  No results found for: "GLUF"  Lab Results   Component Value Date    HGBA1C 6.9 (H) 04/11/2023    HGBA1C 6.8 (H) 03/14/2023    HGBA1C 7.2 (H) 06/06/2022      Lab Results   Component Value Date    ESTIMATEDAVG 151 (H) 04/11/2023    ESTIMATEDAVG 160 (H) 06/06/2022    ESTIMATEDAVG 157 (H) 01/19/2022       History of diabetes since 2008  Diabetes medications: Tradjenta 5mg daily, Jardiance 10mg daily, metformin 1000mg BID, lantus 10u @[facility]  Blood Glucose Checks at Home: Yes: Other: TID  Typical morning range: 110-130 " mg/dl  Endocrinologist or PCP following DM: PCP-changing no set endocrinologist yet    Other Core Components/Diabetes Plan:   Goals:  Hgb A1c < 7%  Exercise Blood Glucose: 100-300 mg/dl    Interventions:  Reviewed and Discussed Labs  Medication Compliance  Med Card Reconciled  Low Sodium, Mediterranean, ADA Diet  Physical Activity  Increase Knowledge of Contributory Factors  Referral to Diabetes Education    Education:  Mediterranean Diet; verbalizes understanding; Date: 23    Comments:   Patient verbalizes understanding to bring home glucometer and check glucose pre and post each exercise session.  Per cardiac rehab protocols, patient's glucose must be between 90 and 270 mg/dL to exercise.  Patient denies any recent glucose levels less than 60 mg/dL or greater than 300 mg/dL. Patient verbalizes importance of notifying rehab staff if symptoms of hypoglycemia occur while at cardiac rehab.  Abnormal labs will be reported to patient's PCP/Endocrinologist by rehab staff.    Noted updated glucose parameters of 100-300    RD contact information provided.      Madai Valladares MS, RDN/LDN     Session: Orientation   Cardiac Rehab Individual Treatment Plan - Initial Assessment      Patient Name: Jud Villa MRN: [id number]   : 1960   Age: 63 y.o.   Date of Event: 2023   Primary Diagnosis: CABG    EF: 47%    Physical Assessment:   There were no vitals taken for this visit.    ASSESSMENT:  Heart Sounds: regular rate and rhythm  Prosthetic Valve: No  Lung Sounds: clear  Capillary Refill: normal  Left Radial Pulse: Normal (+2)  Right Radial Pulse: Normal (+2)  Left Pedal Pulse: Normal (+2)  Right Pedal Pulse: Normal (+2)  Right Edema: none  Left Edema none  Strength: normal  Range of Motion: full range of motion  Existing Limitations:      Site   [] Arthritis, bursitis    [] Amputation, atrophy    [x] Other: Mastectomy left breast   []       Diabetic patient's foot examination comments: Normal -   Bilateral  Incisional site: healing well  Special needs: none    Psychosocial Assessment:   Outcome Survey Tools:    ELSIE SCORES:   PRE   Anxiety 6   Depression 10   Somatic 8   Hostility 1     SF-36 SCORES:   PRE   Physical Function 17   Social Function 9   Mental Health 18   Pain 9   Change in Health 5   Physical Role Limitation 0   Mental Role Limitation 0   Energy/Fatigue 12   Health Perceptions 14   Total Score 84     PHQ-9:  PHQ-9 Depression Patient Health Questionnaire 6/27/2023   Over the last two weeks how often have you been bothered by little interest or pleasure in doing things 1   Over the last two weeks how often have you been bothered by feeling down, depressed or hopeless 1   Over the last two weeks how often have you been bothered by trouble falling or staying asleep, or sleeping too much 3   Over the last two weeks how often have you been bothered by feeling tired or having little energy 3   Over the last two weeks how often have you been bothered by a poor appetite or overeating 3   Over the last two weeks how often have you been bothered by feeling bad about yourself - or that you are a failure or have let yourself or your family down 0   Over the last two weeks how often have you been bothered by trouble concentrating on things, such as reading the newspaper or watching television 1   Over the last two weeks how often have you been bothered by moving or speaking so slowly that other people could have noticed. 0   Over the last two weeks how often have you been bothered by thoughts that you would be better off dead, or of hurting yourself 0   If you checked off any problems, how difficult have these problems made it for you to do your work, take care of things at home or get along with other people? Not difficult at all   Total Score 12              Living Arrangements: Lives alone  Family Support: family  Self Reported: Anxiety and Depression  Displays: calmness  Medication: not  applicable    Psychosocial Plan:   Goals:  Improved psychosocial coping strategies  Reduce manifestation of depression  Maintain positive support system  Maintain positive outlook  Improve overall quality of life    Interventions/Recommendations:  Discussed Results of Surveys  Patient to Self Report Emotional Changes at Session Check In  Recommend Physical Activity  Recommend Attending Education Lectures  Notify MD: No  Program Referral: Yes  Pharmaceutical Intervention/Therapy: No  Other Needs:  patient reports that she is prescribed Zoloft, but is no longer taking it.  Stage of Readiness to Change: Contemplation    Education:  Stress Management; verbalizes understanding; Date: 6/27/2023  Depression; verbalizes understanding; Date: 6/27/2023  Stress; verbalizes understanding; Date: 6/27/2023    Comments:  Patient reports to having issues with anxiety & depression.  PHQ scores indicate moderate depression.  Referral for behavioral medicine made & gave patient brochure for mental wellness program.  She states that she was taking Zoloft, but just decided to stop for unknown reason.  Spoke with patient about possibly restarting Zoloft.  Encouraged for patient to follow up with behavioral medicine.  She has been instructed to notify staff in the event that circumstances worsen.  Patient verbalizes understanding.    Other Core Components/Risk Factors Assessment:   RISK FACTORS:  diabetes, hyperlipidemia, sedentary lifestyle, tobacco use, stress    Learning Barriers: None    Education Level:  Unknown    Pre-test Score: 40    Medication Compliance: has been compliant with taking medications    Other Core Components/Risk Factors Plan:   Goals:  Increase exercise tolerance: In Progress  Increase knowledge of CAD: In Progress  Identify and manage personal areas of stress: In Progress  Control diabetes by adjusting diet and exercise: In Progress  Learn more about healthy eating: In  Progress    Interventions/Recommendations:  Recommend regular attendance for Cardiac Rehab: Exercise and Education Lectures  Encourage medication compliance  Individual Education/ Counseling: Yes  Physician Referral: No    Education:    diabetes, verbalizes understanding; Date: 6/27/2023  risk factors, verbalizes understanding; Date: 6/27/2023  smoking, verbalizes understanding; Date: 6/27/2023  stress, verbalizes understanding; Date: 6/27/2023         Education method adapted to patients education level and preferred method of learning.  Method: explanation    Comments:  Patient will be working on decreasing risk factors for CAD.    Other Core Components/Hypertension Assessment:   Resting BP: 102/58  BP Readings from Last 1 Encounters:   07/10/23 100/72     BP Diagnosis: Normotensive  Patient reported symptoms: none    Other Core Components/Hypertension Plan:   Goals:  Blood Pressure <130/80    Interventions/Recommendations:  Med Card Reconciled: Yes  Encourage medication compliance  Encourage sodium reduction  Recommend physical activity  Educate on contributory factors  Reduce stress, anxiety, anger, depression, and/or chronic pain  Encourage home blood pressure monitoring  Recommend daily weights    Education:    Coronary Artery Disease; verbalizes understanding; Date: 6/27/2023  Risk Factors; verbalizes understanding; Date: 6/27/2023  Stroke; verbalizes understanding; Date: 6/27/2023  Stress; verbalizes understanding; Date: 6/27/2023         Comments:  Patient will be implementing recommendations to decrease risk factors for CAD.    Does the patient have Heart Failure?     Other Core Components/Tobacco Cessation Assessment:   Smoking Status: past smoker who quit 3/2023  Smoking Cessation Barriers:  N/A  Stage of Readiness to Change: Maintenance    Other Core Components/Tobacco Cessation Plan:   Goals:  Maintain non-smoking status    Interventions:  Maintains non-smoking status    Education:    Risk Factors;  verbalizes understanding; Date: 6/27/2023         Comments:  Patient states that she is smoke free.  She states that she does still experience cravings, but remembers how painful her CABG was & will not go back to smoking.  Offered smoking cessation referral, but patient declines.    Discussed Cardiac Rehab program in depth with patient.  Medication list updated per patient & marked as reviewed.  Patient has been instructed to notify staff of any problems while attending rehab (ie: chest pain, shortness of breath, lightheadedness, dizziness).  Patient has been instructed to monitor blood pressure readings outside of rehab & to keep a daily log of the readings.  Patient verbalizes understanding.     Agnieszka Pereira RN  Cardiac Rehab Nurse

## 2023-08-11 ENCOUNTER — CLINICAL SUPPORT (OUTPATIENT)
Dept: CARDIAC REHAB | Facility: CLINIC | Age: 63
End: 2023-08-11
Payer: MEDICARE

## 2023-08-11 DIAGNOSIS — I25.10 CORONARY ATHEROSCLEROSIS OF NATIVE CORONARY ARTERY: ICD-10-CM

## 2023-08-11 DIAGNOSIS — Z95.1 POSTSURGICAL AORTOCORONARY BYPASS STATUS: Primary | ICD-10-CM

## 2023-08-11 PROCEDURE — 93798 PHYS/QHP OP CAR RHAB W/ECG: CPT | Mod: HCNC,S$GLB,, | Performed by: INTERNAL MEDICINE

## 2023-08-11 PROCEDURE — 93798 PR CARDIAC REHAB/MONITOR: ICD-10-PCS | Mod: HCNC,S$GLB,, | Performed by: INTERNAL MEDICINE

## 2023-08-11 NOTE — TELEPHONE ENCOUNTER
Called pt following hospital discharge.  Pt states she feels better now that she has prescription for zofran, nausea is controlled, and she is able to eat and drink. Reiterated the need for pt to clean her incision everyday with soap and water, and to walk as much as she can.  Reminded pt not to drive for the first 4 weeks after surgery, and to refrain from lifting, pushing, and pulling anything greater than 5 pounds for the first 6 weeks following her surgery.  Instructed pt to perform daily weights.  Pt instructed to notify the clinic if she has a weight gain greater than 3 pounds in one day.  Pt instructed to call the clinic with any questions or concerns.  Pt verbalized understanding.    No care due was identified.  [facility] Embedded Care Due Messages. Reference number: [id number].   8/11/2023 3:57:49 PM CDT

## 2023-08-14 ENCOUNTER — DOCUMENTATION ONLY (OUTPATIENT)
Dept: CARDIAC REHAB | Facility: CLINIC | Age: 63
End: 2023-08-14

## 2023-08-14 ENCOUNTER — CLINICAL SUPPORT (OUTPATIENT)
Dept: CARDIAC REHAB | Facility: CLINIC | Age: 63
End: 2023-08-14
Payer: MEDICARE

## 2023-08-14 DIAGNOSIS — I25.10 ATHEROSCLEROSIS OF NATIVE CORONARY ARTERY OF NATIVE HEART, UNSPECIFIED WHETHER ANGINA PRESENT: ICD-10-CM

## 2023-08-14 DIAGNOSIS — Z95.1 POSTSURGICAL AORTOCORONARY BYPASS STATUS: Primary | ICD-10-CM

## 2023-08-14 PROCEDURE — 93798 PHYS/QHP OP CAR RHAB W/ECG: CPT | Mod: HCNC,S$GLB,, | Performed by: INTERNAL MEDICINE

## 2023-08-14 PROCEDURE — 93798 PR CARDIAC REHAB/MONITOR: ICD-10-PCS | Mod: HCNC,S$GLB,, | Performed by: INTERNAL MEDICINE

## 2023-08-14 NOTE — PROGRESS NOTES
"12 Session Follow Up   Cardiac Rehab Individual Treatment Plan - Reassessment    Patient Name: Jud Villa MRN: [id number]   : 1960   Age: 63 y.o.   Primary Diagnosis: s/p CABG    Nutrition Assessment:     Anthropometrics    Height 62 inches   Weight 131.6 lbs   BMI 24.1     Patient confirms she is taking lipitor 80mg for cholesterol control.  Difficulty Chewing or Swallowing: No  Current Exercise: See Exercise Physiologist Note  Food Safety/Food Preparation: self  Living Arrangements/Family Support: Lives alone  Cultural/Spiritual/Personal Preferences: not applicable   Barriers to Education: none identified  Stage of Change Related to Diet Habits: Action  Recent Changes to Diet: Yes - less fast food  Food Diary: Given      Nutrition Plan:   Goals:  LDL-C < 70 (for high risk patients)  Hgb A1c < 7%  BMI < 25 and abdominal girth < 40M/<35 F  2 gram sodium, Mediterranean diet: In Progress  Fish intake (non-fried varieties) to a goal of 2-3 servings per week: In Progress  Increase fruit and vegetable intake: In Progress    Comments on Goal Progression:  Pt states she has made an effort to reduce fast food intake and is also eating more produce with meals    Interventions:  Dietitian Consult: No  Patient to participate in Cardiac Rehab sessions three times a week  Weekly Dietitian Weight Check  Nutrition Recommendations Provided: Verbal, Reviewed  Follow Up Plan for Ongoing Self-Management Support    Education:  Protein; verbalizes understanding; Date: 23  Seafood; verbalizes understanding; Date: 23  Vitamins; verbalizes understanding; Date: 23  Pre/Probiotics; verbalizes understanding; Date: 23    Comments:   Discussed ways to incorporate healthy snacks, eating on a schedule, and monitoring sodium intake for heart health.    Diabetes  Is the patient diabetic? Yes   Other Core Components/Diabetes Assessment:   Labs:  No results found for: "GLUF"  Lab Results   Component Value Date    HGBA1C " 6.9 (H) 04/11/2023    HGBA1C 6.8 (H) 03/14/2023    HGBA1C 7.2 (H) 06/06/2022      Lab Results   Component Value Date    ESTIMATEDAVG 151 (H) 04/11/2023    ESTIMATEDAVG 160 (H) 06/06/2022    ESTIMATEDAVG 157 (H) 01/19/2022       History of diabetes since 2008  Diabetes medications: Jardiance 10mg daily, Tradjenta 5mg daily, metformin 1000mg daily, lantus 10u @[facility]  Blood Glucose Checks at Home: Yes: blood sugars ac & HS  Typical morning range: 100-110 mg/dl  Endocrinologist or PCP following DM: PCP-changing but does not have new one yet    Other Core Components/Diabetes Plan:   Goals:  Hgb A1c < 7%  Exercise Blood Glucose:100-300mg/dl    Interventions:  Medication Compliance  Med Card Reconciled  Low Sodium, Mediterranean, ADA Diet  Physical Activity  Increase Knowledge of Contributory Factors    Education:  Diabetes; verbalizes understanding; Date: 8/14/23  Medications I; verbalizes understanding; Date: 7/21/23    Comments:   Patient verbalizes understanding to bring home glucometer and check glucose pre and post each exercise session.  Per cardiac rehab protocols, patient's glucose must be between 90 and 270 mg/dL to exercise.  Patient denies any recent glucose levels less than 60 mg/dL or greater than 300 mg/dL. Patient verbalizes importance of notifying rehab staff if symptoms of hypoglycemia occur while at cardiac rehab.  Abnormal labs will be reported to patient's PCP/Endocrinologist by rehab staff.    Noted updated glucose parameters of 100-300    Continue to encourage reduction in refined sugars/regular coke    Madai Valladares, MS, RDN/LDN

## 2023-08-14 NOTE — PROGRESS NOTES
Miss Renner has completed 12 out of 36 exercise session of Phase II cardiac rehab.  A follow up reassessment will be completed at 24 sessions.     12 Session Follow Up   Cardiac Rehab Individual Treatment Plan - Reassessment      Patient Name: Jud Villa MRN: [id number]   : 1960   Age: 63 y.o.   Primary Diagnosis: CABG Date of Event: 23   EF: 47%  Risk Stratification: high  Referring Physician: SUMIT ACKERMAN   Exercise Assessment:     Angina with exercise?: No   ST Depression with Exercise?: No  Fall Risk: Yes   Assistive Devices:  independent  Miss Renner stated at orientation there were no limitations to exercise.    Comments on Progression: Miss Renner is progressing slowly but  her workloads will continue to be increased as she tolerates exercise intensity.     Exercise Plan:   Goals:  CR Exercise Goals: Attend Cardiac Rehab 3 times/week: In Progress  Home Aerobic Exercise: 2 additional days/week for 30-60 minutes: In Progress  Intensity of 12-15 on the Rate of Perceived Exertion (RPE) scale: In Progress  30% increase in entry estimated METS: 6.5 : In Progress  5 days/week for 30-60 minutes: In Progress    Comments on Goal Progression:  Patient Consistency: consistent with attendance  Home exercise? No   Patient reports intensity rate 9-15 on RPE scale    Intervention/Recommendations:   Discussed importance of regular attendance to cardiac rehab class    Exercise Prescription:  THR Range 72-78   Mode: Treadmill  Recumbent Bike  Nustep   Frequency:  3 days/week   Duration:  30-60 minutes   Intensity:  12-15 RPE   Resistance Training:  Yes: 3 lb weights with 10-15 reps based on strength and range of motion and adjusted accordingly     Home Prescription:  Mode Aerobic exercise   Frequency: 2- 3 days/week   Duration: 30-60 minutes   Resistance Training: None     Education:  Diabetes; verbalizes understanding; Date: 23  Exercise and Rehydration; verbalizes understanding; Date: 23  Relaxation  Techniques; verbalizes understanding; Date: 7-17-23  What Comes After Phase II?; verbalizes understanding; Date: 7-24-23    Comments:  I reviewed exercise recommendations with Miss Renner.  I encouraged her to begin some type of exercise she can do outside of attending rehab class.  I also explained the importance of arriving to class on time.  She stated understanding.      The exercise prescription will continue to be adjusted based on tolerance of exercise intensity by patient.    Glen Silveira., CEP

## 2023-08-16 ENCOUNTER — CLINICAL SUPPORT (OUTPATIENT)
Dept: CARDIAC REHAB | Facility: CLINIC | Age: 63
End: 2023-08-16
Payer: MEDICARE

## 2023-08-16 DIAGNOSIS — I25.10 CORONARY ATHEROSCLEROSIS OF NATIVE CORONARY ARTERY: ICD-10-CM

## 2023-08-16 DIAGNOSIS — Z95.1 POSTSURGICAL AORTOCORONARY BYPASS STATUS: Primary | ICD-10-CM

## 2023-08-16 PROCEDURE — 93798 PR CARDIAC REHAB/MONITOR: ICD-10-PCS | Mod: HCNC,S$GLB,, | Performed by: INTERNAL MEDICINE

## 2023-08-16 PROCEDURE — 93798 PHYS/QHP OP CAR RHAB W/ECG: CPT | Mod: HCNC,S$GLB,, | Performed by: INTERNAL MEDICINE

## 2023-08-19 DIAGNOSIS — F33.1 MODERATE EPISODE OF RECURRENT MAJOR DEPRESSIVE DISORDER: ICD-10-CM

## 2023-08-20 NOTE — TELEPHONE ENCOUNTER
No care due was identified.  [facility] Embedded Care Due Messages. Reference number: [id number].   8/19/2023 7:29:29 PM CDT

## 2023-08-21 ENCOUNTER — OFFICE VISIT (OUTPATIENT)
Dept: FAMILY MEDICINE | Facility: CLINIC | Age: 63
End: 2023-08-21
Payer: MEDICARE

## 2023-08-21 VITALS
SYSTOLIC BLOOD PRESSURE: 98 MMHG | HEIGHT: 62 IN | BODY MASS INDEX: 24.42 KG/M2 | OXYGEN SATURATION: 98 % | HEART RATE: 77 BPM | WEIGHT: 132.69 LBS | DIASTOLIC BLOOD PRESSURE: 66 MMHG

## 2023-08-21 DIAGNOSIS — J44.1 COPD EXACERBATION: ICD-10-CM

## 2023-08-21 DIAGNOSIS — L40.9 PSORIASIS: ICD-10-CM

## 2023-08-21 DIAGNOSIS — E11.65 TYPE 2 DIABETES MELLITUS WITH HYPERGLYCEMIA, WITH LONG-TERM CURRENT USE OF INSULIN: Primary | ICD-10-CM

## 2023-08-21 DIAGNOSIS — Z95.1 S/P CABG X 2: ICD-10-CM

## 2023-08-21 DIAGNOSIS — J43.2 CENTRILOBULAR EMPHYSEMA: ICD-10-CM

## 2023-08-21 DIAGNOSIS — E78.5 HYPERLIPIDEMIA, UNSPECIFIED HYPERLIPIDEMIA TYPE: ICD-10-CM

## 2023-08-21 DIAGNOSIS — Z79.4 TYPE 2 DIABETES MELLITUS WITH HYPERGLYCEMIA, WITH LONG-TERM CURRENT USE OF INSULIN: Primary | ICD-10-CM

## 2023-08-21 DIAGNOSIS — I25.10 CORONARY ARTERY DISEASE INVOLVING NATIVE CORONARY ARTERY OF NATIVE HEART WITHOUT ANGINA PECTORIS: ICD-10-CM

## 2023-08-21 PROCEDURE — 3288F FALL RISK ASSESSMENT DOCD: CPT | Mod: HCNC,CPTII,S$GLB, | Performed by: FAMILY MEDICINE

## 2023-08-21 PROCEDURE — 1160F RVW MEDS BY RX/DR IN RCRD: CPT | Mod: HCNC,CPTII,S$GLB, | Performed by: FAMILY MEDICINE

## 2023-08-21 PROCEDURE — 1159F MED LIST DOCD IN RCRD: CPT | Mod: HCNC,CPTII,S$GLB, | Performed by: FAMILY MEDICINE

## 2023-08-21 PROCEDURE — 1101F PT FALLS ASSESS-DOCD LE1/YR: CPT | Mod: HCNC,CPTII,S$GLB, | Performed by: FAMILY MEDICINE

## 2023-08-21 PROCEDURE — 3078F PR MOST RECENT DIASTOLIC BLOOD PRESSURE < 80 MM HG: ICD-10-PCS | Mod: HCNC,CPTII,S$GLB, | Performed by: FAMILY MEDICINE

## 2023-08-21 PROCEDURE — 99999 PR PBB SHADOW E&M-EST. PATIENT-LVL V: CPT | Mod: PBBFAC,HCNC,, | Performed by: FAMILY MEDICINE

## 2023-08-21 PROCEDURE — 99215 OFFICE O/P EST HI 40 MIN: CPT | Mod: HCNC,S$GLB,, | Performed by: FAMILY MEDICINE

## 2023-08-21 PROCEDURE — 1160F PR REVIEW ALL MEDS BY PRESCRIBER/CLIN PHARMACIST DOCUMENTED: ICD-10-PCS | Mod: HCNC,CPTII,S$GLB, | Performed by: FAMILY MEDICINE

## 2023-08-21 PROCEDURE — 3074F PR MOST RECENT SYSTOLIC BLOOD PRESSURE < 130 MM HG: ICD-10-PCS | Mod: HCNC,CPTII,S$GLB, | Performed by: FAMILY MEDICINE

## 2023-08-21 PROCEDURE — 3078F DIAST BP <80 MM HG: CPT | Mod: HCNC,CPTII,S$GLB, | Performed by: FAMILY MEDICINE

## 2023-08-21 PROCEDURE — 3044F HG A1C LEVEL LT 7.0%: CPT | Mod: HCNC,CPTII,S$GLB, | Performed by: FAMILY MEDICINE

## 2023-08-21 PROCEDURE — 99999 PR PBB SHADOW E&M-EST. PATIENT-LVL V: ICD-10-PCS | Mod: PBBFAC,HCNC,, | Performed by: FAMILY MEDICINE

## 2023-08-21 PROCEDURE — 99215 PR OFFICE/OUTPT VISIT, EST, LEVL V, 40-54 MIN: ICD-10-PCS | Mod: HCNC,S$GLB,, | Performed by: FAMILY MEDICINE

## 2023-08-21 PROCEDURE — 3044F PR MOST RECENT HEMOGLOBIN A1C LEVEL <7.0%: ICD-10-PCS | Mod: HCNC,CPTII,S$GLB, | Performed by: FAMILY MEDICINE

## 2023-08-21 PROCEDURE — 1159F PR MEDICATION LIST DOCUMENTED IN MEDICAL RECORD: ICD-10-PCS | Mod: HCNC,CPTII,S$GLB, | Performed by: FAMILY MEDICINE

## 2023-08-21 PROCEDURE — 1101F PR PT FALLS ASSESS DOC 0-1 FALLS W/OUT INJ PAST YR: ICD-10-PCS | Mod: HCNC,CPTII,S$GLB, | Performed by: FAMILY MEDICINE

## 2023-08-21 PROCEDURE — 3008F BODY MASS INDEX DOCD: CPT | Mod: HCNC,CPTII,S$GLB, | Performed by: FAMILY MEDICINE

## 2023-08-21 PROCEDURE — 3008F PR BODY MASS INDEX (BMI) DOCUMENTED: ICD-10-PCS | Mod: HCNC,CPTII,S$GLB, | Performed by: FAMILY MEDICINE

## 2023-08-21 PROCEDURE — 3074F SYST BP LT 130 MM HG: CPT | Mod: HCNC,CPTII,S$GLB, | Performed by: FAMILY MEDICINE

## 2023-08-21 PROCEDURE — 3288F PR FALLS RISK ASSESSMENT DOCUMENTED: ICD-10-PCS | Mod: HCNC,CPTII,S$GLB, | Performed by: FAMILY MEDICINE

## 2023-08-21 RX ORDER — MELOXICAM 15 MG/1
15 TABLET ORAL DAILY
Qty: 30 TABLET | Refills: 1 | Status: SHIPPED | OUTPATIENT
Start: 2023-08-21 | End: 2023-10-23

## 2023-08-21 RX ORDER — CLONAZEPAM 0.5 MG/1
TABLET ORAL
Qty: 30 TABLET | Refills: 0 | Status: SHIPPED | OUTPATIENT
Start: 2023-08-21

## 2023-08-21 NOTE — PROGRESS NOTES
Subjective     Patient ID: Jud Villa is a 63 y.o. female.    Chief Complaint: Hypertension    63 years old female came to the clinic for diabetes check.  Patient reports elevated blood pressure at home.  Patient status post CABG.  Patient with good compliance with medical regimen.  She is not taking glipizide.  Patient is requesting evaluation for psoriasis by the dermatologist.    Hypertension  Pertinent negatives include no chest pain or palpitations.     Review of Systems   Constitutional: Negative.    HENT: Negative.     Eyes: Negative.    Respiratory: Negative.     Cardiovascular:  Negative for chest pain, palpitations, leg swelling and claudication.   Gastrointestinal: Negative.    Genitourinary: Negative.    Musculoskeletal: Negative.    Neurological: Negative.    Psychiatric/Behavioral: Negative.            Objective     Physical Exam  Constitutional:       General: She is not in acute distress.     Appearance: She is well-developed. She is not diaphoretic.   HENT:      Head: Normocephalic and atraumatic.      Right Ear: External ear normal.      Left Ear: External ear normal.      Nose: Nose normal.      Mouth/Throat:      Pharynx: No oropharyngeal exudate.   Eyes:      General: No scleral icterus.        Right eye: No discharge.         Left eye: No discharge.      Conjunctiva/sclera: Conjunctivae normal.      Pupils: Pupils are equal, round, and reactive to light.   Neck:      Thyroid: No thyromegaly.      Vascular: No JVD.      Trachea: No tracheal deviation.   Cardiovascular:      Rate and Rhythm: Normal rate and regular rhythm.      Heart sounds: Normal heart sounds. No murmur heard.     No friction rub. No gallop.   Pulmonary:      Effort: Pulmonary effort is normal. No respiratory distress.      Breath sounds: Normal breath sounds. No stridor. No wheezing or rales.   Chest:      Chest wall: No tenderness.   Abdominal:      General: Bowel sounds are normal. There is no distension.       Palpations: Abdomen is soft. There is no mass.      Tenderness: There is no abdominal tenderness. There is no guarding or rebound.   Musculoskeletal:         General: No tenderness. Normal range of motion.      Cervical back: Normal range of motion and neck supple.   Feet:      Right foot:      Protective Sensation: 10 sites tested.  10 sites sensed.      Left foot:      Protective Sensation: 10 sites tested.  10 sites sensed.   Lymphadenopathy:      Cervical: No cervical adenopathy.   Skin:     General: Skin is warm and dry.      Coloration: Skin is not pale.      Findings: Rash present. No erythema. Rash is scaling.   Neurological:      Mental Status: She is alert and oriented to person, place, and time.      Cranial Nerves: No cranial nerve deficit.      Motor: No abnormal muscle tone.      Coordination: Coordination normal.      Deep Tendon Reflexes: Reflexes are normal and symmetric.   Psychiatric:         Behavior: Behavior normal.         Thought Content: Thought content normal.         Judgment: Judgment normal.            Assessment and Plan     1. Type 2 diabetes mellitus with hyperglycemia, with long-term current use of insulin  -     Microalbumin/Creatinine Ratio, Urine; Future; Expected date: 08/21/2023  -     Lipid Panel; Future; Expected date: 08/21/2023  -     Hemoglobin A1C; Future; Expected date: 08/21/2023  -     Comprehensive Metabolic Panel; Future; Expected date: 08/21/2023  -     Ambulatory referral/consult to Podiatry; Future; Expected date: 08/28/2023    2. S/P CABG x 2  -     Lipid Panel; Future; Expected date: 08/21/2023    3. Coronary artery disease involving native coronary artery of native heart without angina pectoris  -     Lipid Panel; Future; Expected date: 08/21/2023  -     TSH; Future; Expected date: 08/21/2023    4. Hyperlipidemia, unspecified hyperlipidemia type  -     TSH; Future; Expected date: 08/21/2023    5. Psoriasis  -     Ambulatory referral/consult to Dermatology;  Future; Expected date: 08/28/2023        Continue monitoring blood sugar at home,ADA diet.          Follow up in about 6 months (around 2/21/2024), or if symptoms worsen or fail to improve.

## 2023-08-22 ENCOUNTER — LAB VISIT (OUTPATIENT)
Dept: LAB | Facility: HOSPITAL | Age: 63
End: 2023-08-22
Attending: FAMILY MEDICINE
Payer: MEDICARE

## 2023-08-22 DIAGNOSIS — Z79.4 TYPE 2 DIABETES MELLITUS WITH HYPERGLYCEMIA, WITH LONG-TERM CURRENT USE OF INSULIN: ICD-10-CM

## 2023-08-22 DIAGNOSIS — I25.10 CORONARY ARTERY DISEASE INVOLVING NATIVE CORONARY ARTERY OF NATIVE HEART WITHOUT ANGINA PECTORIS: ICD-10-CM

## 2023-08-22 DIAGNOSIS — E78.5 HYPERLIPIDEMIA, UNSPECIFIED HYPERLIPIDEMIA TYPE: ICD-10-CM

## 2023-08-22 DIAGNOSIS — E11.65 TYPE 2 DIABETES MELLITUS WITH HYPERGLYCEMIA, WITH LONG-TERM CURRENT USE OF INSULIN: ICD-10-CM

## 2023-08-22 DIAGNOSIS — Z95.1 S/P CABG X 2: ICD-10-CM

## 2023-08-22 LAB
ALBUMIN SERPL BCP-MCNC: 3.6 G/DL (ref 3.5–5.2)
ALP SERPL-CCNC: 62 U/L (ref 55–135)
ALT SERPL W/O P-5'-P-CCNC: 12 U/L (ref 10–44)
ANION GAP SERPL CALC-SCNC: 10 MMOL/L (ref 8–16)
AST SERPL-CCNC: 15 U/L (ref 10–40)
BILIRUB SERPL-MCNC: 0.4 MG/DL (ref 0.1–1)
BUN SERPL-MCNC: 17 MG/DL (ref 8–23)
CALCIUM SERPL-MCNC: 9.3 MG/DL (ref 8.7–10.5)
CHLORIDE SERPL-SCNC: 103 MMOL/L (ref 95–110)
CHOLEST SERPL-MCNC: 117 MG/DL (ref 120–199)
CHOLEST/HDLC SERPL: 2.7 {RATIO} (ref 2–5)
CO2 SERPL-SCNC: 25 MMOL/L (ref 23–29)
CREAT SERPL-MCNC: 0.7 MG/DL (ref 0.5–1.4)
EST. GFR  (NO RACE VARIABLE): >60 ML/MIN/1.73 M^2
ESTIMATED AVG GLUCOSE: 143 MG/DL (ref 68–131)
GLUCOSE SERPL-MCNC: 122 MG/DL (ref 70–110)
HBA1C MFR BLD: 6.6 % (ref 4–5.6)
HDLC SERPL-MCNC: 43 MG/DL (ref 40–75)
HDLC SERPL: 36.8 % (ref 20–50)
LDLC SERPL CALC-MCNC: 52 MG/DL (ref 63–159)
NONHDLC SERPL-MCNC: 74 MG/DL
POTASSIUM SERPL-SCNC: 4.2 MMOL/L (ref 3.5–5.1)
PROT SERPL-MCNC: 7 G/DL (ref 6–8.4)
SODIUM SERPL-SCNC: 138 MMOL/L (ref 136–145)
TRIGL SERPL-MCNC: 110 MG/DL (ref 30–150)
TSH SERPL DL<=0.005 MIU/L-ACNC: 2.37 UIU/ML (ref 0.4–4)

## 2023-08-22 PROCEDURE — 80061 LIPID PANEL: CPT | Mod: HCNC | Performed by: FAMILY MEDICINE

## 2023-08-22 PROCEDURE — 36415 COLL VENOUS BLD VENIPUNCTURE: CPT | Mod: HCNC,PO | Performed by: FAMILY MEDICINE

## 2023-08-22 PROCEDURE — 84443 ASSAY THYROID STIM HORMONE: CPT | Mod: HCNC | Performed by: FAMILY MEDICINE

## 2023-08-22 PROCEDURE — 80053 COMPREHEN METABOLIC PANEL: CPT | Mod: HCNC | Performed by: FAMILY MEDICINE

## 2023-08-22 PROCEDURE — 83036 HEMOGLOBIN GLYCOSYLATED A1C: CPT | Mod: HCNC | Performed by: FAMILY MEDICINE

## 2023-08-23 ENCOUNTER — CLINICAL SUPPORT (OUTPATIENT)
Dept: CARDIAC REHAB | Facility: CLINIC | Age: 63
End: 2023-08-23
Payer: MEDICARE

## 2023-08-23 DIAGNOSIS — I25.10 CORONARY ATHEROSCLEROSIS OF NATIVE CORONARY ARTERY: ICD-10-CM

## 2023-08-23 DIAGNOSIS — Z95.1 POSTSURGICAL AORTOCORONARY BYPASS STATUS: Primary | ICD-10-CM

## 2023-08-23 PROCEDURE — 93798 PHYS/QHP OP CAR RHAB W/ECG: CPT | Mod: HCNC,S$GLB,, | Performed by: INTERNAL MEDICINE

## 2023-08-23 PROCEDURE — 93798 PR CARDIAC REHAB/MONITOR: ICD-10-PCS | Mod: HCNC,S$GLB,, | Performed by: INTERNAL MEDICINE

## 2023-08-23 NOTE — PROGRESS NOTES
Session: 12 Session Follow Up   Cardiac Rehab Individual Treatment Plan - Reassessment      Patient Name: Jud Villa MRN: [id number]   : 1960   Age: 63 y.o.   Primary Diagnosis: S/P CABG 2023      Psychosocial Assessment:   Living Arrangements: Lives alone  Family Support: children and grandchildren  Self Reported: no change Anxiety and Depression  Displays: calmness  Medication: not applicable    Psychosocial Plan:   Goals:  Improved psychosocial coping strategies: In Progress  Reduce manifestation of depression: In Progress  Maintain positive support system: Met  Maintain positive outlook: Met  Improve overall quality of life: In Progress    Interventions:  Patient to Self Report Emotional Changes at Session Check In  Recommend Physical Activity  Recommend Attending Education Lectures  Notify MD: No  Program Referral: Yes  Pharmaceutical Intervention/Therapy: Declined  Other Needs: not applicable  Stage of Readiness to Change: Action    Education:  Heart disease and emotions; verbalizes understanding; Date: 2023    Comments:  Pt denies any overwhelming stress or anxiety. Pt is showing more motivation in daily attendance of cardiac rehab and participating more with effort. Pt states she has worry about her previous problem with breast cancer.  Patient has been instructed to notify staff in the event that circumstances worsen.  Patient verbalizes understanding.    Other Core Components/Risk Factors Assessment:   RISK FACTORS:  diabetes, hyperlipidemia, sedentary lifestyle, tobacco use, stress    Learning Barriers: Emotional    Medication Compliance: has been compliant with the medicaiton regimen    Other Core Components/Risk Factors Plan:   Goals:  Increase exercise tolerance: In Progress  Increase knowledge of CAD: In Progress  Identify and manage personal areas of stress: In Progress  Control diabetes by adjusting diet and exercise: In Progress  Learn more about healthy eating: In  Progress    Interventions:  Individual Education/ Counseling: Yes  Physician Referral: No    Education:    cholesterol meds, verbalizes understanding; Date: 7/21/2023  stress, verbalizes understanding; Date: 8/04/2023  warning signs of MI, verbalizes understanding; Date: 8/11/2023         Education method adapted to patients education level and preferred method of learning.  Method: explanation  demonstration  handouts    Comments:  Pt si attending rehab more regularly and feel improvement physically.    Other Core Components/Hypertension Assessment:   BP Range:  systolic; 52-76 diastolic  BP at Goal: Yes  Patient reported symptoms: none    Other Core Components/Hypertension Plan:   Goals:  Blood Pressure <130/80    Interventions:  Med Card Reconciled: Yes  Encourage medication compliance  Encourage sodium reduction  Encourage weight loss  Recommend physical activity  Educate on contributory factors  Reduce stress, anxiety, anger, depression, and/or chronic pain  Encourage home blood pressure monitoring  Recommend daily weights  MD notified/Physician Referral: No    Education:    Medication I; verbalizes understanding; Date: 7/21/2023  Stroke; verbalizes understanding; Date: 8/11/2023         Comments:  Pt states she is monitoring her blood pressure daily at home and keeping a log.    Does the patient have Heart Failure? No      Other Core Components/Tobacco Cessation Assessment:   Smoking Status: past smoker who quit 3/2023  Smoking Cessation Barriers:  N/A  Stage of Readiness to Change: Maintenance    Other Core Components/Tobacco Cessation Plan:   Goals:  Maintain non-smoking status    Interventions:  Maintains non-smoking status    Education:    Stroke; verbalizes understanding; Date: 8/11/2023         Comments:  Pt is not using tobacco products at this time.    Matt Chapin RN

## 2023-08-24 ENCOUNTER — TELEPHONE (OUTPATIENT)
Dept: FAMILY MEDICINE | Facility: CLINIC | Age: 63
End: 2023-08-24
Payer: MEDICARE

## 2023-08-24 NOTE — PROGRESS NOTES
Please notify the patient.   Normal cholesterol and triglycerides .   Thyroid test was normal.   Normal kidney and liver function.  Elevated blood sugar.   Stable blood sugar for the last 3 months.

## 2023-08-24 NOTE — TELEPHONE ENCOUNTER
----- Message from Agata Covington sent at 8/24/2023  4:09 PM CDT -----  Type:  Needs Medical Advice    Who Called: pt    Would the patient rather a call back or a response via [facility]? call  Best Call Back Number: [phone]  Additional Information: pt requesting a callback to discuss results

## 2023-08-25 ENCOUNTER — TELEPHONE (OUTPATIENT)
Dept: FAMILY MEDICINE | Facility: CLINIC | Age: 63
End: 2023-08-25
Payer: MEDICARE

## 2023-08-25 ENCOUNTER — CLINICAL SUPPORT (OUTPATIENT)
Dept: CARDIAC REHAB | Facility: CLINIC | Age: 63
End: 2023-08-25
Payer: MEDICARE

## 2023-08-25 DIAGNOSIS — Z95.1 POSTSURGICAL AORTOCORONARY BYPASS STATUS: Primary | ICD-10-CM

## 2023-08-25 DIAGNOSIS — I25.10 ATHEROSCLEROSIS OF NATIVE CORONARY ARTERY OF NATIVE HEART, UNSPECIFIED WHETHER ANGINA PRESENT: ICD-10-CM

## 2023-08-25 PROCEDURE — 93798 PR CARDIAC REHAB/MONITOR: ICD-10-PCS | Mod: HCNC,S$GLB,, | Performed by: INTERNAL MEDICINE

## 2023-08-25 PROCEDURE — 93798 PHYS/QHP OP CAR RHAB W/ECG: CPT | Mod: HCNC,S$GLB,, | Performed by: INTERNAL MEDICINE

## 2023-08-25 NOTE — TELEPHONE ENCOUNTER
----- Message from Sherri Mishra sent at 8/25/2023 11:29 AM CDT -----  Contact: pt  Type:  Test Results    Who Called: pt   Name of Test (Lab/Mammo/Etc): lab   Date of Test: 08/21  Ordering Provider: Otilio   Where the test was performed: [facility]  Would the patient rather a call back or a response via [facility]? call  Best Call Back Number: [phone]  Additional Information:    Pt stated she been reaching out to office but no contacted her back yet

## 2023-08-25 NOTE — TELEPHONE ENCOUNTER
----- Message from Bo Lopez MD sent at 8/24/2023  5:12 PM CDT -----  Please notify the patient.   Normal cholesterol and triglycerides .   Thyroid test was normal.   Normal kidney and liver function.  Elevated blood sugar.   Stable blood sugar for the last 3 months.

## 2023-08-28 ENCOUNTER — OFFICE VISIT (OUTPATIENT)
Dept: CARDIOLOGY | Facility: CLINIC | Age: 63
End: 2023-08-28
Payer: MEDICARE

## 2023-08-28 ENCOUNTER — TELEPHONE (OUTPATIENT)
Dept: CARDIAC REHAB | Facility: CLINIC | Age: 63
End: 2023-08-28
Payer: MEDICARE

## 2023-08-28 ENCOUNTER — HOSPITAL ENCOUNTER (OUTPATIENT)
Dept: CARDIOLOGY | Facility: CLINIC | Age: 63
Discharge: HOME | End: 2023-08-28
Payer: MEDICARE

## 2023-08-28 VITALS
DIASTOLIC BLOOD PRESSURE: 80 MMHG | WEIGHT: 132.69 LBS | OXYGEN SATURATION: 96 % | SYSTOLIC BLOOD PRESSURE: 110 MMHG | HEART RATE: 86 BPM | BODY MASS INDEX: 24.42 KG/M2 | HEIGHT: 62 IN

## 2023-08-28 DIAGNOSIS — I97.89 POSTOPERATIVE ATRIAL FIBRILLATION: ICD-10-CM

## 2023-08-28 DIAGNOSIS — R07.89 SQUEEZING CHEST PAIN: Primary | ICD-10-CM

## 2023-08-28 DIAGNOSIS — F17.210 CIGARETTE NICOTINE DEPENDENCE WITHOUT COMPLICATION: ICD-10-CM

## 2023-08-28 DIAGNOSIS — Z95.1 S/P CABG X 2: ICD-10-CM

## 2023-08-28 DIAGNOSIS — E78.00 PURE HYPERCHOLESTEROLEMIA: ICD-10-CM

## 2023-08-28 DIAGNOSIS — Z79.4 CONTROLLED TYPE 2 DIABETES MELLITUS WITH COMPLICATION, WITH LONG-TERM CURRENT USE OF INSULIN: Chronic | ICD-10-CM

## 2023-08-28 DIAGNOSIS — R07.89 SQUEEZING CHEST PAIN: ICD-10-CM

## 2023-08-28 DIAGNOSIS — E11.8 CONTROLLED TYPE 2 DIABETES MELLITUS WITH COMPLICATION, WITH LONG-TERM CURRENT USE OF INSULIN: Chronic | ICD-10-CM

## 2023-08-28 DIAGNOSIS — R07.9 CHEST PAIN, UNSPECIFIED TYPE: Primary | ICD-10-CM

## 2023-08-28 DIAGNOSIS — I25.10 CORONARY ARTERY DISEASE INVOLVING NATIVE CORONARY ARTERY OF NATIVE HEART WITHOUT ANGINA PECTORIS: ICD-10-CM

## 2023-08-28 DIAGNOSIS — I48.91 POSTOPERATIVE ATRIAL FIBRILLATION: ICD-10-CM

## 2023-08-28 PROCEDURE — 3008F PR BODY MASS INDEX (BMI) DOCUMENTED: ICD-10-PCS | Mod: HCNC,CPTII,S$GLB, | Performed by: PHYSICIAN ASSISTANT

## 2023-08-28 PROCEDURE — 93000 ELECTROCARDIOGRAM COMPLETE: CPT | Mod: HCNC,S$GLB,, | Performed by: INTERNAL MEDICINE

## 2023-08-28 PROCEDURE — 3044F HG A1C LEVEL LT 7.0%: CPT | Mod: HCNC,CPTII,S$GLB, | Performed by: PHYSICIAN ASSISTANT

## 2023-08-28 PROCEDURE — 93000 EKG 12-LEAD: ICD-10-PCS | Mod: HCNC,S$GLB,, | Performed by: INTERNAL MEDICINE

## 2023-08-28 PROCEDURE — 99214 PR OFFICE/OUTPT VISIT, EST, LEVL IV, 30-39 MIN: ICD-10-PCS | Mod: HCNC,S$GLB,, | Performed by: PHYSICIAN ASSISTANT

## 2023-08-28 PROCEDURE — 3044F PR MOST RECENT HEMOGLOBIN A1C LEVEL <7.0%: ICD-10-PCS | Mod: HCNC,CPTII,S$GLB, | Performed by: PHYSICIAN ASSISTANT

## 2023-08-28 PROCEDURE — 3079F DIAST BP 80-89 MM HG: CPT | Mod: HCNC,CPTII,S$GLB, | Performed by: PHYSICIAN ASSISTANT

## 2023-08-28 PROCEDURE — 3074F SYST BP LT 130 MM HG: CPT | Mod: HCNC,CPTII,S$GLB, | Performed by: PHYSICIAN ASSISTANT

## 2023-08-28 PROCEDURE — 3061F NEG MICROALBUMINURIA REV: CPT | Mod: HCNC,CPTII,S$GLB, | Performed by: PHYSICIAN ASSISTANT

## 2023-08-28 PROCEDURE — 99214 OFFICE O/P EST MOD 30 MIN: CPT | Mod: HCNC,S$GLB,, | Performed by: PHYSICIAN ASSISTANT

## 2023-08-28 PROCEDURE — 99999 PR PBB SHADOW E&M-EST. PATIENT-LVL IV: CPT | Mod: PBBFAC,HCNC,, | Performed by: PHYSICIAN ASSISTANT

## 2023-08-28 PROCEDURE — 3066F PR DOCUMENTATION OF TREATMENT FOR NEPHROPATHY: ICD-10-PCS | Mod: HCNC,CPTII,S$GLB, | Performed by: PHYSICIAN ASSISTANT

## 2023-08-28 PROCEDURE — 3008F BODY MASS INDEX DOCD: CPT | Mod: HCNC,CPTII,S$GLB, | Performed by: PHYSICIAN ASSISTANT

## 2023-08-28 PROCEDURE — 99999 PR PBB SHADOW E&M-EST. PATIENT-LVL IV: ICD-10-PCS | Mod: PBBFAC,HCNC,, | Performed by: PHYSICIAN ASSISTANT

## 2023-08-28 PROCEDURE — 3061F PR NEG MICROALBUMINURIA RESULT DOCUMENTED/REVIEW: ICD-10-PCS | Mod: HCNC,CPTII,S$GLB, | Performed by: PHYSICIAN ASSISTANT

## 2023-08-28 PROCEDURE — 3074F PR MOST RECENT SYSTOLIC BLOOD PRESSURE < 130 MM HG: ICD-10-PCS | Mod: HCNC,CPTII,S$GLB, | Performed by: PHYSICIAN ASSISTANT

## 2023-08-28 PROCEDURE — 3079F PR MOST RECENT DIASTOLIC BLOOD PRESSURE 80-89 MM HG: ICD-10-PCS | Mod: HCNC,CPTII,S$GLB, | Performed by: PHYSICIAN ASSISTANT

## 2023-08-28 PROCEDURE — 3066F NEPHROPATHY DOC TX: CPT | Mod: HCNC,CPTII,S$GLB, | Performed by: PHYSICIAN ASSISTANT
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
## 2023-08-28 NOTE — TELEPHONE ENCOUNTER
Patient arrived for Phase II cardiac rehab session.  Patient reports that over the weekend, she experienced chest pain.  Patient did not adminster NTG, nor did she seek attention via ER/911.  Instructed patient to proceed to the ER for evaluation.  She states that she does not like going to the ER & would like to be able to exercise today.  Once again strongly encouraged for patient to go to ER. Offered to call 911, but patient declines.  She was discharged & states that she will drive herself.  She was wanting to contact her cardiologist prior to going to ER.  Encouraged for her to go straight to ER.    Agnieszka Pereira RN  Cardiac Rehab Nurse

## 2023-08-31 ENCOUNTER — DOCUMENTATION ONLY (OUTPATIENT)
Dept: CARDIAC REHAB | Facility: CLINIC | Age: 63
End: 2023-08-31
Payer: MEDICARE

## 2023-08-31 NOTE — PROGRESS NOTES
Miss Renner has completed 15 out of 36 exercise session of Phase II cardiac rehab.  A follow up reassessment will be completed at 24 sessions.     12 Session Follow Up   Cardiac Rehab Individual Treatment Plan - Reassessment      Patient Name: Jud Villa MRN: [id number]   : 1960   Age: 63 y.o.   Primary Diagnosis: CABG Date of Event: 23   EF: 47%  Risk Stratification: high  Referring Physician: SUMIT ACKERMAN   Exercise Assessment:     Angina with exercise?: No   ST Depression with Exercise?: No  Fall Risk: Yes   Assistive Devices:  independent  Miss Renner stated at orientation there were no limitations to exercise.    Comments on Progression: Miss Renner is progressing slowly but  her workloads will continue to be increased as she tolerates exercise intensity.     Exercise Plan:   Goals:  CR Exercise Goals: Attend Cardiac Rehab 3 times/week: In Progress  Home Aerobic Exercise: 2 additional days/week for 30-60 minutes: In Progress  Intensity of 12-15 on the Rate of Perceived Exertion (RPE) scale: In Progress  30% increase in entry estimated METS: 6.5 : In Progress  5 days/week for 30-60 minutes: In Progress    Comments on Goal Progression:  Patient Consistency: consistent with attendance  Home exercise? No   Patient reports intensity rate 9-15 on RPE scale    Intervention/Recommendations:   Discussed importance of regular attendance to cardiac rehab class    Exercise Prescription:  THR Range 72-78   Mode: Treadmill  Recumbent Bike  Nustep   Frequency:  3 days/week   Duration:  30-60 minutes   Intensity:  12-15 RPE   Resistance Training:  Yes: 3 lb weights with 10-15 reps based on strength and range of motion and adjusted accordingly     Home Prescription:  Mode Aerobic exercise   Frequency: 2- 3 days/week   Duration: 30-60 minutes   Resistance Training: None     Education:  Diabetes; verbalizes understanding; Date: 23  Exercise and Rehydration; verbalizes understanding; Date: 23  Relaxation  Techniques; verbalizes understanding; Date: 23  What Comes After Phase II?; verbalizes understanding; Date: 23    Comments:  I reviewed exercise recommendations with Miss Renner.  I encouraged her to begin some type of exercise she can do outside of attending rehab class.  I also explained the importance of arriving to class on time.  She stated understanding.      The exercise prescription will continue to be adjusted based on tolerance of exercise intensity by patient.    Ernesto Silveira[place] [facility] [place]     12 Session Follow Up   Cardiac Rehab Individual Treatment Plan - Reassessment    Patient Name: Jud Villa MRN: [id number]   : 1960   Age: 63 y.o.   Primary Diagnosis: s/p CABG    Nutrition Assessment:     Anthropometrics    Height 62 inches   Weight 131.6 lbs   BMI 24.1     Patient confirms she is taking lipitor 80mg for cholesterol control.  Difficulty Chewing or Swallowing: No  Current Exercise: See Exercise Physiologist Note  Food Safety/Food Preparation: self  Living Arrangements/Family Support: Lives alone  Cultural/Spiritual/Personal Preferences: not applicable   Barriers to Education: none identified  Stage of Change Related to Diet Habits: Action  Recent Changes to Diet: Yes - less fast food  Food Diary: Given      Nutrition Plan:   Goals:  LDL-C < 70 (for high risk patients)  Hgb A1c < 7%  BMI < 25 and abdominal girth < 40M/<35 F  2 gram sodium, Mediterranean diet: In Progress  Fish intake (non-fried varieties) to a goal of 2-3 servings per week: In Progress  Increase fruit and vegetable intake: In Progress    Comments on Goal Progression:  Pt states she has made an effort to reduce fast food intake and is also eating more produce with meals    Interventions:  Dietitian Consult: No  Patient to participate in Cardiac Rehab sessions three times a week  Weekly Dietitian Weight Check  Nutrition Recommendations Provided: Verbal, Reviewed  Follow Up Plan for Ongoing Self-Management  "Support    Education:  Protein; verbalizes understanding; Date: 7/12/23  Seafood; verbalizes understanding; Date: 8/2/23  Vitamins; verbalizes understanding; Date: 8/9/23  Pre/Probiotics; verbalizes understanding; Date: 7/26/23    Comments:   Discussed ways to incorporate healthy snacks, eating on a schedule, and monitoring sodium intake for heart health.    Diabetes  Is the patient diabetic? Yes   Other Core Components/Diabetes Assessment:   Labs:  No results found for: "GLUF"  Lab Results   Component Value Date    HGBA1C 6.6 (H) 08/22/2023    HGBA1C 6.9 (H) 04/11/2023    HGBA1C 6.8 (H) 03/14/2023      Lab Results   Component Value Date    ESTIMATEDAVG 143 (H) 08/22/2023    ESTIMATEDAVG 151 (H) 04/11/2023    ESTIMATEDAVG 160 (H) 06/06/2022       History of diabetes since 2008  Diabetes medications: Jardiance 10mg daily, Tradjenta 5mg daily, metformin 1000mg daily, lantus 10u @[facility]  Blood Glucose Checks at Home: Yes: blood sugars ac & HS  Typical morning range: 100-110 mg/dl  Endocrinologist or PCP following DM: PCP-changing but does not have new one yet    Other Core Components/Diabetes Plan:   Goals:  Hgb A1c < 7%  Exercise Blood Glucose:100-300mg/dl    Interventions:  Medication Compliance  Med Card Reconciled  Low Sodium, Mediterranean, ADA Diet  Physical Activity  Increase Knowledge of Contributory Factors    Education:  Diabetes; verbalizes understanding; Date: 8/14/23  Medications I; verbalizes understanding; Date: 7/21/23    Comments:   Patient verbalizes understanding to bring home glucometer and check glucose pre and post each exercise session.  Per cardiac rehab protocols, patient's glucose must be between 90 and 270 mg/dL to exercise.  Patient denies any recent glucose levels less than 60 mg/dL or greater than 300 mg/dL. Patient verbalizes importance of notifying rehab staff if symptoms of hypoglycemia occur while at cardiac rehab.  Abnormal labs will be reported to patient's PCP/Endocrinologist by rehab " staff.    Noted updated glucose parameters of 100-300    Continue to encourage reduction in refined sugars/regular coke    Madai Valladares MS, RDN/LDN     Session: 12 Session Follow Up   Cardiac Rehab Individual Treatment Plan - Reassessment      Patient Name: Jud Villa MRN: [id number]   : 1960   Age: 63 y.o.   Primary Diagnosis: S/P CABG 2023      Psychosocial Assessment:   Living Arrangements: Lives alone  Family Support: children and grandchildren  Self Reported: no change Anxiety and Depression  Displays: calmness  Medication: not applicable    Psychosocial Plan:   Goals:  Improved psychosocial coping strategies: In Progress  Reduce manifestation of depression: In Progress  Maintain positive support system: Met  Maintain positive outlook: Met  Improve overall quality of life: In Progress    Interventions:  Patient to Self Report Emotional Changes at Session Check In  Recommend Physical Activity  Recommend Attending Education Lectures  Notify MD: No  Program Referral: Yes  Pharmaceutical Intervention/Therapy: Declined  Other Needs: not applicable  Stage of Readiness to Change: Action    Education:  Heart disease and emotions; verbalizes understanding; Date: 2023    Comments:  Pt denies any overwhelming stress or anxiety. Pt is showing more motivation in daily attendance of cardiac rehab and participating more with effort. Pt states she has worry about her previous problem with breast cancer.  Patient has been instructed to notify staff in the event that circumstances worsen.  Patient verbalizes understanding.    Other Core Components/Risk Factors Assessment:   RISK FACTORS:  diabetes, hyperlipidemia, sedentary lifestyle, tobacco use, stress    Learning Barriers: Emotional    Medication Compliance: has been compliant with the medicaiton regimen    Other Core Components/Risk Factors Plan:   Goals:  Increase exercise tolerance: In Progress  Increase knowledge of CAD: In Progress  Identify and  manage personal areas of stress: In Progress  Control diabetes by adjusting diet and exercise: In Progress  Learn more about healthy eating: In Progress    Interventions:  Individual Education/ Counseling: Yes  Physician Referral: No    Education:    cholesterol meds, verbalizes understanding; Date: 7/21/2023  stress, verbalizes understanding; Date: 8/04/2023  warning signs of MI, verbalizes understanding; Date: 8/11/2023         Education method adapted to patients education level and preferred method of learning.  Method: explanation  demonstration  handouts    Comments:  Pt si attending rehab more regularly and feel improvement physically.    Other Core Components/Hypertension Assessment:   BP Range:  systolic; 52-76 diastolic  BP at Goal: Yes  Patient reported symptoms: none    Other Core Components/Hypertension Plan:   Goals:  Blood Pressure <130/80    Interventions:  Med Card Reconciled: Yes  Encourage medication compliance  Encourage sodium reduction  Encourage weight loss  Recommend physical activity  Educate on contributory factors  Reduce stress, anxiety, anger, depression, and/or chronic pain  Encourage home blood pressure monitoring  Recommend daily weights  MD notified/Physician Referral: No    Education:    Medication I; verbalizes understanding; Date: 7/21/2023  Stroke; verbalizes understanding; Date: 8/11/2023         Comments:  Pt states she is monitoring her blood pressure daily at home and keeping a log.    Does the patient have Heart Failure? No      Other Core Components/Tobacco Cessation Assessment:   Smoking Status: past smoker who quit 3/2023  Smoking Cessation Barriers:  N/A  Stage of Readiness to Change: Maintenance    Other Core Components/Tobacco Cessation Plan:   Goals:  Maintain non-smoking status    Interventions:  Maintains non-smoking status    Education:    Stroke; verbalizes understanding; Date: 8/11/2023         Comments:  Pt is not using tobacco products at this  time.    Matt Chapin RN

## 2023-09-01 ENCOUNTER — TELEPHONE (OUTPATIENT)
Dept: PODIATRY | Facility: CLINIC | Age: 63
End: 2023-09-01
Payer: MEDICARE

## 2023-09-01 ENCOUNTER — PATIENT MESSAGE (OUTPATIENT)
Dept: OBSTETRICS AND GYNECOLOGY | Facility: CLINIC | Age: 63
End: 2023-09-01
Payer: MEDICARE

## 2023-09-01 NOTE — TELEPHONE ENCOUNTER
Contacted pt to reschedule appt from 09/05/23 to another day, Dr. Burnett will be in surgery on that day.  Pt did not answer call, I left a voice message to contact Rebecca for rescheduling.

## 2023-09-01 NOTE — TELEPHONE ENCOUNTER
Contacted pt to reschedule upcoming podiatry appointment, provider will be in surgery. Pt did not answer, left HIPAA compliant vm with clinic contact.

## 2023-09-06 ENCOUNTER — TELEPHONE (OUTPATIENT)
Dept: CARDIAC REHAB | Facility: CLINIC | Age: 63
End: 2023-09-06
Payer: MEDICARE

## 2023-09-06 NOTE — TELEPHONE ENCOUNTER
Contact Ms. Villa & notified her that LUPE Escobar would like for her to have PET test prior to returning to rehab. She verbalizes understanding.  Test will be on 10/18/23.  Will follow up with patient after testing.  Agnieszka Pereira RN  Cardiac Rehab Nurse

## 2023-09-12 ENCOUNTER — CLINICAL SUPPORT (OUTPATIENT)
Dept: SMOKING CESSATION | Facility: CLINIC | Age: 63
End: 2023-09-12
Payer: COMMERCIAL

## 2023-09-12 DIAGNOSIS — F17.200 NICOTINE DEPENDENCE: Primary | ICD-10-CM

## 2023-09-12 PROCEDURE — 99999 PR PBB SHADOW E&M-EST. PATIENT-LVL II: CPT | Mod: PBBFAC,,,

## 2023-09-12 PROCEDURE — 99999 PR PBB SHADOW E&M-EST. PATIENT-LVL II: ICD-10-PCS | Mod: PBBFAC,,,

## 2023-09-12 PROCEDURE — 99404 PR PREVENT COUNSEL,INDIV,60 MIN: ICD-10-PCS | Mod: S$GLB,,,

## 2023-09-12 PROCEDURE — 99404 PREV MED CNSL INDIV APPRX 60: CPT | Mod: S$GLB,,,

## 2023-09-12 RX ORDER — IBUPROFEN 200 MG
1 TABLET ORAL DAILY
Qty: 28 PATCH | Refills: 0 | Status: SHIPPED | OUTPATIENT
Start: 2023-09-12 | End: 2023-09-25 | Stop reason: SDUPTHER

## 2023-09-12 NOTE — Clinical Note
Pt seen at intake today. She currently smokes 10 cigs/day. Discussed tobacco cessation medication of 14 mg nicotine patch QD. Advised her is 14 mg patch seems to be too strong to cut in half. Pt started on rate reduction and wait time of 15 min prior to smoking. Exhaled carbon monoxide level was 7 (0-6 non-smoker). Will see pt back in office in 1 wk.

## 2023-09-12 NOTE — PROGRESS NOTES
See Tobacco Cessation Intake Form for patient assessment and recommendations.  Exhaled carbon monoxide level was 7 ppm per [employer].

## 2023-09-13 ENCOUNTER — OFFICE VISIT (OUTPATIENT)
Dept: PODIATRY | Facility: CLINIC | Age: 63
End: 2023-09-13
Payer: MEDICARE

## 2023-09-13 VITALS
OXYGEN SATURATION: 93 % | HEIGHT: 62 IN | BODY MASS INDEX: 24.14 KG/M2 | RESPIRATION RATE: 18 BRPM | SYSTOLIC BLOOD PRESSURE: 92 MMHG | WEIGHT: 131.19 LBS | TEMPERATURE: 99 F | HEART RATE: 67 BPM | DIASTOLIC BLOOD PRESSURE: 58 MMHG

## 2023-09-13 DIAGNOSIS — E11.65 TYPE 2 DIABETES MELLITUS WITH HYPERGLYCEMIA, WITH LONG-TERM CURRENT USE OF INSULIN: Primary | ICD-10-CM

## 2023-09-13 DIAGNOSIS — Z79.4 TYPE 2 DIABETES MELLITUS WITH HYPERGLYCEMIA, WITH LONG-TERM CURRENT USE OF INSULIN: Primary | ICD-10-CM

## 2023-09-13 PROCEDURE — 3078F DIAST BP <80 MM HG: CPT | Mod: CPTII,S$GLB,, | Performed by: STUDENT IN AN ORGANIZED HEALTH CARE EDUCATION/TRAINING PROGRAM

## 2023-09-13 PROCEDURE — 1159F PR MEDICATION LIST DOCUMENTED IN MEDICAL RECORD: ICD-10-PCS | Mod: CPTII,S$GLB,, | Performed by: STUDENT IN AN ORGANIZED HEALTH CARE EDUCATION/TRAINING PROGRAM

## 2023-09-13 PROCEDURE — 1101F PR PT FALLS ASSESS DOC 0-1 FALLS W/OUT INJ PAST YR: ICD-10-PCS | Mod: CPTII,S$GLB,, | Performed by: STUDENT IN AN ORGANIZED HEALTH CARE EDUCATION/TRAINING PROGRAM

## 2023-09-13 PROCEDURE — 99999 PR PBB SHADOW E&M-EST. PATIENT-LVL V: ICD-10-PCS | Mod: PBBFAC,,, | Performed by: STUDENT IN AN ORGANIZED HEALTH CARE EDUCATION/TRAINING PROGRAM

## 2023-09-13 PROCEDURE — 99203 OFFICE O/P NEW LOW 30 MIN: CPT | Mod: S$GLB,,, | Performed by: STUDENT IN AN ORGANIZED HEALTH CARE EDUCATION/TRAINING PROGRAM

## 2023-09-13 PROCEDURE — 3078F PR MOST RECENT DIASTOLIC BLOOD PRESSURE < 80 MM HG: ICD-10-PCS | Mod: CPTII,S$GLB,, | Performed by: STUDENT IN AN ORGANIZED HEALTH CARE EDUCATION/TRAINING PROGRAM

## 2023-09-13 PROCEDURE — 3044F HG A1C LEVEL LT 7.0%: CPT | Mod: CPTII,S$GLB,, | Performed by: STUDENT IN AN ORGANIZED HEALTH CARE EDUCATION/TRAINING PROGRAM

## 2023-09-13 PROCEDURE — 99203 PR OFFICE/OUTPT VISIT, NEW, LEVL III, 30-44 MIN: ICD-10-PCS | Mod: S$GLB,,, | Performed by: STUDENT IN AN ORGANIZED HEALTH CARE EDUCATION/TRAINING PROGRAM

## 2023-09-13 PROCEDURE — 3066F NEPHROPATHY DOC TX: CPT | Mod: CPTII,S$GLB,, | Performed by: STUDENT IN AN ORGANIZED HEALTH CARE EDUCATION/TRAINING PROGRAM

## 2023-09-13 PROCEDURE — 3061F NEG MICROALBUMINURIA REV: CPT | Mod: CPTII,S$GLB,, | Performed by: STUDENT IN AN ORGANIZED HEALTH CARE EDUCATION/TRAINING PROGRAM

## 2023-09-13 PROCEDURE — 3074F PR MOST RECENT SYSTOLIC BLOOD PRESSURE < 130 MM HG: ICD-10-PCS | Mod: CPTII,S$GLB,, | Performed by: STUDENT IN AN ORGANIZED HEALTH CARE EDUCATION/TRAINING PROGRAM

## 2023-09-13 PROCEDURE — 3061F PR NEG MICROALBUMINURIA RESULT DOCUMENTED/REVIEW: ICD-10-PCS | Mod: CPTII,S$GLB,, | Performed by: STUDENT IN AN ORGANIZED HEALTH CARE EDUCATION/TRAINING PROGRAM

## 2023-09-13 PROCEDURE — 99999 PR PBB SHADOW E&M-EST. PATIENT-LVL V: CPT | Mod: PBBFAC,,, | Performed by: STUDENT IN AN ORGANIZED HEALTH CARE EDUCATION/TRAINING PROGRAM

## 2023-09-13 PROCEDURE — 3044F PR MOST RECENT HEMOGLOBIN A1C LEVEL <7.0%: ICD-10-PCS | Mod: CPTII,S$GLB,, | Performed by: STUDENT IN AN ORGANIZED HEALTH CARE EDUCATION/TRAINING PROGRAM

## 2023-09-13 PROCEDURE — 1159F MED LIST DOCD IN RCRD: CPT | Mod: CPTII,S$GLB,, | Performed by: STUDENT IN AN ORGANIZED HEALTH CARE EDUCATION/TRAINING PROGRAM

## 2023-09-13 PROCEDURE — 3288F PR FALLS RISK ASSESSMENT DOCUMENTED: ICD-10-PCS | Mod: CPTII,S$GLB,, | Performed by: STUDENT IN AN ORGANIZED HEALTH CARE EDUCATION/TRAINING PROGRAM

## 2023-09-13 PROCEDURE — 3074F SYST BP LT 130 MM HG: CPT | Mod: CPTII,S$GLB,, | Performed by: STUDENT IN AN ORGANIZED HEALTH CARE EDUCATION/TRAINING PROGRAM

## 2023-09-13 PROCEDURE — 1101F PT FALLS ASSESS-DOCD LE1/YR: CPT | Mod: CPTII,S$GLB,, | Performed by: STUDENT IN AN ORGANIZED HEALTH CARE EDUCATION/TRAINING PROGRAM

## 2023-09-13 PROCEDURE — 3066F PR DOCUMENTATION OF TREATMENT FOR NEPHROPATHY: ICD-10-PCS | Mod: CPTII,S$GLB,, | Performed by: STUDENT IN AN ORGANIZED HEALTH CARE EDUCATION/TRAINING PROGRAM

## 2023-09-13 PROCEDURE — 3008F PR BODY MASS INDEX (BMI) DOCUMENTED: ICD-10-PCS | Mod: CPTII,S$GLB,, | Performed by: STUDENT IN AN ORGANIZED HEALTH CARE EDUCATION/TRAINING PROGRAM

## 2023-09-13 PROCEDURE — 3008F BODY MASS INDEX DOCD: CPT | Mod: CPTII,S$GLB,, | Performed by: STUDENT IN AN ORGANIZED HEALTH CARE EDUCATION/TRAINING PROGRAM

## 2023-09-13 PROCEDURE — 3288F FALL RISK ASSESSMENT DOCD: CPT | Mod: CPTII,S$GLB,, | Performed by: STUDENT IN AN ORGANIZED HEALTH CARE EDUCATION/TRAINING PROGRAM

## 2023-09-13 NOTE — PROGRESS NOTES
Subjective:     Patient    Jud Villa is a 63 y.o. female.    Problem    Presents for diabetic foot exam. Occasional sharp pain in arches of both feet. Has had infrequent cramping in the feet. Denies numbness, tingling, burning.     Primary Care Provider    Primary Care Provider: Bo Lopez MD   Last Seen: 8/21/2023     History    History obtained from patient and review of medical records.     Past Medical History:   Diagnosis Date    Asthma     Breast carcinoma     Cataract     Coronary artery disease of native heart with stable angina pectoris 04/04/2023    Diabetes mellitus     Hyperlipidemia     Moderate episode of recurrent major depressive disorder 05/19/2021    Osteoporosis     Stroke 03/2023    left PCA       Past Surgical History:   Procedure Laterality Date    BILATERAL MASTECTOMY  04/26/2018    CHOLECYSTECTOMY      COLONOSCOPY N/A 10/27/2015    Procedure: COLONOSCOPY;  Surgeon: Johnny Hurley MD;  Location: [facility];  Service: Endoscopy;  Laterality: N/A;    CORONARY ANGIOGRAPHY N/A 04/05/2023    Procedure: ANGIOGRAM, CORONARY ARTERY;  Surgeon: Francisco Barahona MD;  Location: [facility] CATH LAB;  Service: Cardiology;  Laterality: N/A;    CORONARY ANGIOPLASTY      CORONARY ARTERY BYPASS GRAFT  04/2023    LIMA to LAD, SVG to OM    CORONARY ARTERY BYPASS GRAFT (CABG) N/A 04/14/2023    Procedure: CORONARY ARTERY BYPASS GRAFT (CABG) x 2;  Surgeon: Igor Kahn MD;  Location: [facility] OR [address] [place] [address];  Service: Cardiothoracic;  Laterality: N/A;    ENDOSCOPIC HARVEST OF VEIN Left 04/14/2023    Procedure: SURGICAL PROCUREMENT, VEIN, ENDOSCOPIC;  Surgeon: Igor Kahn MD;  Location: [facility] OR [address] [place] [address];  Service: Cardiothoracic;  Laterality: Left;  Vein harvest start - stop: 0817 - 0856  Vein prep start - stop: 0857 - 0917    ESOPHAGOGASTRODUODENOSCOPY N/A 01/24/2022    Procedure: ESOPHAGOGASTRODUODENOSCOPY (EGD);  Surgeon: Mili Katz MD;  Location: [address]);  Service:  Endoscopy;  Laterality: N/A;  rapid in AM, instr portal -ml    HYSTERECTOMY      LASER PERIPHERAL IRIDOTOMY Bilateral 2019        LEFT HEART CATHETERIZATION Left 04/05/2023    Procedure: Left heart cath;  Surgeon: Francisco Barahona MD;  Location: [facility] CATH LAB;  Service: Cardiology;  Laterality: Left;        Objective:     Vitals  Wt Readings from Last 1 Encounters:   09/13/23 59.5 kg (131 lb 2.8 oz)     Temp Readings from Last 1 Encounters:   09/13/23 98.8 °F (37.1 °C)     BP Readings from Last 1 Encounters:   09/13/23 (!) 92/58     Pulse Readings from Last 1 Encounters:   09/13/23 67       Dermatological Exam    Skin:  Pedal hair growth, skin color, and skin texture normal on right  Pedal hair growth, skin color, and skin texture normal on left     Nails:  All 10 nail(s) thickened    Vascular Exam    Arteries:  Posterior tibial artery palpable on right  Dorsalis pedis artery palpable on right  Posterior tibial artery palpable on left  Dorsalis pedis artery palpable on left    Veins:  Superficial veins unremarkable on right  Superficial veins unremarkable on left    Swelling:  None on right  None on left    Neurological Exam    [place] touch test:  6/6 sites sensed, light touch intact     Musculoskeletal Exam    Footwear:  Sandal on right  Sandal on left    Gait Exam:   Ambulatory Status: Ambulatory  Gait: Normal  Assistive Devices: None    Foot Progression Angle:  Normal on right  Normal on left     Right Lower Extremity Additional Findings:  Palpable osteophytes dorsal midfoot, not painful.   Right foot and ankle function, strength, and range of motion unremarkable except as noted above.     Left Lower Extremity Additional Findings:  Palpable osteophytes dorsal midfoot, not painful.   Left foot and ankle function, strength, and range of motion unremarkable except as noted above.    Imaging and Other Tests    Imaging:  Independently reviewed and interpreted imaging, findings are as follows:  N/A    Other Tests: The following A1c results were reviewed.   Hemoglobin A1C   Date Value Ref Range Status   08/22/2023 6.6 (H) 4.0 - 5.6 % Final     Comment:     ADA Screening Guidelines:  5.7-6.4%  Consistent with prediabetes  >or=6.5%  Consistent with diabetes    High levels of fetal hemoglobin interfere with the HbA1C  assay. Heterozygous hemoglobin variants (HbS, HgC, etc)do  not significantly interfere with this assay.   However, presence of multiple variants may affect accuracy.     04/11/2023 6.9 (H) 4.0 - 5.6 % Final     Comment:     ADA Screening Guidelines:  5.7-6.4%  Consistent with prediabetes  >or=6.5%  Consistent with diabetes    High levels of fetal hemoglobin interfere with the HbA1C  assay. Heterozygous hemoglobin variants (HbS, HgC, etc)do  not significantly interfere with this assay.   However, presence of multiple variants may affect accuracy.     03/14/2023 6.8 (H) 4.5 - 5.7 % Final   06/06/2022 7.2 (H) 4.0 - 5.6 % Final     Comment:     ADA Screening Guidelines:  5.7-6.4%  Consistent with prediabetes  >or=6.5%  Consistent with diabetes    High levels of fetal hemoglobin interfere with the HbA1C  assay. Heterozygous hemoglobin variants (HbS, HgC, etc)do  not significantly interfere with this assay.   However, presence of multiple variants may affect accuracy.           Assessment:     Encounter Diagnosis   Name Primary?    Type 2 diabetes mellitus with hyperglycemia, with long-term current use of insulin Yes        Plan:     I counseled the patient on her conditions, their implications and medical management.    Diabetic foot without complications: chronic stable  -Diabetic foot exam performed.  -Performed shoe inspection and diabetic foot education. Reviewed importance of blood glucose control, proper nutrition, and foot hygiene to minimize risk of complications of diabetes. Recommended daily foot inspections, daily moisturizer to feet, avoiding sharp instruments to feet, appropriate footwear  at all times when ambulating, and following up regularly for routine foot care.   -Diabetic foot risk: 2023 IWGDF very low ulcer risk.   -Next foot exam due September 2024.        Return to clinic in 1 year for diabetic foot exam, call sooner PRN.

## 2023-09-16 DIAGNOSIS — G43.711 CHRONIC MIGRAINE WITHOUT AURA, INTRACTABLE, WITH STATUS MIGRAINOSUS: ICD-10-CM

## 2023-09-16 NOTE — TELEPHONE ENCOUNTER
No care due was identified.  [facility] Embedded Care Due Messages. Reference number: [id number].   9/16/2023 9:28:04 AM CDT

## 2023-09-17 RX ORDER — RIZATRIPTAN BENZOATE 10 MG/1
TABLET ORAL
Qty: 30 TABLET | Refills: 0 | OUTPATIENT
Start: 2023-09-17

## 2023-09-17 NOTE — TELEPHONE ENCOUNTER
Refill Decision Note   Jud Villa  is requesting a refill authorization.  Brief Assessment and Rationale for Refill:  Quick Discontinue     Medication Therapy Plan:  The original prescription was discontinued on 3/22/2023 by Bo Lopez MD.      Comments:     Note composed:6:48 PM 09/17/2023             Appointments     Last Visit   8/21/2023 Bo Lopez MD   Next Visit   2/20/2024 Bo Lopez MD           Appointments     Last Visit   8/21/2023 Bo Lopez MD   Next Visit   2/20/2024 Bo Lopez MD

## 2023-09-21 ENCOUNTER — DOCUMENTATION ONLY (OUTPATIENT)
Dept: CARDIAC REHAB | Facility: CLINIC | Age: 63
End: 2023-09-21
Payer: MEDICARE

## 2023-09-21 NOTE — PROGRESS NOTES
Miss Renner has completed 15 out of 36 exercise session of Phase II cardiac rehab.  A follow up reassessment will be completed at 24 sessions.   Miss Renner is currently on hold until cleared to return after PET test.    12 Session Follow Up   Cardiac Rehab Individual Treatment Plan - Reassessment      Patient Name: Jud Villa MRN: [id number]   : 1960   Age: 63 y.o.   Primary Diagnosis: CABG Date of Event: 23   EF: 47%  Risk Stratification: high  Referring Physician: SUMIT ACKERMAN   Exercise Assessment:     Angina with exercise?: No   ST Depression with Exercise?: No  Fall Risk: Yes   Assistive Devices:  independent  Miss Renner stated at orientation there were no limitations to exercise.    Comments on Progression: Miss Renner is progressing slowly but  her workloads will continue to be increased as she tolerates exercise intensity.     Exercise Plan:   Goals:  CR Exercise Goals: Attend Cardiac Rehab 3 times/week: In Progress  Home Aerobic Exercise: 2 additional days/week for 30-60 minutes: In Progress  Intensity of 12-15 on the Rate of Perceived Exertion (RPE) scale: In Progress  30% increase in entry estimated METS: 6.5 : In Progress  5 days/week for 30-60 minutes: In Progress    Comments on Goal Progression:  Patient Consistency: consistent with attendance  Home exercise? No   Patient reports intensity rate 9-15 on RPE scale    Intervention/Recommendations:   Discussed importance of regular attendance to cardiac rehab class    Exercise Prescription:  THR Range 72-78   Mode: Treadmill  Recumbent Bike  Nustep   Frequency:  3 days/week   Duration:  30-60 minutes   Intensity:  12-15 RPE   Resistance Training:  Yes: 3 lb weights with 10-15 reps based on strength and range of motion and adjusted accordingly     Home Prescription:  Mode Aerobic exercise   Frequency: 2- 3 days/week   Duration: 30-60 minutes   Resistance Training: None     Education:  Diabetes; verbalizes understanding; Date: 23  Exercise and  Rehydration; verbalizes understanding; Date: 23  Relaxation Techniques; verbalizes understanding; Date: 23  What Comes After Phase II?; verbalizes understanding; Date: 23    Comments:  I reviewed exercise recommendations with Miss Renner.  I encouraged her to begin some type of exercise she can do outside of attending rehab class.  I also explained the importance of arriving to class on time.  She stated understanding.      The exercise prescription will continue to be adjusted based on tolerance of exercise intensity by patient.    Ernesto Silveira, [facility] [place]     [place] Follow Up   Cardiac Rehab Individual Treatment Plan - Reassessment    Patient Name: Jud Villa MRN: [id number]   : 1960   Age: 63 y.o.   Primary Diagnosis: s/p CABG    Nutrition Assessment:     Anthropometrics    Height 62 inches   Weight 131.6 lbs   BMI 24.1     Patient confirms she is taking lipitor 80mg for cholesterol control.  Difficulty Chewing or Swallowing: No  Current Exercise: See Exercise Physiologist Note  Food Safety/Food Preparation: self  Living Arrangements/Family Support: Lives alone  Cultural/Spiritual/Personal Preferences: not applicable   Barriers to Education: none identified  Stage of Change Related to Diet Habits: Action  Recent Changes to Diet: Yes - less fast food  Food Diary: Given      Nutrition Plan:   Goals:  LDL-C < 70 (for high risk patients)  Hgb A1c < 7%  BMI < 25 and abdominal girth < 40M/<35 F  2 gram sodium, Mediterranean diet: In Progress  Fish intake (non-fried varieties) to a goal of 2-3 servings per week: In Progress  Increase fruit and vegetable intake: In Progress    Comments on Goal Progression:  Pt states she has made an effort to reduce fast food intake and is also eating more produce with meals    Interventions:  Dietitian Consult: No  Patient to participate in Cardiac Rehab sessions three times a week  Weekly Dietitian Weight Check  Nutrition Recommendations Provided: Verbal,  "Reviewed  Follow Up Plan for Ongoing Self-Management Support    Education:  Protein; verbalizes understanding; Date: 7/12/23  Seafood; verbalizes understanding; Date: 8/2/23  Vitamins; verbalizes understanding; Date: 8/9/23  Pre/Probiotics; verbalizes understanding; Date: 7/26/23    Comments:   Discussed ways to incorporate healthy snacks, eating on a schedule, and monitoring sodium intake for heart health.    Diabetes  Is the patient diabetic? Yes   Other Core Components/Diabetes Assessment:   Labs:  No results found for: "GLUF"  Lab Results   Component Value Date    HGBA1C 6.6 (H) 08/22/2023    HGBA1C 6.9 (H) 04/11/2023    HGBA1C 6.8 (H) 03/14/2023      Lab Results   Component Value Date    ESTIMATEDAVG 143 (H) 08/22/2023    ESTIMATEDAVG 151 (H) 04/11/2023    ESTIMATEDAVG 160 (H) 06/06/2022       History of diabetes since 2008  Diabetes medications: Jardiance 10mg daily, Tradjenta 5mg daily, metformin 1000mg daily, lantus 10u @[facility]  Blood Glucose Checks at Home: Yes: blood sugars ac & HS  Typical morning range: 100-110 mg/dl  Endocrinologist or PCP following DM: PCP-changing but does not have new one yet    Other Core Components/Diabetes Plan:   Goals:  Hgb A1c < 7%  Exercise Blood Glucose:100-300mg/dl    Interventions:  Medication Compliance  Med Card Reconciled  Low Sodium, Mediterranean, ADA Diet  Physical Activity  Increase Knowledge of Contributory Factors    Education:  Diabetes; verbalizes understanding; Date: 8/14/23  Medications I; verbalizes understanding; Date: 7/21/23    Comments:   Patient verbalizes understanding to bring home glucometer and check glucose pre and post each exercise session.  Per cardiac rehab protocols, patient's glucose must be between 90 and 270 mg/dL to exercise.  Patient denies any recent glucose levels less than 60 mg/dL or greater than 300 mg/dL. Patient verbalizes importance of notifying rehab staff if symptoms of hypoglycemia occur while at cardiac rehab.  Abnormal labs will be " reported to patient's PCP/Endocrinologist by rehab staff.    Noted updated glucose parameters of 100-300    Continue to encourage reduction in refined sugars/regular coke    Madai Valladares MS, RDN/LDN     Session: 12 Session Follow Up   Cardiac Rehab Individual Treatment Plan - Reassessment      Patient Name: Jud Villa MRN: [id number]   : 1960   Age: 63 y.o.   Primary Diagnosis: S/P CABG 2023      Psychosocial Assessment:   Living Arrangements: Lives alone  Family Support: children and grandchildren  Self Reported: no change Anxiety and Depression  Displays: calmness  Medication: not applicable    Psychosocial Plan:   Goals:  Improved psychosocial coping strategies: In Progress  Reduce manifestation of depression: In Progress  Maintain positive support system: Met  Maintain positive outlook: Met  Improve overall quality of life: In Progress    Interventions:  Patient to Self Report Emotional Changes at Session Check In  Recommend Physical Activity  Recommend Attending Education Lectures  Notify MD: No  Program Referral: Yes  Pharmaceutical Intervention/Therapy: Declined  Other Needs: not applicable  Stage of Readiness to Change: Action    Education:  Heart disease and emotions; verbalizes understanding; Date: 2023    Comments:  Pt denies any overwhelming stress or anxiety. Pt is showing more motivation in daily attendance of cardiac rehab and participating more with effort. Pt states she has worry about her previous problem with breast cancer.  Patient has been instructed to notify staff in the event that circumstances worsen.  Patient verbalizes understanding.    Other Core Components/Risk Factors Assessment:   RISK FACTORS:  diabetes, hyperlipidemia, sedentary lifestyle, tobacco use, stress    Learning Barriers: Emotional    Medication Compliance: has been compliant with the medicaiton regimen    Other Core Components/Risk Factors Plan:   Goals:  Increase exercise tolerance: In  Progress  Increase knowledge of CAD: In Progress  Identify and manage personal areas of stress: In Progress  Control diabetes by adjusting diet and exercise: In Progress  Learn more about healthy eating: In Progress    Interventions:  Individual Education/ Counseling: Yes  Physician Referral: No    Education:    cholesterol meds, verbalizes understanding; Date: 7/21/2023  stress, verbalizes understanding; Date: 8/04/2023  warning signs of MI, verbalizes understanding; Date: 8/11/2023         Education method adapted to patients education level and preferred method of learning.  Method: explanation  demonstration  handouts    Comments:  Pt si attending rehab more regularly and feel improvement physically.    Other Core Components/Hypertension Assessment:   BP Range:  systolic; 52-76 diastolic  BP at Goal: Yes  Patient reported symptoms: none    Other Core Components/Hypertension Plan:   Goals:  Blood Pressure <130/80    Interventions:  Med Card Reconciled: Yes  Encourage medication compliance  Encourage sodium reduction  Encourage weight loss  Recommend physical activity  Educate on contributory factors  Reduce stress, anxiety, anger, depression, and/or chronic pain  Encourage home blood pressure monitoring  Recommend daily weights  MD notified/Physician Referral: No    Education:    Medication I; verbalizes understanding; Date: 7/21/2023  Stroke; verbalizes understanding; Date: 8/11/2023         Comments:  Pt states she is monitoring her blood pressure daily at home and keeping a log.    Does the patient have Heart Failure? No      Other Core Components/Tobacco Cessation Assessment:   Smoking Status: past smoker who quit 3/2023  Smoking Cessation Barriers:  N/A  Stage of Readiness to Change: Maintenance    Other Core Components/Tobacco Cessation Plan:   Goals:  Maintain non-smoking status    Interventions:  Maintains non-smoking status    Education:    Stroke; verbalizes understanding; Date: 8/11/2023          Comments:  Pt is not using tobacco products at this time.    Matt Chapin RN

## 2023-09-24 NOTE — PROGRESS NOTES
HPI    DLS: 3/24/2023    Pt here for HVF review/OCT;  Pt states no eye pain but eyes get tired and been noticing decrease in   near vision.     Meds:  No GTTS    1. Hx Narrow Angles OU - S/P PI's ou 2019/2020 (never followed up )   2. NS OU   3. Dry eyes - saw K[facility]man 1/7/2020      Last edited by Jil Batres on 9/28/2023  2:33 PM.              Assessment /Plan     For exam results, see Encounter Report.    Anatomical narrow angle borderline glaucoma of both eyes  -     Johnson Visual Field - OU - Extended - Both Eyes  -     Posterior Segment OCT Optic Nerve- Both eyes    Nuclear sclerotic cataract of both eyes    Visual field defect due to and not concurrent with cerebrovascular accident (CVA)    Dry eyes, bilateral    S/P laser iridotomy    Hx of migraines    H/O TIA (transient ischemic attack) and stroke             Glaucoma (type and duration)    Narrow angles   First HVF   //   First photos   3/25/2023   Treatment / Drops started   //           Family history    none        Glaucoma meds    none        H/O adverse rxn to glaucoma drops    none        LASERS   PI od 2/21/2019 // PI OS 3/28/2019  (did not F/U for 4 yrs - 3/3032)        GLAUCOMA SURGERIES    none        OTHER EYE SURGERIES    none        CDR    0.35/0.35        Tbase    18-22          Tmax    22/18            Ttarget    //             HVF    /1 test 2023 to  2023 -  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 Vis sign    EDUARDO - [facility]   See kateryna note 1/7/2020    Recent TIA / CVA    Had loss of right VF temporarily - resolved - pt now on ASA     9/28/2023  =  -- + right sup quadrantic loss ou - 2/2 cva 3/3034   Recent  CABG - x 2 - April 15th 2023 - still not feeling well as of 9/28/2023 - is to have another stress test soon      F/U 4 months with IOP and gonio

## 2023-09-25 ENCOUNTER — CLINICAL SUPPORT (OUTPATIENT)
Dept: SMOKING CESSATION | Facility: CLINIC | Age: 63
End: 2023-09-25
Payer: COMMERCIAL

## 2023-09-25 DIAGNOSIS — F17.200 NICOTINE DEPENDENCE: Primary | ICD-10-CM

## 2023-09-25 PROCEDURE — 99403 PREV MED CNSL INDIV APPRX 45: CPT | Mod: S$GLB,,,

## 2023-09-25 PROCEDURE — 99403 PR PREVENT COUNSEL,INDIV,45 MIN: ICD-10-PCS | Mod: S$GLB,,,

## 2023-09-25 PROCEDURE — 99999 PR PBB SHADOW E&M-EST. PATIENT-LVL II: ICD-10-PCS | Mod: PBBFAC,,,

## 2023-09-25 PROCEDURE — 99999 PR PBB SHADOW E&M-EST. PATIENT-LVL II: CPT | Mod: PBBFAC,,,

## 2023-09-25 RX ORDER — IBUPROFEN 200 MG
1 TABLET ORAL DAILY
Qty: 28 PATCH | Refills: 0 | Status: SHIPPED | OUTPATIENT
Start: 2023-09-25

## 2023-09-25 NOTE — PROGRESS NOTES
Individual Follow-Up Form    9/25/2023    Clinical Status of Patient: Outpatient    Length of Service: 45 minutes    Continuing Medication: yes  Patches    Other Medications: none     Target Symptoms: Withdrawal and medication side effects. The following were rated moderate (3) to severe (4) on TCRS:  Moderate (3): desire/crave tobacco  Severe (4): none    Comments:  Pt seen in office today. She continues to smoke 10+ cigs/day. Pt remains on tobacco cessation medication of 14 mg nicotine patch QD. She has not received patches. Pharmacy did not honor [employer]. Sent to [facility] [place]. No adverse effects/mental changes noted at this time. She will begin patches as soon as she gets them. Pt asked to keep up with rate reduction as best a she can until she gets them. Reviewed coping strategies/stress management/habitual behavior/relapse prevention with patient. Exhaled carbon monoxide level was 17 ppm per Smokerlyzer (0-6 non-smoker). Will see pt back in office in 1 wk.      Diagnosis: F17.200    Next Visit: 1 week

## 2023-09-25 NOTE — Clinical Note
Pt seen in office today. She continues to smoke 10+ cigs/day. Pt remains on tobacco cessation medication of 14 mg nicotine patch QD. She has not received patches. Pharmacy did not honor [employer]. Sent to [facility] pharmacy [place]. No adverse effects/mental changes noted at this time. She will begin patches as soon as she gets them. Pt asked to keep up with rate reduction as best a she can until she gets them. Reviewed coping strategies/stress management/habitual behavior/relapse prevention with patient. Exhaled carbon monoxide level was 17 ppm per Smokerlyzer (0-6 non-smoker). Will see pt back in office in 1 wk

## 2023-09-28 ENCOUNTER — CLINICAL SUPPORT (OUTPATIENT)
Dept: OPHTHALMOLOGY | Facility: CLINIC | Age: 63
End: 2023-09-28
Payer: MEDICARE

## 2023-09-28 ENCOUNTER — OFFICE VISIT (OUTPATIENT)
Dept: OPHTHALMOLOGY | Facility: CLINIC | Age: 63
End: 2023-09-28
Payer: MEDICARE

## 2023-09-28 DIAGNOSIS — H04.123 DRY EYES, BILATERAL: ICD-10-CM

## 2023-09-28 DIAGNOSIS — H53.40 VISUAL FIELD DEFECT DUE TO AND NOT CONCURRENT WITH CEREBROVASCULAR ACCIDENT (CVA): ICD-10-CM

## 2023-09-28 DIAGNOSIS — Z86.73 H/O TIA (TRANSIENT ISCHEMIC ATTACK) AND STROKE: ICD-10-CM

## 2023-09-28 DIAGNOSIS — I69.398 VISUAL FIELD DEFECT DUE TO AND NOT CONCURRENT WITH CEREBROVASCULAR ACCIDENT (CVA): ICD-10-CM

## 2023-09-28 DIAGNOSIS — H40.033 ANATOMICAL NARROW ANGLE BORDERLINE GLAUCOMA OF BOTH EYES: Primary | ICD-10-CM

## 2023-09-28 DIAGNOSIS — H25.13 NUCLEAR SCLEROTIC CATARACT OF BOTH EYES: ICD-10-CM

## 2023-09-28 DIAGNOSIS — Z98.890 S/P LASER IRIDOTOMY: ICD-10-CM

## 2023-09-28 DIAGNOSIS — Z86.69 HX OF MIGRAINES: ICD-10-CM

## 2023-09-28 PROCEDURE — 2023F PR DILATED RETINAL EXAM W/O EVID OF RETINOPATHY: ICD-10-PCS | Mod: HCNC,CPTII,S$GLB, | Performed by: OPHTHALMOLOGY

## 2023-09-28 PROCEDURE — 92133 POSTERIOR SEGMENT OCT OPTIC NERVE(OCULAR COHERENCE TOMOGRAPHY) - OU - BOTH EYES: ICD-10-PCS | Mod: HCNC,S$GLB,, | Performed by: OPHTHALMOLOGY

## 2023-09-28 PROCEDURE — 92133 CPTRZD OPH DX IMG PST SGM ON: CPT | Mod: HCNC,S$GLB,, | Performed by: OPHTHALMOLOGY

## 2023-09-28 PROCEDURE — 99213 OFFICE O/P EST LOW 20 MIN: CPT | Mod: HCNC,S$GLB,, | Performed by: OPHTHALMOLOGY

## 2023-09-28 PROCEDURE — 1160F PR REVIEW ALL MEDS BY PRESCRIBER/CLIN PHARMACIST DOCUMENTED: ICD-10-PCS | Mod: HCNC,CPTII,S$GLB, | Performed by: OPHTHALMOLOGY

## 2023-09-28 PROCEDURE — 92083 EXTENDED VISUAL FIELD XM: CPT | Mod: HCNC,S$GLB,, | Performed by: OPHTHALMOLOGY

## 2023-09-28 PROCEDURE — 99999 PR PBB SHADOW E&M-EST. PATIENT-LVL III: CPT | Mod: PBBFAC,HCNC,, | Performed by: OPHTHALMOLOGY

## 2023-09-28 PROCEDURE — 3066F PR DOCUMENTATION OF TREATMENT FOR NEPHROPATHY: ICD-10-PCS | Mod: HCNC,CPTII,S$GLB, | Performed by: OPHTHALMOLOGY

## 2023-09-28 PROCEDURE — 3288F PR FALLS RISK ASSESSMENT DOCUMENTED: ICD-10-PCS | Mod: HCNC,CPTII,S$GLB, | Performed by: OPHTHALMOLOGY

## 2023-09-28 PROCEDURE — 1160F RVW MEDS BY RX/DR IN RCRD: CPT | Mod: HCNC,CPTII,S$GLB, | Performed by: OPHTHALMOLOGY

## 2023-09-28 PROCEDURE — 1101F PT FALLS ASSESS-DOCD LE1/YR: CPT | Mod: HCNC,CPTII,S$GLB, | Performed by: OPHTHALMOLOGY

## 2023-09-28 PROCEDURE — 3288F FALL RISK ASSESSMENT DOCD: CPT | Mod: HCNC,CPTII,S$GLB, | Performed by: OPHTHALMOLOGY

## 2023-09-28 PROCEDURE — 3061F PR NEG MICROALBUMINURIA RESULT DOCUMENTED/REVIEW: ICD-10-PCS | Mod: HCNC,CPTII,S$GLB, | Performed by: OPHTHALMOLOGY

## 2023-09-28 PROCEDURE — 3061F NEG MICROALBUMINURIA REV: CPT | Mod: HCNC,CPTII,S$GLB, | Performed by: OPHTHALMOLOGY

## 2023-09-28 PROCEDURE — 99213 PR OFFICE/OUTPT VISIT, EST, LEVL III, 20-29 MIN: ICD-10-PCS | Mod: HCNC,S$GLB,, | Performed by: OPHTHALMOLOGY

## 2023-09-28 PROCEDURE — 3066F NEPHROPATHY DOC TX: CPT | Mod: HCNC,CPTII,S$GLB, | Performed by: OPHTHALMOLOGY

## 2023-09-28 PROCEDURE — 99999 PR PBB SHADOW E&M-EST. PATIENT-LVL III: ICD-10-PCS | Mod: PBBFAC,HCNC,, | Performed by: OPHTHALMOLOGY

## 2023-09-28 PROCEDURE — 1159F MED LIST DOCD IN RCRD: CPT | Mod: HCNC,CPTII,S$GLB, | Performed by: OPHTHALMOLOGY

## 2023-09-28 PROCEDURE — 3044F PR MOST RECENT HEMOGLOBIN A1C LEVEL <7.0%: ICD-10-PCS | Mod: HCNC,CPTII,S$GLB, | Performed by: OPHTHALMOLOGY

## 2023-09-28 PROCEDURE — 2023F DILAT RTA XM W/O RTNOPTHY: CPT | Mod: HCNC,CPTII,S$GLB, | Performed by: OPHTHALMOLOGY

## 2023-09-28 PROCEDURE — 1159F PR MEDICATION LIST DOCUMENTED IN MEDICAL RECORD: ICD-10-PCS | Mod: HCNC,CPTII,S$GLB, | Performed by: OPHTHALMOLOGY

## 2023-09-28 PROCEDURE — 92083 HUMPHREY VISUAL FIELD - OU - BOTH EYES: ICD-10-PCS | Mod: HCNC,S$GLB,, | Performed by: OPHTHALMOLOGY

## 2023-09-28 PROCEDURE — 1101F PR PT FALLS ASSESS DOC 0-1 FALLS W/OUT INJ PAST YR: ICD-10-PCS | Mod: HCNC,CPTII,S$GLB, | Performed by: OPHTHALMOLOGY

## 2023-09-28 PROCEDURE — 3044F HG A1C LEVEL LT 7.0%: CPT | Mod: HCNC,CPTII,S$GLB, | Performed by: OPHTHALMOLOGY

## 2023-09-28 NOTE — TELEPHONE ENCOUNTER
Refill Routing Note   Medication(s) are not appropriate for processing by [facility] for the following reason(s):      No active prescription written by provider    [facility] action(s):  Defer Care Due:  None identified     Medication Therapy Plan:  on the med list 23.      Appointments  past 12m or future 3m with PCP    Date Provider   Last Visit   2023 Bo Lopez MD   Next Visit   2024 Bo Lopez MD   ED visits in past 90 days: 0        Note composed:6:49 PM 2023

## 2023-09-28 NOTE — TELEPHONE ENCOUNTER
No care due was identified.  [facility] Embedded Care Due Messages. Reference number: [id number].   9/28/2023 6:36:39 PM CDT

## 2023-09-28 NOTE — PROGRESS NOTES
VISUAL FIELD TEST 24-2 SS-DONE/AD  OD/REL-FAIR/FIX-FAIR/COOP-POOR/AD  OS/REL-GOOD/FIX-GOOD/COOP-FAIR/AD    PT KEPT TAKING HEAD OUT OF MACHINE./AD  PT HAS NO KNOWN ALLERGIES TO LATEX OR ADHESIVES. /AD    MRX: OD +1.25 +0.50 X180            OS +1.25 +0.50 X180

## 2023-09-29 RX ORDER — INSULIN GLARGINE 100 [IU]/ML
INJECTION, SOLUTION SUBCUTANEOUS
Qty: 15 ML | Refills: 1 | Status: SHIPPED | OUTPATIENT
Start: 2023-09-29

## 2023-10-02 ENCOUNTER — CLINICAL SUPPORT (OUTPATIENT)
Dept: SMOKING CESSATION | Facility: CLINIC | Age: 63
End: 2023-10-02
Payer: COMMERCIAL

## 2023-10-02 DIAGNOSIS — F17.200 NICOTINE DEPENDENCE: Primary | ICD-10-CM

## 2023-10-02 PROCEDURE — 99403 PR PREVENT COUNSEL,INDIV,45 MIN: ICD-10-PCS | Mod: S$GLB,,,

## 2023-10-02 PROCEDURE — 99403 PREV MED CNSL INDIV APPRX 45: CPT | Mod: S$GLB,,,

## 2023-10-02 PROCEDURE — 99999 PR PBB SHADOW E&M-EST. PATIENT-LVL II: ICD-10-PCS | Mod: PBBFAC,,,

## 2023-10-02 PROCEDURE — 99999 PR PBB SHADOW E&M-EST. PATIENT-LVL II: CPT | Mod: PBBFAC,,,

## 2023-10-02 NOTE — Clinical Note
Pt seen in office today, she was late. She continues to smoke 10 cigs/day. Pt remains on tobacco cessation medication of 14 mg nicotine patch QD. She states cravings remain high. Advised her to try using a patch and a half or maybe 2 patches. No adverse effects/mental changes noted at this time. Pt asked to reduce current smoking rate by 2 cigs/day. Reviewed coping strategies/stress management/habitual behavior/relapse prevention with patient. Exhaled carbon monoxide level was 6 ppm per Smokerlyzer (0-6 non-smoker). Will see pt back in office in 1 wk.

## 2023-10-02 NOTE — PROGRESS NOTES
Individual Follow-Up Form    10/2/2023    Clinical Status of Patient: Outpatient    Length of Service: 30 minutes    Continuing Medication: yes  Patches    Other Medications: none     Target Symptoms: Withdrawal and medication side effects. The following were rated moderate (3) to severe (4) on TCRS:  Moderate (3): desire/crave tobacco  Severe (4): none    Comments: Pt seen in office today, she was late. She continues to smoke 10 cigs/day. Pt remains on tobacco cessation medication of 14 mg nicotine patch QD. She states cravings remain high. Advised her to try using a patch and a half or maybe 2 patches. No adverse effects/mental changes noted at this time. Pt asked to reduce current smoking rate by 2 cigs/day. Reviewed coping strategies/stress management/habitual behavior/relapse prevention with patient. Exhaled carbon monoxide level was 6 ppm per Smokerlyzer (0-6 non-smoker). Will see pt back in office in 1 wk.      Diagnosis: F17.200    Next Visit: 1 week

## 2023-10-04 ENCOUNTER — OFFICE VISIT (OUTPATIENT)
Dept: ENDOCRINOLOGY | Facility: CLINIC | Age: 63
End: 2023-10-04
Payer: MEDICARE

## 2023-10-04 ENCOUNTER — LAB VISIT (OUTPATIENT)
Dept: LAB | Facility: HOSPITAL | Age: 63
End: 2023-10-04
Attending: INTERNAL MEDICINE
Payer: MEDICARE

## 2023-10-04 ENCOUNTER — PATIENT OUTREACH (OUTPATIENT)
Dept: ADMINISTRATIVE | Facility: HOSPITAL | Age: 63
End: 2023-10-04
Payer: MEDICARE

## 2023-10-04 VITALS
SYSTOLIC BLOOD PRESSURE: 110 MMHG | WEIGHT: 130.63 LBS | HEIGHT: 62 IN | HEART RATE: 68 BPM | DIASTOLIC BLOOD PRESSURE: 64 MMHG | BODY MASS INDEX: 24.04 KG/M2 | RESPIRATION RATE: 18 BRPM | OXYGEN SATURATION: 95 %

## 2023-10-04 DIAGNOSIS — Z95.1 S/P CABG X 2: ICD-10-CM

## 2023-10-04 DIAGNOSIS — K31.83 ACHLORHYDRIA, GASTRIC: ICD-10-CM

## 2023-10-04 DIAGNOSIS — E11.65 UNCONTROLLED TYPE 2 DIABETES MELLITUS WITH HYPERGLYCEMIA: Primary | ICD-10-CM

## 2023-10-04 DIAGNOSIS — Z79.4 CONTROLLED TYPE 2 DIABETES MELLITUS WITH COMPLICATION, WITH LONG-TERM CURRENT USE OF INSULIN: Chronic | ICD-10-CM

## 2023-10-04 DIAGNOSIS — R53.83 FATIGUE, UNSPECIFIED TYPE: ICD-10-CM

## 2023-10-04 DIAGNOSIS — E11.649 HYPOGLYCEMIA ASSOCIATED WITH DIABETES: ICD-10-CM

## 2023-10-04 DIAGNOSIS — E11.65 UNCONTROLLED TYPE 2 DIABETES MELLITUS WITH HYPERGLYCEMIA: ICD-10-CM

## 2023-10-04 DIAGNOSIS — E11.8 CONTROLLED TYPE 2 DIABETES MELLITUS WITH COMPLICATION, WITH LONG-TERM CURRENT USE OF INSULIN: Chronic | ICD-10-CM

## 2023-10-04 LAB
BASOPHILS # BLD AUTO: 0.09 K/UL (ref 0–0.2)
BASOPHILS NFR BLD: 1.2 % (ref 0–1.9)
DIFFERENTIAL METHOD: ABNORMAL
EOSINOPHIL # BLD AUTO: 0.6 K/UL (ref 0–0.5)
EOSINOPHIL NFR BLD: 7.2 % (ref 0–8)
ERYTHROCYTE [DISTWIDTH] IN BLOOD BY AUTOMATED COUNT: 19.9 % (ref 11.5–14.5)
HCT VFR BLD AUTO: 40.4 % (ref 37–48.5)
HGB BLD-MCNC: 12.2 G/DL (ref 12–16)
IMM GRANULOCYTES # BLD AUTO: 0.01 K/UL (ref 0–0.04)
IMM GRANULOCYTES NFR BLD AUTO: 0.1 % (ref 0–0.5)
LYMPHOCYTES # BLD AUTO: 2.2 K/UL (ref 1–4.8)
LYMPHOCYTES NFR BLD: 27.8 % (ref 18–48)
MCH RBC QN AUTO: 23.7 PG (ref 27–31)
MCHC RBC AUTO-ENTMCNC: 30.2 G/DL (ref 32–36)
MCV RBC AUTO: 78 FL (ref 82–98)
MONOCYTES # BLD AUTO: 0.7 K/UL (ref 0.3–1)
MONOCYTES NFR BLD: 8.5 % (ref 4–15)
NEUTROPHILS # BLD AUTO: 4.3 K/UL (ref 1.8–7.7)
NEUTROPHILS NFR BLD: 55.2 % (ref 38–73)
NRBC BLD-RTO: 0 /100 WBC
PLATELET # BLD AUTO: 297 K/UL (ref 150–450)
PMV BLD AUTO: 10.4 FL (ref 9.2–12.9)
RBC # BLD AUTO: 5.15 M/UL (ref 4–5.4)
VIT B12 SERPL-MCNC: 210 PG/ML (ref 210–950)
WBC # BLD AUTO: 7.73 K/UL (ref 3.9–12.7)

## 2023-10-04 PROCEDURE — 3078F PR MOST RECENT DIASTOLIC BLOOD PRESSURE < 80 MM HG: ICD-10-PCS | Mod: HCNC,CPTII,S$GLB, | Performed by: INTERNAL MEDICINE

## 2023-10-04 PROCEDURE — 3288F PR FALLS RISK ASSESSMENT DOCUMENTED: ICD-10-PCS | Mod: HCNC,CPTII,S$GLB, | Performed by: INTERNAL MEDICINE

## 2023-10-04 PROCEDURE — 99999 PR PBB SHADOW E&M-EST. PATIENT-LVL V: ICD-10-PCS | Mod: PBBFAC,HCNC,, | Performed by: INTERNAL MEDICINE

## 2023-10-04 PROCEDURE — 3078F DIAST BP <80 MM HG: CPT | Mod: HCNC,CPTII,S$GLB, | Performed by: INTERNAL MEDICINE

## 2023-10-04 PROCEDURE — 3008F PR BODY MASS INDEX (BMI) DOCUMENTED: ICD-10-PCS | Mod: HCNC,CPTII,S$GLB, | Performed by: INTERNAL MEDICINE

## 2023-10-04 PROCEDURE — 1101F PR PT FALLS ASSESS DOC 0-1 FALLS W/OUT INJ PAST YR: ICD-10-PCS | Mod: HCNC,CPTII,S$GLB, | Performed by: INTERNAL MEDICINE

## 2023-10-04 PROCEDURE — 3061F PR NEG MICROALBUMINURIA RESULT DOCUMENTED/REVIEW: ICD-10-PCS | Mod: HCNC,CPTII,S$GLB, | Performed by: INTERNAL MEDICINE

## 2023-10-04 PROCEDURE — 99214 PR OFFICE/OUTPT VISIT, EST, LEVL IV, 30-39 MIN: ICD-10-PCS | Mod: HCNC,S$GLB,, | Performed by: INTERNAL MEDICINE

## 2023-10-04 PROCEDURE — 3061F NEG MICROALBUMINURIA REV: CPT | Mod: HCNC,CPTII,S$GLB, | Performed by: INTERNAL MEDICINE

## 2023-10-04 PROCEDURE — 3074F SYST BP LT 130 MM HG: CPT | Mod: HCNC,CPTII,S$GLB, | Performed by: INTERNAL MEDICINE

## 2023-10-04 PROCEDURE — 3044F PR MOST RECENT HEMOGLOBIN A1C LEVEL <7.0%: ICD-10-PCS | Mod: HCNC,CPTII,S$GLB, | Performed by: INTERNAL MEDICINE

## 2023-10-04 PROCEDURE — 36415 COLL VENOUS BLD VENIPUNCTURE: CPT | Mod: HCNC | Performed by: INTERNAL MEDICINE

## 2023-10-04 PROCEDURE — 99214 OFFICE O/P EST MOD 30 MIN: CPT | Mod: HCNC,S$GLB,, | Performed by: INTERNAL MEDICINE

## 2023-10-04 PROCEDURE — 85025 COMPLETE CBC W/AUTO DIFF WBC: CPT | Mod: HCNC | Performed by: INTERNAL MEDICINE

## 2023-10-04 PROCEDURE — 3288F FALL RISK ASSESSMENT DOCD: CPT | Mod: HCNC,CPTII,S$GLB, | Performed by: INTERNAL MEDICINE

## 2023-10-04 PROCEDURE — 1159F PR MEDICATION LIST DOCUMENTED IN MEDICAL RECORD: ICD-10-PCS | Mod: HCNC,CPTII,S$GLB, | Performed by: INTERNAL MEDICINE

## 2023-10-04 PROCEDURE — 3074F PR MOST RECENT SYSTOLIC BLOOD PRESSURE < 130 MM HG: ICD-10-PCS | Mod: HCNC,CPTII,S$GLB, | Performed by: INTERNAL MEDICINE

## 2023-10-04 PROCEDURE — 3008F BODY MASS INDEX DOCD: CPT | Mod: HCNC,CPTII,S$GLB, | Performed by: INTERNAL MEDICINE

## 2023-10-04 PROCEDURE — 1159F MED LIST DOCD IN RCRD: CPT | Mod: HCNC,CPTII,S$GLB, | Performed by: INTERNAL MEDICINE

## 2023-10-04 PROCEDURE — 3066F PR DOCUMENTATION OF TREATMENT FOR NEPHROPATHY: ICD-10-PCS | Mod: HCNC,CPTII,S$GLB, | Performed by: INTERNAL MEDICINE

## 2023-10-04 PROCEDURE — 3066F NEPHROPATHY DOC TX: CPT | Mod: HCNC,CPTII,S$GLB, | Performed by: INTERNAL MEDICINE

## 2023-10-04 PROCEDURE — 82607 VITAMIN B-12: CPT | Mod: HCNC | Performed by: INTERNAL MEDICINE

## 2023-10-04 PROCEDURE — 3044F HG A1C LEVEL LT 7.0%: CPT | Mod: HCNC,CPTII,S$GLB, | Performed by: INTERNAL MEDICINE

## 2023-10-04 PROCEDURE — 1101F PT FALLS ASSESS-DOCD LE1/YR: CPT | Mod: HCNC,CPTII,S$GLB, | Performed by: INTERNAL MEDICINE

## 2023-10-04 PROCEDURE — 99999 PR PBB SHADOW E&M-EST. PATIENT-LVL V: CPT | Mod: PBBFAC,HCNC,, | Performed by: INTERNAL MEDICINE

## 2023-10-04 RX ORDER — BLOOD-GLUCOSE SENSOR
1 EACH MISCELLANEOUS
Qty: 3 EACH | Refills: 11 | Status: SHIPPED | OUTPATIENT
Start: 2023-10-04 | End: 2024-01-09 | Stop reason: SDUPTHER

## 2023-10-04 NOTE — PATIENT INSTRUCTIONS
For treatment of low blood sugars:   If your blood sugar is less than 70 but higher than 60, and you are not having any symptoms, please make sure you check your blood sugar again in 10 - 15 minutes, avoid tasks such as driving. If the repeat value is still in the same range, please drink a 1/4 cup of orange juice or 1 - 2 glucose tablets.     If your blood sugar reading is under 60, whether you are symptomatic or not, please treat. You can do this with 3 oral glucose tablets, juice, milk, other snacks, or a meal. Make sure you check 15 minutes after you treat.        Take a multivitamin

## 2023-10-04 NOTE — ASSESSMENT & PLAN NOTE
Type 2 DM for fifteen years duration   Complications including CAD, Stroke    Check CBC and B12  Recommend MVI     Reviewed insulin administration, avoid scars  Take lantus 10 units at bedtime   Send log in 7 - 10 dys  Would benefit from sensor   May need meal time insulin

## 2023-10-04 NOTE — PROGRESS NOTES
Subjective:      Patient ID: Jud Villa is a 63 y.o. female.    Chief Complaint:  Diabetes      History of Present Illness  T2DM  Diagnosed in 15 years ago  Known complications: CAD    Reports blood sugars can rise to 200 - 350s. This can occur at any time during  the day. Did not bring log or meter.     Current Diabetes Regimen:  Tradjenta 5 mg daily   Jardiance 10 mg daily --denies side effects.    Metformin 1000 mg one tablet twice a day -- denies GI side effects  Lantus 10 units takes it as needed and when blood glucose levels are high    Diet/Exercise:  Does not eat too much    Recent Hgb A1C:  Lab Results   Component Value Date    HGBA1C 6.6 (H) 08/22/2023      Latest Reference Range & Units 04/22/23 07:39   Hemoglobin 12.0 - 16.0 g/dL 7.9 (L)   Hematocrit 37.0 - 48.5 % 25.1 (L)   (L): Data is abnormally low    Glucose Monitoring:  Meter  Three times a day   Did not bring log or meter    Hypoglycemic Episodes:  47 and 60, this occurred two weeks ago. Cannot recall details. These occurred in the morning    Screening / DM Complications:    Nephropathy:  ACEi/ARB: Taking  Lab Results   Component Value Date    MICALBCREAT Unable to calculate 08/22/2023       Last Lipid Panel:  Statin: Taking  Lab Results   Component Value Date    LDLCALC 52.0 (L) 08/22/2023       Neuropathy:denies n/b/t  Last foot exam : 09/13/2023  Last eye exam : 09/28/2023;  no laser surgery or DR    B12:  Lab Results   Component Value Date    [id number] 210 10/04/2023     Denies supplements    PMHx:  Bypass X 2 in 4/2023  Stroke in 3/2023    Review of Systems  Denies recent illness     Objective:   Physical Exam  Constitutional:       General: She is not in acute distress.     Appearance: Normal appearance. She is well-developed.   Eyes:      General: No scleral icterus.     Extraocular Movements: Extraocular movements intact.      Conjunctiva/sclera: Conjunctivae normal.      Pupils: Pupils are equal, round, and reactive to light.       "Comments: No proptosis.    Neck:      Thyroid: No thyromegaly.      Trachea: No tracheal deviation.   Cardiovascular:      Rate and Rhythm: Normal rate.   Pulmonary:      Effort: Pulmonary effort is normal.      Breath sounds: Normal breath sounds.   Musculoskeletal:      Cervical back: Normal range of motion and neck supple.   Lymphadenopathy:      Cervical: No cervical adenopathy.   Skin:     General: Skin is warm and dry.      Findings: No rash.   Neurological:      Mental Status: She is alert.      Deep Tendon Reflexes: Reflexes are normal and symmetric.      Comments: No tremor.       Vitals:    10/04/23 0837   BP: 110/64   BP Location: Left arm   Patient Position: Sitting   BP Method: Medium (Manual)   Pulse: 68   Resp: 18   SpO2: 95%   Weight: 59.2 kg (130 lb 10 oz)   Height: 5' 2" (1.575 m)       BP Readings from Last 3 Encounters:   10/04/23 110/64   09/13/23 (!) 92/58   08/28/23 110/80     Wt Readings from Last 1 Encounters:   10/04/23 0837 59.2 kg (130 lb 10 oz)         Body mass index is 23.89 kg/m².    Lab Review:   Lab Results   Component Value Date    HGBA1C 6.6 (H) 08/22/2023     Lab Results   Component Value Date    CHOL 117 (L) 08/22/2023    HDL 43 08/22/2023    LDLCALC 52.0 (L) 08/22/2023    TRIG 110 08/22/2023    CHOLHDL 36.8 08/22/2023     Lab Results   Component Value Date     08/22/2023    K 4.2 08/22/2023     08/22/2023    CO2 25 08/22/2023     (H) 08/22/2023    BUN 17 08/22/2023    CREATININE 0.7 08/22/2023    CALCIUM 9.3 08/22/2023    PROT 7.0 08/22/2023    ALBUMIN 3.6 08/22/2023    BILITOT 0.4 08/22/2023    ALKPHOS 62 08/22/2023    AST 15 08/22/2023    ALT 12 08/22/2023    ANIONGAP 10 08/22/2023    ESTGFRAFRICA >60.0 06/06/2022    EGFRNONAA >60.0 06/06/2022    TSH 2.365 08/22/2023         Assessment and Plan     Uncontrolled type 2 diabetes mellitus with hyperglycemia  Type 2 DM for fifteen years duration   Complications including CAD, Stroke    Check CBC and " B12  Recommend MVI     Reviewed insulin administration, avoid scars  Take lantus 10 units at bedtime   Send log in 7 - 10 dys  Would benefit from sensor   May need meal time insulin     S/P CABG x 2  Needs good glycemic control   Avoid hypoglycemia    Hypoglycemia associated with diabetes  Needs sensor  Checking four times daily   Start lantus at bedtime

## 2023-10-12 ENCOUNTER — DOCUMENTATION ONLY (OUTPATIENT)
Dept: CARDIAC REHAB | Facility: CLINIC | Age: 63
End: 2023-10-12
Payer: MEDICARE

## 2023-10-12 NOTE — PROGRESS NOTES
Miss Renner has completed 15 out of 36 exercise session of Phase II cardiac rehab.  A follow up reassessment will be completed at 24 sessions.   Miss Renner is currently on hold until cleared to return after PET test.    12 Session Follow Up   Cardiac Rehab Individual Treatment Plan - Reassessment      Patient Name: Jud Villa MRN: [id number]   : 1960   Age: 63 y.o.   Primary Diagnosis: CABG Date of Event: 23   EF: 47%  Risk Stratification: high  Referring Physician: SUMIT ACKERMAN   Exercise Assessment:     Angina with exercise?: No   ST Depression with Exercise?: No  Fall Risk: Yes   Assistive Devices:  independent  Miss Renner stated at orientation there were no limitations to exercise.    Comments on Progression: Miss Renner is progressing slowly but  her workloads will continue to be increased as she tolerates exercise intensity.     Exercise Plan:   Goals:  CR Exercise Goals: Attend Cardiac Rehab 3 times/week: In Progress  Home Aerobic Exercise: 2 additional days/week for 30-60 minutes: In Progress  Intensity of 12-15 on the Rate of Perceived Exertion (RPE) scale: In Progress  30% increase in entry estimated METS: 6.5 : In Progress  5 days/week for 30-60 minutes: In Progress    Comments on Goal Progression:  Patient Consistency: consistent with attendance  Home exercise? No   Patient reports intensity rate 9-15 on RPE scale    Intervention/Recommendations:   Discussed importance of regular attendance to cardiac rehab class    Exercise Prescription:  THR Range 72-78   Mode: Treadmill  Recumbent Bike  Nustep   Frequency:  3 days/week   Duration:  30-60 minutes   Intensity:  12-15 RPE   Resistance Training:  Yes: 3 lb weights with 10-15 reps based on strength and range of motion and adjusted accordingly     Home Prescription:  Mode Aerobic exercise   Frequency: 2- 3 days/week   Duration: 30-60 minutes   Resistance Training: None     Education:  Diabetes; verbalizes understanding; Date: 23  Exercise and  Rehydration; verbalizes understanding; Date: 23  Relaxation Techniques; verbalizes understanding; Date: 23  What Comes After Phase II?; verbalizes understanding; Date: 23    Comments:  I reviewed exercise recommendations with Miss Renner.  I encouraged her to begin some type of exercise she can do outside of attending rehab class.  I also explained the importance of arriving to class on time.  She stated understanding.      The exercise prescription will continue to be adjusted based on tolerance of exercise intensity by patient.    Ernesto Silveira, [facility] [place]     12 Session Follow Up   Cardiac Rehab Individual Treatment Plan - Reassessment    Patient Name: Jud Villa MRN: [id number]   : 1960   Age: 63 y.o.   Primary Diagnosis: s/p CABG    Nutrition Assessment:     Anthropometrics    Height 62 inches   Weight 131.6 lbs   BMI 24.1     Patient confirms she is taking lipitor 80mg for cholesterol control.  Difficulty Chewing or Swallowing: No  Current Exercise: See Exercise Physiologist Note  Food Safety/Food Preparation: self  Living Arrangements/Family Support: Lives alone  Cultural/Spiritual/Personal Preferences: not applicable   Barriers to Education: none identified  Stage of Change Related to Diet Habits: Action  Recent Changes to Diet: Yes - less fast food  Food Diary: Given      Nutrition Plan:   Goals:  LDL-C < 70 (for high risk patients)  Hgb A1c < 7%  BMI < 25 and abdominal girth < 40M/<35 F  2 gram sodium, Mediterranean diet: In Progress  Fish intake (non-fried varieties) to a goal of 2-3 servings per week: In Progress  Increase fruit and vegetable intake: In Progress    Comments on Goal Progression:  Pt states she has made an effort to reduce fast food intake and is also eating more produce with meals    Interventions:  Dietitian Consult: No  Patient to participate in Cardiac Rehab sessions three times a week  Weekly Dietitian Weight Check  Nutrition Recommendations Provided: Verbal,  "Reviewed  Follow Up Plan for Ongoing Self-Management Support    Education:  Protein; verbalizes understanding; Date: 7/12/23  Seafood; verbalizes understanding; Date: 8/2/23  Vitamins; verbalizes understanding; Date: 8/9/23  Pre/Probiotics; verbalizes understanding; Date: 7/26/23    Comments:   Discussed ways to incorporate healthy snacks, eating on a schedule, and monitoring sodium intake for heart health.    Diabetes  Is the patient diabetic? Yes   Other Core Components/Diabetes Assessment:   Labs:  No results found for: "GLUF"  Lab Results   Component Value Date    HGBA1C 6.6 (H) 08/22/2023    HGBA1C 6.9 (H) 04/11/2023    HGBA1C 6.8 (H) 03/14/2023      Lab Results   Component Value Date    ESTIMATEDAVG 143 (H) 08/22/2023    ESTIMATEDAVG 151 (H) 04/11/2023    ESTIMATEDAVG 160 (H) 06/06/2022       History of diabetes since 2008  Diabetes medications: Jardiance 10mg daily, Tradjenta 5mg daily, metformin 1000mg daily, lantus 10u @[facility]  Blood Glucose Checks at Home: Yes: blood sugars ac & HS  Typical morning range: 100-110 mg/dl  Endocrinologist or PCP following DM: PCP-changing but does not have new one yet    Other Core Components/Diabetes Plan:   Goals:  Hgb A1c < 7%  Exercise Blood Glucose:100-300mg/dl    Interventions:  Medication Compliance  Med Card Reconciled  Low Sodium, Mediterranean, ADA Diet  Physical Activity  Increase Knowledge of Contributory Factors    Education:  Diabetes; verbalizes understanding; Date: 8/14/23  Medications I; verbalizes understanding; Date: 7/21/23    Comments:   Patient verbalizes understanding to bring home glucometer and check glucose pre and post each exercise session.  Per cardiac rehab protocols, patient's glucose must be between 90 and 270 mg/dL to exercise.  Patient denies any recent glucose levels less than 60 mg/dL or greater than 300 mg/dL. Patient verbalizes importance of notifying rehab staff if symptoms of hypoglycemia occur while at cardiac rehab.  Abnormal labs will be " reported to patient's PCP/Endocrinologist by rehab staff.    Noted updated glucose parameters of 100-300    Continue to encourage reduction in refined sugars/regular coke    Madai Valladares MS, RDN/LDN     Session: 12 Session Follow Up   Cardiac Rehab Individual Treatment Plan - Reassessment      Patient Name: Jud Villa MRN: [id number]   : 1960   Age: 63 y.o.   Primary Diagnosis: S/P CABG 2023      Psychosocial Assessment:   Living Arrangements: Lives alone  Family Support: children and grandchildren  Self Reported: no change Anxiety and Depression  Displays: calmness  Medication: not applicable    Psychosocial Plan:   Goals:  Improved psychosocial coping strategies: In Progress  Reduce manifestation of depression: In Progress  Maintain positive support system: Met  Maintain positive outlook: Met  Improve overall quality of life: In Progress    Interventions:  Patient to Self Report Emotional Changes at Session Check In  Recommend Physical Activity  Recommend Attending Education Lectures  Notify MD: No  Program Referral: Yes  Pharmaceutical Intervention/Therapy: Declined  Other Needs: not applicable  Stage of Readiness to Change: Action    Education:  Heart disease and emotions; verbalizes understanding; Date: 2023    Comments:  Pt denies any overwhelming stress or anxiety. Pt is showing more motivation in daily attendance of cardiac rehab and participating more with effort. Pt states she has worry about her previous problem with breast cancer.  Patient has been instructed to notify staff in the event that circumstances worsen.  Patient verbalizes understanding.    Other Core Components/Risk Factors Assessment:   RISK FACTORS:  diabetes, hyperlipidemia, sedentary lifestyle, tobacco use, stress    Learning Barriers: Emotional    Medication Compliance: has been compliant with the medicaiton regimen    Other Core Components/Risk Factors Plan:   Goals:  Increase exercise tolerance: In  Progress  Increase knowledge of CAD: In Progress  Identify and manage personal areas of stress: In Progress  Control diabetes by adjusting diet and exercise: In Progress  Learn more about healthy eating: In Progress    Interventions:  Individual Education/ Counseling: Yes  Physician Referral: No    Education:    cholesterol meds, verbalizes understanding; Date: 7/21/2023  stress, verbalizes understanding; Date: 8/04/2023  warning signs of MI, verbalizes understanding; Date: 8/11/2023         Education method adapted to patients education level and preferred method of learning.  Method: explanation  demonstration  handouts    Comments:  Pt si attending rehab more regularly and feel improvement physically.    Other Core Components/Hypertension Assessment:   BP Range:  systolic; 52-76 diastolic  BP at Goal: Yes  Patient reported symptoms: none    Other Core Components/Hypertension Plan:   Goals:  Blood Pressure <130/80    Interventions:  Med Card Reconciled: Yes  Encourage medication compliance  Encourage sodium reduction  Encourage weight loss  Recommend physical activity  Educate on contributory factors  Reduce stress, anxiety, anger, depression, and/or chronic pain  Encourage home blood pressure monitoring  Recommend daily weights  MD notified/Physician Referral: No    Education:    Medication I; verbalizes understanding; Date: 7/21/2023  Stroke; verbalizes understanding; Date: 8/11/2023         Comments:  Pt states she is monitoring her blood pressure daily at home and keeping a log.    Does the patient have Heart Failure? No      Other Core Components/Tobacco Cessation Assessment:   Smoking Status: past smoker who quit 3/2023  Smoking Cessation Barriers:  N/A  Stage of Readiness to Change: Maintenance    Other Core Components/Tobacco Cessation Plan:   Goals:  Maintain non-smoking status    Interventions:  Maintains non-smoking status    Education:    Stroke; verbalizes understanding; Date: 8/11/2023          Comments:  Pt is not using tobacco products at this time.    Matt Chapin RN

## 2023-10-13 ENCOUNTER — OFFICE VISIT (OUTPATIENT)
Dept: URGENT CARE | Facility: CLINIC | Age: 63
End: 2023-10-13
Payer: MEDICARE

## 2023-10-13 VITALS
OXYGEN SATURATION: 97 % | TEMPERATURE: 99 F | WEIGHT: 130 LBS | HEIGHT: 62 IN | HEART RATE: 84 BPM | BODY MASS INDEX: 23.92 KG/M2 | SYSTOLIC BLOOD PRESSURE: 93 MMHG | DIASTOLIC BLOOD PRESSURE: 62 MMHG | RESPIRATION RATE: 19 BRPM

## 2023-10-13 DIAGNOSIS — A08.4 VIRAL GASTROENTERITIS: ICD-10-CM

## 2023-10-13 DIAGNOSIS — R53.83 FATIGUE, UNSPECIFIED TYPE: Primary | ICD-10-CM

## 2023-10-13 LAB
CTP QC/QA: YES
MOLECULAR STREP A: NEGATIVE
POC MOLECULAR INFLUENZA A AGN: NEGATIVE
POC MOLECULAR INFLUENZA B AGN: NEGATIVE
SARS-COV-2 AG RESP QL IA.RAPID: NEGATIVE

## 2023-10-13 PROCEDURE — 87651 POCT STREP A MOLECULAR: ICD-10-PCS | Mod: QW,S$GLB,,

## 2023-10-13 PROCEDURE — 99213 OFFICE O/P EST LOW 20 MIN: CPT | Mod: S$GLB,,,

## 2023-10-13 PROCEDURE — 87811 SARS CORONAVIRUS 2 ANTIGEN POCT, MANUAL READ: ICD-10-PCS | Mod: QW,S$GLB,,

## 2023-10-13 PROCEDURE — 87651 STREP A DNA AMP PROBE: CPT | Mod: QW,S$GLB,,

## 2023-10-13 PROCEDURE — 99213 PR OFFICE/OUTPT VISIT, EST, LEVL III, 20-29 MIN: ICD-10-PCS | Mod: S$GLB,,,

## 2023-10-13 PROCEDURE — 87502 INFLUENZA DNA AMP PROBE: CPT | Mod: QW,S$GLB,,

## 2023-10-13 PROCEDURE — 87811 SARS-COV-2 COVID19 W/OPTIC: CPT | Mod: QW,S$GLB,,

## 2023-10-13 PROCEDURE — 87502 POCT INFLUENZA A/B MOLECULAR: ICD-10-PCS | Mod: QW,S$GLB,,

## 2023-10-13 NOTE — PATIENT INSTRUCTIONS
Follow up with primary care if symptoms do not improve in 3-5 days.   Please drink plenty of fluids.  Please get plenty of rest.  Please return here or go to the Emergency Department for any concerns or worsening of condition.  If you were prescribed antibiotics, please take them to completion.  If you were given wait & see antibiotics, please wait 5-7 days before taking them, and only take them if your symptoms have worsened or not improved.  If you do begin taking the antibiotics, please take them to completion.  If you were prescribed a narcotic medication, do not drive or operate heavy equipment or machinery while taking these medications.  If you were given a steroid shot in the clinic and have also been given a prescription for a steroid such as Prednisone or a Medrol Dose Pack, please begin taking them tomorrow.  If you do not have Hypertension or any history of palpitations, it is ok to take over the counter Sudafed or Mucinex D or Allegra-D or Claritin-D or Zyrtec-D.  If you do take one of the above, it is ok to combine that with plain over the counter Mucinex or Allegra or Claritin or Zyrtec.  If for example you are taking Zyrtec -D, you can combine that with Mucinex, but not Mucinex-D.  If you are taking Mucinex-D, you can combine that with plain Allegra or Claritin or Zyrtec.   If you do have Hypertension or palpitations, it is safe to take Coricidin HBP for relief of sinus symptoms.  If not allergic, please take over the counter Tylenol (Acetaminophen) and/or Motrin (Ibuprofen) as directed for control of pain and/or fever.  Please follow up with your primary care doctor or specialist as needed.    If you  smoke, please stop smoking.

## 2023-10-13 NOTE — PROGRESS NOTES
"Subjective:      Patient ID: Jud Villa is a 63 y.o. female.    Vitals:  height is 5' 2" (1.575 m) and weight is 59 kg (130 lb). Her oral temperature is 98.6 °F (37 °C). Her blood pressure is 93/62 and her pulse is 84. Her respiration is 19 and oxygen saturation is 97%.     Chief Complaint: Fatigue    63 y.o. patient complains of fatigue that began yesterday, swollen glands in throat began yesterday. Pt states that she has only gotten vitamins to help with symptoms. Pt states that her "Bowel is not completely water, but its not normal." Pt states that she is very tired. Pt recently had heart surgery and unsure if she is having shortness of breath because of that.     Fatigue  This is a new problem. The current episode started yesterday. The problem occurs constantly. The problem has been gradually worsening. Associated symptoms include fatigue, headaches and swollen glands. Pertinent negatives include no abdominal pain, anorexia, arthralgias, change in bowel habit, chest pain, chills, congestion, coughing, diaphoresis, fever, joint swelling, myalgias, nausea, neck pain, numbness, rash, sore throat, urinary symptoms, vertigo, visual change, vomiting or weakness. The symptoms are aggravated by exertion. Treatments tried: Vitamins. The treatment provided no relief.       Constitution: Positive for fatigue. Negative for appetite change, chills, sweating and fever.   HENT:  Negative for ear pain, ear discharge, congestion, postnasal drip, sinus pain, sinus pressure and sore throat.    Neck: Positive for painful lymph nodes. Negative for neck pain.   Cardiovascular:  Positive for sob on exertion. Negative for chest pain.   Eyes:  Negative for foreign body in eye, eye discharge and eye redness.   Respiratory:  Negative for cough and shortness of breath.    Gastrointestinal:  Negative for abdominal pain, nausea and vomiting.   Genitourinary:  Negative for dysuria, frequency, urgency, urine decreased and flank pain. "   Musculoskeletal:  Negative for joint pain, joint swelling and muscle ache.   Skin:  Negative for color change and rash.   Allergic/Immunologic: Negative for itching and sneezing.   Neurological:  Positive for headaches. Negative for history of vertigo, disorientation, altered mental status and numbness.        Patient suffers from migraines regularly   Hematologic/Lymphatic: Positive for swollen lymph nodes.   Psychiatric/Behavioral:  Negative for altered mental status and disorientation.       Past Medical History:   Diagnosis Date    Asthma     Breast carcinoma     Cataract     Coronary artery disease of native heart with stable angina pectoris 04/04/2023    Diabetes mellitus     Hyperlipidemia     Moderate episode of recurrent major depressive disorder 05/19/2021    Osteoporosis     Stroke 03/2023    left PCA       Past Surgical History:   Procedure Laterality Date    BILATERAL MASTECTOMY  04/26/2018    CHOLECYSTECTOMY      COLONOSCOPY N/A 10/27/2015    Procedure: COLONOSCOPY;  Surgeon: Johnny Hurley MD;  Location: [facility] ENDO;  Service: Endoscopy;  Laterality: N/A;    CORONARY ANGIOGRAPHY N/A 04/05/2023    Procedure: ANGIOGRAM, CORONARY ARTERY;  Surgeon: Francisco Barahona MD;  Location: [facility] CATH LAB;  Service: Cardiology;  Laterality: N/A;    CORONARY ANGIOPLASTY      CORONARY ARTERY BYPASS GRAFT  04/2023    LIMA to LAD, SVG to OM    CORONARY ARTERY BYPASS GRAFT (CABG) N/A 04/14/2023    Procedure: CORONARY ARTERY BYPASS GRAFT (CABG) x 2;  Surgeon: Igor Kahn MD;  Location: [facility] OR [address] [place] [address];  Service: Cardiothoracic;  Laterality: N/A;    ENDOSCOPIC HARVEST OF VEIN Left 04/14/2023    Procedure: SURGICAL PROCUREMENT, VEIN, ENDOSCOPIC;  Surgeon: Igor Kahn MD;  Location: [facility] OR [address] [place] [address];  Service: Cardiothoracic;  Laterality: Left;  Vein harvest start - stop: 0817 - 0856  Vein prep start - stop: 0857 - 0917    ESOPHAGOGASTRODUODENOSCOPY N/A 01/24/2022    Procedure: ESOPHAGOGASTRODUODENOSCOPY  (EGD);  Surgeon: Mili Kazt MD;  Location: [facility] ENDO ([address] [place] [address]);  Service: Endoscopy;  Laterality: N/A;  rapid in AM, instr portal -ml    HYSTERECTOMY      LASER PERIPHERAL IRIDOTOMY Bilateral         LEFT HEART CATHETERIZATION Left 2023    Procedure: Left heart cath;  Surgeon: Francisco Barahona MD;  Location: [facility] CATH LAB;  Service: Cardiology;  Laterality: Left;       Family History   Problem Relation Age of Onset    Diabetes Father     Congenital heart disease Brother     Amblyopia Neg Hx     Blindness Neg Hx     Cataracts Neg Hx     Glaucoma Neg Hx     Macular degeneration Neg Hx     Retinal detachment Neg Hx     Strabismus Neg Hx        Social History     Socioeconomic History    Marital status:    Tobacco Use    Smoking status: Every Day     Current packs/day: 0.00     Average packs/day: 1 pack/day for 40.0 years (40.0 ttl pk-yrs)     Types: Cigarettes     Start date: 3/12/1983     Last attempt to quit: 3/12/2023     Years since quittin.5     Passive exposure: Past    Smokeless tobacco: Current   Substance and Sexual Activity    Alcohol use: No     Alcohol/week: 0.0 standard drinks of alcohol    Drug use: No     Social Determinants of Health     Financial Resource Strain: Unknown (2023)    Overall Financial Resource Strain (CARDIA)     Difficulty of Paying Living Expenses: Patient refused   Food Insecurity: Unknown (2023)    Hunger Vital Sign     Worried About Running Out of Food in the Last Year: Patient refused     Ran Out of Food in the Last Year: Patient refused   Transportation Needs: Unknown (2023)    PRAPARE - Transportation     Lack of Transportation (Medical): Patient refused     Lack of Transportation (Non-Medical): Patient refused   Physical Activity: Unknown (2023)    Exercise Vital Sign     Days of Exercise per Week: Patient refused     Minutes of Exercise per Session: 90 min   Stress: Unknown (2023)    [place] [employer]  "[employer] - Occupational Stress Questionnaire     Feeling of Stress : Patient refused   Recent Concern: Stress - Stress Concern Present (4/3/2023)    [employer] [place] [employer] - Occupational Stress Questionnaire     Feeling of Stress : Rather much   Social Connections: Unknown (5/2/2023)    Social Connection and Isolation Panel [NHANES]     Frequency of Communication with Friends and Family: Patient refused     Frequency of Social Gatherings with Friends and Family: Patient refused     Attends Holiness Services: Patient refused     Active Member of Clubs or Organizations: No     Attends Club or Organization Meetings: Patient refused     Marital Status: Patient refused   Housing Stability: Unknown (5/2/2023)    Housing Stability Vital Sign     Unable to Pay for Housing in the Last Year: Patient refused     Unstable Housing in the Last Year: Patient refused       Current Outpatient Medications   Medication Sig Dispense Refill    acetaminophen (TYLENOL) 500 MG tablet Take 2 tablets (1,000 mg total) by mouth every 6 (six) hours as needed for Pain. 30 tablet 1    albuterol (PROVENTIL) 2.5 mg /3 mL (0.083 %) nebulizer solution INHALE 1 VIAL PER NEBULIZER FOUR TIMES DAILY AS NEEDED FOR SHORTNESS OF BREATH/ WHEEZING 360 mL 11    albuterol (VENTOLIN HFA) 90 mcg/actuation inhaler INHALE 2 PUFFS BY MOUTH INTO THE LUNGS EVERY 6 HOURS AS NEEDED FOR WHEEZING 18 g 11    apixaban (ELIQUIS) 5 mg Tab Take 1 tablet (5 mg total) by mouth 2 (two) times daily. 60 tablet 11    aspirin 81 MG Chew Take 81 mg by mouth once daily.      atorvastatin (LIPITOR) 80 MG tablet Take 1 tablet (80 mg total) by mouth every evening. 90 tablet 3    BD ULTRA-FINE SHORT PEN NEEDLE 31 gauge x 5/16" Ndle Inject 1 pen into the skin once daily. 100 each 3    blood-glucose sensor (DEXCOM G7 SENSOR) Vicki 1 Device by Misc.(Non-Drug; Combo Route) route every 10 days. 3 each 11    clonazePAM (KLONOPIN) 0.5 MG tablet TAKE 1 TABLET(0.5 MG) BY MOUTH EVERY " NIGHT AS NEEDED FOR ANXIETY 30 tablet 0    ergocalciferol (ERGOCALCIFEROL) 50,000 unit Cap TAKE 1 CAPSULE BY MOUTH EVERY 7 DAYS 12 capsule 3    fluticasone propionate (FLONASE) 50 mcg/actuation nasal spray SHAKE LIQUID AND USE 1 SPRAY(50 MCG) IN EACH NOSTRIL EVERY DAY 16 g 0    fluticasone-umeclidin-vilanter (TRELEGY ELLIPTA) 100-62.5-25 mcg DsDv Inhale 1 puff into the lungs once daily. 60 each 2    JARDIANCE 10 mg tablet TAKE 1 TABLET(10 MG) BY MOUTH EVERY DAY 90 tablet 3    LANTUS SOLOSTAR U-100 INSULIN glargine 100 units/mL SubQ pen ADMINISTER 10 UNITS UNDER THE SKIN EVERY EVENING 15 mL 1    meloxicam (MOBIC) 15 MG tablet Take 1 tablet (15 mg total) by mouth once daily. 30 tablet 1    metFORMIN (GLUCOPHAGE) 1000 MG tablet Take 1 tablet (1,000 mg total) by mouth 2 (two) times daily with meals. 180 tablet 3    metoprolol tartrate (LOPRESSOR) 50 MG tablet Take 1 tablet (50 mg total) by mouth 2 (two) times daily. 60 tablet 11    nicotine (NICODERM CQ) 14 mg/24 hr Place 1 patch onto the skin once daily. 28 patch 0    pantoprazole (PROTONIX) 40 MG tablet Take 1 tablet (40 mg total) by mouth before breakfast. 90 tablet 3    scopolamine (TRANSDERM-SCOP) 1.3-1.5 mg (1 mg over 3 days) Place 1 patch onto the skin Every 3 (three) days. 4 each 1    sucralfate (CARAFATE) 1 gram tablet TAKE 1 TABLET(1 GRAM) BY MOUTH THREE TIMES DAILY BEFORE MEALS 270 tablet 3    topiramate (TOPAMAX) 50 MG tablet Take 2 tablets (100 mg total) by mouth 2 (two) times daily. TAKE 1 TABLET BY MOUTH TWICE DAILY FOR 7 DAYS, THEN 1 TABLET EVERY MORNING AND 2 TABLETS EVERY EVENING FOR 7 DAYS, THEN 2 TABLETS TWICE DAILY 360 tablet 1    TRADJENTA 5 mg Tab tablet TAKE 1 TABLET(5 MG) BY MOUTH EVERY DAY 90 tablet 0    TRUE METRIX GLUCOSE TEST STRIP Strp USE AS DIRECTED FOUR TIMES DAILY 50 each 0    sertraline (ZOLOFT) 50 MG tablet Take 1 tablet (50 mg total) by mouth once daily. 90 tablet 3     No current facility-administered medications for this visit.        Review of patient's allergies indicates:  No Known Allergies   Objective:     Physical Exam   Constitutional: She is oriented to person, place, and time. She appears well-developed. She is cooperative.  Non-toxic appearance. She does not appear ill. No distress.   HENT:   Head: Normocephalic and atraumatic.   Ears:   Right Ear: Hearing, tympanic membrane, external ear and ear canal normal.   Left Ear: Hearing, tympanic membrane, external ear and ear canal normal.   Nose: Nose normal. No mucosal edema, rhinorrhea, nasal deformity or congestion. No epistaxis. Right sinus exhibits no maxillary sinus tenderness and no frontal sinus tenderness. Left sinus exhibits no maxillary sinus tenderness and no frontal sinus tenderness.   Mouth/Throat: Uvula is midline, oropharynx is clear and moist and mucous membranes are normal. No trismus in the jaw. Normal dentition. No uvula swelling. No oropharyngeal exudate, posterior oropharyngeal edema or posterior oropharyngeal erythema. Tonsillar exudate (left).   Eyes: Conjunctivae and lids are normal. Pupils are equal, round, and reactive to light. No scleral icterus. Extraocular movement intact   Neck: Trachea normal and phonation normal. Neck supple. No edema present. No erythema present. No neck rigidity present.   Cardiovascular: Normal rate, regular rhythm, normal heart sounds and normal pulses.   Pulmonary/Chest: Effort normal and breath sounds normal. No accessory muscle usage. No apnea, no tachypnea and no bradypnea. No respiratory distress. She has no decreased breath sounds. She has no wheezes. She has no rhonchi. She has no rales.   Clear lung sounds B/L         Comments: Clear lung sounds B/L    Abdominal: Normal appearance.   Musculoskeletal: Normal range of motion.         General: No deformity. Normal range of motion.   Neurological: She is alert and oriented to person, place, and time. She exhibits normal muscle tone. Coordination normal.   Skin: Skin is warm,  dry, intact, not diaphoretic and not pale.   Psychiatric: Her speech is normal and behavior is normal. Judgment and thought content normal.   Nursing note and vitals reviewed.    Results for orders placed or performed in visit on 10/13/23   SARS Coronavirus 2 Antigen, POCT Manual Read   Result Value Ref Range    SARS Coronavirus 2 Antigen Negative Negative     Acceptable Yes    POCT Influenza A/B MOLECULAR   Result Value Ref Range    POC Molecular Influenza A Ag Negative Negative, Not Reported    POC Molecular Influenza B Ag Negative Negative, Not Reported     Acceptable Yes    POCT Strep A, Molecular   Result Value Ref Range    Molecular Strep A, POC Negative Negative     Acceptable Yes      *Note: Due to a large number of results and/or encounters for the requested time period, some results have not been displayed. A complete set of results can be found in Results Review.      Assessment:     1. Fatigue, unspecified type    2. Viral gastroenteritis        Plan:   I have reviewed the patient chart and pertinent past imaging/labs.     Fatigue, unspecified type  -     SARS Coronavirus 2 Antigen, POCT Manual Read  -     POCT Influenza A/B MOLECULAR  -     POCT Strep A, Molecular    Viral gastroenteritis      Patient Instructions   Follow up with primary care if symptoms do not improve in 3-5 days.   Please drink plenty of fluids.  Please get plenty of rest.  Please return here or go to the Emergency Department for any concerns or worsening of condition.  If you were prescribed antibiotics, please take them to completion.  If you were given wait & see antibiotics, please wait 5-7 days before taking them, and only take them if your symptoms have worsened or not improved.  If you do begin taking the antibiotics, please take them to completion.  If you were prescribed a narcotic medication, do not drive or operate heavy equipment or machinery while taking these medications.  If  you were given a steroid shot in the clinic and have also been given a prescription for a steroid such as Prednisone or a Medrol Dose Pack, please begin taking them tomorrow.  If you do not have Hypertension or any history of palpitations, it is ok to take over the counter Sudafed or Mucinex D or Allegra-D or Claritin-D or Zyrtec-D.  If you do take one of the above, it is ok to combine that with plain over the counter Mucinex or Allegra or Claritin or Zyrtec.  If for example you are taking Zyrtec -D, you can combine that with Mucinex, but not Mucinex-D.  If you are taking Mucinex-D, you can combine that with plain Allegra or Claritin or Zyrtec.   If you do have Hypertension or palpitations, it is safe to take Coricidin HBP for relief of sinus symptoms.  If not allergic, please take over the counter Tylenol (Acetaminophen) and/or Motrin (Ibuprofen) as directed for control of pain and/or fever.  Please follow up with your primary care doctor or specialist as needed.    If you  smoke, please stop smoking.

## 2023-10-16 ENCOUNTER — TELEPHONE (OUTPATIENT)
Dept: CARDIOLOGY | Facility: HOSPITAL | Age: 63
End: 2023-10-16

## 2023-10-16 ENCOUNTER — TELEPHONE (OUTPATIENT)
Dept: CARDIOLOGY | Facility: HOSPITAL | Age: 63
End: 2023-10-16
Payer: MEDICARE

## 2023-10-18 ENCOUNTER — HOSPITAL ENCOUNTER (OUTPATIENT)
Dept: CARDIOLOGY | Facility: HOSPITAL | Age: 63
Discharge: HOME | End: 2023-10-18
Attending: PHYSICIAN ASSISTANT
Payer: MEDICARE

## 2023-10-18 VITALS
WEIGHT: 130 LBS | HEART RATE: 90 BPM | HEIGHT: 62 IN | DIASTOLIC BLOOD PRESSURE: 59 MMHG | BODY MASS INDEX: 23.92 KG/M2 | SYSTOLIC BLOOD PRESSURE: 108 MMHG | RESPIRATION RATE: 16 BRPM

## 2023-10-18 DIAGNOSIS — Z95.1 S/P CABG X 2: ICD-10-CM

## 2023-10-18 DIAGNOSIS — R07.9 CHEST PAIN, UNSPECIFIED TYPE: ICD-10-CM

## 2023-10-18 LAB
CFR FLOW - ANTERIOR: 2.78
CFR FLOW - INFERIOR: 2.63
CFR FLOW - LATERAL: 2.41
CFR FLOW - MAX: 3.22
CFR FLOW - MIN: 1.89
CFR FLOW - SEPTAL: 2.57
CFR FLOW - WHOLE HEART: 2.6
CV STRESS BASE HR: 78 BPM
DIASTOLIC BLOOD PRESSURE: 68 MMHG
EJECTION FRACTION- HIGH: 59 %
END DIASTOLIC INDEX-HIGH: 155 ML/M2
END DIASTOLIC INDEX-LOW: 91 ML/M2
END SYSTOLIC INDEX-HIGH: 78 ML/M2
END SYSTOLIC INDEX-LOW: 40 ML/M2
NUC REST DIASTOLIC VOLUME INDEX: 61
NUC REST EJECTION FRACTION: 65
NUC REST SYSTOLIC VOLUME INDEX: 21
NUC STRESS DIASTOLIC VOLUME INDEX: 64
NUC STRESS EJECTION FRACTION: 70 %
NUC STRESS SYSTOLIC VOLUME INDEX: 19
OHS CV CPX 1 MINUTE RECOVERY HEART RATE: 100 BPM
OHS CV CPX 85 PERCENT MAX PREDICTED HEART RATE MALE: 126
OHS CV CPX MAX PREDICTED HEART RATE: 149
OHS CV CPX PATIENT IS FEMALE: 1
OHS CV CPX PATIENT IS MALE: 0
OHS CV CPX PEAK DIASTOLIC BLOOD PRESSURE: 62 MMHG
OHS CV CPX PEAK HEAR RATE: 82 BPM
OHS CV CPX PEAK RATE PRESSURE PRODUCT: 8036
OHS CV CPX PEAK SYSTOLIC BLOOD PRESSURE: 98 MMHG
OHS CV CPX PERCENT MAX PREDICTED HEART RATE ACHIEVED: 55
OHS CV CPX RATE PRESSURE PRODUCT PRESENTING: 9594
PERFUSION DEFECT 1 SIZE IN %: 17 %
REST FLOW - ANTERIOR: 0.56 CC/MIN/G
REST FLOW - INFERIOR: 0.57 CC/MIN/G
REST FLOW - LATERAL: 0.8 CC/MIN/G
REST FLOW - MAX: 1.1 CC/MIN/G
REST FLOW - MIN: 0.37 CC/MIN/G
REST FLOW - SEPTAL: 0.52 CC/MIN/G
REST FLOW - WHOLE HEART: 0.61 CC/MIN/G
RETIRED EF AND QEF - SEE NOTES: 47 %
STRESS FLOW - ANTERIOR: 1.57 CC/MIN/G
STRESS FLOW - INFERIOR: 1.51 CC/MIN/G
STRESS FLOW - LATERAL: 1.91 CC/MIN/G
STRESS FLOW - MAX: 2.46 CC/MIN/G
STRESS FLOW - MIN: 0.79 CC/MIN/G
STRESS FLOW - SEPTAL: 1.33 CC/MIN/G
STRESS FLOW - WHOLE HEART: 1.58 CC/MIN/G
SYSTOLIC BLOOD PRESSURE: 123 MMHG

## 2023-10-18 PROCEDURE — 63600175 PHARM REV CODE 636 W HCPCS: Mod: HCNC | Performed by: PHYSICIAN ASSISTANT

## 2023-10-18 PROCEDURE — 78434 AQMBF PET REST & RX STRESS: CPT | Mod: HCNC

## 2023-10-18 PROCEDURE — 78431 MYOCRD IMG PET RST&STRS CT: CPT | Mod: HCNC

## 2023-10-18 PROCEDURE — 93016 CV STRESS TEST SUPVJ ONLY: CPT | Mod: HCNC,,, | Performed by: INTERNAL MEDICINE

## 2023-10-18 PROCEDURE — 78431 MYOCRD IMG PET RST&STRS CT: CPT | Mod: 26,HCNC,, | Performed by: INTERNAL MEDICINE

## 2023-10-18 PROCEDURE — 78434 CARDIAC PET SCAN STRESS (CUPID ONLY): ICD-10-PCS | Mod: 26,HCNC,, | Performed by: INTERNAL MEDICINE

## 2023-10-18 PROCEDURE — 78431 CARDIAC PET SCAN STRESS (CUPID ONLY): ICD-10-PCS | Mod: 26,HCNC,, | Performed by: INTERNAL MEDICINE

## 2023-10-18 PROCEDURE — 93018 CV STRESS TEST I&R ONLY: CPT | Mod: HCNC,,, | Performed by: INTERNAL MEDICINE

## 2023-10-18 PROCEDURE — 93016 CARDIAC PET SCAN STRESS (CUPID ONLY): ICD-10-PCS | Mod: HCNC,,, | Performed by: INTERNAL MEDICINE

## 2023-10-18 PROCEDURE — 78434 AQMBF PET REST & RX STRESS: CPT | Mod: 26,HCNC,, | Performed by: INTERNAL MEDICINE

## 2023-10-18 PROCEDURE — 93018 CARDIAC PET SCAN STRESS (CUPID ONLY): ICD-10-PCS | Mod: HCNC,,, | Performed by: INTERNAL MEDICINE

## 2023-10-18 RX ORDER — ERGOCALCIFEROL 1.25 MG/1
CAPSULE ORAL
Qty: 12 CAPSULE | Refills: 3 | Status: SHIPPED | OUTPATIENT
Start: 2023-10-18

## 2023-10-18 RX ORDER — AMINOPHYLLINE 25 MG/ML
75 INJECTION, SOLUTION INTRAVENOUS ONCE
Status: COMPLETED | OUTPATIENT
Start: 2023-10-18 | End: 2023-10-18

## 2023-10-18 RX ORDER — REGADENOSON 0.08 MG/ML
0.4 INJECTION, SOLUTION INTRAVENOUS ONCE
Status: COMPLETED | OUTPATIENT
Start: 2023-10-18 | End: 2023-10-18

## 2023-10-18 RX ADMIN — REGADENOSON 0.4 MG: 0.08 INJECTION, SOLUTION INTRAVENOUS at 01:10

## 2023-10-18 RX ADMIN — AMINOPHYLLINE 75 MG: 25 INJECTION, SOLUTION INTRAVENOUS at 01:10

## 2023-10-19 NOTE — PROGRESS NOTES
As Ms. Lupe Arias PA-C requested me is to inform Mrs. Villa about her test results. I called and informed Mrs. Villa that her PET stress test does not show any concerning abnormalities. Her surgery in April has successfully returned some blood flow to the areas that were previously affected. She verbalized and understand

## 2023-10-23 RX ORDER — MELOXICAM 15 MG/1
15 TABLET ORAL
Qty: 30 TABLET | Refills: 1 | Status: SHIPPED | OUTPATIENT
Start: 2023-10-23 | End: 2024-01-16

## 2023-10-23 NOTE — TELEPHONE ENCOUNTER
No care due was identified.  [facility] Embedded Care Due Messages. Reference number: [id number].   10/23/2023 10:17:03 AM CDT

## 2023-11-02 ENCOUNTER — DOCUMENTATION ONLY (OUTPATIENT)
Dept: CARDIAC REHAB | Facility: CLINIC | Age: 63
End: 2023-11-02
Payer: MEDICARE

## 2023-11-02 NOTE — PROGRESS NOTES
Miss Renner has completed 15 out of 36 exercise session of Phase II cardiac rehab.  A follow up reassessment will be completed at 24 sessions.   Miss Renner is currently on hold until cleared to return after PET test.    12 Session Follow Up   Cardiac Rehab Individual Treatment Plan - Reassessment      Patient Name: Jud Villa MRN: [id number]   : 1960   Age: 63 y.o.   Primary Diagnosis: CABG Date of Event: 23   EF: 47%  Risk Stratification: high  Referring Physician: SUMIT ACKERMAN   Exercise Assessment:     Angina with exercise?: No   ST Depression with Exercise?: No  Fall Risk: Yes   Assistive Devices:  independent  Miss Renner stated at orientation there were no limitations to exercise.    Comments on Progression: Miss Renner is progressing slowly but  her workloads will continue to be increased as she tolerates exercise intensity.     Exercise Plan:   Goals:  CR Exercise Goals: Attend Cardiac Rehab 3 times/week: In Progress  Home Aerobic Exercise: 2 additional days/week for 30-60 minutes: In Progress  Intensity of 12-15 on the Rate of Perceived Exertion (RPE) scale: In Progress  30% increase in entry estimated METS: 6.5 : In Progress  5 days/week for 30-60 minutes: In Progress    Comments on Goal Progression:  Patient Consistency: consistent with attendance  Home exercise? No   Patient reports intensity rate 9-15 on RPE scale    Intervention/Recommendations:   Discussed importance of regular attendance to cardiac rehab class    Exercise Prescription:  THR Range 72-78   Mode: Treadmill  Recumbent Bike  Nustep   Frequency:  3 days/week   Duration:  30-60 minutes   Intensity:  12-15 RPE   Resistance Training:  Yes: 3 lb weights with 10-15 reps based on strength and range of motion and adjusted accordingly     Home Prescription:  Mode Aerobic exercise   Frequency: 2- 3 days/week   Duration: 30-60 minutes   Resistance Training: None     Education:  Diabetes; verbalizes understanding; Date: 23  Exercise and  Rehydration; verbalizes understanding; Date: 23  Relaxation Techniques; verbalizes understanding; Date: 23  What Comes After Phase II?; verbalizes understanding; Date: 23    Comments:  I reviewed exercise recommendations with Miss Renner.  I encouraged her to begin some type of exercise she can do outside of attending rehab class.  I also explained the importance of arriving to class on time.  She stated understanding.      The exercise prescription will continue to be adjusted based on tolerance of exercise intensity by patient.    Ernesto Silveira[place] [facility] [place]     [place] Follow Up   Cardiac Rehab Individual Treatment Plan - Reassessment    Patient Name: Jud Villa MRN: [id number]   : 1960   Age: 63 y.o.   Primary Diagnosis: s/p CABG    Nutrition Assessment:     Anthropometrics    Height 62 inches   Weight 131.6 lbs   BMI 24.1     Patient confirms she is taking lipitor 80mg for cholesterol control.  Difficulty Chewing or Swallowing: No  Current Exercise: See Exercise Physiologist Note  Food Safety/Food Preparation: self  Living Arrangements/Family Support: Lives alone  Cultural/Spiritual/Personal Preferences: not applicable   Barriers to Education: none identified  Stage of Change Related to Diet Habits: Action  Recent Changes to Diet: Yes - less fast food  Food Diary: Given      Nutrition Plan:   Goals:  LDL-C < 70 (for high risk patients)  Hgb A1c < 7%  BMI < 25 and abdominal girth < 40M/<35 F  2 gram sodium, Mediterranean diet: In Progress  Fish intake (non-fried varieties) to a goal of 2-3 servings per week: In Progress  Increase fruit and vegetable intake: In Progress    Comments on Goal Progression:  Pt states she has made an effort to reduce fast food intake and is also eating more produce with meals    Interventions:  Dietitian Consult: No  Patient to participate in Cardiac Rehab sessions three times a week  Weekly Dietitian Weight Check  Nutrition Recommendations Provided: Verbal,  "Reviewed  Follow Up Plan for Ongoing Self-Management Support    Education:  Protein; verbalizes understanding; Date: 7/12/23  Seafood; verbalizes understanding; Date: 8/2/23  Vitamins; verbalizes understanding; Date: 8/9/23  Pre/Probiotics; verbalizes understanding; Date: 7/26/23    Comments:   Discussed ways to incorporate healthy snacks, eating on a schedule, and monitoring sodium intake for heart health.    Diabetes  Is the patient diabetic? Yes   Other Core Components/Diabetes Assessment:   Labs:  No results found for: "GLUF"  Lab Results   Component Value Date    HGBA1C 6.6 (H) 08/22/2023    HGBA1C 6.9 (H) 04/11/2023    HGBA1C 6.8 (H) 03/14/2023      Lab Results   Component Value Date    ESTIMATEDAVG 143 (H) 08/22/2023    ESTIMATEDAVG 151 (H) 04/11/2023    ESTIMATEDAVG 160 (H) 06/06/2022       History of diabetes since 2008  Diabetes medications: Jardiance 10mg daily, Tradjenta 5mg daily, metformin 1000mg daily, lantus 10u @[facility]  Blood Glucose Checks at Home: Yes: blood sugars ac & HS  Typical morning range: 100-110 mg/dl  Endocrinologist or PCP following DM: PCP-changing but does not have new one yet    Other Core Components/Diabetes Plan:   Goals:  Hgb A1c < 7%  Exercise Blood Glucose:100-300mg/dl    Interventions:  Medication Compliance  Med Card Reconciled  Low Sodium, Mediterranean, ADA Diet  Physical Activity  Increase Knowledge of Contributory Factors    Education:  Diabetes; verbalizes understanding; Date: 8/14/23  Medications I; verbalizes understanding; Date: 7/21/23    Comments:   Patient verbalizes understanding to bring home glucometer and check glucose pre and post each exercise session.  Per cardiac rehab protocols, patient's glucose must be between 90 and 270 mg/dL to exercise.  Patient denies any recent glucose levels less than 60 mg/dL or greater than 300 mg/dL. Patient verbalizes importance of notifying rehab staff if symptoms of hypoglycemia occur while at cardiac rehab.  Abnormal labs will be " reported to patient's PCP/Endocrinologist by rehab staff.    Noted updated glucose parameters of 100-300    Continue to encourage reduction in refined sugars/regular coke    Madai Valladares MS, RDN/LDN     Session: 12 Session Follow Up   Cardiac Rehab Individual Treatment Plan - Reassessment      Patient Name: Jud Villa MRN: [id number]   : 1960   Age: 63 y.o.   Primary Diagnosis: S/P CABG 2023      Psychosocial Assessment:   Living Arrangements: Lives alone  Family Support: children and grandchildren  Self Reported: no change Anxiety and Depression  Displays: calmness  Medication: not applicable    Psychosocial Plan:   Goals:  Improved psychosocial coping strategies: In Progress  Reduce manifestation of depression: In Progress  Maintain positive support system: Met  Maintain positive outlook: Met  Improve overall quality of life: In Progress    Interventions:  Patient to Self Report Emotional Changes at Session Check In  Recommend Physical Activity  Recommend Attending Education Lectures  Notify MD: No  Program Referral: Yes  Pharmaceutical Intervention/Therapy: Declined  Other Needs: not applicable  Stage of Readiness to Change: Action    Education:  Heart disease and emotions; verbalizes understanding; Date: 2023    Comments:  Pt denies any overwhelming stress or anxiety. Pt is showing more motivation in daily attendance of cardiac rehab and participating more with effort. Pt states she has worry about her previous problem with breast cancer.  Patient has been instructed to notify staff in the event that circumstances worsen.  Patient verbalizes understanding.    Other Core Components/Risk Factors Assessment:   RISK FACTORS:  diabetes, hyperlipidemia, sedentary lifestyle, tobacco use, stress    Learning Barriers: Emotional    Medication Compliance: has been compliant with the medicaiton regimen    Other Core Components/Risk Factors Plan:   Goals:  Increase exercise tolerance: In  Progress  Increase knowledge of CAD: In Progress  Identify and manage personal areas of stress: In Progress  Control diabetes by adjusting diet and exercise: In Progress  Learn more about healthy eating: In Progress    Interventions:  Individual Education/ Counseling: Yes  Physician Referral: No    Education:    cholesterol meds, verbalizes understanding; Date: 7/21/2023  stress, verbalizes understanding; Date: 8/04/2023  warning signs of MI, verbalizes understanding; Date: 8/11/2023         Education method adapted to patients education level and preferred method of learning.  Method: explanation  demonstration  handouts    Comments:  Pt si attending rehab more regularly and feel improvement physically.    Other Core Components/Hypertension Assessment:   BP Range:  systolic; 52-76 diastolic  BP at Goal: Yes  Patient reported symptoms: none    Other Core Components/Hypertension Plan:   Goals:  Blood Pressure <130/80    Interventions:  Med Card Reconciled: Yes  Encourage medication compliance  Encourage sodium reduction  Encourage weight loss  Recommend physical activity  Educate on contributory factors  Reduce stress, anxiety, anger, depression, and/or chronic pain  Encourage home blood pressure monitoring  Recommend daily weights  MD notified/Physician Referral: No    Education:    Medication I; verbalizes understanding; Date: 7/21/2023  Stroke; verbalizes understanding; Date: 8/11/2023         Comments:  Pt states she is monitoring her blood pressure daily at home and keeping a log.    Does the patient have Heart Failure? No      Other Core Components/Tobacco Cessation Assessment:   Smoking Status: past smoker who quit 3/2023  Smoking Cessation Barriers:  N/A  Stage of Readiness to Change: Maintenance    Other Core Components/Tobacco Cessation Plan:   Goals:  Maintain non-smoking status    Interventions:  Maintains non-smoking status    Education:    Stroke; verbalizes understanding; Date: 8/11/2023          Comments:  Pt is not using tobacco products at this time.    Matt Chapin RN

## 2023-11-03 ENCOUNTER — TELEPHONE (OUTPATIENT)
Dept: ADMINISTRATIVE | Facility: HOSPITAL | Age: 63
End: 2023-11-03
Payer: MEDICARE

## 2023-11-06 ENCOUNTER — TELEPHONE (OUTPATIENT)
Dept: FAMILY MEDICINE | Facility: CLINIC | Age: 63
End: 2023-11-06
Payer: MEDICARE

## 2023-11-15 RX ORDER — RIZATRIPTAN BENZOATE 10 MG/1
10 TABLET ORAL ONCE AS NEEDED
Qty: 10 TABLET | Refills: 3 | Status: CANCELLED | OUTPATIENT
Start: 2023-11-15 | End: 2023-11-15

## 2023-11-15 RX ORDER — TOPIRAMATE 100 MG/1
100 TABLET, FILM COATED ORAL 2 TIMES DAILY
Qty: 180 TABLET | Refills: 0 | Status: SHIPPED | OUTPATIENT
Start: 2023-11-15 | End: 2024-11-14

## 2023-11-15 RX ORDER — RIZATRIPTAN BENZOATE 10 MG/1
10 TABLET ORAL ONCE AS NEEDED
COMMUNITY
Start: 2023-09-25 | End: 2023-11-17 | Stop reason: SDUPTHER

## 2023-11-15 NOTE — TELEPHONE ENCOUNTER
Refill Routing Note   Medication(s) are not appropriate for processing by [facility] for the following reason(s):        Outside of protocol: Contraindicated diagnoses on problem list  No active prescription written by provider    [facility] action(s):  Route        Medication Therapy Plan: Contraindicated diagnoses on problem list      Appointments  past 12m or future 3m with PCP    Date Provider   Last Visit   8/21/2023 Bo Lopez MD   Next Visit   2/20/2024 Bo Lopez MD   ED visits in past 90 days: 0        Note composed:2:35 PM 11/15/2023

## 2023-11-15 NOTE — TELEPHONE ENCOUNTER
No care due was identified.  [facility] Embedded Care Due Messages. Reference number: [id number].   11/15/2023 11:01:53 AM CST

## 2023-11-17 DIAGNOSIS — E11.65 TYPE 2 DIABETES MELLITUS WITH HYPERGLYCEMIA, WITH LONG-TERM CURRENT USE OF INSULIN: ICD-10-CM

## 2023-11-17 DIAGNOSIS — Z79.4 TYPE 2 DIABETES MELLITUS WITH HYPERGLYCEMIA, WITH LONG-TERM CURRENT USE OF INSULIN: ICD-10-CM

## 2023-11-17 RX ORDER — RIZATRIPTAN BENZOATE 10 MG/1
10 TABLET ORAL ONCE AS NEEDED
Qty: 12 TABLET | Refills: 0 | Status: SHIPPED | OUTPATIENT
Start: 2023-11-17 | End: 2023-11-20

## 2023-11-17 RX ORDER — EMPAGLIFLOZIN 10 MG/1
TABLET, FILM COATED ORAL
Qty: 90 TABLET | Refills: 3 | Status: SHIPPED | OUTPATIENT
Start: 2023-11-17

## 2023-11-17 NOTE — TELEPHONE ENCOUNTER
No care due was identified.  [facility] Embedded Care Due Messages. Reference number: [id number].   11/17/2023 12:50:40 PM CST

## 2023-11-18 NOTE — TELEPHONE ENCOUNTER
No care due was identified.  [facility] Embedded Care Due Messages. Reference number: [id number].   11/18/2023 11:24:45 AM CST

## 2023-11-19 NOTE — TELEPHONE ENCOUNTER
Refill Routing Note   Medication(s) are not appropriate for processing by [facility] for the following reason(s):        Drug-disease interaction: contraindicated diagnoses on problem list     [facility] action(s):  Defer        Medication Therapy Plan: Previous approval start/end 11/17/23      Appointments  past 12m or future 3m with PCP    Date Provider   Last Visit   8/21/2023 Bo Lopez MD   Next Visit   2/20/2024 Bo Lopez MD   ED visits in past 90 days: 0        Note composed:11:16 AM 11/19/2023

## 2023-11-20 RX ORDER — RIZATRIPTAN BENZOATE 10 MG/1
TABLET ORAL
Qty: 12 TABLET | Refills: 0 | Status: SHIPPED | OUTPATIENT
Start: 2023-11-20 | End: 2024-03-04

## 2023-11-21 ENCOUNTER — TELEPHONE (OUTPATIENT)
Dept: INTERNAL MEDICINE | Facility: CLINIC | Age: 63
End: 2023-11-21
Payer: MEDICARE

## 2023-11-21 NOTE — TELEPHONE ENCOUNTER
Called and spoke with pt in regard to prolia vaccine , put stated that she does not want the vaccine.

## 2023-11-22 ENCOUNTER — TELEPHONE (OUTPATIENT)
Dept: INTERNAL MEDICINE | Facility: CLINIC | Age: 63
End: 2023-11-22
Payer: MEDICARE

## 2023-11-22 NOTE — TELEPHONE ENCOUNTER
Discarding prolia vaccine per pharmacy as it has been over 1 year since dispense date (6/2/22).  Per Madai in the specialty pharmacy.

## 2023-11-24 ENCOUNTER — DOCUMENTATION ONLY (OUTPATIENT)
Dept: CARDIAC REHAB | Facility: CLINIC | Age: 63
End: 2023-11-24
Payer: MEDICARE

## 2023-11-24 NOTE — PROGRESS NOTES
Miss Renner has completed 15 out of 36 exercise session of Phase II cardiac rehab.  A follow up reassessment will be completed at 24 sessions.   Miss Renner is currently on hold until cleared to return after PET test.    12 Session Follow Up   Cardiac Rehab Individual Treatment Plan - Reassessment      Patient Name: Jud Villa MRN: [id number]   : 1960   Age: 63 y.o.   Primary Diagnosis: CABG Date of Event: 23   EF: 47%  Risk Stratification: high  Referring Physician: SUMIT ACKERMAN   Exercise Assessment:     Angina with exercise?: No   ST Depression with Exercise?: No  Fall Risk: Yes   Assistive Devices:  independent  Miss Renner stated at orientation there were no limitations to exercise.    Comments on Progression: Miss Renner is progressing slowly but  her workloads will continue to be increased as she tolerates exercise intensity.     Exercise Plan:   Goals:  CR Exercise Goals: Attend Cardiac Rehab 3 times/week: In Progress  Home Aerobic Exercise: 2 additional days/week for 30-60 minutes: In Progress  Intensity of 12-15 on the Rate of Perceived Exertion (RPE) scale: In Progress  30% increase in entry estimated METS: 6.5 : In Progress  5 days/week for 30-60 minutes: In Progress    Comments on Goal Progression:  Patient Consistency: consistent with attendance  Home exercise? No   Patient reports intensity rate 9-15 on RPE scale    Intervention/Recommendations:   Discussed importance of regular attendance to cardiac rehab class    Exercise Prescription:  THR Range 72-78   Mode: Treadmill  Recumbent Bike  Nustep   Frequency:  3 days/week   Duration:  30-60 minutes   Intensity:  12-15 RPE   Resistance Training:  Yes: 3 lb weights with 10-15 reps based on strength and range of motion and adjusted accordingly     Home Prescription:  Mode Aerobic exercise   Frequency: 2- 3 days/week   Duration: 30-60 minutes   Resistance Training: None     Education:  Diabetes; verbalizes understanding; Date: 23  Exercise and  Rehydration; verbalizes understanding; Date: 23  Relaxation Techniques; verbalizes understanding; Date: 23  What Comes After Phase II?; verbalizes understanding; Date: 23    Comments:  I reviewed exercise recommendations with Miss Renner.  I encouraged her to begin some type of exercise she can do outside of attending rehab class.  I also explained the importance of arriving to class on time.  She stated understanding.      The exercise prescription will continue to be adjusted based on tolerance of exercise intensity by patient.    Ernesto Silveira, [facility] [place]     12 Session Follow Up   Cardiac Rehab Individual Treatment Plan - Reassessment    Patient Name: Jud Villa MRN: [id number]   : 1960   Age: 63 y.o.   Primary Diagnosis: s/p CABG    Nutrition Assessment:     Anthropometrics    Height 62 inches   Weight 131.6 lbs   BMI 24.1     Patient confirms she is taking lipitor 80mg for cholesterol control.  Difficulty Chewing or Swallowing: No  Current Exercise: See Exercise Physiologist Note  Food Safety/Food Preparation: self  Living Arrangements/Family Support: Lives alone  Cultural/Spiritual/Personal Preferences: not applicable   Barriers to Education: none identified  Stage of Change Related to Diet Habits: Action  Recent Changes to Diet: Yes - less fast food  Food Diary: Given      Nutrition Plan:   Goals:  LDL-C < 70 (for high risk patients)  Hgb A1c < 7%  BMI < 25 and abdominal girth < 40M/<35 F  2 gram sodium, Mediterranean diet: In Progress  Fish intake (non-fried varieties) to a goal of 2-3 servings per week: In Progress  Increase fruit and vegetable intake: In Progress    Comments on Goal Progression:  Pt states she has made an effort to reduce fast food intake and is also eating more produce with meals    Interventions:  Dietitian Consult: No  Patient to participate in Cardiac Rehab sessions three times a week  Weekly Dietitian Weight Check  Nutrition Recommendations Provided: Verbal,  "Reviewed  Follow Up Plan for Ongoing Self-Management Support    Education:  Protein; verbalizes understanding; Date: 7/12/23  Seafood; verbalizes understanding; Date: 8/2/23  Vitamins; verbalizes understanding; Date: 8/9/23  Pre/Probiotics; verbalizes understanding; Date: 7/26/23    Comments:   Discussed ways to incorporate healthy snacks, eating on a schedule, and monitoring sodium intake for heart health.    Diabetes  Is the patient diabetic? Yes   Other Core Components/Diabetes Assessment:   Labs:  No results found for: "GLUF"  Lab Results   Component Value Date    HGBA1C 6.6 (H) 08/22/2023    HGBA1C 6.9 (H) 04/11/2023    HGBA1C 6.8 (H) 03/14/2023      Lab Results   Component Value Date    ESTIMATEDAVG 143 (H) 08/22/2023    ESTIMATEDAVG 151 (H) 04/11/2023    ESTIMATEDAVG 160 (H) 06/06/2022       History of diabetes since 2008  Diabetes medications: Jardiance 10mg daily, Tradjenta 5mg daily, metformin 1000mg daily, lantus 10u @[facility]  Blood Glucose Checks at Home: Yes: blood sugars ac & HS  Typical morning range: 100-110 mg/dl  Endocrinologist or PCP following DM: PCP-changing but does not have new one yet    Other Core Components/Diabetes Plan:   Goals:  Hgb A1c < 7%  Exercise Blood Glucose:100-300mg/dl    Interventions:  Medication Compliance  Med Card Reconciled  Low Sodium, Mediterranean, ADA Diet  Physical Activity  Increase Knowledge of Contributory Factors    Education:  Diabetes; verbalizes understanding; Date: 8/14/23  Medications I; verbalizes understanding; Date: 7/21/23    Comments:   Patient verbalizes understanding to bring home glucometer and check glucose pre and post each exercise session.  Per cardiac rehab protocols, patient's glucose must be between 90 and 270 mg/dL to exercise.  Patient denies any recent glucose levels less than 60 mg/dL or greater than 300 mg/dL. Patient verbalizes importance of notifying rehab staff if symptoms of hypoglycemia occur while at cardiac rehab.  Abnormal labs will be " reported to patient's PCP/Endocrinologist by rehab staff.    Noted updated glucose parameters of 100-300    Continue to encourage reduction in refined sugars/regular coke    Madai Valladares MS, RDN/LDN     Session: 12 Session Follow Up   Cardiac Rehab Individual Treatment Plan - Reassessment      Patient Name: Jud Villa MRN: [id number]   : 1960   Age: 63 y.o.   Primary Diagnosis: S/P CABG 2023      Psychosocial Assessment:   Living Arrangements: Lives alone  Family Support: children and grandchildren  Self Reported: no change Anxiety and Depression  Displays: calmness  Medication: not applicable    Psychosocial Plan:   Goals:  Improved psychosocial coping strategies: In Progress  Reduce manifestation of depression: In Progress  Maintain positive support system: Met  Maintain positive outlook: Met  Improve overall quality of life: In Progress    Interventions:  Patient to Self Report Emotional Changes at Session Check In  Recommend Physical Activity  Recommend Attending Education Lectures  Notify MD: No  Program Referral: Yes  Pharmaceutical Intervention/Therapy: Declined  Other Needs: not applicable  Stage of Readiness to Change: Action    Education:  Heart disease and emotions; verbalizes understanding; Date: 2023    Comments:  Pt denies any overwhelming stress or anxiety. Pt is showing more motivation in daily attendance of cardiac rehab and participating more with effort. Pt states she has worry about her previous problem with breast cancer.  Patient has been instructed to notify staff in the event that circumstances worsen.  Patient verbalizes understanding.    Other Core Components/Risk Factors Assessment:   RISK FACTORS:  diabetes, hyperlipidemia, sedentary lifestyle, tobacco use, stress    Learning Barriers: Emotional    Medication Compliance: has been compliant with the medicaiton regimen    Other Core Components/Risk Factors Plan:   Goals:  Increase exercise tolerance: In  Progress  Increase knowledge of CAD: In Progress  Identify and manage personal areas of stress: In Progress  Control diabetes by adjusting diet and exercise: In Progress  Learn more about healthy eating: In Progress    Interventions:  Individual Education/ Counseling: Yes  Physician Referral: No    Education:    cholesterol meds, verbalizes understanding; Date: 7/21/2023  stress, verbalizes understanding; Date: 8/04/2023  warning signs of MI, verbalizes understanding; Date: 8/11/2023         Education method adapted to patients education level and preferred method of learning.  Method: explanation  demonstration  handouts    Comments:  Pt si attending rehab more regularly and feel improvement physically.    Other Core Components/Hypertension Assessment:   BP Range:  systolic; 52-76 diastolic  BP at Goal: Yes  Patient reported symptoms: none    Other Core Components/Hypertension Plan:   Goals:  Blood Pressure <130/80    Interventions:  Med Card Reconciled: Yes  Encourage medication compliance  Encourage sodium reduction  Encourage weight loss  Recommend physical activity  Educate on contributory factors  Reduce stress, anxiety, anger, depression, and/or chronic pain  Encourage home blood pressure monitoring  Recommend daily weights  MD notified/Physician Referral: No    Education:    Medication I; verbalizes understanding; Date: 7/21/2023  Stroke; verbalizes understanding; Date: 8/11/2023         Comments:  Pt states she is monitoring her blood pressure daily at home and keeping a log.    Does the patient have Heart Failure? No      Other Core Components/Tobacco Cessation Assessment:   Smoking Status: past smoker who quit 3/2023  Smoking Cessation Barriers:  N/A  Stage of Readiness to Change: Maintenance    Other Core Components/Tobacco Cessation Plan:   Goals:  Maintain non-smoking status    Interventions:  Maintains non-smoking status    Education:    Stroke; verbalizes understanding; Date: 8/11/2023          Comments:  Pt is not using tobacco products at this time.    Matt Chapin RN

## 2023-11-30 DIAGNOSIS — J43.2 CENTRILOBULAR EMPHYSEMA: ICD-10-CM

## 2023-11-30 DIAGNOSIS — J44.1 COPD EXACERBATION: ICD-10-CM

## 2023-11-30 NOTE — TELEPHONE ENCOUNTER
Care Due:                  Date            Visit Type   Department     Provider  --------------------------------------------------------------------------------                                EP -                              PRIMARY      [facility]  Last Visit: 08-      CARE ([facility])   CLEMENTE Lopez                              EP -                              PRIMARY      [facility]  Next Visit: 02-      CARE ([facility])   CLEMENTE Lopez                                                            Last  Test          Frequency    Reason                     Performed    Due Date  --------------------------------------------------------------------------------    HBA1C.......  6 months...  BUSTER KEN,         08- 02-                             TRINH, metFORMIN.....    [employer] Embedded Care Due Messages. Reference number: [id number].   11/30/2023 9:37:15 AM CST

## 2023-12-13 ENCOUNTER — TELEPHONE (OUTPATIENT)
Dept: SMOKING CESSATION | Facility: CLINIC | Age: 63
End: 2023-12-13
Payer: MEDICARE

## 2023-12-14 ENCOUNTER — DOCUMENTATION ONLY (OUTPATIENT)
Dept: CARDIAC REHAB | Facility: CLINIC | Age: 63
End: 2023-12-14
Payer: MEDICARE

## 2023-12-14 NOTE — PROGRESS NOTES
Miss Renner has completed 15 out of 36 exercise session of Phase II cardiac rehab.  A follow up reassessment will be completed at 24 sessions.   Miss Renner is currently on hold until cleared to return after PET test.    12 Session Follow Up   Cardiac Rehab Individual Treatment Plan - Reassessment      Patient Name: Jud Villa MRN: [id number]   : 1960   Age: 63 y.o.   Primary Diagnosis: CABG Date of Event: 23   EF: 47%  Risk Stratification: high  Referring Physician: SUMIT ACKERMAN   Exercise Assessment:     Angina with exercise?: No   ST Depression with Exercise?: No  Fall Risk: Yes   Assistive Devices:  independent  Miss Renner stated at orientation there were no limitations to exercise.    Comments on Progression: Miss Renner is progressing slowly but  her workloads will continue to be increased as she tolerates exercise intensity.     Exercise Plan:   Goals:  CR Exercise Goals: Attend Cardiac Rehab 3 times/week: In Progress  Home Aerobic Exercise: 2 additional days/week for 30-60 minutes: In Progress  Intensity of 12-15 on the Rate of Perceived Exertion (RPE) scale: In Progress  30% increase in entry estimated METS: 6.5 : In Progress  5 days/week for 30-60 minutes: In Progress    Comments on Goal Progression:  Patient Consistency: consistent with attendance  Home exercise? No   Patient reports intensity rate 9-15 on RPE scale    Intervention/Recommendations:   Discussed importance of regular attendance to cardiac rehab class    Exercise Prescription:  THR Range 72-78   Mode: Treadmill  Recumbent Bike  Nustep   Frequency:  3 days/week   Duration:  30-60 minutes   Intensity:  12-15 RPE   Resistance Training:  Yes: 3 lb weights with 10-15 reps based on strength and range of motion and adjusted accordingly     Home Prescription:  Mode Aerobic exercise   Frequency: 2- 3 days/week   Duration: 30-60 minutes   Resistance Training: None     Education:  Diabetes; verbalizes understanding; Date: 23  Exercise and  Rehydration; verbalizes understanding; Date: 23  Relaxation Techniques; verbalizes understanding; Date: 23  What Comes After Phase II?; verbalizes understanding; Date: 23    Comments:  I reviewed exercise recommendations with Miss Renner.  I encouraged her to begin some type of exercise she can do outside of attending rehab class.  I also explained the importance of arriving to class on time.  She stated understanding.      The exercise prescription will continue to be adjusted based on tolerance of exercise intensity by patient.    Ernesto Silveira, [facility]     12 Session Follow Up   Cardiac Rehab Individual Treatment Plan - Reassessment    Patient Name: Jud Villa MRN: [id number]   : 1960   Age: 63 y.o.   Primary Diagnosis: s/p CABG    Nutrition Assessment:     Anthropometrics    Height 62 inches   Weight 131.6 lbs   BMI 24.1     Patient confirms she is taking lipitor 80mg for cholesterol control.  Difficulty Chewing or Swallowing: No  Current Exercise: See Exercise Physiologist Note  Food Safety/Food Preparation: self  Living Arrangements/Family Support: Lives alone  Cultural/Spiritual/Personal Preferences: not applicable   Barriers to Education: none identified  Stage of Change Related to Diet Habits: Action  Recent Changes to Diet: Yes - less fast food  Food Diary: Given      Nutrition Plan:   Goals:  LDL-C < 70 (for high risk patients)  Hgb A1c < 7%  BMI < 25 and abdominal girth < 40M/<35 F  2 gram sodium, Mediterranean diet: In Progress  Fish intake (non-fried varieties) to a goal of 2-3 servings per week: In Progress  Increase fruit and vegetable intake: In Progress    Comments on Goal Progression:  Pt states she has made an effort to reduce fast food intake and is also eating more produce with meals    Interventions:  Dietitian Consult: No  Patient to participate in Cardiac Rehab sessions three times a week  Weekly Dietitian Weight Check  Nutrition Recommendations Provided: Verbal,  "Reviewed  Follow Up Plan for Ongoing Self-Management Support    Education:  Protein; verbalizes understanding; Date: 7/12/23  Seafood; verbalizes understanding; Date: 8/2/23  Vitamins; verbalizes understanding; Date: 8/9/23  Pre/Probiotics; verbalizes understanding; Date: 7/26/23    Comments:   Discussed ways to incorporate healthy snacks, eating on a schedule, and monitoring sodium intake for heart health.    Diabetes  Is the patient diabetic? Yes   Other Core Components/Diabetes Assessment:   Labs:  No results found for: "GLUF"  Lab Results   Component Value Date    HGBA1C 6.6 (H) 08/22/2023    HGBA1C 6.9 (H) 04/11/2023    HGBA1C 6.8 (H) 03/14/2023      Lab Results   Component Value Date    ESTIMATEDAVG 143 (H) 08/22/2023    ESTIMATEDAVG 151 (H) 04/11/2023    ESTIMATEDAVG 160 (H) 06/06/2022       History of diabetes since 2008  Diabetes medications: Jardiance 10mg daily, Tradjenta 5mg daily, metformin 1000mg daily, lantus 10u @[facility]  Blood Glucose Checks at Home: Yes: blood sugars ac & HS  Typical morning range: 100-110 mg/dl  Endocrinologist or PCP following DM: PCP-changing but does not have new one yet    Other Core Components/Diabetes Plan:   Goals:  Hgb A1c < 7%  Exercise Blood Glucose:100-300mg/dl    Interventions:  Medication Compliance  Med Card Reconciled  Low Sodium, Mediterranean, ADA Diet  Physical Activity  Increase Knowledge of Contributory Factors    Education:  Diabetes; verbalizes understanding; Date: 8/14/23  Medications I; verbalizes understanding; Date: 7/21/23    Comments:   Patient verbalizes understanding to bring home glucometer and check glucose pre and post each exercise session.  Per cardiac rehab protocols, patient's glucose must be between 90 and 270 mg/dL to exercise.  Patient denies any recent glucose levels less than 60 mg/dL or greater than 300 mg/dL. Patient verbalizes importance of notifying rehab staff if symptoms of hypoglycemia occur while at cardiac rehab.  Abnormal labs will be " reported to patient's PCP/Endocrinologist by rehab staff.    Noted updated glucose parameters of 100-300    Continue to encourage reduction in refined sugars/regular coke    Madai Valladares MS, RDN/LDN     Session: 12 Session Follow Up   Cardiac Rehab Individual Treatment Plan - Reassessment      Patient Name: Jud Villa MRN: [id number]   : 1960   Age: 63 y.o.   Primary Diagnosis: S/P CABG 2023      Psychosocial Assessment:   Living Arrangements: Lives alone  Family Support: children and grandchildren  Self Reported: no change Anxiety and Depression  Displays: calmness  Medication: not applicable    Psychosocial Plan:   Goals:  Improved psychosocial coping strategies: In Progress  Reduce manifestation of depression: In Progress  Maintain positive support system: Met  Maintain positive outlook: Met  Improve overall quality of life: In Progress    Interventions:  Patient to Self Report Emotional Changes at Session Check In  Recommend Physical Activity  Recommend Attending Education Lectures  Notify MD: No  Program Referral: Yes  Pharmaceutical Intervention/Therapy: Declined  Other Needs: not applicable  Stage of Readiness to Change: Action    Education:  Heart disease and emotions; verbalizes understanding; Date: 2023    Comments:  Pt denies any overwhelming stress or anxiety. Pt is showing more motivation in daily attendance of cardiac rehab and participating more with effort. Pt states she has worry about her previous problem with breast cancer.  Patient has been instructed to notify staff in the event that circumstances worsen.  Patient verbalizes understanding.    Other Core Components/Risk Factors Assessment:   RISK FACTORS:  diabetes, hyperlipidemia, sedentary lifestyle, tobacco use, stress    Learning Barriers: Emotional    Medication Compliance: has been compliant with the medicaiton regimen    Other Core Components/Risk Factors Plan:   Goals:  Increase exercise tolerance: In  Progress  Increase knowledge of CAD: In Progress  Identify and manage personal areas of stress: In Progress  Control diabetes by adjusting diet and exercise: In Progress  Learn more about healthy eating: In Progress    Interventions:  Individual Education/ Counseling: Yes  Physician Referral: No    Education:    cholesterol meds, verbalizes understanding; Date: 7/21/2023  stress, verbalizes understanding; Date: 8/04/2023  warning signs of MI, verbalizes understanding; Date: 8/11/2023         Education method adapted to patients education level and preferred method of learning.  Method: explanation  demonstration  handouts    Comments:  Pt si attending rehab more regularly and feel improvement physically.    Other Core Components/Hypertension Assessment:   BP Range:  systolic; 52-76 diastolic  BP at Goal: Yes  Patient reported symptoms: none    Other Core Components/Hypertension Plan:   Goals:  Blood Pressure <130/80    Interventions:  Med Card Reconciled: Yes  Encourage medication compliance  Encourage sodium reduction  Encourage weight loss  Recommend physical activity  Educate on contributory factors  Reduce stress, anxiety, anger, depression, and/or chronic pain  Encourage home blood pressure monitoring  Recommend daily weights  MD notified/Physician Referral: No    Education:    Medication I; verbalizes understanding; Date: 7/21/2023  Stroke; verbalizes understanding; Date: 8/11/2023         Comments:  Pt states she is monitoring her blood pressure daily at home and keeping a log.    Does the patient have Heart Failure? No      Other Core Components/Tobacco Cessation Assessment:   Smoking Status: past smoker who quit 3/2023  Smoking Cessation Barriers:  N/A  Stage of Readiness to Change: Maintenance    Other Core Components/Tobacco Cessation Plan:   Goals:  Maintain non-smoking status    Interventions:  Maintains non-smoking status    Education:    Stroke; verbalizes understanding; Date: 8/11/2023          Comments:  Pt is not using tobacco products at this time.    Matt Chapin RN

## 2023-12-30 ENCOUNTER — HOSPITAL ENCOUNTER (EMERGENCY)
Facility: HOSPITAL | Age: 63
Discharge: HOME | End: 2023-12-30
Attending: EMERGENCY MEDICINE
Payer: MEDICARE

## 2023-12-30 VITALS
RESPIRATION RATE: 19 BRPM | DIASTOLIC BLOOD PRESSURE: 64 MMHG | BODY MASS INDEX: 23.92 KG/M2 | HEIGHT: 62 IN | WEIGHT: 130 LBS | TEMPERATURE: 99 F | SYSTOLIC BLOOD PRESSURE: 129 MMHG | OXYGEN SATURATION: 96 % | HEART RATE: 67 BPM

## 2023-12-30 DIAGNOSIS — R51.9 HEADACHE: ICD-10-CM

## 2023-12-30 DIAGNOSIS — J10.1 INFLUENZA B: Primary | ICD-10-CM

## 2023-12-30 LAB
ALBUMIN SERPL BCP-MCNC: 3.9 G/DL (ref 3.5–5.2)
ALP SERPL-CCNC: 88 U/L (ref 55–135)
ALT SERPL W/O P-5'-P-CCNC: 11 U/L (ref 10–44)
ANION GAP SERPL CALC-SCNC: 11 MMOL/L (ref 8–16)
AST SERPL-CCNC: 14 U/L (ref 10–40)
BASOPHILS # BLD AUTO: 0.06 K/UL (ref 0–0.2)
BASOPHILS NFR BLD: 0.7 % (ref 0–1.9)
BILIRUB SERPL-MCNC: 0.3 MG/DL (ref 0.1–1)
BUN SERPL-MCNC: 11 MG/DL (ref 8–23)
CALCIUM SERPL-MCNC: 9.3 MG/DL (ref 8.7–10.5)
CHLORIDE SERPL-SCNC: 106 MMOL/L (ref 95–110)
CO2 SERPL-SCNC: 22 MMOL/L (ref 23–29)
CREAT SERPL-MCNC: 0.8 MG/DL (ref 0.5–1.4)
DIFFERENTIAL METHOD BLD: ABNORMAL
EOSINOPHIL # BLD AUTO: 0.3 K/UL (ref 0–0.5)
EOSINOPHIL NFR BLD: 3.9 % (ref 0–8)
ERYTHROCYTE [DISTWIDTH] IN BLOOD BY AUTOMATED COUNT: 17.2 % (ref 11.5–14.5)
EST. GFR  (NO RACE VARIABLE): >60 ML/MIN/1.73 M^2
GLUCOSE SERPL-MCNC: 195 MG/DL (ref 70–110)
HCT VFR BLD AUTO: 38.1 % (ref 37–48.5)
HGB BLD-MCNC: 12.5 G/DL (ref 12–16)
IMM GRANULOCYTES # BLD AUTO: 0.01 K/UL (ref 0–0.04)
IMM GRANULOCYTES NFR BLD AUTO: 0.1 % (ref 0–0.5)
INFLUENZA A, MOLECULAR: NEGATIVE
INFLUENZA B, MOLECULAR: POSITIVE
LYMPHOCYTES # BLD AUTO: 2.6 K/UL (ref 1–4.8)
LYMPHOCYTES NFR BLD: 31.6 % (ref 18–48)
MCH RBC QN AUTO: 25 PG (ref 27–31)
MCHC RBC AUTO-ENTMCNC: 32.8 G/DL (ref 32–36)
MCV RBC AUTO: 76 FL (ref 82–98)
MONOCYTES # BLD AUTO: 0.7 K/UL (ref 0.3–1)
MONOCYTES NFR BLD: 8 % (ref 4–15)
NEUTROPHILS # BLD AUTO: 4.6 K/UL (ref 1.8–7.7)
NEUTROPHILS NFR BLD: 55.7 % (ref 38–73)
NRBC BLD-RTO: 0 /100 WBC
PLATELET # BLD AUTO: 278 K/UL (ref 150–450)
PMV BLD AUTO: 9.2 FL (ref 9.2–12.9)
POTASSIUM SERPL-SCNC: 3.7 MMOL/L (ref 3.5–5.1)
PROT SERPL-MCNC: 7.3 G/DL (ref 6–8.4)
RBC # BLD AUTO: 5 M/UL (ref 4–5.4)
SARS-COV-2 RDRP RESP QL NAA+PROBE: NEGATIVE
SODIUM SERPL-SCNC: 139 MMOL/L (ref 136–145)
SPECIMEN SOURCE: ABNORMAL
WBC # BLD AUTO: 8.28 K/UL (ref 3.9–12.7)

## 2023-12-30 PROCEDURE — U0002 COVID-19 LAB TEST NON-CDC: HCPCS | Mod: HCNC | Performed by: EMERGENCY MEDICINE

## 2023-12-30 PROCEDURE — 93005 ELECTROCARDIOGRAM TRACING: CPT | Mod: HCNC | Performed by: INTERNAL MEDICINE

## 2023-12-30 PROCEDURE — 99285 EMERGENCY DEPT VISIT HI MDM: CPT | Mod: 25,HCNC

## 2023-12-30 PROCEDURE — 85025 COMPLETE CBC W/AUTO DIFF WBC: CPT | Mod: HCNC | Performed by: EMERGENCY MEDICINE

## 2023-12-30 PROCEDURE — 96374 THER/PROPH/DIAG INJ IV PUSH: CPT | Mod: HCNC

## 2023-12-30 PROCEDURE — 93005 ELECTROCARDIOGRAM TRACING: CPT | Mod: HCNC

## 2023-12-30 PROCEDURE — 87502 INFLUENZA DNA AMP PROBE: CPT | Mod: HCNC | Performed by: EMERGENCY MEDICINE

## 2023-12-30 PROCEDURE — 93010 ELECTROCARDIOGRAM REPORT: CPT | Mod: HCNC,,, | Performed by: INTERNAL MEDICINE

## 2023-12-30 PROCEDURE — 80053 COMPREHEN METABOLIC PANEL: CPT | Mod: HCNC | Performed by: EMERGENCY MEDICINE

## 2023-12-30 PROCEDURE — 63600175 PHARM REV CODE 636 W HCPCS: Mod: HCNC | Performed by: EMERGENCY MEDICINE

## 2023-12-30 PROCEDURE — 25000003 PHARM REV CODE 250: Mod: HCNC | Performed by: EMERGENCY MEDICINE

## 2023-12-30 RX ORDER — OSELTAMIVIR PHOSPHATE 75 MG/1
75 CAPSULE ORAL 2 TIMES DAILY
Qty: 10 CAPSULE | Refills: 0 | Status: SHIPPED | OUTPATIENT
Start: 2023-12-30 | End: 2024-01-04

## 2023-12-30 RX ORDER — ACETAMINOPHEN 500 MG
1000 TABLET ORAL
Status: COMPLETED | OUTPATIENT
Start: 2023-12-30 | End: 2023-12-30

## 2023-12-30 RX ORDER — PROCHLORPERAZINE EDISYLATE 5 MG/ML
10 INJECTION INTRAMUSCULAR; INTRAVENOUS
Status: COMPLETED | OUTPATIENT
Start: 2023-12-30 | End: 2023-12-30

## 2023-12-30 RX ADMIN — PROCHLORPERAZINE EDISYLATE 10 MG: 5 INJECTION INTRAMUSCULAR; INTRAVENOUS at 06:12

## 2023-12-30 RX ADMIN — ACETAMINOPHEN 1000 MG: 500 TABLET ORAL at 06:12

## 2023-12-30 NOTE — ED TRIAGE NOTES
Jud Villa, a 63 y.o. female presents to the ED w/ complaint of HA and fatigue for the few weeks. Pt states that she just has not been herself. Pt states that she is concerned that she may be having another stroke. Pt denies any SOB, N/V, chest pain.  Pt had stroke in March and double bypass in April.    Triage note:  Chief Complaint   Patient presents with    Headache     +fatigue. Generalized weakness. RLQ pain. Denies urinary symptoms. Hx of CVA. Reports symptoms were partial vision loss. Reports blurred vision that has now resolved today. Now reports slurred speech for several months.      Review of patient's allergies indicates:  No Known Allergies  Past Medical History:   Diagnosis Date    Asthma     Breast carcinoma     Cataract     Coronary artery disease of native heart with stable angina pectoris 04/04/2023    Diabetes mellitus     Hyperlipidemia     Moderate episode of recurrent major depressive disorder 05/19/2021    Osteoporosis     Stroke 03/2023    left PCA

## 2023-12-31 NOTE — PROVIDER PROGRESS NOTES - EMERGENCY DEPT.
Signout Note    I received signout from the previous provider.     Chief complaint:  Headache (+fatigue. Generalized weakness. RLQ pain. Denies urinary symptoms. Hx of CVA. Reports symptoms were partial vision loss. Reports blurred vision that has now resolved today. Now reports slurred speech for several months. )      Pertinent history &exam:  Jud Villa is a 63 y.o. female with pertinent PMH of prior stroke who presented to emergency department with complaint of headache, fatigue, found to have flu B. there was concern for a possible visual field cut on exam, seems that a prior visual field cut when she had her stroke resolved.  No other focal neuro deficits.  She was afebrile, hemodynamically stable without increased work of breathing or hypoxia.  She was signed out pending MRI of her brain for final disposition      Vitals:    12/30/23 2300   BP: 129/64   Pulse: 67   Resp:    Temp: 98.6 °F (37 °C)       Imaging Studies:    MRI Brain Without Contrast   Final Result      No acute abnormality.      Encephalomalacia left PCA territory with associated gradient susceptibility consistent with remote hemorrhagic infarct.         Electronically signed by: Collins Cullen MD   Date:    12/30/2023   Time:    22:55          Medications Given:  Medications   prochlorperazine injection Soln 10 mg (10 mg Intravenous Given 12/30/23 1847)   acetaminophen tablet 1,000 mg (1,000 mg Oral Given 12/30/23 1846)       Pending Items/ MDM:  63 y.o. female with above complaint.  MRI reviewed, no acute stroke.  Discharged in stable condition.     Diagnostic Impression:    1. Influenza B    2. Headache         ED Disposition Condition    Discharge Stable          ED Prescriptions       Medication Sig Dispense Start Date End Date Auth. Provider    oseltamivir (TAMIFLU) 75 MG capsule Take 1 capsule (75 mg total) by mouth 2 (two) times daily. for 5 days 10 capsule 12/30/2023 1/4/2024 Liliam Bowman MD          Follow-up Information        Follow up With Specialties Details Why Contact Info    [facility] - Emergency Dept Emergency Medicine  If symptoms worsen [id number]  [facility] [address] [phone]            Liliam Mcgee MD  Emergency Medicine

## 2023-12-31 NOTE — DISCHARGE INSTRUCTIONS
Tests today showed:   Labs Reviewed   INFLUENZA A & B BY MOLECULAR - Abnormal; Notable for the following components:       Result Value    Influenza B, Molecular Positive (*)     All other components within normal limits   CBC W/ AUTO DIFFERENTIAL - Abnormal; Notable for the following components:    MCV 76 (*)     MCH 25.0 (*)     RDW 17.2 (*)     All other components within normal limits   COMPREHENSIVE METABOLIC PANEL - Abnormal; Notable for the following components:    CO2 22 (*)     Glucose 195 (*)     All other components within normal limits   SARS-COV-2 RNA AMPLIFICATION, QUAL     MRI Brain Without Contrast   Final Result      No acute abnormality.      Encephalomalacia left PCA territory with associated gradient susceptibility consistent with remote hemorrhagic infarct.         Electronically signed by: Collins Cullen MD   Date:    12/30/2023   Time:    22:55          Treatments you had today:   Medications   prochlorperazine injection Soln 10 mg (10 mg Intravenous Given 12/30/23 1847)   acetaminophen tablet 1,000 mg (1,000 mg Oral Given 12/30/23 1846)       Follow-Up Plan:  - Follow-up with primary care doctor within 3 - 5 days  - Additional testing and/or evaluation as directed by your primary doctor    Return to the Emergency Department for symptoms including but not limited to: worsening symptoms, shortness of breath or chest pain, vomiting with inability to hold down fluids, fevers greater than 100.4°F, passing out/fainting/unconsciousness, or other concerning symptoms.

## 2023-12-31 NOTE — ED PROVIDER NOTES
Encounter Date: 12/30/2023       History     Chief Complaint   Patient presents with    Headache     +fatigue. Generalized weakness. RLQ pain. Denies urinary symptoms. Hx of CVA. Reports symptoms were partial vision loss. Reports blurred vision that has now resolved today. Now reports slurred speech for several months.      63-year-old female, history of stroke, CAD, diabetes, on anticoagulation, migraines, complaining of feeling confused and forgetful since this morning.  Patient can not pinpoint an exact time that these symptoms start but says sometime earlier today.  She feels that she is having memory problems, says for instance she went to [employer] and forgot to bring her purse.  All labs are back she feels slightly confused at times as well.  She is also developed a headache since yesterday that is bifrontal and feels somewhat similar to her migraines.  She had blurry vision earlier today but that has since resolved.  She has had no fevers, chills, neck pain.  She has had no URI symptoms.  She has had no focal weakness or numbness or difficulty ambulating.    The history is provided by the patient.     Review of patient's allergies indicates:  No Known Allergies  Past Medical History:   Diagnosis Date    Asthma     Breast carcinoma     Cataract     Coronary artery disease of native heart with stable angina pectoris 04/04/2023    Diabetes mellitus     Hyperlipidemia     Moderate episode of recurrent major depressive disorder 05/19/2021    Osteoporosis     Stroke 03/2023    left PCA     Past Surgical History:   Procedure Laterality Date    BILATERAL MASTECTOMY  04/26/2018    CHOLECYSTECTOMY      COLONOSCOPY N/A 10/27/2015    Procedure: COLONOSCOPY;  Surgeon: Johnny Hurley MD;  Location: [facility] ENDO;  Service: Endoscopy;  Laterality: N/A;    CORONARY ANGIOGRAPHY N/A 04/05/2023    Procedure: ANGIOGRAM, CORONARY ARTERY;  Surgeon: Francisco Barahona MD;  Location: [facility] CATH LAB;  Service: Cardiology;  Laterality:  N/A;    CORONARY ANGIOPLASTY      CORONARY ARTERY BYPASS GRAFT  2023    LIMA to LAD, SVG to OM    CORONARY ARTERY BYPASS GRAFT (CABG) N/A 2023    Procedure: CORONARY ARTERY BYPASS GRAFT (CABG) x 2;  Surgeon: Igor Kahn MD;  [facility];  Service: Cardiothoracic;  Laterality: N/A;    ENDOSCOPIC HARVEST OF VEIN Left 2023    Procedure: SURGICAL PROCUREMENT, VEIN, ENDOSCOPIC;  Surgeon: Igor Kahn MD;  [facility];  Service: Cardiothoracic;  Laterality: Left;  Vein harvest start - stop: 817  Vein prep start - stop: 857    ESOPHAGOGASTRODUODENOSCOPY N/A 2022    Procedure: ESOPHAGOGASTRODUODENOSCOPY (EGD);  Surgeon: Mili Katz MD;  Location: [facility] (4TH FLR);  Service: Endoscopy;  Laterality: N/A;  rapid in AM, instr portal -ml    HYSTERECTOMY      LASER PERIPHERAL IRIDOTOMY Bilateral         LEFT HEART CATHETERIZATION Left 2023    Procedure: Left heart cath;  Surgeon: Francisco Barahona MD;  Location: [facility] CATH LAB;  Service: Cardiology;  Laterality: Left;     Family History   Problem Relation Age of Onset    Diabetes Father     Congenital heart disease Brother     Amblyopia Neg Hx     Blindness Neg Hx     Cataracts Neg Hx     Glaucoma Neg Hx     Macular degeneration Neg Hx     Retinal detachment Neg Hx     Strabismus Neg Hx      Social History     Tobacco Use    Smoking status: Every Day     Current packs/day: 0.00     Average packs/day: 1 pack/day for 40.0 years (40.0 ttl pk-yrs)     Types: Cigarettes     Start date: 3/12/1983     Last attempt to quit: 3/12/2023     Years since quittin.8     Passive exposure: Past    Smokeless tobacco: Current   Substance Use Topics    Alcohol use: No     Alcohol/week: 0.0 standard drinks of alcohol    Drug use: No     Review of Systems    Physical Exam     Initial Vitals [23 1720]   BP Pulse Resp Temp SpO2   123/69 101 16 98.2 °F (36.8 °C) 96 %      MAP       --          Physical Exam    Nursing note and vitals reviewed.  Constitutional: Vital signs are normal. She appears well-developed and well-nourished. She is not diaphoretic.  Non-toxic appearance. She does not appear ill. No distress.   HENT:   Head: Normocephalic and atraumatic.   Mouth/Throat: Mucous membranes are normal. Mucous membranes are not dry.   Eyes: Conjunctivae, EOM and lids are normal. Pupils are equal, round, and reactive to light.   Neck: Neck supple.   Normal range of motion.  Cardiovascular:  Normal rate.           Musculoskeletal:      Cervical back: Normal range of motion and neck supple.     Neurological: She is alert and oriented to person, place, and time. She has normal strength. She displays no atrophy. No sensory deficit. She exhibits normal muscle tone. Coordination and gait normal.   No pronator drift  Speech with no aphasia or dysarthria, patient able to answer all my questions without difficulty  Visual fields inspected.  Possible right outer quadrant visual field deficit?   Skin: Skin is dry and intact. No pallor.   Psychiatric: She has a normal mood and affect. Her speech is normal and behavior is normal.         ED Course   Procedures  Labs Reviewed   INFLUENZA A & B BY MOLECULAR - Abnormal; Notable for the following components:       Result Value    Influenza B, Molecular Positive (*)     All other components within normal limits   CBC W/ AUTO DIFFERENTIAL - Abnormal; Notable for the following components:    MCV 76 (*)     MCH 25.0 (*)     RDW 17.2 (*)     All other components within normal limits   COMPREHENSIVE METABOLIC PANEL - Abnormal; Notable for the following components:    CO2 22 (*)     Glucose 195 (*)     All other components within normal limits   SARS-COV-2 RNA AMPLIFICATION, QUAL          Imaging Results              MRI Brain Without Contrast (Final result)  Result time 12/30/23 22:55:43      Final result by Collins Cullen MD (12/30/23 22:55:43)                    Impression:      No acute abnormality.    Encephalomalacia left PCA territory with associated gradient susceptibility consistent with remote hemorrhagic infarct.      Electronically signed by: Collins Cullen MD  Date:    12/30/2023  Time:    22:55               Narrative:    EXAMINATION:  MRI BRAIN WITHOUT CONTRAST    CLINICAL HISTORY:  Transient ischemic attack (TIA);.    TECHNIQUE:  Multiplanar multisequence MR imaging of the brain was performed without contrast.    COMPARISON:  Outside MRI brain 03/14/2023.    FINDINGS:  Intracranial compartment:    Ventricles and sulci are normal in size for age without evidence of hydrocephalus. No extra-axial blood or fluid collections.    Encephalomalacia left PCA territory with associated gradient susceptibility consistent with remote hemorrhagic infarct.  No mass lesion, acute hemorrhage, edema or acute infarct.    Normal vascular flow voids are preserved.    Skull/extracranial contents (limited evaluation): Bone marrow signal intensity is normal.                                       Medications   prochlorperazine injection Soln 10 mg (10 mg Intravenous Given 12/30/23 1847)   acetaminophen tablet 1,000 mg (1,000 mg Oral Given 12/30/23 1846)     Medical Decision Making  Patient presenting with headache, malaise, other nonspecific symptoms.  There was also report of blurry vision earlier today but that has since resolved.  No other acute new neurologic symptoms in the emergency department    Patient well-appearing on exam, full neuro exam performed and the only abnormality that I could detect was what could be a right lateral visual field cut.  On review of patient's old ophthalmology notes it seems that that could be recurrent as it seems like it had resolved after her previous stroke    Patient with no clear time of onset and van negative so no indication for acute stroke code but plan made to check labs, assess for acute infection, and get MRI brain to assess for  subacute infarct versus recrudescence of old stroke syndrome in the setting of an acute infection    Patient had extensive ED testing and ultimately found to be influenza B positive which I think explains all of her symptoms.  Out of an abundance of caution, I did order an MRI brain to ensure the patient did not have a subacute stroke.  At the time of sign-out, plan was for discharge home with a prescription for Tamiflu and symptomatic management if MRI negative for stroke.  Patient is signed out to Dr. Mcgee at 10:00 p.m.    Amount and/or Complexity of Data Reviewed  External Data Reviewed: radiology and notes.  Labs: ordered. Decision-making details documented in ED Course.  Radiology: ordered and independent interpretation performed. Decision-making details documented in ED Course.  ECG/medicine tests: ordered and independent interpretation performed. Decision-making details documented in ED Course.    Risk  OTC drugs.  Prescription drug management.  Decision regarding hospitalization.                                      Clinical Impression:  Final diagnoses:  [J10.1] Influenza B (Primary)  [R51.9] Headache          ED Disposition Condition    Discharge Stable          ED Prescriptions       Medication Sig Dispense Start Date End Date Auth. Provider    oseltamivir (TAMIFLU) 75 MG capsule Take 1 capsule (75 mg total) by mouth 2 (two) times daily. for 5 days 10 capsule 12/30/2023 1/4/2024 Liliam Bowman MD          Follow-up Information       Follow up With Specialties Details Why Contact Info    [facility] - Emergency Dept Emergency Medicine  If symptoms worsen [address]  [facility] [id number]  [phone]             Liliam Bowman MD  12/31/23 2695

## 2024-01-02 ENCOUNTER — PATIENT OUTREACH (OUTPATIENT)
Dept: EMERGENCY MEDICINE | Facility: HOSPITAL | Age: 64
End: 2024-01-02
Payer: MEDICARE

## 2024-01-02 NOTE — PROGRESS NOTES
Patient was seen in the ED on 12/30/23. Phoned patient to assist with Post ED Discharge Navigation. Patients daughter, Katarina, agreed to assistance. Message sent to PCP staff for assistance scheduling 7 day follow-up.  Falguni Santos

## 2024-01-03 ENCOUNTER — LAB VISIT (OUTPATIENT)
Dept: LAB | Facility: HOSPITAL | Age: 64
End: 2024-01-03
Attending: INTERNAL MEDICINE
Payer: MEDICARE

## 2024-01-03 DIAGNOSIS — E11.65 UNCONTROLLED TYPE 2 DIABETES MELLITUS WITH HYPERGLYCEMIA: ICD-10-CM

## 2024-01-03 LAB
ESTIMATED AVG GLUCOSE: 177 MG/DL (ref 68–131)
HBA1C MFR BLD: 7.8 % (ref 4–5.6)

## 2024-01-03 PROCEDURE — 83036 HEMOGLOBIN GLYCOSYLATED A1C: CPT | Mod: HCNC | Performed by: INTERNAL MEDICINE

## 2024-01-03 PROCEDURE — 36415 COLL VENOUS BLD VENIPUNCTURE: CPT | Mod: HCNC,PO | Performed by: INTERNAL MEDICINE

## 2024-01-04 ENCOUNTER — DOCUMENTATION ONLY (OUTPATIENT)
Dept: CARDIAC REHAB | Facility: CLINIC | Age: 64
End: 2024-01-04
Payer: MEDICARE

## 2024-01-04 NOTE — PROGRESS NOTES
Miss Renner has completed 15 out of 36 exercise session of Phase II cardiac rehab.  A follow up reassessment will be completed at 24 sessions.   Miss Renner is currently on hold until cleared to return after PET test.    12 Session Follow Up   Cardiac Rehab Individual Treatment Plan - Reassessment      Patient Name: Jud Villa MRN: [id number]   : 1960   Age: 63 y.o.   Primary Diagnosis: CABG Date of Event: 23   EF: 47%  Risk Stratification: high  Referring Physician: SUMIT ACKERMAN   Exercise Assessment:     Angina with exercise?: No   ST Depression with Exercise?: No  Fall Risk: Yes   Assistive Devices:  independent  Miss Renner stated at orientation there were no limitations to exercise.    Comments on Progression: Miss Renner is progressing slowly but  her workloads will continue to be increased as she tolerates exercise intensity.     Exercise Plan:   Goals:  CR Exercise Goals: Attend Cardiac Rehab 3 times/week: In Progress  Home Aerobic Exercise: 2 additional days/week for 30-60 minutes: In Progress  Intensity of 12-15 on the Rate of Perceived Exertion (RPE) scale: In Progress  30% increase in entry estimated METS: 6.5 : In Progress  5 days/week for 30-60 minutes: In Progress    Comments on Goal Progression:  Patient Consistency: consistent with attendance  Home exercise? No   Patient reports intensity rate 9-15 on RPE scale    Intervention/Recommendations:   Discussed importance of regular attendance to cardiac rehab class    Exercise Prescription:  THR Range 72-78   Mode: Treadmill  Recumbent Bike  Nustep   Frequency:  3 days/week   Duration:  30-60 minutes   Intensity:  12-15 RPE   Resistance Training:  Yes: 3 lb weights with 10-15 reps based on strength and range of motion and adjusted accordingly     Home Prescription:  Mode Aerobic exercise   Frequency: 2- 3 days/week   Duration: 30-60 minutes   Resistance Training: None     Education:  Diabetes; verbalizes understanding; Date: 23  Exercise and  Rehydration; verbalizes understanding; Date: 23  Relaxation Techniques; verbalizes understanding; Date: 23  What Comes After Phase II?; verbalizes understanding; Date: 23    Comments:  I reviewed exercise recommendations with Miss Renner.  I encouraged her to begin some type of exercise she can do outside of attending rehab class.  I also explained the importance of arriving to class on time.  She stated understanding.      The exercise prescription will continue to be adjusted based on tolerance of exercise intensity by patient.    Ernesto Silveira, [facility]     12 Session Follow Up   Cardiac Rehab Individual Treatment Plan - Reassessment    Patient Name: Jud Villa MRN: [id number]   : 1960   Age: 63 y.o.   Primary Diagnosis: s/p CABG    Nutrition Assessment:     Anthropometrics    Height 62 inches   Weight 131.6 lbs   BMI 24.1     Patient confirms she is taking lipitor 80mg for cholesterol control.  Difficulty Chewing or Swallowing: No  Current Exercise: See Exercise Physiologist Note  Food Safety/Food Preparation: self  Living Arrangements/Family Support: Lives alone  Cultural/Spiritual/Personal Preferences: not applicable   Barriers to Education: none identified  Stage of Change Related to Diet Habits: Action  Recent Changes to Diet: Yes - less fast food  Food Diary: Given      Nutrition Plan:   Goals:  LDL-C < 70 (for high risk patients)  Hgb A1c < 7%  BMI < 25 and abdominal girth < 40M/<35 F  2 gram sodium, Mediterranean diet: In Progress  Fish intake (non-fried varieties) to a goal of 2-3 servings per week: In Progress  Increase fruit and vegetable intake: In Progress    Comments on Goal Progression:  Pt states she has made an effort to reduce fast food intake and is also eating more produce with meals    Interventions:  Dietitian Consult: No  Patient to participate in Cardiac Rehab sessions three times a week  Weekly Dietitian Weight Check  Nutrition Recommendations Provided: Verbal,  "Reviewed  Follow Up Plan for Ongoing Self-Management Support    Education:  Protein; verbalizes understanding; Date: 7/12/23  Seafood; verbalizes understanding; Date: 8/2/23  Vitamins; verbalizes understanding; Date: 8/9/23  Pre/Probiotics; verbalizes understanding; Date: 7/26/23    Comments:   Discussed ways to incorporate healthy snacks, eating on a schedule, and monitoring sodium intake for heart health.    Diabetes  Is the patient diabetic? Yes   Other Core Components/Diabetes Assessment:   Labs:  No results found for: "GLUF"  Lab Results   Component Value Date    HGBA1C 7.8 (H) 01/03/2024    HGBA1C 6.6 (H) 08/22/2023    HGBA1C 6.9 (H) 04/11/2023      Lab Results   Component Value Date    ESTIMATEDAVG 177 (H) 01/03/2024    ESTIMATEDAVG 143 (H) 08/22/2023    ESTIMATEDAVG 151 (H) 04/11/2023       History of diabetes since 2008  Diabetes medications: Jardiance 10mg daily, Tradjenta 5mg daily, metformin 1000mg daily, lantus 10u @[facility]  Blood Glucose Checks at Home: Yes: blood sugars ac & HS  Typical morning range: 100-110 mg/dl  Endocrinologist or PCP following DM: PCP-changing but does not have new one yet    Other Core Components/Diabetes Plan:   Goals:  Hgb A1c < 7%  Exercise Blood Glucose:100-300mg/dl    Interventions:  Medication Compliance  Med Card Reconciled  Low Sodium, Mediterranean, ADA Diet  Physical Activity  Increase Knowledge of Contributory Factors    Education:  Diabetes; verbalizes understanding; Date: 8/14/23  Medications I; verbalizes understanding; Date: 7/21/23    Comments:   Patient verbalizes understanding to bring home glucometer and check glucose pre and post each exercise session.  Per cardiac rehab protocols, patient's glucose must be between 90 and 270 mg/dL to exercise.  Patient denies any recent glucose levels less than 60 mg/dL or greater than 300 mg/dL. Patient verbalizes importance of notifying rehab staff if symptoms of hypoglycemia occur while at cardiac rehab.  Abnormal labs will be " reported to patient's PCP/Endocrinologist by rehab staff.    Noted updated glucose parameters of 100-300    Continue to encourage reduction in refined sugars/regular coke    Madai Valladares MS, RDN/LDN     Session: 12 Session Follow Up   Cardiac Rehab Individual Treatment Plan - Reassessment      Patient Name: Jud Villa MRN: [id number]   : 1960   Age: 63 y.o.   Primary Diagnosis: S/P CABG 2023      Psychosocial Assessment:   Living Arrangements: Lives alone  Family Support: children and grandchildren  Self Reported: no change Anxiety and Depression  Displays: calmness  Medication: not applicable    Psychosocial Plan:   Goals:  Improved psychosocial coping strategies: In Progress  Reduce manifestation of depression: In Progress  Maintain positive support system: Met  Maintain positive outlook: Met  Improve overall quality of life: In Progress    Interventions:  Patient to Self Report Emotional Changes at Session Check In  Recommend Physical Activity  Recommend Attending Education Lectures  Notify MD: No  Program Referral: Yes  Pharmaceutical Intervention/Therapy: Declined  Other Needs: not applicable  Stage of Readiness to Change: Action    Education:  Heart disease and emotions; verbalizes understanding; Date: 2023    Comments:  Pt denies any overwhelming stress or anxiety. Pt is showing more motivation in daily attendance of cardiac rehab and participating more with effort. Pt states she has worry about her previous problem with breast cancer.  Patient has been instructed to notify staff in the event that circumstances worsen.  Patient verbalizes understanding.    Other Core Components/Risk Factors Assessment:   RISK FACTORS:  diabetes, hyperlipidemia, sedentary lifestyle, tobacco use, stress    Learning Barriers: Emotional    Medication Compliance: has been compliant with the medicaiton regimen    Other Core Components/Risk Factors Plan:   Goals:  Increase exercise tolerance: In  Progress  Increase knowledge of CAD: In Progress  Identify and manage personal areas of stress: In Progress  Control diabetes by adjusting diet and exercise: In Progress  Learn more about healthy eating: In Progress    Interventions:  Individual Education/ Counseling: Yes  Physician Referral: No    Education:    cholesterol meds, verbalizes understanding; Date: 7/21/2023  stress, verbalizes understanding; Date: 8/04/2023  warning signs of MI, verbalizes understanding; Date: 8/11/2023         Education method adapted to patients education level and preferred method of learning.  Method: explanation  demonstration  handouts    Comments:  Pt si attending rehab more regularly and feel improvement physically.    Other Core Components/Hypertension Assessment:   BP Range:  systolic; 52-76 diastolic  BP at Goal: Yes  Patient reported symptoms: none    Other Core Components/Hypertension Plan:   Goals:  Blood Pressure <130/80    Interventions:  Med Card Reconciled: Yes  Encourage medication compliance  Encourage sodium reduction  Encourage weight loss  Recommend physical activity  Educate on contributory factors  Reduce stress, anxiety, anger, depression, and/or chronic pain  Encourage home blood pressure monitoring  Recommend daily weights  MD notified/Physician Referral: No    Education:    Medication I; verbalizes understanding; Date: 7/21/2023  Stroke; verbalizes understanding; Date: 8/11/2023         Comments:  Pt states she is monitoring her blood pressure daily at home and keeping a log.    Does the patient have Heart Failure? No      Other Core Components/Tobacco Cessation Assessment:   Smoking Status: past smoker who quit 3/2023  Smoking Cessation Barriers:  N/A  Stage of Readiness to Change: Maintenance    Other Core Components/Tobacco Cessation Plan:   Goals:  Maintain non-smoking status    Interventions:  Maintains non-smoking status    Education:    Stroke; verbalizes understanding; Date: 8/11/2023          Comments:  Pt is not using tobacco products at this time.    Matt Chapin RN

## 2024-01-09 ENCOUNTER — OFFICE VISIT (OUTPATIENT)
Dept: ENDOCRINOLOGY | Facility: CLINIC | Age: 64
End: 2024-01-09
Payer: MEDICARE

## 2024-01-09 VITALS
HEIGHT: 62 IN | DIASTOLIC BLOOD PRESSURE: 62 MMHG | WEIGHT: 131.06 LBS | BODY MASS INDEX: 24.12 KG/M2 | SYSTOLIC BLOOD PRESSURE: 126 MMHG

## 2024-01-09 DIAGNOSIS — Z95.1 S/P CABG X 2: Primary | ICD-10-CM

## 2024-01-09 DIAGNOSIS — Z79.4 TYPE 2 DIABETES MELLITUS WITH OTHER CIRCULATORY COMPLICATION, WITH LONG-TERM CURRENT USE OF INSULIN: ICD-10-CM

## 2024-01-09 DIAGNOSIS — E11.65 UNCONTROLLED TYPE 2 DIABETES MELLITUS WITH HYPERGLYCEMIA: ICD-10-CM

## 2024-01-09 DIAGNOSIS — E11.649 HYPOGLYCEMIA ASSOCIATED WITH DIABETES: ICD-10-CM

## 2024-01-09 DIAGNOSIS — D51.3 OTHER DIETARY VITAMIN B12 DEFICIENCY ANEMIA: ICD-10-CM

## 2024-01-09 DIAGNOSIS — E11.59 TYPE 2 DIABETES MELLITUS WITH OTHER CIRCULATORY COMPLICATION, WITH LONG-TERM CURRENT USE OF INSULIN: ICD-10-CM

## 2024-01-09 PROCEDURE — 3288F FALL RISK ASSESSMENT DOCD: CPT | Mod: HCNC,CPTII,S$GLB, | Performed by: INTERNAL MEDICINE

## 2024-01-09 PROCEDURE — 99214 OFFICE O/P EST MOD 30 MIN: CPT | Mod: HCNC,S$GLB,, | Performed by: INTERNAL MEDICINE

## 2024-01-09 PROCEDURE — 3072F LOW RISK FOR RETINOPATHY: CPT | Mod: HCNC,CPTII,S$GLB, | Performed by: INTERNAL MEDICINE

## 2024-01-09 PROCEDURE — 3008F BODY MASS INDEX DOCD: CPT | Mod: HCNC,CPTII,S$GLB, | Performed by: INTERNAL MEDICINE

## 2024-01-09 PROCEDURE — 1101F PT FALLS ASSESS-DOCD LE1/YR: CPT | Mod: HCNC,CPTII,S$GLB, | Performed by: INTERNAL MEDICINE

## 2024-01-09 PROCEDURE — 99999 PR PBB SHADOW E&M-EST. PATIENT-LVL II: CPT | Mod: PBBFAC,HCNC,, | Performed by: INTERNAL MEDICINE

## 2024-01-09 PROCEDURE — 3078F DIAST BP <80 MM HG: CPT | Mod: HCNC,CPTII,S$GLB, | Performed by: INTERNAL MEDICINE

## 2024-01-09 PROCEDURE — 3074F SYST BP LT 130 MM HG: CPT | Mod: HCNC,CPTII,S$GLB, | Performed by: INTERNAL MEDICINE

## 2024-01-09 PROCEDURE — 3051F HG A1C>EQUAL 7.0%<8.0%: CPT | Mod: HCNC,CPTII,S$GLB, | Performed by: INTERNAL MEDICINE

## 2024-01-09 PROCEDURE — 1126F AMNT PAIN NOTED NONE PRSNT: CPT | Mod: HCNC,CPTII,S$GLB, | Performed by: INTERNAL MEDICINE

## 2024-01-09 RX ORDER — BLOOD-GLUCOSE SENSOR
1 EACH MISCELLANEOUS
Qty: 3 EACH | Refills: 11 | Status: SHIPPED | OUTPATIENT
Start: 2024-01-09 | End: 2025-01-08

## 2024-01-09 NOTE — PROGRESS NOTES
"Subjective:      Patient ID: Jud Villa is a 63 y.o. female.    Chief Complaint:  Diabetes      History of Present Illness  T2DM  Diagnosed in 15 years ago  Known complications: CAD     Reports blood sugars can rise to 200 - 350s. This can occur at any time during  the day. Did not bring log or meter.      Current Diabetes Regimen:  Tradjenta 5 mg daily   Jardiance 10 mg daily --denies side effects.    Metformin 1000 mg one tablet twice a day -- denies GI side effects  Lantus 10 units at bedtime --> denies difficulties with injections or sites of injectins      Diet/Exercise:  Eats 1 - 2 times daily   Trying to drink and eat less coke/chocolate/twinkies/oreos  Breakfast and dinner.     Glucose Monitoring:  Has not checked her BG since 10/2023     Hypoglycemic Episodes:  Denies recent hypoglycemia     PMHx:  S/p CABG X 2 in 4/2023  Stroke in 3/2023    Denies chest pain pressure or shortness of breath   Review of Systems  Denies recent illness     Objective:   Physical Exam  Vitals:    01/09/24 1449   BP: 126/62   BP Location: Right arm   Patient Position: Sitting   BP Method: Medium (Manual)   Weight: 59.5 kg (131 lb 1 oz)   Height: 5' 2" (1.575 m)       BP Readings from Last 3 Encounters:   01/09/24 126/62   12/30/23 129/64   10/18/23 (!) 108/59     Wt Readings from Last 1 Encounters:   01/09/24 1449 59.5 kg (131 lb 1 oz)         Body mass index is 23.97 kg/m².    Lab Review:   Lab Results   Component Value Date    HGBA1C 7.8 (H) 01/03/2024     Lab Results   Component Value Date    CHOL 117 (L) 08/22/2023    HDL 43 08/22/2023    LDLCALC 52.0 (L) 08/22/2023    TRIG 110 08/22/2023    CHOLHDL 36.8 08/22/2023     Lab Results   Component Value Date     12/30/2023    K 3.7 12/30/2023     12/30/2023    CO2 22 (L) 12/30/2023     (H) 12/30/2023    BUN 11 12/30/2023    CREATININE 0.8 12/30/2023    CALCIUM 9.3 12/30/2023    PROT 7.3 12/30/2023    ALBUMIN 3.9 12/30/2023    BILITOT 0.3 12/30/2023    " ALKPHOS 88 12/30/2023    AST 14 12/30/2023    ALT 11 12/30/2023    ANIONGAP 11 12/30/2023    ESTGFRAFRICA >60.0 06/06/2022    EGFRNONAA >60.0 06/06/2022    TSH 2.365 08/22/2023      Latest Reference Range & Units 08/22/23 09:11   Creatinine, Urine 15.0 - 325.0 mg/dL 69.0   Urine Microalbumin ug/mL <5.0   MICROALB/CREAT RATIO 0.0 - 30.0 ug/mg Unable to calculate       Assessment and Plan     Type 2 diabetes mellitus with other circulatory complication, with long-term current use of insulin  15 years with T2DM that is complicated by CAD and neuropathy   Low B12 levels on metformin use     Add lantus 10 units every night  Tradjenta 5 mg daily   Jardiance 10 mg daily  Metformin 1000 mg one tablet twice a day    DM education for sensor training.     S/P CABG x 2  BP and P wnl range   Lipitor 80 mg daily     Other dietary vitamin B12 deficiency anemia  Most likely due to metformin use   Advised to take OTC B12

## 2024-01-09 NOTE — ASSESSMENT & PLAN NOTE
15 years with T2DM that is complicated by CAD and neuropathy   Low B12 levels on metformin use     Add lantus 10 units every night  Tradjenta 5 mg daily   Jardiance 10 mg daily  Metformin 1000 mg one tablet twice a day    DM education for sensor training.

## 2024-01-14 NOTE — TELEPHONE ENCOUNTER
No care due was identified.  Health [facility] Embedded Care Due Messages. Reference number: [id number].   1/14/2024 3:13:06 PM CST

## 2024-01-16 RX ORDER — MELOXICAM 15 MG/1
15 TABLET ORAL DAILY PRN
Qty: 30 TABLET | Refills: 1 | Status: SHIPPED | OUTPATIENT
Start: 2024-01-16

## 2024-01-24 ENCOUNTER — TELEPHONE (OUTPATIENT)
Dept: FAMILY MEDICINE | Facility: CLINIC | Age: 64
End: 2024-01-24
Payer: MEDICARE

## 2024-01-24 NOTE — TELEPHONE ENCOUNTER
January 12, 2022      Jaxon Ambriz  [address]  [facility] [address]        Dear Mr. Ambriz,    The results of your endoscopy performed on 01/10/2022 have returned and indicate the following:        Your EGD showed  Gastritis Recommend to continue omeprazole. Also to obtain AMA  (blood test) for elevated ALK phos         Gastritis  Gastritis is an inflammation of the stomach lining. The most common symptoms are stomach upset or pain. You may also experience belching, abdominal bloating, nausea, vomiting, feeling of fullness, or burning in your stomach. If you see blood in your vomit or stool, your stomach lining may be bleeding a bit.     Gastritis is usually treated by taking antacids or other mediations to reduce stomach acid and thereby help relieve symptoms and promote healing of the stomach lining. You will also need to avoid foods that may cause irritation.      If your gastritis is related to an illness or infection, that problem may be treated separately with the use of antibiotics. Once the illness or infection resolves, the gastritis usually heals as well.      It has been a pleasure caring for you. If you have questions or concerns regarding these test results or your plan of care, please feel free to contact us. You may either contact us by phone, MyChart or schedule a follow-up office visit to go over these results.      Sincerely,  Gus Busch MD  [facility] [address] [address] [phone]   Attempted to return call. No answer and mailbox is full.

## 2024-01-24 NOTE — TELEPHONE ENCOUNTER
----- Message from Mili Garrett sent at 1/24/2024 11:22 AM CST -----  Contact: pt  Type:  Same Day Appointment Request    Caller is requesting a same day appointment.  Caller declined first available appointment listed below.    Name of Caller:pt   When is the first available appointment?02/20  Symptoms:Boil under left arm   Best Call Back Number:[phone]  Additional Information:

## 2024-01-25 ENCOUNTER — DOCUMENTATION ONLY (OUTPATIENT)
Dept: CARDIAC REHAB | Facility: CLINIC | Age: 64
End: 2024-01-25
Payer: MEDICARE

## 2024-01-25 NOTE — PROGRESS NOTES
Miss Renner has completed 15 out of 36 exercise session of Phase II cardiac rehab.  A follow up reassessment will be completed at 24 sessions.   Miss Renner is currently on hold until cleared to return after PET test.    12 Session Follow Up   Cardiac Rehab Individual Treatment Plan - Reassessment      Patient Name: Jud Villa MRN: [id number]   : 1960   Age: 63 y.o.   Primary Diagnosis: CABG Date of Event: 23   EF: 47%  Risk Stratification: high  Referring Physician: SUMIT ACKERMAN   Exercise Assessment:     Angina with exercise?: No   ST Depression with Exercise?: No  Fall Risk: Yes   Assistive Devices:  independent  Miss Renner stated at orientation there were no limitations to exercise.    Comments on Progression: Miss Renner is progressing slowly but  her workloads will continue to be increased as she tolerates exercise intensity.     Exercise Plan:   Goals:  CR Exercise Goals: Attend Cardiac Rehab 3 times/week: In Progress  Home Aerobic Exercise: 2 additional days/week for 30-60 minutes: In Progress  Intensity of 12-15 on the Rate of Perceived Exertion (RPE) scale: In Progress  30% increase in entry estimated METS: 6.5 : In Progress  5 days/week for 30-60 minutes: In Progress    Comments on Goal Progression:  Patient Consistency: consistent with attendance  Home exercise? No   Patient reports intensity rate 9-15 on RPE scale    Intervention/Recommendations:   Discussed importance of regular attendance to cardiac rehab class    Exercise Prescription:  THR Range 72-78   Mode: Treadmill  Recumbent Bike  Nustep   Frequency:  3 days/week   Duration:  30-60 minutes   Intensity:  12-15 RPE   Resistance Training:  Yes: 3 lb weights with 10-15 reps based on strength and range of motion and adjusted accordingly     Home Prescription:  Mode Aerobic exercise   Frequency: 2- 3 days/week   Duration: 30-60 minutes   Resistance Training: None     Education:  Diabetes; verbalizes understanding; Date: 23  Exercise and  Rehydration; verbalizes understanding; Date: 23  Relaxation Techniques; verbalizes understanding; Date: 23  What Comes After Phase II?; verbalizes understanding; Date: 23    Comments:  I reviewed exercise recommendations with Miss Renner.  I encouraged her to begin some type of exercise she can do outside of attending rehab class.  I also explained the importance of arriving to class on time.  She stated understanding.      The exercise prescription will continue to be adjusted based on tolerance of exercise intensity by patient.    Ernesto Silveira, [facility]     12 Session Follow Up   Cardiac Rehab Individual Treatment Plan - Reassessment    Patient Name: Jud Villa MRN: [id number]   : 1960   Age: 63 y.o.   Primary Diagnosis: s/p CABG    Nutrition Assessment:     Anthropometrics    Height 62 inches   Weight 131.6 lbs   BMI 24.1     Patient confirms she is taking lipitor 80mg for cholesterol control.  Difficulty Chewing or Swallowing: No  Current Exercise: See Exercise Physiologist Note  Food Safety/Food Preparation: self  Living Arrangements/Family Support: Lives alone  Cultural/Spiritual/Personal Preferences: not applicable   Barriers to Education: none identified  Stage of Change Related to Diet Habits: Action  Recent Changes to Diet: Yes - less fast food  Food Diary: Given      Nutrition Plan:   Goals:  LDL-C < 70 (for high risk patients)  Hgb A1c < 7%  BMI < 25 and abdominal girth < 40M/<35 F  2 gram sodium, Mediterranean diet: In Progress  Fish intake (non-fried varieties) to a goal of 2-3 servings per week: In Progress  Increase fruit and vegetable intake: In Progress    Comments on Goal Progression:  Pt states she has made an effort to reduce fast food intake and is also eating more produce with meals    Interventions:  Dietitian Consult: No  Patient to participate in Cardiac Rehab sessions three times a week  Weekly Dietitian Weight Check  Nutrition Recommendations Provided: Verbal,  "Reviewed  Follow Up Plan for Ongoing Self-Management Support    Education:  Protein; verbalizes understanding; Date: 7/12/23  Seafood; verbalizes understanding; Date: 8/2/23  Vitamins; verbalizes understanding; Date: 8/9/23  Pre/Probiotics; verbalizes understanding; Date: 7/26/23    Comments:   Discussed ways to incorporate healthy snacks, eating on a schedule, and monitoring sodium intake for heart health.    Diabetes  Is the patient diabetic? Yes   Other Core Components/Diabetes Assessment:   Labs:  No results found for: "GLUF"  Lab Results   Component Value Date    HGBA1C 7.8 (H) 01/03/2024    HGBA1C 6.6 (H) 08/22/2023    HGBA1C 6.9 (H) 04/11/2023      Lab Results   Component Value Date    ESTIMATEDAVG 177 (H) 01/03/2024    ESTIMATEDAVG 143 (H) 08/22/2023    ESTIMATEDAVG 151 (H) 04/11/2023       History of diabetes since 2008  Diabetes medications: Jardiance 10mg daily, Tradjenta 5mg daily, metformin 1000mg daily, lantus 10u @[facility]  Blood Glucose Checks at Home: Yes: blood sugars ac & HS  Typical morning range: 100-110 mg/dl  Endocrinologist or PCP following DM: PCP-changing but does not have new one yet    Other Core Components/Diabetes Plan:   Goals:  Hgb A1c < 7%  Exercise Blood Glucose:100-300mg/dl    Interventions:  Medication Compliance  Med Card Reconciled  Low Sodium, Mediterranean, ADA Diet  Physical Activity  Increase Knowledge of Contributory Factors    Education:  Diabetes; verbalizes understanding; Date: 8/14/23  Medications I; verbalizes understanding; Date: 7/21/23    Comments:   Patient verbalizes understanding to bring home glucometer and check glucose pre and post each exercise session.  Per cardiac rehab protocols, patient's glucose must be between 90 and 270 mg/dL to exercise.  Patient denies any recent glucose levels less than 60 mg/dL or greater than 300 mg/dL. Patient verbalizes importance of notifying rehab staff if symptoms of hypoglycemia occur while at cardiac rehab.  Abnormal labs will be " reported to patient's PCP/Endocrinologist by rehab staff.    Noted updated glucose parameters of 100-300    Continue to encourage reduction in refined sugars/regular coke    Madai Valladares MS, RDN/LDN     Session: 12 Session Follow Up   Cardiac Rehab Individual Treatment Plan - Reassessment      Patient Name: Jud Villa MRN: [id number]   : 1960   Age: 63 y.o.   Primary Diagnosis: S/P CABG 2023      Psychosocial Assessment:   Living Arrangements: Lives alone  Family Support: children and grandchildren  Self Reported: no change Anxiety and Depression  Displays: calmness  Medication: not applicable    Psychosocial Plan:   Goals:  Improved psychosocial coping strategies: In Progress  Reduce manifestation of depression: In Progress  Maintain positive support system: Met  Maintain positive outlook: Met  Improve overall quality of life: In Progress    Interventions:  Patient to Self Report Emotional Changes at Session Check In  Recommend Physical Activity  Recommend Attending Education Lectures  Notify MD: No  Program Referral: Yes  Pharmaceutical Intervention/Therapy: Declined  Other Needs: not applicable  Stage of Readiness to Change: Action    Education:  Heart disease and emotions; verbalizes understanding; Date: 2023    Comments:  Pt denies any overwhelming stress or anxiety. Pt is showing more motivation in daily attendance of cardiac rehab and participating more with effort. Pt states she has worry about her previous problem with breast cancer.  Patient has been instructed to notify staff in the event that circumstances worsen.  Patient verbalizes understanding.    Other Core Components/Risk Factors Assessment:   RISK FACTORS:  diabetes, hyperlipidemia, sedentary lifestyle, tobacco use, stress    Learning Barriers: Emotional    Medication Compliance: has been compliant with the medicaiton regimen    Other Core Components/Risk Factors Plan:   Goals:  Increase exercise tolerance: In  Progress  Increase knowledge of CAD: In Progress  Identify and manage personal areas of stress: In Progress  Control diabetes by adjusting diet and exercise: In Progress  Learn more about healthy eating: In Progress    Interventions:  Individual Education/ Counseling: Yes  Physician Referral: No    Education:    cholesterol meds, verbalizes understanding; Date: 7/21/2023  stress, verbalizes understanding; Date: 8/04/2023  warning signs of MI, verbalizes understanding; Date: 8/11/2023         Education method adapted to patients education level and preferred method of learning.  Method: explanation  demonstration  handouts    Comments:  Pt si attending rehab more regularly and feel improvement physically.    Other Core Components/Hypertension Assessment:   BP Range:  systolic; 52-76 diastolic  BP at Goal: Yes  Patient reported symptoms: none    Other Core Components/Hypertension Plan:   Goals:  Blood Pressure <130/80    Interventions:  Med Card Reconciled: Yes  Encourage medication compliance  Encourage sodium reduction  Encourage weight loss  Recommend physical activity  Educate on contributory factors  Reduce stress, anxiety, anger, depression, and/or chronic pain  Encourage home blood pressure monitoring  Recommend daily weights  MD notified/Physician Referral: No    Education:    Medication I; verbalizes understanding; Date: 7/21/2023  Stroke; verbalizes understanding; Date: 8/11/2023         Comments:  Pt states she is monitoring her blood pressure daily at home and keeping a log.    Does the patient have Heart Failure? No      Other Core Components/Tobacco Cessation Assessment:   Smoking Status: past smoker who quit 3/2023  Smoking Cessation Barriers:  N/A  Stage of Readiness to Change: Maintenance    Other Core Components/Tobacco Cessation Plan:   Goals:  Maintain non-smoking status    Interventions:  Maintains non-smoking status    Education:    Stroke; verbalizes understanding; Date: 8/11/2023          Comments:  Pt is not using tobacco products at this time.    Matt Chapin RN

## 2024-02-15 ENCOUNTER — DOCUMENTATION ONLY (OUTPATIENT)
Dept: CARDIAC REHAB | Facility: CLINIC | Age: 64
End: 2024-02-15
Payer: MEDICARE

## 2024-02-15 NOTE — PROGRESS NOTES
Miss Renner has completed 15 out of 36 exercise session of Phase II cardiac rehab.  A follow up reassessment will be completed at 24 sessions.   Miss Renner is currently on hold until cleared to return after PET test.    12 Session Follow Up   Cardiac Rehab Individual Treatment Plan - Reassessment      Patient Name: Jud Villa MRN: [id number]   : 1960   Age: 63 y.o.   Primary Diagnosis: CABG Date of Event: 23   EF: 47%  Risk Stratification: high  Referring Physician: SUMIT ACKERMAN   Exercise Assessment:     Angina with exercise?: No   ST Depression with Exercise?: No  Fall Risk: Yes   Assistive Devices:  independent  Miss Renner stated at orientation there were no limitations to exercise.    Comments on Progression: Miss Renner is progressing slowly but  her workloads will continue to be increased as she tolerates exercise intensity.     Exercise Plan:   Goals:  CR Exercise Goals: Attend Cardiac Rehab 3 times/week: In Progress  Home Aerobic Exercise: 2 additional days/week for 30-60 minutes: In Progress  Intensity of 12-15 on the Rate of Perceived Exertion (RPE) scale: In Progress  30% increase in entry estimated METS: 6.5 : In Progress  5 days/week for 30-60 minutes: In Progress    Comments on Goal Progression:  Patient Consistency: consistent with attendance  Home exercise? No   Patient reports intensity rate 9-15 on RPE scale    Intervention/Recommendations:   Discussed importance of regular attendance to cardiac rehab class    Exercise Prescription:  THR Range 72-78   Mode: Treadmill  Recumbent Bike  Nustep   Frequency:  3 days/week   Duration:  30-60 minutes   Intensity:  12-15 RPE   Resistance Training:  Yes: 3 lb weights with 10-15 reps based on strength and range of motion and adjusted accordingly     Home Prescription:  Mode Aerobic exercise   Frequency: 2- 3 days/week   Duration: 30-60 minutes   Resistance Training: None     Education:  Diabetes; verbalizes understanding; Date: 23  Exercise and  Rehydration; verbalizes understanding; Date: 23  Relaxation Techniques; verbalizes understanding; Date: 23  What Comes After Phase II?; verbalizes understanding; Date: 23    Comments:  I reviewed exercise recommendations with Miss Renner.  I encouraged her to begin some type of exercise she can do outside of attending rehab class.  I also explained the importance of arriving to class on time.  She stated understanding.      The exercise prescription will continue to be adjusted based on tolerance of exercise intensity by patient.    Ernesto Silveira, [facility] [place] [facility]     12 Session Follow Up   Cardiac Rehab Individual Treatment Plan - Reassessment    Patient Name: Jud Villa MRN: [id number]   : 1960   Age: 63 y.o.   Primary Diagnosis: s/p CABG    Nutrition Assessment:     Anthropometrics    Height 62 inches   Weight 131.6 lbs   BMI 24.1     Patient confirms she is taking lipitor 80mg for cholesterol control.  Difficulty Chewing or Swallowing: No  Current Exercise: See Exercise Physiologist Note  Food Safety/Food Preparation: self  Living Arrangements/Family Support: Lives alone  Cultural/Spiritual/Personal Preferences: not applicable   Barriers to Education: none identified  Stage of Change Related to Diet Habits: Action  Recent Changes to Diet: Yes - less fast food  Food Diary: Given      Nutrition Plan:   Goals:  LDL-C < 70 (for high risk patients)  Hgb A1c < 7%  BMI < 25 and abdominal girth < 40M/<35 F  2 gram sodium, Mediterranean diet: In Progress  Fish intake (non-fried varieties) to a goal of 2-3 servings per week: In Progress  Increase fruit and vegetable intake: In Progress    Comments on Goal Progression:  Pt states she has made an effort to reduce fast food intake and is also eating more produce with meals    Interventions:  Dietitian Consult: No  Patient to participate in Cardiac Rehab sessions three times a week  Weekly Dietitian Weight Check  Nutrition Recommendations Provided: Verbal,  "Reviewed  Follow Up Plan for Ongoing Self-Management Support    Education:  Protein; verbalizes understanding; Date: 7/12/23  Seafood; verbalizes understanding; Date: 8/2/23  Vitamins; verbalizes understanding; Date: 8/9/23  Pre/Probiotics; verbalizes understanding; Date: 7/26/23    Comments:   Discussed ways to incorporate healthy snacks, eating on a schedule, and monitoring sodium intake for heart health.    Diabetes  Is the patient diabetic? Yes   Other Core Components/Diabetes Assessment:   Labs:  No results found for: "GLUF"  Lab Results   Component Value Date    HGBA1C 7.8 (H) 01/03/2024    HGBA1C 6.6 (H) 08/22/2023    HGBA1C 6.9 (H) 04/11/2023      Lab Results   Component Value Date    ESTIMATEDAVG 177 (H) 01/03/2024    ESTIMATEDAVG 143 (H) 08/22/2023    ESTIMATEDAVG 151 (H) 04/11/2023       History of diabetes since 2008  Diabetes medications: Jardiance 10mg daily, Tradjenta 5mg daily, metformin 1000mg daily, lantus 10u @[facility]  Blood Glucose Checks at Home: Yes: blood sugars ac & HS  Typical morning range: 100-110 mg/dl  Endocrinologist or PCP following DM: PCP-changing but does not have new one yet    Other Core Components/Diabetes Plan:   Goals:  Hgb A1c < 7%  Exercise Blood Glucose:100-300mg/dl    Interventions:  Medication Compliance  Med Card Reconciled  Low Sodium, Mediterranean, ADA Diet  Physical Activity  Increase Knowledge of Contributory Factors    Education:  Diabetes; verbalizes understanding; Date: 8/14/23  Medications I; verbalizes understanding; Date: 7/21/23    Comments:   Patient verbalizes understanding to bring home glucometer and check glucose pre and post each exercise session.  Per cardiac rehab protocols, patient's glucose must be between 90 and 270 mg/dL to exercise.  Patient denies any recent glucose levels less than 60 mg/dL or greater than 300 mg/dL. Patient verbalizes importance of notifying rehab staff if symptoms of hypoglycemia occur while at cardiac rehab.  Abnormal labs will be " reported to patient's PCP/Endocrinologist by rehab staff.    Noted updated glucose parameters of 100-300    Continue to encourage reduction in refined sugars/regular coke    Madai Valladares MS, RDN/LDN     Session: 12 Session Follow Up   Cardiac Rehab Individual Treatment Plan - Reassessment      Patient Name: Jud Villa MRN: [id number]   : 1960   Age: 63 y.o.   Primary Diagnosis: S/P CABG 2023      Psychosocial Assessment:   Living Arrangements: Lives alone  Family Support: children and grandchildren  Self Reported: no change Anxiety and Depression  Displays: calmness  Medication: not applicable    Psychosocial Plan:   Goals:  Improved psychosocial coping strategies: In Progress  Reduce manifestation of depression: In Progress  Maintain positive support system: Met  Maintain positive outlook: Met  Improve overall quality of life: In Progress    Interventions:  Patient to Self Report Emotional Changes at Session Check In  Recommend Physical Activity  Recommend Attending Education Lectures  Notify MD: No  Program Referral: Yes  Pharmaceutical Intervention/Therapy: Declined  Other Needs: not applicable  Stage of Readiness to Change: Action    Education:  Heart disease and emotions; verbalizes understanding; Date: 2023    Comments:  Pt denies any overwhelming stress or anxiety. Pt is showing more motivation in daily attendance of cardiac rehab and participating more with effort. Pt states she has worry about her previous problem with breast cancer.  Patient has been instructed to notify staff in the event that circumstances worsen.  Patient verbalizes understanding.    Other Core Components/Risk Factors Assessment:   RISK FACTORS:  diabetes, hyperlipidemia, sedentary lifestyle, tobacco use, stress    Learning Barriers: Emotional    Medication Compliance: has been compliant with the medicaiton regimen    Other Core Components/Risk Factors Plan:   Goals:  Increase exercise tolerance: In  Progress  Increase knowledge of CAD: In Progress  Identify and manage personal areas of stress: In Progress  Control diabetes by adjusting diet and exercise: In Progress  Learn more about healthy eating: In Progress    Interventions:  Individual Education/ Counseling: Yes  Physician Referral: No    Education:    cholesterol meds, verbalizes understanding; Date: 7/21/2023  stress, verbalizes understanding; Date: 8/04/2023  warning signs of MI, verbalizes understanding; Date: 8/11/2023         Education method adapted to patients education level and preferred method of learning.  Method: explanation  demonstration  handouts    Comments:  Pt si attending rehab more regularly and feel improvement physically.    Other Core Components/Hypertension Assessment:   BP Range:  systolic; 52-76 diastolic  BP at Goal: Yes  Patient reported symptoms: none    Other Core Components/Hypertension Plan:   Goals:  Blood Pressure <130/80    Interventions:  Med Card Reconciled: Yes  Encourage medication compliance  Encourage sodium reduction  Encourage weight loss  Recommend physical activity  Educate on contributory factors  Reduce stress, anxiety, anger, depression, and/or chronic pain  Encourage home blood pressure monitoring  Recommend daily weights  MD notified/Physician Referral: No    Education:    Medication I; verbalizes understanding; Date: 7/21/2023  Stroke; verbalizes understanding; Date: 8/11/2023         Comments:  Pt states she is monitoring her blood pressure daily at home and keeping a log.    Does the patient have Heart Failure? No      Other Core Components/Tobacco Cessation Assessment:   Smoking Status: past smoker who quit 3/2023  Smoking Cessation Barriers:  N/A  Stage of Readiness to Change: Maintenance    Other Core Components/Tobacco Cessation Plan:   Goals:  Maintain non-smoking status    Interventions:  Maintains non-smoking status    Education:    Stroke; verbalizes understanding; Date: 8/11/2023          Comments:  Pt is not using tobacco products at this time.    Matt Chapin RN

## 2024-02-16 ENCOUNTER — TELEPHONE (OUTPATIENT)
Dept: ENDOCRINOLOGY | Facility: CLINIC | Age: 64
End: 2024-02-16
Payer: MEDICARE

## 2024-02-16 NOTE — TELEPHONE ENCOUNTER
----- Message from Radha Gomez MA sent at 2/16/2024  7:24 AM CST -----    ----- Message -----  From: Norma Cuadra  Sent: 2/15/2024   5:00 PM CST  To: [facility] Staff    Type:  Sooner Apoointment Request    Caller is requesting a sooner appointment.  Caller declined first available appointment listed below.  Caller will not accept being placed on the waitlist and is requesting a message be sent to doctor.  Name of Caller: Pt  When is the first available appointment? N/A  Symptoms: Check up  Would the patient rather a call back or [facility]?  call  Best Call Back Number:  [phone]  Additional Information:

## 2024-02-20 ENCOUNTER — TELEPHONE (OUTPATIENT)
Dept: FAMILY MEDICINE | Facility: CLINIC | Age: 64
End: 2024-02-20
Payer: MEDICARE

## 2024-02-20 NOTE — TELEPHONE ENCOUNTER
Informed patient today's appointment will have to rescheduled due to provider being sick. Patient rescheduled appointment for 3/18 with Bea GONZALEZ.

## 2024-02-21 ENCOUNTER — NURSE TRIAGE (OUTPATIENT)
Dept: ADMINISTRATIVE | Facility: CLINIC | Age: 64
End: 2024-02-21
Payer: MEDICARE

## 2024-02-21 ENCOUNTER — TELEPHONE (OUTPATIENT)
Dept: ENDOCRINOLOGY | Facility: CLINIC | Age: 64
End: 2024-02-21
Payer: MEDICARE

## 2024-02-21 DIAGNOSIS — E11.59 TYPE 2 DIABETES MELLITUS WITH OTHER CIRCULATORY COMPLICATION, WITH LONG-TERM CURRENT USE OF INSULIN: Primary | ICD-10-CM

## 2024-02-21 DIAGNOSIS — Z79.4 TYPE 2 DIABETES MELLITUS WITH OTHER CIRCULATORY COMPLICATION, WITH LONG-TERM CURRENT USE OF INSULIN: Primary | ICD-10-CM

## 2024-02-21 NOTE — TELEPHONE ENCOUNTER
Nt calls this pt back from [employer]. Pt states that she already spoke to a nurse. Pt hangs up.  Reason for Disposition   Caller has already spoken with another triager and has no further questions    Protocols used: No Contact or Duplicate Contact Call-A-[place]

## 2024-02-21 NOTE — TELEPHONE ENCOUNTER
pt that is calling and she said that her B/S this am was 100 then ate and it went to 300 and then went to the 41 and was shaking she said that her sugar is all over the place. After drinking coke its now at 202 and she is upset. I triaged her and its to transfer to MD and calling to see if someone would be able to give her recommendations as to what she needs to do. I reached out to the MA in the diabetes clinic to see if someone can assist the pt as to what she needs to do and will await a response                   Reason for Disposition   Caller has URGENT medication or insulin pump question and triager unable to answer question    Additional Information   Negative: Unconscious or difficult to awaken   Negative: Seizure occurs   Negative: Acting confused (e.g., disoriented, slurred speech)   Negative: Very weak (can't stand)   Negative: Sounds like a life-threatening emergency to the triager   Negative: Vomiting and signs of dehydration (e.g., very dry mouth, lightheaded, dark urine, etc.)   Negative: Low blood sugar symptoms persist > 30 minutes AND using low blood sugar Care Advice   Negative: Low blood glucose (70 mg/dl [3.9 mmol/l] or below) persists > 30 minutes AND using low blood sugar Care Advice   Negative: Patient sounds very sick or weak to the triager   Negative: Diabetes medication overdose (e.g., insulin error) and triager unable to answer question    Protocols used: Diabetes - Low Blood Sugar-A-OH

## 2024-02-21 NOTE — PROGRESS NOTES
Please call patient   Review her medications for diabetes and document to make sure she is taking medication and doses correctly.     Schedule fasting 8 am labs tomorrow.   Download dexcom so we can review her blood sguars.

## 2024-02-21 NOTE — TELEPHONE ENCOUNTER
Called pt no answer. Also called pt daughter, she attempted to call her mo as well, no answer. She assumes she's sleeping right now. Will reach out again tomorrow morning. Pt daughter stated her mo had an boil under her arm, and whatever they injected into her, it has been causing her sugars to go up and down. Per        Please call patient   Review her medications for diabetes and document to make sure she is taking medication and doses correctly.      Schedule fasting 8 am labs tomorrow.   Download dexcom so we can review her blood sguars.

## 2024-02-21 NOTE — TELEPHONE ENCOUNTER
I got a response from Dr Mcdonald that she was in clinic and will reach out after she is finished. I called pt to let her know that the MD will call her back or respond to me but I will call her as soon as I know something

## 2024-02-22 ENCOUNTER — TELEPHONE (OUTPATIENT)
Dept: ENDOCRINOLOGY | Facility: CLINIC | Age: 64
End: 2024-02-22
Payer: MEDICARE

## 2024-02-22 NOTE — TELEPHONE ENCOUNTER
Spoke to pt. Pt will come in today so we can download dexcom. Pt fasting labs are scheduled. Pt states she is taking metfromin 2x a day, trajenta and jardiance once a day.

## 2024-02-22 NOTE — TELEPHONE ENCOUNTER
----- Message from Ghulam Acharya sent at 2/22/2024  9:46 AM CST -----  Type:  Patient Returning Call    Who Called: pt  Who Left Message for Patient: Beth Maguire MA  Does the patient know what this is regarding?:  Would the patient rather a call back or a response via [facility]? call  Best Call Back Number: [phone]  Additional Information:

## 2024-02-23 ENCOUNTER — LAB VISIT (OUTPATIENT)
Dept: LAB | Facility: HOSPITAL | Age: 64
End: 2024-02-23
Attending: INTERNAL MEDICINE
Payer: MEDICARE

## 2024-02-23 DIAGNOSIS — Z79.4 TYPE 2 DIABETES MELLITUS WITH OTHER CIRCULATORY COMPLICATION, WITH LONG-TERM CURRENT USE OF INSULIN: ICD-10-CM

## 2024-02-23 DIAGNOSIS — E11.59 TYPE 2 DIABETES MELLITUS WITH OTHER CIRCULATORY COMPLICATION, WITH LONG-TERM CURRENT USE OF INSULIN: ICD-10-CM

## 2024-02-23 LAB
ALBUMIN SERPL BCP-MCNC: 3.7 G/DL (ref 3.5–5.2)
ALP SERPL-CCNC: 62 U/L (ref 55–135)
ALT SERPL W/O P-5'-P-CCNC: 11 U/L (ref 10–44)
ANION GAP SERPL CALC-SCNC: 9 MMOL/L (ref 8–16)
AST SERPL-CCNC: 16 U/L (ref 10–40)
BILIRUB SERPL-MCNC: 0.4 MG/DL (ref 0.1–1)
BUN SERPL-MCNC: 12 MG/DL (ref 8–23)
CALCIUM SERPL-MCNC: 9.4 MG/DL (ref 8.7–10.5)
CHLORIDE SERPL-SCNC: 107 MMOL/L (ref 95–110)
CO2 SERPL-SCNC: 24 MMOL/L (ref 23–29)
CORTIS SERPL-MCNC: 9.3 UG/DL (ref 4.3–22.4)
CREAT SERPL-MCNC: 0.7 MG/DL (ref 0.5–1.4)
EST. GFR  (NO RACE VARIABLE): >60 ML/MIN/1.73 M^2
GLUCOSE SERPL-MCNC: 129 MG/DL (ref 70–110)
POTASSIUM SERPL-SCNC: 4.2 MMOL/L (ref 3.5–5.1)
PROT SERPL-MCNC: 6.7 G/DL (ref 6–8.4)
SODIUM SERPL-SCNC: 140 MMOL/L (ref 136–145)

## 2024-02-23 PROCEDURE — 82533 TOTAL CORTISOL: CPT | Mod: HCNC | Performed by: INTERNAL MEDICINE

## 2024-02-23 PROCEDURE — 80053 COMPREHEN METABOLIC PANEL: CPT | Mod: HCNC | Performed by: INTERNAL MEDICINE

## 2024-02-23 PROCEDURE — 36415 COLL VENOUS BLD VENIPUNCTURE: CPT | Mod: HCNC,PO | Performed by: INTERNAL MEDICINE

## 2024-03-04 RX ORDER — RIZATRIPTAN BENZOATE 10 MG/1
TABLET ORAL
Qty: 30 TABLET | Refills: 0 | Status: SHIPPED | OUTPATIENT
Start: 2024-03-04 | End: 2024-04-09

## 2024-03-04 NOTE — TELEPHONE ENCOUNTER
Refill Routing Note   Medication(s) are not appropriate for processing by [facility] for the following reason(s):        ED/Hospital Visit since last OV with provider  Drug-disease interaction    [facility] action(s):  Defer     Requires labs : Yes      Medication Therapy Plan: contraindicated diagnoses on problem list      Appointments  past 12m or future 3m with PCP    Date Provider   Last Visit   8/21/2023 Bo Lopez MD   Next Visit   Visit date not found Bo Lopez MD   ED visits in past 90 days: 1        Note composed:3:08 PM 03/04/2024

## 2024-03-07 ENCOUNTER — DOCUMENTATION ONLY (OUTPATIENT)
Dept: CARDIAC REHAB | Facility: CLINIC | Age: 64
End: 2024-03-07
Payer: MEDICARE

## 2024-03-07 NOTE — PROGRESS NOTES
Miss Renner has completed 15 out of 36 exercise session of Phase II cardiac rehab.  A follow up reassessment will be completed at 24 sessions.   Miss Renner is currently on hold until cleared to return after PET test.  As of 3-5-24, Miss Renner is now out of the 36 week time period to return to the program and has been discharged from the program.    12 Session Follow Up   Cardiac Rehab Individual Treatment Plan - Reassessment      Patient Name: Jud Villa MRN: [id number]   : 1960   Age: 63 y.o.   Primary Diagnosis: CABG Date of Event: 23   EF: 47%  Risk Stratification: high  Referring Physician: SUMIT ACKERMAN   Exercise Assessment:     Angina with exercise?: No   ST Depression with Exercise?: No  Fall Risk: Yes   Assistive Devices:  independent  Miss Renner stated at orientation there were no limitations to exercise.    Comments on Progression: Miss Renner is progressing slowly but  her workloads will continue to be increased as she tolerates exercise intensity.     Exercise Plan:   Goals:  CR Exercise Goals: Attend Cardiac Rehab 3 times/week: In Progress  Home Aerobic Exercise: 2 additional days/week for 30-60 minutes: In Progress  Intensity of 12-15 on the Rate of Perceived Exertion (RPE) scale: In Progress  30% increase in entry estimated METS: 6.5 : In Progress  5 days/week for 30-60 minutes: In Progress    Comments on Goal Progression:  Patient Consistency: consistent with attendance  Home exercise? No   Patient reports intensity rate 9-15 on RPE scale    Intervention/Recommendations:   Discussed importance of regular attendance to cardiac rehab class    Exercise Prescription:  THR Range 72-78   Mode: Treadmill  Recumbent Bike  Nustep   Frequency:  3 days/week   Duration:  30-60 minutes   Intensity:  12-15 RPE   Resistance Training:  Yes: 3 lb weights with 10-15 reps based on strength and range of motion and adjusted accordingly     Home Prescription:  Mode Aerobic exercise   Frequency: 2- 3 days/week    Duration: 30-60 minutes   Resistance Training: None     Education:  Diabetes; verbalizes understanding; Date: 23  Exercise and Rehydration; verbalizes understanding; Date: 23  Relaxation Techniques; verbalizes understanding; Date: 23  What Comes After Phase II?; verbalizes understanding; Date: 23    Comments:  I reviewed exercise recommendations with Miss Renner.  I encouraged her to begin some type of exercise she can do outside of attending rehab class.  I also explained the importance of arriving to class on time.  She stated understanding.      The exercise prescription will continue to be adjusted based on tolerance of exercise intensity by patient.    Ernesto Silveira, CEP     12 Session Follow Up   Cardiac Rehab Individual Treatment Plan - Reassessment    Patient Name: Jud Villa MRN: [id number]   : 1960   Age: 63 y.o.   Primary Diagnosis: s/p CABG    Nutrition Assessment:     Anthropometrics    Height 62 inches   Weight 131.6 lbs   BMI 24.1     Patient confirms she is taking lipitor 80mg for cholesterol control.  Difficulty Chewing or Swallowing: No  Current Exercise: See Exercise Physiologist Note  Food Safety/Food Preparation: self  Living Arrangements/Family Support: Lives alone  Cultural/Spiritual/Personal Preferences: not applicable   Barriers to Education: none identified  Stage of Change Related to Diet Habits: Action  Recent Changes to Diet: Yes - less fast food  Food Diary: Given      Nutrition Plan:   Goals:  LDL-C < 70 (for high risk patients)  Hgb A1c < 7%  BMI < 25 and abdominal girth < 40M/<35 F  2 gram sodium, Mediterranean diet: In Progress  Fish intake (non-fried varieties) to a goal of 2-3 servings per week: In Progress  Increase fruit and vegetable intake: In Progress    Comments on Goal Progression:  Pt states she has made an effort to reduce fast food intake and is also eating more produce with meals    Interventions:  Dietitian Consult: No  Patient to  "participate in Cardiac Rehab sessions three times a week  Weekly Dietitian Weight Check  Nutrition Recommendations Provided: Verbal, Reviewed  Follow Up Plan for Ongoing Self-Management Support    Education:  Protein; verbalizes understanding; Date: 7/12/23  Seafood; verbalizes understanding; Date: 8/2/23  Vitamins; verbalizes understanding; Date: 8/9/23  Pre/Probiotics; verbalizes understanding; Date: 7/26/23    Comments:   Discussed ways to incorporate healthy snacks, eating on a schedule, and monitoring sodium intake for heart health.    Diabetes  Is the patient diabetic? Yes   Other Core Components/Diabetes Assessment:   Labs:  No results found for: "GLUF"  Lab Results   Component Value Date    HGBA1C 7.8 (H) 01/03/2024    HGBA1C 6.6 (H) 08/22/2023    HGBA1C 6.9 (H) 04/11/2023      Lab Results   Component Value Date    ESTIMATEDAVG 177 (H) 01/03/2024    ESTIMATEDAVG 143 (H) 08/22/2023    ESTIMATEDAVG 151 (H) 04/11/2023       History of diabetes since 2008  Diabetes medications: Jardiance 10mg daily, Tradjenta 5mg daily, metformin 1000mg daily, lantus 10u @[facility]  Blood Glucose Checks at Home: Yes: blood sugars ac & HS  Typical morning range: 100-110 mg/dl  Endocrinologist or PCP following DM: PCP-changing but does not have new one yet    Other Core Components/Diabetes Plan:   Goals:  Hgb A1c < 7%  Exercise Blood Glucose:100-300mg/dl    Interventions:  Medication Compliance  Med Card Reconciled  Low Sodium, Mediterranean, ADA Diet  Physical Activity  Increase Knowledge of Contributory Factors    Education:  Diabetes; verbalizes understanding; Date: 8/14/23  Medications I; verbalizes understanding; Date: 7/21/23    Comments:   Patient verbalizes understanding to bring home glucometer and check glucose pre and post each exercise session.  Per cardiac rehab protocols, patient's glucose must be between 90 and 270 mg/dL to exercise.  Patient denies any recent glucose levels less than 60 mg/dL or greater than 300 mg/dL. " Patient verbalizes importance of notifying rehab staff if symptoms of hypoglycemia occur while at cardiac rehab.  Abnormal labs will be reported to patient's PCP/Endocrinologist by rehab staff.    Noted updated glucose parameters of 100-300    Continue to encourage reduction in refined sugars/regular coke    Madai Valladares MS, RDN/LDN     Session: 12 Session Follow Up   Cardiac Rehab Individual Treatment Plan - Reassessment      Patient Name: Jud Villa MRN: [id number]   : 1960   Age: 63 y.o.   Primary Diagnosis: S/P CABG 2023      Psychosocial Assessment:   Living Arrangements: Lives alone  Family Support: children and grandchildren  Self Reported: no change Anxiety and Depression  Displays: calmness  Medication: not applicable    Psychosocial Plan:   Goals:  Improved psychosocial coping strategies: In Progress  Reduce manifestation of depression: In Progress  Maintain positive support system: Met  Maintain positive outlook: Met  Improve overall quality of life: In Progress    Interventions:  Patient to Self Report Emotional Changes at Session Check In  Recommend Physical Activity  Recommend Attending Education Lectures  Notify MD: No  Program Referral: Yes  Pharmaceutical Intervention/Therapy: Declined  Other Needs: not applicable  Stage of Readiness to Change: Action    Education:  Heart disease and emotions; verbalizes understanding; Date: 2023    Comments:  Pt denies any overwhelming stress or anxiety. Pt is showing more motivation in daily attendance of cardiac rehab and participating more with effort. Pt states she has worry about her previous problem with breast cancer.  Patient has been instructed to notify staff in the event that circumstances worsen.  Patient verbalizes understanding.    Other Core Components/Risk Factors Assessment:   RISK FACTORS:  diabetes, hyperlipidemia, sedentary lifestyle, tobacco use, stress    Learning Barriers: Emotional    Medication Compliance: has been  compliant with the medicaiton regimen    Other Core Components/Risk Factors Plan:   Goals:  Increase exercise tolerance: In Progress  Increase knowledge of CAD: In Progress  Identify and manage personal areas of stress: In Progress  Control diabetes by adjusting diet and exercise: In Progress  Learn more about healthy eating: In Progress    Interventions:  Individual Education/ Counseling: Yes  Physician Referral: No    Education:    cholesterol meds, verbalizes understanding; Date: 7/21/2023  stress, verbalizes understanding; Date: 8/04/2023  warning signs of MI, verbalizes understanding; Date: 8/11/2023         Education method adapted to patients education level and preferred method of learning.  Method: explanation  demonstration  handouts    Comments:  Pt si attending rehab more regularly and feel improvement physically.    Other Core Components/Hypertension Assessment:   BP Range:  systolic; 52-76 diastolic  BP at Goal: Yes  Patient reported symptoms: none    Other Core Components/Hypertension Plan:   Goals:  Blood Pressure <130/80    Interventions:  Med Card Reconciled: Yes  Encourage medication compliance  Encourage sodium reduction  Encourage weight loss  Recommend physical activity  Educate on contributory factors  Reduce stress, anxiety, anger, depression, and/or chronic pain  Encourage home blood pressure monitoring  Recommend daily weights  MD notified/Physician Referral: No    Education:    Medication I; verbalizes understanding; Date: 7/21/2023  Stroke; verbalizes understanding; Date: 8/11/2023         Comments:  Pt states she is monitoring her blood pressure daily at home and keeping a log.    Does the patient have Heart Failure? No      Other Core Components/Tobacco Cessation Assessment:   Smoking Status: past smoker who quit 3/2023  Smoking Cessation Barriers:  N/A  Stage of Readiness to Change: Maintenance    Other Core Components/Tobacco Cessation Plan:   Goals:  Maintain non-smoking  status    Interventions:  Maintains non-smoking status    Education:    Stroke; verbalizes understanding; Date: 8/11/2023         Comments:  Pt is not using tobacco products at this time.    Matt Chapin RN

## 2024-03-14 ENCOUNTER — TELEPHONE (OUTPATIENT)
Dept: SMOKING CESSATION | Facility: CLINIC | Age: 64
End: 2024-03-14
Payer: MEDICARE

## 2024-03-19 DIAGNOSIS — J43.2 CENTRILOBULAR EMPHYSEMA: ICD-10-CM

## 2024-03-19 DIAGNOSIS — J44.1 COPD EXACERBATION: ICD-10-CM

## 2024-03-19 NOTE — TELEPHONE ENCOUNTER
No care due was identified.  Health [facility] Embedded Care Due Messages. Reference number: [id number].   3/19/2024 10:15:08 AM CDT

## 2024-03-20 RX ORDER — FLUTICASONE FUROATE, UMECLIDINIUM BROMIDE AND VILANTEROL TRIFENATATE 100; 62.5; 25 UG/1; UG/1; UG/1
1 POWDER RESPIRATORY (INHALATION)
Qty: 180 EACH | Refills: 1 | Status: SHIPPED | OUTPATIENT
Start: 2024-03-20

## 2024-03-20 NOTE — TELEPHONE ENCOUNTER
Refill Decision Note   Jud Allen  is requesting a refill authorization.  Brief Assessment and Rationale for Refill:  Approve     Medication Therapy Plan:  ED visit 12/30/23 due to Influenza B. No changes in Trelegy therapy.      Extended chart review required: Yes   Comments:     Note composed:10:54 AM 03/20/2024

## 2024-03-20 NOTE — TELEPHONE ENCOUNTER
Refill Routing Note   Medication(s) are not appropriate for processing by [facility] for the following reason(s):        Drug-disease interaction    [facility] action(s):  Defer        Medication Therapy Plan: Drug-Disease: fluticasone-umeclidin-vilanter and Coronary atherosclerosis of native coronary artery; Coronary artery disease; Chest pain due to myocardial ischemia    Pharmacist review requested: Yes   Extended chart review required: Yes     Appointments  past 12m or future 3m with PCP    Date Provider   Last Visit   8/21/2023 Bo Lopez MD   Next Visit   Visit date not found Bo Lopez MD   ED visits in past 90 days: 1        Note composed:9:57 AM 03/20/2024

## 2024-04-08 ENCOUNTER — TELEPHONE (OUTPATIENT)
Dept: OBSTETRICS AND GYNECOLOGY | Facility: CLINIC | Age: 64
End: 2024-04-08
Payer: MEDICARE

## 2024-04-08 NOTE — TELEPHONE ENCOUNTER
Try calling pt to inform her that Dr Wagner will be gone after 3 p.m today. Pt was rescheduled for next available appointment.

## 2024-04-09 ENCOUNTER — TELEPHONE (OUTPATIENT)
Dept: ADMINISTRATIVE | Facility: OTHER | Age: 64
End: 2024-04-09

## 2024-04-09 ENCOUNTER — DOCUMENTATION ONLY (OUTPATIENT)
Dept: NEUROLOGY | Facility: CLINIC | Age: 64
End: 2024-04-09
Payer: MEDICARE

## 2024-04-09 ENCOUNTER — TELEPHONE (OUTPATIENT)
Dept: NEUROLOGY | Facility: CLINIC | Age: 64
End: 2024-04-09
Payer: MEDICARE

## 2024-04-09 ENCOUNTER — OFFICE VISIT (OUTPATIENT)
Dept: NEUROLOGY | Facility: CLINIC | Age: 64
End: 2024-04-09
Payer: MEDICARE

## 2024-04-09 ENCOUNTER — LAB VISIT (OUTPATIENT)
Dept: LAB | Facility: HOSPITAL | Age: 64
End: 2024-04-09
Attending: INTERNAL MEDICINE
Payer: MEDICARE

## 2024-04-09 VITALS
WEIGHT: 127 LBS | BODY MASS INDEX: 23.37 KG/M2 | HEART RATE: 51 BPM | SYSTOLIC BLOOD PRESSURE: 113 MMHG | DIASTOLIC BLOOD PRESSURE: 65 MMHG | HEIGHT: 62 IN

## 2024-04-09 DIAGNOSIS — E11.65 UNCONTROLLED TYPE 2 DIABETES MELLITUS WITH HYPERGLYCEMIA: ICD-10-CM

## 2024-04-09 DIAGNOSIS — G62.0 DRUG-INDUCED POLYNEUROPATHY: ICD-10-CM

## 2024-04-09 DIAGNOSIS — R51.9 NONINTRACTABLE HEADACHE, UNSPECIFIED CHRONICITY PATTERN, UNSPECIFIED HEADACHE TYPE: ICD-10-CM

## 2024-04-09 DIAGNOSIS — G43.711 CHRONIC MIGRAINE WITHOUT AURA, INTRACTABLE, WITH STATUS MIGRAINOSUS: Primary | ICD-10-CM

## 2024-04-09 LAB
ESTIMATED AVG GLUCOSE: 200 MG/DL (ref 68–131)
HBA1C MFR BLD: 8.6 % (ref 4–5.6)

## 2024-04-09 PROCEDURE — 99212 OFFICE O/P EST SF 10 MIN: CPT | Mod: HCNC,S$GLB,, | Performed by: STUDENT IN AN ORGANIZED HEALTH CARE EDUCATION/TRAINING PROGRAM

## 2024-04-09 PROCEDURE — 3074F SYST BP LT 130 MM HG: CPT | Mod: HCNC,CPTII,S$GLB, | Performed by: STUDENT IN AN ORGANIZED HEALTH CARE EDUCATION/TRAINING PROGRAM

## 2024-04-09 PROCEDURE — 99999 PR PBB SHADOW E&M-EST. PATIENT-LVL V: CPT | Mod: PBBFAC,HCNC,, | Performed by: STUDENT IN AN ORGANIZED HEALTH CARE EDUCATION/TRAINING PROGRAM

## 2024-04-09 PROCEDURE — 3078F DIAST BP <80 MM HG: CPT | Mod: HCNC,CPTII,S$GLB, | Performed by: STUDENT IN AN ORGANIZED HEALTH CARE EDUCATION/TRAINING PROGRAM

## 2024-04-09 PROCEDURE — 3051F HG A1C>EQUAL 7.0%<8.0%: CPT | Mod: HCNC,CPTII,S$GLB, | Performed by: STUDENT IN AN ORGANIZED HEALTH CARE EDUCATION/TRAINING PROGRAM

## 2024-04-09 PROCEDURE — 3008F BODY MASS INDEX DOCD: CPT | Mod: HCNC,CPTII,S$GLB, | Performed by: STUDENT IN AN ORGANIZED HEALTH CARE EDUCATION/TRAINING PROGRAM

## 2024-04-09 PROCEDURE — 83036 HEMOGLOBIN GLYCOSYLATED A1C: CPT | Mod: HCNC | Performed by: INTERNAL MEDICINE

## 2024-04-09 PROCEDURE — 3072F LOW RISK FOR RETINOPATHY: CPT | Mod: HCNC,CPTII,S$GLB, | Performed by: STUDENT IN AN ORGANIZED HEALTH CARE EDUCATION/TRAINING PROGRAM

## 2024-04-09 PROCEDURE — 36415 COLL VENOUS BLD VENIPUNCTURE: CPT | Mod: HCNC | Performed by: INTERNAL MEDICINE

## 2024-04-09 PROCEDURE — 1125F AMNT PAIN NOTED PAIN PRSNT: CPT | Mod: HCNC,CPTII,S$GLB, | Performed by: STUDENT IN AN ORGANIZED HEALTH CARE EDUCATION/TRAINING PROGRAM

## 2024-04-09 PROCEDURE — 1160F RVW MEDS BY RX/DR IN RCRD: CPT | Mod: HCNC,CPTII,S$GLB, | Performed by: STUDENT IN AN ORGANIZED HEALTH CARE EDUCATION/TRAINING PROGRAM

## 2024-04-09 PROCEDURE — 1159F MED LIST DOCD IN RCRD: CPT | Mod: HCNC,CPTII,S$GLB, | Performed by: STUDENT IN AN ORGANIZED HEALTH CARE EDUCATION/TRAINING PROGRAM

## 2024-04-09 RX ORDER — TOPIRAMATE 50 MG/1
50 TABLET, FILM COATED ORAL 2 TIMES DAILY
Qty: 180 TABLET | Refills: 1 | Status: SHIPPED | OUTPATIENT
Start: 2024-04-09 | End: 2024-10-06

## 2024-04-09 NOTE — TELEPHONE ENCOUNTER
Took patient's vitals and completed rooming process. She was obviously frustrated with her appointments issues surrounding her visit. I asked questions surrounding the reason for her visit and she just stated she was in pain and her pain level was a 10. Nothing more was said.

## 2024-04-09 NOTE — PROGRESS NOTES
Patient was scheduled incorrectly twice with Yelena Fortune.    Initially sent message last year that patient was scheduled incorrectly for stroke. Previous encounter can be found in chart.    2nd event today, 4/09/24

## 2024-04-09 NOTE — TELEPHONE ENCOUNTER
Patient arrived to the clinic for an appointment originally scheduled for 4/9/24 at 8am. Appointment unfortunately was improperly scheduled resulting in a cancellation. Patient was visibly agitated and expressed her frustrations with the department. Ms. Renner was offered an appointment with Dr. Wiley for this morning. Ms. Renner reluctantly accepted the appointment. Ms. Jenkins was also notified that the clinic can provide her a note for work if she requested. Ms. Jenkins scheduled with Dr. Wiley.

## 2024-04-09 NOTE — PROGRESS NOTES
[facility] - NEUROLOGY 7TH [address] [facility], [place]    Date: 4/9/24  Patient Name: Jud Villa   MRN: [id number]   PCP: Bo Lopez  Referring Provider: Leatha Atkinson, *    Assessment:   Jud Villa is a 64 y.o. female presenting for:  Chronic migraine w/out aura w/ status migrainosus   Medication overuse headache    Plan:  DC maxalt.  Triptans are contraindicated in this patient with history of ischemic stroke and coronary artery disease.  We will proceed with a trial of ubrelvy.    Resume topamax at 50 mg BID given patient not taking regularly.    RTC 6-8 weeks     Plan:     Problem List Items Addressed This Visit          Neuro    Chronic migraine without aura, intractable, with status migrainosus - Primary    Overview     Dx updated per 2019 IMO Load         Relevant Medications    topiramate (TOPAMAX) 50 MG tablet    ubrogepant (UBRELVY) 100 mg tablet    Nonintractable headache    Drug-induced polyneuropathy       Angela Wiley MD    Patient note was created using MModal Dictation.  Any errors in syntax or even information may not have been identified and edited on initial review prior to signing this note.  Subjective:     Referred for evaluation of headaches.  Previously saw Dr óGmez who noted headaches started in 2017 after cancer diagnosis.   She has also seen vascular neurology for history of stroke.    Bifrontal ,throbbing, light sensitivity, nausea, dizziness.  No visual aura.      Prior medications tried include:  Topiramate - not taking every day.  Took it once a week ago.  Did work when she took more frequently  Gabapentin - reports it helped in the past.  Reports she was taking 300 mg daily.    BC powder - occasionally taking.  Prior to stroke was having it three times per day.   Fioricet  Maxalt  Excedrin -     Diagnostic tests:  Brain Imaging   MRI of the brain O[facility] 03/14/2023:  Radiology read indicates a left PCA territory  infarct    MRI brain 12/30/23  Remote left PCA infarct    Vessel Imaging   CTA head and neck OSH 03/13/2023:  Radiology read indicates No flow-limiting stenosis, occlusion or aneurysm.  Atherosclerotic plaque at the carotid bifurcation with no measurable stenosis.    Left PCA territory hypoattenuating keeping with acute or subacute infarct  Cardiac Imaging   TTE 03/15/2023 OSH:  No LV thrombus   LV apex, anterior and septal wall hypokinesis   EF 40%     PAST MEDICAL HISTORY:  Past Medical History:   Diagnosis Date    Asthma     Breast carcinoma     Cataract     Coronary artery disease of native heart with stable angina pectoris 04/04/2023    Diabetes mellitus     Hyperlipidemia     Moderate episode of recurrent major depressive disorder 05/19/2021    Osteoporosis     Stroke 03/2023    left PCA       PAST SURGICAL HISTORY:  Past Surgical History:   Procedure Laterality Date    BILATERAL MASTECTOMY  04/26/2018    CHOLECYSTECTOMY      COLONOSCOPY N/A 10/27/2015    Procedure: COLONOSCOPY;  Surgeon: Johnny Hurley MD;  Location: [facility] ENDO;  Service: Endoscopy;  Laterality: N/A;    CORONARY ANGIOGRAPHY N/A 04/05/2023    Procedure: ANGIOGRAM, CORONARY ARTERY;  Surgeon: Francisco Barahona MD;  Location: [facility] CATH LAB;  Service: Cardiology;  Laterality: N/A;    CORONARY ANGIOPLASTY      CORONARY ARTERY BYPASS GRAFT  04/2023    LIMA to LAD, SVG to OM    CORONARY ARTERY BYPASS GRAFT (CABG) N/A 04/14/2023    Procedure: CORONARY ARTERY BYPASS GRAFT (CABG) x 2;  Surgeon: Igor Kahn MD;  Location: [facility] OR [address] [place] [address];  Service: Cardiothoracic;  Laterality: N/A;    ENDOSCOPIC HARVEST OF VEIN Left 04/14/2023    Procedure: SURGICAL PROCUREMENT, VEIN, ENDOSCOPIC;  Surgeon: Igor Kahn MD;  Location: [facility] OR [address] [place] [address];  Service: Cardiothoracic;  Laterality: Left;  Vein harvest start - stop: 0817 - 0856  Vein prep start - stop: 0857 - 0917    ESOPHAGOGASTRODUODENOSCOPY N/A 01/24/2022    Procedure: ESOPHAGOGASTRODUODENOSCOPY  "(EGD);  Surgeon: Mili Katz MD;  Location: [facility] ENDO ([address] [place] [address]);  Service: Endoscopy;  Laterality: N/A;  rapid in AM, instr portal -ml    HYSTERECTOMY      LASER PERIPHERAL IRIDOTOMY Bilateral 2019        LEFT HEART CATHETERIZATION Left 04/05/2023    Procedure: Left heart cath;  Surgeon: Francisco Barahona MD;  Location: [facility] CATH LAB;  Service: Cardiology;  Laterality: Left;       CURRENT MEDS:  Current Outpatient Medications   Medication Sig Dispense Refill    acetaminophen (TYLENOL) 500 MG tablet Take 2 tablets (1,000 mg total) by mouth every 6 (six) hours as needed for Pain. 30 tablet 1    albuterol (PROVENTIL) 2.5 mg /3 mL (0.083 %) nebulizer solution INHALE 1 VIAL PER NEBULIZER FOUR TIMES DAILY AS NEEDED FOR SHORTNESS OF BREATH/ WHEEZING 360 mL 11    albuterol (VENTOLIN HFA) 90 mcg/actuation inhaler INHALE 2 PUFFS BY MOUTH INTO THE LUNGS EVERY 6 HOURS AS NEEDED FOR WHEEZING 18 g 11    apixaban (ELIQUIS) 5 mg Tab Take 1 tablet (5 mg total) by mouth 2 (two) times daily. 60 tablet 11    atorvastatin (LIPITOR) 80 MG tablet Take 1 tablet (80 mg total) by mouth every evening. 90 tablet 3    BD ULTRA-FINE SHORT PEN NEEDLE 31 gauge x 5/16" Ndle Inject 1 pen into the skin once daily. 100 each 3    blood-glucose sensor (DEXCOM G7 SENSOR) Vicki 1 Device by Misc.(Non-Drug; Combo Route) route every 10 days. 3 each 11    clonazePAM (KLONOPIN) 0.5 MG tablet TAKE 1 TABLET(0.5 MG) BY MOUTH EVERY NIGHT AS NEEDED FOR ANXIETY 30 tablet 0    ergocalciferol (ERGOCALCIFEROL) 50,000 unit Cap TAKE 1 CAPSULE BY MOUTH EVERY 7 DAYS 12 capsule 3    fluticasone propionate (FLONASE) 50 mcg/actuation nasal spray SHAKE LIQUID AND USE 1 SPRAY(50 MCG) IN EACH NOSTRIL EVERY DAY 16 g 0    JARDIANCE 10 mg tablet TAKE 1 TABLET(10 MG) BY MOUTH EVERY DAY 90 tablet 3    LANTUS SOLOSTAR U-100 INSULIN glargine 100 units/mL SubQ pen ADMINISTER 10 UNITS UNDER THE SKIN EVERY EVENING 15 mL 1    meloxicam (MOBIC) 15 MG tablet Take 1 tablet " (15 mg total) by mouth daily as needed for Pain. 30 tablet 1    metFORMIN (GLUCOPHAGE) 1000 MG tablet Take 1 tablet (1,000 mg total) by mouth 2 (two) times daily with meals. 180 tablet 3    metoprolol tartrate (LOPRESSOR) 50 MG tablet Take 1 tablet (50 mg total) by mouth 2 (two) times daily. 60 tablet 11    nicotine (NICODERM CQ) 14 mg/24 hr Place 1 patch onto the skin once daily. 28 patch 0    pantoprazole (PROTONIX) 40 MG tablet Take 1 tablet (40 mg total) by mouth before breakfast. 90 tablet 3    rizatriptan (MAXALT) 10 MG tablet TAKE 1 TABLET(10 MG) BY MOUTH 1 TIME AS NEEDED FOR MIGRAINE 30 tablet 0    scopolamine (TRANSDERM-SCOP) 1.3-1.5 mg (1 mg over 3 days) Place 1 patch onto the skin Every 3 (three) days. 4 each 1    sertraline (ZOLOFT) 50 MG tablet Take 1 tablet (50 mg total) by mouth once daily. 90 tablet 3    sucralfate (CARAFATE) 1 gram tablet TAKE 1 TABLET(1 GRAM) BY MOUTH THREE TIMES DAILY BEFORE MEALS 270 tablet 3    topiramate (TOPAMAX) 100 MG tablet Take 1 tablet (100 mg total) by mouth 2 (two) times daily. 180 tablet 0    TRADJENTA 5 mg Tab tablet TAKE 1 TABLET(5 MG) BY MOUTH EVERY DAY 90 tablet 1    TRELEGY ELLIPTA 100-62.5-25 mcg DsDv INHALE 1 PUFF INTO THE LUNGS EVERY  each 1    TRUE METRIX GLUCOSE TEST STRIP Strp USE AS DIRECTED FOUR TIMES DAILY 50 each 0     No current facility-administered medications for this visit.       ALLERGIES:  Review of patient's allergies indicates:  No Known Allergies    FAMILY HISTORY:  Family History   Problem Relation Age of Onset    Diabetes Father     Congenital heart disease Brother     Amblyopia Neg Hx     Blindness Neg Hx     Cataracts Neg Hx     Glaucoma Neg Hx     Macular degeneration Neg Hx     Retinal detachment Neg Hx     Strabismus Neg Hx        SOCIAL HISTORY:  Social History     Tobacco Use    Smoking status: Some Days     Current packs/day: 0.50     Average packs/day: 1 pack/day for 46.3 years (46.0 ttl pk-yrs)     Types: Cigarettes     Start  "date: 1978     Passive exposure: Past    Smokeless tobacco: Current   Substance Use Topics    Alcohol use: No     Alcohol/week: 0.0 standard drinks of alcohol    Drug use: No       Review of Systems:  12 system review of systems is negative except for the symptoms mentioned in HPI.      Objective:     Vitals:    04/09/24 0926   BP: 113/65   Pulse: (!) 51   Weight: 57.6 kg (126 lb 15.8 oz)   Height: 5' 2" (1.575 m)     General: NAD, well nourished   Eyes: no tearing, discharge, no erythema   ENT: moist mucous membranes of the oral cavity, nares patent    Neck: Supple, full range of motion  Cardiovascular: Warm and well perfused, pulses equal and symmetrical  Lungs: Normal work of breathing, normal chest wall excursions  Skin: No rash, lesions, or breakdown on exposed skin  Psychiatry: Mood and affect are appropriate   Abdomen: soft, non tender, non distended  Extremeties: No cyanosis, clubbing or edema.    Neurological   MENTAL STATUS: Alert and oriented to person, place, and time. Attention and concentration within normal limits. Speech without dysarthria, able to name and repeat without difficulty. Recent and remote memory within normal limits   CRANIAL NERVES: R superior quadrantanopia. PERRL. EOMI. Facial sensation intact. Face symmetrical. Hearing grossly intact. Full shoulder shrug bilaterally. Tongue protrudes midline   SENSORY: Sensation is intact to light touch throughout.  Joint position perception intact. Negative Romberg.   MOTOR: Normal bulk and tone. No pronator drift.  5/5 deltoid, biceps, triceps, interosseous, hand  bilaterally. 5/5 iliopsoas, knee extension/flexion, foot dorsi/plantarflexion bilaterally.    REFLEXES: Symmetric and 2+ throughout. Toes down going bilaterally.   CEREBELLAR/COORDINATION/GAIT: Gait steady with normal arm swing and stride length.  Heel to shin intact. Finger to nose intact. Normal rapid alternating movements.      "

## 2024-04-09 NOTE — PATIENT INSTRUCTIONS
VISIT FOLLOW UP    It was nice to see you today.  Here is what we discussed at your visit:    Restart topamax 50 mg twice a day.  At onset of a migraine, take ubrelvy to stop the headache.    Start  a headache journal.   Follow up in 6-8 weeks    Dr. TORRES's contact information: office phone [phone], or contact via [employer]

## 2024-04-09 NOTE — PROGRESS NOTES
Patient had scheduled an appointment with LUPE Sunshine on 4/09/24. Per CLAUDINE, was informed that patient is scheduled incorrectly as patient is not established with her.    MA went ahead and canceled appointment. However, patient came to the lobby asking to be checked in afterwards.    MA informed patient that patient is scheduled incorrectly as she is not established with CLAUDINE. Patient states that she made this appointment months ago and is upset that she's finding out now that appointment is canceled.    Attempted to make accommodations by asking other providers if able to see patient. Offered patient an in-person appointment at 1pm or a virtual with another neurologist.    Patient continued to expressed frustration and walked out. While walking out, patient said to reschedule but did not specify if she wanted 1pm appointment.    No further action was done.

## 2024-04-17 ENCOUNTER — OFFICE VISIT (OUTPATIENT)
Dept: FAMILY MEDICINE | Facility: CLINIC | Age: 64
End: 2024-04-17
Payer: MEDICARE

## 2024-04-17 VITALS
WEIGHT: 125 LBS | DIASTOLIC BLOOD PRESSURE: 82 MMHG | OXYGEN SATURATION: 98 % | HEART RATE: 72 BPM | SYSTOLIC BLOOD PRESSURE: 128 MMHG | BODY MASS INDEX: 23 KG/M2 | HEIGHT: 62 IN

## 2024-04-17 DIAGNOSIS — C50.912 BREAST CANCER METASTASIZED TO AXILLARY LYMPH NODE, LEFT: ICD-10-CM

## 2024-04-17 DIAGNOSIS — I48.91 POSTOPERATIVE ATRIAL FIBRILLATION: ICD-10-CM

## 2024-04-17 DIAGNOSIS — I97.89 POSTOPERATIVE ATRIAL FIBRILLATION: ICD-10-CM

## 2024-04-17 DIAGNOSIS — J44.1 COPD EXACERBATION: ICD-10-CM

## 2024-04-17 DIAGNOSIS — I25.118 ATHEROSCLEROTIC HEART DISEASE OF NATIVE CORONARY ARTERY WITH OTHER FORMS OF ANGINA PECTORIS: ICD-10-CM

## 2024-04-17 DIAGNOSIS — I73.9 PAD (PERIPHERAL ARTERY DISEASE): ICD-10-CM

## 2024-04-17 DIAGNOSIS — E55.9 VITAMIN D DEFICIENCY: ICD-10-CM

## 2024-04-17 DIAGNOSIS — C77.3 BREAST CANCER METASTASIZED TO AXILLARY LYMPH NODE, LEFT: ICD-10-CM

## 2024-04-17 DIAGNOSIS — F33.1 MODERATE EPISODE OF RECURRENT MAJOR DEPRESSIVE DISORDER: Primary | ICD-10-CM

## 2024-04-17 PROCEDURE — 3052F HG A1C>EQUAL 8.0%<EQUAL 9.0%: CPT | Mod: HCNC,CPTII,S$GLB, | Performed by: FAMILY MEDICINE

## 2024-04-17 PROCEDURE — 1160F RVW MEDS BY RX/DR IN RCRD: CPT | Mod: HCNC,CPTII,S$GLB, | Performed by: FAMILY MEDICINE

## 2024-04-17 PROCEDURE — 3079F DIAST BP 80-89 MM HG: CPT | Mod: HCNC,CPTII,S$GLB, | Performed by: FAMILY MEDICINE

## 2024-04-17 PROCEDURE — 3288F FALL RISK ASSESSMENT DOCD: CPT | Mod: HCNC,CPTII,S$GLB, | Performed by: FAMILY MEDICINE

## 2024-04-17 PROCEDURE — 3074F SYST BP LT 130 MM HG: CPT | Mod: HCNC,CPTII,S$GLB, | Performed by: FAMILY MEDICINE

## 2024-04-17 PROCEDURE — 99215 OFFICE O/P EST HI 40 MIN: CPT | Mod: HCNC,S$GLB,, | Performed by: FAMILY MEDICINE

## 2024-04-17 PROCEDURE — 1126F AMNT PAIN NOTED NONE PRSNT: CPT | Mod: HCNC,CPTII,S$GLB, | Performed by: FAMILY MEDICINE

## 2024-04-17 PROCEDURE — 99999 PR PBB SHADOW E&M-EST. PATIENT-LVL III: CPT | Mod: PBBFAC,HCNC,, | Performed by: FAMILY MEDICINE

## 2024-04-17 PROCEDURE — 1101F PT FALLS ASSESS-DOCD LE1/YR: CPT | Mod: HCNC,CPTII,S$GLB, | Performed by: FAMILY MEDICINE

## 2024-04-17 PROCEDURE — 3072F LOW RISK FOR RETINOPATHY: CPT | Mod: HCNC,CPTII,S$GLB, | Performed by: FAMILY MEDICINE

## 2024-04-17 PROCEDURE — 3008F BODY MASS INDEX DOCD: CPT | Mod: HCNC,CPTII,S$GLB, | Performed by: FAMILY MEDICINE

## 2024-04-17 PROCEDURE — 1159F MED LIST DOCD IN RCRD: CPT | Mod: HCNC,CPTII,S$GLB, | Performed by: FAMILY MEDICINE

## 2024-04-17 RX ORDER — SERTRALINE HYDROCHLORIDE 50 MG/1
50 TABLET, FILM COATED ORAL DAILY
Qty: 90 TABLET | Refills: 3 | Status: SHIPPED | OUTPATIENT
Start: 2024-04-17 | End: 2025-04-17

## 2024-04-17 RX ORDER — ERGOCALCIFEROL 1.25 MG/1
50000 CAPSULE ORAL
Qty: 12 CAPSULE | Refills: 0 | Status: SHIPPED | OUTPATIENT
Start: 2024-04-17

## 2024-04-17 RX ORDER — CLONAZEPAM 0.5 MG/1
0.5 TABLET ORAL NIGHTLY PRN
Qty: 30 TABLET | Refills: 0 | Status: SHIPPED | OUTPATIENT
Start: 2024-04-17

## 2024-04-17 NOTE — PROGRESS NOTES
Subjective     Patient ID: Jud Villa is a 64 y.o. female.    Chief Complaint: Follow-up    64 years old female came to the clinic to reestablish depression treatment.  Patient was not takingThe antidepressant and medicine for anxiety.  Patient prefers to start the medicines 1st and no counseling for now .  Patient with vitamin-D deficiency.  She was not taking vitamin-D supplements.  No flare ups breast cancer.  No episodes of atrial fibrillation.  Patient was previously diagnosed coronary artery disease and peripheral arterial disease.  No chest pain, palpitation, orthopnea or PND.    Depression  Visit Type: follow-up  Patient presents with the following symptoms: anhedonia, decreased concentration, depressed mood, fatigue, feelings of hopelessness, hypersomnia and memory impairment.  Patient is not experiencing: chest pain, choking sensation, compulsions, confusion, dizziness, dry mouth, excessive worry, feelings of worthlessness, hyperventilation, impotence, insomnia, irritability, malaise, muscle tension, nausea, nervousness/anxiety, obsessions, palpitations, panic, psychomotor agitation, psychomotor retardation, restlessness, shortness of breath, suicidal ideas, suicidal planning, thoughts of death, weight gain and weight loss.  Frequency of symptoms: occasionally   Episode duration: 1 day  Severity: interfering with daily activities   Sleep quality: non-restorative  Nighttime awakenings: one to two  Compliance with medications:  0-25%      Review of Systems   Constitutional: Negative.  Negative for irritability, weight gain and weight loss.   HENT: Negative.     Eyes: Negative.    Respiratory: Negative.  Negative for choking and shortness of breath.    Cardiovascular:  Negative for chest pain, palpitations, leg swelling and claudication.   Gastrointestinal: Negative.    Genitourinary: Negative.  Negative for impotence.   Musculoskeletal: Negative.    Neurological: Negative.    Psychiatric/Behavioral:   Positive for decreased concentration, depression, dysphoric mood and sleep disturbance. Negative for agitation, behavioral problems, confusion, hallucinations, self-injury and suicidal ideas. The patient is not nervous/anxious, does not have insomnia and is not hyperactive.           Objective     Physical Exam  Constitutional:       General: She is not in acute distress.     Appearance: She is well-developed. She is not diaphoretic.   HENT:      Head: Normocephalic and atraumatic.      Right Ear: External ear normal.      Left Ear: External ear normal.      Nose: Nose normal.      Mouth/Throat:      Pharynx: No oropharyngeal exudate.   Eyes:      General: No scleral icterus.        Right eye: No discharge.         Left eye: No discharge.      Conjunctiva/sclera: Conjunctivae normal.      Pupils: Pupils are equal, round, and reactive to light.   Neck:      Thyroid: No thyromegaly.      Vascular: No JVD.      Trachea: No tracheal deviation.   Cardiovascular:      Rate and Rhythm: Normal rate and regular rhythm.      Heart sounds: Normal heart sounds. No murmur heard.     No friction rub. No gallop.   Pulmonary:      Effort: Pulmonary effort is normal. No respiratory distress.      Breath sounds: Normal breath sounds. No stridor. No wheezing or rales.   Chest:      Chest wall: No tenderness.   Abdominal:      General: Bowel sounds are normal. There is no distension.      Palpations: Abdomen is soft. There is no mass.      Tenderness: There is no abdominal tenderness. There is no guarding or rebound.   Musculoskeletal:         General: No tenderness. Normal range of motion.      Cervical back: Normal range of motion and neck supple.   Lymphadenopathy:      Cervical: No cervical adenopathy.   Skin:     General: Skin is warm and dry.      Coloration: Skin is not pale.      Findings: No erythema or rash.   Neurological:      Mental Status: She is alert and oriented to person, place, and time.      Cranial Nerves: No  cranial nerve deficit.      Motor: No abnormal muscle tone.      Coordination: Coordination normal.      Deep Tendon Reflexes: Reflexes are normal and symmetric.   Psychiatric:         Behavior: Behavior normal.         Thought Content: Thought content normal.         Judgment: Judgment normal.            Assessment and Plan     1. Moderate episode of recurrent major depressive disorder  -     clonazePAM (KLONOPIN) 0.5 MG tablet; Take 1 tablet (0.5 mg total) by mouth nightly as needed for Anxiety.  Dispense: 30 tablet; Refill: 0  -     sertraline (ZOLOFT) 50 MG tablet; Take 1 tablet (50 mg total) by mouth once daily.  Dispense: 90 tablet; Refill: 3    2. Vitamin D deficiency  -     ergocalciferol (ERGOCALCIFEROL) 50,000 unit Cap; Take 1 capsule (50,000 Units total) by mouth every 7 days.  Dispense: 12 capsule; Refill: 0    3. PAD (peripheral artery disease)    4. COPD exacerbation    5. Breast cancer metastasized to axillary lymph node, left  Overview:  Visits and path in care everywhere  Diagnosed September 2017.  Status post chemo/xrt  Bilateral mastectomy 4/2018.  Would like to establish follow up care at [facility].      6. Postoperative atrial fibrillation    7. Atherosclerotic heart disease of native coronary artery with other forms of angina pectoris                 Follow up in about 6 months (around 10/17/2024), or if symptoms worsen or fail to improve.

## 2024-04-26 DIAGNOSIS — E11.59 TYPE 2 DIABETES MELLITUS WITH OTHER CIRCULATORY COMPLICATION, WITH LONG-TERM CURRENT USE OF INSULIN: Primary | ICD-10-CM

## 2024-04-26 DIAGNOSIS — Z79.4 TYPE 2 DIABETES MELLITUS WITH OTHER CIRCULATORY COMPLICATION, WITH LONG-TERM CURRENT USE OF INSULIN: Primary | ICD-10-CM

## 2024-04-26 DIAGNOSIS — E11.9 TYPE 2 DIABETES MELLITUS WITHOUT COMPLICATION, WITHOUT LONG-TERM CURRENT USE OF INSULIN: ICD-10-CM

## 2024-04-26 RX ORDER — LINAGLIPTIN 5 MG/1
TABLET, FILM COATED ORAL
Qty: 90 TABLET | Refills: 1 | Status: SHIPPED | OUTPATIENT
Start: 2024-04-26

## 2024-04-26 NOTE — TELEPHONE ENCOUNTER
Refill Decision Note   Jud Allen  is requesting a refill authorization.  Brief Assessment and Rationale for Refill:  Approve     Medication Therapy Plan:         Pharmacist review requested: Yes   Comments:     Note composed:4:26 PM 04/26/2024

## 2024-04-26 NOTE — TELEPHONE ENCOUNTER
Spoke to pt. Pt states yesterday her blood sugars was 316.  Pt states she hates to inject herself. Pt states she hasn't been taking insulin but she would like to try again. Pt states she needs an appointment and she needs insulin sent to [facility].

## 2024-04-26 NOTE — TELEPHONE ENCOUNTER
Refill Routing Note   Medication(s) are not appropriate for processing by [facility] for the following reason(s):      Drug-disease interaction    [facility] action(s):  Defer Care Due:  None identified     Medication Therapy Plan: TRADJENTA and Hypoglycemia associated with diabetes    Pharmacist review requested: Yes     Appointments  past 12m or future 3m with PCP    Date Provider   Last Visit   4/17/2024 Bo Lopez MD   Next Visit   10/17/2024 Bo Lopez MD   ED visits in past 90 days: 0        Note composed:4:07 PM 04/26/2024

## 2024-04-26 NOTE — TELEPHONE ENCOUNTER
----- Message from Rosario Heck sent at 4/26/2024  8:40 AM CDT -----  Regarding: Appt requested  Contact: [phone]  Hi, pt called to request a call to discuss scheduling an appt for High Blood sugar. Pls call the pt at  [phone] to discuss.  Thank you.

## 2024-04-26 NOTE — TELEPHONE ENCOUNTER
No care due was identified.  [facility] Embedded Care Due Messages. Reference number: [id number].   4/26/2024 10:12:43 AM CDT

## 2024-04-29 RX ORDER — INSULIN GLARGINE 100 [IU]/ML
10 INJECTION, SOLUTION SUBCUTANEOUS NIGHTLY
Qty: 15 ML | Refills: 1 | Status: SHIPPED | OUTPATIENT
Start: 2024-04-29 | End: 2024-05-13 | Stop reason: SDUPTHER

## 2024-05-08 ENCOUNTER — PATIENT OUTREACH (OUTPATIENT)
Dept: ADMINISTRATIVE | Facility: HOSPITAL | Age: 64
End: 2024-05-08
Payer: MEDICARE

## 2024-05-08 NOTE — PROGRESS NOTES
Health Maintenance Topic(s) Outreach Outcomes & Actions Taken:    Osteoporosis Screening - Outreach Outcomes & Actions Taken  : Patient Declined Scheduling Dexa or Will Call Back to Schedule    Colorectal Cancer Screening - Outreach Outcomes & Actions Taken  : Pt Will Schedule with External Provider / Order Routed & Care Team Updated if Applicable       Additional Notes:       Care Management, Digital Medicine, and/or Education Referrals

## 2024-05-13 DIAGNOSIS — E11.59 TYPE 2 DIABETES MELLITUS WITH OTHER CIRCULATORY COMPLICATION, WITH LONG-TERM CURRENT USE OF INSULIN: ICD-10-CM

## 2024-05-13 DIAGNOSIS — Z79.4 TYPE 2 DIABETES MELLITUS WITH OTHER CIRCULATORY COMPLICATION, WITH LONG-TERM CURRENT USE OF INSULIN: ICD-10-CM

## 2024-05-13 RX ORDER — INSULIN GLARGINE 100 [IU]/ML
10 INJECTION, SOLUTION SUBCUTANEOUS NIGHTLY
Qty: 15 ML | Refills: 1 | Status: SHIPPED | OUTPATIENT
Start: 2024-05-13

## 2024-05-13 NOTE — TELEPHONE ENCOUNTER
I did an telephone encounter on pt last week but I dont see it documented. Assuming I didn't sign it (not sure)    But when I spoke with pt. Pt stated her blood sugar has been running high and she hasn't been taking the insulin (she didn't want to,. now pt states she is willing to try the insulin again. If you can send in insulin to [facility] so she can managed her blood sugars. Pt dexcom is connected but it doesn't show any upload information.

## 2024-06-06 ENCOUNTER — OFFICE VISIT (OUTPATIENT)
Dept: OBSTETRICS AND GYNECOLOGY | Facility: CLINIC | Age: 64
End: 2024-06-06
Payer: MEDICARE

## 2024-06-06 VITALS
DIASTOLIC BLOOD PRESSURE: 70 MMHG | WEIGHT: 124.31 LBS | HEIGHT: 62 IN | SYSTOLIC BLOOD PRESSURE: 116 MMHG | BODY MASS INDEX: 22.88 KG/M2

## 2024-06-06 DIAGNOSIS — Z01.419 WELL WOMAN EXAM: Primary | ICD-10-CM

## 2024-06-06 DIAGNOSIS — N89.8 VAGINAL DISCHARGE: ICD-10-CM

## 2024-06-06 LAB
BILIRUB SERPL-MCNC: NORMAL MG/DL
BLOOD URINE, POC: NORMAL
CLARITY, POC UA: NORMAL
COLOR, POC UA: NORMAL
GLUCOSE UR QL STRIP: 500
KETONES UR QL STRIP: NORMAL
LEUKOCYTE ESTERASE URINE, POC: NORMAL
NITRITE, POC UA: NORMAL
PH, POC UA: NORMAL
PROTEIN, POC: NORMAL
SPECIFIC GRAVITY, POC UA: NORMAL
UROBILINOGEN, POC UA: NORMAL

## 2024-06-06 PROCEDURE — 3008F BODY MASS INDEX DOCD: CPT | Mod: HCNC,CPTII,S$GLB, | Performed by: STUDENT IN AN ORGANIZED HEALTH CARE EDUCATION/TRAINING PROGRAM

## 2024-06-06 PROCEDURE — 1101F PT FALLS ASSESS-DOCD LE1/YR: CPT | Mod: HCNC,CPTII,S$GLB, | Performed by: STUDENT IN AN ORGANIZED HEALTH CARE EDUCATION/TRAINING PROGRAM

## 2024-06-06 PROCEDURE — 1159F MED LIST DOCD IN RCRD: CPT | Mod: HCNC,CPTII,S$GLB, | Performed by: STUDENT IN AN ORGANIZED HEALTH CARE EDUCATION/TRAINING PROGRAM

## 2024-06-06 PROCEDURE — 81514 NFCT DS BV&VAGINITIS DNA ALG: CPT | Mod: HCNC | Performed by: STUDENT IN AN ORGANIZED HEALTH CARE EDUCATION/TRAINING PROGRAM

## 2024-06-06 PROCEDURE — 99213 OFFICE O/P EST LOW 20 MIN: CPT | Mod: HCNC,S$GLB,, | Performed by: STUDENT IN AN ORGANIZED HEALTH CARE EDUCATION/TRAINING PROGRAM

## 2024-06-06 PROCEDURE — 1160F RVW MEDS BY RX/DR IN RCRD: CPT | Mod: HCNC,CPTII,S$GLB, | Performed by: STUDENT IN AN ORGANIZED HEALTH CARE EDUCATION/TRAINING PROGRAM

## 2024-06-06 PROCEDURE — 3078F DIAST BP <80 MM HG: CPT | Mod: HCNC,CPTII,S$GLB, | Performed by: STUDENT IN AN ORGANIZED HEALTH CARE EDUCATION/TRAINING PROGRAM

## 2024-06-06 PROCEDURE — 81002 URINALYSIS NONAUTO W/O SCOPE: CPT | Mod: HCNC,S$GLB,, | Performed by: STUDENT IN AN ORGANIZED HEALTH CARE EDUCATION/TRAINING PROGRAM

## 2024-06-06 PROCEDURE — 3052F HG A1C>EQUAL 8.0%<EQUAL 9.0%: CPT | Mod: HCNC,CPTII,S$GLB, | Performed by: STUDENT IN AN ORGANIZED HEALTH CARE EDUCATION/TRAINING PROGRAM

## 2024-06-06 PROCEDURE — 3288F FALL RISK ASSESSMENT DOCD: CPT | Mod: HCNC,CPTII,S$GLB, | Performed by: STUDENT IN AN ORGANIZED HEALTH CARE EDUCATION/TRAINING PROGRAM

## 2024-06-06 PROCEDURE — 3072F LOW RISK FOR RETINOPATHY: CPT | Mod: HCNC,CPTII,S$GLB, | Performed by: STUDENT IN AN ORGANIZED HEALTH CARE EDUCATION/TRAINING PROGRAM

## 2024-06-06 PROCEDURE — 3074F SYST BP LT 130 MM HG: CPT | Mod: HCNC,CPTII,S$GLB, | Performed by: STUDENT IN AN ORGANIZED HEALTH CARE EDUCATION/TRAINING PROGRAM

## 2024-06-06 PROCEDURE — 99999 PR PBB SHADOW E&M-EST. PATIENT-LVL IV: CPT | Mod: PBBFAC,HCNC,, | Performed by: STUDENT IN AN ORGANIZED HEALTH CARE EDUCATION/TRAINING PROGRAM

## 2024-06-06 PROCEDURE — 1126F AMNT PAIN NOTED NONE PRSNT: CPT | Mod: HCNC,CPTII,S$GLB, | Performed by: STUDENT IN AN ORGANIZED HEALTH CARE EDUCATION/TRAINING PROGRAM

## 2024-06-06 NOTE — PROGRESS NOTES
"History & Physical  Gynecology      SUBJECTIVE:     Chief Complaint: Annual Exam       History of Present Illness:  64 y.o. [id number] here for annual GYN visit.   She is  and s/p hysterectomy (ovaries remain) at the age of 36.  She complains of vaginal discharge and odor.  Patient is not sexually active.   She continues to smoke "sometimes," denies alcohol or drug use.   She is a former  (for [employer]), lives with her grandson and reports feeling safe at home.     Patient has no history of abnormal paps  Last Pap:  - NILM  Last Colonoscopy:      She denies a family history of breast or ovarian cancer.     Review of patient's allergies indicates:  No Known Allergies    Past Medical History:   Diagnosis Date    Asthma     Breast carcinoma     Cataract     Coronary artery disease of native heart with stable angina pectoris 2023    Diabetes mellitus     Hyperlipidemia     Moderate episode of recurrent major depressive disorder 2021    Osteoporosis     Stroke 2023    left PCA     Past Surgical History:   Procedure Laterality Date    BILATERAL MASTECTOMY  2018    CHOLECYSTECTOMY      COLONOSCOPY N/A 10/27/2015    Procedure: COLONOSCOPY;  Surgeon: Johnny Hurley MD;  Location: [facility];  Service: Endoscopy;  Laterality: N/A;    CORONARY ANGIOGRAPHY N/A 2023    Procedure: ANGIOGRAM, CORONARY ARTERY;  Surgeon: Francisco Barahona MD;  Location: [facility] CATH LAB;  Service: Cardiology;  Laterality: N/A;    CORONARY ANGIOPLASTY      CORONARY ARTERY BYPASS GRAFT  2023    LIMA to LAD, SVG to OM    CORONARY ARTERY BYPASS GRAFT (CABG) N/A 2023    Procedure: CORONARY ARTERY BYPASS GRAFT (CABG) x 2;  Surgeon: Igor Kahn MD;  Location: [facility] OR [address] [place] [address];  Service: Cardiothoracic;  Laterality: N/A;    ENDOSCOPIC HARVEST OF VEIN Left 2023    Procedure: SURGICAL PROCUREMENT, VEIN, ENDOSCOPIC;  Surgeon: Igor Kahn MD;  Location: [facility] OR [address] [place] [address];  Service: " "Cardiothoracic;  Laterality: Left;  Vein harvest start - stop: 0817 - 0856  Vein prep start - stop: 0857 - 0917    ESOPHAGOGASTRODUODENOSCOPY N/A 01/24/2022    Procedure: ESOPHAGOGASTRODUODENOSCOPY (EGD);  Surgeon: Mili Katz MD;  Location: [facility] ENDO (4TH FLR);  Service: Endoscopy;  Laterality: N/A;  rapid in AM, instr portal -ml    HYSTERECTOMY      LASER PERIPHERAL IRIDOTOMY Bilateral 2019        LEFT HEART CATHETERIZATION Left 04/05/2023    Procedure: Left heart cath;  Surgeon: Francisco Barahona MD;  Location: [facility] CATH LAB;  Service: Cardiology;  Laterality: Left;     OB History    No obstetric history on file.       Family History   Problem Relation Name Age of Onset    Diabetes Father      Congenital heart disease Brother      Amblyopia Neg Hx      Blindness Neg Hx      Cataracts Neg Hx      Glaucoma Neg Hx      Macular degeneration Neg Hx      Retinal detachment Neg Hx      Strabismus Neg Hx       Social History     Tobacco Use    Smoking status: Some Days     Current packs/day: 0.50     Average packs/day: 1 pack/day for 46.4 years (46.1 ttl pk-yrs)     Types: Cigarettes     Start date: 1978     Passive exposure: Past    Smokeless tobacco: Current   Substance Use Topics    Alcohol use: No     Alcohol/week: 0.0 standard drinks of alcohol    Drug use: No       Current Outpatient Medications   Medication Sig    acetaminophen (TYLENOL) 500 MG tablet Take 2 tablets (1,000 mg total) by mouth every 6 (six) hours as needed for Pain.    albuterol (PROVENTIL) 2.5 mg /3 mL (0.083 %) nebulizer solution INHALE 1 VIAL PER NEBULIZER FOUR TIMES DAILY AS NEEDED FOR SHORTNESS OF BREATH/ WHEEZING    albuterol (VENTOLIN HFA) 90 mcg/actuation inhaler INHALE 2 PUFFS BY MOUTH INTO THE LUNGS EVERY 6 HOURS AS NEEDED FOR WHEEZING    apixaban (ELIQUIS) 5 mg Tab Take 1 tablet (5 mg total) by mouth 2 (two) times daily.    BD ULTRA-FINE SHORT PEN NEEDLE 31 gauge x 5/16" Ndle Inject 1 pen into the skin once daily.    " blood-glucose sensor (DEXCOM G7 SENSOR) Vicki 1 Device by Misc.(Non-Drug; Combo Route) route every 10 days.    clonazePAM (KLONOPIN) 0.5 MG tablet Take 1 tablet (0.5 mg total) by mouth nightly as needed for Anxiety.    ergocalciferol (ERGOCALCIFEROL) 50,000 unit Cap Take 1 capsule (50,000 Units total) by mouth every 7 days.    fluticasone propionate (FLONASE) 50 mcg/actuation nasal spray SHAKE LIQUID AND USE 1 SPRAY(50 MCG) IN EACH NOSTRIL EVERY DAY    insulin glargine U-100, Lantus, (LANTUS SOLOSTAR U-100 INSULIN) 100 unit/mL (3 mL) InPn pen Inject 10 Units into the skin every evening.    JARDIANCE 10 mg tablet TAKE 1 TABLET(10 MG) BY MOUTH EVERY DAY    meloxicam (MOBIC) 15 MG tablet Take 1 tablet (15 mg total) by mouth daily as needed for Pain.    metFORMIN (GLUCOPHAGE) 1000 MG tablet Take 1 tablet (1,000 mg total) by mouth 2 (two) times daily with meals.    nicotine (NICODERM CQ) 14 mg/24 hr Place 1 patch onto the skin once daily.    scopolamine (TRANSDERM-SCOP) 1.3-1.5 mg (1 mg over 3 days) Place 1 patch onto the skin Every 3 (three) days.    sertraline (ZOLOFT) 50 MG tablet Take 1 tablet (50 mg total) by mouth once daily.    sucralfate (CARAFATE) 1 gram tablet TAKE 1 TABLET(1 GRAM) BY MOUTH THREE TIMES DAILY BEFORE MEALS    topiramate (TOPAMAX) 50 MG tablet Take 1 tablet (50 mg total) by mouth 2 (two) times daily.    TRADJENTA 5 mg Tab tablet TAKE 1 TABLET(5 MG) BY MOUTH EVERY DAY    TRELEGY ELLIPTA 100-62.5-25 mcg DsDv INHALE 1 PUFF INTO THE LUNGS EVERY DAY    TRUE METRIX GLUCOSE TEST STRIP Strp USE AS DIRECTED FOUR TIMES DAILY    ubrogepant (UBRELVY) 100 mg tablet Take 1 tablet (100 mg total) by mouth as needed for Migraine. If symptoms persist or return, may repeat dose after 2 hours. Maximum: 200 mg per 24 hours    atorvastatin (LIPITOR) 80 MG tablet Take 1 tablet (80 mg total) by mouth every evening.    metoprolol tartrate (LOPRESSOR) 50 MG tablet Take 1 tablet (50 mg total) by mouth 2 (two) times daily.     pantoprazole (PROTONIX) 40 MG tablet Take 1 tablet (40 mg total) by mouth before breakfast.     No current facility-administered medications for this visit.       Review of Systems:  Review of Systems   Constitutional:  Negative for chills, fatigue and fever.   HENT:  Negative for congestion.    Eyes:  Negative for visual disturbance.   Respiratory:  Negative for cough and shortness of breath.    Cardiovascular:  Negative for chest pain and palpitations.   Gastrointestinal:  Negative for abdominal distention, abdominal pain, constipation, diarrhea, nausea and vomiting.   Genitourinary:  Positive for vaginal discharge. Negative for difficulty urinating, dysuria, hematuria and vaginal bleeding.   Skin:  Negative for rash.   Neurological:  Negative for dizziness, seizures, light-headedness and headaches.   Hematological:  Does not bruise/bleed easily.   Psychiatric/Behavioral:  Negative for dysphoric mood. The patient is not nervous/anxious.         OBJECTIVE:     Physical Exam:  Vitals:    06/06/24 0919   BP: 116/70      Physical Exam  Vitals and nursing note reviewed. Exam conducted with a chaperone present.   Constitutional:       General: She is not in acute distress.     Appearance: She is well-developed.   HENT:      Head: Normocephalic and atraumatic.   Eyes:      Pupils: Pupils are equal, round, and reactive to light.   Cardiovascular:      Rate and Rhythm: Normal rate and regular rhythm.   Pulmonary:      Effort: Pulmonary effort is normal. No respiratory distress.   Abdominal:      General: There is no distension.      Palpations: Abdomen is soft. There is no mass.      Tenderness: There is no abdominal tenderness. There is no guarding.   Genitourinary:     Comments: SSE: Normal external female genitalia, normal urethral meatus, atrophic vaginal rugae and vaginal mucosa, no vaginal blood in canal, normal physiologic discharge, surgically absent cervix and uterus, no adnexal masses palpated on bimanual exam.    Musculoskeletal:         General: Normal range of motion.      Cervical back: Normal range of motion and neck supple.   Skin:     General: Skin is warm and dry.   Neurological:      Mental Status: She is alert and oriented to person, place, and time.   Psychiatric:         Behavior: Behavior normal.         Thought Content: Thought content normal.         Judgment: Judgment normal.         ASSESSMENT/PLAN:     1. Well woman exam  - Pap smear - No longer indicated.   - Mammogram - Followed by breast cancer team  - Colonoscopy - Due ordered per PCP  - Dexa scan - Up to date  - Calcium and Vitamin D counseling  14 - 30 years old 1,300mg Ca/d; 600 IU vit D/d  31 - 50 years old 1,000 Ca/d; 600 IU vit D/d  51 - 70 year old: 1,200 Ca/d; 600 IU vit D/d  71+ years old 1,200 Ca/d; 800 IU vit D/d  - POCT URINE DIPSTICK WITHOUT MICROSCOPE    2. Vaginal discharge  - Affirm collected      Ana Wagner M.D.  OB/GYN  [facility]

## 2024-06-09 DIAGNOSIS — Z79.4 TYPE 2 DIABETES MELLITUS WITH HYPERGLYCEMIA, WITH LONG-TERM CURRENT USE OF INSULIN: ICD-10-CM

## 2024-06-09 DIAGNOSIS — E11.65 TYPE 2 DIABETES MELLITUS WITH HYPERGLYCEMIA, WITH LONG-TERM CURRENT USE OF INSULIN: ICD-10-CM

## 2024-06-09 RX ORDER — METFORMIN HYDROCHLORIDE 1000 MG/1
1000 TABLET ORAL 2 TIMES DAILY WITH MEALS
Qty: 180 TABLET | Refills: 1 | Status: SHIPPED | OUTPATIENT
Start: 2024-06-09

## 2024-06-10 NOTE — TELEPHONE ENCOUNTER
Jud Villa  is requesting a refill authorization.  Brief Assessment and Rationale for Refill:  Approve     Medication Therapy Plan:         Comments:     Note composed:9:43 PM 06/09/2024

## 2024-06-15 NOTE — PROGRESS NOTES
HPI     Glaucoma            Comments: Overdue 4 month IOP ck and pt feels vision is getting worse and   is blurry all the time          Comments    DLS: 9/28/23    Pt feels vision is worse and is always blurry. Pt states that her vision   is better when she wears her glasses, but she does not like the way they   were made. Pt has difficulty adjusting to them.     Pt has never adjusted to the progressive bifocals she had prescribed in   2023   Has a lot of trouble with prolonged computer work - has trouble with   trying to adjust her head position to see clearly     1. Hx Narrow Angles  2. NS OU  3. EDUARDO/KCS  3. Hx TIA/CVA  4. S/P laser PI's ou           Last edited by Claribel Pereira MD on 6/20/2024  5:10 PM.            Assessment /Plan     For exam results, see Encounter Report.    Anatomical narrow angle borderline glaucoma of both eyes    Nuclear sclerotic cataract of both eyes    Visual field defect due to and not concurrent with cerebrovascular accident (CVA)    Dry eyes, bilateral    S/P laser iridotomy    Hx of migraines    H/O TIA (transient ischemic attack) and stroke           Glaucoma (type and duration)    Narrow angles   First HVF   //   First photos   3/25/2023   Treatment / Drops started   //           Family history    none        Glaucoma meds    none        H/O adverse rxn to glaucoma drops    none        LASERS   PI od 2/21/2019 // PI OS 3/28/2019  (did not F/U for 4 yrs - 3/3032)        GLAUCOMA SURGERIES    none        OTHER EYE SURGERIES    none        CDR    0.35/0.35        Tbase    18-22          Tmax    22/18            Ttarget    //             HVF    /1 test 2023 to  2023 -  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 3/13/2023          Gonio    +1/+1 (pre- PI's)         CCT    /        OCT    1 test 2023 to 20/23- RNFL - nl od // nl os        Disc photos    3/2023     - Ttoday  13/13  - Test done today  IOP / gonio       NSC  Mild - Not  Vis sign - BCVA 20/20 ou (( 20/30 ou without  glasses ) (6/2024)    EDUARDO - KCS   See kateryna note 1/7/2020 9/28/2023  =(( VF loss post CVA ))   -- + right sup quadrantic loss ou - 2/2 cva 3/2023  S/P  CABG - x 2 - April 15th 2023 - still not feeling well as of 9/28/2023 - is to have another stress test soon     6/20/2024   IOP ok   Gonio + 3 ou post laser PI's  Mild NS ou // BCVA 20/20 ou    Dislikes the progressive glasses - has a great deal of difficulty doing prolonged computer work  Rec distance glasses with a lined bifocal set a mid range for computer (rx given)     Today's visit is associated with current and anticipated ongoing medical care related to this patient's single serious/complex condition (glaucoma suspect / vision loss 2/2 CVA / catraacts ) Follow up is to be continued indefinitely to monitor the condition.    F/U 9 months with HVF / DFE / OCT

## 2024-06-19 DIAGNOSIS — G43.711 CHRONIC MIGRAINE WITHOUT AURA, INTRACTABLE, WITH STATUS MIGRAINOSUS: ICD-10-CM

## 2024-06-20 ENCOUNTER — OFFICE VISIT (OUTPATIENT)
Dept: OPHTHALMOLOGY | Facility: CLINIC | Age: 64
End: 2024-06-20
Payer: MEDICARE

## 2024-06-20 DIAGNOSIS — I69.398 VISUAL FIELD DEFECT DUE TO AND NOT CONCURRENT WITH CEREBROVASCULAR ACCIDENT (CVA): ICD-10-CM

## 2024-06-20 DIAGNOSIS — Z86.73 H/O TIA (TRANSIENT ISCHEMIC ATTACK) AND STROKE: ICD-10-CM

## 2024-06-20 DIAGNOSIS — Z98.890 S/P LASER IRIDOTOMY: ICD-10-CM

## 2024-06-20 DIAGNOSIS — H25.13 NUCLEAR SCLEROTIC CATARACT OF BOTH EYES: ICD-10-CM

## 2024-06-20 DIAGNOSIS — H40.033 ANATOMICAL NARROW ANGLE BORDERLINE GLAUCOMA OF BOTH EYES: Primary | ICD-10-CM

## 2024-06-20 DIAGNOSIS — Z86.69 HX OF MIGRAINES: ICD-10-CM

## 2024-06-20 DIAGNOSIS — H04.123 DRY EYES, BILATERAL: ICD-10-CM

## 2024-06-20 DIAGNOSIS — H53.40 VISUAL FIELD DEFECT DUE TO AND NOT CONCURRENT WITH CEREBROVASCULAR ACCIDENT (CVA): ICD-10-CM

## 2024-06-20 PROCEDURE — 1101F PT FALLS ASSESS-DOCD LE1/YR: CPT | Mod: HCNC,CPTII,S$GLB, | Performed by: OPHTHALMOLOGY

## 2024-06-20 PROCEDURE — 1126F AMNT PAIN NOTED NONE PRSNT: CPT | Mod: HCNC,CPTII,S$GLB, | Performed by: OPHTHALMOLOGY

## 2024-06-20 PROCEDURE — 3052F HG A1C>EQUAL 8.0%<EQUAL 9.0%: CPT | Mod: HCNC,CPTII,S$GLB, | Performed by: OPHTHALMOLOGY

## 2024-06-20 PROCEDURE — 99999 PR PBB SHADOW E&M-EST. PATIENT-LVL III: CPT | Mod: PBBFAC,HCNC,, | Performed by: OPHTHALMOLOGY

## 2024-06-20 PROCEDURE — 1159F MED LIST DOCD IN RCRD: CPT | Mod: HCNC,CPTII,S$GLB, | Performed by: OPHTHALMOLOGY

## 2024-06-20 PROCEDURE — 99214 OFFICE O/P EST MOD 30 MIN: CPT | Mod: HCNC,S$GLB,, | Performed by: OPHTHALMOLOGY

## 2024-06-20 PROCEDURE — 3288F FALL RISK ASSESSMENT DOCD: CPT | Mod: HCNC,CPTII,S$GLB, | Performed by: OPHTHALMOLOGY

## 2024-06-20 PROCEDURE — 1160F RVW MEDS BY RX/DR IN RCRD: CPT | Mod: HCNC,CPTII,S$GLB, | Performed by: OPHTHALMOLOGY

## 2024-06-24 DIAGNOSIS — I48.91 POSTOPERATIVE ATRIAL FIBRILLATION: ICD-10-CM

## 2024-06-24 DIAGNOSIS — Z86.73 HISTORY OF CVA (CEREBROVASCULAR ACCIDENT): ICD-10-CM

## 2024-06-24 DIAGNOSIS — I97.89 POSTOPERATIVE ATRIAL FIBRILLATION: ICD-10-CM

## 2024-06-24 DIAGNOSIS — E78.00 PURE HYPERCHOLESTEROLEMIA: ICD-10-CM

## 2024-06-26 RX ORDER — UBROGEPANT 100 MG/1
TABLET ORAL
Qty: 12 TABLET | Refills: 1 | Status: SHIPPED | OUTPATIENT
Start: 2024-06-26

## 2024-06-26 RX ORDER — APIXABAN 5 MG/1
5 TABLET, FILM COATED ORAL 2 TIMES DAILY
Qty: 60 TABLET | Refills: 11 | Status: SHIPPED | OUTPATIENT
Start: 2024-06-26

## 2024-07-11 DIAGNOSIS — Z78.0 MENOPAUSE: ICD-10-CM

## 2024-07-25 ENCOUNTER — OFFICE VISIT (OUTPATIENT)
Dept: CARDIOLOGY | Facility: CLINIC | Age: 64
End: 2024-07-25
Payer: MEDICARE

## 2024-07-25 ENCOUNTER — TELEPHONE (OUTPATIENT)
Dept: CARDIOLOGY | Facility: CLINIC | Age: 64
End: 2024-07-25
Payer: MEDICARE

## 2024-07-25 VITALS
SYSTOLIC BLOOD PRESSURE: 101 MMHG | HEART RATE: 87 BPM | WEIGHT: 116.88 LBS | OXYGEN SATURATION: 95 % | HEIGHT: 62 IN | BODY MASS INDEX: 21.51 KG/M2 | DIASTOLIC BLOOD PRESSURE: 58 MMHG

## 2024-07-25 DIAGNOSIS — Z95.1 S/P CABG X 2: ICD-10-CM

## 2024-07-25 DIAGNOSIS — I48.91 POSTOPERATIVE ATRIAL FIBRILLATION: ICD-10-CM

## 2024-07-25 DIAGNOSIS — R07.9 CHEST PAIN, UNSPECIFIED TYPE: Primary | ICD-10-CM

## 2024-07-25 DIAGNOSIS — Z01.810 PREOP CARDIOVASCULAR EXAM: Primary | ICD-10-CM

## 2024-07-25 DIAGNOSIS — Z87.891 PERSONAL HISTORY OF NICOTINE DEPENDENCE: ICD-10-CM

## 2024-07-25 DIAGNOSIS — R07.9 CHEST PAIN, UNSPECIFIED TYPE: ICD-10-CM

## 2024-07-25 DIAGNOSIS — Z71.6 TOBACCO ABUSE COUNSELING: ICD-10-CM

## 2024-07-25 DIAGNOSIS — I97.89 POSTOPERATIVE ATRIAL FIBRILLATION: ICD-10-CM

## 2024-07-25 DIAGNOSIS — I25.10 CORONARY ARTERY DISEASE INVOLVING NATIVE CORONARY ARTERY OF NATIVE HEART WITHOUT ANGINA PECTORIS: ICD-10-CM

## 2024-07-25 PROCEDURE — 99999 PR PBB SHADOW E&M-EST. PATIENT-LVL IV: CPT | Mod: PBBFAC,HCNC,GC, | Performed by: STUDENT IN AN ORGANIZED HEALTH CARE EDUCATION/TRAINING PROGRAM

## 2024-07-25 PROCEDURE — 93000 ELECTROCARDIOGRAM COMPLETE: CPT | Mod: HCNC,S$GLB,, | Performed by: INTERNAL MEDICINE

## 2024-07-25 RX ORDER — ATORVASTATIN CALCIUM 80 MG/1
80 TABLET, FILM COATED ORAL NIGHTLY
Qty: 90 TABLET | Refills: 3 | Status: SHIPPED | OUTPATIENT
Start: 2024-07-25 | End: 2025-07-25

## 2024-07-25 NOTE — TELEPHONE ENCOUNTER
Patient called back.  Appt made for today for preop.  Patient agreed with appt. Patient also reports she has been having some chest pain.  EKG ordered. To be done in the room.  No avail slots around appt time

## 2024-07-25 NOTE — PROGRESS NOTES
Cardiology Clinic Note  Reason for Visit: Preop evaluation for GI procedure    HPI:       64yo F patient with medical diseases as below, who comes for preop evaluation prior to colonoscopy and for decision regarding anticoagulation.     She is having chest pain for the last 3 weeks, sharp, precordial, whenever she moves or bends down, and resolves once she gets back to a more relaxed position, she goes up and down her house stairs (at least 14 steps) at least 4 times a day without chest pain. She also started smoking again.     She is getting shortness of breath whenever she is walking up the stairs, but she stops when she reaches the top of the stairs, not associated with chest pain.     She also complain of palpitations which she associates with stress or if she overexerts herself (vacuuming her car) to the point that she had to lie down.     She has not been taking     She was seen for chest pain before on 08/2023, similar to pain she felt prior to CABG but PET stress 10/18/23 showed a non-transmural infarct that was smaller in size and improved flows than prior study on 04/30/23, with succesful revascularization.     She also had post-op Afib and there was a concern for current Afib, she followed with Dr. Xavier (EP) and was considering ILR.     Medical problems:  - CAD s/p CABG (LIMA-LAD and SVG-OM1) - 04/2023  - Postoperative AF - CHADSVASC 7 - on Eliquis  - HTN  - HLD - Atorvastatin 80 qhs  - H/o CVA 03/2023  - DM - Tradjenta, Metformin and Jardiance  - Tobacco use   - COPD  - Migraine - Topiromate  - Breast cancer 2017, triple negative (mastectomy, AC+T)    Patient denies chest pain with exertion or at rest, palpitations, shortness of breath, dyspnea on exertion, dizziness, syncope, edema, orthopnea, paroxysmal nocturnal dyspnea, or claudication.    ROS:    Constitution: Negative for fever, chills, weight loss or gain.   HENT: Negative for sore throat, rhinorrhea, or headache.  Eyes: Negative for  blurred or double vision.   Cardiovascular: See above  Pulmonary: Negative for SOB   Gastrointestinal: Negative for abdominal pain, nausea, vomiting, or diarrhea.   : Negative for dysuria.   Neurological: Negative for focal weakness or sensory changes.  PMH:     Past Medical History:   Diagnosis Date    Asthma     Breast carcinoma     Cataract     Coronary artery disease of native heart with stable angina pectoris 04/04/2023    Diabetes mellitus     Hyperlipidemia     Moderate episode of recurrent major depressive disorder 05/19/2021    Osteoporosis     Stroke 03/2023    left PCA     Past Surgical History:   Procedure Laterality Date    BILATERAL MASTECTOMY  04/26/2018    CHOLECYSTECTOMY      COLONOSCOPY N/A 10/27/2015    Procedure: COLONOSCOPY;  Surgeon: Johnny Hurley MD;  Location: [facility] ENDO;  Service: Endoscopy;  Laterality: N/A;    CORONARY ANGIOGRAPHY N/A 04/05/2023    Procedure: ANGIOGRAM, CORONARY ARTERY;  Surgeon: Francisco Barahona MD;  Location: [facility] CATH LAB;  Service: Cardiology;  Laterality: N/A;    CORONARY ANGIOPLASTY      CORONARY ARTERY BYPASS GRAFT  04/2023    LIMA to LAD, SVG to OM    CORONARY ARTERY BYPASS GRAFT (CABG) N/A 04/14/2023    Procedure: CORONARY ARTERY BYPASS GRAFT (CABG) x 2;  Surgeon: Igor Kahn MD;  Location: [facility] OR [address] [place] [address];  Service: Cardiothoracic;  Laterality: N/A;    ENDOSCOPIC HARVEST OF VEIN Left 04/14/2023    Procedure: SURGICAL PROCUREMENT, VEIN, ENDOSCOPIC;  Surgeon: Igor Kahn MD;  Location: [facility] OR 2ND FLR;  Service: Cardiothoracic;  Laterality: Left;  Vein harvest start - stop: 0817 - 0856  Vein prep start - stop: 0857 - 0917    ESOPHAGOGASTRODUODENOSCOPY N/A 01/24/2022    Procedure: ESOPHAGOGASTRODUODENOSCOPY (EGD);  Surgeon: Mili Katz MD;  Location: [facility] ENDO ([facility]);  Service: Endoscopy;  Laterality: N/A;  rapid in AM, instr portal -ml    HYSTERECTOMY      LASER PERIPHERAL IRIDOTOMY Bilateral 2019        LEFT [facility]  "CATHETERIZATION Left 04/05/2023    Procedure: Left heart cath;  Surgeon: Francisco Barahona MD;  Location: [facility] CATH LAB;  Service: Cardiology;  Laterality: Left;     Allergies:   Review of patient's allergies indicates:  No Known Allergies  Medications:     Current Outpatient Medications on File Prior to Visit   Medication Sig Dispense Refill    acetaminophen (TYLENOL) 500 MG tablet Take 2 tablets (1,000 mg total) by mouth every 6 (six) hours as needed for Pain. 30 tablet 1    albuterol (PROVENTIL) 2.5 mg /3 mL (0.083 %) nebulizer solution INHALE 1 VIAL PER NEBULIZER FOUR TIMES DAILY AS NEEDED FOR SHORTNESS OF BREATH/ WHEEZING 360 mL 11    albuterol (VENTOLIN HFA) 90 mcg/actuation inhaler INHALE 2 PUFFS BY MOUTH INTO THE LUNGS EVERY 6 HOURS AS NEEDED FOR WHEEZING 18 g 11    BD ULTRA-FINE SHORT PEN NEEDLE 31 gauge x 5/16" Ndle Inject 1 pen into the skin once daily. 100 each 3    blood-glucose sensor (DEXCOM G7 SENSOR) Vicki 1 Device by Misc.(Non-Drug; Combo Route) route every 10 days. 3 each 11    ELIQUIS 5 mg Tab TAKE 1 TABLET(5 MG) BY MOUTH TWICE DAILY 60 tablet 11    ergocalciferol (ERGOCALCIFEROL) 50,000 unit Cap Take 1 capsule (50,000 Units total) by mouth every 7 days. 12 capsule 0    insulin glargine U-100, Lantus, (LANTUS SOLOSTAR U-100 INSULIN) 100 unit/mL (3 mL) InPn pen Inject 10 Units into the skin every evening. 15 mL 1    JARDIANCE 10 mg tablet TAKE 1 TABLET(10 MG) BY MOUTH EVERY DAY 90 tablet 3    metFORMIN (GLUCOPHAGE) 1000 MG tablet TAKE 1 TABLET(1000 MG) BY MOUTH TWICE DAILY WITH MEALS 180 tablet 1    topiramate (TOPAMAX) 50 MG tablet Take 1 tablet (50 mg total) by mouth 2 (two) times daily. 180 tablet 1    TRADJENTA 5 mg Tab tablet TAKE 1 TABLET(5 MG) BY MOUTH EVERY DAY 90 tablet 1    TRELEGY ELLIPTA 100-62.5-25 mcg DsDv INHALE 1 PUFF INTO THE LUNGS EVERY  each 1    atorvastatin (LIPITOR) 80 MG tablet Take 1 tablet (80 mg total) by mouth every evening. 90 tablet 3    clonazePAM (KLONOPIN) 0.5 " "MG tablet Take 1 tablet (0.5 mg total) by mouth nightly as needed for Anxiety. (Patient not taking: Reported on 7/25/2024) 30 tablet 0    fluticasone propionate (FLONASE) 50 mcg/actuation nasal spray SHAKE LIQUID AND USE 1 SPRAY(50 MCG) IN EACH NOSTRIL EVERY DAY (Patient not taking: Reported on 7/25/2024) 16 g 0    meloxicam (MOBIC) 15 MG tablet Take 1 tablet (15 mg total) by mouth daily as needed for Pain. (Patient not taking: Reported on 7/25/2024) 30 tablet 1    metoprolol tartrate (LOPRESSOR) 50 MG tablet Take 1 tablet (50 mg total) by mouth 2 (two) times daily. 60 tablet 11    pantoprazole (PROTONIX) 40 MG tablet Take 1 tablet (40 mg total) by mouth before breakfast. 90 tablet 3    sertraline (ZOLOFT) 50 MG tablet Take 1 tablet (50 mg total) by mouth once daily. (Patient not taking: Reported on 7/25/2024) 90 tablet 3    UBRELVY 100 mg tablet TAKE 1 TABLET BY MOUTH AS NEEDED FOR MIGRAINE. IF SYMPTOMS PERSIST OR RETURN, MAY REPEAT DOSE AFTER 2 HOURS. MAXIMUM 2 TABLETS PER 24 HOURS (Patient not taking: Reported on 7/25/2024) 12 tablet 1     No current facility-administered medications on file prior to visit.     Social History:     Social History     Tobacco Use    Smoking status: Some Days     Current packs/day: 0.50     Average packs/day: 1 pack/day for 46.6 years (46.2 ttl pk-yrs)     Types: Cigarettes     Start date: 1978     Passive exposure: Past    Smokeless tobacco: Current   Substance Use Topics    Alcohol use: No     Alcohol/week: 0.0 standard drinks of alcohol     Family History:     Family History   Problem Relation Name Age of Onset    Diabetes Father      Congenital heart disease Brother      Amblyopia Neg Hx      Blindness Neg Hx      Cataracts Neg Hx      Glaucoma Neg Hx      Macular degeneration Neg Hx      Retinal detachment Neg Hx      Strabismus Neg Hx       Physical Exam:   BP (!) 101/58   Pulse 87   Ht 5' 2" (1.575 m)   Wt 53 kg (116 lb 13.5 oz)   SpO2 95%   BMI 21.37 kg/m²    Wt " Readings from Last 4 Encounters:   07/25/24 53 kg (116 lb 13.5 oz)   06/06/24 56.4 kg (124 lb 5.4 oz)   04/17/24 56.7 kg (125 lb)   04/09/24 57.6 kg (126 lb 15.8 oz)     Constitutional: No distress, conversant  HEENT: Sclera anicteric, PERRLA  Neck: No JVD, no masses, good movement  CV: RRR, S1 and S2 normal, no additional heart sounds or murmurs. Pulses 2+ and equal bilaterally in radial arteries and femoral, DP and PT areas bilaterally  Pulm: Clear to auscultation bilaterally with symmetrical expansion.   GI: Abdomen soft, non-tender, good bowel sounds  Extremities: Both extremities intact and grossly normal, skin is warm, no edema noted  Skin: No ecchymosis, erythema, or ulcers  Neuro: AOx3, CNII-XII intact, no focal deficits      Labs:     Lab Results   Component Value Date     02/23/2024    K 4.2 02/23/2024     02/23/2024    CO2 24 02/23/2024    BUN 12 02/23/2024    CREATININE 0.7 02/23/2024    GLUCOSE 108 (H) 08/18/2020    ANIONGAP 9 02/23/2024     Lab Results   Component Value Date    HGBA1C 8.6 (H) 04/09/2024     Lab Results   Component Value Date     (H) 04/01/2023    BNP 16 01/13/2022    Lab Results   Component Value Date    WBC 8.28 12/30/2023    HGB 12.5 12/30/2023    HCT 38.1 12/30/2023    HCT 29 (L) 04/14/2023     12/30/2023    GRAN 4.6 12/30/2023    GRAN 55.7 12/30/2023     Lab Results   Component Value Date    CHOL 117 (L) 08/22/2023    HDL 43 08/22/2023    LDLCALC 52.0 (L) 08/22/2023    TRIG 110 08/22/2023          Imaging:       EF   Date Value Ref Range Status   03/22/2023 55 % Final     Nuc Stress EF   Date Value Ref Range Status   10/18/2023 70 % Final   04/03/2023 55 % Final     Nuc Rest EF   Date Value Ref Range Status   10/18/2023 65  Final   04/03/2023 54  Final       TTE 06/22/23:   The left ventricle is normal in size with normal systolic function.  The estimated ejection fraction is 55%.  There are segmental left ventricular wall motion abnormalities consistent  with anteroapical MI.  Normal left ventricular diastolic function.  The estimated PA systolic pressure is 32 mmHg.  Normal right ventricular size with normal right ventricular systolic function.  Normal central venous pressure (3 mmHg).  There is no pulmonary hypertension.    Coronary Angiography 04/05/23:    The Mid LAD lesion was 95% stenosed.    The 1st Mrg lesion was 95% stenosed.    The estimated blood loss was none.    Refer for CABG.    Cardiac Event monitor 06/13/23  Baseline rhythm was normal sinus rhythm with normal intervals and an initial heart rate of 97 bpm.  There were 42 patient-triggered episodes with reported symptoms of chest pain and palpitations. These episodes corresponded to periods of normal sinus rhythm and sinus tachycardia without ectopy.  There were rare PACs.  There were no atrial or ventricular arrhythmias.    Stress Test PET 10/18/23:    There is a moderate sized, mild to moderate intensity mid to apical anterior, septal, apical and anteroseptal fixed perfusion abnormality involving 17% of LV myocardium, suggestive of non-transmural infarct.    There are no other significant perfusion abnormalities.    The whole heart absolute myocardial perfusion values averaged 0.61 cc/min/g at rest, which is normal; 1.58 cc/min/g at stress, which is mildly reduced; and CFR is 2.60 , which is low normal.    CT attenuation images demonstrate moderate diffuse coronary calcifications in the LAD and LCX territory.    The gated perfusion images showed an ejection fraction of 65% at rest and 70% during stress. A normal ejection fraction is greater than 47%.    The wall motion is normal at rest and during stress.    The LV cavity size is normal at rest and stress.    The ECG portion of the study is negative for ischemia.    There were no arrhythmias during stress.    The patient reported no chest pain during the stress test.    When compared to the previous study from 4/30/2023, infarct now measuring  smaller in size, with improved flows at stress, no ischemia, sucessfull revascularization.    Assessment and Plan:       Preop cardiovascular exam  Revised Cardiac Risk Index   High risk surgery: No  History of ischemic heart disease: Yes  History of congestive heart failure: No  History of stroke: Yes  Insulin dependent diabetes: Yes  Cr > 2: No  3 points, class IV risk, 15% risk of cardiac event 2014 ACC/AHA Perioperative Guidelines  Known or risk factors for CAD: Yes  High risk of MACE: Yes  Functional capacity < 4 METs: No     High risk patient for low risk procedure.    Patient has no active cardiac condition (ACS/USA, decompenstated CHF, significant arrhythmias or severe valvular disease) and can easily achieve 4 METS.  As such, pt does not require further cardiac evaluation prior to undergoing surgery.  Patient is medically optimized for colonoscopy.     Can hold DOAC 2 days prior to procedure and resume 24 hours after procedure. Avoid long periods without anticoagulation.     These recommendations follow the most current Guideline on Perioperative Cardiovascular Evaluation and Management of Patients Undergoing Noncardiac Surgery released by the ACC/AHA (JACC 2014.07.944).    S/P CABG x 2  - No acute issues, no symptoms of ischemia or heart failure    Postoperative atrial fibrillation  - No recurrence on cardiac monitor or EKG  - Patient with high CHADSVASC, so have to avoid stopping anticoagulation as much as possible   - Patient wants to follow up with Dr. Xavier in the future for management of Afib    Coronary artery disease  - S/p CABG  - Continue atorvastatin 80mg qhs    Signed:  Darnell Herron M.D.  Cardiology Fellow  [facility]  7/25/2024 3:43 PM    Tobacco Use/Cessation:  I assessed Jud Villa and discussed smoking cessation with her for 3-10 minutes. She reports that she has been smoking cigarettes. She started smoking about 46 years ago. She has a 46.2 pack-year  smoking history. She has been exposed to tobacco smoke. She uses smokeless tobacco.

## 2024-07-25 NOTE — ASSESSMENT & PLAN NOTE
Revised Cardiac Risk Index   High risk surgery: No  History of ischemic heart disease: Yes  History of congestive heart failure: No  History of stroke: Yes  Insulin dependent diabetes: Yes  Cr > 2: No  3 points, class IV risk, 15% risk of cardiac event 2014 ACC/AHA Perioperative Guidelines  Known or risk factors for CAD: Yes  High risk of MACE: Yes  Functional capacity < 4 METs: No     High risk patient for low risk procedure.    Patient has no active cardiac condition (ACS/USA, decompenstated CHF, significant arrhythmias or severe valvular disease) and can easily achieve 4 METS.  As such, pt does not require further cardiac evaluation prior to undergoing surgery.  Patient is medically optimized for colonoscopy.     Can hold DOAC 2 days prior to procedure and resume 24 hours after procedure. Avoid long periods without anticoagulation.     These recommendations follow the most current Guideline on Perioperative Cardiovascular Evaluation and Management of Patients Undergoing Noncardiac Surgery released by the ACC/AHA (JACC 2014.07.944).

## 2024-07-25 NOTE — ASSESSMENT & PLAN NOTE
- No recurrence on cardiac monitor or EKG  - Patient with high CHADSVASC, so have to avoid stopping anticoagulation as much as possible   - Patient wants to follow up with Dr. Xavier in the future for management of Afib

## 2024-07-26 ENCOUNTER — TELEPHONE (OUTPATIENT)
Dept: CARDIOLOGY | Facility: CLINIC | Age: 64
End: 2024-07-26
Payer: MEDICARE

## 2024-07-26 DIAGNOSIS — R07.9 CHEST PAIN, UNSPECIFIED TYPE: Primary | ICD-10-CM

## 2024-07-26 LAB
OHS QRS DURATION: 84 MS
OHS QTC CALCULATION: 446 MS

## 2024-07-29 ENCOUNTER — TELEPHONE (OUTPATIENT)
Dept: CARDIOLOGY | Facility: CLINIC | Age: 64
End: 2024-07-29
Payer: MEDICARE

## 2024-08-05 ENCOUNTER — HOSPITAL ENCOUNTER (EMERGENCY)
Facility: HOSPITAL | Age: 64
Discharge: HOME | End: 2024-08-06
Attending: EMERGENCY MEDICINE
Payer: MEDICARE

## 2024-08-05 DIAGNOSIS — R29.818 ACUTE FOCAL NEUROLOGICAL DEFICIT: ICD-10-CM

## 2024-08-05 DIAGNOSIS — R29.810 FACIAL DROOP: Primary | ICD-10-CM

## 2024-08-05 PROBLEM — E11.9 CONTROLLED TYPE 2 DIABETES MELLITUS, WITHOUT LONG-TERM CURRENT USE OF INSULIN: Status: ACTIVE | Noted: 2023-10-04

## 2024-08-05 LAB
ALBUMIN SERPL BCP-MCNC: 3.5 G/DL (ref 3.5–5.2)
ALP SERPL-CCNC: 80 U/L (ref 55–135)
ALT SERPL W/O P-5'-P-CCNC: 19 U/L (ref 10–44)
ANION GAP SERPL CALC-SCNC: 11 MMOL/L (ref 8–16)
AST SERPL-CCNC: 20 U/L (ref 10–40)
BACTERIA #/AREA URNS AUTO: NORMAL /HPF
BASOPHILS # BLD AUTO: 0.09 K/UL (ref 0–0.2)
BASOPHILS NFR BLD: 1.1 % (ref 0–1.9)
BILIRUB SERPL-MCNC: 0.2 MG/DL (ref 0.1–1)
BILIRUB UR QL STRIP: NEGATIVE
BUN SERPL-MCNC: 16 MG/DL (ref 8–23)
CALCIUM SERPL-MCNC: 9 MG/DL (ref 8.7–10.5)
CHLORIDE SERPL-SCNC: 109 MMOL/L (ref 95–110)
CHOLEST SERPL-MCNC: 105 MG/DL (ref 120–199)
CHOLEST/HDLC SERPL: 2.8 {RATIO} (ref 2–5)
CLARITY UR REFRACT.AUTO: CLEAR
CO2 SERPL-SCNC: 21 MMOL/L (ref 23–29)
COLOR UR AUTO: COLORLESS
CREAT SERPL-MCNC: 0.9 MG/DL (ref 0.5–1.4)
CREAT SERPL-MCNC: 0.9 MG/DL (ref 0.5–1.4)
DIFFERENTIAL METHOD BLD: ABNORMAL
EOSINOPHIL # BLD AUTO: 0.4 K/UL (ref 0–0.5)
EOSINOPHIL NFR BLD: 5 % (ref 0–8)
ERYTHROCYTE [DISTWIDTH] IN BLOOD BY AUTOMATED COUNT: 13.4 % (ref 11.5–14.5)
EST. GFR  (NO RACE VARIABLE): >60 ML/MIN/1.73 M^2
GLUCOSE SERPL-MCNC: 112 MG/DL (ref 70–110)
GLUCOSE UR QL STRIP: ABNORMAL
HCT VFR BLD AUTO: 42.1 % (ref 37–48.5)
HDLC SERPL-MCNC: 38 MG/DL (ref 40–75)
HDLC SERPL: 36.2 % (ref 20–50)
HGB BLD-MCNC: 13.1 G/DL (ref 12–16)
HGB UR QL STRIP: NEGATIVE
IMM GRANULOCYTES # BLD AUTO: 0.01 K/UL (ref 0–0.04)
IMM GRANULOCYTES NFR BLD AUTO: 0.1 % (ref 0–0.5)
INR PPP: 0.9 (ref 0.8–1.2)
KETONES UR QL STRIP: NEGATIVE
LDLC SERPL CALC-MCNC: 34.6 MG/DL (ref 63–159)
LEUKOCYTE ESTERASE UR QL STRIP: NEGATIVE
LYMPHOCYTES # BLD AUTO: 2.4 K/UL (ref 1–4.8)
LYMPHOCYTES NFR BLD: 30.3 % (ref 18–48)
MCH RBC QN AUTO: 28.2 PG (ref 27–31)
MCHC RBC AUTO-ENTMCNC: 31.1 G/DL (ref 32–36)
MCV RBC AUTO: 91 FL (ref 82–98)
MICROSCOPIC COMMENT: NORMAL
MONOCYTES # BLD AUTO: 0.6 K/UL (ref 0.3–1)
MONOCYTES NFR BLD: 7.9 % (ref 4–15)
NEUTROPHILS # BLD AUTO: 4.4 K/UL (ref 1.8–7.7)
NEUTROPHILS NFR BLD: 55.6 % (ref 38–73)
NITRITE UR QL STRIP: NEGATIVE
NONHDLC SERPL-MCNC: 67 MG/DL
NRBC BLD-RTO: 0 /100 WBC
PH UR STRIP: 8 [PH] (ref 5–8)
PLATELET # BLD AUTO: 252 K/UL (ref 150–450)
PMV BLD AUTO: 10.2 FL (ref 9.2–12.9)
POC PTINR: 1.1 (ref 0.9–1.2)
POC PTWBT: 13.3 SEC (ref 9.7–14.3)
POCT GLUCOSE: 119 MG/DL (ref 70–110)
POTASSIUM SERPL-SCNC: 4.4 MMOL/L (ref 3.5–5.1)
PROT SERPL-MCNC: 6.6 G/DL (ref 6–8.4)
PROT UR QL STRIP: NEGATIVE
PROTHROMBIN TIME: 10.3 SEC (ref 9–12.5)
RBC # BLD AUTO: 4.64 M/UL (ref 4–5.4)
RBC #/AREA URNS AUTO: 2 /HPF (ref 0–4)
SAMPLE: NORMAL
SAMPLE: NORMAL
SODIUM SERPL-SCNC: 141 MMOL/L (ref 136–145)
SP GR UR STRIP: >1.03 (ref 1–1.03)
SQUAMOUS #/AREA URNS AUTO: 0 /HPF
TRIGL SERPL-MCNC: 162 MG/DL (ref 30–150)
TSH SERPL DL<=0.005 MIU/L-ACNC: 0.95 UIU/ML (ref 0.4–4)
URN SPEC COLLECT METH UR: ABNORMAL
WBC # BLD AUTO: 7.93 K/UL (ref 3.9–12.7)
WBC #/AREA URNS AUTO: 0 /HPF (ref 0–5)
YEAST UR QL AUTO: NORMAL

## 2024-08-05 PROCEDURE — 85610 PROTHROMBIN TIME: CPT | Mod: HCNC,91 | Performed by: EMERGENCY MEDICINE

## 2024-08-05 PROCEDURE — 93005 ELECTROCARDIOGRAM TRACING: CPT | Mod: HCNC

## 2024-08-05 PROCEDURE — 99285 EMERGENCY DEPT VISIT HI MDM: CPT | Mod: 25,HCNC

## 2024-08-05 PROCEDURE — 93010 ELECTROCARDIOGRAM REPORT: CPT | Mod: HCNC,,, | Performed by: INTERNAL MEDICINE

## 2024-08-05 PROCEDURE — 80053 COMPREHEN METABOLIC PANEL: CPT | Mod: HCNC | Performed by: EMERGENCY MEDICINE

## 2024-08-05 PROCEDURE — 85025 COMPLETE CBC W/AUTO DIFF WBC: CPT | Mod: HCNC | Performed by: EMERGENCY MEDICINE

## 2024-08-05 PROCEDURE — 82962 GLUCOSE BLOOD TEST: CPT | Mod: HCNC

## 2024-08-05 PROCEDURE — 82565 ASSAY OF CREATININE: CPT | Mod: HCNC,91

## 2024-08-05 PROCEDURE — 80061 LIPID PANEL: CPT | Mod: HCNC | Performed by: EMERGENCY MEDICINE

## 2024-08-05 PROCEDURE — 25000003 PHARM REV CODE 250: Mod: HCNC | Performed by: EMERGENCY MEDICINE

## 2024-08-05 PROCEDURE — 25500020 PHARM REV CODE 255: Mod: HCNC | Performed by: EMERGENCY MEDICINE

## 2024-08-05 PROCEDURE — 99284 EMERGENCY DEPT VISIT MOD MDM: CPT | Mod: FS,HCNC,, | Performed by: STUDENT IN AN ORGANIZED HEALTH CARE EDUCATION/TRAINING PROGRAM

## 2024-08-05 PROCEDURE — 85610 PROTHROMBIN TIME: CPT | Mod: HCNC

## 2024-08-05 PROCEDURE — 84443 ASSAY THYROID STIM HORMONE: CPT | Mod: HCNC | Performed by: EMERGENCY MEDICINE

## 2024-08-05 PROCEDURE — 81001 URINALYSIS AUTO W/SCOPE: CPT | Mod: HCNC | Performed by: EMERGENCY MEDICINE

## 2024-08-05 PROCEDURE — 99900035 HC TECH TIME PER 15 MIN (STAT): Mod: HCNC

## 2024-08-05 RX ORDER — ACETAMINOPHEN 500 MG
1000 TABLET ORAL
Status: COMPLETED | OUTPATIENT
Start: 2024-08-05 | End: 2024-08-05

## 2024-08-05 RX ADMIN — ACETAMINOPHEN 1000 MG: 500 TABLET ORAL at 07:08

## 2024-08-05 RX ADMIN — IOHEXOL 75 ML: 350 INJECTION, SOLUTION INTRAVENOUS at 06:08

## 2024-08-05 NOTE — ED NOTES
Patient states that she noticed a right facial droop this morning when putting her lipstick on at 8AM this morning.

## 2024-08-05 NOTE — ED PROVIDER NOTES
Emergency Department Provider Note    Jud Villa   64 y.o. female   [id number]      8/5/2024       History     This history was obtained from the patient without limitations.  She drove herself to the ED.      She is a 64-year-old with the below past medical history which includes stroke with residual visual field deficit.  She presents with right-sided facial paralysis that she noticed when she woke up at around 08:00.  She had a right parietal headache as well that resolved after taking acetaminophen.  She felt lethargic yesterday and had double vision last night.  She denies active vision changes.  She denies fever, chills, dizziness, chest pain, shortness of breath, abdominal pain, nausea, and vomiting.  She denies weakness and numbness to her extremities.         Past Medical History:   Diagnosis Date    Asthma     Breast carcinoma     Cataract     Coronary artery disease of native heart with stable angina pectoris 04/04/2023    Diabetes mellitus     Hyperlipidemia     Moderate episode of recurrent major depressive disorder 05/19/2021    Osteoporosis     Stroke 03/2023    left PCA      Past Surgical History:   Procedure Laterality Date    BILATERAL MASTECTOMY  04/26/2018    CHOLECYSTECTOMY      COLONOSCOPY N/A 10/27/2015    Procedure: COLONOSCOPY;  Surgeon: Johnny Hurley MD;  Location: [facility] ENDO;  Service: Endoscopy;  Laterality: N/A;    CORONARY ANGIOGRAPHY N/A 04/05/2023    Procedure: ANGIOGRAM, CORONARY ARTERY;  Surgeon: Francisco Barahona MD;  Location: [facility] CATH LAB;  Service: Cardiology;  Laterality: N/A;    CORONARY ANGIOPLASTY      CORONARY ARTERY BYPASS GRAFT  04/2023    LIMA to LAD, SVG to OM    CORONARY ARTERY BYPASS GRAFT (CABG) N/A 04/14/2023    Procedure: CORONARY ARTERY BYPASS GRAFT (CABG) x 2;  Surgeon: Igor Kahn MD;  Location: [facility] OR [facility] FLR;  Service: Cardiothoracic;  Laterality: N/A;    ENDOSCOPIC HARVEST OF VEIN Left 04/14/2023    Procedure: SURGICAL PROCUREMENT, VEIN,  ENDOSCOPIC;  Surgeon: Igor Kahn MD;  Location: [facility] OR 2ND FLR;  Service: Cardiothoracic;  Laterality: Left;  Vein harvest start - stop: 0817 - 0856  Vein prep start - stop: 0857 - 0917    ESOPHAGOGASTRODUODENOSCOPY N/A 01/24/2022    Procedure: ESOPHAGOGASTRODUODENOSCOPY (EGD);  Surgeon: Mili Katz MD;  Location: [facility] ENDO (4TH FLR);  Service: Endoscopy;  Laterality: N/A;  rapid in AM, instr portal -ml    HYSTERECTOMY      LASER PERIPHERAL IRIDOTOMY Bilateral 2019        LEFT HEART CATHETERIZATION Left 04/05/2023    Procedure: Left heart cath;  Surgeon: Francisco Barahona MD;  Location: [facility] CATH LAB;  Service: Cardiology;  Laterality: Left;      Family History   Problem Relation Name Age of Onset    Diabetes Father      Congenital heart disease Brother      Amblyopia Neg Hx      Blindness Neg Hx      Cataracts Neg Hx      Glaucoma Neg Hx      Macular degeneration Neg Hx      Retinal detachment Neg Hx      Strabismus Neg Hx        Social History     Socioeconomic History    Marital status:    Tobacco Use    Smoking status: Some Days     Current packs/day: 0.50     Average packs/day: 1 pack/day for 46.6 years (46.2 ttl pk-yrs)     Types: Cigarettes     Start date: 1978     Passive exposure: Past    Smokeless tobacco: Current   Substance and Sexual Activity    Alcohol use: No     Alcohol/week: 0.0 standard drinks of alcohol    Drug use: No    Sexual activity: Not Currently     Social Determinants of Health     Financial Resource Strain: Patient Declined (5/2/2023)    Overall Financial Resource Strain (CARDIA)     Difficulty of Paying Living Expenses: Patient declined   Food Insecurity: Patient Declined (5/2/2023)    Hunger Vital Sign     Worried About Running Out of Food in the Last Year: Patient declined     Ran Out of Food in the Last Year: Patient declined   Transportation Needs: Patient Declined (5/2/2023)    PRAPARE - Transportation     Lack of Transportation (Medical): Patient  declined     Lack of Transportation (Non-Medical): Patient declined   Physical Activity: Unknown (5/2/2023)    Exercise Vital Sign     Days of Exercise per Week: Patient declined     Minutes of Exercise per Session: 90 min   Stress: Patient Declined (5/2/2023)    [employer] - Occupational Stress Questionnaire     Feeling of Stress : Patient declined   Recent Concern: Stress - Stress Concern Present (4/3/2023)    [employer] - Occupational Stress Questionnaire     Feeling of Stress : Rather much   Housing Stability: Unknown (5/2/2023)    Housing Stability Vital Sign     Unable to Pay for Housing in the Last Year: Patient refused     Unstable Housing in the Last Year: Patient refused      Review of patient's allergies indicates:  No Known Allergies        Physical Examination     Initial Vitals [08/05/24 1537]   BP Pulse Resp Temp SpO2   123/66 78 18 98.4 °F (36.9 °C) 96 %      MAP       --           Physical Exam    Nursing note and vitals reviewed.  Constitutional: She is not diaphoretic. No distress.   HENT:   Head: Normocephalic and atraumatic.   Eyes: Conjunctivae and EOM are normal. Pupils are equal, round, and reactive to light. Right eye exhibits no nystagmus. Left eye exhibits no nystagmus.   Cardiovascular:  Normal rate, regular rhythm and normal heart sounds.     Exam reveals no gallop and no friction rub.       No murmur heard.  Pulmonary/Chest: No respiratory distress. She has no wheezes. She has no rhonchi. She has no rales.   Abdominal: Abdomen is soft. She exhibits no distension. There is no abdominal tenderness.     Neurological: She is alert and oriented to person, place, and time. Coordination and gait normal. GCS score is 15. GCS eye subscore is 4. GCS verbal subscore is 5. GCS motor subscore is 6.   Left-sided middle and lower facial paralysis.  5/5 strength and normal sensation to touch throughout the extremities.  Clear speech.   Skin:  Skin is warm and dry. No pallor.   Psychiatric: She has a normal mood and affect. Her speech is normal and behavior is normal. She is not actively hallucinating. She is attentive.            Labs     Labs Reviewed   CBC W/ AUTO DIFFERENTIAL - Abnormal       Result Value    WBC 7.93      RBC 4.64      Hemoglobin 13.1      Hematocrit 42.1      MCV 91      MCH 28.2      MCHC 31.1 (*)     RDW 13.4      Platelets 252      MPV 10.2      Immature Granulocytes 0.1      Gran # (ANC) 4.4      Immature Grans (Abs) 0.01      Lymph # 2.4      Mono # 0.6      Eos # 0.4      Baso # 0.09      nRBC 0      Gran % 55.6      Lymph % 30.3      Mono % 7.9      Eosinophil % 5.0      Basophil % 1.1      Differential Method Automated     COMPREHENSIVE METABOLIC PANEL - Abnormal    Sodium 141      Potassium 4.4      Chloride 109      CO2 21 (*)     Glucose 112 (*)     BUN 16      Creatinine 0.9      Calcium 9.0      Total Protein 6.6      Albumin 3.5      Total Bilirubin 0.2      Alkaline Phosphatase 80      AST 20      ALT 19      eGFR >60.0      Anion Gap 11     LIPID PANEL - Abnormal    Cholesterol 105 (*)     Triglycerides 162 (*)     HDL 38 (*)     LDL Cholesterol 34.6 (*)     HDL/Cholesterol Ratio 36.2      Total Cholesterol/HDL Ratio 2.8      Non-HDL Cholesterol 67     URINALYSIS, REFLEX TO URINE CULTURE - Abnormal    Specimen UA Urine, Clean Catch      Color, UA Colorless (*)     Appearance, UA Clear      pH, UA 8.0      Specific Gravity, UA >1.030 (*)     Protein, UA Negative      Glucose, UA 4+ (*)     Ketones, UA Negative      Bilirubin (UA) Negative      Occult Blood UA Negative      Nitrite, UA Negative      Leukocytes, UA Negative      Narrative:     Specimen Source->Urine   POCT GLUCOSE - Abnormal    POCT Glucose 119 (*)    PROTIME-INR    Prothrombin Time 10.3      INR 0.9     TSH    TSH 0.955     URINALYSIS MICROSCOPIC    RBC, UA 2      WBC, UA 0      Bacteria Rare      Yeast, UA None      Squam Epithel, UA 0      Microscopic  Comment SEE COMMENT      Narrative:     Specimen Source->Urine   ISTAT PROCEDURE    POC PTWBT 13.3      POC PTINR 1.1      Sample unknown     ISTAT CREATININE    POC Creatinine 0.9      Sample unknown          Imaging     Imaging Results              MRI Brain Without Contrast (Final result)  Result time 08/05/24 23:44:44      Final result by Augusto Crooks MD (08/05/24 23:44:44)                   Impression:      No evidence of acute intracranial pathology.    Remote infarcts in the left occipital lobe and right cerebellum.    Electronically signed by resident: Chinmay Murillo  Date:    08/05/2024  Time:    22:42    Electronically signed by: Augusto Crooks MD  Date:    08/05/2024  Time:    23:44               Narrative:    EXAMINATION:  MRI BRAIN WITHOUT CONTRAST    CLINICAL HISTORY:  Stroke, follow up;    TECHNIQUE:  Multiplanar multisequence MR imaging of the brain was performed without intravenous contrast.    COMPARISON:  CTA head neck 08/05/2024, MRI brain 12/30/2023    FINDINGS:  Intracranial Compartment:    The ventricles are normal in size without evidence of acute hydrocephalus.    Mild FLAIR hyperintensities in supratentorial white matter suggestive of chronic microvascular ischemic change. Remote infarct in the left occipital lobe with large area of encephalomalacia.  There is unchanged appearance of hemosiderin deposition within the region of the infarction left occipital lobe.  FLAIR hyperintensity in the right cerebellum, suggesting remote infarct.    No diffusion restriction to suggest acute infarct.  No mass, acute hemorrhage, or midline shift.    No extra-axial blood or fluid collections.    Normal vascular flow voids are preserved.    Skull/Extracranial Contents (limited evaluation):    Bone marrow signal intensity is normal.                                       CTA Head and Neck (xpd) (Final result)  Result time 08/05/24 19:54:33      Final result by Augusto Crooks MD (08/05/24 19:54:33)                    Impression:      Hypodensity in the right medulla, suspicious for age indeterminate infarct.  Recommend MRI brain for further characterization.    Stable remote left occipital infarct with encephalomalacia.    No hemodynamically significant stenosis of the neck vessels.    No large vessel occlusion in the intracranial circulation.    Epic abnormal this report was flagged in Epic as abnormal.    Electronically signed by resident: Chinmay Murillo  Date:    08/05/2024  Time:    19:03    Electronically signed by: Augusto Crooks MD  Date:    08/05/2024  Time:    19:54               Narrative:    EXAMINATION:  CTA HEAD AND NECK (XPD)    CLINICAL HISTORY:  Neuro deficit, acute, stroke suspected;    TECHNIQUE:  Axial CT images obtained throughout the region of the head before and after the administration of intravenous contrast.  CT angiogram was performed through the cervical and intracranial vasculature during the IV bolus administration of 75mL of Omnipaque 350.  Multiplanar MPR and MIP reformats were performed.    COMPARISON:  MRI brain 12/30/2023 and CTA head neck 03/13/2023.      Ventricles are normal in size without evidence of hydrocephalus.    Left occipital lobe remote infarct with large area of encephalomalacia, similar to prior.  Hypodensity in the medulla which is not definitively seen on prior MRI (series 2, image 5) (series 602, image 53), possibly representing age-indeterminate infarct.  Punctate calcification in the left basal ganglia.  Gray-white matter differentiation is maintained.  No parenchymal hemorrhage or midline shift.    No extra-axial blood or fluid collections.    The cranium appears intact. Mastoid air cells and paranasal sinuses are essentially clear.    CTA:    The aortic arch demonstrates mild atherosclerotic calcifications without significant stenosis at the major branch vessel origins.    The right common carotid artery is normal in caliber. Mild atherosclerotic calcification without  significant stenosis at the carotid bifurcation.The right internal carotid artery is mildly narrowed with atherosclerotic calcification in the cavernous portion..    The left common carotid artery is normal in caliber. Mild atherosclerotic calcification without significant stenosis at the carotid bifurcation.The left internal carotid artery is normal in caliber.    The right vertebral artery is normal in caliber.    The left vertebral artery is dominant and normal in caliber.    Basilar artery is fully supplied by the left vertebral artery within normal limits without focal abnormality.  There is a gradual tapering of the V4 segment of the right vertebral artery.  Similar findings are present the prior examination.    The proximal anterior, middle, and posterior cerebral arteries are within normal limits.  No evidence of significant stenosis, focal occlusion, or intracranial aneurysm.    Chronic additional findings: Paraseptal emphysema.  Thyroid gland appears within normal limits.  Degenerative changes of the spine.  Sternotomy wires in appropriate position.                                        ED Course     The patient received the following medications:  Medications   iohexoL (OMNIPAQUE 350) injection 75 mL (75 mLs Intravenous Given 8/5/24 1855)   acetaminophen tablet 1,000 mg (1,000 mg Oral Given 8/5/24 1954)       ED Course as of 08/13/24 1528   Mon Aug 05, 2024   1740 The patient's initial vital signs were normal.  Her workup was initiated by another provider who ordered IV access, EKG, labs (CBC, CMP, PT/INR, lipid panel, TSH, urinalysis), and CTA head and neck.  She has right-sided facial paralysis that is concerning for acute stroke.  I will order a vascular neurology consult. [LP]   1742 ECG 12 lead  Independently interpreted by me.    Normal sinus rhythm.  Ventricular rate 70 beats per minute.  Normal axis.  Normal QRS duration and QTc interval.  No ST segment elevation or depression. [LP]      ED  Course User Index  [LP] Mike Holt III, MD        Medical Decision Making                 Medical Decision Making  Amount and/or Complexity of Data Reviewed  Radiology: ordered.  ECG/medicine tests:  Decision-making details documented in ED Course.    Risk  OTC drugs.  Prescription drug management.              Diagnoses       ICD-10-CM ICD-9-CM   1. Facial droop  R29.810 781.94   2. Acute focal neurological deficit  R29.818 781.99         Dispostion     Patient signed out to Dr. Armstrong at shift change with MRI brain pending.           Mike Holt III, MD  08/13/24 1535

## 2024-08-05 NOTE — FIRST PROVIDER EVALUATION
"Medical screening examination initiated.  I have conducted a focused provider triage encounter, findings are as follows:    Brief history of present illness:  65yo F hx CVA presenting with concern for a stroke, R facial numbness and weakness since woke up this am at 9am.     Vitals:    08/05/24 1537   BP: 123/66   Pulse: 78   Resp: 18   Temp: 98.4 °F (36.9 °C)   TempSrc: Oral   SpO2: 96%   Weight: 53.1 kg (117 lb)   Height: 5' 2" (1.575 m)       Pertinent physical exam:  Mild R facial droop, decreased sensation R face, otherwise strength and sensation intact, gait steady    Brief workup plan:  stroke w/u    Preliminary workup initiated; this workup will be continued and followed by the physician or advanced practice provider that is assigned to the patient when roomed.  "

## 2024-08-06 VITALS
HEIGHT: 62 IN | BODY MASS INDEX: 21.53 KG/M2 | HEART RATE: 63 BPM | WEIGHT: 117 LBS | TEMPERATURE: 98 F | OXYGEN SATURATION: 97 % | DIASTOLIC BLOOD PRESSURE: 55 MMHG | RESPIRATION RATE: 17 BRPM | SYSTOLIC BLOOD PRESSURE: 101 MMHG

## 2024-08-06 LAB
OHS QRS DURATION: 70 MS
OHS QTC CALCULATION: 416 MS

## 2024-08-06 NOTE — HPI
Jud Villa is a 64 y.o. 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 The patient states she woke up this am and was putting on lipstick when she noted a right facial droop. She also had right facial numbness and right sided HA at that time. She proceeded on with her day with some improvement of the HA and numbness but the facial droop remained. The patient denies dizziness, worsening vision, gait instability, or weakness. She is right handed. The patient presented to the ED for evaluation.

## 2024-08-06 NOTE — SUBJECTIVE & OBJECTIVE
Past Medical History:   Diagnosis Date    Asthma     Breast carcinoma     Cataract     Coronary artery disease of native heart with stable angina pectoris 04/04/2023    Diabetes mellitus     Hyperlipidemia     Moderate episode of recurrent major depressive disorder 05/19/2021    Osteoporosis     Stroke 03/2023    left PCA     Past Surgical History:   Procedure Laterality Date    BILATERAL MASTECTOMY  04/26/2018    CHOLECYSTECTOMY      COLONOSCOPY N/A 10/27/2015    Procedure: COLONOSCOPY;  Surgeon: Johnny Hurley MD;  Location: [facility] ENDO;  Service: Endoscopy;  Laterality: N/A;    CORONARY ANGIOGRAPHY N/A 04/05/2023    Procedure: ANGIOGRAM, CORONARY ARTERY;  Surgeon: Francisco Barahona MD;  Location: [facility] CATH LAB;  Service: Cardiology;  Laterality: N/A;    CORONARY ANGIOPLASTY      CORONARY ARTERY BYPASS GRAFT  04/2023    LIMA to LAD, SVG to OM    CORONARY ARTERY BYPASS GRAFT (CABG) N/A 04/14/2023    Procedure: CORONARY ARTERY BYPASS GRAFT (CABG) x 2;  Surgeon: Igor Kahn MD;  Location: [facility] OR [address] [place] [address];  Service: Cardiothoracic;  Laterality: N/A;    ENDOSCOPIC HARVEST OF VEIN Left 04/14/2023    Procedure: SURGICAL PROCUREMENT, VEIN, ENDOSCOPIC;  Surgeon: Igor Kahn MD;  Location: [facility] OR [address] [place] [address];  Service: Cardiothoracic;  Laterality: Left;  Vein harvest start - stop: 0817 - 0856  Vein prep start - stop: 0857 - 0917    ESOPHAGOGASTRODUODENOSCOPY N/A 01/24/2022    Procedure: ESOPHAGOGASTRODUODENOSCOPY (EGD);  Surgeon: Mili Katz MD;  Location: [facility] (4TH [facility]);  Service: Endoscopy;  Laterality: N/A;  rapid in AM, instr portal -ml    HYSTERECTOMY      LASER PERIPHERAL IRIDOTOMY Bilateral 2019        LEFT HEART CATHETERIZATION Left 04/05/2023    Procedure: Left heart cath;  Surgeon: Francisco Barahona MD;  Location: [facility] CATH LAB;  Service: Cardiology;  Laterality: Left;     Social History     Tobacco Use    Smoking status: Some Days     Current packs/day: 0.50     Average  "packs/day: 1 pack/day for 46.6 years (46.2 ttl pk-yrs)     Types: Cigarettes     Start date: 1978     Passive exposure: Past    Smokeless tobacco: Current   Substance Use Topics    Alcohol use: No     Alcohol/week: 0.0 standard drinks of alcohol    Drug use: No     Review of patient's allergies indicates:  No Known Allergies    Medications: I have reviewed the current medication administration record.    Current Outpatient Medications   Medication Instructions    acetaminophen (TYLENOL) 1,000 mg, Oral, Every 6 hours PRN    albuterol (PROVENTIL) 2.5 mg /3 mL (0.083 %) nebulizer solution INHALE 1 VIAL PER NEBULIZER FOUR TIMES DAILY AS NEEDED FOR SHORTNESS OF BREATH/ WHEEZING    albuterol (VENTOLIN HFA) 90 mcg/actuation inhaler INHALE 2 PUFFS BY MOUTH INTO THE LUNGS EVERY 6 HOURS AS NEEDED FOR WHEEZING    atorvastatin (LIPITOR) 80 mg, Oral, Nightly    BD ULTRA-FINE SHORT PEN NEEDLE 31 gauge x 5/16" Ndle 1 pen , Subcutaneous, Daily    blood-glucose sensor (DEXCOM G7 SENSOR) Vicki 1 Device, Misc.(Non-Drug; Combo Route), Every 10 days    clonazePAM (KLONOPIN) 0.5 mg, Oral, Nightly PRN    ELIQUIS 5 mg, Oral, 2 times daily    ergocalciferol (ERGOCALCIFEROL) 50,000 Units, Oral, Every 7 days    fluticasone propionate (FLONASE) 50 mcg/actuation nasal spray SHAKE LIQUID AND USE 1 SPRAY(50 MCG) IN EACH NOSTRIL EVERY DAY    JARDIANCE 10 mg tablet TAKE 1 TABLET(10 MG) BY MOUTH EVERY DAY    LANTUS SOLOSTAR U-100 INSULIN 10 Units, Subcutaneous, Nightly    meloxicam (MOBIC) 15 mg, Oral, Daily PRN    metFORMIN (GLUCOPHAGE) 1,000 mg, Oral, 2 times daily with meals    metoprolol tartrate (LOPRESSOR) 50 mg, Oral, 2 times daily    pantoprazole (PROTONIX) 40 mg, Oral, Before breakfast    sertraline (ZOLOFT) 50 mg, Oral, Daily    topiramate (TOPAMAX) 50 mg, Oral, 2 times daily    TRADJENTA 5 mg Tab tablet TAKE 1 TABLET(5 MG) BY MOUTH EVERY DAY    TRELEGY ELLIPTA 100-62.5-25 mcg DsDv 1 puff, Inhalation    UBRELVY 100 mg tablet TAKE 1 TABLET " BY MOUTH AS NEEDED FOR MIGRAINE. IF SYMPTOMS PERSIST OR RETURN, MAY REPEAT DOSE AFTER 2 HOURS. MAXIMUM 2 TABLETS PER 24 HOURS         Review of Systems   Constitutional:  Positive for fatigue.   HENT:  Positive for sinus pressure and trouble swallowing (baseline, scheduled for eval). Negative for drooling and facial swelling.    Eyes:  Positive for visual disturbance (double vision, currently resolved). Negative for discharge and redness.   Respiratory:  Negative for cough and shortness of breath.    Cardiovascular:  Negative for leg swelling.   Gastrointestinal:  Negative for abdominal pain, nausea and vomiting.   Genitourinary:  Negative for flank pain and urgency.   Musculoskeletal:  Negative for gait problem.   Neurological:  Positive for facial asymmetry, speech difficulty (intermittent), numbness and headaches.   Psychiatric/Behavioral:  Negative for agitation and confusion.      Objective:     Vital Signs (Most Recent):  Temp: 98.3 °F (36.8 °C) (08/05/24 1915)  Pulse: 65 (08/05/24 1915)  Resp: 17 (08/05/24 1915)  BP: 118/64 (08/05/24 1915)  SpO2: 97 % (08/05/24 1915)    Vital Signs Range (Last 24H):  Temp:  [98.3 °F (36.8 °C)-98.4 °F (36.9 °C)]   Pulse:  [65-78]   Resp:  [17-23]   BP: (118-140)/(61-66)   SpO2:  [96 %-97 %]        Physical Exam  Vitals and nursing note reviewed.   Eyes:      General:         Right eye: No discharge.         Left eye: No discharge.      Extraocular Movements: Extraocular movements intact.      Conjunctiva/sclera: Conjunctivae normal.   Cardiovascular:      Rate and Rhythm: Normal rate.   Pulmonary:      Effort: Pulmonary effort is normal. No respiratory distress.   Abdominal:      General: There is no distension.   Musculoskeletal:      Cervical back: Normal range of motion and neck supple.   Skin:     General: Skin is warm and dry.   Neurological:      Mental Status: She is alert and oriented to person, place, and time.      GCS: GCS eye subscore is 4. GCS verbal subscore is  "5. GCS motor subscore is 6.      Cranial Nerves: Cranial nerve deficit and facial asymmetry present.      Motor: No weakness.   Psychiatric:         Mood and Affect: Mood normal.          Neurological Exam:   LOC: alert  Attention Span: Good   Language: No aphasia  Articulation: No dysarthria  Orientation: Person, Place, Time   EOM (CN III, IV, VI): Full/intact  Facial Sensation (CN V): Normal  Facial Movement (CN VII): Lower facial weakness on the Right  Motor: Arm left  Normal 5/5  Leg left  Normal 5/5  Arm right  Normal 5/5  Leg right Normal 5/5  Sensation: Intact to light touch  Tone: Normal tone throughout    Laboratory:  CMP:   Recent Labs   Lab 08/05/24  1604   CALCIUM 9.0   ALBUMIN 3.5   PROT 6.6      K 4.4   CO2 21*      BUN 16   CREATININE 0.9   ALKPHOS 80   ALT 19   AST 20   BILITOT 0.2     CBC:   Recent Labs   Lab 08/05/24  1604   WBC 7.93   RBC 4.64   HGB 13.1   HCT 42.1      MCV 91   MCH 28.2   MCHC 31.1*     Lipid Panel:   Recent Labs   Lab 08/05/24  1604   CHOL 105*   LDLCALC 34.6*   HDL 38*   TRIG 162*     Coagulation:   Recent Labs   Lab 08/05/24  1604   INR 0.9     Hgb A1C: No results for input(s): "HGBA1C" in the last 168 hours.  TSH:   Recent Labs   Lab 08/05/24  1604   TSH 0.955       Diagnostic Results: All imaging personally reviewed      Brain imaging/Vessel Imaging:  MRI Brain 8/5/2024 pending    CTA Head and Neck 8/5/2024 Impression: Hypodensity in the right medulla, suspicious for age indeterminate infarct.  Recommend MRI brain for further characterization.  Stable remote left occipital infarct with encephalomalacia.  No hemodynamically significant stenosis of the neck vessels.  No large vessel occlusion in the intracranial circulation.    Cardiac Evaluation:   EKG 8/5/2024 NSR    "

## 2024-08-06 NOTE — CONSULTS
[facility] - Emergency Dept  Vascular Neurology  [facility]  Consult Note    Inpatient consult to Vascular (Stroke) Neurology  Consult performed by: Zelda Larsen DNP  Consult ordered by: Mike Holt III, MD  Reason for consult: right facial droop        Assessment/Plan:     Facial droop  Jud Villa is a 64 y.o. 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 She noted the symptom when she was putting on make up this am but proceeded on with her day without difficulty, intermittent dysarthria. NIHSS 3. CTA Head and Neck negative for acute findings.     Recommendations:  -MRI brain pending. Vascular Neurology provider will f/u imaging.  -ED workup ongoing  -SBP<220 for now, pending MRI    Type 2 diabetes mellitus with other circulatory complication, with long-term current use of insulin  -Stroke risk factor. Glucose on labs 112.      Hyperlipidemia  -Stroke risk factor. LDL 34.6.  -Continue Atorvastatin 80 mg daily.    Postoperative atrial fibrillation  -Stroke risk factor. Patient is currently prescribed Eliquis, continue.        STROKE DOCUMENTATION          NIH Scale:  Interval: baseline  1a. Level of Consciousness: 0-->Alert, keenly responsive  1b. LOC Questions: 0-->Answers both questions correctly  1c. LOC Commands: 0-->Performs both tasks correctly  2. Best Gaze: 0-->Normal  3. Visual: 1-->Partial hemianopia (baseline, right eye)  4. Facial Palsy: 2-->Partial paralysis (total or near-total paralysis of lower face)  5a. Motor Arm, Left: 0-->No drift, limb holds 90 (or 45) degrees for full 10 secs  5b. Motor Arm, Right: 0-->No drift, limb holds 90 (or 45) degrees for full 10 secs  6a. Motor Leg, Left: 0-->No drift, leg holds 30 degree position for full 5 secs  6b. Motor Leg, Right: 0-->No drift, leg holds 30 degree position for full 5 secs  7.  Limb Ataxia: 0-->Absent  8. Sensory: 0-->Normal, no sensory loss  9. Best Language: 0-->No aphasia, normal  10. Dysarthria: 0-->Normal  11. Extinction and Inattention (formerly Neglect): 0-->No abnormality  Total (NIH Stroke Scale): 3    Modified [place] Score: 0  [place] Coma Scale:15   ABCD2 Score:    [id number] Score:6  HAS -BLED Score:   ICH Score:   Hunt & Fields Classification:       Thrombolysis Candidate? No, Out of window - Symptom onset > 4.5 hours    Delays to Thrombolysis?  Not Applicable    Interventional Revascularization Candidate?   Is the patient eligible for mechanical endovascular reperfusion (GIN)?  No; at this time symptoms not suggestive of large vessel occlusion    Delays to Thrombectomy? Not Applicable    Hemorrhagic change of an Ischemic Stroke: Does this patient have an ischemic stroke with hemorrhagic changes? No     Subjective:     History of Present Illness:  Jud Villa is a 64 y.o. 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 The patient states she woke up this am and was putting on lipstick when she noted a right facial droop. She also had right facial numbness and right sided HA at that time. She proceeded on with her day with some improvement of the HA and numbness but the facial droop remained. The patient denies dizziness, worsening vision, gait instability, or weakness. She is right handed. The patient presented to the ED for evaluation.            Past Medical History:   Diagnosis Date    Asthma     Breast carcinoma     Cataract     Coronary artery disease of native heart with stable angina pectoris 04/04/2023    Diabetes mellitus     Hyperlipidemia     Moderate episode of recurrent major depressive disorder 05/19/2021    Osteoporosis     Stroke 03/2023    left PCA     Past Surgical History:   Procedure Laterality Date    BILATERAL  MASTECTOMY  04/26/2018    CHOLECYSTECTOMY      COLONOSCOPY N/A 10/27/2015    Procedure: COLONOSCOPY;  Surgeon: Johnny Hurley MD;  Location: [facility] ENDO;  Service: Endoscopy;  Laterality: N/A;    CORONARY ANGIOGRAPHY N/A 04/05/2023    Procedure: ANGIOGRAM, CORONARY ARTERY;  Surgeon: Francisco Barahona MD;  Location: [facility] CATH LAB;  Service: Cardiology;  Laterality: N/A;    CORONARY ANGIOPLASTY      CORONARY ARTERY BYPASS GRAFT  04/2023    LIMA to LAD, SVG to OM    CORONARY ARTERY BYPASS GRAFT (CABG) N/A 04/14/2023    Procedure: CORONARY ARTERY BYPASS GRAFT (CABG) x 2;  Surgeon: Igor Kahn MD;  Location: [facility] OR 2ND FLR;  Service: Cardiothoracic;  Laterality: N/A;    ENDOSCOPIC HARVEST OF VEIN Left 04/14/2023    Procedure: SURGICAL PROCUREMENT, VEIN, ENDOSCOPIC;  Surgeon: Igor Kahn MD;  [facility];  Service: Cardiothoracic;  Laterality: Left;  Vein harvest start - stop: 0817 - 0856  Vein prep start - stop: 0857 - 0917    ESOPHAGOGASTRODUODENOSCOPY N/A 01/24/2022    Procedure: ESOPHAGOGASTRODUODENOSCOPY (EGD);  Surgeon: Mili Katz MD;  Location: [facility] ENDO (4TH FLR);  Service: Endoscopy;  Laterality: N/A;  rapid in AM, instr portal -ml    HYSTERECTOMY      LASER PERIPHERAL IRIDOTOMY Bilateral 2019        LEFT HEART CATHETERIZATION Left 04/05/2023    Procedure: Left heart cath;  Surgeon: Francisco Barahona MD;  Location: [facility] CATH LAB;  Service: Cardiology;  Laterality: Left;     Social History     Tobacco Use    Smoking status: Some Days     Current packs/day: 0.50     Average packs/day: 1 pack/day for 46.6 years (46.2 ttl pk-yrs)     Types: Cigarettes     Start date: 1978     Passive exposure: Past    Smokeless tobacco: Current   Substance Use Topics    Alcohol use: No     Alcohol/week: 0.0 standard drinks of alcohol    Drug use: No     Review of patient's allergies indicates:  No Known Allergies    Medications: I have reviewed the current medication administration  "record.    Current Outpatient Medications   Medication Instructions    acetaminophen (TYLENOL) 1,000 mg, Oral, Every 6 hours PRN    albuterol (PROVENTIL) 2.5 mg /3 mL (0.083 %) nebulizer solution INHALE 1 VIAL PER NEBULIZER FOUR TIMES DAILY AS NEEDED FOR SHORTNESS OF BREATH/ WHEEZING    albuterol (VENTOLIN HFA) 90 mcg/actuation inhaler INHALE 2 PUFFS BY MOUTH INTO THE LUNGS EVERY 6 HOURS AS NEEDED FOR WHEEZING    atorvastatin (LIPITOR) 80 mg, Oral, Nightly    BD ULTRA-FINE SHORT PEN NEEDLE 31 gauge x 5/16" Ndle 1 pen , Subcutaneous, Daily    blood-glucose sensor (DEXCOM G7 SENSOR) Vicki 1 Device, Misc.(Non-Drug; Combo Route), Every 10 days    clonazePAM (KLONOPIN) 0.5 mg, Oral, Nightly PRN    ELIQUIS 5 mg, Oral, 2 times daily    ergocalciferol (ERGOCALCIFEROL) 50,000 Units, Oral, Every 7 days    fluticasone propionate (FLONASE) 50 mcg/actuation nasal spray SHAKE LIQUID AND USE 1 SPRAY(50 MCG) IN EACH NOSTRIL EVERY DAY    JARDIANCE 10 mg tablet TAKE 1 TABLET(10 MG) BY MOUTH EVERY DAY    LANTUS SOLOSTAR U-100 INSULIN 10 Units, Subcutaneous, Nightly    meloxicam (MOBIC) 15 mg, Oral, Daily PRN    metFORMIN (GLUCOPHAGE) 1,000 mg, Oral, 2 times daily with meals    metoprolol tartrate (LOPRESSOR) 50 mg, Oral, 2 times daily    pantoprazole (PROTONIX) 40 mg, Oral, Before breakfast    sertraline (ZOLOFT) 50 mg, Oral, Daily    topiramate (TOPAMAX) 50 mg, Oral, 2 times daily    TRADJENTA 5 mg Tab tablet TAKE 1 TABLET(5 MG) BY MOUTH EVERY DAY    TRELEGY ELLIPTA 100-62.5-25 mcg DsDv 1 puff, Inhalation    UBRELVY 100 mg tablet TAKE 1 TABLET BY MOUTH AS NEEDED FOR MIGRAINE. IF SYMPTOMS PERSIST OR RETURN, MAY REPEAT DOSE AFTER 2 HOURS. MAXIMUM 2 TABLETS PER 24 HOURS         Review of Systems   Constitutional:  Positive for fatigue.   HENT:  Positive for sinus pressure and trouble swallowing (baseline, scheduled for eval). Negative for drooling and facial swelling.    Eyes:  Positive for visual disturbance (double vision, currently " resolved). Negative for discharge and redness.   Respiratory:  Negative for cough and shortness of breath.    Cardiovascular:  Negative for leg swelling.   Gastrointestinal:  Negative for abdominal pain, nausea and vomiting.   Genitourinary:  Negative for flank pain and urgency.   Musculoskeletal:  Negative for gait problem.   Neurological:  Positive for facial asymmetry, speech difficulty (intermittent), numbness and headaches.   Psychiatric/Behavioral:  Negative for agitation and confusion.      Objective:     Vital Signs (Most Recent):  Temp: 98.3 °F (36.8 °C) (08/05/24 1915)  Pulse: 65 (08/05/24 1915)  Resp: 17 (08/05/24 1915)  BP: 118/64 (08/05/24 1915)  SpO2: 97 % (08/05/24 1915)    Vital Signs Range (Last 24H):  Temp:  [98.3 °F (36.8 °C)-98.4 °F (36.9 °C)]   Pulse:  [65-78]   Resp:  [17-23]   BP: (118-140)/(61-66)   SpO2:  [96 %-97 %]        Physical Exam  Vitals and nursing note reviewed.   Eyes:      General:         Right eye: No discharge.         Left eye: No discharge.      Extraocular Movements: Extraocular movements intact.      Conjunctiva/sclera: Conjunctivae normal.   Cardiovascular:      Rate and Rhythm: Normal rate.   Pulmonary:      Effort: Pulmonary effort is normal. No respiratory distress.   Abdominal:      General: There is no distension.   Musculoskeletal:      Cervical back: Normal range of motion and neck supple.   Skin:     General: Skin is warm and dry.   Neurological:      Mental Status: She is alert and oriented to person, place, and time.      GCS: GCS eye subscore is 4. GCS verbal subscore is 5. GCS motor subscore is 6.      Cranial Nerves: Cranial nerve deficit and facial asymmetry present.      Motor: No weakness.   Psychiatric:         Mood and Affect: Mood normal.          Neurological Exam:   LOC: alert  Attention Span: Good   Language: No aphasia  Articulation: No dysarthria  Orientation: Person, Place, Time   EOM (CN III, IV, VI): Full/intact  Facial Sensation (CN V):  "Normal  Facial Movement (CN VII): Lower facial weakness on the Right  Motor: Arm left  Normal 5/5  Leg left  Normal 5/5  Arm right  Normal 5/5  Leg right Normal 5/5  Sensation: Intact to light touch  Tone: Normal tone throughout    Laboratory:  CMP:   Recent Labs   Lab 08/05/24  1604   CALCIUM 9.0   ALBUMIN 3.5   PROT 6.6      K 4.4   CO2 21*      BUN 16   CREATININE 0.9   ALKPHOS 80   ALT 19   AST 20   BILITOT 0.2     CBC:   Recent Labs   Lab 08/05/24  1604   WBC 7.93   RBC 4.64   HGB 13.1   HCT 42.1      MCV 91   MCH 28.2   MCHC 31.1*     Lipid Panel:   Recent Labs   Lab 08/05/24  1604   CHOL 105*   LDLCALC 34.6*   HDL 38*   TRIG 162*     Coagulation:   Recent Labs   Lab 08/05/24  1604   INR 0.9     Hgb A1C: No results for input(s): "HGBA1C" in the last 168 hours.  TSH:   Recent Labs   Lab 08/05/24  1604   TSH 0.955       Diagnostic Results: All imaging personally reviewed      Brain imaging/Vessel Imaging:  MRI Brain 8/5/2024 pending    CTA Head and Neck 8/5/2024 Impression: Hypodensity in the right medulla, suspicious for age indeterminate infarct.  Recommend MRI brain for further characterization.  Stable remote left occipital infarct with encephalomalacia.  No hemodynamically significant stenosis of the neck vessels.  No large vessel occlusion in the intracranial circulation.    Cardiac Evaluation:   EKG 8/5/2024 NSR      Zelda Larsen DNP  [facility]  Department of Vascular Neurology   [facility] - Emergency Dept   "

## 2024-08-06 NOTE — ED PROVIDER NOTES
This patient was received in sign-out from Dr. Holt, pending MRI of the brain.  Initially alerted as a stroke code for right-sided facial droop of unknown time onset.  Evaluated by vascular neurology rapidly.  All screening labs appear largely within normal limits.  Lipid panel are at goal.  Vital signs are stable including blood pressure.  MRI of the brain is negative for acute intracranial pathology.  Note is made of remote infarcts in the left occipital lobe and right cerebellum.  I spoke with the on-call vascular neurology nurse practitioner who does not feel the patient warrants further workup, admission or should remain under the care her consultation.  I spoke with hospital medicine who states that she was medically optimized, including diabetes, lipid, blood pressure goals.  Other recommendations would be regarding PT OT or speech therapy.  The patient would like to follow up with PT OT and speech in the outpatient setting and requests discharge at this time.  I think this is appropriate.  Referral is placed and she was asked to follow up with the specialist, as well as her primary care doctor as soon as possible.  She was asked to return to the emergency department immediately for any new, concerning, or worsening symptoms.  Patient and her daughter were agreeable with this plan and she was discharged in stable condition.     Clement Armstrong MD  08/06/24 022

## 2024-08-06 NOTE — ASSESSMENT & PLAN NOTE
Jud Villa is a 64 y.o. 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 She noted the symptom when she was putting on make up this am but proceeded on with her day without difficulty, intermittent dysarthria. NIHSS 3. CTA Head and Neck negative for acute findings.     Recommendations:  -MRI brain pending. Vascular Neurology provider will f/u imaging.  -ED workup ongoing  -SBP<220 for now, pending MRI

## 2024-08-07 ENCOUNTER — OFFICE VISIT (OUTPATIENT)
Dept: CARDIOLOGY | Facility: CLINIC | Age: 64
End: 2024-08-07
Payer: MEDICARE

## 2024-08-07 ENCOUNTER — TELEPHONE (OUTPATIENT)
Dept: ENDOCRINOLOGY | Facility: CLINIC | Age: 64
End: 2024-08-07
Payer: MEDICARE

## 2024-08-07 ENCOUNTER — CLINICAL SUPPORT (OUTPATIENT)
Dept: REHABILITATION | Facility: HOSPITAL | Age: 64
End: 2024-08-07
Payer: MEDICARE

## 2024-08-07 ENCOUNTER — TELEPHONE (OUTPATIENT)
Dept: CARDIOLOGY | Facility: CLINIC | Age: 64
End: 2024-08-07
Payer: MEDICARE

## 2024-08-07 ENCOUNTER — TELEPHONE (OUTPATIENT)
Dept: FAMILY MEDICINE | Facility: CLINIC | Age: 64
End: 2024-08-07
Payer: MEDICARE

## 2024-08-07 VITALS
WEIGHT: 118.81 LBS | BODY MASS INDEX: 21.86 KG/M2 | HEIGHT: 62 IN | HEART RATE: 86 BPM | OXYGEN SATURATION: 96 % | SYSTOLIC BLOOD PRESSURE: 101 MMHG | DIASTOLIC BLOOD PRESSURE: 61 MMHG

## 2024-08-07 DIAGNOSIS — R29.818 ACUTE FOCAL NEUROLOGICAL DEFICIT: ICD-10-CM

## 2024-08-07 DIAGNOSIS — I73.9 PAD (PERIPHERAL ARTERY DISEASE): ICD-10-CM

## 2024-08-07 DIAGNOSIS — Z79.4 TYPE 2 DIABETES MELLITUS WITH OTHER CIRCULATORY COMPLICATION, WITH LONG-TERM CURRENT USE OF INSULIN: ICD-10-CM

## 2024-08-07 DIAGNOSIS — G51.0 BELL'S PALSY: Primary | ICD-10-CM

## 2024-08-07 DIAGNOSIS — R29.810 FACIAL DROOP: ICD-10-CM

## 2024-08-07 DIAGNOSIS — I48.91 POSTOPERATIVE ATRIAL FIBRILLATION: ICD-10-CM

## 2024-08-07 DIAGNOSIS — E78.00 PURE HYPERCHOLESTEROLEMIA: ICD-10-CM

## 2024-08-07 DIAGNOSIS — Z95.1 S/P CABG X 2: ICD-10-CM

## 2024-08-07 DIAGNOSIS — R29.810 FACIAL WEAKNESS: Primary | ICD-10-CM

## 2024-08-07 DIAGNOSIS — I25.10 CORONARY ARTERY DISEASE INVOLVING NATIVE CORONARY ARTERY OF NATIVE HEART WITHOUT ANGINA PECTORIS: ICD-10-CM

## 2024-08-07 DIAGNOSIS — I97.89 POSTOPERATIVE ATRIAL FIBRILLATION: ICD-10-CM

## 2024-08-07 DIAGNOSIS — Z86.73 HISTORY OF CVA (CEREBROVASCULAR ACCIDENT): ICD-10-CM

## 2024-08-07 DIAGNOSIS — E11.59 TYPE 2 DIABETES MELLITUS WITH OTHER CIRCULATORY COMPLICATION, WITH LONG-TERM CURRENT USE OF INSULIN: ICD-10-CM

## 2024-08-07 PROCEDURE — 97162 PT EVAL MOD COMPLEX 30 MIN: CPT | Mod: HCNC,PN

## 2024-08-07 PROCEDURE — 97530 THERAPEUTIC ACTIVITIES: CPT | Mod: HCNC,PN

## 2024-08-07 PROCEDURE — 99999 PR PBB SHADOW E&M-EST. PATIENT-LVL V: CPT | Mod: PBBFAC,HCNC,, | Performed by: PHYSICIAN ASSISTANT

## 2024-08-07 RX ORDER — PREDNISONE 20 MG/1
60 TABLET ORAL DAILY
Qty: 21 TABLET | Refills: 0 | Status: SHIPPED | OUTPATIENT
Start: 2024-08-07 | End: 2024-08-14

## 2024-08-12 ENCOUNTER — CLINICAL SUPPORT (OUTPATIENT)
Dept: REHABILITATION | Facility: HOSPITAL | Age: 64
End: 2024-08-12
Payer: MEDICARE

## 2024-08-12 DIAGNOSIS — R29.810 FACIAL WEAKNESS: Primary | ICD-10-CM

## 2024-08-12 DIAGNOSIS — R29.810 FACIAL DROOP: ICD-10-CM

## 2024-08-12 DIAGNOSIS — R41.841 COGNITIVE COMMUNICATION DEFICIT: Primary | ICD-10-CM

## 2024-08-12 PROCEDURE — 97112 NEUROMUSCULAR REEDUCATION: CPT | Mod: HCNC,PN

## 2024-08-12 PROCEDURE — 97140 MANUAL THERAPY 1/> REGIONS: CPT | Mod: HCNC,PN

## 2024-08-12 PROCEDURE — 96125 COGNITIVE TEST BY HC PRO: CPT | Mod: HCNC,PN

## 2024-08-12 NOTE — PROGRESS NOTES
[facility]  Speech Therapy Evaluation -Neurological Rehabilitation    Date: 8/12/2024     Name: Jud Villa   MRN: [id number]    Therapy Diagnosis:   Encounter Diagnoses   Name Primary?    Facial droop     Cognitive communication deficit Yes    Physician: Clement Armstrong MD  Physician Orders: Ambulatory Referral to Speech Therapy   Medical Diagnosis: R29.810 (ICD-10-CM) - Facial droop    Visit # / Visits Authorized:  1/1  Date of Evaluation:  8/12/2024   Insurance Authorization Period: 8/8/24 to 12/31/24  Plan of Care Certification:    8/12/2024 to 9/23/2024      Time In: 1032   Time Out: 1115   Total time: 43 minutes    Procedure   Cognitive Communication Evaluation including scoring and interpretation (Total time: 60 minutes)     Precautions: Cognition and Communication  Subjective   Date of Onset: within the last 6 months  History of Current Condition:  Jud Villa is a 64 y.o. female who presents to [facility] Outpatient Speech Therapy for evaluation secondary to facial droop. Patient was referred to therapy by Clement Armstrong MD , which is the patient's hospitalist. Patient reports stroke in March 2023 with slurred speech as residuals, now with worsening memory and processing problems, as well as word finding impairments. Her daughter has set reminders for her medications, and she is using a spreadsheet for finances and bill management. She currently has oversight from daughter and grandson for tasks.     Past Medical History: Jud Villa  has a past medical history of Asthma, Breast carcinoma, Cataract, Coronary artery disease of native heart with stable angina pectoris (04/04/2023), Diabetes mellitus, Hyperlipidemia, Moderate episode of recurrent major depressive disorder (05/19/2021), Osteoporosis, and Stroke (03/2023).  Jud Villa  has a past surgical history that includes Cholecystectomy; Hysterectomy; Colonoscopy (N/A, 10/27/2015); LASER PERIPHERAL  "IRIDOTOMY (Bilateral, 2019); Bilateral mastectomy (04/26/2018); Esophagogastroduodenoscopy (N/A, 01/24/2022); Left heart catheterization (Left, 04/05/2023); Coronary angiography (N/A, 04/05/2023); Coronary Artery Bypass Graft (CABG) (N/A, 04/14/2023); Endoscopic harvest of vein (Left, 04/14/2023); Coronary angioplasty; and Coronary artery bypass graft (04/2023).  Medical Hx and Allergies: Jud has a current medication list which includes the following prescription(s): acetaminophen, albuterol, albuterol, atorvastatin, bd ultra-fine short pen needle, dexcom g7 sensor, clonazepam, eliquis, ergocalciferol, fluticasone propionate, lantus solostar u-100 insulin, jardiance, meloxicam, metformin, metoprolol tartrate, pantoprazole, prednisone, sertraline, topiramate, tradjenta, trelegy ellipta, and ubrelvy. Review of patient's allergies indicates:  No Known Allergies  Prior Therapy:  no prior ST  Social History:  Patient lives in [place] and is currently driving. She mentions some difficulty with  "blacking out" while driving, sometimes forgetting where she is or where she is going.  Occupation:  Retired   Prior Level of Function: 100% independent   Current Level of Function: requiring assistance for medication management; word finding difficulties; memory difficulties  Pain Scale: 0/10 on a Visual Analog Scale currently.  Pain Location: n/a  Patient's Therapy Goals:  "to get better"  Objective   Formal Assessment:  Scales of Cognitive and Communicative Ability for Neurorehabilitation (SCCAN) was administered to assess cognitive and communicative functioning.        Raw Scores  Percentage Score  Oral Expression (OE)  16/19   84   Orientation (OR)   12/12   100  Memory (ME)   11/19   58  Speech Comprehension (SP)  8/13   62  Reading Comprehension (RD)  10/12   83  Writing (WR)   6/7   86  Attention (AT)    12/16   75  Problem Solving (PS)   16/23   70    Total Raw Score 72 %ile Rank < 1 SCCAN Index 50 Degree of Severity " MILD    Treatment   Total Treatment Time Separate from Evaluation: not applicable   No treatment performed secondary to time to complete evaluation.     Education provided:   -role of Speech Therapy, goals/plan of care, scheduling/cancellations, insurance limitations with patient  -Additional Education provided:   Results of evaluation    Patient  expressed understanding.     Home Program: to be initiated at next session  Assessment     Jud presents to [facility] status post medical diagnosis of facial droop, recently diagnosed with Bell's palsy and started on medication with notable improvements per patient and physical therapist's report. The patient's chief complaint today is difficulty with memory and word finding specifically.     Interpretation of objective assessment: The patient participated in the Scales of Cognitive and Communicative Ability for Neurorehabilitation (SCCAN) assessment to determine her current abilities as they relate to executive functioning and speech-language skills for daily living tasks. She demonstrates relative strengths in her orientation abilities despite her self-reports of intermittent confusion as to where she is or where she is going while driving. She presents with mild cognitive-communicative impairment as characterized by difficulties in the areas of oral expression, speech and reading comprehension, writing, memory, attention, and problem solving domains. She was less oriented to detail as it pertained to reading comprehension and writing tasks. She performed well for immediate memory tasks and had more difficulty with delayed recall. She sustained attention throughout the evaluation with relative ease and did not require any breaks. She is cognizant of her current cognitive limitations and is interested in therapy. Skilled speech therapy services are warranted based on the results today, as well as subjective reports of her current  abilities.    Demonstrates impairments including limitations as described in the problem list.     Positive prognostic factors: interest, motivation  Negative prognostic factors: unclear trajectory of impairments and limitations  Barriers to therapy: No barriers to therapy identified.     Patient's spiritual, cultural, and educational needs considered and patient agreeable to plan of care and goals.    Patient will benefit from skilled therapy.    Rehab Potential: fair    Short Term Goals: (4 weeks) Current Progress:   The patient will independently recall at least 5 cognitive compensatory strategies for use in daily contexts and environments to assist with current impairments.    Progressing/ Not Met 8/12/2024   Established this date   2. The patient will complete immediate recall tasks with 90% accuracy independently using compensatory strategies.     Progressing/ Not Met 8/12/2024   Established this date   3. The patient will participate in delayed recall tasks of either written or verbally presented information with 90% accuracy using compensatory strategies independently.      Progressing/ Not Met 8/12/2024   Established this date    4. The patient will complete organization tasks with 90% accuracy as it relates to medication management, time management, appointment schedules, and/or finances to carryover strategies into home environment.     Progressing/ Not Met 8/12/2024   Established this date    5. The patient will complete word finding tasks with 90% accuracy independently using compensatory strategies as needed to increase her lexical retrieval for daily contexts.     Progressing/ Not Met 8/12/2024   Established this date      Long Term Goals: (6 weeks) Current Progress:   Using compensatory strategies, the patient will recall 3 pieces of new information with 90% accuracy or greater for carryover into daily contexts and environments.   Established this date     2. Patient will report consistent use of at  least 3 cognitive compensatory strategies independently to increase her independence in home and social environments.     Established this date       Plan     Recommended Treatment Plan:  Patient will participate in the [facility] rehabilitation program for speech therapy 2 times per week for 6 weeks to address her Communication and Cognition deficits, to educate patient and their family, and to participate in a home exercise program.    Other Recommendations:   May benefit from neuropsychology evaluation    Therapist's Name:   Jana Sanchez M.S., L-SLP,CCC-SLP, CBIS  Speech-Language Pathologist  Certified Brain Injury Specialist  8/12/2024

## 2024-08-12 NOTE — PROGRESS NOTES
[facility]   Physical Therapy Treatment Note      Name: Jud Villa  Clinic Number: [id number]    Therapy Diagnosis:   Encounter Diagnosis   Name Primary?    Facial weakness Yes     Physician: Clement Armstrong MD    Visit Date: 8/12/2024    Physician Orders: PT Eval and Treat   Medical Diagnosis from Referral: Acute focal neurological deficit [R29.818], Facial droop [R29.810]   Evaluation Date: 8/7/2024  Authorization Period Expiration: 12/31/2024  Plan of Care Expiration: 9/20/2024  Progress Note Due: last formally assessed on 8/12/2024  Date of Surgery: N/A  Visit # / Visits authorized: 1/20 + eval  FOTO: 0/3 - FOTO not collected on intake     Precautions: Standard     Time In: 9:50AM  Time Out: 10:30AM  Total Billable Time: 40 minutes (1MT + 2NMR)    PTA Visit #: 0/5     Subjective     Patient reports: Her ENT who is affiliated with [facility] agrees and thinks she has Bell's Palsy. He prescribed her an anti-viral medication. At cardiology appointment following her initial PT visit, she notified physician assistant of PT's suspicions of Bell's Palsy and she prescribed the patient steroids. She is noticing her facial asymmetry is improving but she cannot form full smile yet.  She was compliant with home exercise program.  Response to previous treatment: Last session was initial evaluation  Functional change: Prescribed anti-virals and prednisone by medical team; improving facial symmetry    Pain: 0/10  Location: N/A      Objective      Objective Measures updated at progress report unless specified.     Sunnybrook Facial Grading System    Resting Symmetry (compared to normal side)    Eye (choose one only)  - normal (0)  - narrow (1)  - wide (1)  - eyelid surgery (1)    Cheek (nasolabial fold)  - normal (0)  - absent (2)  - less pronounced (1)  - more pronounced (1)    Mouth  - normal (0)  - corner dropped (1)  - corner pulled up/out (1)    TOTAL = 1  Resting symmetry score = TOTAL  x 5: 5    Symmetry of Voluntary Movement (degree of muscle excursion compared to normal side)    Forehead wrinkle  - unable to initiate movement/no movement (1)  - initiates slight movement (2)   - initiates movement with mild excursion (3)  - movement almost complete (4)  - movement complete (5)    Gentle eye closure  - unable to initiate movement/no movement (1)  - initiates slight movement (2)   - initiates movement with mild excursion (3)  - movement almost complete (4)  - movement complete (5)    Open mouth smile  - unable to initiate movement/no movement (1)  - initiates slight movement (2)   - initiates movement with mild excursion (3)  - movement almost complete (4)  - movement complete (5)    Snarl  - unable to initiate movement/no movement (1)  - initiates slight movement (2)   - initiates movement with mild excursion (3)  - movement almost complete (4)  - movement complete (5)    Lip pucker  - unable to initiate movement/no movement (1)  - initiates slight movement (2)   - initiates movement with mild excursion (3)  - movement almost complete (4)  - movement complete (5)    TOTAL = 18  Voluntary movement score = TOTAL x 4: 72    Synkinesis (rate the degree of involuntary muscle contraction associated with each expression)    Forehead wrinkle  - none/no synkinesis or mass movement (0)  - mild/slight synkinesis (1)  - moderate/obvious but not disfiguring synkinesis (2)   - severe/disfiguring synkinesis/gross mass movement of several muscles (3)    Gentle eye closure  - none/no synkinesis or mass movement (0)  - mild/slight synkinesis (1)  - moderate/obvious but not disfiguring synkinesis (2)   - severe/disfiguring synkinesis/gross mass movement of several muscles (3)    Open mouth smile  - none/no synkinesis or mass movement (0)  - mild/slight synkinesis (1)  - moderate/obvious but not disfiguring synkinesis (2)   - severe/disfiguring synkinesis/gross mass movement of several muscles (3)    Snarl  - none/no  "synkinesis or mass movement (0)  - mild/slight synkinesis (1)  - moderate/obvious but not disfiguring synkinesis (2)   - severe/disfiguring synkinesis/gross mass movement of several muscles (3)    Lip pucker  - none/no synkinesis or mass movement (0)  - mild/slight synkinesis (1)  - moderate/obvious but not disfiguring synkinesis (2)   - severe/disfiguring synkinesis/gross mass movement of several muscles (3)    TOTAL = 2  Synkinesis score = TOTAL: 2    Voluntary movement score (72) - Resting symmetry score (5) - Synkinesis score (2) = Composite score (65)     Treatment     Jud received the treatments listed below:      manual therapy techniques: Soft tissue Mobilization were applied to the: right facial region for 10 minutes, including:  - Manual stretching and vibratory stimulation with oscillator to orbicularis oculi superioris/inferioris, orbicularis oris superioris/inferioris, zygomaticus major/minor, levator anguli oris, levator labii  - Orbicularis oris superioris/inferioris multi-directional manual stretching    neuromuscular re-education activities to improve: Coordination of facial musculature for 30 minutes. The following activities were included:  - Reassessment of Sunnybrook (see above)  - Below exercises all performed in front of mirror with facial symmetry and minimizing synkinesis emphasized:  - Closed mouth smile with active assist x 2 minutes total; 5" holds   - Lip pucker with cues for 50% effort to reduce synkinesis and improve symmetry   - Lateral jaw deviations with tongue depressor in mouth 2x60"  - Cylindrical peg blows with straw 2x60"    Patient Education and Home Exercises       Education provided:   - Continue HEP  - Continue taking medications as prescribed    Written Home Exercises Provided: Pt instructed to continue prior HEP. Exercises were reviewed and Jud was able to demonstrate them prior to the end of the session.  Jud demonstrated good  understanding of the education " provided. See Electronic Medical Record under Patient Instructions for exercises provided during therapy sessions    Assessment     Jud is showing excellent improvements in facial symmetry since being prescribed appropriate medication regimen for Bell's Palsy as well as completing HEP instructed on PT evaluation. Outside physician as well as [facility] cardiology physician assistant confirmed PT suspicions of likely Bell's Palsy on initial evaluation. Her Sunnybrook score has improved significantly, but she is still demonstrating slight synkinesis and asymmetry most notable with lower facial musculature recruitment. She would benefit from continued PT services to improve facial symmetry and function further.    Jud Is progressing well towards her goals.   Patient prognosis is Fair.     Patient will continue to benefit from skilled outpatient physical therapy to address the deficits listed in the problem list box on initial evaluation, provide pt/family education and to maximize pt's level of independence in the home and community environment.     Patient's spiritual, cultural and educational needs considered and pt agreeable to plan of care and goals.     Anticipated barriers to physical therapy: originally, medical work-up was nebulous but this has improved since initial evaluation with more definitive diagnosis; updated barriers anticipated = cognitive communication deficits noted by SLP    Goals:     Short Term Goals: 3 weeks   Patient will be 100% compliant with initial HEP in order to maximize PT benefits (met)  Patient will score >/=50/100 on Sunnybrook Facial Grading System in order to improve facial function and symmetry (met)     Long Term Goals: 6 weeks   Patient will score >/=75/100 on Sunnybrook Facial Grading System in order to improve facial function and symmetry (progressing, not met)  Patient will report full return to prior function during facial ADLs including chewing, speaking, and emoting in  order to improve quality of life (progressing, not met)    Plan     Continue to progress as per plan of care; [facility] as needed    PARKER CALDERA, PT

## 2024-08-12 NOTE — PLAN OF CARE
[facility]  Speech Therapy Evaluation -Neurological Rehabilitation    Date: 8/12/2024     Name: Jud Villa   MRN: [id number]    Therapy Diagnosis:   Encounter Diagnoses   Name Primary?    Facial droop     Cognitive communication deficit Yes    Physician: Clement Armstrong MD  Physician Orders: Ambulatory Referral to Speech Therapy   Medical Diagnosis: R29.810 (ICD-10-CM) - Facial droop    Visit # / Visits Authorized:  1/1  Date of Evaluation:  8/12/2024   Insurance Authorization Period: 8/8/24 to 12/31/24  Plan of Care Certification:    8/12/2024 to 9/23/2024      Time In: 1032   Time Out: 1115   Total time: 43 minutes    Procedure   Cognitive Communication Evaluation including scoring and interpretation (Total time: 60 minutes)     Precautions: Cognition and Communication  Subjective   Date of Onset: within the last 6 months  History of Current Condition:  Jud Villa is a 64 y.o. female who presents to [facility] for evaluation secondary to facial droop. Patient was referred to therapy by Clement Armstrong MD , which is the patient's hospitalist. Patient reports stroke in March 2023 with slurred speech as residuals, now with worsening memory and processing problems, as well as word finding impairments. Her daughter has set reminders for her medications, and she is using a spreadsheet for finances and bill management. She currently has oversight from daughter and grandson for tasks.     Past Medical History: Jud Villa  has a past medical history of Asthma, Breast carcinoma, Cataract, Coronary artery disease of native heart with stable angina pectoris (04/04/2023), Diabetes mellitus, Hyperlipidemia, Moderate episode of recurrent major depressive disorder (05/19/2021), Osteoporosis, and Stroke (03/2023).  Jud Villa  has a past surgical history that includes Cholecystectomy; Hysterectomy; Colonoscopy (N/A, 10/27/2015); LASER PERIPHERAL  "IRIDOTOMY (Bilateral, 2019); Bilateral mastectomy (04/26/2018); Esophagogastroduodenoscopy (N/A, 01/24/2022); Left heart catheterization (Left, 04/05/2023); Coronary angiography (N/A, 04/05/2023); Coronary Artery Bypass Graft (CABG) (N/A, 04/14/2023); Endoscopic harvest of vein (Left, 04/14/2023); Coronary angioplasty; and Coronary artery bypass graft (04/2023).  Medical Hx and Allergies: Jud has a current medication list which includes the following prescription(s): acetaminophen, albuterol, albuterol, atorvastatin, bd ultra-fine short pen needle, dexcom g7 sensor, clonazepam, eliquis, ergocalciferol, fluticasone propionate, lantus solostar u-100 insulin, jardiance, meloxicam, metformin, metoprolol tartrate, pantoprazole, prednisone, sertraline, topiramate, tradjenta, trelegy ellipta, and ubrelvy. Review of patient's allergies indicates:  No Known Allergies  Prior Therapy:  no prior ST  Social History:  Patient lives in [place] and is currently driving. She mentions some difficulty with  "blacking out" while driving, sometimes forgetting where she is or where she is going.  Occupation:  Retired   Prior Level of Function: 100% independent   Current Level of Function: requiring assistance for medication management; word finding difficulties; memory difficulties  Pain Scale: 0/10 on a Visual Analog Scale currently.  Pain Location: n/a  Patient's Therapy Goals:  "to get better"  Objective   Formal Assessment:  Scales of Cognitive and Communicative Ability for Neurorehabilitation (SCCAN) was administered to assess cognitive and communicative functioning.        Raw Scores  Percentage Score  Oral Expression (OE)  16/19   84   Orientation (OR)   12/12   100  Memory (ME)    11/19   58  Speech Comprehension (SP)  8/13   62  Reading Comprehension (RD)  10/12   83  Writing (WR)    6/7   86  Attention (AT)    12/16   75  Problem Solving (PS)   16/23   70    Total Raw Score 72 %ile Rank < 1 SCCAN Index 50 Degree of Severity " MILD    Treatment   Total Treatment Time Separate from Evaluation: not applicable   No treatment performed secondary to time to complete evaluation.     Education provided:   -role of Speech Therapy, goals/plan of care, scheduling/cancellations, insurance limitations with patient  -Additional Education provided:   Results of evaluation    Patient  expressed understanding.     Home Program: to be initiated at next session  Assessment     Jud presents to [facility] status post medical diagnosis of facial droop, recently diagnosed with Bell's palsy and started on medication with notable improvements per patient and physical therapist's report. The patient's chief complaint today is difficulty with memory and word finding specifically.     Interpretation of objective assessment: The patient participated in the Scales of Cognitive and Communicative Ability for Neurorehabilitation (SCCAN) assessment to determine her current abilities as they relate to executive functioning and speech-language skills for daily living tasks. She demonstrates relative strengths in her orientation abilities despite her self-reports of intermittent confusion as to where she is or where she is going while driving. She presents with mild cognitive-communicative impairment as characterized by difficulties in the areas of oral expression, speech and reading comprehension, writing, memory, attention, and problem solving domains. She was less oriented to detail as it pertained to reading comprehension and writing tasks. She performed well for immediate memory tasks and had more difficulty with delayed recall. She sustained attention throughout the evaluation with relative ease and did not require any breaks. She is cognizant of her current cognitive limitations and is interested in therapy. Skilled speech therapy services are warranted based on the results today, as well as subjective reports of her current  abilities.    Demonstrates impairments including limitations as described in the problem list.     Positive prognostic factors: interest, motivation  Negative prognostic factors: unclear trajectory of impairments and limitations  Barriers to therapy: No barriers to therapy identified.     Patient's spiritual, cultural, and educational needs considered and patient agreeable to plan of care and goals.    Patient will benefit from skilled therapy.    Rehab Potential: fair    Short Term Goals: (4 weeks) Current Progress:   The patient will independently recall at least 5 cognitive compensatory strategies for use in daily contexts and environments to assist with current impairments.    Progressing/ Not Met 8/12/2024   Established this date   2. The patient will complete immediate recall tasks with 90% accuracy independently using compensatory strategies.     Progressing/ Not Met 8/12/2024   Established this date   3. The patient will participate in delayed recall tasks of either written or verbally presented information with 90% accuracy using compensatory strategies independently.      Progressing/ Not Met 8/12/2024   Established this date    4. The patient will complete organization tasks with 90% accuracy as it relates to medication management, time management, appointment schedules, and/or finances to carryover strategies into home environment.     Progressing/ Not Met 8/12/2024   Established this date    5. The patient will complete word finding tasks with 90% accuracy independently using compensatory strategies as needed to increase her lexical retrieval for daily contexts.     Progressing/ Not Met 8/12/2024   Established this date      Long Term Goals: (6 weeks) Current Progress:   Using compensatory strategies, the patient will recall 3 pieces of new information with 90% accuracy or greater for carryover into daily contexts and environments.   Established this date     2. Patient will report consistent use of at  least 3 cognitive compensatory strategies independently to increase her independence in home and social environments.     Established this date       Plan     Recommended Treatment Plan:  Patient will participate in the [facility] rehabilitation program for speech therapy 2 times per week for 6 weeks to address her Communication and Cognition deficits, to educate patient and their family, and to participate in a home exercise program.    Other Recommendations:   May benefit from neuropsychology evaluation    Therapist's Name:   Jana Sanchez M.S., L-SLP,CCC-SLP, CBIS  Speech-Language Pathologist  Certified Brain Injury Specialist  8/12/2024

## 2024-08-13 ENCOUNTER — DOCUMENTATION ONLY (OUTPATIENT)
Dept: REHABILITATION | Facility: HOSPITAL | Age: 64
End: 2024-08-13

## 2024-08-13 NOTE — PROGRESS NOTES
No Show Note/Documentation     Patient: Jud Villa  Date of Session: 8/13/2024  MRN: [id number]     Jdu Villa did not attend her scheduled therapy appointment today. She did not call to cancel nor reschedule. Next appointment is scheduled for August 19th and will follow up with patient at that time. No charges have been posted today.      Bethany Torres M.S., L-SLP,CCC-SLP   Speech Language Pathologist   8/13/2024

## 2024-08-15 ENCOUNTER — OFFICE VISIT (OUTPATIENT)
Dept: FAMILY MEDICINE | Facility: CLINIC | Age: 64
End: 2024-08-15
Payer: MEDICARE

## 2024-08-15 ENCOUNTER — OFFICE VISIT (OUTPATIENT)
Dept: ENDOCRINOLOGY | Facility: CLINIC | Age: 64
End: 2024-08-15
Payer: MEDICARE

## 2024-08-15 VITALS
WEIGHT: 116.88 LBS | SYSTOLIC BLOOD PRESSURE: 112 MMHG | DIASTOLIC BLOOD PRESSURE: 68 MMHG | BODY MASS INDEX: 21.51 KG/M2 | HEIGHT: 62 IN | HEART RATE: 81 BPM | OXYGEN SATURATION: 98 %

## 2024-08-15 DIAGNOSIS — F51.01 PRIMARY INSOMNIA: Primary | ICD-10-CM

## 2024-08-15 DIAGNOSIS — E11.59 TYPE 2 DIABETES MELLITUS WITH OTHER CIRCULATORY COMPLICATION, WITH LONG-TERM CURRENT USE OF INSULIN: ICD-10-CM

## 2024-08-15 DIAGNOSIS — Z79.4 TYPE 2 DIABETES MELLITUS WITH OTHER CIRCULATORY COMPLICATION, WITH LONG-TERM CURRENT USE OF INSULIN: Primary | ICD-10-CM

## 2024-08-15 DIAGNOSIS — Z95.1 S/P CABG X 2: ICD-10-CM

## 2024-08-15 DIAGNOSIS — E11.59 TYPE 2 DIABETES MELLITUS WITH OTHER CIRCULATORY COMPLICATION, WITH LONG-TERM CURRENT USE OF INSULIN: Primary | ICD-10-CM

## 2024-08-15 DIAGNOSIS — Z79.4 TYPE 2 DIABETES MELLITUS WITH OTHER CIRCULATORY COMPLICATION, WITH LONG-TERM CURRENT USE OF INSULIN: ICD-10-CM

## 2024-08-15 DIAGNOSIS — F33.1 MODERATE EPISODE OF RECURRENT MAJOR DEPRESSIVE DISORDER: ICD-10-CM

## 2024-08-15 PROCEDURE — 99214 OFFICE O/P EST MOD 30 MIN: CPT | Mod: HCNC,95,, | Performed by: INTERNAL MEDICINE

## 2024-08-15 PROCEDURE — 1101F PT FALLS ASSESS-DOCD LE1/YR: CPT | Mod: HCNC,CPTII,S$GLB, | Performed by: FAMILY MEDICINE

## 2024-08-15 PROCEDURE — 99215 OFFICE O/P EST HI 40 MIN: CPT | Mod: HCNC,S$GLB,, | Performed by: FAMILY MEDICINE

## 2024-08-15 PROCEDURE — 3074F SYST BP LT 130 MM HG: CPT | Mod: HCNC,CPTII,S$GLB, | Performed by: FAMILY MEDICINE

## 2024-08-15 PROCEDURE — 3051F HG A1C>EQUAL 7.0%<8.0%: CPT | Mod: HCNC,CPTII,95, | Performed by: INTERNAL MEDICINE

## 2024-08-15 PROCEDURE — 3078F DIAST BP <80 MM HG: CPT | Mod: HCNC,CPTII,S$GLB, | Performed by: FAMILY MEDICINE

## 2024-08-15 PROCEDURE — 3072F LOW RISK FOR RETINOPATHY: CPT | Mod: HCNC,CPTII,95, | Performed by: INTERNAL MEDICINE

## 2024-08-15 PROCEDURE — 3072F LOW RISK FOR RETINOPATHY: CPT | Mod: HCNC,CPTII,S$GLB, | Performed by: FAMILY MEDICINE

## 2024-08-15 PROCEDURE — 3052F HG A1C>EQUAL 8.0%<EQUAL 9.0%: CPT | Mod: HCNC,CPTII,S$GLB, | Performed by: FAMILY MEDICINE

## 2024-08-15 PROCEDURE — 3008F BODY MASS INDEX DOCD: CPT | Mod: HCNC,CPTII,S$GLB, | Performed by: FAMILY MEDICINE

## 2024-08-15 PROCEDURE — 99999 PR PBB SHADOW E&M-EST. PATIENT-LVL III: CPT | Mod: PBBFAC,HCNC,, | Performed by: FAMILY MEDICINE

## 2024-08-15 PROCEDURE — 3066F NEPHROPATHY DOC TX: CPT | Mod: HCNC,CPTII,95, | Performed by: INTERNAL MEDICINE

## 2024-08-15 PROCEDURE — 1159F MED LIST DOCD IN RCRD: CPT | Mod: HCNC,CPTII,S$GLB, | Performed by: FAMILY MEDICINE

## 2024-08-15 PROCEDURE — 3288F FALL RISK ASSESSMENT DOCD: CPT | Mod: HCNC,CPTII,S$GLB, | Performed by: FAMILY MEDICINE

## 2024-08-15 PROCEDURE — 3061F NEG MICROALBUMINURIA REV: CPT | Mod: HCNC,CPTII,95, | Performed by: INTERNAL MEDICINE

## 2024-08-15 PROCEDURE — 1160F RVW MEDS BY RX/DR IN RCRD: CPT | Mod: HCNC,CPTII,S$GLB, | Performed by: FAMILY MEDICINE

## 2024-08-15 PROCEDURE — G2211 COMPLEX E/M VISIT ADD ON: HCPCS | Mod: HCNC,95,, | Performed by: INTERNAL MEDICINE

## 2024-08-15 RX ORDER — ESZOPICLONE 1 MG/1
1 TABLET, FILM COATED ORAL NIGHTLY
Qty: 90 TABLET | Refills: 0 | Status: SHIPPED | OUTPATIENT
Start: 2024-08-15 | End: 2024-11-13

## 2024-08-15 NOTE — ASSESSMENT & PLAN NOTE
15 + years with T2DM that is complicated by CAD and neuropathy   Low B12 levels on metformin use - reminded to please start taking as she has neuropathy    Start taking lantus 10 units every night, currently missing at least three times weekly; asked her to set an alarm  Tradjenta 5 mg daily   Jardiance 10 mg daily  --> plan to increase to 25 mg daily   Metformin 1000 mg one tablet twice a day    DM education for sensor training. - referred.

## 2024-08-15 NOTE — PATIENT INSTRUCTIONS
Your diabetes regimen:    Lantus 10 units at bedtime - set an alarm so you don't forget   Tradjenta 5 mg daily   Jardiance 10 mg daily - we will increase the dose to 25 mg daily. It is good for your heart and for your diabetes. Since you don't want to increase the dose, try taking jardiance 10 mg 1 1/5 tablets for a few days and see how you feel. Then increase to jardiance 10 mg two tablets for a few days. Then start the new prescription of jardiance 25 mg daily.   Please repeat your labs in three months.   Metformin 1000 mg one tablet twice a day    Please take a super B complex vitamin that has B12.     Jardiance:   While using this medication, stay well hydrated, typically we recommend 8 - 10 glasses a day.  It can lower your blood pressure and lead to dizziness.     It can increase the risk of urinary tract infections so please let me know if that develops.     You should not take this medication if you are sick or for three days before any planned procedures until you are eating normally again      Here are some steps to take if you are having connectivity issues:  Call tech support at [phone]  [employer] help center patients can chat with tech support or go onto settings tab and click HELP to chat with tech support.  Make sure the phone is up to date on software downloads and check the phone compatibility on our website. I will also enclosed the link.  Close out of dexcom and clarity monica and all other apps on phone. Shut the phone down for 30 seconds and REBOOT  Make sure the phone monica has not been swiped which causes the monica closed.   Turn off the automatic iPhone (IOS) update and manually add the updates as needed.  Make sure phone is within 20 feet of the user  Ensure the patient does not have multiple things connected to [employer] and are all working at one time.  When the patient looks at Bluetooth, even though it will look as if the Dexcom is not connectedit actually is??  If patient gets a new  phone, patient will need to forget device and reinstall Dexcom 76 monica and clarity. The patient need to remember log in information.  If tips have not resolved the issue, make sure the patient has the sensor and transmitter connected correctly. If this box has been checked, it could be a sensor or transmitter failure. Patient will need to call tech support

## 2024-08-15 NOTE — PROGRESS NOTES
Subjective     Patient ID: Jud Villa is a 64 y.o. female.    Chief Complaint: Insomnia, Diabetes, and Depression    64 years old female came to the clinic with significant depression after family member death .  Patient with significant anxiety associated with sleeping.  She had recently started on Zoloft.  She is willing to do psychotherapy.  Last A1c was elevated.  Testing for diabetes was ordered.    Diabetes  She presents for her follow-up diabetic visit. She has type 2 diabetes mellitus. No MedicAlert identification noted. Her disease course has been stable. Hypoglycemia symptoms include nervousness/anxiousness. Pertinent negatives for diabetes include no polydipsia, no polyphagia and no polyuria. There are no hypoglycemic complications. Symptoms are stable. There are no diabetic complications. Risk factors for coronary artery disease include sedentary lifestyle, hypertension, post-menopausal and stress. Current diabetic treatment includes oral agent (dual therapy) and insulin injections. She is compliant with treatment most of the time. She is following a generally healthy diet. She has not had a previous visit with a dietitian. She never participates in exercise. An ACE inhibitor/angiotensin II receptor blocker is not being taken.   Depression  Visit Type: follow-up  Patient presents with the following symptoms: depressed mood, excessive worry, feelings of hopelessness, insomnia and nervousness/anxiety.  Patient is not experiencing: suicidal ideas and suicidal planning.  Frequency of symptoms: most days   Severity: moderate   Sleep quality: non-restorative  Side effects:  Insomnia    Review of Systems   Constitutional: Negative.    HENT: Negative.     Eyes: Negative.    Respiratory: Negative.     Gastrointestinal: Negative.    Endocrine: Negative for polydipsia, polyphagia and polyuria.   Genitourinary: Negative.    Musculoskeletal: Negative.    Neurological: Negative.    Psychiatric/Behavioral:   Positive for depression, depressed mood and sleep disturbance. Negative for suicidal ideas. The patient is nervous/anxious and has insomnia.           Objective     Physical Exam  Constitutional:       General: She is not in acute distress.     Appearance: She is well-developed. She is not diaphoretic.   HENT:      Head: Normocephalic and atraumatic.      Right Ear: External ear normal.      Left Ear: External ear normal.      Nose: Nose normal.      Mouth/Throat:      Pharynx: No oropharyngeal exudate.   Eyes:      General: No scleral icterus.        Right eye: No discharge.         Left eye: No discharge.      Conjunctiva/sclera: Conjunctivae normal.      Pupils: Pupils are equal, round, and reactive to light.   Neck:      Thyroid: No thyromegaly.      Vascular: No JVD.      Trachea: No tracheal deviation.   Cardiovascular:      Rate and Rhythm: Normal rate and regular rhythm.      Heart sounds: Normal heart sounds. No murmur heard.     No friction rub. No gallop.   Pulmonary:      Effort: Pulmonary effort is normal. No respiratory distress.      Breath sounds: Normal breath sounds. No stridor. No wheezing or rales.   Chest:      Chest wall: No tenderness.   Abdominal:      General: Bowel sounds are normal. There is no distension.      Palpations: Abdomen is soft. There is no mass.      Tenderness: There is no abdominal tenderness. There is no guarding or rebound.   Musculoskeletal:         General: No tenderness. Normal range of motion.      Cervical back: Normal range of motion and neck supple.   Lymphadenopathy:      Cervical: No cervical adenopathy.   Skin:     General: Skin is warm and dry.      Coloration: Skin is not pale.      Findings: No erythema or rash.   Neurological:      Mental Status: She is alert and oriented to person, place, and time.      Cranial Nerves: No cranial nerve deficit.      Motor: No abnormal muscle tone.      Coordination: Coordination normal.      Deep Tendon Reflexes: Reflexes are  normal and symmetric.   Psychiatric:         Mood and Affect: Mood is anxious and depressed.         Behavior: Behavior normal.         Thought Content: Thought content normal.         Judgment: Judgment normal.            Assessment and Plan     1. Primary insomnia  -     eszopiclone (LUNESTA) 1 MG Tab; Take 1 tablet (1 mg total) by mouth nightly.  Dispense: 90 tablet; Refill: 0    2. Type 2 diabetes mellitus with other circulatory complication, with long-term current use of insulin  -     Microalbumin/Creatinine Ratio, Urine; Future; Expected date: 08/15/2024  -     Lipid Panel; Future; Expected date: 08/15/2024  -     Hemoglobin A1C; Future; Expected date: 08/15/2024  -     Comprehensive Metabolic Panel; Future; Expected date: 08/15/2024  -     Ambulatory referral/consult to Diabetes Education; Future; Expected date: 08/22/2024    3. Moderate episode of recurrent major depressive disorder  -     Ambulatory referral/consult to Psychiatry; Future; Expected date: 08/22/2024    I spent a total of 45 minutes on the day of the visit.This includes face to face time and non-face to face time preparing to see the patient (eg, review of tests), obtaining and/or reviewing separately obtained history, documenting clinical information in the electronic or other health record, independently interpreting results and communicating results to the patient/family/caregiver, or care coordinator.     Continue monitoring blood sugar at home,ADA diet.   Sleep hygiene.         Follow up in about 3 months (around 11/15/2024), or if symptoms worsen or fail to improve.

## 2024-08-15 NOTE — ASSESSMENT & PLAN NOTE
BP and P wnl range   Lipitor 80 mg daily   Continue jardiance would consider increasing dose to 25 mg daily.   Repeat basic chem profile in three months along with A1C.

## 2024-08-15 NOTE — PROGRESS NOTES
Subjective:      Patient ID: Jud Villa is a 64 y.o. female.    Chief Complaint:  No chief complaint on file.      ENDOCRINE FOLLOW UP VISIT     History of Present Illness  T2DM  Diagnosed 15 years ago  Known complications: CAD, s/p CABG and neuropathy  Low B12 levels      Interval history:  Episode of [place] Palsy  Reports difficulty with using dexcom - painful, only using right arm. No erythema or swelling.   Has a planned colonoscopy in the upcoming weeks. Knows to stop jardiance 2 days before.     Current Diabetes Regimen:  Lantus 10 units at bedtime - forgets to take it 3 times weekly   Tradjenta 5 mg daily   Jardiance 10 mg daily --denies side effects.    Metformin 1000 mg one tablet twice a day -- denies GI side effects    Not taking B12    Diet/Exercise:  Eats 1 - 2 times daily   Trying to drink and eat less (has one small coke/chocolate/twinkies/oreos)  Breakfast and dinner.     Glucose Monitoring:           Hypoglycemic Episodes:  Denies recent hypoglycemia     PMHx:  S/p CABG X 2 in 4/2023  Stroke in 3/2023    Denies chest pain pressure or shortness of breath   Review of Systems  Denies recent illness     Objective:   Physical Exam  This appointment is audiovisual   There were no vitals filed for this visit.      BP Readings from Last 3 Encounters:   08/15/24 112/68   08/07/24 101/61   08/06/24 (!) 101/55     Wt Readings from Last 1 Encounters:   08/15/24 0921 53 kg (116 lb 13.5 oz)         There is no height or weight on file to calculate BMI.    Lab Review:   Lab Results   Component Value Date    HGBA1C 8.6 (H) 04/09/2024     Lab Results   Component Value Date    CHOL 105 (L) 08/05/2024    HDL 38 (L) 08/05/2024    LDLCALC 34.6 (L) 08/05/2024    TRIG 162 (H) 08/05/2024    CHOLHDL 36.2 08/05/2024     Lab Results   Component Value Date     08/05/2024    K 4.4 08/05/2024     08/05/2024    CO2 21 (L) 08/05/2024     (H) 08/05/2024    BUN 16 08/05/2024    CREATININE 0.9 08/05/2024     CALCIUM 9.0 08/05/2024    PROT 6.6 08/05/2024    ALBUMIN 3.5 08/05/2024    BILITOT 0.2 08/05/2024    ALKPHOS 80 08/05/2024    AST 20 08/05/2024    ALT 19 08/05/2024    ANIONGAP 11 08/05/2024    ESTGFRAFRICA >60.0 06/06/2022    EGFRNONAA >60.0 06/06/2022    TSH 0.955 08/05/2024      Latest Reference Range & Units 08/22/23 09:11   Creatinine, Urine 15.0 - 325.0 mg/dL 69.0   Urine Microalbumin ug/mL <5.0   MICROALB/CREAT RATIO 0.0 - 30.0 ug/mg Unable to calculate       Assessment and Plan     Type 2 diabetes mellitus with other circulatory complication, with long-term current use of insulin  15 + years with T2DM that is complicated by CAD and neuropathy   Low B12 levels on metformin use - reminded to please start taking as she has neuropathy    Start taking lantus 10 units every night, currently missing at least three times weekly; asked her to set an alarm  Tradjenta 5 mg daily   Jardiance 10 mg daily  Metformin 1000 mg one tablet twice a day    DM education for sensor training. - referred.       S/P CABG x 2  BP and P wnl range   Lipitor 80 mg daily   Continue jardiance would consider increasing dose to 25 mg daily.   Repeat basic chem profile in three months along with A1C.       The patient location is: home/[place]  The chief complaint leading to consultation is: type 2dm with complications    Visit type: audiovisual    Face to Face time with patient: 20 minutes of total time spent on the encounter, which includes face to face time and non-face to face time preparing to see the patient (eg, review of tests), Obtaining and/or reviewing separately obtained history, Documenting clinical information in the electronic or other health record, Independently interpreting results (not separately reported) and communicating results to the patient/family/caregiver, or Care coordination (not separately reported).         Each patient to whom he or she provides medical services by telemedicine is:  (1) informed of the relationship  between the physician and patient and the respective role of any other health care provider with respect to management of the patient; and (2) notified that he or she may decline to receive medical services by telemedicine and may withdraw from such care at any time.    Notes:

## 2024-08-16 ENCOUNTER — LAB VISIT (OUTPATIENT)
Dept: LAB | Facility: HOSPITAL | Age: 64
End: 2024-08-16
Attending: FAMILY MEDICINE
Payer: MEDICARE

## 2024-08-16 ENCOUNTER — TELEPHONE (OUTPATIENT)
Dept: INTERNAL MEDICINE | Facility: CLINIC | Age: 64
End: 2024-08-16
Payer: MEDICARE

## 2024-08-16 DIAGNOSIS — Z79.4 TYPE 2 DIABETES MELLITUS WITH OTHER CIRCULATORY COMPLICATION, WITH LONG-TERM CURRENT USE OF INSULIN: ICD-10-CM

## 2024-08-16 DIAGNOSIS — E11.59 TYPE 2 DIABETES MELLITUS WITH OTHER CIRCULATORY COMPLICATION, WITH LONG-TERM CURRENT USE OF INSULIN: ICD-10-CM

## 2024-08-16 LAB
ALBUMIN SERPL BCP-MCNC: 3.7 G/DL (ref 3.5–5.2)
ALP SERPL-CCNC: 77 U/L (ref 55–135)
ALT SERPL W/O P-5'-P-CCNC: 15 U/L (ref 10–44)
ANION GAP SERPL CALC-SCNC: 8 MMOL/L (ref 8–16)
AST SERPL-CCNC: 14 U/L (ref 10–40)
BILIRUB SERPL-MCNC: 0.4 MG/DL (ref 0.1–1)
BUN SERPL-MCNC: 19 MG/DL (ref 8–23)
CALCIUM SERPL-MCNC: 9.6 MG/DL (ref 8.7–10.5)
CHLORIDE SERPL-SCNC: 101 MMOL/L (ref 95–110)
CHOLEST SERPL-MCNC: 107 MG/DL (ref 120–199)
CHOLEST/HDLC SERPL: 2 {RATIO} (ref 2–5)
CO2 SERPL-SCNC: 26 MMOL/L (ref 23–29)
CREAT SERPL-MCNC: 0.8 MG/DL (ref 0.5–1.4)
EST. GFR  (NO RACE VARIABLE): >60 ML/MIN/1.73 M^2
ESTIMATED AVG GLUCOSE: 163 MG/DL (ref 68–131)
GLUCOSE SERPL-MCNC: 157 MG/DL (ref 70–110)
HBA1C MFR BLD: 7.3 % (ref 4–5.6)
HDLC SERPL-MCNC: 53 MG/DL (ref 40–75)
HDLC SERPL: 49.5 % (ref 20–50)
LDLC SERPL CALC-MCNC: 39.8 MG/DL (ref 63–159)
NONHDLC SERPL-MCNC: 54 MG/DL
POTASSIUM SERPL-SCNC: 4 MMOL/L (ref 3.5–5.1)
PROT SERPL-MCNC: 6.5 G/DL (ref 6–8.4)
SODIUM SERPL-SCNC: 135 MMOL/L (ref 136–145)
TRIGL SERPL-MCNC: 71 MG/DL (ref 30–150)

## 2024-08-16 PROCEDURE — 83036 HEMOGLOBIN GLYCOSYLATED A1C: CPT | Mod: HCNC | Performed by: FAMILY MEDICINE

## 2024-08-16 PROCEDURE — 80053 COMPREHEN METABOLIC PANEL: CPT | Mod: HCNC | Performed by: FAMILY MEDICINE

## 2024-08-16 PROCEDURE — 80061 LIPID PANEL: CPT | Mod: HCNC | Performed by: FAMILY MEDICINE

## 2024-08-16 PROCEDURE — 36415 COLL VENOUS BLD VENIPUNCTURE: CPT | Mod: HCNC,PO | Performed by: FAMILY MEDICINE

## 2024-08-16 NOTE — TELEPHONE ENCOUNTER
Called and spoke with pt in regard to message. Pt stated that she wanted her A1c results that she did this morning. I informed pt that the results are no showing in her chart yet pt stated that the results are showing in her my chart. I informed pt that I dont see anything in her chart on my end I will send a message to Dr. Yañez and his nurse. Pt started yelling over the phone stating that she requested Dr. Yañez nurse to call I informed her that I am another MA in the office calling for my details and she proceeded to continue yelling. Please contact pt.

## 2024-08-16 NOTE — TELEPHONE ENCOUNTER
----- Message from Shyanne Montana sent at 8/16/2024  9:58 AM CDT -----  Type:  Test Results    Who Called: pt   Name of Test (Lab/Mammo/Etc): lab  Date of Test: 08/16  Ordering Provider: ricarda   Where the test was performed: [facility]   Would the patient rather a call back or a response via [facility]? Call   Best Call Back Number:  [phone]  Additional Information:

## 2024-08-19 ENCOUNTER — TELEPHONE (OUTPATIENT)
Dept: FAMILY MEDICINE | Facility: CLINIC | Age: 64
End: 2024-08-19
Payer: MEDICARE

## 2024-08-19 ENCOUNTER — CLINICAL SUPPORT (OUTPATIENT)
Dept: REHABILITATION | Facility: HOSPITAL | Age: 64
End: 2024-08-19
Payer: MEDICARE

## 2024-08-19 DIAGNOSIS — R41.841 COGNITIVE COMMUNICATION DEFICIT: Primary | ICD-10-CM

## 2024-08-19 DIAGNOSIS — R29.810 FACIAL WEAKNESS: Primary | ICD-10-CM

## 2024-08-19 PROCEDURE — 97112 NEUROMUSCULAR REEDUCATION: CPT | Mod: HCNC,PN

## 2024-08-19 PROCEDURE — 97129 THER IVNTJ 1ST 15 MIN: CPT | Mod: HCNC,PN

## 2024-08-19 PROCEDURE — 97130 THER IVNTJ EA ADDL 15 MIN: CPT | Mod: HCNC,PN

## 2024-08-19 NOTE — TELEPHONE ENCOUNTER
----- Message from Sarina Boateng sent at 8/19/2024  3:29 PM CDT -----  Type:  Test Results     Who Called: ABELINO DEMARCO [[id number]]    Name of Test (Lab/Mammo/Etc): Lab     Date of Test: 8/16    Ordering Provider: GLORIA BERGMAN MD     Where the test was performed: [facility]    Would the patient rather a call back or a response via [facility]? Call back     Best Call Back Number:  [phone]    Additional Information: Pt stated that someone was supposed to call her to discuss lab results on Friday but has not heard from anyone

## 2024-08-19 NOTE — PROGRESS NOTES
[facility]   Physical Therapy Treatment Note      Name: Jud Villa  Clinic Number: [id number]    Therapy Diagnosis:   Encounter Diagnosis   Name Primary?    Facial weakness Yes     Physician: Clement Armstrong MD    Visit Date: 8/19/2024    Physician Orders: PT Eval and Treat   Medical Diagnosis from Referral: Acute focal neurological deficit [R29.818], Facial droop [R29.810]   Evaluation Date: 8/7/2024  Authorization Period Expiration: 12/31/2024  Plan of Care Expiration: 9/20/2024  Progress Note Due: last formally assessed on 8/19/2024  Date of Surgery: N/A  Visit # / Visits authorized: 2/20 + eval  FOTO: 0/3 - FOTO not collected on intake     Precautions: Standard     Time In: 8:20AM  Time Out: 9:00AM  Total Billable Time: 40 minutes (3NMR)    PTA Visit #: 0/5     Subjective     Patient reports: Still wanting to continue PT a little longer but is feeling she is making significant improvement in terms of facial symmetry.  She was compliant with home exercise program.  Response to previous treatment: Last session was initial evaluation  Functional change: Improving [place]    Pain: 0/10  Location: N/A      Objective      Objective Measures updated at progress report unless specified.     [place] Facial Grading System    Resting Symmetry (compared to normal side)    Eye (choose one only)  - normal (0)  - narrow (1)  - wide (1)  - eyelid surgery (1)    Cheek (nasolabial fold)  - normal (0)  - absent (2)  - less pronounced (1)  - more pronounced (1)    Mouth  - normal (0)  - corner dropped (1)  - corner pulled up/out (1)    TOTAL = 0  Resting symmetry score = TOTAL x 5: 0    Symmetry of Voluntary Movement (degree of muscle excursion compared to normal side)    Forehead wrinkle  - unable to initiate movement/no movement (1)  - initiates slight movement (2)   - initiates movement with mild excursion (3)  - movement almost complete (4)  - movement complete (5)    Gentle eye closure  -  unable to initiate movement/no movement (1)  - initiates slight movement (2)   - initiates movement with mild excursion (3)  - movement almost complete (4)  - movement complete (5)    Open mouth smile  - unable to initiate movement/no movement (1)  - initiates slight movement (2)   - initiates movement with mild excursion (3)  - movement almost complete (4)  - movement complete (5)    Snarl  - unable to initiate movement/no movement (1)  - initiates slight movement (2)   - initiates movement with mild excursion (3)  - movement almost complete (4)  - movement complete (5)    Lip pucker  - unable to initiate movement/no movement (1)  - initiates slight movement (2)   - initiates movement with mild excursion (3)  - movement almost complete (4)  - movement complete (5)    TOTAL = 21  Voluntary movement score = TOTAL x 4: 84    Synkinesis (rate the degree of involuntary muscle contraction associated with each expression)    Forehead wrinkle  - none/no synkinesis or mass movement (0)  - mild/slight synkinesis (1)  - moderate/obvious but not disfiguring synkinesis (2)   - severe/disfiguring synkinesis/gross mass movement of several muscles (3)    Gentle eye closure  - none/no synkinesis or mass movement (0)  - mild/slight synkinesis (1)  - moderate/obvious but not disfiguring synkinesis (2)   - severe/disfiguring synkinesis/gross mass movement of several muscles (3)    Open mouth smile  - none/no synkinesis or mass movement (0)  - mild/slight synkinesis (1)  - moderate/obvious but not disfiguring synkinesis (2)   - severe/disfiguring synkinesis/gross mass movement of several muscles (3)    Snarl  - none/no synkinesis or mass movement (0)  - mild/slight synkinesis (1)  - moderate/obvious but not disfiguring synkinesis (2)   - severe/disfiguring synkinesis/gross mass movement of several muscles (3)    Lip pucker  - none/no synkinesis or mass movement (0)  - mild/slight synkinesis (1)  - moderate/obvious but not disfiguring  "synkinesis (2)   - severe/disfiguring synkinesis/gross mass movement of several muscles (3)    TOTAL = 0  Synkinesis score = TOTAL: 0    Voluntary movement score (84) - Resting symmetry score (0) - Synkinesis score (0) = Composite score (84)     Treatment     Jud received the treatments listed below:      manual therapy techniques: Soft tissue Mobilization were applied to the: right facial region for 00 minutes, including:    neuromuscular re-education activities to improve: Coordination of facial musculature for 40 minutes. The following activities were included:  - Reassessment of [place] (see above)  - Below exercises all performed in front of mirror with facial symmetry and minimizing synkinesis emphasized:  - Closed mouth smile with active assist x 2 minutes total; 5" holds   - Lip pucker with cues for 50% effort to improve symmetry x 2 minutes total; 5" holds   - Inferior jaw protrusion 2x60"  - Alternate forceful eye closure and gentle eye closure 2x60"  - Lateral jaw deviations with tongue depressor in mouth 2x60"  - Snarl (face of "disgust") with cues for 50% effort to improve symmetry 2x60"  - Frowning (face of "sadness") 2x60"  - Alternate winking 2x60"  - Cylindrical peg blows with straw 2x60"    Patient Education and Home Exercises       Education provided:   - Continue HEP  - Continue taking medications as prescribed    Written Home Exercises Provided: Pt instructed to continue prior HEP. Exercises were reviewed and Jud was able to demonstrate them prior to the end of the session.  Jud demonstrated good  understanding of the education provided. See Electronic Medical Record under Patient Instructions for exercises provided during therapy sessions    Assessment     Jud continues to show excellent improvement in facial symmetry. Her resting symmetry appears back to baseline. Still with some very mild asymmetry with lower facial movements persist. [place] has improved significantly since " initial evaluation. She would benefit from continued PT services to achieve remaining goals.    Jud Is progressing well towards her goals.   Patient prognosis is Fair.     Patient will continue to benefit from skilled outpatient physical therapy to address the deficits listed in the problem list box on initial evaluation, provide pt/family education and to maximize pt's level of independence in the home and community environment.     Patient's spiritual, cultural and educational needs considered and pt agreeable to plan of care and goals.     Anticipated barriers to physical therapy: originally, medical work-up was nebulous but this has improved since initial evaluation with more definitive diagnosis; updated barriers anticipated = cognitive communication deficits noted by SLP    Goals:     Short Term Goals: 3 weeks   Patient will be 100% compliant with initial HEP in order to maximize PT benefits (met)  Patient will score >/=50/100 on [place] Facial Grading System in order to improve facial function and symmetry (met)     Long Term Goals: 6 weeks   Patient will score >/=75/100 on [place] Facial Grading System in order to improve facial function and symmetry (met) - upgraded to 92/100 (progressing, not met)  Patient will report full return to prior function during facial ADLs including chewing, speaking, and emoting in order to improve quality of life (progressing, not met)    Plan     Continue to progress as per plan of care; update [place] scale as needed    PARKER CALDERA, PT

## 2024-08-19 NOTE — PROGRESS NOTES
[facility]  Speech Therapy Treatment Note- Neurological Rehabilitation  Date: 2024     Name: Jud Villa   MRN: [id number]   Therapy Diagnosis:   Encounter Diagnosis   Name Primary?    Cognitive communication deficit Yes   Physician: Clement Armstrong MD  Physician Orders: Ambulatory Referral to Speech Therapy   Medical Diagnosis: R29.810 (ICD-10-CM) - Facial droop    Visit #/ Visits Authorized:   Date of Evaluation:  24  Insurance Authorization Period: 24-25  Plan of Care Expiration Date:    24  Extended Plan of Care:  n/a   Progress Note: 24   FOTO: Intake completed today - 34.8/63.5    Time In:  902  Time Out:  945  Total Billable Time: 43 minutes     Precautions: Cognition and Communication  Subjective:   Patient reports: her daughter-in-law was killed in an accident and  is tomorrow; she is feeling stressed this past week and within the last few years   She was not compliant to home exercise program. Not established yet  Response to previous treatment: good  Pain Scale: 0/10 on a Visual Analog Scale currently.  Pain Location: n/a  Objective:   TIMED  Procedure Min.   Cognitive Therapeutic Interventions, first 15 minutes CPT 94923  15   Cognitive Therapeutic Interventions, each additional 15 minutes CPT 09739  28         Short Term Goals: (4 weeks) Current Progress:   The patient will independently recall at least 5 cognitive compensatory strategies for use in daily contexts and environments to assist with current impairments.     Progressing/ Not Met 2024 Speech-language pathologist instructed patient in various cognitive-communicative strategies for daily living tasks, using Therapy Insights packet. She received instruction on orientation and memory, attention and focus, planning and organizing, and then strategies specific to external and internal memory aids. The patient reported currently using reminders to remember to take her medications -  daughter helped her set this up. Also using a calendar for her therapy and doctor appointments   2. The patient will complete immediate recall tasks with 90% accuracy independently using compensatory strategies.      Progressing/ Not Met 2024 10 item grocery list visually presented with 2 minutes to review. Speech-language pathologist assisted patient with categorization strategy and manipulation of the list for easier recall.  Recalled 90% independently (9/10 items)   3. The patient will participate in delayed recall tasks of either written or verbally presented information with 90% accuracy using compensatory strategies independently.       Progressing/ Not Met 2024   With 5 minute delay, patient independently recalled 100% (10/10) of the items using categorization   4. The patient will complete organization tasks with 90% accuracy as it relates to medication management, time management, appointment schedules, and/or finances to carryover strategies into home environment.      Progressing/ Not Met 2024 Did not address today   5. The patient will complete word finding tasks with 90% accuracy independently using compensatory strategies as needed to increase her lexical retrieval for daily contexts.      Progressing/ Not Met 2024 Did not address today      Long Term Goals: (6 weeks) Current Progress:   Using compensatory strategies, the patient will recall 3 pieces of new information with 90% accuracy or greater for carryover into daily contexts and environments.    ongoing   2. Patient will report consistent use of at least 3 cognitive compensatory strategies independently to increase her independence in home and social environments.     ongoing     Patient Education/Response:   Patient educated regarding the followin. Cognitive compensatory strategies  2. Home exercise program     Home program established: yes-review strategies provided today in handout  Patient verbalized understanding to  all above education provided.     See Electronic Medical Record under Patient Instructions for exercises provided throughout therapy.  Assessment:   Initial treatment session focused on education and use of cognitive compensatory strategies for attention, focus, orientation, memory, problem solving, and organization for daily living tasks. The patient performed well with immediate and delayed recall of a 10-item list using a categorization strategy implemented by speech-language pathologist. The patient is motivated to participate in her treatment thus far. She mentions stress for over two years which she thinks is giving her the most difficulty with her cognition. Jud is progressing well towards her goals. Current goals remain appropriate. Goals to be updated as necessary.     Patient prognosis is Fair. Patient will continue to benefit from skilled outpatient speech and language therapy to address the deficits listed in the problem list on initial evaluation, provide patient/family education and to maximize patient's level of independence in the home and community environment.   Medical necessity is demonstrated by the following IMPAIRMENTS:  Cognition: Deficits in executive functioning, attention, and memory prevent the pt from relaying medically and safety relevant information in a timely manner in a state of emergency.   Barriers to Therapy: cognitive impairment  Patient's spiritual, cultural and educational needs considered and patient agreeable to plan of care and goals.  Plan:   Continue Plan of Care with focus on rehabilitation and compensation for cognitive-communicative impairment    Jana Sanchez M.S., L-SLP,CCC-SLP, CBIS  Speech-Language Pathologist  Certified Brain Injury Specialist  8/19/2024

## 2024-08-27 ENCOUNTER — DOCUMENTATION ONLY (OUTPATIENT)
Dept: REHABILITATION | Facility: HOSPITAL | Age: 64
End: 2024-08-27
Payer: MEDICARE

## 2024-08-27 NOTE — PROGRESS NOTES
No Show Note/Documentation    Patient: Jud Villa  Date of Session: 8/27/2024  MRN: [id number]    Jud Villa did not attend her scheduled therapy appointment today. She did not call to cancel nor reschedule. Therapist left a voicemail about possible Reschedule of today's appointment and reminder of next appointment on 8/29/24.  No charges have been posted today.     No show: 2    Bethany Torres M.S., L-SLP,CCC-SLP   Speech Language Pathologist   8/27/2024

## 2024-08-29 ENCOUNTER — DOCUMENTATION ONLY (OUTPATIENT)
Dept: REHABILITATION | Facility: HOSPITAL | Age: 64
End: 2024-08-29
Payer: MEDICARE

## 2024-08-29 NOTE — PROGRESS NOTES
Documentation Only/ No Show    Patient: Jud Villa  Date of Session: 08/29/2024  MRN: [id number]  Jud Villa did not attend his/her scheduled therapy appointment today. Jud Villa did not call to cancel nor reschedule. This is the 2nd PT appointment that the patient has not attended. No charges have been posted today.     No-shows: 2    VM left this week by staff regarding missed visits.     Nicole Rodriguez, PT  08/29/2024

## 2024-09-03 ENCOUNTER — DOCUMENTATION ONLY (OUTPATIENT)
Dept: REHABILITATION | Facility: HOSPITAL | Age: 64
End: 2024-09-03
Payer: MEDICARE

## 2024-09-03 NOTE — PROGRESS NOTES
Attempted to call patient to confirm tomorrow's PT visit but mailbox full and unable to leave voicemail.    Renee Parker, PT, DPT

## 2024-10-22 ENCOUNTER — TELEPHONE (OUTPATIENT)
Dept: SMOKING CESSATION | Facility: CLINIC | Age: 64
End: 2024-10-22
Payer: MEDICARE

## 2024-11-01 DIAGNOSIS — F51.01 PRIMARY INSOMNIA: ICD-10-CM

## 2024-11-01 RX ORDER — ESZOPICLONE 2 MG/1
2 TABLET, FILM COATED ORAL NIGHTLY PRN
Qty: 90 TABLET | Refills: 0 | Status: SHIPPED | OUTPATIENT
Start: 2024-11-01

## 2024-11-02 DIAGNOSIS — E11.9 TYPE 2 DIABETES MELLITUS WITHOUT COMPLICATION, WITHOUT LONG-TERM CURRENT USE OF INSULIN: ICD-10-CM

## 2024-11-02 NOTE — TELEPHONE ENCOUNTER
No care due was identified.  [facility] Embedded Care Due Messages. Reference number: [id number].   11/02/2024 3:14:38 PM CDT

## 2024-11-03 RX ORDER — LINAGLIPTIN 5 MG/1
TABLET, FILM COATED ORAL
Qty: 90 TABLET | Refills: 1 | OUTPATIENT
Start: 2024-11-03

## 2024-11-03 NOTE — TELEPHONE ENCOUNTER
Refill Decision Note   Jud Villa  is requesting a refill authorization.  Brief Assessment and Rationale for Refill:  Quick Discontinue     Medication Therapy Plan:  prescription was discontinued on 8/15/2024 by Bo Lopez MD      Comments:     Note composed:5:19 PM 11/03/2024

## 2024-11-17 NOTE — TELEPHONE ENCOUNTER
----- Message from Alea Wood sent at 4/9/2019  2:58 PM CDT -----  Contact: [facility] - [phone]  [facility] is requesting a refill on the below. Please advise      diazePAM (VALIUM) 10 MG Tab        potassium chloride (KLOR-CON) 8 MEQ TbSR  
----- Message from Slime Kim sent at 4/10/2019  3:40 PM CDT -----  Contact: [facility]/[phone]  Type:  RX Refill Request    Who Called:[facility]  Refill or New Rx:  RX Name and Strength: Potassium chloride  How is the patient currently taking it? (ex. 1XDay):  Is this a 30 day or 90 day RX  Preferred Pharmacy with phone number:[facility] [phone] [address] [address]  AT [facility], [place]    
 used

## 2024-11-23 DIAGNOSIS — G43.711 CHRONIC MIGRAINE WITHOUT AURA, INTRACTABLE, WITH STATUS MIGRAINOSUS: ICD-10-CM

## 2024-11-25 DIAGNOSIS — G43.711 CHRONIC MIGRAINE WITHOUT AURA, INTRACTABLE, WITH STATUS MIGRAINOSUS: ICD-10-CM

## 2024-11-26 RX ORDER — UBROGEPANT 100 MG/1
TABLET ORAL
Qty: 12 TABLET | Refills: 1 | OUTPATIENT
Start: 2024-11-26

## 2024-11-26 RX ORDER — UBROGEPANT 100 MG/1
TABLET ORAL
Qty: 12 TABLET | Refills: 5 | Status: SHIPPED | OUTPATIENT
Start: 2024-11-26

## 2024-11-27 ENCOUNTER — TELEPHONE (OUTPATIENT)
Dept: NEUROLOGY | Facility: CLINIC | Age: 64
End: 2024-11-27
Payer: MEDICARE

## 2024-11-27 ENCOUNTER — OFFICE VISIT (OUTPATIENT)
Dept: FAMILY MEDICINE | Facility: CLINIC | Age: 64
End: 2024-11-27
Payer: MEDICARE

## 2024-11-27 VITALS
SYSTOLIC BLOOD PRESSURE: 140 MMHG | WEIGHT: 112.38 LBS | OXYGEN SATURATION: 95 % | HEART RATE: 107 BPM | DIASTOLIC BLOOD PRESSURE: 76 MMHG | BODY MASS INDEX: 20.68 KG/M2 | HEIGHT: 62 IN

## 2024-11-27 DIAGNOSIS — R41.89 COGNITIVE DEFICITS: Primary | ICD-10-CM

## 2024-11-27 DIAGNOSIS — E11.65 TYPE 2 DIABETES MELLITUS WITH HYPERGLYCEMIA, WITH LONG-TERM CURRENT USE OF INSULIN: ICD-10-CM

## 2024-11-27 DIAGNOSIS — E78.5 HYPERLIPIDEMIA, UNSPECIFIED HYPERLIPIDEMIA TYPE: ICD-10-CM

## 2024-11-27 DIAGNOSIS — D53.9 NUTRITIONAL ANEMIA: ICD-10-CM

## 2024-11-27 DIAGNOSIS — F17.200 SMOKER: ICD-10-CM

## 2024-11-27 DIAGNOSIS — I69.911 MEMORY DEFICIT AFTER CEREBROVASCULAR DISEASE: ICD-10-CM

## 2024-11-27 DIAGNOSIS — Z86.73 HISTORY OF CVA (CEREBROVASCULAR ACCIDENT): ICD-10-CM

## 2024-11-27 DIAGNOSIS — R41.3 OTHER AMNESIA: ICD-10-CM

## 2024-11-27 DIAGNOSIS — Z79.4 TYPE 2 DIABETES MELLITUS WITH HYPERGLYCEMIA, WITH LONG-TERM CURRENT USE OF INSULIN: ICD-10-CM

## 2024-11-27 PROCEDURE — 99999 PR PBB SHADOW E&M-EST. PATIENT-LVL III: CPT | Mod: PBBFAC,HCNC,, | Performed by: FAMILY MEDICINE

## 2024-11-27 NOTE — PROGRESS NOTES
Subjective     Patient ID: Jud Villa is a 64 y.o. female.    Chief Complaint: Memory Loss    64 years old female came to the clinic with problems with concentration memory for several months, patient with problems with recent memory and not able to do multitasking.  No recent cholesterol check.  Last A1c within acceptable parameters.  Patient with previous stroke over the left occipital and cerebellar area.  She is still smoking.  Patient was previously diagnosed with anemia before.      Review of Systems   Constitutional: Negative.    HENT: Negative.     Eyes: Negative.    Respiratory: Negative.     Gastrointestinal: Negative.    Genitourinary: Negative.    Musculoskeletal: Negative.    Neurological: Negative.  Positive for memory loss.   Psychiatric/Behavioral:  Positive for decreased concentration and depressed mood.           Objective     Physical Exam  Constitutional:       General: She is not in acute distress.     Appearance: She is well-developed. She is not diaphoretic.   HENT:      Head: Normocephalic and atraumatic.      Right Ear: External ear normal.      Left Ear: External ear normal.      Nose: Nose normal.      Mouth/Throat:      Pharynx: No oropharyngeal exudate.   Eyes:      General: No scleral icterus.        Right eye: No discharge.         Left eye: No discharge.      Conjunctiva/sclera: Conjunctivae normal.      Pupils: Pupils are equal, round, and reactive to light.   Neck:      Thyroid: No thyromegaly.      Vascular: No JVD.      Trachea: No tracheal deviation.   Cardiovascular:      Rate and Rhythm: Normal rate and regular rhythm.      Heart sounds: Normal heart sounds. No murmur heard.     No friction rub. No gallop.   Pulmonary:      Effort: Pulmonary effort is normal. No respiratory distress.      Breath sounds: Normal breath sounds. No stridor. No wheezing or rales.   Chest:      Chest wall: No tenderness.   Abdominal:      General: Bowel sounds are normal. There is no distension.       Palpations: Abdomen is soft. There is no mass.      Tenderness: There is no abdominal tenderness. There is no guarding or rebound.   Musculoskeletal:         General: No tenderness. Normal range of motion.      Cervical back: Normal range of motion and neck supple.   Lymphadenopathy:      Cervical: No cervical adenopathy.   Skin:     General: Skin is warm and dry.      Coloration: Skin is not pale.      Findings: No erythema or rash.   Neurological:      Mental Status: She is alert and oriented to person, place, and time.      Cranial Nerves: No cranial nerve deficit.      Motor: No abnormal muscle tone.      Coordination: Coordination normal.      Deep Tendon Reflexes: Reflexes are normal and symmetric.   Psychiatric:         Mood and Affect: Mood is depressed.         Behavior: Behavior normal.         Thought Content: Thought content normal.         Cognition and Memory: Cognition is impaired. Memory is impaired. She exhibits impaired recent memory. She does not exhibit impaired remote memory.         Judgment: Judgment normal.      Comments: Mini-mental test 27/30.            Assessment and Plan     1. Cognitive deficits  -     Ambulatory referral/consult to Adult Neuropsychology; Future; Expected date: 12/04/2024  -     Vitamin B12; Future; Expected date: 11/27/2024  -     Folate; Future; Expected date: 11/27/2024  -     CBC Auto Differential; Future; Expected date: 11/27/2024  -     Treponema Pallidium Antibodies IgG, IgM; Future; Expected date: 11/27/2024  -     MRI Brain W WO Contrast; Future; Expected date: 11/27/2024    2. 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 Hyperlipidemia, unspecified hyperlipidemia type  -     Lipid Panel; Future; Expected date:  11/27/2024    4. History of CVA (cerebrovascular accident)  -     MRI Brain W WO Contrast; Future; Expected date: 11/27/2024    5. Type 2 diabetes mellitus with hyperglycemia, with long-term current use of insulin  -     Microalbumin/Creatinine Ratio, Urine; Future; Expected date: 11/27/2024  -     Lipid Panel; Future; Expected date: 11/27/2024  -     Hemoglobin A1C; Future; Expected date: 11/27/2024  -     Comprehensive Metabolic Panel; Future; Expected date: 11/27/2024    6. Smoker  Overview:  Has quit but lives in home, must not smoke in home      7. Nutritional anemia  -     Vitamin B12; Future; Expected date: 11/27/2024  -     Folate; Future; Expected date: 11/27/2024  -     CBC Auto Differential; Future; Expected date: 11/27/2024    8. Other amnesia  -     MRI Brain W WO Contrast; Future; Expected date: 11/27/2024        I spent a total of 50 minutes on the day of the visit.This includes face to face time and non-face to face time preparing to see the patient (eg, review of tests), obtaining and/or reviewing separately obtained history, documenting clinical information in the electronic or other health record, independently interpreting results and communicating results to the patient/family/caregiver, or care coordinator.          Follow up in about 8 weeks (around 1/22/2025).

## 2024-12-01 ENCOUNTER — HOSPITAL ENCOUNTER (OUTPATIENT)
Dept: RADIOLOGY | Facility: HOSPITAL | Age: 64
Discharge: HOME | End: 2024-12-01
Attending: FAMILY MEDICINE
Payer: MEDICARE

## 2024-12-01 DIAGNOSIS — R41.89 COGNITIVE DEFICITS: ICD-10-CM

## 2024-12-01 DIAGNOSIS — Z86.73 HISTORY OF CVA (CEREBROVASCULAR ACCIDENT): ICD-10-CM

## 2024-12-01 DIAGNOSIS — I69.911 MEMORY DEFICIT AFTER CEREBROVASCULAR DISEASE: ICD-10-CM

## 2024-12-01 DIAGNOSIS — R41.3 OTHER AMNESIA: ICD-10-CM

## 2024-12-01 PROCEDURE — 70553 MRI BRAIN STEM W/O & W/DYE: CPT | Mod: TC,HCNC

## 2024-12-01 PROCEDURE — 25500020 PHARM REV CODE 255: Mod: HCNC | Performed by: FAMILY MEDICINE

## 2024-12-01 PROCEDURE — A9585 GADOBUTROL INJECTION: HCPCS | Mod: HCNC | Performed by: FAMILY MEDICINE

## 2024-12-01 RX ORDER — GADOBUTROL 604.72 MG/ML
6 INJECTION INTRAVENOUS
Status: COMPLETED | OUTPATIENT
Start: 2024-12-01 | End: 2024-12-01

## 2024-12-01 RX ADMIN — GADOBUTROL 6 ML: 604.72 INJECTION INTRAVENOUS at 01:12

## 2024-12-02 ENCOUNTER — TELEPHONE (OUTPATIENT)
Dept: FAMILY MEDICINE | Facility: CLINIC | Age: 64
End: 2024-12-02
Payer: MEDICARE

## 2024-12-02 NOTE — TELEPHONE ENCOUNTER
----- Message from [employer] sent at 12/2/2024 11:59 AM CST -----  Pt Requesting Call Back    Who called: pt  Who called for pt:  Best call back #:  [phone]  Add notes: pt said she needs to speak to Dr. Yañez or his nurse regarding her MRI results; pt said she seen her results on the portal but that she doesn't understand them

## 2024-12-06 ENCOUNTER — TELEPHONE (OUTPATIENT)
Dept: FAMILY MEDICINE | Facility: CLINIC | Age: 64
End: 2024-12-06
Payer: MEDICARE

## 2024-12-06 NOTE — TELEPHONE ENCOUNTER
----- Message from Agata sent at 12/6/2024 11:39 AM CST -----  Type:  Needs Medical Advice/results     Who Called: pt    Would the patient rather a call back or a response via [facility]? call  Best Call Back Number: [phone]  Additional Information: Pt requesting a call back to discuss MRI results

## 2024-12-16 ENCOUNTER — LAB VISIT (OUTPATIENT)
Dept: LAB | Facility: HOSPITAL | Age: 64
End: 2024-12-16
Attending: FAMILY MEDICINE
Payer: MEDICARE

## 2024-12-16 DIAGNOSIS — R41.89 COGNITIVE DEFICITS: ICD-10-CM

## 2024-12-16 DIAGNOSIS — E78.5 HYPERLIPIDEMIA, UNSPECIFIED HYPERLIPIDEMIA TYPE: ICD-10-CM

## 2024-12-16 DIAGNOSIS — E11.65 TYPE 2 DIABETES MELLITUS WITH HYPERGLYCEMIA, WITH LONG-TERM CURRENT USE OF INSULIN: ICD-10-CM

## 2024-12-16 DIAGNOSIS — Z79.4 TYPE 2 DIABETES MELLITUS WITH HYPERGLYCEMIA, WITH LONG-TERM CURRENT USE OF INSULIN: ICD-10-CM

## 2024-12-16 DIAGNOSIS — I69.911 MEMORY DEFICIT AFTER CEREBROVASCULAR DISEASE: ICD-10-CM

## 2024-12-16 DIAGNOSIS — D53.9 NUTRITIONAL ANEMIA: ICD-10-CM

## 2024-12-16 LAB
ALBUMIN SERPL BCP-MCNC: 3.7 G/DL (ref 3.5–5.2)
ALP SERPL-CCNC: 71 U/L (ref 40–150)
ALT SERPL W/O P-5'-P-CCNC: 12 U/L (ref 10–44)
ANION GAP SERPL CALC-SCNC: 7 MMOL/L (ref 8–16)
AST SERPL-CCNC: 19 U/L (ref 10–40)
BASOPHILS # BLD AUTO: 0.08 K/UL (ref 0–0.2)
BASOPHILS NFR BLD: 1 % (ref 0–1.9)
BILIRUB SERPL-MCNC: 0.4 MG/DL (ref 0.1–1)
BUN SERPL-MCNC: 17 MG/DL (ref 8–23)
CALCIUM SERPL-MCNC: 9.3 MG/DL (ref 8.7–10.5)
CHLORIDE SERPL-SCNC: 104 MMOL/L (ref 95–110)
CHOLEST SERPL-MCNC: 131 MG/DL (ref 120–199)
CHOLEST/HDLC SERPL: 3.2 {RATIO} (ref 2–5)
CO2 SERPL-SCNC: 26 MMOL/L (ref 23–29)
CREAT SERPL-MCNC: 0.7 MG/DL (ref 0.5–1.4)
DIFFERENTIAL METHOD BLD: ABNORMAL
EOSINOPHIL # BLD AUTO: 0.2 K/UL (ref 0–0.5)
EOSINOPHIL NFR BLD: 2.5 % (ref 0–8)
ERYTHROCYTE [DISTWIDTH] IN BLOOD BY AUTOMATED COUNT: 13.9 % (ref 11.5–14.5)
EST. GFR  (NO RACE VARIABLE): >60 ML/MIN/1.73 M^2
ESTIMATED AVG GLUCOSE: 154 MG/DL (ref 68–131)
FOLATE SERPL-MCNC: 12.8 NG/ML (ref 4–24)
GLUCOSE SERPL-MCNC: 137 MG/DL (ref 70–110)
HBA1C MFR BLD: 7 % (ref 4–5.6)
HCT VFR BLD AUTO: 47 % (ref 37–48.5)
HDLC SERPL-MCNC: 41 MG/DL (ref 40–75)
HDLC SERPL: 31.3 % (ref 20–50)
HGB BLD-MCNC: 14.8 G/DL (ref 12–16)
IMM GRANULOCYTES # BLD AUTO: 0.02 K/UL (ref 0–0.04)
IMM GRANULOCYTES NFR BLD AUTO: 0.2 % (ref 0–0.5)
LDLC SERPL CALC-MCNC: 66.8 MG/DL (ref 63–159)
LYMPHOCYTES # BLD AUTO: 2.4 K/UL (ref 1–4.8)
LYMPHOCYTES NFR BLD: 29.9 % (ref 18–48)
MCH RBC QN AUTO: 28.7 PG (ref 27–31)
MCHC RBC AUTO-ENTMCNC: 31.5 G/DL (ref 32–36)
MCV RBC AUTO: 91 FL (ref 82–98)
MONOCYTES # BLD AUTO: 0.7 K/UL (ref 0.3–1)
MONOCYTES NFR BLD: 8.1 % (ref 4–15)
NEUTROPHILS # BLD AUTO: 4.7 K/UL (ref 1.8–7.7)
NEUTROPHILS NFR BLD: 58.3 % (ref 38–73)
NONHDLC SERPL-MCNC: 90 MG/DL
NRBC BLD-RTO: 0 /100 WBC
PLATELET # BLD AUTO: 328 K/UL (ref 150–450)
PMV BLD AUTO: 9.9 FL (ref 9.2–12.9)
POTASSIUM SERPL-SCNC: 4.7 MMOL/L (ref 3.5–5.1)
PROT SERPL-MCNC: 7.4 G/DL (ref 6–8.4)
RBC # BLD AUTO: 5.15 M/UL (ref 4–5.4)
SODIUM SERPL-SCNC: 137 MMOL/L (ref 136–145)
TREPONEMA PALLIDUM IGG+IGM AB [PRESENCE] IN SERUM OR PLASMA BY IMMUNOASSAY: NONREACTIVE
TRIGL SERPL-MCNC: 116 MG/DL (ref 30–150)
VIT B12 SERPL-MCNC: 1118 PG/ML (ref 210–950)
WBC # BLD AUTO: 8.06 K/UL (ref 3.9–12.7)

## 2024-12-16 PROCEDURE — 82607 VITAMIN B-12: CPT | Mod: HCNC | Performed by: FAMILY MEDICINE

## 2024-12-16 PROCEDURE — 83036 HEMOGLOBIN GLYCOSYLATED A1C: CPT | Mod: HCNC | Performed by: FAMILY MEDICINE

## 2024-12-16 PROCEDURE — 85025 COMPLETE CBC W/AUTO DIFF WBC: CPT | Mod: HCNC | Performed by: FAMILY MEDICINE

## 2024-12-16 PROCEDURE — 82746 ASSAY OF FOLIC ACID SERUM: CPT | Mod: HCNC | Performed by: FAMILY MEDICINE

## 2024-12-16 PROCEDURE — 36415 COLL VENOUS BLD VENIPUNCTURE: CPT | Mod: HCNC,PO | Performed by: FAMILY MEDICINE

## 2024-12-16 PROCEDURE — 86593 SYPHILIS TEST NON-TREP QUANT: CPT | Mod: HCNC | Performed by: FAMILY MEDICINE

## 2024-12-16 PROCEDURE — 80061 LIPID PANEL: CPT | Mod: HCNC | Performed by: FAMILY MEDICINE

## 2024-12-16 PROCEDURE — 80053 COMPREHEN METABOLIC PANEL: CPT | Mod: HCNC | Performed by: FAMILY MEDICINE

## 2024-12-26 ENCOUNTER — OFFICE VISIT (OUTPATIENT)
Dept: URGENT CARE | Facility: CLINIC | Age: 64
End: 2024-12-26
Payer: MEDICARE

## 2024-12-26 VITALS
OXYGEN SATURATION: 94 % | SYSTOLIC BLOOD PRESSURE: 113 MMHG | BODY MASS INDEX: 20.61 KG/M2 | RESPIRATION RATE: 16 BRPM | HEIGHT: 62 IN | WEIGHT: 112 LBS | TEMPERATURE: 101 F | DIASTOLIC BLOOD PRESSURE: 70 MMHG | HEART RATE: 100 BPM

## 2024-12-26 DIAGNOSIS — J10.1 INFLUENZA A: Primary | ICD-10-CM

## 2024-12-26 DIAGNOSIS — R05.9 COUGH, UNSPECIFIED TYPE: ICD-10-CM

## 2024-12-26 DIAGNOSIS — R06.2 WHEEZING: ICD-10-CM

## 2024-12-26 LAB
CTP QC/QA: YES
POC MOLECULAR INFLUENZA A AGN: POSITIVE
POC MOLECULAR INFLUENZA B AGN: NEGATIVE

## 2024-12-26 PROCEDURE — 71046 X-RAY EXAM CHEST 2 VIEWS: CPT | Mod: FY,S$GLB,, | Performed by: RADIOLOGY

## 2024-12-26 RX ORDER — OSELTAMIVIR PHOSPHATE 75 MG/1
75 CAPSULE ORAL 2 TIMES DAILY
Qty: 10 CAPSULE | Refills: 0 | Status: SHIPPED | OUTPATIENT
Start: 2024-12-26 | End: 2024-12-31

## 2024-12-26 RX ORDER — BENZONATATE 200 MG/1
200 CAPSULE ORAL 3 TIMES DAILY PRN
Qty: 20 CAPSULE | Refills: 0 | Status: SHIPPED | OUTPATIENT
Start: 2024-12-26 | End: 2025-01-05

## 2024-12-26 RX ORDER — DEXAMETHASONE SODIUM PHOSPHATE 10 MG/ML
10 INJECTION INTRAMUSCULAR; INTRAVENOUS
Status: COMPLETED | OUTPATIENT
Start: 2024-12-26 | End: 2024-12-26

## 2024-12-26 RX ORDER — PROMETHAZINE HYDROCHLORIDE AND DEXTROMETHORPHAN HYDROBROMIDE 6.25; 15 MG/5ML; MG/5ML
5 SYRUP ORAL NIGHTLY PRN
Qty: 118 ML | Refills: 0 | Status: SHIPPED | OUTPATIENT
Start: 2024-12-26 | End: 2025-01-05

## 2024-12-26 RX ORDER — IPRATROPIUM BROMIDE 0.5 MG/2.5ML
0.5 SOLUTION RESPIRATORY (INHALATION)
Status: COMPLETED | OUTPATIENT
Start: 2024-12-26 | End: 2024-12-26

## 2024-12-26 RX ORDER — ALBUTEROL SULFATE 0.83 MG/ML
2.5 SOLUTION RESPIRATORY (INHALATION)
Status: COMPLETED | OUTPATIENT
Start: 2024-12-26 | End: 2024-12-26

## 2024-12-26 RX ADMIN — ALBUTEROL SULFATE 2.5 MG: 0.83 SOLUTION RESPIRATORY (INHALATION) at 04:12

## 2024-12-26 RX ADMIN — DEXAMETHASONE SODIUM PHOSPHATE 10 MG: 10 INJECTION INTRAMUSCULAR; INTRAVENOUS at 04:12

## 2024-12-26 RX ADMIN — IPRATROPIUM BROMIDE 0.5 MG: 0.5 SOLUTION RESPIRATORY (INHALATION) at 04:12

## 2024-12-26 NOTE — PATIENT INSTRUCTIONS
"FLU      Your Rapid FLU test was POSITIVE FOR INFLUENZA     -  Take full course of Tamilfu as prescribed.  If symptoms began over 48 hours ago, you are not eligible for Tamiflu.  Symptomatic management is recommended as treatment.    - Rest at home.     - Drink plenty of fluids so you won't get dehydrated.    - Do not share any utensils or share drinks     - Wash hands frequently      Avoid taking Decongestants such as pseudoephedrine (ex. Sudafed) or phenylephrine (ex. Mucinex FastMax, Dayquil, Nyquil, or any combo cold meds that say "cold," "sinus" or "-D").        - Cough recommendations:  Warm tea with honey can help with cough. Honey is a natural cough suppressant.    Prescription for Tessalon Perles was sent to the pharmacy. Take every 8 hours as needed for cough       -  Congestion recommendations:    Mucinex (guaifenesin) twice a day to help loosen mucous.     - Fever/Pain recommendations:  Alternate Tylenol or Ibuprofen as directed for fever/pain.     Take ibuprofen/Motrin/Advil every 6-8 hours for pain and inflammation.  Do not take ibuprofen if you have a history of GI bleeding, kidney disease, or if you take blood thinners.    You can also take acetaminophen/Tylenol every 6-8 hours for added pain relief. Avoid tylenol if you have a history of liver disease.      - Sore throat recommendations: Warm fluids, warm salt water gargles, throat lozenges, tea, honey, soup, or drinking something cold or frozen.  Throat lozenges or sprays help reduce pain. Gargling with warm saltwater (1/4 teaspoon of salt in 1/2 cup of warm water) or an OTC anesthetic gargle may be useful for irritation.    - Follow up with your PCP or specialty clinic as directed in the next 1-2 weeks if not improved or as needed.  You can call [phone] to schedule an appointment with the appropriate provider.      - If your condition worsens or fails to improve we recommend that you receive another evaluation at the ER immediately or " contact your PCP to discuss your concerns or return here.    When to seek medical advice  Call your healthcare provider right away if any of these occur:  Fever that is poorly controlled with OTC fever reducing medication  New or worsening ear pain, sinus pain, or headache  Stiff neck  You can't swallow liquids or you can't open your mouth wide because of throat pain  Signs of dehydration. These include very dark urine or no urine, sunken eyes, and dizziness.  Trouble breathing or noisy breathing  Muffled voice  Rash

## 2024-12-26 NOTE — PROGRESS NOTES
"Subjective:      Patient ID: Jud Villa is a 64 y.o. female.    Vitals:  height is 5' 2" (1.575 m) and weight is 50.8 kg (112 lb). Her oral temperature is 100.8 °F (38.2 °C) (abnormal). Her blood pressure is 113/70 and her pulse is 100. Her respiration is 16 and oxygen saturation is 94% (abnormal).     Chief Complaint: Cough    Pt states cough and fatigue for 2 days. She states she's been sick for a month and it cleared up and just started getting sick again.     Cough  This is a new problem. The current episode started in the past 7 days. The problem has been gradually worsening. The problem occurs constantly. The cough is Non-productive. Associated symptoms include chills, a fever, headaches and shortness of breath.       Constitution: Positive for chills and fever.   Respiratory:  Positive for cough and shortness of breath.    Neurological:  Positive for headaches.      Objective:     Physical Exam   Constitutional: She is oriented to person, place, and time. She appears well-developed. She is cooperative.  Non-toxic appearance. She does not appear ill. No distress.   HENT:   Head: Normocephalic and atraumatic.   Ears:   Right Ear: Hearing, tympanic membrane, external ear and ear canal normal.   Left Ear: Hearing, tympanic membrane, external ear and ear canal normal.   Nose: Nose normal. No mucosal edema, rhinorrhea or nasal deformity. No epistaxis. Right sinus exhibits no maxillary sinus tenderness and no frontal sinus tenderness. Left sinus exhibits no maxillary sinus tenderness and no frontal sinus tenderness.   Mouth/Throat: Uvula is midline, oropharynx is clear and moist and mucous membranes are normal. No trismus in the jaw. Normal dentition. No uvula swelling. No oropharyngeal exudate, posterior oropharyngeal edema or posterior oropharyngeal erythema.   Eyes: Conjunctivae and lids are normal. No scleral icterus.   Neck: Trachea normal and phonation normal. Neck supple. No edema present. No erythema " present. No neck rigidity present.   Cardiovascular: Normal rate, regular rhythm, normal heart sounds and normal pulses.   Pulmonary/Chest: No stridor. She is in respiratory distress. She has decreased breath sounds (tight lung sounds) in the right upper field and the left upper field. She has wheezes in the right upper field, the right middle field, the right lower field, the left upper field and the left lower field. She has no rhonchi. She has no rales. She exhibits no tenderness.   Abdominal: Normal appearance.   Musculoskeletal: Normal range of motion.         General: No deformity. Normal range of motion.   Neurological: She is alert and oriented to person, place, and time. She exhibits normal muscle tone. Coordination normal.   Skin: Skin is warm, dry, intact, not diaphoretic and not pale.   Psychiatric: Her speech is normal and behavior is normal. Judgment and thought content normal.   Nursing note and vitals reviewed.      Assessment:     1. Influenza A    2. Cough, unspecified type    3. Wheezing      Results for orders placed or performed in visit on 12/26/24   POCT Influenza A/B Molecular    Collection Time: 12/26/24  3:58 PM   Result Value Ref Range    POC Molecular Influenza A Ag Positive (A) Negative    POC Molecular Influenza B Ag Negative Negative     Acceptable Yes      *Note: Due to a large number of results and/or encounters for the requested time period, some results have not been displayed. A complete set of results can be found in Results Review.       Plan:       Influenza A  -     oseltamivir (TAMIFLU) 75 MG capsule; Take 1 capsule (75 mg total) by mouth 2 (two) times daily. for 5 days  Dispense: 10 capsule; Refill: 0  -     benzonatate (TESSALON) 200 MG capsule; Take 1 capsule (200 mg total) by mouth 3 (three) times daily as needed for Cough.  Dispense: 20 capsule; Refill: 0  -     promethazine-dextromethorphan (PROMETHAZINE-DM) 6.25-15 mg/5 mL Syrp; Take 5 mLs by mouth  nightly as needed (cough).  Dispense: 118 mL; Refill: 0    Cough, unspecified type  -     POCT Influenza A/B Molecular  -     XR CHEST PA AND LATERAL; Future; Expected date: 12/26/2024    Wheezing  -     dexAMETHasone injection 10 mg  -     ipratropium 0.02 % nebulizer solution 0.5 mg  -     albuterol nebulizer solution 2.5 mg           Patient with significant improvement in breath sounds after decadron and neb treatment. O2 sats 96%. Patient ambulated in hallway and returned to room. O2 sats 94-95% post ambulation.    Discussed treatment plan with patient. Instructed to start tamiflu today and take benzonatate for cough. Pt requests something stronger for cough. I did not recommend her taking something do to her hx of emphysema but patient states benzonatate does not work for her. Will give her promethazine dm at night time only. Patient lives with her son and granddaughter. Given strict ED precautions. Discussed risks if she does not go to ED including respiratory or organ failure. She verbalizes understanding.        XR CHEST PA AND LATERAL    Result Date: 12/26/2024  EXAMINATION: XR CHEST PA AND LATERAL CLINICAL HISTORY: Cough, unspecified TECHNIQUE: PA and lateral views of the chest were performed. COMPARISON: None FINDINGS: Similar postoperative changes related to median sternotomy.  Stable size of the cardiomediastinal contours.  The lungs are clear.  No blunting of costophrenic angles.    No acute osseous findings demonstrated.     No acute findings. Electronically signed by: Win Morillo Date:    12/26/2024 Time:    16:23    Patient Instructions   FLU      Your Rapid FLU test was POSITIVE FOR INFLUENZA     -  Take full course of Tamilfu as prescribed.  If symptoms began over 48 hours ago, you are not eligible for Tamiflu.  Symptomatic management is recommended as treatment.    - Rest at home.     - Drink plenty of fluids so you won't get dehydrated.    - Do not share any utensils or share drinks     - Wash  "hands frequently      Avoid taking Decongestants such as pseudoephedrine (ex. Sudafed) or phenylephrine (ex. Mucinex FastMax, Dayquil, Nyquil, or any combo cold meds that say "cold," "sinus" or "-D").        - Cough recommendations:  Warm tea with honey can help with cough. Honey is a natural cough suppressant.    Prescription for Tessalon Perles was sent to the pharmacy. Take every 8 hours as needed for cough       -  Congestion recommendations:    Mucinex (guaifenesin) twice a day to help loosen mucous.     - Fever/Pain recommendations:  Alternate Tylenol or Ibuprofen as directed for fever/pain.     Take ibuprofen/Motrin/Advil every 6-8 hours for pain and inflammation.  Do not take ibuprofen if you have a history of GI bleeding, kidney disease, or if you take blood thinners.    You can also take acetaminophen/Tylenol every 6-8 hours for added pain relief. Avoid tylenol if you have a history of liver disease.      - Sore throat recommendations: Warm fluids, warm salt water gargles, throat lozenges, tea, honey, soup, or drinking something cold or frozen.  Throat lozenges or sprays help reduce pain. Gargling with warm saltwater (1/4 teaspoon of salt in 1/2 cup of warm water) or an OTC anesthetic gargle may be useful for irritation.    - Follow up with your PCP or specialty clinic as directed in the next 1-2 weeks if not improved or as needed.  You can call [phone] to schedule an appointment with the appropriate provider.      - If your condition worsens or fails to improve we recommend that you receive another evaluation at the ER immediately or contact your PCP to discuss your concerns or return here.    When to seek medical advice  Call your healthcare provider right away if any of these occur:  Fever that is poorly controlled with OTC fever reducing medication  New or worsening ear pain, sinus pain, or headache  Stiff neck  You can't swallow liquids or you can't open your mouth wide because of throat " pain  Signs of dehydration. These include very dark urine or no urine, sunken eyes, and dizziness.  Trouble breathing or noisy breathing  Muffled voice  Rash

## 2024-12-31 NOTE — PROGRESS NOTES
NEUROPSYCHOLOGY CONSULT (TELEHEALTH)  Referral Information  Name: Jud Villa  MRN: [id number]  : 1960  Age: 64 y.o.  Race: White  Gender: female  REFERRAL SOURCE: Bo Lopez MD  DATE CONDUCTED: 2025  SOURCES OF INFORMATION:  The following was gathered from a clinical interview with Ms. Jud Villa and review of the available medical records. Ms. Villa expressed an understanding of the purpose of the evaluation and consented to all procedures. Total licensed billing psychologists professional time including clinical interview, test administration and interpretation of tests administered by the billing psychologist, integration of test results and other clinical data, preparing the final report, and personally reporting results to the patient   Billin - 60 minutes  Telemedicine:   The patient location is: Home  The provider location is: Home  The chief complaint/medical necessity leading to consultation/medical necessity is: cognitive decline in setting of stroke and depression.   Visit type: Virtual visit with synchronous audio and video  Total time spent with patient: 45 minutes  Each patient to whom he or she provides medical services by telemedicine is:  (1) informed of the relationship between the physician and patient and the respective role of any other health care provider with respect to management of the patient; and (2) notified that he or she may decline to receive medical services by telemedicine and may withdraw from such care at any time.  Consent/Emergency Plan: The patient expressed an understanding of the purpose of the evaluation and consented to all procedures. I informed the patient of limits to confidentiality and discussed an emergency plan.    NEUROPSYCHOLOGICAL EVALUATION - CONFIDENTIAL    SUMMARY/TREATMENT PLAN   Ms. Villa is a 64 year old female with persistent cognitive complaints following a L PCA stroke in 3/2023. She reported difficulty  with word finding, working memory, and memory. She was terminated from a new position after 3 months in 12/2024, stating the level of set shifting was too challenging for her. She reported errors in several complex ADLs, including medication and appointment management. She is scheduled for neuropsychological testing on 1/29. Full diagnostic impressions to follow. Sleep disruption, polypharmacy, and depression are likely additional factors impacting her cognition/daily functioning beyond post-stroke cognitive change.     Sleep, mood, polypharmacy  Set med timers  Depression seems to be impacting functioning, not staying organized, not eating    Testing on 1/29 at 830    Diagnoses  Problem List Items Addressed This Visit          Neuro    History of CVA (cerebrovascular accident)    Cognitive complaints    Current Assessment & Plan     Compensatory Mechanisms: Set recurring alarms for medication times. Keep an updated calendar and check regularly. Can consider re-referral to speech therapy to help establish more consistent strategies (implementation may be impacted by depression).    Optimize Brain Health: Continue to refrain from smoking. Discuss continued weight loss with medical providers (could be related to reduced appetite 2/2 depression).     Medication Considerations: Sleep aids can contribute to cognitive dysfunction.     Follow-up: Testing on 1/29.             Psychiatric    Moderate episode of recurrent major depressive disorder - Primary    Current Assessment & Plan     Treatment: Recommend establishing care with mental health providers.            Ms. Villa will be provided the results of the evaluation.     Thank you for allowing me to participate in Ms. Toure care.  If you have any questions, please contact me at [phone].    Westley Cooepr Psy.D., ABPP  Board Certified in Clinical Neuropsychology  Department of Neurology    HISTORY OF PRESENT ILLNESS: Ms. Jud Villa is a 64 y.o.,  right-handed, female with 11 years of education who was referred for a neuropsychological evaluation in the setting of post-stroke cognitive dysfunction. She stated that she does not remember if she was experiencing any cognitive changes prior to the stroke. She presented to the ED in 3/2023 with headache and vision changes with imaging remarkable for a L PCA stroke. She reported cognitive improvement since the subacute stage, but does not feel that her cognition has fully returned to baseline. She indicated that she has been researching Alzheimer's disease, dementia, and mild cognitive impairment due to her persistent cognitive concerns.     Ms. Villa initially noticed a wide range of cognitive symptoms immediately after the stroke, noting that she could not remember how to use a computer despite having used one for 30 years in her profession as a . While her memory has improved, she expressed frustration that speech therapy was never suggested as she feels she missed on possible additional recovery. She requested a referral for speech therapy after work-up for bell's palsy in 8/2024, noting that her initial ST evaluation revealed cognitive dysfunction. She participated in a few sessions, but stopped attending after starting a new job.     Ms. Villa continues to note several persistent post-stroke cognitive symptoms. She reported word finding difficulty. She described changes in working memory, which may have contributed to her losing a job she started in late 2024. The position was related to her career as a , but required significantly more set shifting. She used to be able to focus on 1 customer at a time, but the new position had her alternating between multiple different forms of a communication, which was a challenge. She recalled an instance of a co-worker telling her a zip code and her forgetting the number by the time she went to write it down. She was terminated in 12/2024  after 3 months in the position. Beyond word finding and working memory, she generally notices forgetfulness, stating that she doesn't remember to take her medications, track appointments, and struggles to recall the day of the week. She noted 2 instances of losing her train of thought while driving, which she found particularly upsetting. The first occurred on the highway as she had to ask her ex-partner where she was driving to. The second occurred at [facility]. She parked her car, but didn't recognize where she was when she got out of the car, only remembering after she saw the sign for [employer].     Neuropsychiatric Symptoms:  Hallucinations: Denied  Depression/Labile Mood: Endorsed, noting that she has gone through a lot. Her son's girlfriend  in 2024 , so she now has custody of her 8 year old granddaughter. She denied active SI, plan, or intent. She is not currently followed by a mental health provider. She was followed by psychiatry and psychology in  after her daughter's death.   Anxiety: Endorsed, she indicated that she used to be very outgoing with no fear, but recalled experiencing a high level of anxiety at her new job. She was especially intimidated by her supervisor. She is not used to experiencing this level of self doubt.     DAILY FUNCTIONING:  BASIC ADLS:  Feeding: independent, she denied anosmia. She indicated that she doesn't have much of an appetite and has been losing weight. She has lost about 15-20 lbs over the past year and a half per medical records.   Dressing: independent  Bathing: independent, no changes in hygiene.     IADLS:  Support System: Resides with grandson and granddaughter.   Appointment Management: independent, she doesn't keep a calendar. She indicated that she frequently forgets about appointments, relying on text message reminders.    Medication Compliance: independent, she fills a weekly pillbox. She estimated forgetting to take her medications about 3 days per  "week, either morning or evening.   Financial Management: independent, she using autopay when she was working, but took them off since she doesn't have an income. She is remembering to pay them with no issue.   Cooking: No issues.   Driving: She denied changes in navigation. No recent MVCs.      BRAIN HEALTH RISK FACTORS:  Hearing Loss: Denied  Sleep: She reported problems with sleep initiation and maintenance, describing her sleep as broken throughout the night. She takes Lunesta about 4 days per week.     MEDICAL HISTORY: Ms. Villa  has a past medical history of Asthma, Breast carcinoma, Cataract, Coronary artery disease of native heart with stable angina pectoris (2023), Diabetes mellitus, Hyperlipidemia, Moderate episode of recurrent major depressive disorder (2021), Osteoporosis, and Stroke (2023).    NEUROIMAGIN2024 Brain MRI: "Large left PCA territory infarction with encephalomalacia again seen.  There is susceptibility along the margin of the encephalomalacia compatible with remote hemorrhage unchanged. There is no restricted diffusion to suggest acute infarction.  Previous punctate FLAIR hyperintensity right cerebellum no longer seen likely previously artifactual. Few scattered punctate foci of T2 FLAIR signal hyperintensity supratentorial white matter which are nonspecific and similar to prior may represent mild chronic ischemic change.  Punctate focus of susceptibility in the right parietal lobe with additional focus left posterior temporal lobe suggestive for remote microhemorrhages.  No evidence for new parenchymal susceptibility to suggest interval hemorrhage.  Ventricles stable without hydrocephalus.  No midline shift or significant mass effect.  Major intracranial skull base T2 flow voids are present.  No abnormal parenchymal enhancement."    SUBSTANCE USE: Ms. Villa reports that she quit smoking cigarettes in 2024. She reports that she does not drink alcohol and does " "not use drugs. No history of substance abuse.     CURRENT MEDICATIONS:    Current Outpatient Medications:     acetaminophen (TYLENOL) 500 MG tablet, Take 2 tablets (1,000 mg total) by mouth every 6 (six) hours as needed for Pain., Disp: 30 tablet, Rfl: 1    albuterol (PROVENTIL) 2.5 mg /3 mL (0.083 %) nebulizer solution, INHALE 1 VIAL PER NEBULIZER FOUR TIMES DAILY AS NEEDED FOR SHORTNESS OF BREATH/ WHEEZING, Disp: 360 mL, Rfl: 11    albuterol (VENTOLIN HFA) 90 mcg/actuation inhaler, INHALE 2 PUFFS BY MOUTH INTO THE LUNGS EVERY 6 HOURS AS NEEDED FOR WHEEZING, Disp: 18 g, Rfl: 11    atorvastatin (LIPITOR) 80 MG tablet, Take 1 tablet (80 mg total) by mouth every evening. (Patient not taking: Reported on 12/26/2024), Disp: 90 tablet, Rfl: 3    BD ULTRA-FINE SHORT PEN NEEDLE 31 gauge x 5/16" Ndle, Inject 1 pen into the skin once daily., Disp: 100 each, Rfl: 3    blood-glucose sensor (DEXCOM G7 SENSOR) Vicki, 1 Device by Misc.(Non-Drug; Combo Route) route every 10 days., Disp: 3 each, Rfl: 11    ELIQUIS 5 mg Tab, TAKE 1 TABLET(5 MG) BY MOUTH TWICE DAILY, Disp: 60 tablet, Rfl: 11    empagliflozin (JARDIANCE) 25 mg tablet, Take 1 tablet (25 mg total) by mouth once daily., Disp: 30 tablet, Rfl: 3    eszopiclone (LUNESTA) 2 MG Tab, Take 1 tablet (2 mg total) by mouth nightly as needed (insomnia)., Disp: 90 tablet, Rfl: 0    insulin glargine U-100, Lantus, (LANTUS SOLOSTAR U-100 INSULIN) 100 unit/mL (3 mL) InPn pen, Inject 10 Units into the skin every evening., Disp: 15 mL, Rfl: 1    metFORMIN (GLUCOPHAGE) 1000 MG tablet, TAKE 1 TABLET(1000 MG) BY MOUTH TWICE DAILY WITH MEALS, Disp: 180 tablet, Rfl: 1    metoprolol tartrate (LOPRESSOR) 50 MG tablet, Take 1 tablet (50 mg total) by mouth 2 (two) times daily., Disp: 60 tablet, Rfl: 11    pantoprazole (PROTONIX) 40 MG tablet, Take 1 tablet (40 mg total) by mouth before breakfast., Disp: 90 tablet, Rfl: 3    sertraline (ZOLOFT) 50 MG tablet, Take 1 tablet (50 mg total) by mouth " once daily., Disp: 90 tablet, Rfl: 3    topiramate (TOPAMAX) 50 MG tablet, Take 1 tablet (50 mg total) by mouth 2 (two) times daily., Disp: 180 tablet, Rfl: 1    TRELEGY ELLIPTA 100-62.5-25 mcg DsDv, INHALE 1 PUFF INTO THE LUNGS EVERY DAY, Disp: 180 each, Rfl: 1    UBRELVY 100 mg tablet, TAKE 1 TABLET BY MOUTH AS NEEDED FOR MIGRAINE. IF SYMPTOMS PERSIST OR RETURN, MAY REPEAT DOSE AFTER 2 HOURS. MAXIMUM 2 TABLETS PER 24 HOURS, Disp: 12 tablet, Rfl: 5     FAMILY HISTORY: family history includes Congenital heart disease in her brother; Diabetes in her father.    PSYCHOSOCIAL HISTORY: Born, raised, and early education in [place]. Immigrated to the [place] at 7 years old. Maltese is native language.   Education:   Level Attained: Completed 11 years of education. Obtained GED.    Learning Difficulties: Endorsed, noted that attention/concentration is a longstanding weakness.    Repeated Grade: Repeated 1 grade after immigrating to the [place].      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 She worked less consistently after cancer diagnosis in 2017. She is currently unemployed.      Relationship Status:   : No   : Yes   Children: 4    MENTAL STATUS AND OBSERVATIONS:  APPEARANCE: Appropriately dressed and adequate grooming/hygiene.   ALERTNESS/ORIENTATION: Attentive and alert. She presented as a reliable historian, recalling specific dates and timeframes of past and recent events.   GAIT/MOTOR: Unable to assess.   SENSORY: Unremarkable.   SPEECH/LANGUAGE: Normal in rate, rhythm, tone, and volume. Expressive and receptive language were grossly intact.  STATED MOOD/AFFECT: Mood was tearful at times, particularly when discussing cognitive symptoms.   INTERPERSONAL BEHAVIOR: Rapport was quickly and easily established   THOUGHT PROCESSES: Thoughts seemed logical and goal-directed.     PROPOSED TEST BATTERY:  YEARS OF ED: 11    MSVT  MOCA  TOPF  WAIS - DS,  SS, CODING  HVLT  WMS STORY  BVMT  MARIELA COPY  TRAILS  FAS/ANIMALS  NAB NAMING  PEGS  BDI, GAD7

## 2025-01-07 ENCOUNTER — OFFICE VISIT (OUTPATIENT)
Dept: NEUROLOGY | Facility: CLINIC | Age: 65
End: 2025-01-07
Payer: MEDICAID

## 2025-01-07 DIAGNOSIS — F33.1 MODERATE EPISODE OF RECURRENT MAJOR DEPRESSIVE DISORDER: Primary | ICD-10-CM

## 2025-01-07 DIAGNOSIS — Z86.73 HISTORY OF CVA (CEREBROVASCULAR ACCIDENT): ICD-10-CM

## 2025-01-07 DIAGNOSIS — R41.9 COGNITIVE COMPLAINTS: ICD-10-CM

## 2025-01-07 NOTE — ASSESSMENT & PLAN NOTE
Compensatory Mechanisms: Set recurring alarms for medication times. Keep an updated calendar and check regularly. Can consider re-referral to speech therapy to help establish more consistent strategies (implementation may be impacted by depression).    Optimize Brain Health: Continue to refrain from smoking. Discuss continued weight loss with medical providers (could be related to reduced appetite 2/2 depression).     Medication Considerations: Sleep aids can contribute to cognitive dysfunction.     Follow-up: Testing on 1/29.

## 2025-01-10 DIAGNOSIS — E11.59 TYPE 2 DIABETES MELLITUS WITH OTHER CIRCULATORY COMPLICATION, WITH LONG-TERM CURRENT USE OF INSULIN: ICD-10-CM

## 2025-01-10 DIAGNOSIS — E11.65 UNCONTROLLED TYPE 2 DIABETES MELLITUS WITH HYPERGLYCEMIA: ICD-10-CM

## 2025-01-10 DIAGNOSIS — Z79.4 TYPE 2 DIABETES MELLITUS WITH OTHER CIRCULATORY COMPLICATION, WITH LONG-TERM CURRENT USE OF INSULIN: ICD-10-CM

## 2025-01-10 NOTE — TELEPHONE ENCOUNTER
No care due was identified.  [facility] Embedded Care Due Messages. Reference number: [id number].   1/10/2025 8:16:28 AM CST

## 2025-01-11 NOTE — TELEPHONE ENCOUNTER
Refill Routing Note   Medication(s) are not appropriate for processing by [facility] for the following reason(s):        No active prescription written by provider  Outside of protocol    [facility] action(s):  Defer  Route               Appointments  past 12m or future 3m with PCP    Date Provider   Last Visit   11/27/2024 Bo Lopez MD   Next Visit   1/24/2025 Bo Lopez MD   ED visits in past 90 days: 0        Note composed:10:53 PM 01/10/2025

## 2025-01-13 ENCOUNTER — TELEPHONE (OUTPATIENT)
Dept: FAMILY MEDICINE | Facility: CLINIC | Age: 65
End: 2025-01-13
Payer: MEDICARE

## 2025-01-13 RX ORDER — ONDANSETRON 8 MG/1
8 TABLET, ORALLY DISINTEGRATING ORAL EVERY 6 HOURS PRN
Qty: 60 TABLET | Refills: 1 | OUTPATIENT
Start: 2025-01-13

## 2025-01-13 RX ORDER — BLOOD-GLUCOSE SENSOR
1 EACH MISCELLANEOUS
Qty: 9 EACH | Refills: 3 | Status: SHIPPED | OUTPATIENT
Start: 2025-01-13 | End: 2026-01-13

## 2025-01-13 NOTE — TELEPHONE ENCOUNTER
Pt is requesting a glucose meter, strips and lancets, she tests 3x/day, also she is requesting refill of her Zofran for the nausea, she uses [facility]

## 2025-01-13 NOTE — TELEPHONE ENCOUNTER
----- Message from Carmen sent at 1/13/2025 11:49 AM CST -----  Regarding: Refills  Contact: [phone]  Who Called: PT     Patient called in requesting to speak with you. Did not specify why. Please advise.

## 2025-01-13 NOTE — TELEPHONE ENCOUNTER
----- Message from Jemma sent at 1/13/2025  1:53 PM CST -----  Patient Returning a call    Who is calling?  Patient  Who called patient?  Office  Call back or [facility] message?  Call back  Call back number: [phone]

## 2025-01-24 ENCOUNTER — OFFICE VISIT (OUTPATIENT)
Dept: FAMILY MEDICINE | Facility: CLINIC | Age: 65
End: 2025-01-24
Payer: MEDICARE

## 2025-01-24 VITALS
DIASTOLIC BLOOD PRESSURE: 60 MMHG | OXYGEN SATURATION: 98 % | SYSTOLIC BLOOD PRESSURE: 104 MMHG | HEART RATE: 67 BPM | BODY MASS INDEX: 19.19 KG/M2 | HEIGHT: 62 IN | WEIGHT: 104.31 LBS

## 2025-01-24 DIAGNOSIS — E11.65 TYPE 2 DIABETES MELLITUS WITH HYPERGLYCEMIA, WITH LONG-TERM CURRENT USE OF INSULIN: ICD-10-CM

## 2025-01-24 DIAGNOSIS — R63.0 LOSS OF APPETITE: ICD-10-CM

## 2025-01-24 DIAGNOSIS — Z79.4 TYPE 2 DIABETES MELLITUS WITH OTHER CIRCULATORY COMPLICATION, WITH LONG-TERM CURRENT USE OF INSULIN: Primary | ICD-10-CM

## 2025-01-24 DIAGNOSIS — R41.89 COGNITIVE DEFICITS: ICD-10-CM

## 2025-01-24 DIAGNOSIS — R11.0 NAUSEA: ICD-10-CM

## 2025-01-24 DIAGNOSIS — Z79.4 TYPE 2 DIABETES MELLITUS WITH HYPERGLYCEMIA, WITH LONG-TERM CURRENT USE OF INSULIN: ICD-10-CM

## 2025-01-24 DIAGNOSIS — F41.1 ANXIETY STATE: ICD-10-CM

## 2025-01-24 DIAGNOSIS — E11.59 TYPE 2 DIABETES MELLITUS WITH OTHER CIRCULATORY COMPLICATION, WITH LONG-TERM CURRENT USE OF INSULIN: Primary | ICD-10-CM

## 2025-01-24 PROCEDURE — 99214 OFFICE O/P EST MOD 30 MIN: CPT | Mod: PBBFAC,PO | Performed by: FAMILY MEDICINE

## 2025-01-24 PROCEDURE — 99215 OFFICE O/P EST HI 40 MIN: CPT | Mod: S$PBB,,, | Performed by: FAMILY MEDICINE

## 2025-01-24 PROCEDURE — 99999 PR PBB SHADOW E&M-EST. PATIENT-LVL IV: CPT | Mod: PBBFAC,,, | Performed by: FAMILY MEDICINE

## 2025-01-24 RX ORDER — ATORVASTATIN CALCIUM 10 MG/1
10 TABLET, FILM COATED ORAL NIGHTLY
Qty: 90 TABLET | Refills: 3 | Status: SHIPPED | OUTPATIENT
Start: 2025-01-24 | End: 2026-01-24

## 2025-01-24 RX ORDER — INSULIN PUMP SYRINGE, 3 ML
EACH MISCELLANEOUS
Qty: 1 EACH | Refills: 0 | Status: SHIPPED | OUTPATIENT
Start: 2025-01-24 | End: 2026-01-24

## 2025-01-24 RX ORDER — ONDANSETRON 4 MG/1
4 TABLET, ORALLY DISINTEGRATING ORAL EVERY 8 HOURS PRN
Qty: 30 TABLET | Refills: 0 | Status: SHIPPED | OUTPATIENT
Start: 2025-01-24

## 2025-01-24 RX ORDER — LANCETS
EACH MISCELLANEOUS
Qty: 200 EACH | Refills: 3 | Status: SHIPPED | OUTPATIENT
Start: 2025-01-24

## 2025-01-25 NOTE — PROGRESS NOTES
Subjective     Patient ID: Jud Villa is a 64 y.o. female.    Chief Complaint:  Diabetes/memory problems    64 years old female came to the clinic for diabetes follow-up.  Patient problems with concentration and short-term memory.  Pending follow evaluation with the neuropsychologist.  Patient with significant anxiety associated problems with sleeping.  Patient also with loss of appetite.  She has recently lose her job.  She has requesting medicine to help with nausea sometimes.    Diabetes  She presents for her follow-up diabetic visit. She has type 2 diabetes mellitus. Her disease course has been stable. Hypoglycemia symptoms include nervousness/anxiousness. Pertinent negatives for diabetes include no polydipsia, no polyphagia and no polyuria. There are no hypoglycemic complications. Symptoms are stable. There are no diabetic complications. There are no known risk factors for coronary artery disease. Current diabetic treatment includes insulin injections and oral agent (dual therapy). She is following a generally healthy diet. She has not had a previous visit with a dietitian.   Anxiety  Presents for follow-up visit. Symptoms include decreased concentration, insomnia, nausea and nervous/anxious behavior. The severity of symptoms is moderate. The quality of sleep is non-restorative.         Review of Systems   Constitutional: Negative.    HENT: Negative.     Eyes: Negative.    Respiratory: Negative.     Cardiovascular: Negative.    Gastrointestinal:  Positive for nausea.   Endocrine: Negative for polydipsia, polyphagia and polyuria.   Genitourinary: Negative.    Musculoskeletal: Negative.    Neurological: Negative.    Psychiatric/Behavioral:  Positive for decreased concentration. The patient is nervous/anxious and has insomnia.           Objective     Physical Exam  Constitutional:       General: She is not in acute distress.     Appearance: She is well-developed. She is not diaphoretic.   HENT:      Head:  Normocephalic and atraumatic.      Right Ear: External ear normal.      Left Ear: External ear normal.      Nose: Nose normal.      Mouth/Throat:      Pharynx: No oropharyngeal exudate.   Eyes:      General: No scleral icterus.        Right eye: No discharge.         Left eye: No discharge.      Conjunctiva/sclera: Conjunctivae normal.      Pupils: Pupils are equal, round, and reactive to light.   Neck:      Thyroid: No thyromegaly.      Vascular: No JVD.      Trachea: No tracheal deviation.   Cardiovascular:      Rate and Rhythm: Normal rate and regular rhythm.      Heart sounds: Normal heart sounds. No murmur heard.     No friction rub. No gallop.   Pulmonary:      Effort: Pulmonary effort is normal. No respiratory distress.      Breath sounds: Normal breath sounds. No stridor. No wheezing or rales.   Chest:      Chest wall: No tenderness.   Abdominal:      General: Bowel sounds are normal. There is no distension.      Palpations: Abdomen is soft. There is no mass.      Tenderness: There is no abdominal tenderness. There is no guarding or rebound.   Musculoskeletal:         General: No tenderness. Normal range of motion.      Cervical back: Normal range of motion and neck supple.   Lymphadenopathy:      Cervical: No cervical adenopathy.   Skin:     General: Skin is warm and dry.      Coloration: Skin is not pale.      Findings: No erythema or rash.   Neurological:      Mental Status: She is alert and oriented to person, place, and time.      Cranial Nerves: No cranial nerve deficit.      Motor: No abnormal muscle tone.      Coordination: Coordination normal.      Deep Tendon Reflexes: Reflexes are normal and symmetric.   Psychiatric:         Behavior: Behavior normal.         Thought Content: Thought content normal.         Judgment: Judgment normal.            Assessment and Plan     1. Type 2 diabetes mellitus with other circulatory complication, with long-term current use of insulin  -      Microalbumin/Creatinine Ratio, Urine; Future; Expected date: 01/24/2025  -     Lipid Panel; Future; Expected date: 01/24/2025  -     Hemoglobin A1C; Future; Expected date: 01/24/2025  -     Comprehensive Metabolic Panel; Future; Expected date: 01/24/2025    2. Anxiety state, unspecified  Overview:  Dx updated per 2019 IMO Load      3. Cognitive deficits    4. Loss of appetite    5. Type 2 diabetes mellitus with hyperglycemia, with long-term current use of insulin  -     atorvastatin (LIPITOR) 10 MG tablet; Take 1 tablet (10 mg total) by mouth every evening.  Dispense: 90 tablet; Refill: 3  -     blood-glucose meter kit; To check BG 2 times daily, to use with insurance preferred meter  Dispense: 1 each; Refill: 0  -     lancets Misc; To check BG 2 times daily, to use with insurance preferred meter  Dispense: 200 each; Refill: 3  -     blood sugar diagnostic Strp; To check BG 2 times daily, to use with insurance preferred meter  Dispense: 200 strip; Refill: 3  -     Microalbumin/Creatinine Ratio, Urine; Future; Expected date: 01/24/2025  -     Lipid Panel; Future; Expected date: 01/24/2025  -     Hemoglobin A1C; Future; Expected date: 01/24/2025  -     Comprehensive Metabolic Panel; Future; Expected date: 01/24/2025    6. Nausea  -     ondansetron (ZOFRAN-ODT) 4 MG TbDL; Take 1 tablet (4 mg total) by mouth every 8 (eight) hours as needed (nausea).  Dispense: 30 tablet; Refill: 0        I spent a total of 41 minutes on the day of the visit.This includes face to face time and non-face to face time preparing to see the patient (eg, review of tests), obtaining and/or reviewing separately obtained history, documenting clinical information in the electronic or other health record, independently interpreting results and communicating results to the patient/family/caregiver, or care coordinator.          Follow up in about 6 months (around 7/24/2025), or if symptoms worsen or fail to improve.

## 2025-01-27 DIAGNOSIS — Z79.4 TYPE 2 DIABETES MELLITUS WITH HYPERGLYCEMIA, WITH LONG-TERM CURRENT USE OF INSULIN: ICD-10-CM

## 2025-01-27 DIAGNOSIS — E11.65 TYPE 2 DIABETES MELLITUS WITH HYPERGLYCEMIA, WITH LONG-TERM CURRENT USE OF INSULIN: ICD-10-CM

## 2025-01-27 RX ORDER — METFORMIN HYDROCHLORIDE 1000 MG/1
1000 TABLET ORAL 2 TIMES DAILY WITH MEALS
Qty: 180 TABLET | Refills: 3 | Status: SHIPPED | OUTPATIENT
Start: 2025-01-27

## 2025-01-27 NOTE — TELEPHONE ENCOUNTER
No care due was identified.  [facility] Embedded Care Due Messages. Reference number: [id number].   1/27/2025 8:30:09 AM CST

## 2025-01-29 ENCOUNTER — OFFICE VISIT (OUTPATIENT)
Dept: NEUROLOGY | Facility: CLINIC | Age: 65
End: 2025-01-29
Payer: MEDICARE

## 2025-01-29 DIAGNOSIS — F33.1 MODERATE EPISODE OF RECURRENT MAJOR DEPRESSIVE DISORDER: ICD-10-CM

## 2025-01-29 DIAGNOSIS — R41.89 COGNITIVE DEFICITS: ICD-10-CM

## 2025-01-29 DIAGNOSIS — Z86.73 HISTORY OF CVA (CEREBROVASCULAR ACCIDENT): ICD-10-CM

## 2025-01-29 DIAGNOSIS — F06.70 MILD VASCULAR NEUROCOGNITIVE DISORDER: Primary | ICD-10-CM

## 2025-01-29 DIAGNOSIS — I69.911 MEMORY DEFICIT AFTER CEREBROVASCULAR DISEASE: ICD-10-CM

## 2025-01-29 DIAGNOSIS — I99.9 MILD VASCULAR NEUROCOGNITIVE DISORDER: Primary | ICD-10-CM

## 2025-01-29 PROCEDURE — 96138 PSYCL/NRPSYC TECH 1ST: CPT | Mod: ,,, | Performed by: PSYCHIATRY & NEUROLOGY

## 2025-01-29 PROCEDURE — 96139 PSYCL/NRPSYC TST TECH EA: CPT | Mod: ,,, | Performed by: PSYCHIATRY & NEUROLOGY

## 2025-01-29 PROCEDURE — 96132 NRPSYC TST EVAL PHYS/QHP 1ST: CPT | Mod: ,,, | Performed by: PSYCHIATRY & NEUROLOGY

## 2025-01-29 PROCEDURE — 99999 PR PBB SHADOW E&M-EST. PATIENT-LVL I: CPT | Mod: PBBFAC,,,

## 2025-01-29 PROCEDURE — 99211 OFF/OP EST MAY X REQ PHY/QHP: CPT | Mod: PBBFAC

## 2025-01-29 PROCEDURE — 99499 UNLISTED E&M SERVICE: CPT | Mod: S$PBB,,, | Performed by: PSYCHIATRY & NEUROLOGY

## 2025-01-29 PROCEDURE — 96133 NRPSYC TST EVAL PHYS/QHP EA: CPT | Mod: ,,, | Performed by: PSYCHIATRY & NEUROLOGY

## 2025-01-29 NOTE — PROGRESS NOTES
NEUROPSYCHOLOGY CONSULT   Referral Information  Name: Jud Villa  MRN: [id number]  : 1960  Age: 64 y.o.  Race: White  Gender: female  REFERRAL SOURCE: Bo Lopez MD  DATE CONDUCTED: 2025  SOURCES OF INFORMATION:  The following was gathered from a clinical interview with Ms. Jud Villa and review of the available medical records. Ms. Villa expressed an understanding of the purpose of the evaluation and consented to all procedures. Total licensed billing psychologists professional time including clinical interview, test administration and interpretation of tests administered by the billing psychologist, integration of test results and other clinical data, preparing the final report, and personally reporting results to the patient   [place] - 60 minutes (2025), [id number] - 120 minutes (2025), [id number] - 200 minutes (2025)    NEUROPSYCHOLOGICAL EVALUATION - CONFIDENTIAL    SUMMARY/TREATMENT PLAN   Ms. Villa is a 64 year old female with persistent cognitive complaints following a L PCA stroke in 3/2023. She reported difficulty with word finding, working memory, and memory. She was terminated from a new position after 3 months in 2024, stating the level of set shifting was too challenging for her. She reported errors in several complex ADLs, including medication and appointment management.     Neuropsychological test scores must be interpreted with caution as Ms. Villa was administered tests in her non-native language. She also presents with clinical depression and anxiety, both of which may have impacted testing in addition to her daily functioning. With that said, it is very likely that she is experiencing at least a mild degree of post-stroke cognitive dysfunction. Furthermore, sleep disruption and polypharmacy may be exacerbating cognitive changes. Her functioning is not at the level of dementia, but she can consider additional work-up with  neurology if she remains concerned about an emerging neurodegenerative condition.     Diagnoses  Problem List Items Addressed This Visit          Neuro    History of CVA (cerebrovascular accident)    Mild vascular neurocognitive disorder - Primary    Current Assessment & Plan     Compensatory Mechanisms: Set recurring alarms for medication times. Keep an updated calendar and check regularly. Can consider re-referral to speech therapy to help establish more consistent strategies (implementation may be impacted by depression).     Optimize Brain Health: Continue to refrain from smoking. Discuss continued weight loss with medical providers (could be related to reduced appetite 2/2 depression).     Neurology: Will discuss referral during feedback for neurodegenerative work-up.      Medication Considerations: Sleep aids can contribute to cognitive dysfunction.      Follow-up: PRN.             Psychiatric    Moderate episode of recurrent major depressive disorder    Current Assessment & Plan     Treatment: Recommend establishing care with mental health providers.           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 Villa will be provided the results of the evaluation.     Thank you for allowing me to participate in Ms. Toure care.  If you have any questions, please contact me at [phone].    Westley Cooper Psy.D., ABPP  Board Certified in Clinical Neuropsychology  Department of Neurology    HISTORY OF PRESENT ILLNESS: Ms. Jud Villa is a 64 y.o., right-handed, female with 11 years of education who was referred for a neuropsychological evaluation in the setting of post-stroke cognitive dysfunction. She stated that she does not remember if she was experiencing any cognitive changes prior to the stroke. She presented to the ED in 3/2023 with headache and vision changes with imaging remarkable for a L PCA stroke. She reported cognitive  improvement since the subacute stage, but does not feel that her cognition has fully returned to baseline. She indicated that she has been researching Alzheimer's disease, dementia, and mild cognitive impairment due to her persistent cognitive concerns.     Ms. Villa initially noticed a wide range of cognitive symptoms immediately after the stroke, noting that she could not remember how to use a computer despite having used one for 30 years in her profession as a . While her memory has improved, she expressed frustration that speech therapy was never suggested as she feels she missed on possible additional recovery. She requested a referral for speech therapy after work-up for bell's palsy in 8/2024, noting that her initial ST evaluation revealed cognitive dysfunction. She participated in a few sessions, but stopped attending after starting a new job.     Ms. Villa continues to note several persistent post-stroke cognitive symptoms. She reported word finding difficulty. She described changes in working memory, which may have contributed to her losing a job she started in late 2024. The position was related to her career as a , but required significantly more set shifting. She was able to focus on 1 customer at a time in her previous position, but the new position had her alternating between multiple different forms of a communication, which was a challenge. She recalled an instance of a co-worker telling her a zip code and her forgetting the number by the time she went to write it down. She was terminated in 12/2024 after 3 months in the position. Beyond word finding and working memory, she generally notices forgetfulness, stating that she doesn't remember to take her medications, track appointments, and struggles to recall the day of the week. She noted 2 instances of losing her train of thought while driving, which she found particularly upsetting. The first occurred on the highway  as she had to ask her ex-partner where she was driving to. The second occurred at [employer]. She parked her car, but didn't recognize where she was when she got out of the car, only remembering after she saw the sign for [employer].     Neuropsychiatric Symptoms:  Hallucinations: Denied  Depression/Labile Mood: Endorsed, noting that she has gone through a lot. Her son's girlfriend  in 2024 , so she now has custody of her 8 year old granddaughter. She denied active SI, plan, or intent. She is not currently followed by a mental health provider. She was followed by psychiatry and psychology in  after her daughter's death.   Anxiety: Endorsed, she indicated that she used to be very outgoing with no fear, but recalled experiencing a high level of anxiety at her new job. She was especially intimidated by her supervisor. She is not used to experiencing this level of self doubt.     DAILY FUNCTIONING:  BASIC ADLS:  Feeding: independent, she denied anosmia. She indicated that she doesn't have much of an appetite and has been losing weight. She has lost about 15-20 lbs over the past year and a half per medical records.   Dressing: independent  Bathing: independent, no changes in hygiene.     IADLS:  Support System: Resides with grandson and granddaughter.   Appointment Management: independent, she doesn't keep a calendar. She indicated that she frequently forgets about appointments, relying on text message reminders.    Medication Compliance: independent, she fills a weekly pillbox. She estimated forgetting to take her medications about 3 days per week, either morning or evening.   Financial Management: independent, she using autopay when she was working, but took them off since she doesn't have an income. She is remembering to pay them with no issue.   Cooking: No issues.   Driving: She denied changes in navigation. No recent MVCs.      BRAIN HEALTH RISK FACTORS:  Hearing Loss: Denied  Sleep: She reported problems  "with sleep initiation and maintenance, describing her sleep as broken throughout the night. She takes Lunesta about 4 days per week.     MEDICAL HISTORY: Ms. Villa  has a past medical history of Asthma, Breast carcinoma, Cataract, Coronary artery disease of native heart with stable angina pectoris (2023), Diabetes mellitus, Hyperlipidemia, Moderate episode of recurrent major depressive disorder (2021), Osteoporosis, and Stroke (2023). Oncology history: "IDC of the LEFT breast, clinical Stage IIIA (cT3 N1) (ER neg, WI neg, HER2 neg). Status-post NACT with dd AC x 4 then weekly Taxol x 12. Status-post BILATERAL mastectomy with pathology showing residual IDC (2 foci, largest 5 mm; ypT1b ypN0; RCB-II)."    NEUROIMAGIN2024 Brain MRI: "Large left PCA territory infarction with encephalomalacia again seen.  There is susceptibility along the margin of the encephalomalacia compatible with remote hemorrhage unchanged. There is no restricted diffusion to suggest acute infarction.  Previous punctate FLAIR hyperintensity right cerebellum no longer seen likely previously artifactual. Few scattered punctate foci of T2 FLAIR signal hyperintensity supratentorial white matter which are nonspecific and similar to prior may represent mild chronic ischemic change.  Punctate focus of susceptibility in the right parietal lobe with additional focus left posterior temporal lobe suggestive for remote microhemorrhages.  No evidence for new parenchymal susceptibility to suggest interval hemorrhage.  Ventricles stable without hydrocephalus.  No midline shift or significant mass effect.  Major intracranial skull base T2 flow voids are present.  No abnormal parenchymal enhancement."    SUBSTANCE USE: Ms. Villa reports that she quit smoking cigarettes in 2024. She reports that she does not drink alcohol and does not use drugs. No history of substance abuse.     CURRENT MEDICATIONS:    Current Outpatient Medications: " "    acetaminophen (TYLENOL) 500 MG tablet, Take 2 tablets (1,000 mg total) by mouth every 6 (six) hours as needed for Pain., Disp: 30 tablet, Rfl: 1    albuterol (PROVENTIL) 2.5 mg /3 mL (0.083 %) nebulizer solution, INHALE 1 VIAL PER NEBULIZER FOUR TIMES DAILY AS NEEDED FOR SHORTNESS OF BREATH/ WHEEZING, Disp: 360 mL, Rfl: 11    albuterol (VENTOLIN HFA) 90 mcg/actuation inhaler, INHALE 2 PUFFS BY MOUTH INTO THE LUNGS EVERY 6 HOURS AS NEEDED FOR WHEEZING, Disp: 18 g, Rfl: 11    atorvastatin (LIPITOR) 10 MG tablet, Take 1 tablet (10 mg total) by mouth every evening., Disp: 90 tablet, Rfl: 3    BD ULTRA-FINE SHORT PEN NEEDLE 31 gauge x 5/16" Ndle, Inject 1 pen into the skin once daily., Disp: 100 each, Rfl: 3    blood sugar diagnostic Strp, To check BG 2 times daily, to use with insurance preferred meter, Disp: 200 strip, Rfl: 3    blood-glucose meter kit, To check BG 2 times daily, to use with insurance preferred meter, Disp: 1 each, Rfl: 0    blood-glucose sensor (DEXCOM G7 SENSOR) Vicki, 1 Device by Misc.(Non-Drug; Combo Route) route every 10 days., Disp: 9 each, Rfl: 3    ELIQUIS 5 mg Tab, TAKE 1 TABLET(5 MG) BY MOUTH TWICE DAILY, Disp: 60 tablet, Rfl: 11    empagliflozin (JARDIANCE) 25 mg tablet, Take 1 tablet (25 mg total) by mouth once daily., Disp: 30 tablet, Rfl: 3    eszopiclone (LUNESTA) 2 MG Tab, Take 1 tablet (2 mg total) by mouth nightly as needed (insomnia)., Disp: 90 tablet, Rfl: 0    insulin glargine U-100, Lantus, (LANTUS SOLOSTAR U-100 INSULIN) 100 unit/mL (3 mL) InPn pen, Inject 10 Units into the skin every evening., Disp: 15 mL, Rfl: 1    lancets Misc, To check BG 2 times daily, to use with insurance preferred meter, Disp: 200 each, Rfl: 3    metFORMIN (GLUCOPHAGE) 1000 MG tablet, Take 1 tablet (1,000 mg total) by mouth 2 (two) times daily with meals., Disp: 180 tablet, Rfl: 3    metoprolol tartrate (LOPRESSOR) 50 MG tablet, Take 1 tablet (50 mg total) by mouth 2 (two) times daily., Disp: 60 " tablet, Rfl: 11    ondansetron (ZOFRAN-ODT) 4 MG TbDL, Take 1 tablet (4 mg total) by mouth every 8 (eight) hours as needed (nausea)., Disp: 30 tablet, Rfl: 0    pantoprazole (PROTONIX) 40 MG tablet, Take 1 tablet (40 mg total) by mouth before breakfast., Disp: 90 tablet, Rfl: 3    sertraline (ZOLOFT) 50 MG tablet, Take 1 tablet (50 mg total) by mouth once daily., Disp: 90 tablet, Rfl: 3    topiramate (TOPAMAX) 50 MG tablet, Take 1 tablet (50 mg total) by mouth 2 (two) times daily., Disp: 180 tablet, Rfl: 1    TRELEGY ELLIPTA 100-62.5-25 mcg DsDv, INHALE 1 PUFF INTO THE LUNGS EVERY DAY, Disp: 180 each, Rfl: 1    UBRELVY 100 mg tablet, TAKE 1 TABLET BY MOUTH AS NEEDED FOR MIGRAINE. IF SYMPTOMS PERSIST OR RETURN, MAY REPEAT DOSE AFTER 2 HOURS. MAXIMUM 2 TABLETS PER 24 HOURS, Disp: 12 tablet, Rfl: 5     FAMILY HISTORY: family history includes Congenital heart disease in her brother; Diabetes in her father.    PSYCHOSOCIAL HISTORY: Born, raised, and early education in [place]. Immigrated to the [place] at 7 years old. Trinidadian is native language.   Education:   Level Attained: Completed 11 years of education. Obtained GED.    Learning Difficulties: Endorsed, noted that attention/concentration is a longstanding weakness.    Repeated Grade: Repeated 1 grade after immigrating to the [place].      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 She worked less consistently after cancer diagnosis in 2017. She is currently unemployed.      Relationship Status:   : No   : Yes   Children: 4    MENTAL STATUS AND OBSERVATIONS:  APPEARANCE: Appropriately dressed and adequate grooming/hygiene.   ALERTNESS/ORIENTATION: Attentive and alert. She presented as a reliable historian, recalling specific dates and timeframes of past and recent events.   GAIT/MOTOR: Ambulated independently.   SENSORY: Unremarkable.   SPEECH/LANGUAGE: Normal in rate, rhythm, tone, and volume.  Expressive and receptive language were grossly intact.  STATED MOOD/AFFECT: Mood was tearful at times, particularly when discussing cognitive symptoms.   INTERPERSONAL BEHAVIOR: Rapport was quickly and easily established   THOUGHT PROCESSES: Thoughts seemed logical and goal-directed.   BEHAVIORAL OBSERVATIONS: A standalone PVT was discontinued due to technical issues. 2/2 embedded PVTs were at or below established cut scores. She had difficulty understanding test instructions, requiring repetition and clarification. She reported a headache during testing. Ms. Villa was administered tests that were developed for and normed on individuals born, raised, and educated in the [place] with English as their native language. She was born, raised, and completed early education in [place] with [place]ian as her native language. As a result, test scores will be interpreted with caution as they may represent an underestimate of her current cognitive abilities.      APPENDIX/TEST RESULTS:  TESTS ADMINISTERED:  Clinical Interview and Review of Records, Cochran Cognitive Assessment (MoCA), Test of Premorbid Functioning (TOPF), selected subtests from the Wechsler Adult Intelligence Scale - 4th edition (WAIS-IV, [facility] Demographically Adjusted Norms), Raya Verbal Learning Test - Revised (HVLT-R), Logical Memory subtest from the WMS-IV ([facility] Demographically Adjusted Norms), Brief Visuospatial Memory Test - Revised (BVMT-R), Naming subtest from the NAB, Controlled Oral Word Association Test ([facility] Norms), Animal Fluency ([facility] Norms), Trailmaking Test ([facility] Norms), Dagoberto Complex Figure (Copy Trial), Grooved Pegboard Test ([facility] Norms), Generalized Anxiety Disorder - 7 (CRYSTAL-7), and the Prince Depression Scale - 2nd edition (BDI-2).     Score Label T-Score Standard Score Z-Score Scaled Score %ile Rank   Exceptionally High > 70 > 130 > 2.0  > 16 > 98   Above Average 64-69 120-129 1.4-1.9 15 91-97   High Average 57-63 110-119 0.7-1.3  12-14 75-90   Average 44-56  0.6 to -0.6 8-11 25-74   Low Average 37-43 80-89 -1.3 to -0.7 6-7 9-24   Below Average 30-36 70-79 -2.0 to -1.4 4-5 2-8   Exceptionally Low < 30 < 70  < -2.0 < 4 < 2      Mental Status: Fully oriented to time and place.   1/2025 MoCA = 14/30     Pre-morbid/Baseline: Single word reading was below average. Education/vocational attainment was suggestive of low average range baseline abilities.      Language: She accurately repeated 0/2 sentences verbatim. Below average semantic verbal fluency. Exceptionally low letter verbal fluency. Exceptionally low performance on a test of confrontation naming with limited benefit from cueing.      Visuospatial: All numbers were included on her drawing of a clock face and relatively in the correct location (although the 1 was very close to the 12 position). She did not accurately set the clock hands. Mild errors on a copy of a complex figure, seemingly related to planning. Otherwise, her copy demonstrated an intact appreciation for the gestalt of the figure with no obvious visuospatial dysfunction.      Learning/Memory: Overall encoding of a supraspan word list was exceptionally low as she recalled 2, 5, and 6 of 12 words. She did not freely recall any words following a long delay. Recognition was below average (11/12 hits, 4 fps). She encoded 4/5 words after 2 trials on the MoCA, freely recalling 0 following a brief delay. She recalled 1 word with categorical cueing. She correctly identified 4/4 words with multiple choice cueing. Overall encoding of two short stories was low average. She retained 6/9 previously encoded details from the first story following a long delay and 5/5 from the second story. Her overall recall was low average. Responses to y/n questions pertaining to the stories was below average (positive response bias, 17/23). Overall encoding of geometric figures and their location on a page was exceptionally low. Retention was intact  following a long delay and her overall recall was low average. Recognition was perfect.      Executive Functioning: One trial learning/encoding was below expectation. She had difficulty understanding the instructions for a serial 7 subtraction task. Low average performance on another test of working memory. She eventually was able to provide 1 correct response, but then reverted to an incorrect subtraction interval. Exceptionally low to below average performance on tests of processing speed. She made numerous errors on a SS and coding task. She did not complete a test requiring her to maintain a complex set.      Motor: Fine motor abilities were below average for her non-dominant left hand and exceptionally low for her dominant right hand.      Mood: She endorsed a mild degree of clinical anxiety and a moderate degree of clinical depression. She denied SI.       Raw Score Type of Standardized Score Standardized Score Percentile/CP   ACS RDS 6 - - -   HVLT-R Recognition Discrimination 7 - - -   PREMORBID FUNCTIONING Raw Score Type of Standardized Score Standardized Score Percentile/CP   TOPF simple dem. eFSIQ - SS 88 21   TOPF pred. eFSIQ - SS 77 6   TOPF simple + pred. eFSIQ - SS 82 12   INTELLECTUAL FUNCTIONING Raw Score Type of Standardized Score Standardized Score Percentile/CP   WAIS-IV       PSI - T 36    Digit Span 14 T 39          DS Forward 7 ss 7 16         DS Backward 3 ss 3 1         DS Sequence 4 ss 6 9         Longest Digit Forward 5 - - -         Longest Digit Backward 2 - - -         Longest Digit Sequence 3 - - -   Symbol Search 14 T 37    Coding 26 T 37    COGNITIVE SCREENING Raw Score Type of Standardized Score Standardized Score Percentile/CP   MoCA 14 - - -   Orientation - Place 2/2 - - -   Orientation - Date 4/4 - - -   LANGUAGE FUNCTIONING Raw Score Type of Standardized Score Standardized Score Percentile/CP   TOPF Word Reading 18 SS 78 7   NAB Naming 18 Tscore 19 <0.1   NAB Naming Percent  Correct After Semantic Cuing 15 - - 42   NAB Naming Percent Correct After Phonemic Cuing 27 - - 24   COWAT 14 Tscore 26 1   Letter F  6 Tscore 39 13   Animal Naming 11 Tscore 29 2   VISUOSPATIAL FUNCTIONING Raw Score Type of Standardized Score Standardized Score Percentile/CP   RCFT Copy 29 - - 6-10   RCFT Time to Copy 366 - - >16   BVMT-R Copy 10 - - -   LEARNING & MEMORY Raw Score Type of Standardized Score Standardized Score Percentile/CP   HVLT-R       Total Immediate 13 Tscore 29 2   Delayed Recall 0 Tscore 20 0.1   Retention % 0 Tscore 20 0.1   Hits 11 - - -   False Positives 4 - - -   Discrimination  7 Tscore 31 3   WMS-IV Subtests       LM I 19 T 40    LM II 11 T 43    LM Recognition 15 - - 3-9   BVMT-R       IR 9 Tscore 27 1   DR 6 Tscore 40 16   Discrimination Index 6 - - >16   ATTENTION/WORKING MEMORY Raw Score Type of Standardized Score Standardized Score Percentile/CP   WAIS-IV Digit Span 14 ss 4 2         DS Forward 7 ss 7 16         DS Backward 3 ss 3 1         DS Sequence 4 ss 6 9         Longest Digit Forward 5 - - -         Longest Digit Backward 2 - - -         Longest Digit Sequence 3 - - -   MENTAL PROCESSING SPEED Raw Score Type of Standardized Score Standardized Score Percentile/CP   WAIS-IV PSI - SS 74 4   WAIS-IV Symbol Search 14 ss 5 5   WAIS-IV Coding 26 ss 5 5   TMT A  97 Tscore 20 0   TMT A Total Err. 1 - - -   TMT A Seq. Err. 1 - - -   EXECUTIVE FUNCTIONING Raw Score Type of Standardized Score Standardized Score Percentile/CP   TMT B D/C Time 300 - - -   TMT B Total Err. 1 - - -   TMT B Seq Err. 0 - - -   TMT B Set-Loss Err. 1 - - -   TMT B Time-DC Err. 8 - - -   FRONTOMOTOR  Raw Score Type of Standardized Score Standardized Score Percentile/CP   GPT  Tscore 25 0.5   GPT  Tscore 35 7   MOOD & PERSONALITY Raw Score Type of Standardized Score Standardized Score Percentile/CP   BDI-2 20 - - -   CRYSTAL-7 8 - - -

## 2025-01-30 DIAGNOSIS — Z00.00 ENCOUNTER FOR MEDICARE ANNUAL WELLNESS EXAM: ICD-10-CM

## 2025-02-04 PROBLEM — F06.70 MILD VASCULAR NEUROCOGNITIVE DISORDER: Status: ACTIVE | Noted: 2025-01-07

## 2025-02-04 PROBLEM — I99.9 MILD VASCULAR NEUROCOGNITIVE DISORDER: Status: ACTIVE | Noted: 2025-01-07

## 2025-02-04 NOTE — ASSESSMENT & PLAN NOTE
Compensatory Mechanisms: Set recurring alarms for medication times. Keep an updated calendar and check regularly. Can consider re-referral to speech therapy to help establish more consistent strategies (implementation may be impacted by depression).     Optimize Brain Health: Continue to refrain from smoking. Discuss continued weight loss with medical providers (could be related to reduced appetite 2/2 depression).     Neurology: Will discuss referral during feedback for neurodegenerative work-up.      Medication Considerations: Sleep aids can contribute to cognitive dysfunction.      Follow-up: PRN.

## 2025-02-10 ENCOUNTER — OFFICE VISIT (OUTPATIENT)
Dept: NEUROLOGY | Facility: CLINIC | Age: 65
End: 2025-02-10
Payer: MEDICARE

## 2025-02-10 DIAGNOSIS — I99.9 MILD VASCULAR NEUROCOGNITIVE DISORDER: Primary | ICD-10-CM

## 2025-02-10 DIAGNOSIS — F06.70 MILD VASCULAR NEUROCOGNITIVE DISORDER: Primary | ICD-10-CM

## 2025-02-10 DIAGNOSIS — Z86.73 HISTORY OF CVA (CEREBROVASCULAR ACCIDENT): ICD-10-CM

## 2025-02-10 PROCEDURE — 99499 UNLISTED E&M SERVICE: CPT | Mod: S$GLB,,, | Performed by: PSYCHIATRY & NEUROLOGY

## 2025-02-10 NOTE — PROGRESS NOTES
NEUROPSYCHOLOGICAL EVALUATION - CONFIDENTIAL  FEEDBACK NOTE    On 2/10/2025, I provided MsHernandez Villa and her daughter the neuropsychological evaluation results. Please see the full report for a comprehensive overview of the findings. Ms. Villa was provided a copy of the report and invited to call with additional questions.      Westley Cooper Psy.D., ABPP  Board Certified in Clinical Neuropsychology  [facility] - Department of Neurology

## 2025-02-20 ENCOUNTER — TELEPHONE (OUTPATIENT)
Dept: NEUROLOGY | Facility: CLINIC | Age: 65
End: 2025-02-20
Payer: MEDICARE

## 2025-02-20 NOTE — TELEPHONE ENCOUNTER
----- Message from Westley Cooper PsyD sent at 2/10/2025  1:52 PM CST -----  Good afternoon,    This patient saw Leatha in the past and was interested in a follow-up appointment for further stroke education. Thanks,    Westley

## 2025-02-24 DIAGNOSIS — J43.2 CENTRILOBULAR EMPHYSEMA: ICD-10-CM

## 2025-02-24 DIAGNOSIS — J44.1 COPD EXACERBATION: ICD-10-CM

## 2025-02-24 RX ORDER — FLUTICASONE FUROATE, UMECLIDINIUM BROMIDE AND VILANTEROL TRIFENATATE 100; 62.5; 25 UG/1; UG/1; UG/1
1 POWDER RESPIRATORY (INHALATION)
Qty: 180 EACH | Refills: 3 | Status: SHIPPED | OUTPATIENT
Start: 2025-02-24

## 2025-02-24 NOTE — TELEPHONE ENCOUNTER
Refill Decision Note   Jud Villa  is requesting a refill authorization.  Brief Assessment and Rationale for Refill:  Approve     Medication Therapy Plan:         Pharmacist review requested: Yes   Comments:     Note composed:10:26 AM 02/24/2025

## 2025-02-24 NOTE — TELEPHONE ENCOUNTER
No care due was identified.  [employer] Embedded Care Due Messages. Reference number: [id number].   2/24/2025 9:36:46 AM CST

## 2025-02-24 NOTE — TELEPHONE ENCOUNTER
Refill Routing Note   Medication(s) are not appropriate for processing by [facility] for the following reason(s):        Drug-disease interaction    [facility] action(s):  Defer      Medication Therapy Plan: Chest pain due to myocardial ischemia; Coronary artery disease; Atherosclerotic heart disease of native coronary artery with other forms of angina pectoris    Pharmacist review requested: Yes     Appointments  past 12m or future 3m with PCP    Date Provider   Last Visit   1/24/2025 Bo Lopez MD   Next Visit   7/24/2025 Bo Lopez MD   ED visits in past 90 days: 0        Note composed:10:04 AM 02/24/2025

## 2025-03-03 ENCOUNTER — PATIENT MESSAGE (OUTPATIENT)
Dept: CARDIOLOGY | Facility: CLINIC | Age: 65
End: 2025-03-03
Payer: MEDICARE

## 2025-03-03 ENCOUNTER — TELEPHONE (OUTPATIENT)
Dept: FAMILY MEDICINE | Facility: CLINIC | Age: 65
End: 2025-03-03
Payer: MEDICARE

## 2025-03-11 RX ORDER — ATORVASTATIN CALCIUM 80 MG/1
80 TABLET, FILM COATED ORAL DAILY
Qty: 90 TABLET | Refills: 3 | Status: SHIPPED | OUTPATIENT
Start: 2025-03-11 | End: 2026-03-11

## 2025-03-16 ENCOUNTER — HOSPITAL ENCOUNTER (EMERGENCY)
Facility: HOSPITAL | Age: 65
Discharge: HOME | End: 2025-03-17
Attending: EMERGENCY MEDICINE
Payer: MEDICARE

## 2025-03-16 DIAGNOSIS — R53.1 GENERALIZED WEAKNESS: ICD-10-CM

## 2025-03-16 DIAGNOSIS — R00.2 PALPITATIONS: ICD-10-CM

## 2025-03-16 LAB
ALBUMIN SERPL BCP-MCNC: 3.6 G/DL (ref 3.5–5.2)
ALP SERPL-CCNC: 93 U/L (ref 40–150)
ALT SERPL W/O P-5'-P-CCNC: 10 U/L (ref 10–44)
ANION GAP SERPL CALC-SCNC: 12 MMOL/L (ref 8–16)
AST SERPL-CCNC: 13 U/L (ref 10–40)
BASOPHILS # BLD AUTO: 0.06 K/UL (ref 0–0.2)
BASOPHILS NFR BLD: 0.6 % (ref 0–1.9)
BILIRUB SERPL-MCNC: 0.2 MG/DL (ref 0.1–1)
BNP SERPL-MCNC: 47 PG/ML (ref 0–99)
BUN SERPL-MCNC: 18 MG/DL (ref 8–23)
CALCIUM SERPL-MCNC: 9.3 MG/DL (ref 8.7–10.5)
CHLORIDE SERPL-SCNC: 106 MMOL/L (ref 95–110)
CO2 SERPL-SCNC: 21 MMOL/L (ref 23–29)
CREAT SERPL-MCNC: 0.8 MG/DL (ref 0.5–1.4)
DIFFERENTIAL METHOD BLD: ABNORMAL
EOSINOPHIL # BLD AUTO: 0.3 K/UL (ref 0–0.5)
EOSINOPHIL NFR BLD: 2.8 % (ref 0–8)
ERYTHROCYTE [DISTWIDTH] IN BLOOD BY AUTOMATED COUNT: 14.1 % (ref 11.5–14.5)
EST. GFR  (NO RACE VARIABLE): >60 ML/MIN/1.73 M^2
GLUCOSE SERPL-MCNC: 249 MG/DL (ref 70–110)
HCT VFR BLD AUTO: 43.6 % (ref 37–48.5)
HCV AB SERPL QL IA: NORMAL
HGB BLD-MCNC: 13.7 G/DL (ref 12–16)
HIV 1+2 AB+HIV1 P24 AG SERPL QL IA: NORMAL
IMM GRANULOCYTES # BLD AUTO: 0.03 K/UL (ref 0–0.04)
IMM GRANULOCYTES NFR BLD AUTO: 0.3 % (ref 0–0.5)
LYMPHOCYTES # BLD AUTO: 2.9 K/UL (ref 1–4.8)
LYMPHOCYTES NFR BLD: 27.9 % (ref 18–48)
MCH RBC QN AUTO: 28.2 PG (ref 27–31)
MCHC RBC AUTO-ENTMCNC: 31.4 G/DL (ref 32–36)
MCV RBC AUTO: 90 FL (ref 82–98)
MONOCYTES # BLD AUTO: 0.7 K/UL (ref 0.3–1)
MONOCYTES NFR BLD: 7.1 % (ref 4–15)
NEUTROPHILS # BLD AUTO: 6.4 K/UL (ref 1.8–7.7)
NEUTROPHILS NFR BLD: 61.3 % (ref 38–73)
NRBC BLD-RTO: 0 /100 WBC
PLATELET # BLD AUTO: 230 K/UL (ref 150–450)
PMV BLD AUTO: 9.6 FL (ref 9.2–12.9)
POTASSIUM SERPL-SCNC: 3.7 MMOL/L (ref 3.5–5.1)
PROT SERPL-MCNC: 7 G/DL (ref 6–8.4)
RBC # BLD AUTO: 4.85 M/UL (ref 4–5.4)
SODIUM SERPL-SCNC: 139 MMOL/L (ref 136–145)
T4 FREE SERPL-MCNC: 0.93 NG/DL (ref 0.71–1.51)
TROPONIN I SERPL DL<=0.01 NG/ML-MCNC: <3 NG/L (ref 0–14)
TSH SERPL DL<=0.005 MIU/L-ACNC: 0.33 UIU/ML (ref 0.4–4)
WBC # BLD AUTO: 10.46 K/UL (ref 3.9–12.7)

## 2025-03-16 PROCEDURE — 83880 ASSAY OF NATRIURETIC PEPTIDE: CPT

## 2025-03-16 PROCEDURE — 84443 ASSAY THYROID STIM HORMONE: CPT

## 2025-03-16 PROCEDURE — 85025 COMPLETE CBC W/AUTO DIFF WBC: CPT

## 2025-03-16 PROCEDURE — 93005 ELECTROCARDIOGRAM TRACING: CPT

## 2025-03-16 PROCEDURE — 93010 ELECTROCARDIOGRAM REPORT: CPT | Mod: ,,, | Performed by: INTERNAL MEDICINE

## 2025-03-16 PROCEDURE — 87389 HIV-1 AG W/HIV-1&-2 AB AG IA: CPT | Performed by: PHYSICIAN ASSISTANT

## 2025-03-16 PROCEDURE — 99285 EMERGENCY DEPT VISIT HI MDM: CPT | Mod: 25

## 2025-03-16 PROCEDURE — 84439 ASSAY OF FREE THYROXINE: CPT

## 2025-03-16 PROCEDURE — 80053 COMPREHEN METABOLIC PANEL: CPT

## 2025-03-16 PROCEDURE — 94761 N-INVAS EAR/PLS OXIMETRY MLT: CPT

## 2025-03-16 PROCEDURE — 63600175 PHARM REV CODE 636 W HCPCS

## 2025-03-16 PROCEDURE — 0241U SARS-COV2 (COVID) WITH FLU/RSV BY PCR: CPT

## 2025-03-16 PROCEDURE — 86803 HEPATITIS C AB TEST: CPT | Performed by: PHYSICIAN ASSISTANT

## 2025-03-16 PROCEDURE — 96360 HYDRATION IV INFUSION INIT: CPT

## 2025-03-16 PROCEDURE — 84484 ASSAY OF TROPONIN QUANT: CPT

## 2025-03-16 RX ADMIN — SODIUM CHLORIDE, POTASSIUM CHLORIDE, SODIUM LACTATE AND CALCIUM CHLORIDE 1000 ML: 600; 310; 30; 20 INJECTION, SOLUTION INTRAVENOUS at 11:03

## 2025-03-17 VITALS
BODY MASS INDEX: 19.02 KG/M2 | SYSTOLIC BLOOD PRESSURE: 164 MMHG | WEIGHT: 104 LBS | TEMPERATURE: 98 F | OXYGEN SATURATION: 95 % | RESPIRATION RATE: 22 BRPM | DIASTOLIC BLOOD PRESSURE: 85 MMHG | HEART RATE: 101 BPM

## 2025-03-17 LAB
AMORPH CRY UR QL COMP ASSIST: NORMAL
BACTERIA #/AREA URNS AUTO: NORMAL /HPF
BILIRUB UR QL STRIP: NEGATIVE
CLARITY UR REFRACT.AUTO: CLEAR
COLOR UR AUTO: COLORLESS
GLUCOSE UR QL STRIP: ABNORMAL
HGB UR QL STRIP: NEGATIVE
INFLUENZA A, MOLECULAR: NOT DETECTED
INFLUENZA B, MOLECULAR: NOT DETECTED
KETONES UR QL STRIP: NEGATIVE
LEUKOCYTE ESTERASE UR QL STRIP: NEGATIVE
MICROSCOPIC COMMENT: NORMAL
NITRITE UR QL STRIP: NEGATIVE
OHS QRS DURATION: 84 MS
OHS QRS DURATION: 86 MS
OHS QTC CALCULATION: 447 MS
OHS QTC CALCULATION: 455 MS
PH UR STRIP: 8 [PH] (ref 5–8)
PROT UR QL STRIP: NEGATIVE
RBC #/AREA URNS AUTO: 3 /HPF (ref 0–4)
RSV AG BY MOLECULAR METHOD: NOT DETECTED
SARS-COV-2 RNA RESP QL NAA+PROBE: NOT DETECTED
SP GR UR STRIP: 1.03 (ref 1–1.03)
SQUAMOUS #/AREA URNS AUTO: 0 /HPF
TROPONIN I SERPL DL<=0.01 NG/ML-MCNC: <3 NG/L (ref 0–14)
URN SPEC COLLECT METH UR: ABNORMAL
WBC #/AREA URNS AUTO: 2 /HPF (ref 0–5)
YEAST UR QL AUTO: NORMAL

## 2025-03-17 PROCEDURE — 94640 AIRWAY INHALATION TREATMENT: CPT | Mod: XB

## 2025-03-17 PROCEDURE — 84484 ASSAY OF TROPONIN QUANT: CPT

## 2025-03-17 PROCEDURE — 93010 ELECTROCARDIOGRAM REPORT: CPT | Mod: ,,, | Performed by: INTERNAL MEDICINE

## 2025-03-17 PROCEDURE — 25000242 PHARM REV CODE 250 ALT 637 W/ HCPCS

## 2025-03-17 PROCEDURE — 81001 URINALYSIS AUTO W/SCOPE: CPT

## 2025-03-17 PROCEDURE — 93005 ELECTROCARDIOGRAM TRACING: CPT

## 2025-03-17 PROCEDURE — 94761 N-INVAS EAR/PLS OXIMETRY MLT: CPT

## 2025-03-17 RX ORDER — IPRATROPIUM BROMIDE AND ALBUTEROL SULFATE 2.5; .5 MG/3ML; MG/3ML
3 SOLUTION RESPIRATORY (INHALATION) EVERY 4 HOURS PRN
Qty: 75 ML | Refills: 11 | Status: SHIPPED | OUTPATIENT
Start: 2025-03-17 | End: 2026-03-17

## 2025-03-17 RX ORDER — IPRATROPIUM BROMIDE AND ALBUTEROL SULFATE 2.5; .5 MG/3ML; MG/3ML
3 SOLUTION RESPIRATORY (INHALATION)
Status: ACTIVE | OUTPATIENT
Start: 2025-03-17 | End: 2025-03-17

## 2025-03-17 RX ORDER — ATORVASTATIN CALCIUM 80 MG/1
80 TABLET, FILM COATED ORAL DAILY
Qty: 90 TABLET | Refills: 3 | Status: SHIPPED | OUTPATIENT
Start: 2025-03-17 | End: 2026-03-17

## 2025-03-17 RX ORDER — ALBUTEROL SULFATE 90 UG/1
1-2 INHALANT RESPIRATORY (INHALATION) EVERY 4 HOURS PRN
Qty: 18 G | Refills: 11 | Status: SHIPPED | OUTPATIENT
Start: 2025-03-17

## 2025-03-17 RX ADMIN — IPRATROPIUM BROMIDE AND ALBUTEROL SULFATE 3 ML: 2.5; .5 SOLUTION RESPIRATORY (INHALATION) at 02:03

## 2025-03-17 RX ADMIN — IPRATROPIUM BROMIDE AND ALBUTEROL SULFATE 3 ML: 2.5; .5 SOLUTION RESPIRATORY (INHALATION) at 01:03

## 2025-03-17 NOTE — ED PROVIDER NOTES
Encounter Date: 3/16/2025       History     Chief Complaint   Patient presents with    Palpitations     Generalized weakness and palpitations started this morning, denies chest pain     64 y.o. female with a PMH of CAD, DM, HTN, HLD, CVA, breast cancer in remission, asthma, and osteoporosis presents to [facility] Emergency Department for evaluation of generalized weakness, dyspnea, and palpitations that began this morning. She states she became dyspneic then felt weak and had palpitations during her episode of dyspnea. She describes the palpitations as feeling like her heart is beating fast, but does not feel like it is skipping beats. She denies fever, cough, congestion, rhinorrhea, chest pain, n/v/d, abdominal pain, dysuria, vertigo, lightheadedness, or lower extremity edema.         The history is provided by the patient, medical records and a relative.     Review of patient's allergies indicates:  No Known Allergies  Past Medical History:   Diagnosis Date    Asthma     Breast carcinoma     Cataract     Coronary artery disease of native heart with stable angina pectoris 04/04/2023    Diabetes mellitus     Hyperlipidemia     Moderate episode of recurrent major depressive disorder 05/19/2021    Osteoporosis     Stroke 03/2023    left PCA     Past Surgical History:   Procedure Laterality Date    BILATERAL MASTECTOMY  04/26/2018    CHOLECYSTECTOMY      COLONOSCOPY N/A 10/27/2015    Procedure: COLONOSCOPY;  Surgeon: Johnny Hurley MD;  Location: [facility] ENDO;  Service: Endoscopy;  Laterality: N/A;    CORONARY ANGIOGRAPHY N/A 04/05/2023    Procedure: ANGIOGRAM, CORONARY ARTERY;  Surgeon: Francisco Barahona MD;  Location: [facility] CATH LAB;  Service: Cardiology;  Laterality: N/A;    CORONARY ANGIOPLASTY      CORONARY ARTERY BYPASS GRAFT  04/2023    LIMA to LAD, SVG to OM    CORONARY ARTERY BYPASS GRAFT (CABG) N/A 04/14/2023    Procedure: CORONARY ARTERY BYPASS GRAFT (CABG) x 2;  Surgeon: Igor Kahn MD;  Location: [facility] OR  2ND FLR;  Service: Cardiothoracic;  Laterality: N/A;    ENDOSCOPIC HARVEST OF VEIN Left 04/14/2023    Procedure: SURGICAL PROCUREMENT, VEIN, ENDOSCOPIC;  Surgeon: Igor Kahn MD;  Location: [facility] OR 2ND FLR;  Service: Cardiothoracic;  Laterality: Left;  Vein harvest start - stop: 0817 - 0856  Vein prep start - stop: 0857 - 0917    ESOPHAGOGASTRODUODENOSCOPY N/A 01/24/2022    Procedure: ESOPHAGOGASTRODUODENOSCOPY (EGD);  Surgeon: Mili Katz MD;  Location: [facility] ENDO (4TH FLR);  Service: Endoscopy;  Laterality: N/A;  rapid in AM, instr portal -ml    HYSTERECTOMY      LASER PERIPHERAL IRIDOTOMY Bilateral 2019        LEFT HEART CATHETERIZATION Left 04/05/2023    Procedure: Left heart cath;  Surgeon: Francisco Barahona MD;  Location: [facility] CATH LAB;  Service: Cardiology;  Laterality: Left;     Family History   Problem Relation Name Age of Onset    Diabetes Father      Congenital heart disease Brother      Amblyopia Neg Hx      Blindness Neg Hx      Cataracts Neg Hx      Glaucoma Neg Hx      Macular degeneration Neg Hx      Retinal detachment Neg Hx      Strabismus Neg Hx       Social History[1]  Review of Systems    Physical Exam     Initial Vitals [03/16/25 2155]   BP Pulse Resp Temp SpO2   129/85 96 16 98.2 °F (36.8 °C) 98 %      MAP       --         Physical Exam    Nursing note and vitals reviewed.  Constitutional: She appears well-developed and well-nourished. No distress.   HENT:   Head: Normocephalic. Mouth/Throat: Oropharynx is clear and moist.   Eyes: Conjunctivae are normal. Pupils are equal, round, and reactive to light. No scleral icterus.   Neck: Neck supple.   Cardiovascular:  Normal rate and regular rhythm.           Pulmonary/Chest: No stridor. No respiratory distress. She has wheezes (mild diffuse expiratory wheezing).   Abdominal: Abdomen is soft. She exhibits no distension. There is no abdominal tenderness.   Musculoskeletal:         General: No edema.      Cervical back: Neck  supple.     Neurological: She is alert. GCS score is 15. GCS eye subscore is 4. GCS verbal subscore is 5. GCS motor subscore is 6.   Skin: Skin is warm and dry. No rash noted.         ED Course   Procedures  Labs Reviewed   CBC W/ AUTO DIFFERENTIAL - Abnormal       Result Value    WBC 10.46      RBC 4.85      Hemoglobin 13.7      Hematocrit 43.6      MCV 90      MCH 28.2      MCHC 31.4 (*)     RDW 14.1      Platelets 230      MPV 9.6      Immature Granulocytes 0.3      Gran # (ANC) 6.4      Immature Grans (Abs) 0.03      Lymph # 2.9      Mono # 0.7      Eos # 0.3      Baso # 0.06      nRBC 0      Gran % 61.3      Lymph % 27.9      Mono % 7.1      Eosinophil % 2.8      Basophil % 0.6      Differential Method Automated      Narrative:     Release to patient->Immediate   COMPREHENSIVE METABOLIC PANEL - Abnormal    Sodium 139      Potassium 3.7      Chloride 106      CO2 21 (*)     Glucose 249 (*)     BUN 18      Creatinine 0.8      Calcium 9.3      Total Protein 7.0      Albumin 3.6      Total Bilirubin 0.2      Alkaline Phosphatase 93      AST 13      ALT 10      eGFR >60.0      Anion Gap 12      Narrative:     Release to patient->Immediate   URINALYSIS, REFLEX TO URINE CULTURE - Abnormal    Specimen UA Urine, Clean Catch      Color, UA Colorless (*)     Appearance, UA Clear      pH, UA 8.0      Specific Gravity, UA 1.030      Protein, UA Negative      Glucose, UA 4+ (*)     Ketones, UA Negative      Bilirubin (UA) Negative      Occult Blood UA Negative      Nitrite, UA Negative      Leukocytes, UA Negative      Narrative:     Specimen Source->Urine   TSH - Abnormal    TSH 0.331 (*)     Narrative:     Release to patient->Immediate   HEPATITIS C ANTIBODY    Hepatitis C Ab Non-reactive      Narrative:     Release to patient->Immediate   HIV 1 / 2 ANTIBODY    HIV 1/2 Ag/Ab Non-reactive      Narrative:     Release to patient->Immediate   TROPONIN I HIGH SENSITIVITY    Troponin I High Sensitivity <3      Narrative:      Release to patient->Immediate   TROPONIN I HIGH SENSITIVITY    Troponin I High Sensitivity <3     B-TYPE NATRIURETIC PEPTIDE    BNP 47      Narrative:     Release to patient->Immediate   SARS-COV2 (COVID) WITH FLU/RSV BY PCR    SARS-CoV2 (COVID-19) Qualitative PCR Not Detected      Influenza A, Molecular Not Detected      Influenza B, Molecular Not Detected      RSV Ag by Molecular Method Not Detected     T4, FREE    Free T4 0.93      Narrative:     Release to patient->Immediate   URINALYSIS MICROSCOPIC    RBC, UA 3      WBC, UA 2      Bacteria None      Yeast, UA None      Squam Epithel, UA 0      Amorphous, UA Rare      Microscopic Comment SEE COMMENT      Narrative:     Specimen Source->Urine     EKG Readings: (Independently Interpreted)   Initial Reading: No STEMI. Previous EKG: Compared with most recent EKG Rhythm: Normal Sinus Rhythm. Heart Rate: 87. Ectopy: No Ectopy.     ECG Results              Repeat EKG 12-lead (Final result)        Collection Time Result Time QRS Duration OHS QTC Calculation    03/17/25 01:11:39 03/17/25 09:13:32 86 447                     Final result by Interface, Lab In [facility] (03/17/25 09:13:35)                   Narrative:    Test Reason : R53.1,    Vent. Rate :  87 BPM     Atrial Rate :  87 BPM     P-R Int : 120 ms          QRS Dur :  86 ms      QT Int : 372 ms       P-R-T Axes :  81  89  66 degrees    QTcB Int : 447 ms    Normal sinus rhythm  Low voltage QRS  Low septal forces ; Abnormal R wave progression in the precordial leads  -Probable  Anteroseptal infarct (cited on or before 18-Apr-2023)  Abnormal ECG  When compared with ECG of 16-Mar-2025 22:01,  No significant change was found  Confirmed by Jose Martinez ([id number]) on 3/17/2025 9:13:23 AM    Referred By: AAAREFERRAL SELF           Confirmed By: Jose Martinez                                     EKG 12-lead (Final result)        Collection Time Result Time QRS Duration OHS QTC Calculation    03/16/25 22:01:53 03/17/25 09:27:27 84  455                     Final result by Interface, Lab In [facility] (03/17/25 09:27:35)                   Narrative:    Test Reason : R07.9,    Vent. Rate :  92 BPM     Atrial Rate :  92 BPM     P-R Int : 122 ms          QRS Dur :  84 ms      QT Int : 368 ms       P-R-T Axes :  71  67  54 degrees    QTcB Int : 455 ms    Normal sinus rhythm and artifact  Possible Left atrial enlargement  Low voltage QRS  Low septal forces ; Abnormal R wave progression in the precordial leads  -Probable  Anteroseptal infarct (cited on or before 18-Apr-2023)  Abnormal ECG  When compared with ECG of 05-Aug-2024 17:37,  Nonspecific T wave abnormality now evident in Inferior leads  T wave inversion more evident in Anterior leads  Confirmed by Jose Martinez ([id number]) on 3/17/2025 9:27:26 AM    Referred By: AAAREFERRAL SELF           Confirmed By: Jose Martinez                                  Imaging Results              X-Ray Chest AP Portable (Final result)  Result time 03/16/25 23:49:38      Final result by Stewart Silva MD (03/16/25 23:49:38)                   Impression:      No evidence of acute chest disease radiographically.      Electronically signed by: Stewart Silva  Date:    03/16/2025  Time:    23:49               Narrative:    EXAMINATION:  XR CHEST AP PORTABLE    CLINICAL HISTORY:  Chest Pain;    TECHNIQUE:  Single frontal view of the chest was performed.    COMPARISON:  None    FINDINGS:  Heart mediastinal contours are stable with changes of median sternotomy.  The lungs and pleural spaces are clear.  Breast implant on the right with postsurgical changes in the chest wall on the left.  Calcific tendinopathy or bursitis in the left shoulder.                                    X-Rays:   Independently Interpreted Readings:   Chest X-Ray: No acute abnormalities.     Medications   albuterol-ipratropium 2.5 mg-0.5 mg/3 mL nebulizer solution 3 mL (3 mLs Nebulization Given 3/17/25 0215)   lactated ringers bolus 1,000 mL (0 mLs  Intravenous Stopped 3/17/25 0005)     Medical Decision Making  63 y/o F presents with dyspnea, palpitations, and generalized weakness that began this morning.     Patient is afebrile, hemodynamically stable, and in no acute distress on arrival.   She has end expiratory wheezing. Differential diagnoses considered include, but not limited to: asthma exacerbation, URI, ACS, CHF, pneumonia, arrhythmia     EKG without acute ischemic changes or ectopy. NSR.   Trop and BNP normal.   Labs unremarkable.   CXR normal.  Duonebs x 3 given. On reevaluation, patient reports she is feeling much better, no longer having any symptoms. Discussed possible observation but patient prefers to follow up outpatient. Discussed plan for discharge home, follow up with PCP, scheduled albuterol x 1 day, and return precautions with the patient and they expressed understanding and agreement. Refills for home albuterol nebs and inhaler sent. Spacer provided and instruction given by RT.       Amount and/or Complexity of Data Reviewed  Labs: ordered.  Radiology: ordered.    Risk  Prescription drug management.                                      Clinical Impression:  Final diagnoses:  [R00.2] Palpitations  [R53.1] Generalized weakness          ED Disposition Condition    Discharge Stable          ED Prescriptions       Medication Sig Dispense Start Date End Date Auth. Provider    albuterol-ipratropium (DUO-NEB) 2.5 mg-0.5 mg/3 mL nebulizer solution Take 3 mLs by nebulization every 4 (four) hours as needed for Wheezing. Rescue 75 mL 3/17/2025 3/17/2026 Shama Acharya MD    albuterol (PROVENTIL/VENTOLIN HFA) 90 mcg/actuation inhaler Inhale 1-2 puffs into the lungs every 4 (four) hours as needed for Wheezing. Rescue 18 g 3/17/2025 -- Shama Acharya MD          Follow-up Information       Follow up With Specialties Details Why Contact Info    Bo Lopez MD Family Medicine   [address] [facility] [address] [phone]      [address] Emergency Dept Emergency Medicine  If symptoms worsen Be Muhammad  [facility] [id number] [phone]                 [1]   Social History  Tobacco Use    Smoking status: Former     Current packs/day: 0.50     Average packs/day: 1 pack/day for 47.2 years (46.5 ttl pk-yrs)     Types: Cigarettes     Start date: 1978     Passive exposure: Past    Smokeless tobacco: Current    Tobacco comments:     Quit 12/22/24   Substance Use Topics    Alcohol use: No     Alcohol/week: 0.0 standard drinks of alcohol    Drug use: No        Shama Acharya MD  Resident  03/18/25 0714

## 2025-03-18 ENCOUNTER — PATIENT OUTREACH (OUTPATIENT)
Facility: OTHER | Age: 65
End: 2025-03-18
Payer: MEDICARE

## 2025-03-18 NOTE — PROGRESS NOTES
Patient was seen in the ED on 3/17/25. Phoned patient to assist with Post ED Discharge Navigation. Spoke with patients Daughter, Katarina, who agreed to assistance scheduling a PCP follow-up appointment. In Basket message sent to PCP staff for scheduling assistance.  Falguni Satnos

## 2025-03-19 ENCOUNTER — PATIENT MESSAGE (OUTPATIENT)
Dept: FAMILY MEDICINE | Facility: CLINIC | Age: 65
End: 2025-03-19
Payer: MEDICARE

## 2025-03-20 ENCOUNTER — PATIENT OUTREACH (OUTPATIENT)
Facility: OTHER | Age: 65
End: 2025-03-20
Payer: MEDICARE

## 2025-03-20 ENCOUNTER — PATIENT MESSAGE (OUTPATIENT)
Dept: HEMATOLOGY/ONCOLOGY | Facility: CLINIC | Age: 65
End: 2025-03-20
Payer: MEDICARE

## 2025-03-20 ENCOUNTER — TELEPHONE (OUTPATIENT)
Dept: HEMATOLOGY/ONCOLOGY | Facility: CLINIC | Age: 65
End: 2025-03-20
Payer: MEDICARE

## 2025-03-20 NOTE — PROGRESS NOTES
Phoned patient for follow-up. Informed patient of upcoming Post ED Discharge Follow-up appointment. Patient acknowledged appointment. Also advised patient that PCP Nurse recommended patient also follow-up with Cardiologist.  Falguni Santos

## 2025-03-21 DIAGNOSIS — Z95.1 S/P CABG X 2: ICD-10-CM

## 2025-03-21 DIAGNOSIS — E11.59 TYPE 2 DIABETES MELLITUS WITH OTHER CIRCULATORY COMPLICATION, WITH LONG-TERM CURRENT USE OF INSULIN: ICD-10-CM

## 2025-03-21 DIAGNOSIS — Z79.4 TYPE 2 DIABETES MELLITUS WITH OTHER CIRCULATORY COMPLICATION, WITH LONG-TERM CURRENT USE OF INSULIN: ICD-10-CM

## 2025-03-27 ENCOUNTER — OFFICE VISIT (OUTPATIENT)
Dept: FAMILY MEDICINE | Facility: CLINIC | Age: 65
End: 2025-03-27
Payer: MEDICARE

## 2025-03-27 VITALS
OXYGEN SATURATION: 97 % | HEIGHT: 62 IN | WEIGHT: 115 LBS | SYSTOLIC BLOOD PRESSURE: 100 MMHG | BODY MASS INDEX: 21.16 KG/M2 | DIASTOLIC BLOOD PRESSURE: 64 MMHG | HEART RATE: 81 BPM

## 2025-03-27 DIAGNOSIS — R79.89 LOW TSH LEVEL: ICD-10-CM

## 2025-03-27 DIAGNOSIS — E11.65 TYPE 2 DIABETES MELLITUS WITH HYPERGLYCEMIA, WITH LONG-TERM CURRENT USE OF INSULIN: ICD-10-CM

## 2025-03-27 DIAGNOSIS — E11.59 TYPE 2 DIABETES MELLITUS WITH OTHER CIRCULATORY COMPLICATION, WITH LONG-TERM CURRENT USE OF INSULIN: ICD-10-CM

## 2025-03-27 DIAGNOSIS — Z79.4 TYPE 2 DIABETES MELLITUS WITH OTHER CIRCULATORY COMPLICATION, WITH LONG-TERM CURRENT USE OF INSULIN: ICD-10-CM

## 2025-03-27 DIAGNOSIS — Z23 NEEDS FLU SHOT: Primary | ICD-10-CM

## 2025-03-27 DIAGNOSIS — J43.2 CENTRILOBULAR EMPHYSEMA: ICD-10-CM

## 2025-03-27 DIAGNOSIS — J44.1 COPD EXACERBATION: ICD-10-CM

## 2025-03-27 DIAGNOSIS — E05.90 HYPERTHYROIDISM: ICD-10-CM

## 2025-03-27 DIAGNOSIS — F33.0 MILD EPISODE OF RECURRENT MAJOR DEPRESSIVE DISORDER: ICD-10-CM

## 2025-03-27 DIAGNOSIS — F17.200 SMOKER: ICD-10-CM

## 2025-03-27 DIAGNOSIS — C50.912 BREAST CANCER METASTASIZED TO AXILLARY LYMPH NODE, LEFT: ICD-10-CM

## 2025-03-27 DIAGNOSIS — C77.3 BREAST CANCER METASTASIZED TO AXILLARY LYMPH NODE, LEFT: ICD-10-CM

## 2025-03-27 DIAGNOSIS — Z95.1 S/P CABG X 2: ICD-10-CM

## 2025-03-27 DIAGNOSIS — Z79.4 TYPE 2 DIABETES MELLITUS WITH HYPERGLYCEMIA, WITH LONG-TERM CURRENT USE OF INSULIN: ICD-10-CM

## 2025-03-27 DIAGNOSIS — Z23 NEED FOR COVID-19 VACCINE: ICD-10-CM

## 2025-03-27 PROBLEM — I97.89 POSTOPERATIVE ATRIAL FIBRILLATION: Status: RESOLVED | Noted: 2023-04-21 | Resolved: 2025-03-27

## 2025-03-27 PROBLEM — I48.91 POSTOPERATIVE ATRIAL FIBRILLATION: Status: RESOLVED | Noted: 2023-04-21 | Resolved: 2025-03-27

## 2025-03-27 PROCEDURE — 99999 PR PBB SHADOW E&M-EST. PATIENT-LVL III: CPT | Mod: PBBFAC,,, | Performed by: FAMILY MEDICINE

## 2025-03-27 RX ORDER — ESCITALOPRAM OXALATE 10 MG/1
10 TABLET ORAL DAILY
Qty: 90 TABLET | Refills: 3 | Status: SHIPPED | OUTPATIENT
Start: 2025-03-27 | End: 2026-03-27

## 2025-03-27 RX ORDER — EMPAGLIFLOZIN 25 MG/1
TABLET, FILM COATED ORAL
Qty: 30 TABLET | Refills: 3 | Status: SHIPPED | OUTPATIENT
Start: 2025-03-27

## 2025-03-27 RX ORDER — IBUPROFEN 200 MG
1 TABLET ORAL DAILY
Qty: 90 PATCH | Refills: 3 | Status: SHIPPED | OUTPATIENT
Start: 2025-03-27 | End: 2026-03-27

## 2025-03-27 NOTE — PROGRESS NOTES
Subjective     Patient ID: Jud Villa is a 64 y.o. female.    Chief Complaint: Hospital Follow Up, Diabetes, and Hypertension    64 years old female came to the clinic with elevated blood sugar at home.  Patient was not taking consistently the insulin treatment.  Blood pressure today was stable.  No COPD exacerbation.  She is willing to try a new antidepressant.  Zoloft was not working for her.  Last TSH was slightly low.  Patient with history of breast cancer.  Currently on remission.    Diabetes  She presents for her follow-up diabetic visit. She has type 2 diabetes mellitus. Her disease course has been worsening. There are no hypoglycemic associated symptoms. There are no diabetic associated symptoms. There are no hypoglycemic complications. Symptoms are worsening. There are no diabetic complications. Risk factors for coronary artery disease include diabetes mellitus, hypertension and post-menopausal. Current diabetic treatment includes insulin injections and oral agent (dual therapy). She is compliant with treatment most of the time. She is following a generally healthy diet. She has not had a previous visit with a dietitian.   Hypertension  This is a chronic problem. The current episode started more than 1 year ago. The problem has been gradually worsening since onset. The problem is controlled. There are no associated agents to hypertension. Risk factors for coronary artery disease include sedentary lifestyle. Past treatments include beta blockers. The current treatment provides significant improvement. There is no history of angina. There is no history of a hypertension causing med or a thyroid problem.     Review of Systems   Constitutional: Negative.    HENT: Negative.     Eyes: Negative.    Respiratory: Negative.     Gastrointestinal: Negative.    Genitourinary: Negative.    Musculoskeletal: Negative.    Neurological: Negative.    Psychiatric/Behavioral: Negative.            Objective     Physical  Exam  Constitutional:       General: She is not in acute distress.     Appearance: She is well-developed. She is not diaphoretic.   HENT:      Head: Normocephalic and atraumatic.      Right Ear: External ear normal.      Left Ear: External ear normal.      Nose: Nose normal.      Mouth/Throat:      Pharynx: No oropharyngeal exudate.   Eyes:      General: No scleral icterus.        Right eye: No discharge.         Left eye: No discharge.      Conjunctiva/sclera: Conjunctivae normal.      Pupils: Pupils are equal, round, and reactive to light.   Neck:      Thyroid: No thyromegaly.      Vascular: No JVD.      Trachea: No tracheal deviation.   Cardiovascular:      Rate and Rhythm: Normal rate and regular rhythm.      Heart sounds: Normal heart sounds. No murmur heard.     No friction rub. No gallop.   Pulmonary:      Effort: Pulmonary effort is normal. No respiratory distress.      Breath sounds: Normal breath sounds. No stridor. No wheezing or rales.   Chest:      Chest wall: No tenderness.   Abdominal:      General: Bowel sounds are normal. There is no distension.      Palpations: Abdomen is soft. There is no mass.      Tenderness: There is no abdominal tenderness. There is no guarding or rebound.   Musculoskeletal:         General: No tenderness. Normal range of motion.      Cervical back: Normal range of motion and neck supple.   Lymphadenopathy:      Cervical: No cervical adenopathy.   Skin:     General: Skin is warm and dry.      Coloration: Skin is not pale.      Findings: No erythema or rash.   Neurological:      Mental Status: She is alert and oriented to person, place, and time.      Cranial Nerves: No cranial nerve deficit.      Motor: No abnormal muscle tone.      Coordination: Coordination normal.      Deep Tendon Reflexes: Reflexes are normal and symmetric.   Psychiatric:         Mood and Affect: Mood is anxious.         Behavior: Behavior normal.         Thought Content: Thought content normal.          Judgment: Judgment normal.            Assessment and Plan     1. Needs flu shot  -     influenza (adjuvanted) (Fluad) 45 mcg/0.5 mL IM vaccine (> or = 64 yo) 0.5 mL    2. Breast cancer metastasized to axillary lymph node, left  Overview:  Visits and path in care everywhere  Diagnosed September 2017.  Status post chemo/xrt  Bilateral mastectomy 4/2018.  Would like to establish follow up care at [facility].      3. COPD exacerbation    4. Type 2 diabetes mellitus with hyperglycemia, with long-term current use of insulin  -     Hemoglobin A1C; Future; Expected date: 03/27/2025    5. Mild episode of recurrent major depressive disorder  -     EScitalopram oxalate (LEXAPRO) 10 MG tablet; Take 1 tablet (10 mg total) by mouth once daily.  Dispense: 90 tablet; Refill: 3  -     Ambulatory referral/consult to Behavioral Health; Future; Expected date: 04/03/2025    6. Low TSH level  -     TSH; Future; Expected date: 03/27/2025    7. Centrilobular emphysema  Overview:  Add ICS to Laba/lama combination (trelegy daily)  Albuterol HFA or nebulizer  for rescue and prevention.    Moderate obstruction on FEV1 (58% predicted), normal diffusion.      8. Hyperthyroidism  -     TSH; Future; Expected date: 03/27/2025    9. Need for COVID-19 vaccine  -     COVID-19 (Moderna) 50 mcg/0.5 mL IM vaccine (>/= 13 yo) 0.5 mL    I spent a total of 42 minutes on the day of the visit.This includes face to face time and non-face to face time preparing to see the patient (eg, review of tests), obtaining and/or reviewing separately obtained history, documenting clinical information in the electronic or other health record, independently interpreting results and communicating results to the patient/family/caregiver, or care coordinator.     Continue monitoring blood sugar at home,ADA diet.   Continue monitoring blood pressure at home, low sodium diet.          Follow up in about 6 months (around 9/27/2025), or if symptoms worsen or fail to improve.

## 2025-03-31 ENCOUNTER — TELEPHONE (OUTPATIENT)
Dept: HEMATOLOGY/ONCOLOGY | Facility: CLINIC | Age: 65
End: 2025-03-31
Payer: MEDICARE

## 2025-04-03 ENCOUNTER — TELEPHONE (OUTPATIENT)
Dept: HEMATOLOGY/ONCOLOGY | Facility: CLINIC | Age: 65
End: 2025-04-03
Payer: MEDICARE

## 2025-04-03 NOTE — TELEPHONE ENCOUNTER
Called and spoke to patient to confirm the virtual visit on 4/7/25 and to complete the genetic questionnaire

## 2025-04-07 PROBLEM — Z85.3 HISTORY OF LEFT BREAST CANCER: Status: ACTIVE | Noted: 2019-03-13

## 2025-04-09 NOTE — PROGRESS NOTES
Jud Villa is a 62 y.o. female is here to be evaluated for a sleep disorder; referred by Self, Emileeal.    The patient reports excessive daytime sleepiness, excessive daytime fatigue, snoring,  witnessed breathing pauses,  gasping for air in sleep and interrupted sleep since  Several years back..       The patient does not  rested upon awakening. Takes naps.     The patient  reports morning headaches and reports  dry mouth on awakening.   Jud Villa denies  nasal congestion.    The patient never had tonsillectomy, adenoidectomy or UPPP    The patient denies difficulty falling and staying asleep.    Jud Villa  denies symptoms concerning for parasomnia except for occasional somniloquy.  The patient  denies auxiliary symptoms of narcolepsy including sleep onset paralysis, hypnagogic hallucinations, sleep attacks and cataplexy.    Jud Villa report occasional symptoms concerning for RLS; nocturnal leg movements have not been noticed.   The patient does not experience sleep related leg cramps.   Restless sleeper        Medications pertinent to sleep  disorders taken currently: Mirtazapine (Remeron) 15-30 mg  Previous  medications taken  for sleep disorders:  Clonazepam 0.5 mg    Sleep studies  PSG 12/12/15: AHI 21.5, SaO2 min -89%        Occupation: semi retired  Bed partner:   Exercise routine: active  Caffeine:  1 morning  beverages per day - afternoon  Alcohol: no  Smoking:yes  EPWORTH SLEEPINESS SCALE TOTAL SCORE  6/13/2022   Total score 12         EPWORTH SLEEPINESS SCALE 6/13/2022   Sitting and reading 0   Watching TV 0   Sitting, inactive in a public place (e.g. a theatre or a meeting) 1   As a passenger in a car for an hour without a break 3   Lying down to rest in the afternoon when circumstances permit 3   Sitting and talking to someone 1   Sitting quietly after a lunch without alcohol 3   In a car, while stopped for a few minutes in traffic 1   Total score 12           EPWORTH  SLEEPINESS SCALE 6/13/2022   Sitting and reading 0   Watching TV 0   Sitting, inactive in a public place (e.g. a theatre or a meeting) 1   As a passenger in a car for an hour without a break 3   Lying down to rest in the afternoon when circumstances permit 3   Sitting and talking to someone 1   Sitting quietly after a lunch without alcohol 3   In a car, while stopped for a few minutes in traffic 1   Total score 12     Sleep Clinic New Patient 6/13/2022   What time do you go to bed on a week day? (Give a range) 10-11PM   What time do you go to bed on a day off? (Give a range) 10-11PM   How long does it take you to fall asleep? (Give a range) 2 hours   On average, how many times per night do you wake up? 2 times   How long does it take you to fall back into sleep? (Give a range) 1 hour   What time do you wake up to start your day on a week day? (Give a range) 8AM   What time do you wake up to start your day on a day off? (Give a range) 8AM   What time do you get out of bed? (Give a range) 8AM   On average, how many hours do you sleep? 2-3 hours   On average, how many naps do you take per day? 0   Rate your sleep quality from 0 to 5 (0-poor, 5-great). 0   Have you experienced:  Weight loss?   How much weight have you lost or gained (in lbs.) in the last year? 10lbs   On average, how many times per night do you go to the bathroom?  2   Have you ever had a sleep study/CPAP machine/surgery for sleep apnea? Yes   Have you ever had a CPAP machine for sleep apnea? No   Have you ever had surgery for sleep apnea? No       Sleep Clinic ROS  6/13/2022   Difficulty breathing through the nose?  No   Sore throat or dry mouth in the morning? Yes   Irregular or very fast heart beat?  Yes   Shortness of breath?  Yes   Acid reflux? Yes   Body aches and pains?  Yes   Morning headaches? Yes   Dizziness? Yes   Mood changes?  Yes   Do you exercise?  No   Do you feel like moving your legs a lot?  No       DME:         PAST MEDICAL HISTORY:   [de-identified] : Mr. Vasquez is a 59 y/o who comes in for a new issue: LEFT forearm that started about 3 weeks without injury. He was leaning on arm on a long flight which she thought may have aggravated it.  No numbness or tingling.. Pain can be 9/10 when he feels it intermittently.  Most of the time there is no pain but he will get pain if he presses his forearm on something firm like an arm chair or table and it hits the spot.  He also gets some left anteromedial knee pain with a lot of walking.  It is somewhat tender to the touch. No swelling or locking or buckling.  No prior knee injuries.  He does do a lot of walking and exercises..   Active Ambulatory Problems     Diagnosis Date Noted    Vertigo 06/03/2015    Chronic migraine without aura, intractable, with status migrainosus 06/03/2015    Dizziness 06/03/2015    Anxiety state, unspecified 06/03/2015    Smoker 09/30/2015    History of colon polyps 10/27/2015    COPD (chronic obstructive pulmonary disease) 11/04/2015    Breast cancer metastasized to axillary lymph node, left 03/13/2019    Former smoker 03/13/2019    Chronic frontal sinusitis 03/13/2019    Controlled type 2 diabetes mellitus with complication, with long-term current use of insulin 01/29/2020    COPD exacerbation 02/06/2020    Cigarette nicotine dependence without complication 02/06/2020    Impaired mobility and activities of daily living 10/15/2020    Chronic right shoulder pain 10/15/2020    Weakness 04/01/2021    Moderate episode of recurrent major depressive disorder 05/19/2021    Medication overuse headache 08/10/2021    Drug-induced polyneuropathy 01/13/2022     Resolved Ambulatory Problems     Diagnosis Date Noted    Abnormal respirations     DM (diabetes mellitus screen) 11/04/2015     Past Medical History:   Diagnosis Date    Asthma     Breast carcinoma     Cataract     Diabetes mellitus     Osteoporosis                 PAST SURGICAL HISTORY:    Past Surgical History:   Procedure Laterality Date    BILATERAL MASTECTOMY  04/26/2018    CHOLECYSTECTOMY      COLONOSCOPY N/A 10/27/2015    Procedure: COLONOSCOPY;  Surgeon: Johnny Hurley MD;  Location: [facility];  Service: Endoscopy;  Laterality: N/A;    ESOPHAGOGASTRODUODENOSCOPY N/A 1/24/2022    Procedure: ESOPHAGOGASTRODUODENOSCOPY (EGD);  Surgeon: Mili Katz MD;  Location: [address]);  Service: Endoscopy;  Laterality: N/A;  rapid in AM, instr portal -ml    HYSTERECTOMY      LASER PERIPHERAL IRIDOTOMY Right 02/21/2019             FAMILY HISTORY:                Family History   Problem Relation Age of Onset     "Diabetes Father     Amblyopia Neg Hx     Blindness Neg Hx     Cataracts Neg Hx     Glaucoma Neg Hx     Macular degeneration Neg Hx     Retinal detachment Neg Hx     Strabismus Neg Hx        SOCIAL HISTORY:          Tobacco:   Social History     Tobacco Use   Smoking Status Current Every Day Smoker    Packs/day: 1.00    Years: 0.00    Pack years: 0.00    Types: Cigarettes   Smokeless Tobacco Never Used       alcohol use:    Social History     Substance and Sexual Activity   Alcohol Use No    Alcohol/week: 0.0 standard drinks                   ALLERGIES:  Review of patient's allergies indicates:  No Known Allergies    CURRENT MEDICATIONS:    Current Outpatient Medications   Medication Sig Dispense Refill    albuterol (PROVENTIL) 2.5 mg /3 mL (0.083 %) nebulizer solution INHALE 1 VIAL PER NEBULIZER FOUR TIMES DAILY AS NEEDED FOR SHORTNESS OF BREATH/ WHEEZING 360 mL 11    albuterol (VENTOLIN HFA) 90 mcg/actuation inhaler INHALE 2 PUFFS BY MOUTH INTO THE LUNGS EVERY 6 HOURS AS NEEDED FOR WHEEZING 18 g 11    albuterol-ipratropium (DUO-NEB) 2.5 mg-0.5 mg/3 mL nebulizer solution Take 3 mLs by nebulization once daily. 1 Box 11    BASAGLAR KWIKPEN U-100 INSULIN glargine 100 units/mL (3mL) SubQ pen INJECT 10 UNITS UNDER THE SKIN EVERY MORNING 15 mL 0    BD ULTRA-FINE SHORT PEN NEEDLE 31 gauge x 5/16" Ndle Inject 1 pen into the skin once daily. 100 each 3    brompheniramine-pseudoeph-DM (BROMFED DM) 2-30-10 mg/5 mL Syrp TAKE 10 ML BY MOUTH THREE TIMES DAILY AS NEEDED 118 mL 0    carboxymethylcellulose sodium 1 % DpGe Apply 1 drop to eye 4 (four) times daily. 1 each 0    clonazePAM (KLONOPIN) 0.5 MG tablet TAKE 1 TABLET(0.5 MG) BY MOUTH EVERY NIGHT AS NEEDED FOR ANXIETY 30 tablet 0    clotrimazole-betamethasone 1-0.05% (LOTRISONE) cream Apply topically 2 (two) times daily. 1 Tube 2    denosumab (PROLIA) 60 mg/mL Syrg Inject 1 mL (60 mg total) into the skin every 6 (six) months. 2 mL 0    ergocalciferol " (ERGOCALCIFEROL) 50,000 unit Cap TAKE 1 CAPSULE BY MOUTH EVERY 7 DAYS 12 capsule 3    gabapentin (NEURONTIN) 300 MG capsule Take 1 capsule (300 mg total) by mouth 3 (three) times daily. 270 capsule 3    glipiZIDE (GLUCOTROL) 10 MG tablet TAKE 1 TABLET(10 MG) BY MOUTH TWICE DAILY 180 tablet 3    hydrocodone-chlorpheniramine (TUSSIONEX) 10-8 mg/5 mL suspension Take 5 mLs by mouth every 12 (twelve) hours as needed for Cough. 115 mL 0    JARDIANCE 10 mg tablet TAKE 1 TABLET(10 MG) BY MOUTH EVERY DAY 90 tablet 3    metFORMIN (GLUCOPHAGE) 1000 MG tablet TAKE 1 TABLET(1000 MG) BY MOUTH TWICE DAILY WITH MEALS 180 tablet 3    mupirocin (BACTROBAN) 2 % ointment Apply topically 2 (two) times daily. 22 g 0    nicotine (NICODERM CQ) 21 mg/24 hr Place 1 patch onto the skin once daily. 28 patch 3    ondansetron (ZOFRAN-ODT) 4 MG TbDL Take 1 tablet (4 mg total) by mouth 3 (three) times daily. 30 tablet 0    oxymetazoline (AFRIN) 0.05 % nasal spray 2 sprays by Nasal route.      pantoprazole (PROTONIX) 40 MG tablet Take 1 tablet (40 mg total) by mouth once daily. 30 tablet 1    potassium chloride (MICRO-K) 8 mEq CpSR Take 1 capsule (8 mEq total) by mouth once daily. 90 capsule 1    prednisoLONE acetate (PRED FORTE) 1 % DrpS Place 1 drop into the left eye 4 (four) times daily. 5 mL 0    sertraline (ZOLOFT) 50 MG tablet Take 1 tablet (50 mg total) by mouth once daily. 90 tablet 3    sucralfate (CARAFATE) 1 gram tablet TAKE 1 TABLET(1 GRAM) BY MOUTH THREE TIMES DAILY BEFORE MEALS 270 tablet 3    topiramate (TOPAMAX) 50 MG tablet Take 2 tablets (100 mg total) by mouth 2 (two) times daily. TAKE 1 TABLET BY MOUTH TWICE DAILY FOR 7 DAYS, THEN 1 TABLET EVERY MORNING AND 2 TABLETS EVERY EVENING FOR 7 DAYS, THEN 2 TABLETS TWICE DAILY 360 tablet 1    TRADJENTA 5 mg Tab tablet Take 1 tablet (5 mg total) by mouth once daily. 90 tablet 3    traMADoL (ULTRAM) 50 mg tablet Take 1 tablet (50 mg total) by mouth every 6 (six) hours as  "needed for Pain. 12 tablet 0    TRELEGY ELLIPTA 100-62.5-25 mcg DsDv INHALE 1 PUFF INTO THE LUNGS EVERY DAY 60 each 2    TRUE METRIX GLUCOSE TEST STRIP Strp USE AS DIRECTED FOUR TIMES DAILY 50 each 0    augmented betamethasone dipropionate (DIPROLENE-AF) 0.05 % cream Apply topically 2 (two) times daily. for 10 days 50 g 0     No current facility-administered medications for this visit.                        PHYSICAL EXAM:  /65   Pulse 63   Resp 18   Ht 5' 2" (1.575 m)   Wt 59 kg (130 lb)   BMI 23.78 kg/m²   GENERAL: Normal development, well groomed.  HEENT:   HEENT:  Conjunctivae are non-erythematous; Pupils equal, round, and reactive to light; Nose is symmetrical; Nasal mucosa is pink and moist; Septum is midline; Inferior turbinates are normal; Nasal airflow is normal; Posterior pharynx is pink; Modified Mallampati:II; Posterior palate is low; Tonsils not visualized; Uvula is normal and pink;Tongue is normal; Dentition is fair; No TMJ tenderness; Jaw opening and protrusion without click and without discomfort.  NECK: Supple. Neck circumference is 15 inches. No thyromegaly. No palpable nodes.     SKIN: On face and neck: No abrasions, no rashes, no lesions.  No subcutaneous nodules are palpable.  RESPIRATORY: Chest is clear to auscultation.  Normal chest expansion and non-labored breathing at rest.  CARDIOVASCULAR: Normal S1, S2.  No murmurs, gallops or rubs. No carotid bruits bilaterally.  No edema. No clubbing. No cyanosis.    NEURO: Oriented to time, place and person. Normal attention span and concentration. Gait normal.    PSYCH: Affect is full. Mood is normal  MUSCULOSKELETAL: Moves 4 extremities. Gait normal.           ASSESSMENT:    1. Sleep Apnea NEC. Previously diagnosed moderate Mil.The patient symptomatically has  excessive daytime sleepiness, snoring,  witnessed breathing pauses, excessive daytime fatigue, gasping for air in sleep, interrupted sleep and nocturia  with exam findings of "a " crowded oral airway and elevated body mass index. The patient has medical co-morbidities of COPD ,  which can be worsened by CONNIE. This warrants further investigation for possible obstructive sleep apnea.        2. Insomnia NEC. Multi-factorial -  excess time in bed, poor sleep hygiene, and likely paradoxical insomnia play a role        PLAN:    Diagnostic: Home Sleep Study due to concurrent COPD and need to score PLMS. The nature of this procedure and its indication was discussed with the patient. We will notify the patient about sleep study resuts via My Chart.    Move Demond coffee to noon.    Sleep hygiene brochure was provided    We have pre-discussed CPAP vs OA (oral appliance) for CONNIE treatment options =- she is open to both depending upon severity.       During our discussion today, we talked about the etiology of  CONNIE as well as the potential ramifications of untreated sleep apnea, which could include daytime sleepiness, hypertension, heart disease and/or stroke.  We discussed potential treatment options, which could include weight loss, body positioning, continuous positive airway pressure (CPAP), or referral for surgical consideration. Meanwhile, she  is urged to avoid supine sleep, weight gain and alcoholic beverages since all of these can worsen CONNIE.     The patient was given open opportunity to ask questions and/or express concerns about treatment plan. Two point patient identifier confirmed.       Precautions: The patient was advised to abstain from driving should he feel sleepy or drowsy.    Follow up: MD after the sleep study has been completed.     31-minute visit. >50% spent counseling patient and coordination of care.  The patient was  cautioned against drowsy driving.

## 2025-04-14 ENCOUNTER — PATIENT MESSAGE (OUTPATIENT)
Dept: ADMINISTRATIVE | Facility: OTHER | Age: 65
End: 2025-04-14
Payer: MEDICARE

## 2025-04-17 ENCOUNTER — PATIENT MESSAGE (OUTPATIENT)
Dept: ADMINISTRATIVE | Facility: OTHER | Age: 65
End: 2025-04-17
Payer: MEDICARE

## 2025-04-18 DIAGNOSIS — E11.9 TYPE 2 DIABETES MELLITUS WITHOUT COMPLICATION, WITHOUT LONG-TERM CURRENT USE OF INSULIN: ICD-10-CM

## 2025-04-18 NOTE — TELEPHONE ENCOUNTER
Care Due:                  Date            Visit Type   Department     Provider  --------------------------------------------------------------------------------                                [facility]  Last Visit: 03-      FOLLOW UP    MEDICINE       Bo Lopez                               -                              [facility]  Next Visit: 09-      CARE ([facility])   MEDICINE       Bo Lopez                                                            Last  Test          Frequency    Reason                     Performed    Due Date  --------------------------------------------------------------------------------    HBA1C.......  6 months...  metFORMIN................  12- 06-    [facility] Embedded Care Due Messages. Reference number: [id number].   4/18/2025 4:02:05 PM CDT

## 2025-04-20 RX ORDER — LINAGLIPTIN 5 MG/1
5 TABLET, FILM COATED ORAL
Qty: 90 TABLET | Refills: 1 | OUTPATIENT
Start: 2025-04-20

## 2025-04-20 NOTE — TELEPHONE ENCOUNTER
Refill Decision Note   Jud Villa  is requesting a refill authorization.  Brief Assessment and Rationale for Refill:  Quick Discontinue     Medication Therapy Plan:  FLOS; Discontinued by: Bo Lopez MD on 8/15/2024      Comments:     Note composed:10:47 AM 04/20/2025

## 2025-04-23 ENCOUNTER — LAB VISIT (OUTPATIENT)
Dept: LAB | Facility: HOSPITAL | Age: 65
End: 2025-04-23
Attending: FAMILY MEDICINE
Payer: MEDICARE

## 2025-04-23 DIAGNOSIS — R79.89 LOW TSH LEVEL: ICD-10-CM

## 2025-04-23 DIAGNOSIS — Z79.4 TYPE 2 DIABETES MELLITUS WITH HYPERGLYCEMIA, WITH LONG-TERM CURRENT USE OF INSULIN: ICD-10-CM

## 2025-04-23 DIAGNOSIS — E05.90 HYPERTHYROIDISM: ICD-10-CM

## 2025-04-23 DIAGNOSIS — E11.65 TYPE 2 DIABETES MELLITUS WITH HYPERGLYCEMIA, WITH LONG-TERM CURRENT USE OF INSULIN: ICD-10-CM

## 2025-04-23 LAB
EAG (OHS): 171 MG/DL (ref 68–131)
HBA1C MFR BLD: 7.6 % (ref 4–5.6)
TSH SERPL-ACNC: 1.27 UIU/ML (ref 0.4–4)

## 2025-04-23 PROCEDURE — 84443 ASSAY THYROID STIM HORMONE: CPT

## 2025-04-23 PROCEDURE — 36415 COLL VENOUS BLD VENIPUNCTURE: CPT | Mod: PO

## 2025-04-23 PROCEDURE — 83036 HEMOGLOBIN GLYCOSYLATED A1C: CPT

## 2025-04-24 ENCOUNTER — RESULTS FOLLOW-UP (OUTPATIENT)
Dept: FAMILY MEDICINE | Facility: CLINIC | Age: 65
End: 2025-04-24

## 2025-05-24 ENCOUNTER — OFFICE VISIT (OUTPATIENT)
Dept: URGENT CARE | Facility: CLINIC | Age: 65
End: 2025-05-24
Payer: MEDICARE

## 2025-05-24 VITALS
OXYGEN SATURATION: 94 % | TEMPERATURE: 99 F | HEART RATE: 91 BPM | SYSTOLIC BLOOD PRESSURE: 112 MMHG | DIASTOLIC BLOOD PRESSURE: 71 MMHG | WEIGHT: 115 LBS | HEIGHT: 62 IN | RESPIRATION RATE: 16 BRPM | BODY MASS INDEX: 21.16 KG/M2

## 2025-05-24 DIAGNOSIS — R05.8 COUGH WITH EXPOSURE TO SEVERE ACUTE RESPIRATORY SYNDROME CORONAVIRUS 2 (SARS-COV-2): Primary | ICD-10-CM

## 2025-05-24 DIAGNOSIS — Z20.822 COUGH WITH EXPOSURE TO SEVERE ACUTE RESPIRATORY SYNDROME CORONAVIRUS 2 (SARS-COV-2): Primary | ICD-10-CM

## 2025-05-24 DIAGNOSIS — J06.9 UPPER RESPIRATORY TRACT INFECTION, UNSPECIFIED TYPE: ICD-10-CM

## 2025-05-24 LAB
CTP QC/QA: YES
SARS-COV+SARS-COV-2 AG RESP QL IA.RAPID: NEGATIVE

## 2025-05-24 PROCEDURE — 71046 X-RAY EXAM CHEST 2 VIEWS: CPT | Mod: FY,S$GLB,, | Performed by: RADIOLOGY

## 2025-05-24 PROCEDURE — 87811 SARS-COV-2 COVID19 W/OPTIC: CPT | Mod: QW,S$GLB,,

## 2025-05-24 PROCEDURE — 99213 OFFICE O/P EST LOW 20 MIN: CPT | Mod: S$GLB,,,

## 2025-05-24 RX ORDER — FLUTICASONE PROPIONATE 50 MCG
1 SPRAY, SUSPENSION (ML) NASAL DAILY
Qty: 18.2 ML | Refills: 0 | Status: SHIPPED | OUTPATIENT
Start: 2025-05-24 | End: 2025-06-23

## 2025-05-24 RX ORDER — CETIRIZINE HYDROCHLORIDE 10 MG/1
10 TABLET ORAL DAILY
Qty: 30 TABLET | Refills: 0 | Status: SHIPPED | OUTPATIENT
Start: 2025-05-24 | End: 2025-06-23

## 2025-05-24 RX ORDER — GUAIFENESIN 600 MG/1
1200 TABLET, EXTENDED RELEASE ORAL 2 TIMES DAILY
Qty: 28 TABLET | Refills: 0 | Status: SHIPPED | OUTPATIENT
Start: 2025-05-24 | End: 2025-05-31

## 2025-05-24 RX ORDER — PROMETHAZINE HYDROCHLORIDE AND DEXTROMETHORPHAN HYDROBROMIDE 6.25; 15 MG/5ML; MG/5ML
5 SYRUP ORAL EVERY 6 HOURS PRN
Qty: 118 ML | Refills: 0 | Status: SHIPPED | OUTPATIENT
Start: 2025-05-24 | End: 2025-06-07

## 2025-05-24 RX ORDER — BENZONATATE 200 MG/1
200 CAPSULE ORAL 3 TIMES DAILY PRN
Qty: 42 CAPSULE | Refills: 0 | Status: SHIPPED | OUTPATIENT
Start: 2025-05-24 | End: 2025-06-07

## 2025-05-24 NOTE — PROGRESS NOTES
"Subjective:      Patient ID: Jud Villa is a 65 y.o. female.    Vitals:  height is 5' 2" (1.575 m) and weight is 52.2 kg (115 lb). Her oral temperature is 98.9 °F (37.2 °C). Her blood pressure is 112/71 and her pulse is 91. Her respiration is 16 and oxygen saturation is 94% (abnormal).     Chief Complaint: Cough    This is a 65 y.o. female who presents today with a chief complaint of sinus congestion runny nose, runny nose,  and cough productive cough that started yesterday.  Pt did not take any meds for symptoms. Denies fever, nausea, vomiting, diarrhea. History of COPD, takes Trelegy inhaler.       Cough  This is a new problem. The current episode started in the past 7 days. The problem has been unchanged. The problem occurs constantly. The cough is Productive of sputum. Associated symptoms include chills, nasal congestion, postnasal drip and a sore throat. Pertinent negatives include no fever or shortness of breath. Her past medical history is significant for bronchitis and COPD.     Constitution: Positive for chills. Negative for fever.   HENT:  Positive for postnasal drip and sore throat. Negative for sinus pain and sinus pressure.    Cardiovascular:  Negative for sob on exertion.   Respiratory:  Positive for cough and COPD. Negative for shortness of breath.    Gastrointestinal:  Negative for nausea, vomiting and diarrhea.      Objective:     Physical Exam   Constitutional: She is oriented to person, place, and time.  Non-toxic appearance. She does not appear ill. No distress.   HENT:   Head: Normocephalic.   Ears:   Right Ear: Tympanic membrane normal.   Left Ear: Tympanic membrane normal.   Mouth/Throat: Mucous membranes are moist.   Neck: Neck supple.   Cardiovascular: Normal rate, regular rhythm and normal heart sounds.   Pulmonary/Chest: Effort normal. No stridor. No tachypnea and no bradypnea. No respiratory distress. She has no wheezes. She has no rhonchi. She has no rales. She exhibits no " tenderness.   Abdominal: Normal appearance.   Neurological: She is alert and oriented to person, place, and time.   Skin: Skin is warm, dry and not diaphoretic.     XR CHEST PA AND LATERAL  Result Date: 5/24/2025  EXAMINATION: XR CHEST PA AND LATERAL CLINICAL HISTORY: Other specified cough TECHNIQUE: PA and lateral views of the chest were performed. COMPARISON: 03/16/2025 FINDINGS: The lungs are clear.  No pleural effusion.  Heart size is within normal limits.     No acute cardiopulmonary disease Electronically signed by: Mariana Amezquita Date:    05/24/2025 Time:    15:37        Assessment:     1. Cough with exposure to severe acute respiratory syndrome coronavirus 2 (SARS-CoV-2)    2. Acute bronchitis, unspecified organism        Plan:       Cough with exposure to severe acute respiratory syndrome coronavirus 2 (SARS-CoV-2)  -     SARS Coronavirus 2 Antigen, POCT Manual Read  -     XR CHEST PA AND LATERAL; Future; Expected date: 05/24/2025    Acute bronchitis, unspecified organism      Patient Instructions   Tylenol or Motrin for pain or fever greater than 100.4 F.  Increase humidity  Saltwater gargles  Hot tea with honey  Take medicine as prescribed    Return to clinic for no improvement in symptoms.  Report to the emergency room for worsening of symptoms.             X ray was negative, no antibiotics prescribed. Supportive care.  Additional MDM:     Heart Failure Score:   COPD = Yes

## 2025-05-24 NOTE — PATIENT INSTRUCTIONS
Tylenol or Motrin for pain or fever greater than 100.4 F.  Increase humidity  Saltwater gargles  Hot tea with honey  Take medicine as prescribed    Return to clinic for no improvement in symptoms.  Report to the emergency room for worsening of symptoms.

## 2025-06-18 ENCOUNTER — TELEPHONE (OUTPATIENT)
Dept: PHARMACY | Facility: CLINIC | Age: 65
End: 2025-06-18
Payer: MEDICARE

## 2025-06-18 NOTE — TELEPHONE ENCOUNTER
[facility]/Population Health Chart Review & Patient Outreach Details For Medication Adherence Project    Reason for Outreach Encounter: 3rd Party payor non-compliance report ([employer], [employer], [employer], etc)  2.  Patient Outreach Method: Reviewed patient chart   3.   Medication in question:    Hyperlipidemia Medications              atorvastatin (LIPITOR) 80 MG tablet Take 1 tablet (80 mg total) by mouth once daily.                  atorvastatin  last filled  6/8 for 90 day supply      4.  Reviewed and or Updates Made To: Patient Chart  5. Outreach Outcomes and/or actions taken: Patient filled medication and is on track to be adherent  Additional Notes:

## 2025-06-24 DIAGNOSIS — R11.0 NAUSEA: ICD-10-CM

## 2025-06-24 NOTE — TELEPHONE ENCOUNTER
No care due was identified.  [facility] Embedded Care Due Messages. Reference number: [id number].   6/24/2025 3:34:50 PM CDT

## 2025-06-25 RX ORDER — ONDANSETRON 4 MG/1
4 TABLET, ORALLY DISINTEGRATING ORAL EVERY 8 HOURS PRN
Qty: 30 TABLET | Refills: 0 | Status: SHIPPED | OUTPATIENT
Start: 2025-06-25

## 2025-07-02 ENCOUNTER — HOSPITAL ENCOUNTER (OUTPATIENT)
Facility: HOSPITAL | Age: 65
Discharge: HOME | End: 2025-07-03
Attending: EMERGENCY MEDICINE | Admitting: INTERNAL MEDICINE
Payer: MEDICARE

## 2025-07-02 DIAGNOSIS — R29.818 ACUTE FOCAL NEUROLOGICAL DEFICIT: ICD-10-CM

## 2025-07-02 DIAGNOSIS — R29.90 EPISODE OF TRANSIENT NEUROLOGIC SYMPTOMS: ICD-10-CM

## 2025-07-02 LAB
ABSOLUTE EOSINOPHIL (OHS): 0.25 K/UL
ABSOLUTE MONOCYTE (OHS): 0.65 K/UL (ref 0.3–1)
ABSOLUTE NEUTROPHIL COUNT (OHS): 6.91 K/UL (ref 1.8–7.7)
ALBUMIN SERPL BCP-MCNC: 4 G/DL (ref 3.5–5.2)
ALP SERPL-CCNC: 83 UNIT/L (ref 40–150)
ALT SERPL W/O P-5'-P-CCNC: 16 UNIT/L (ref 10–44)
ANION GAP (OHS): 11 MMOL/L (ref 8–16)
AST SERPL-CCNC: 17 UNIT/L (ref 11–45)
BACTERIA #/AREA URNS AUTO: NORMAL /HPF
BASOPHILS # BLD AUTO: 0.04 K/UL
BASOPHILS NFR BLD AUTO: 0.4 %
BILIRUB SERPL-MCNC: 0.3 MG/DL (ref 0.1–1)
BILIRUB UR QL STRIP.AUTO: NEGATIVE
BUN SERPL-MCNC: 12 MG/DL (ref 8–23)
CALCIUM SERPL-MCNC: 9.2 MG/DL (ref 8.7–10.5)
CHLORIDE SERPL-SCNC: 101 MMOL/L (ref 95–110)
CHOLEST SERPL-MCNC: 123 MG/DL (ref 120–199)
CHOLEST/HDLC SERPL: 2.6 {RATIO} (ref 2–5)
CLARITY UR: CLEAR
CO2 SERPL-SCNC: 26 MMOL/L (ref 23–29)
COLOR UR AUTO: COLORLESS
CREAT SERPL-MCNC: 0.5 MG/DL (ref 0.5–1.4)
CREAT SERPL-MCNC: 0.6 MG/DL (ref 0.5–1.4)
ERYTHROCYTE [DISTWIDTH] IN BLOOD BY AUTOMATED COUNT: 13.3 % (ref 11.5–14.5)
GFR SERPLBLD CREATININE-BSD FMLA CKD-EPI: >60 ML/MIN/1.73/M2
GLUCOSE SERPL-MCNC: 160 MG/DL (ref 70–110)
GLUCOSE UR QL STRIP: ABNORMAL
HCT VFR BLD AUTO: 45.2 % (ref 37–48.5)
HDLC SERPL-MCNC: 47 MG/DL (ref 40–75)
HDLC SERPL: 38.2 % (ref 20–50)
HGB BLD-MCNC: 14.4 GM/DL (ref 12–16)
HGB UR QL STRIP: NEGATIVE
IMM GRANULOCYTES # BLD AUTO: 0.02 K/UL (ref 0–0.04)
IMM GRANULOCYTES NFR BLD AUTO: 0.2 % (ref 0–0.5)
INR PPP: 1 (ref 0.8–1.2)
KETONES UR QL STRIP: NEGATIVE
LDLC SERPL CALC-MCNC: 45 MG/DL (ref 63–159)
LEUKOCYTE ESTERASE UR QL STRIP: NEGATIVE
LIPASE SERPL-CCNC: 28 U/L (ref 4–60)
LYMPHOCYTES # BLD AUTO: 2.15 K/UL (ref 1–4.8)
MCH RBC QN AUTO: 28.2 PG (ref 27–31)
MCHC RBC AUTO-ENTMCNC: 31.9 G/DL (ref 32–36)
MCV RBC AUTO: 89 FL (ref 82–98)
MICROSCOPIC COMMENT: NORMAL
NITRITE UR QL STRIP: NEGATIVE
NONHDLC SERPL-MCNC: 76 MG/DL
NUCLEATED RBC (/100WBC) (OHS): 0 /100 WBC
PH UR STRIP: 6 [PH]
PLATELET # BLD AUTO: 244 K/UL (ref 150–450)
PMV BLD AUTO: 9.8 FL (ref 9.2–12.9)
POC PTINR: 1.1 (ref 0.9–1.2)
POC PTWBT: 11 SEC (ref 9.7–14.3)
POCT GLUCOSE: 171 MG/DL (ref 70–110)
POTASSIUM SERPL-SCNC: 4 MMOL/L (ref 3.5–5.1)
PROT SERPL-MCNC: 7.1 GM/DL (ref 6–8.4)
PROT UR QL STRIP: NEGATIVE
PROTHROMBIN TIME: 10.7 SECONDS (ref 9–12.5)
RBC # BLD AUTO: 5.11 M/UL (ref 4–5.4)
RBC #/AREA URNS AUTO: <1 /HPF (ref 0–4)
RELATIVE EOSINOPHIL (OHS): 2.5 %
RELATIVE LYMPHOCYTE (OHS): 21.5 % (ref 18–48)
RELATIVE MONOCYTE (OHS): 6.5 % (ref 4–15)
RELATIVE NEUTROPHIL (OHS): 68.9 % (ref 38–73)
SAMPLE: NORMAL
SAMPLE: NORMAL
SODIUM SERPL-SCNC: 138 MMOL/L (ref 136–145)
SP GR UR STRIP: 1.02
TRIGL SERPL-MCNC: 155 MG/DL (ref 30–150)
TROPONIN I SERPL HS-MCNC: <3 NG/L
TSH SERPL-ACNC: 0.8 UIU/ML (ref 0.4–4)
UROBILINOGEN UR STRIP-ACNC: NEGATIVE EU/DL
WBC # BLD AUTO: 10.02 K/UL (ref 3.9–12.7)
YEAST UR QL AUTO: NORMAL /HPF

## 2025-07-02 PROCEDURE — 99285 EMERGENCY DEPT VISIT HI MDM: CPT | Mod: 25

## 2025-07-02 PROCEDURE — 82565 ASSAY OF CREATININE: CPT

## 2025-07-02 PROCEDURE — 93005 ELECTROCARDIOGRAM TRACING: CPT

## 2025-07-02 PROCEDURE — 84443 ASSAY THYROID STIM HORMONE: CPT | Performed by: STUDENT IN AN ORGANIZED HEALTH CARE EDUCATION/TRAINING PROGRAM

## 2025-07-02 PROCEDURE — 85025 COMPLETE CBC W/AUTO DIFF WBC: CPT | Performed by: STUDENT IN AN ORGANIZED HEALTH CARE EDUCATION/TRAINING PROGRAM

## 2025-07-02 PROCEDURE — 85610 PROTHROMBIN TIME: CPT

## 2025-07-02 PROCEDURE — 80061 LIPID PANEL: CPT | Performed by: STUDENT IN AN ORGANIZED HEALTH CARE EDUCATION/TRAINING PROGRAM

## 2025-07-02 PROCEDURE — 82247 BILIRUBIN TOTAL: CPT | Performed by: STUDENT IN AN ORGANIZED HEALTH CARE EDUCATION/TRAINING PROGRAM

## 2025-07-02 PROCEDURE — 93010 ELECTROCARDIOGRAM REPORT: CPT | Mod: ,,, | Performed by: INTERNAL MEDICINE

## 2025-07-02 PROCEDURE — 96375 TX/PRO/DX INJ NEW DRUG ADDON: CPT

## 2025-07-02 PROCEDURE — 85610 PROTHROMBIN TIME: CPT | Performed by: STUDENT IN AN ORGANIZED HEALTH CARE EDUCATION/TRAINING PROGRAM

## 2025-07-02 PROCEDURE — 25500020 PHARM REV CODE 255: Performed by: EMERGENCY MEDICINE

## 2025-07-02 PROCEDURE — 84484 ASSAY OF TROPONIN QUANT: CPT | Performed by: STUDENT IN AN ORGANIZED HEALTH CARE EDUCATION/TRAINING PROGRAM

## 2025-07-02 PROCEDURE — 82962 GLUCOSE BLOOD TEST: CPT

## 2025-07-02 PROCEDURE — 96374 THER/PROPH/DIAG INJ IV PUSH: CPT

## 2025-07-02 PROCEDURE — 63600175 PHARM REV CODE 636 W HCPCS: Performed by: STUDENT IN AN ORGANIZED HEALTH CARE EDUCATION/TRAINING PROGRAM

## 2025-07-02 PROCEDURE — 81001 URINALYSIS AUTO W/SCOPE: CPT | Performed by: STUDENT IN AN ORGANIZED HEALTH CARE EDUCATION/TRAINING PROGRAM

## 2025-07-02 PROCEDURE — 83690 ASSAY OF LIPASE: CPT | Performed by: STUDENT IN AN ORGANIZED HEALTH CARE EDUCATION/TRAINING PROGRAM

## 2025-07-02 PROCEDURE — 96361 HYDRATE IV INFUSION ADD-ON: CPT

## 2025-07-02 PROCEDURE — 25000003 PHARM REV CODE 250: Performed by: STUDENT IN AN ORGANIZED HEALTH CARE EDUCATION/TRAINING PROGRAM

## 2025-07-02 PROCEDURE — 99900035 HC TECH TIME PER 15 MIN (STAT)

## 2025-07-02 RX ORDER — ACETAMINOPHEN 500 MG
1000 TABLET ORAL
Status: COMPLETED | OUTPATIENT
Start: 2025-07-02 | End: 2025-07-02

## 2025-07-02 RX ORDER — DIPHENHYDRAMINE HYDROCHLORIDE 50 MG/ML
25 INJECTION, SOLUTION INTRAMUSCULAR; INTRAVENOUS
Status: COMPLETED | OUTPATIENT
Start: 2025-07-02 | End: 2025-07-02

## 2025-07-02 RX ORDER — PROCHLORPERAZINE EDISYLATE 5 MG/ML
2.5 INJECTION INTRAMUSCULAR; INTRAVENOUS
Status: COMPLETED | OUTPATIENT
Start: 2025-07-02 | End: 2025-07-02

## 2025-07-02 RX ADMIN — PROCHLORPERAZINE EDISYLATE 2.5 MG: 5 INJECTION INTRAMUSCULAR; INTRAVENOUS at 04:07

## 2025-07-02 RX ADMIN — ACETAMINOPHEN 1000 MG: 500 TABLET ORAL at 04:07

## 2025-07-02 RX ADMIN — DIPHENHYDRAMINE HYDROCHLORIDE 25 MG: 50 INJECTION, SOLUTION INTRAMUSCULAR; INTRAVENOUS at 04:07

## 2025-07-02 RX ADMIN — SODIUM CHLORIDE 1000 ML: 9 INJECTION, SOLUTION INTRAVENOUS at 04:07

## 2025-07-02 RX ADMIN — IOHEXOL 100 ML: 350 INJECTION, SOLUTION INTRAVENOUS at 03:07

## 2025-07-02 NOTE — FIRST PROVIDER EVALUATION
Hand Therapy Objective info    Current Date:  10/10/2018  DOI: 9/24/18  Diagnosis: Distal phalanx fracture, R thumb  Procedure:  Casted  Post:  0w 4d       Objective:  Pain Level Report  VAS(0-10) 9/28/2018 10/10   At Rest: 1-2/10 1-2/10   With Use: 5/10 3/10     Report of Pain:  Location:  thumb   Pain Quality:  Throbbing and swollen  Frequency: constant    Pain is worst:  daytime or nighttime  Exacerbated by:  Moving it wrong, leaving it down at my side  Relieved by:  elevation  Progression:  Gradually better  Edema:  MILD  Sensation: WNL throughout all nerve distributions; per patient report    ROM:  Not measured.     Wound: minimal to no drainage this week. Closing well. About 1 cm of nailbed visible. Suture in place. Scabbing beginning over distal nailbed.    Home Exercise Program:  Tip protector full time  AROM flex/ext  IP blocking    Next Visit:  Check splint  AROM  Wound care     Medical screening examination initiated.  I have conducted a focused provider triage encounter, findings are as follows:    Brief history of present illness:  dizziness, loss of balance, headache, eyes are tired.  Started upon awakening @ 10 am.  LKM 11 pm last night.  H/o large PCA stroke in past    There were no vitals filed for this visit.    Pertinent physical exam:  no acute distress    Brief workup plan:  stroke code    Preliminary workup initiated; this workup will be continued and followed by the physician or advanced practice provider that is assigned to the patient when roomed.

## 2025-07-02 NOTE — ASSESSMENT & PLAN NOTE
Jud Villa is a 65 y.o. female with PMH of prior stroke (residual right hemianopsia) daily Eliquis use, COPD, Breast CA, tobacco use, Vertigo, migraine headaches that presented to the ED with generalized fatigue, light headedness, blurred vision OU, urinary frequency, abd pain and headache. LKW 7/1/25 at 2300. Neuro exam significant for Right hemianopsia. NIHSS 2. CTH/CTA stroke multiphase negative for acute intracranial findings including infarct/hemorrhage. Determined not a candidate for acute stroke interventions, OOW for TNK, daily Eliquis use and no LVO for thrombectomy. Of note, patient endorses head pain she reports is the same quality and intensity she experiences with her usual migraines. She reports taking gabapentin PTA and denies relief.     Recommendations:  --No acute stroke interventions recommended.  --Migraine cocktail  --MRI brain pending for further evaluation and workup to definitively rule out acute stroke  --Ongoing workup including UA and management per ED team  --SBP goal <220 until acute stroke ruled out, long-term BP goal normotensive  --We will follow up on MRI and provide any additional recs pending results. If MRI imaging negative for evidence of acute infarct, no further stroke workup indicated and VN will sign off.

## 2025-07-02 NOTE — SUBJECTIVE & OBJECTIVE
Past Medical History:   Diagnosis Date    Asthma     Cataract     Coronary artery disease of native heart with stable angina pectoris 04/04/2023    Diabetes mellitus     Hyperlipidemia     Moderate episode of recurrent major depressive disorder 05/19/2021    Osteoporosis     Stroke 03/2023    left PCA    Triple negative breast cancer 09/2018     Past Surgical History:   Procedure Laterality Date    ABDOMINOPLASTY  2000    BILATERAL MASTECTOMY  04/26/2018    Nipple-sparing mastectomy, left SLNB, bilateral implants    CHOLECYSTECTOMY      COLONOSCOPY N/A 10/27/2015    Procedure: COLONOSCOPY;  Surgeon: Johnny Hurley MD;  Location: [facility] ENDO;  Service: Endoscopy;  Laterality: N/A;    CORONARY ANGIOGRAPHY N/A 04/05/2023    Procedure: ANGIOGRAM, CORONARY ARTERY;  Surgeon: Francisco Barahona MD;  Location: [facility] CATH LAB;  Service: Cardiology;  Laterality: N/A;    CORONARY ANGIOPLASTY      CORONARY ARTERY BYPASS GRAFT  04/2023    LIMA to LAD, SVG to OM    CORONARY ARTERY BYPASS GRAFT (CABG) N/A 04/14/2023    Procedure: CORONARY ARTERY BYPASS GRAFT (CABG) x 2;  Surgeon: Igor Kahn MD;  Location: [facility] OR [address] [place] [address];  Service: Cardiothoracic;  Laterality: N/A;    ENDOSCOPIC HARVEST OF VEIN Left 04/14/2023    Procedure: SURGICAL PROCUREMENT, VEIN, ENDOSCOPIC;  Surgeon: Igor Kahn MD;  Location: [facility] OR [address] [place] [address];  Service: Cardiothoracic;  Laterality: Left;  Vein harvest start - stop: 0817 - 0856  Vein prep start - stop: 0857 - 0917    ESOPHAGOGASTRODUODENOSCOPY N/A 01/24/2022    Procedure: ESOPHAGOGASTRODUODENOSCOPY (EGD);  Surgeon: Mili Katz MD;  Location: [facility] ([facility]);  Service: Endoscopy;  Laterality: N/A;  rapid in AM, instr portal -ml    HYSTERECTOMY  1990    ovaries intact    LASER PERIPHERAL IRIDOTOMY Bilateral 2019        LEFT HEART CATHETERIZATION Left 04/05/2023    Procedure: Left heart cath;  Surgeon: Francisco Barahona MD;  Location: [facility] CATH LAB;  Service:  Cardiology;  Laterality: Left;     Social History[1]  Review of patient's allergies indicates:  No Known Allergies    Medications: I have reviewed the current medication administration record.    Prescriptions Prior to Admission[2]    Review of Systems   Gastrointestinal:  Positive for abdominal pain.   Genitourinary:  Positive for frequency.   Neurological:  Positive for weakness (generalized fatigue), light-headedness and headaches. Negative for speech difficulty.     Objective:     Vital Signs (Most Recent):  Temp: 97.9 °F (36.6 °C) (07/02/25 1600)  Pulse: 67 (07/02/25 1800)  Resp: 18 (07/02/25 1730)  BP: 138/70 (07/02/25 1800)  SpO2: 96 % (07/02/25 1800)    Vital Signs Range (Last 24H):  Temp:  [97.6 °F (36.4 °C)-97.9 °F (36.6 °C)]   Pulse:  [67-88]   Resp:  [16-22]   BP: (130-148)/(70-85)   SpO2:  [95 %-97 %]        Physical Exam  Vitals and nursing note reviewed.   HENT:      Mouth/Throat:      Mouth: Mucous membranes are moist.   Eyes:      Pupils: Pupils are equal, round, and reactive to light.   Cardiovascular:      Rate and Rhythm: Normal rate.   Pulmonary:      Effort: No respiratory distress.   Skin:     General: Skin is warm and dry.   Neurological:      Mental Status: She is alert and oriented to person, place, and time.              Neurological Exam:   LOC: alert  Attention Span: Good   Language: No aphasia  Articulation: No dysarthria  Visual Fields: Hemianopsia right  Motor: Arm left  Normal 5/5  Leg left  Normal 5/5  Arm right  Normal 5/5  Leg right Normal 5/5      Laboratory:  CMP:   Recent Labs   Lab 07/02/25  1553   CALCIUM 9.2   ALBUMIN 4.0   PROT 7.1      K 4.0   CO2 26      BUN 12   CREATININE 0.6   ALKPHOS 83   ALT 16   AST 17   BILITOT 0.3     CBC:   Recent Labs   Lab 07/02/25  1553   WBC 10.02   RBC 5.11   HGB 14.4   HCT 45.2      MCV 89   MCH 28.2   MCHC 31.9*     Lipid Panel:   Recent Labs   Lab 07/02/25  1553   CHOL 123   LDLCALC 45.0*   HDL 47   TRIG 155*  "    Coagulation:   Recent Labs   Lab 25  1553   INR 1.0     Hgb A1C: No results for input(s): "HGBA1C" in the last 168 hours.  TSH:   Recent Labs   Lab 25  1553   TSH 0.795       Diagnostic Results:      Brain imaging:  MRI brain pending    Vessel Imaging:  CTA Stroke Multiphase 25  Impression:     CTA head: Unremarkable CTA of the head specifically without evidence for proximal high-grade stenosis or proximal large vessel occlusion.     CTA neck: Post atherosclerotic plaquing with probable moderate narrowing the right vertebral artery at its origin with underlying right vertebral artery hypoplasia and distal right vertebral artery essentially ending in PICA.     No significant carotid stenosis throughout the neck.     CT head: Stable large encephalomalacia left PCA territory suggestive for prior infarction     No evidence for acute intracranial hemorrhage or sulcal effacement to suggest large territory recent infarction     Cardiac Evaluation:   EKG   NSR   Rate 93 BPM       [1]   Social History  Tobacco Use    Smoking status: Former     Current packs/day: 0.00     Average packs/day: 1.4 packs/day for 44.6 years (62.0 ttl pk-yrs)     Types: Cigarettes     Start date:      Quit date: 2024     Years since quittin.5     Passive exposure: Past    Smokeless tobacco: Current    Tobacco comments:     Quit smoking after cancer diagnosis in 2017, then resumed smoking.    Substance Use Topics    Alcohol use: No     Alcohol/week: 0.0 standard drinks of alcohol    Drug use: No   [2] (Not in a hospital admission)    "

## 2025-07-02 NOTE — CONSULTS
[facility] - Emergency Dept  Vascular Neurology  [facility]  Consult Note    Inpatient consult to Neurology Services (Vascular Neurology)  Consult performed by: Jana Marques NP  Consult ordered by: Sparkle Bishop MD        Assessment/Plan:     Patient is a 65 y.o. year old female with:    Episode of transient neurologic symptoms  Jud Villa is a 65 y.o. female with PMH of prior stroke (residual right hemianopsia) daily Eliquis use, COPD, Breast CA, tobacco use, Vertigo, migraine headaches that presented to the ED with generalized fatigue, light headedness, blurred vision OU, urinary frequency, abd pain and headache. LKW 7/1/25 at 2300. Neuro exam significant for Right hemianopsia. NIHSS 2. CTH/CTA stroke multiphase negative for acute intracranial findings including infarct/hemorrhage. Determined not a candidate for acute stroke interventions, OOW for TNK, daily Eliquis use and no LVO for thrombectomy. Of note, patient endorses head pain she reports is the same quality and intensity she experiences with her usual migraines. She reports taking gabapentin PTA and denies relief.     Recommendations:  --No acute stroke interventions recommended.  --Migraine cocktail  --MRI brain pending for further evaluation and workup to definitively rule out acute stroke  --Ongoing workup including UA and management per ED team  --SBP goal <220 until acute stroke ruled out, long-term BP goal normotensive  --We will follow up on MRI and provide any additional recs pending results. If MRI imaging negative for evidence of acute infarct, no further stroke workup indicated and VN will sign off.          STROKE DOCUMENTATION     Acute Stroke Times   Last Known Normal Date: 07/01/25  Last Known Normal Time: 2300  Unknown Symptom Onset Date: Unknown Date  Unknown Symptom Onset Time: Unknown Time  Stroke Team Called Date: 07/02/25  Stroke Team Called Time: 1530  Stroke Team Arrival Date: 07/02/25  Stroke Team  Arrival Time: 1535  CT Interpretation Time: 1550  Thrombolytic Therapy Recommended: No  CTA Interpretation Time: 1600  Thrombectomy Recommended: No    NIH Scale:  1a. Level of Consciousness: 0-->Alert, keenly responsive  1b. LOC Questions: 0-->Answers both questions correctly  1c. LOC Commands: 0-->Performs both tasks correctly  2. Best Gaze: 0-->Normal  3. Visual: 2-->Complete hemianopia  4. Facial Palsy: 0-->Normal symmetrical movements  5a. Motor Arm, Left: 0-->No drift, limb holds 90 (or 45) degrees for full 10 secs  5b. Motor Arm, Right: 0-->No drift, limb holds 90 (or 45) degrees for full 10 secs  6a. Motor Leg, Left: 0-->No drift, leg holds 30 degree position for full 5 secs  6b. Motor Leg, Right: 0-->No drift, leg holds 30 degree position for full 5 secs  7. Limb Ataxia: 0-->Absent  8. Sensory: 0-->Normal, no sensory loss  9. Best Language: 0-->No aphasia, normal  10. Dysarthria: 0-->Normal  11. Extinction and Inattention (formerly Neglect): 0-->No abnormality  Total (NIH Stroke Scale): 2    Modified Clare Score: 1  Jessica Coma Scale:15   ABCD2 Score:    [id number] Score:   HAS -BLED Score:   ICH Score:   Hunt & Fields Classification:       Thrombolysis Candidate? No, Out of window - Symptom onset > 4.5 hours, Current use of direct thrombin inhibitors (dabigatran) or direct factor Xa inhibitors within 48 hours    Delays to Thrombolysis?  Not Applicable    Interventional Revascularization Candidate?   Is the patient eligible for mechanical endovascular reperfusion (GIN)?  No; No large vessel occlusion identified on imaging     Delays to Thrombectomy? Not Applicable    Hemorrhagic change of an Ischemic Stroke: Does this patient have an ischemic stroke with hemorrhagic changes? No     Subjective:     History of Present Illness:  Jud Villa is a 65 y.o. female with PMH of prior stroke (residual right hemianopsia) daily Eliquis use, COPD, Breast CA, tobacco use, Vertigo, migraine headaches that presented  to the ED with generalized fatigue, light headedness, blurred vision OU, urinary frequency, abd pain and headache. LKW 7/1/25 at 2300. Neuro exam significant for Right hemianopsia. NIHSS 2. CTH/CTA stroke multiphase negative for acute intracranial findings including infarct/hemorrhage. Determined not a candidate for acute stroke interventions, OOW for TNK, daily Eliquis use and no LVO for thrombectomy. Of note, patient endorses head pain she reports is the same quality and intensity she experiences with her usual migraines. She reports taking gabapentin PTA and denies relief.           Past Medical History:   Diagnosis Date    Asthma     Cataract     Coronary artery disease of native heart with stable angina pectoris 04/04/2023    Diabetes mellitus     Hyperlipidemia     Moderate episode of recurrent major depressive disorder 05/19/2021    Osteoporosis     Stroke 03/2023    left PCA    Triple negative breast cancer 09/2018     Past Surgical History:   Procedure Laterality Date    ABDOMINOPLASTY  2000    BILATERAL MASTECTOMY  04/26/2018    Nipple-sparing mastectomy, left SLNB, bilateral implants    CHOLECYSTECTOMY      COLONOSCOPY N/A 10/27/2015    Procedure: COLONOSCOPY;  Surgeon: Johnny Hurley MD;  Location: [facility] ENDO;  Service: Endoscopy;  Laterality: N/A;    CORONARY ANGIOGRAPHY N/A 04/05/2023    Procedure: ANGIOGRAM, CORONARY ARTERY;  Surgeon: Francisco Barahona MD;  Location: [facility] CATH LAB;  Service: Cardiology;  Laterality: N/A;    CORONARY ANGIOPLASTY      CORONARY ARTERY BYPASS GRAFT  04/2023    LIMA to LAD, SVG to OM    CORONARY ARTERY BYPASS GRAFT (CABG) N/A 04/14/2023    Procedure: CORONARY ARTERY BYPASS GRAFT (CABG) x 2;  Surgeon: Igor Kahn MD;  Location: [facility] OR [address] [place] [address];  Service: Cardiothoracic;  Laterality: N/A;    ENDOSCOPIC HARVEST OF VEIN Left 04/14/2023    Procedure: SURGICAL PROCUREMENT, VEIN, ENDOSCOPIC;  Surgeon: Igor Kahn MD;  Location: [facility] OR [address] [place] [address];  Service:  Cardiothoracic;  Laterality: Left;  Vein harvest start - stop: 0817 - 0856  Vein prep start - stop: 0857 - 0917    ESOPHAGOGASTRODUODENOSCOPY N/A 01/24/2022    Procedure: ESOPHAGOGASTRODUODENOSCOPY (EGD);  Surgeon: Mili Katz MD;  Location: [facility] ENDO (4TH FLR);  Service: Endoscopy;  Laterality: N/A;  rapid in AM, instr portal -ml    HYSTERECTOMY  1990    ovaries intact    LASER PERIPHERAL IRIDOTOMY Bilateral 2019        LEFT HEART CATHETERIZATION Left 04/05/2023    Procedure: Left heart cath;  Surgeon: Francisco Barahona MD;  Location: [facility] CATH LAB;  Service: Cardiology;  Laterality: Left;     Social History[1]  Review of patient's allergies indicates:  No Known Allergies    Medications: I have reviewed the current medication administration record.    Prescriptions Prior to Admission[2]    Review of Systems   Gastrointestinal:  Positive for abdominal pain.   Genitourinary:  Positive for frequency.   Neurological:  Positive for weakness (generalized fatigue), light-headedness and headaches. Negative for speech difficulty.     Objective:     Vital Signs (Most Recent):  Temp: 97.9 °F (36.6 °C) (07/02/25 1600)  Pulse: 67 (07/02/25 1800)  Resp: 18 (07/02/25 1730)  BP: 138/70 (07/02/25 1800)  SpO2: 96 % (07/02/25 1800)    Vital Signs Range (Last 24H):  Temp:  [97.6 °F (36.4 °C)-97.9 °F (36.6 °C)]   Pulse:  [67-88]   Resp:  [16-22]   BP: (130-148)/(70-85)   SpO2:  [95 %-97 %]        Physical Exam  Vitals and nursing note reviewed.   HENT:      Mouth/Throat:      Mouth: Mucous membranes are moist.   Eyes:      Pupils: Pupils are equal, round, and reactive to light.   Cardiovascular:      Rate and Rhythm: Normal rate.   Pulmonary:      Effort: No respiratory distress.   Skin:     General: Skin is warm and dry.   Neurological:      Mental Status: She is alert and oriented to person, place, and time.              Neurological Exam:   LOC: alert  Attention Span: Good   Language: No aphasia  Articulation: No  "dysarthria  Visual Fields: Hemianopsia right  Motor: Arm left  Normal 5/5  Leg left  Normal 5/5  Arm right  Normal 5/5  Leg right Normal 5/5      Laboratory:  CMP:   Recent Labs   Lab 07/02/25  1553   CALCIUM 9.2   ALBUMIN 4.0   PROT 7.1      K 4.0   CO2 26      BUN 12   CREATININE 0.6   ALKPHOS 83   ALT 16   AST 17   BILITOT 0.3     CBC:   Recent Labs   Lab 07/02/25  1553   WBC 10.02   RBC 5.11   HGB 14.4   HCT 45.2      MCV 89   MCH 28.2   MCHC 31.9*     Lipid Panel:   Recent Labs   Lab 07/02/25  1553   CHOL 123   LDLCALC 45.0*   HDL 47   TRIG 155*     Coagulation:   Recent Labs   Lab 07/02/25  1553   INR 1.0     Hgb A1C: No results for input(s): "HGBA1C" in the last 168 hours.  TSH:   Recent Labs   Lab 07/02/25  1553   TSH 0.795       Diagnostic Results:      Brain imaging:  MRI brain pending    Vessel Imaging:  CTA Stroke Multiphase 7/2/25  Impression:     CTA head: Unremarkable CTA of the head specifically without evidence for proximal high-grade stenosis or proximal large vessel occlusion.     CTA neck: Post atherosclerotic plaquing with probable moderate narrowing the right vertebral artery at its origin with underlying right vertebral artery hypoplasia and distal right vertebral artery essentially ending in PICA.     No significant carotid stenosis throughout the neck.     CT head: Stable large encephalomalacia left PCA territory suggestive for prior infarction     No evidence for acute intracranial hemorrhage or sulcal effacement to suggest large territory recent infarction     Cardiac Evaluation:   EKG   NSR   Rate 93 BPM      Jana Marques NP  [facility] Stroke Center  Department of Vascular Neurology   [facility] - Emergency Dept          [1]   Social History  Tobacco Use    Smoking status: Former     Current packs/day: 0.00     Average packs/day: 1.4 packs/day for 44.6 years (62.0 ttl pk-yrs)     Types: Cigarettes     Start date: 1978     Quit date: 12/22/2024     Years since " quittin.5     Passive exposure: Past    Smokeless tobacco: Current    Tobacco comments:     Quit smoking after cancer diagnosis in 2017, then resumed smoking.    Substance Use Topics    Alcohol use: No     Alcohol/week: 0.0 standard drinks of alcohol    Drug use: No   [2] (Not in a hospital admission)

## 2025-07-02 NOTE — ED TRIAGE NOTES
Pt presents to ED for c/o generalized weakness, nausea, abdominal pain, dizziness, and headache beginning upon waking up 10 am this morning. Pt endorses that son noticed some slowed speech about 30 minutes before arrival. Pt is AAOX4. Hx of previous stroke with residual right sided visual deficits. +Eliquis. Pt endorses some altered gait.

## 2025-07-02 NOTE — ED NOTES
Assumed care of the patient. Pt placed on continuous cardiac monitoring, continuous pulse oximetry, and automatic BP cuff cycling Q15min. Pt in hospital gown, side rails up X2, bed low and locked, and call light is placed within reach. One family/visitors at bedside at this time. Pt denies any complaints or needs.

## 2025-07-02 NOTE — ED PROVIDER NOTES
"Encounter Date: 7/2/2025       History     Chief Complaint   Patient presents with    Loss of Balance     Feels tired, loss of balance, states she almost fell, headache since 10am when she woke. Yesterday LNK. "Feels like she is floating".      HPI  Jud Villa is a 65 YOF with pmh asthma, CAD, DM, HLD, and L PCA stroke presenting with dizziness.     Patient states that she woke up with dizziness that she describes as lightheadedness.  Denies any double vision.  Also associated headache, and that her vision is tired.  Also describes dark in vision, epigastric pain without nausea/vomiting. Increased urinary frequency, but no dysuria. Notes numbness/tingling in her R fingers. Denies any other symptoms including chest pain or difficulty breathing.  Review of patient's allergies indicates:  No Known Allergies  Past Medical History:   Diagnosis Date    Asthma     Cataract     Coronary artery disease of native heart with stable angina pectoris 04/04/2023    Diabetes mellitus     Hyperlipidemia     Moderate episode of recurrent major depressive disorder 05/19/2021    Osteoporosis     Stroke 03/2023    left PCA    Triple negative breast cancer 09/2018     Past Surgical History:   Procedure Laterality Date    ABDOMINOPLASTY  2000    BILATERAL MASTECTOMY  04/26/2018    Nipple-sparing mastectomy, left SLNB, bilateral implants    CHOLECYSTECTOMY      COLONOSCOPY N/A 10/27/2015    Procedure: COLONOSCOPY;  Surgeon: Johnny Hurley MD;  Location: [facility] ENDO;  Service: Endoscopy;  Laterality: N/A;    CORONARY ANGIOGRAPHY N/A 04/05/2023    Procedure: ANGIOGRAM, CORONARY ARTERY;  Surgeon: Francisco Barahona MD;  Location: [facility] CATH LAB;  Service: Cardiology;  Laterality: N/A;    CORONARY ANGIOPLASTY      CORONARY ARTERY BYPASS GRAFT  04/2023    LIMA to LAD, SVG to OM    CORONARY ARTERY BYPASS GRAFT (CABG) N/A 04/14/2023    Procedure: CORONARY ARTERY BYPASS GRAFT (CABG) x 2;  Surgeon: Igor Kahn MD;  Location: [facility] OR " 2ND FLR;  Service: Cardiothoracic;  Laterality: N/A;    ENDOSCOPIC HARVEST OF VEIN Left 04/14/2023    Procedure: SURGICAL PROCUREMENT, VEIN, ENDOSCOPIC;  Surgeon: Igor Kahn MD;  Location: [facility] OR 2ND FLR;  Service: Cardiothoracic;  Laterality: Left;  Vein harvest start - stop: 0817 - 0856  Vein prep start - stop: 0857 - 0917    ESOPHAGOGASTRODUODENOSCOPY N/A 01/24/2022    Procedure: ESOPHAGOGASTRODUODENOSCOPY (EGD);  Surgeon: Mili Katz MD;  Location: [facility] ENDO (4TH FLR);  Service: Endoscopy;  Laterality: N/A;  rapid in AM, instr portal -ml    HYSTERECTOMY  1990    ovaries intact    LASER PERIPHERAL IRIDOTOMY Bilateral 2019        LEFT HEART CATHETERIZATION Left 04/05/2023    Procedure: Left heart cath;  Surgeon: Francisco Barahona MD;  Location: [facility] CATH LAB;  Service: Cardiology;  Laterality: Left;     Family History   Problem Relation Name Age of Onset    Diabetes Father      Congenital heart disease Brother      Amblyopia Neg Hx      Blindness Neg Hx      Cataracts Neg Hx      Glaucoma Neg Hx      Macular degeneration Neg Hx      Retinal detachment Neg Hx      Strabismus Neg Hx       Social History[1]  Review of Systems    Physical Exam     Initial Vitals [07/02/25 1525]   BP Pulse Resp Temp SpO2   (!) 142/73 86 20 97.6 °F (36.4 °C) 95 %      MAP       --         Physical Exam    Nursing note and vitals reviewed.  Constitutional: She appears well-developed and well-nourished.   HENT:   Head: Normocephalic and atraumatic.   Eyes: Conjunctivae and EOM are normal. Pupils are equal, round, and reactive to light.   Cardiovascular:  Normal rate, regular rhythm, normal heart sounds and intact distal pulses.           No murmur heard.  Pulmonary/Chest: Breath sounds normal. No respiratory distress. She has no wheezes. She has no rhonchi. She has no rales.   Abdominal: Abdomen is soft. Bowel sounds are normal. She exhibits no distension. There is no abdominal tenderness.     Neurological: She  is alert. GCS score is 15. GCS eye subscore is 4. GCS verbal subscore is 5. GCS motor subscore is 6.   NIH Stroke Scale/Score (NIHSS) from [email or web address]  on 7/2/2025  ** All calculations should be rechecked by clinician prior to use **    RESULT SUMMARY:  1 points  NIH Stroke Scale      INPUTS:  1A: Level of consciousness -> 0 = Alert; keenly responsive  1B: Ask month and age -> 0 = Both questions right  1C: 'Blink eyes' & 'squeeze hands' -> 0 = Performs both tasks  2: Horizontal extraocular movements -> 0 = Normal  3: Visual fields -> 0 = No visual loss  4: Facial palsy -> 0 = Normal symmetry  5A: Left arm motor drift -> 0 = No drift for 10 seconds  5B: Right arm motor drift -> 0 = No drift for 10 seconds  6A: Left leg motor drift -> 0 = No drift for 5 seconds  6B: Right leg motor drift -> 0 = No drift for 5 seconds  7: Limb Ataxia -> 0 = No ataxia  8: Sensation -> 1 = Mild-moderate loss: less sharp/more dull   9: Language/aphasia -> 0 = Normal; no aphasia  10: Dysarthria -> 0 = Normal  11: Extinction/inattention -> 0 = No abnormality      Power 5/5 in both upper and lower extremities but R side is weaker than L.   Skin: Skin is warm. Capillary refill takes less than 2 seconds.   Psychiatric: She has a normal mood and affect.         ED Course   Procedures  Labs Reviewed   COMPREHENSIVE METABOLIC PANEL - Abnormal       Result Value    Sodium 138      Potassium 4.0      Chloride 101      CO2 26      Glucose 160 (*)     BUN 12      Creatinine 0.6      Calcium 9.2      Protein Total 7.1      Albumin 4.0      Bilirubin Total 0.3      ALP 83      AST 17      ALT 16      Anion Gap 11      eGFR >60     LIPID PANEL - Abnormal    Cholesterol Total 123      Triglyceride 155 (*)     HDL Cholesterol 47      LDL Cholesterol 45.0 (*)     HDL/Cholesterol Ratio 38.2      Cholesterol/HDL Ratio 2.6      Non HDL Cholesterol 76     CBC WITH DIFFERENTIAL - Abnormal    WBC 10.02      RBC 5.11      HGB 14.4      HCT 45.2      MCV 89       MCH 28.2      MCHC 31.9 (*)     RDW 13.3      Platelet Count 244      MPV 9.8      Nucleated RBC 0      Neut % 68.9      Lymph % 21.5      Mono % 6.5      Eos % 2.5      Basophil % 0.4      Imm Grans % 0.2      Neut # 6.91      Lymph # 2.15      Mono # 0.65      Eos # 0.25      Baso # 0.04      Imm Grans # 0.02     URINALYSIS, REFLEX TO URINE CULTURE - Abnormal    Color, UA Colorless (*)     Appearance, UA Clear      pH, UA 6.0      Spec Grav UA 1.025      Protein, UA Negative      Glucose, UA 4+ (*)     Ketones, UA Negative      Bilirubin, UA Negative      Blood, UA Negative      Nitrites, UA Negative      Urobilinogen, UA Negative      Leukocyte Esterase, UA Negative     POCT GLUCOSE - Abnormal    POCT Glucose 171 (*)    PROTIME-INR - Normal    PT 10.7      INR 1.0     TSH - Normal    TSH 0.795     LIPASE - Normal    Lipase Level 28     TROPONIN I HIGH SENSITIVITY - Normal    Troponin High Sensitive <3     CBC W/ AUTO DIFFERENTIAL    Narrative:     The following orders were created for panel order CBC W/ AUTO DIFFERENTIAL.  Procedure                               Abnormality         Status                     ---------                               -----------         ------                     CBC with Differential[[id number]]       Abnormal            Final result                 Please view results for these tests on the individual orders.   URINALYSIS MICROSCOPIC    RBC, UA <1      Bacteria, UA None      Yeast, UA None      Microscopic Comment       GREY TOP URINE HOLD   POCT GLUCOSE, HAND-HELD DEVICE   ISTAT CREATININE    POC Creatinine 0.5      Sample unknown     ISTAT PROCEDURE    POC PTWBT 11.0      POC PTINR 1.1      Sample unknown            Imaging Results              MRI Brain Without Contrast (In process)  Result time 07/02/25 23:28:12                     CTA STROKE MULTI-PHASE (XPD) (Final result)  Result time 07/02/25 16:12:23      Final result by Kemar Ruiz DO (07/02/25 16:12:23)                    Impression:      CTA head: Unremarkable CTA of the head specifically without evidence for proximal high-grade stenosis or proximal large vessel occlusion.    CTA neck: Post atherosclerotic plaquing with probable moderate narrowing the right vertebral artery at its origin with underlying right vertebral artery hypoplasia and distal right vertebral artery essentially ending in PICA.    No significant carotid stenosis throughout the neck.    CT head: Stable large encephalomalacia left PCA territory suggestive for prior infarction    No evidence for acute intracranial hemorrhage or sulcal effacement to suggest large territory recent infarction    Clinical correlation and further evaluation with MRI as warranted if patient compatible.      Electronically signed by: Kemar Ruiz DO  Date:    07/02/2025  Time:    16:12               Narrative:    EXAMINATION:  CTA STROKE MULTI-PHASE (XPD)    CLINICAL HISTORY:  Neuro deficit, acute, stroke suspected;    TECHNIQUE:  5 mm axial images of the head pre contrast with 0.625 mm axial CTA images of the head neck post-contrast.  Coronal and sagittal MPR and MIP imaging was performed 100 ml of Omnipaque 350 contrast was injected intravenously.  Please note study was performed in multiphase technique with 3 arterial passes through the head    COMPARISON:  MRI 12/01/2024    FINDINGS:  CT head    without contrast: Large left occipital encephalomalacia again identified compatible with prior infarction.  There is no evidence for acute intracranial hemorrhage.  No sulcal effacement to suggest large territory recent infarction.  Ventricles stable without hydrocephalus allowing for differences in technique.  Patchy ethmoid air opacities.  Remaining paranasal sinuses and mastoid air cells are relatively clear.    CTA head:    Anterior circulation: The bilateral distal cervical, petrous, cavernous, and supraclinoid segments of the ICAs are patent without significant focal stenosis or  aneurysm.    The anterior and middle cerebral arteries are patent without focal stenosis or aneurysm.    Posterior circulation: Distal left vertebral artery is dominant.  Distal right vertebral artery functionally ends in PICA.  Basilar artery and posterior cerebral arteries are patent without significant focal stenosis.    CTA neck: Atherosclerotic plaquing origin the great vessels from the arch without significant focal stenosis..    Origin left vertebral artery from the subclavian arteries within normal limits.  There is atherosclerotic plaquing and probable moderate narrowing the right vertebral artery at its origin from the right subclavian artery.  The left vertebral artery is dominant and patent throughout its course without significant focal stenosis.  Right vertebral artery is somewhat hypoplastic and functionally ends in PICA..    Right carotid: The right common carotid artery, carotid bifurcation and extracranial portions of the internal carotid artery are patent without significant focal stenosis.    Left carotid: The left common carotid artery, carotid bifurcation and extracranial portions of the internal carotid arteries are patent without significant focal stenosis.    Atherosclerotic plaquing carotid bifurcations and proximal ICAs there is less than 50% stenosis of the proximal ICAs by NASCET criteria.    Pharynx/larynx: Allowing for artifacts from motion and beam hardening no definite focal lesion throughout the pharynx/larynx..    Glands: No focal parotid, submandibular or thyroid gland lesion.    No evidence for adenopathy throughout the neck by size criteria.    No evidence for acute fracture or traumatic subluxation cervical spine.  Emphysematous change in the visualized lungs.                                       Medications   iohexoL (OMNIPAQUE 350) injection 100 mL (100 mLs Intravenous Given 7/2/25 1543)   prochlorperazine injection Soln 2.5 mg (2.5 mg Intravenous Given 7/2/25 1616)    diphenhydrAMINE injection 25 mg (25 mg Intravenous Given 7/2/25 1617)   sodium chloride 0.9% bolus 1,000 mL 1,000 mL (0 mLs Intravenous Stopped 7/2/25 1711)   acetaminophen tablet 1,000 mg (1,000 mg Oral Given 7/2/25 1621)     Medical Decision Making  Jud Villa is a 65 YOF with pmh asthma, CAD, DM, HLD, and L PCA stroke presenting with dizziness. On arrival patient is hemodynamically stable. Exam notable for decreased strength on R upper and lower extremity but still 5/5 power. Subjective decreased sensation in R hand. Rest of neuro exam is normal. NIH 1. Differentials at this time include stroke, complex migraine, electrolyte derangement, cervical radiculopathy. Stroke code activated. CTA head w/o acute stroke. Discussed case with vascular neuro who recommends MRI. Will treat HA as migraine and give compazine, benadryl, tylenol and fluids.    Blood work overall unremarkable. At shift change patient is still pending MRI brain. Dispo pending MRI results. Patient signed out to Dr. Zacarias.    Amount and/or Complexity of Data Reviewed  Labs: ordered. Decision-making details documented in ED Course.  Radiology: ordered.  ECG/medicine tests:  Decision-making details documented in ED Course.    Risk  OTC drugs.  Prescription drug management.               ED Course as of 07/02/25 2346 Wed Jul 02, 2025   1615 ECG 12 lead  Independent interpretation normal sinus rhythm, heart rate of 93, low voltage QRS, no STEMI, T-wave inversions in leads V1, V2, and V6. [AC]   1658 Troponin I High Sensitivity: <3 [AC]   1658 Lipase: 28 [AC]   1658 Glucose(!): 160 [AC]   1658 CBC W/ AUTO DIFFERENTIAL(!)  unremarkable [AC]   1716 TSH: 0.795 [AC]   1925 Glucose, UA(!): 4+ [AC]   1925 Urinalysis, Reflex to Urine Culture Urine, Clean Catch(!)  Does not appear to be infected [AC]      ED Course User Index  [AC] Sparkle Bishop MD                           Clinical Impression:  Final diagnoses:  [R29.818] Acute focal neurological  deficit                       [1]   Social History  Tobacco Use    Smoking status: Former     Current packs/day: 0.00     Average packs/day: 1.4 packs/day for 44.6 years (62.0 ttl pk-yrs)     Types: Cigarettes     Start date:      Quit date: 2024     Years since quittin.5     Passive exposure: Past    Smokeless tobacco: Current    Tobacco comments:     Quit smoking after cancer diagnosis in 2017, then resumed smoking.    Substance Use Topics    Alcohol use: No     Alcohol/week: 0.0 standard drinks of alcohol    Drug use: No        Sparkle Bishop MD  Resident  [phone]

## 2025-07-02 NOTE — HPI
Jud Villa is a 65 y.o. female with PMH of prior stroke (residual right hemianopsia) daily Eliquis use, COPD, Breast CA, tobacco use, Vertigo, migraine headaches that presented to the ED with generalized fatigue, light headedness, blurred vision OU, urinary frequency, abd pain and headache. LKW 7/1/25 at 2300. Neuro exam significant for Right hemianopsia. NIHSS 2. CTH/CTA stroke multiphase negative for acute intracranial findings including infarct/hemorrhage. Determined not a candidate for acute stroke interventions, OOW for TNK, daily Eliquis use and no LVO for thrombectomy. Of note, patient endorses head pain she reports is the same quality and intensity she experiences with her usual migraines. She reports taking gabapentin PTA and denies relief.

## 2025-07-03 VITALS
OXYGEN SATURATION: 99 % | BODY MASS INDEX: 19.32 KG/M2 | WEIGHT: 105 LBS | HEIGHT: 62 IN | RESPIRATION RATE: 20 BRPM | TEMPERATURE: 98 F | SYSTOLIC BLOOD PRESSURE: 125 MMHG | DIASTOLIC BLOOD PRESSURE: 71 MMHG | HEART RATE: 60 BPM

## 2025-07-03 LAB
ABSOLUTE EOSINOPHIL (OHS): 0.28 K/UL
ABSOLUTE MONOCYTE (OHS): 0.68 K/UL (ref 0.3–1)
ABSOLUTE NEUTROPHIL COUNT (OHS): 4.73 K/UL (ref 1.8–7.7)
ALBUMIN SERPL BCP-MCNC: 3.6 G/DL (ref 3.5–5.2)
ALP SERPL-CCNC: 67 UNIT/L (ref 40–150)
ALT SERPL W/O P-5'-P-CCNC: 11 UNIT/L (ref 10–44)
ANION GAP (OHS): 10 MMOL/L (ref 8–16)
AST SERPL-CCNC: 16 UNIT/L (ref 11–45)
BASOPHILS # BLD AUTO: 0.07 K/UL
BASOPHILS NFR BLD AUTO: 0.9 %
BILIRUB SERPL-MCNC: 0.4 MG/DL (ref 0.1–1)
BUN SERPL-MCNC: 8 MG/DL (ref 8–23)
CALCIUM SERPL-MCNC: 8.8 MG/DL (ref 8.7–10.5)
CHLORIDE SERPL-SCNC: 105 MMOL/L (ref 95–110)
CO2 SERPL-SCNC: 25 MMOL/L (ref 23–29)
CREAT SERPL-MCNC: 0.5 MG/DL (ref 0.5–1.4)
ERYTHROCYTE [DISTWIDTH] IN BLOOD BY AUTOMATED COUNT: 13.4 % (ref 11.5–14.5)
GFR SERPLBLD CREATININE-BSD FMLA CKD-EPI: >60 ML/MIN/1.73/M2
GLUCOSE SERPL-MCNC: 105 MG/DL (ref 70–110)
HCT VFR BLD AUTO: 40.5 % (ref 37–48.5)
HGB BLD-MCNC: 13.2 GM/DL (ref 12–16)
HOLD SPECIMEN: NORMAL
IMM GRANULOCYTES # BLD AUTO: 0.02 K/UL (ref 0–0.04)
IMM GRANULOCYTES NFR BLD AUTO: 0.2 % (ref 0–0.5)
LYMPHOCYTES # BLD AUTO: 2.38 K/UL (ref 1–4.8)
MCH RBC QN AUTO: 28.6 PG (ref 27–31)
MCHC RBC AUTO-ENTMCNC: 32.6 G/DL (ref 32–36)
MCV RBC AUTO: 88 FL (ref 82–98)
NUCLEATED RBC (/100WBC) (OHS): 0 /100 WBC
OHS QRS DURATION: 82 MS
OHS QTC CALCULATION: 482 MS
PLATELET # BLD AUTO: 205 K/UL (ref 150–450)
PMV BLD AUTO: 9.6 FL (ref 9.2–12.9)
POCT GLUCOSE: 123 MG/DL (ref 70–110)
POTASSIUM SERPL-SCNC: 3.7 MMOL/L (ref 3.5–5.1)
PROT SERPL-MCNC: 6.5 GM/DL (ref 6–8.4)
RBC # BLD AUTO: 4.62 M/UL (ref 4–5.4)
RELATIVE EOSINOPHIL (OHS): 3.4 %
RELATIVE LYMPHOCYTE (OHS): 29.2 % (ref 18–48)
RELATIVE MONOCYTE (OHS): 8.3 % (ref 4–15)
RELATIVE NEUTROPHIL (OHS): 58 % (ref 38–73)
SODIUM SERPL-SCNC: 140 MMOL/L (ref 136–145)
WBC # BLD AUTO: 8.16 K/UL (ref 3.9–12.7)

## 2025-07-03 PROCEDURE — 25000003 PHARM REV CODE 250

## 2025-07-03 PROCEDURE — 85025 COMPLETE CBC W/AUTO DIFF WBC: CPT

## 2025-07-03 PROCEDURE — 80053 COMPREHEN METABOLIC PANEL: CPT

## 2025-07-03 PROCEDURE — 82962 GLUCOSE BLOOD TEST: CPT

## 2025-07-03 PROCEDURE — G0378 HOSPITAL OBSERVATION PER HR: HCPCS

## 2025-07-03 PROCEDURE — 94761 N-INVAS EAR/PLS OXIMETRY MLT: CPT

## 2025-07-03 PROCEDURE — 94640 AIRWAY INHALATION TREATMENT: CPT

## 2025-07-03 PROCEDURE — 25000242 PHARM REV CODE 250 ALT 637 W/ HCPCS

## 2025-07-03 RX ORDER — FLUTICASONE FUROATE AND VILANTEROL 100; 25 UG/1; UG/1
1 POWDER RESPIRATORY (INHALATION) DAILY
Status: DISCONTINUED | OUTPATIENT
Start: 2025-07-03 | End: 2025-07-03 | Stop reason: HOSPADM

## 2025-07-03 RX ORDER — IBUPROFEN 200 MG
16 TABLET ORAL
Status: DISCONTINUED | OUTPATIENT
Start: 2025-07-03 | End: 2025-07-03 | Stop reason: HOSPADM

## 2025-07-03 RX ORDER — MECLIZINE HCL 12.5 MG 12.5 MG/1
25 TABLET ORAL 3 TIMES DAILY PRN
Status: DISCONTINUED | OUTPATIENT
Start: 2025-07-03 | End: 2025-07-03

## 2025-07-03 RX ORDER — POTASSIUM CHLORIDE 750 MG/1
30 CAPSULE, EXTENDED RELEASE ORAL ONCE
Status: COMPLETED | OUTPATIENT
Start: 2025-07-03 | End: 2025-07-03

## 2025-07-03 RX ORDER — INSULIN GLARGINE 100 [IU]/ML
5 INJECTION, SOLUTION SUBCUTANEOUS NIGHTLY
Status: DISCONTINUED | OUTPATIENT
Start: 2025-07-03 | End: 2025-07-03 | Stop reason: HOSPADM

## 2025-07-03 RX ORDER — ESCITALOPRAM OXALATE 5 MG/1
10 TABLET ORAL DAILY
Status: DISCONTINUED | OUTPATIENT
Start: 2025-07-03 | End: 2025-07-03 | Stop reason: HOSPADM

## 2025-07-03 RX ORDER — ATORVASTATIN CALCIUM 40 MG/1
80 TABLET, FILM COATED ORAL DAILY
Status: DISCONTINUED | OUTPATIENT
Start: 2025-07-03 | End: 2025-07-03 | Stop reason: HOSPADM

## 2025-07-03 RX ORDER — PEN NEEDLE, DIABETIC 31 GX5/16"
NEEDLE, DISPOSABLE MISCELLANEOUS
Start: 2025-07-03

## 2025-07-03 RX ORDER — IBUPROFEN 200 MG
24 TABLET ORAL
Status: DISCONTINUED | OUTPATIENT
Start: 2025-07-03 | End: 2025-07-03 | Stop reason: HOSPADM

## 2025-07-03 RX ORDER — INSULIN ASPART 100 [IU]/ML
0-5 INJECTION, SOLUTION INTRAVENOUS; SUBCUTANEOUS
Status: DISCONTINUED | OUTPATIENT
Start: 2025-07-03 | End: 2025-07-03 | Stop reason: HOSPADM

## 2025-07-03 RX ORDER — GLUCAGON 1 MG
1 KIT INJECTION
Status: DISCONTINUED | OUTPATIENT
Start: 2025-07-03 | End: 2025-07-03 | Stop reason: HOSPADM

## 2025-07-03 RX ADMIN — FLUTICASONE FUROATE AND VILANTEROL TRIFENATATE 1 PUFF: 100; 25 POWDER RESPIRATORY (INHALATION) at 08:07

## 2025-07-03 RX ADMIN — POTASSIUM CHLORIDE 30 MEQ: 750 CAPSULE, EXTENDED RELEASE ORAL at 08:07

## 2025-07-03 RX ADMIN — ESCITALOPRAM OXALATE 10 MG: 5 TABLET, FILM COATED ORAL at 08:07

## 2025-07-03 RX ADMIN — APIXABAN 5 MG: 5 TABLET, FILM COATED ORAL at 08:07

## 2025-07-03 RX ADMIN — ATORVASTATIN CALCIUM 80 MG: 40 TABLET, FILM COATED ORAL at 08:07

## 2025-07-03 NOTE — ASSESSMENT & PLAN NOTE
Patient with L PCA stroke in 2023 that presented to the ED for L sided headache and trouble walking when she woke up. Stroke code was called. Vascular neurology was consulted and stroke imaging including MRI brain was negative. Vascular neurology signed off. She has no focal/neurological exam on repeat exam. Her neurological symptoms could be recrudescence of her previous stroke vs migraine. She has a history of migraines that she sees neurology for. No evidence of a new stroke seen.       Plan:  -6 MWT  -Consider PT/OT if 6 MWT failed  -Denies headache at this time  -Migraine cocktail as needed   -Continue Eliquis and statin  -Discharge in morning if neuro exam normal

## 2025-07-03 NOTE — HOSPITAL COURSE
Admitted for HA and difficulty walking yest morning and was concerned for a stroke. stroke workup with CT and MRI was no acute changes or new strokes. Vascular neurology eval the patient and had no concerns for stroke.  symptoms have resolved and no neuro deficits and was able to complete the walk test. She was stable for home discharge.

## 2025-07-03 NOTE — ED NOTES
Pt care assumed. Report received by Kylie Ricketts RN. Pt lying in stretcher in low and locked position and side rails raised x2. Call light, pt's belongings, and bedside table within pt's reach. Pt on continuous cardiac monitoring, pulse oximetry, and BP cycling every 30 minutes. Pt in NAD and verbalized no needs at this time.

## 2025-07-03 NOTE — ASSESSMENT & PLAN NOTE
Known history of T2DM on 10 units glargine qhs at home. Previous A1c 7.6.     Plan:  -continue 5 units glargine qhs  -POCT glucose 4x/day  -LDSSI  -hypogylcemia PRNs

## 2025-07-03 NOTE — H&P
"[facility] - Emergency Dept  [facility] Medicine  History & Physical    Patient Name: Jud Villa  MRN: [id number]  Patient Class: Emergency  Admission Date: 7/2/2025  Attending Physician: Mike Holt III, MD   Primary Care Provider: Bo Lopez MD         Patient information was obtained from patient and ER records.     Subjective:     Principal Problem:Episode of transient neurologic symptoms    Chief Complaint:   Chief Complaint   Patient presents with    Loss of Balance     Feels tired, loss of balance, states she almost fell, headache since 10am when she woke. Yesterday LNK. "Feels like she is floating".         HPI: 65 year old woman with T2DM on insulin, CAD s/p CABG (2023), COPD, migraines, L PCA stroke (March 2023) that presented for left sided headache and difficulty walking this morning. LKN was 7/1/25 at 2300. She was worried she was having another stroke which prompted her to come to the ED. She had associated blurry vision and RUE tingling. Stroke code was called and her neuro exam was significant for R hemianopsia. NIHSS 2. Her stroke imaging (CTH/CTA/MRI) has been unremarkable for a new infarct/hemorrhage. She reports her headache, blurry vision and RUE tingling has resolved. She still reports some tingling in her R fingers. She denies vertigo. No focal or neurological deficits on repeat exam. She was admitted to hospital medicine for an episode of transient neurological symptoms.        Past Medical History:   Diagnosis Date    Asthma     Cataract     Coronary artery disease of native heart with stable angina pectoris 04/04/2023    Diabetes mellitus     Hyperlipidemia     Moderate episode of recurrent major depressive disorder 05/19/2021    Osteoporosis     Stroke 03/2023    left PCA    Triple negative breast cancer 09/2018       Past Surgical History:   Procedure Laterality Date    ABDOMINOPLASTY  2000    BILATERAL MASTECTOMY  04/26/2018    Nipple-sparing mastectomy, left SLNB, " bilateral implants    CHOLECYSTECTOMY      COLONOSCOPY N/A 10/27/2015    Procedure: COLONOSCOPY;  Surgeon: Johnny Hurley MD;  Location: [facility] ENDO;  Service: Endoscopy;  Laterality: N/A;    CORONARY ANGIOGRAPHY N/A 04/05/2023    Procedure: ANGIOGRAM, CORONARY ARTERY;  Surgeon: Francisco Barahona MD;  Location: [facility] CATH LAB;  Service: Cardiology;  Laterality: N/A;    CORONARY ANGIOPLASTY      CORONARY ARTERY BYPASS GRAFT  04/2023    LIMA to LAD, SVG to OM    CORONARY ARTERY BYPASS GRAFT (CABG) N/A 04/14/2023    Procedure: CORONARY ARTERY BYPASS GRAFT (CABG) x 2;  Surgeon: Igor Kahn MD;  Location: [facility] OR 2ND FLR;  Service: Cardiothoracic;  Laterality: N/A;    ENDOSCOPIC HARVEST OF VEIN Left 04/14/2023    Procedure: SURGICAL PROCUREMENT, VEIN, ENDOSCOPIC;  Surgeon: Igor Kahn MD;  Location: [facility] OR 2ND FLR;  Service: Cardiothoracic;  Laterality: Left;  Vein harvest start - stop: 0817 - 0856  Vein prep start - stop: 0857 - 0917    ESOPHAGOGASTRODUODENOSCOPY N/A 01/24/2022    Procedure: ESOPHAGOGASTRODUODENOSCOPY (EGD);  Surgeon: Mili Katz MD;  Location: [facility] ENDO (4TH FLR);  Service: Endoscopy;  Laterality: N/A;  rapid in AM, instr portal -ml    HYSTERECTOMY  1990    ovaries intact    LASER PERIPHERAL IRIDOTOMY Bilateral 2019        LEFT HEART CATHETERIZATION Left 04/05/2023    Procedure: Left heart cath;  Surgeon: Francisco Barahona MD;  Location: [facility] CATH LAB;  Service: Cardiology;  Laterality: Left;       Review of patient's allergies indicates:  No Known Allergies    No current facility-administered medications on file prior to encounter.     Current Outpatient Medications on File Prior to Encounter   Medication Sig    acetaminophen (TYLENOL) 500 MG tablet Take 2 tablets (1,000 mg total) by mouth every 6 (six) hours as needed for Pain.    albuterol (PROVENTIL) 2.5 mg /3 mL (0.083 %) nebulizer solution INHALE 1 VIAL PER NEBULIZER FOUR TIMES DAILY AS NEEDED FOR SHORTNESS OF  "BREATH/ WHEEZING    albuterol (PROVENTIL/VENTOLIN HFA) 90 mcg/actuation inhaler Inhale 1-2 puffs into the lungs every 4 (four) hours as needed for Wheezing. Rescue    albuterol (VENTOLIN HFA) 90 mcg/actuation inhaler INHALE 2 PUFFS BY MOUTH INTO THE LUNGS EVERY 6 HOURS AS NEEDED FOR WHEEZING    albuterol-ipratropium (DUO-NEB) 2.5 mg-0.5 mg/3 mL nebulizer solution Take 3 mLs by nebulization every 4 (four) hours as needed for Wheezing. Rescue    atorvastatin (LIPITOR) 80 MG tablet Take 1 tablet (80 mg total) by mouth once daily.    BD ULTRA-FINE SHORT PEN NEEDLE 31 gauge x 5/16" Ndle Inject 1 pen into the skin once daily.    blood sugar diagnostic Strp To check BG 2 times daily, to use with insurance preferred meter    blood-glucose meter kit To check BG 2 times daily, to use with insurance preferred meter    blood-glucose sensor (DEXCOM G7 SENSOR) Vicki 1 Device by Misc.(Non-Drug; Combo Route) route every 10 days.    cetirizine (ZYRTEC) 10 MG tablet Take 1 tablet (10 mg total) by mouth once daily.    ELIQUIS 5 mg Tab TAKE 1 TABLET(5 MG) BY MOUTH TWICE DAILY    EScitalopram oxalate (LEXAPRO) 10 MG tablet Take 1 tablet (10 mg total) by mouth once daily.    insulin glargine U-100, Lantus, (LANTUS SOLOSTAR U-100 INSULIN) 100 unit/mL (3 mL) InPn pen Inject 10 Units into the skin every evening.    JARDIANCE 25 mg tablet TAKE 1 TABLET(25 MG) BY MOUTH DAILY    lancets [facility] To check BG 2 times daily, to use with insurance preferred meter    metFORMIN (GLUCOPHAGE) 1000 MG tablet Take 1 tablet (1,000 mg total) by mouth 2 (two) times daily with meals.    metoprolol tartrate (LOPRESSOR) 50 MG tablet Take 1 tablet (50 mg total) by mouth 2 (two) times daily.    nicotine (NICODERM CQ) 21 mg/24 hr Place 1 patch onto the skin once daily.    ondansetron (ZOFRAN-ODT) 4 MG TbDL Take 1 tablet (4 mg total) by mouth every 8 (eight) hours as needed (nausea).    pantoprazole (PROTONIX) 40 MG tablet Take 1 tablet (40 mg total) by mouth before " breakfast.    topiramate (TOPAMAX) 50 MG tablet Take 1 tablet (50 mg total) by mouth 2 (two) times daily.    TRELEGY ELLIPTA 100-62.5-25 mcg DsDv INHALE 1 PUFF INTO THE LUNGS EVERY DAY    UBRELVY 100 mg tablet TAKE 1 TABLET BY MOUTH AS NEEDED FOR MIGRAINE. IF SYMPTOMS PERSIST OR RETURN, MAY REPEAT DOSE AFTER 2 HOURS. MAXIMUM 2 TABLETS PER 24 HOURS     Family History       Problem Relation (Age of Onset)    Congenital heart disease Brother    Diabetes Father          Tobacco Use    Smoking status: Former     Current packs/day: 0.00     Average packs/day: 1.4 packs/day for 44.6 years (62.0 ttl pk-yrs)     Types: Cigarettes     Start date:      Quit date: 2024     Years since quittin.5     Passive exposure: Past    Smokeless tobacco: Current    Tobacco comments:     Quit smoking after cancer diagnosis in 2017, then resumed smoking.    Substance and Sexual Activity    Alcohol use: No     Alcohol/week: 0.0 standard drinks of alcohol    Drug use: No    Sexual activity: Not Currently     Review of Systems  Objective:     Vital Signs (Most Recent):  Temp: 98.1 °F (36.7 °C) (25 0230)  Pulse: (!) 58 (25 0300)  Resp: 17 (25 0230)  BP: 138/76 (25 0300)  SpO2: (!) 93 % (25 0300) Vital Signs (24h Range):  Temp:  [97.6 °F (36.4 °C)-98.1 °F (36.7 °C)] 98.1 °F (36.7 °C)  Pulse:  [54-88] 58  Resp:  [15-22] 17  SpO2:  [93 %-97 %] 93 %  BP: (114-153)/(64-85) 138/76     Weight: 47.6 kg (105 lb)  Body mass index is 19.2 kg/m².     Physical Exam  Constitutional:       General: She is not in acute distress.     Appearance: She is normal weight.   HENT:      Head: Normocephalic and atraumatic.   Eyes:      Extraocular Movements: Extraocular movements intact.   Cardiovascular:      Rate and Rhythm: Normal rate and regular rhythm.   Pulmonary:      Effort: Pulmonary effort is normal. No respiratory distress.      Breath sounds: Normal breath sounds. No wheezing.   Abdominal:      General: There is  no distension.      Palpations: Abdomen is soft.      Tenderness: There is no abdominal tenderness.   Neurological:      Mental Status: She is alert. Mental status is at baseline.      Cranial Nerves: Cranial nerves 2-12 are intact. No cranial nerve deficit, dysarthria or facial asymmetry.      Motor: No weakness or pronator drift.                Significant Labs: All pertinent labs within the past 24 hours have been reviewed.    Significant Imaging: I have reviewed all pertinent imaging results/findings within the past 24 hours.  Assessment/Plan:     Assessment & Plan  Episode of transient neurologic symptoms  Patient with L PCA stroke in 2023 that presented to the ED for L sided headache and trouble walking when she woke up. Stroke code was called. Vascular neurology was consulted and stroke imaging including MRI brain was negative. Vascular neurology signed off. She has no focal/neurological exam on repeat exam. Her neurological symptoms could be recrudescence of her previous stroke vs migraine. She has a history of migraines that she sees neurology for. No evidence of a new stroke seen.       Plan:  -6 MWT  -Consider PT/OT if 6 MWT failed  -Denies headache at this time  -Migraine cocktail as needed   -Continue Eliquis and statin  -Discharge in morning if neuro exam normal      Anxiety state, unspecified  Stable. Continue Lexapro.     COPD (chronic obstructive pulmonary disease)  Known COPD that is stable on Trelegy. Continuing with [facility] inpatient.   Type 2 diabetes mellitus with other circulatory complication, with long-term current use of insulin  Known history of T2DM on 10 units glargine qhs at home. Previous A1c 7.6.     Plan:  -continue 5 units glargine qhs  -POCT glucose 4x/day  -LDSSI  -hypogylcemia PRNs  History of CVA (cerebrovascular accident)  Patient with L PCA stroke in 2023. Stable.    S/P CABG x 2  History of CAD s/p CABG (LIMA-LAD and SVG-OM1) in April 2023. Stable.        Plan:  -Stable  -Continue Eliquis and statin    VTE Risk Mitigation (From admission, onward)           Ordered     apixaban tablet 5 mg  2 times daily         07/03/25 0339                                                Eleni Perez DO  Department of Hospital Medicine  [facility] - Emergency Dept

## 2025-07-03 NOTE — PROVIDER PROGRESS NOTES - EMERGENCY DEPT.
Encounter Date: 7/2/2025    ED Physician Progress Notes          Physician Note:   Signout Note  I received signout from the previous providers, Dr. Holt and Dr. Bishop.      Chief complaint:  Dizziness     Per sign out and chart review:  Jud Villa is a 65 y.o. female, PMH asthma, CAD, DM, HLD, and L PCA stroke, presenting with complaints of  dizziness that she describes as lightheadedness.  Denies any double vision.  Also associated headache, and that her vision is tired.  Also describes dark in vision, epigastric pain without nausea/vomiting. Increased urinary frequency, but no dysuria. Notes numbness/tingling in her R fingers. Denies any other symptoms including chest pain or difficulty breathing.     During ED stay patient received:  Medications   fluticasone furoate-vilanteroL 100-25 mcg/dose diskus inhaler 1 puff (has no administration in time range)   insulin glargine U-100 (Lantus) pen 5 Units (has no administration in time range)   EScitalopram oxalate tablet 10 mg (has no administration in time range)   apixaban tablet 5 mg (has no administration in time range)   atorvastatin tablet 80 mg (has no administration in time range)   glucose chewable tablet 16 g (has no administration in time range)   glucose chewable tablet 24 g (has no administration in time range)   dextrose 50% injection 12.5 g (has no administration in time range)   dextrose 50% injection 25 g (has no administration in time range)   glucagon (human recombinant) injection 1 mg (has no administration in time range)   insulin aspart U-100 pen 0-5 Units (has no administration in time range)   potassium chloride CR capsule 30 mEq (has no administration in time range)   iohexoL (OMNIPAQUE 350) injection 100 mL (100 mLs Intravenous Given 7/2/25 1543)   prochlorperazine injection Soln 2.5 mg (2.5 mg Intravenous Given 7/2/25 1616)   diphenhydrAMINE injection 25 mg (25 mg Intravenous Given 7/2/25 1617)   sodium chloride 0.9% bolus 1,000  mL 1,000 mL (0 mLs Intravenous Stopped 7/2/25 1711)   acetaminophen tablet 1,000 mg (1,000 mg Oral Given 7/2/25 1621)        Pt signed out to me pending: MRI     Update/ Disposition:  MRI without evidence of acute stroke.  Discussed with vascular neurology who is signing off.  Patient admitted to hospital medicine .     Patient, caregiver and/or family understands the plan and verbalized agreement. All questions answered.      Diagnostic Impression:    Dizziness    Brynn Ley MD  [facility], PGY3

## 2025-07-03 NOTE — HPI
65 year old woman with T2DM on insulin, CAD s/p CABG (2023), COPD, migraines, L PCA stroke (March 2023) that presented for left sided headache and difficulty walking this morning. LKN was 7/1/25 at 2300. She was worried she was having another stroke which prompted her to come to the ED. She had associated blurry vision and RUE tingling. Stroke code was called and her neuro exam was significant for R hemianopsia. NIHSS 2. Her stroke imaging (CTH/CTA/MRI) has been unremarkable for a new infarct/hemorrhage. She reports her headache, blurry vision and RUE tingling has resolved. She still reports some tingling in her R fingers. She denies vertigo. No focal or neurological deficits on repeat exam. She was admitted to hospital medicine for an episode of transient neurological symptoms.

## 2025-07-03 NOTE — SUBJECTIVE & OBJECTIVE
Past Medical History:   Diagnosis Date    Asthma     Cataract     Coronary artery disease of native heart with stable angina pectoris 04/04/2023    Diabetes mellitus     Hyperlipidemia     Moderate episode of recurrent major depressive disorder 05/19/2021    Osteoporosis     Stroke 03/2023    left PCA    Triple negative breast cancer 09/2018       Past Surgical History:   Procedure Laterality Date    ABDOMINOPLASTY  2000    BILATERAL MASTECTOMY  04/26/2018    Nipple-sparing mastectomy, left SLNB, bilateral implants    CHOLECYSTECTOMY      COLONOSCOPY N/A 10/27/2015    Procedure: COLONOSCOPY;  Surgeon: Johnny Hurley MD;  Location: [facility] ENDO;  Service: Endoscopy;  Laterality: N/A;    CORONARY ANGIOGRAPHY N/A 04/05/2023    Procedure: ANGIOGRAM, CORONARY ARTERY;  Surgeon: Francisco Barahona MD;  Location: [facility] CATH LAB;  Service: Cardiology;  Laterality: N/A;    CORONARY ANGIOPLASTY      CORONARY ARTERY BYPASS GRAFT  04/2023    LIMA to LAD, SVG to OM    CORONARY ARTERY BYPASS GRAFT (CABG) N/A 04/14/2023    Procedure: CORONARY ARTERY BYPASS GRAFT (CABG) x 2;  Surgeon: Igor Kahn MD;  Location: [facility] OR [address] [place] [address];  Service: Cardiothoracic;  Laterality: N/A;    ENDOSCOPIC HARVEST OF VEIN Left 04/14/2023    Procedure: SURGICAL PROCUREMENT, VEIN, ENDOSCOPIC;  Surgeon: Igor Kahn MD;  Location: [facility] OR [address] [place] [address];  Service: Cardiothoracic;  Laterality: Left;  Vein harvest start - stop: 0817 - 0856  Vein prep start - stop: 0857 - 0917    ESOPHAGOGASTRODUODENOSCOPY N/A 01/24/2022    Procedure: ESOPHAGOGASTRODUODENOSCOPY (EGD);  Surgeon: Mili Katz MD;  Location: [facility] ([facility]TH [place] [facility]);  Service: Endoscopy;  Laterality: N/A;  rapid in AM, instr portal -ml    HYSTERECTOMY  1990    ovaries intact    LASER PERIPHERAL IRIDOTOMY Bilateral 2019        LEFT HEART CATHETERIZATION Left 04/05/2023    Procedure: Left heart cath;  Surgeon: Francisco Barahona MD;  Location: [facility] CATH LAB;  Service: Cardiology;   "Laterality: Left;       Review of patient's allergies indicates:  No Known Allergies    No current facility-administered medications on file prior to encounter.     Current Outpatient Medications on File Prior to Encounter   Medication Sig    acetaminophen (TYLENOL) 500 MG tablet Take 2 tablets (1,000 mg total) by mouth every 6 (six) hours as needed for Pain.    albuterol (PROVENTIL) 2.5 mg /3 mL (0.083 %) nebulizer solution INHALE 1 VIAL PER NEBULIZER FOUR TIMES DAILY AS NEEDED FOR SHORTNESS OF BREATH/ WHEEZING    albuterol (PROVENTIL/VENTOLIN HFA) 90 mcg/actuation inhaler Inhale 1-2 puffs into the lungs every 4 (four) hours as needed for Wheezing. Rescue    albuterol (VENTOLIN HFA) 90 mcg/actuation inhaler INHALE 2 PUFFS BY MOUTH INTO THE LUNGS EVERY 6 HOURS AS NEEDED FOR WHEEZING    albuterol-ipratropium (DUO-NEB) 2.5 mg-0.5 mg/3 mL nebulizer solution Take 3 mLs by nebulization every 4 (four) hours as needed for Wheezing. Rescue    atorvastatin (LIPITOR) 80 MG tablet Take 1 tablet (80 mg total) by mouth once daily.    BD ULTRA-FINE SHORT PEN NEEDLE 31 gauge x 5/16" Ndle Inject 1 pen into the skin once daily.    blood sugar diagnostic Strp To check BG 2 times daily, to use with insurance preferred meter    blood-glucose meter kit To check BG 2 times daily, to use with insurance preferred meter    blood-glucose sensor (DEXCOM G7 SENSOR) Vicki 1 Device by Misc.(Non-Drug; Combo Route) route every 10 days.    cetirizine (ZYRTEC) 10 MG tablet Take 1 tablet (10 mg total) by mouth once daily.    ELIQUIS 5 mg Tab TAKE 1 TABLET(5 MG) BY MOUTH TWICE DAILY    EScitalopram oxalate (LEXAPRO) 10 MG tablet Take 1 tablet (10 mg total) by mouth once daily.    insulin glargine U-100, Lantus, (LANTUS SOLOSTAR U-100 INSULIN) 100 unit/mL (3 mL) InPn pen Inject 10 Units into the skin every evening.    JARDIANCE 25 mg tablet TAKE 1 TABLET(25 MG) BY MOUTH DAILY    lancets [facility] To check BG 2 times daily, to use with insurance preferred " meter    metFORMIN (GLUCOPHAGE) 1000 MG tablet Take 1 tablet (1,000 mg total) by mouth 2 (two) times daily with meals.    metoprolol tartrate (LOPRESSOR) 50 MG tablet Take 1 tablet (50 mg total) by mouth 2 (two) times daily.    nicotine (NICODERM CQ) 21 mg/24 hr Place 1 patch onto the skin once daily.    ondansetron (ZOFRAN-ODT) 4 MG TbDL Take 1 tablet (4 mg total) by mouth every 8 (eight) hours as needed (nausea).    pantoprazole (PROTONIX) 40 MG tablet Take 1 tablet (40 mg total) by mouth before breakfast.    topiramate (TOPAMAX) 50 MG tablet Take 1 tablet (50 mg total) by mouth 2 (two) times daily.    TRELEGY ELLIPTA 100-62.5-25 mcg DsDv INHALE 1 PUFF INTO THE LUNGS EVERY DAY    UBRELVY 100 mg tablet TAKE 1 TABLET BY MOUTH AS NEEDED FOR MIGRAINE. IF SYMPTOMS PERSIST OR RETURN, MAY REPEAT DOSE AFTER 2 HOURS. MAXIMUM 2 TABLETS PER 24 HOURS     Family History       Problem Relation (Age of Onset)    Congenital heart disease Brother    Diabetes Father          Tobacco Use    Smoking status: Former     Current packs/day: 0.00     Average packs/day: 1.4 packs/day for 44.6 years (62.0 ttl pk-yrs)     Types: Cigarettes     Start date:      Quit date: 2024     Years since quittin.5     Passive exposure: Past    Smokeless tobacco: Current    Tobacco comments:     Quit smoking after cancer diagnosis in 2017, then resumed smoking.    Substance and Sexual Activity    Alcohol use: No     Alcohol/week: 0.0 standard drinks of alcohol    Drug use: No    Sexual activity: Not Currently     Review of Systems  Objective:     Vital Signs (Most Recent):  Temp: 98.1 °F (36.7 °C) (25 023)  Pulse: (!) 58 (25 030)  Resp: 17 (25)  BP: 138/76 (25)  SpO2: (!) 93 % (25) Vital Signs (24h Range):  Temp:  [97.6 °F (36.4 °C)-98.1 °F (36.7 °C)] 98.1 °F (36.7 °C)  Pulse:  [54-88] 58  Resp:  [15-22] 17  SpO2:  [93 %-97 %] 93 %  BP: (114-153)/(64-85) 138/76     Weight: 47.6 kg (105 lb)  Body  mass index is 19.2 kg/m².     Physical Exam  Constitutional:       General: She is not in acute distress.     Appearance: She is normal weight.   HENT:      Head: Normocephalic and atraumatic.   Eyes:      Extraocular Movements: Extraocular movements intact.   Cardiovascular:      Rate and Rhythm: Normal rate and regular rhythm.   Pulmonary:      Effort: Pulmonary effort is normal. No respiratory distress.      Breath sounds: Normal breath sounds. No wheezing.   Abdominal:      General: There is no distension.      Palpations: Abdomen is soft.      Tenderness: There is no abdominal tenderness.   Neurological:      Mental Status: She is alert. Mental status is at baseline.      Cranial Nerves: Cranial nerves 2-12 are intact. No cranial nerve deficit, dysarthria or facial asymmetry.      Motor: No weakness or pronator drift.                Significant Labs: All pertinent labs within the past 24 hours have been reviewed.    Significant Imaging: I have reviewed all pertinent imaging results/findings within the past 24 hours.

## 2025-07-03 NOTE — ED NOTES
I assumed care of this patient at this time. Report received from SANDRA Garcia. Pt is resting comfortably in ED stretcher + no acute distress observed. Pt is AAOx4. RR is even, unlabored, and spontaneous + pt's oxygen saturation is 97% on room air at this time. Skin is warm, dry and intact. Pt denies pain or needs at this time. Pt remains on continuous cardiac monitor, pulse ox, and BP cuff to cycle. Bed low and locked; side rails up x2; call light within reach.

## 2025-07-03 NOTE — ASSESSMENT & PLAN NOTE
History of CAD s/p CABG (LIMA-LAD and SVG-OM1) in April 2023. Stable.       Plan:  -Stable  -Continue Eliquis and statin

## 2025-07-03 NOTE — PLAN OF CARE
[facility] - Emergency Dept  Initial Discharge Assessment       Primary Care Provider: Bo Lopez MD    Admission Diagnosis: Episode of transient neurologic symptoms [R29.90]    Admission Date: 7/2/2025  Expected Discharge Date:     Pt stated she is independent with her ambulation and ADL's and does not require assistance or equipment.     Post acute services discussed and declined    Pt to d/c home with no needs when ready     Transition of Care Barriers: (P) None    Payor: [email or web address] [facility] / Plan: [employer] / Product Type: Medicare Advantage /     Extended Emergency Contact Information  Primary Emergency Contact: Katarina Villa  Address: [address] [place] [address] M[place]           [place] [address] [facility]  Mobile Phone: [phone]  Relation: Daughter    Discharge Plan A: (P) Home  Discharge Plan B: (P) Home      [id number] - [place] - [address]  AT [facility] OF [facility] [address]  [address] [place] [address]  Phone: [phone] Fax: [phone]    [facility] #[id number] - [place], [place] - [address] AT SEC OF [facility] [address]  [address]  [place] [address]  Phone: [phone] Fax: [phone]    [facility] #[id number] - [place] - [address] AT [address] OF [place]  [address] [place] [address]  Phone: [phone] Fax: [phone]      Initial Assessment (most recent)       Adult Discharge Assessment - 07/03/25 0819          Discharge Assessment    Assessment Type Discharge Planning Assessment     Confirmed/corrected address, phone number and insurance Yes     Confirmed Demographics Correct on Facesheet     Source of Information patient     Communicated JORGE with patient/caregiver Yes (P)      People in Home child(laureano), adult (P)      Name(s) of People in Home adult son Leonardo (P)      Facility Arrived From: home (P)      Do you expect to return to your current  living situation? Yes (P)      Do you have help at home or someone to help you manage your care at home? No (P)      Prior to hospitilization cognitive status: Alert/Oriented;No Deficits (P)      Current cognitive status: No Deficits;Alert/Oriented (P)      Walking or Climbing Stairs Difficulty no (P)      Dressing/Bathing Difficulty no (P)      Home Accessibility stairs to enter home (P)      Number of Stairs, Main Entrance other (see comments) (P)    2nd floor of a walk-up 15-20 steps to enter    Home Layout Able to live on 1st floor (P)      Equipment Currently Used at Home none (P)      Patient currently being followed by outpatient case management? No (P)      Do you currently have service(s) that help you manage your care at home? No (P)      Do you take prescription medications? Yes (P)      Do you have prescription coverage? Yes (P)      Do you have any problems affording any of your prescribed medications? No (P)      Is the patient taking medications as prescribed? yes (P)      Who is going to help you get home at discharge? family/friends (P)      How do you get to doctors appointments? car, drives self (P)      Are you on dialysis? No (P)      Do you take coumadin? No (P)      Discharge Plan A Home (P)      Discharge Plan B Home (P)      DME Needed Upon Discharge  none (P)      Discharge Plan discussed with: Patient (P)      Transition of Care Barriers None (P)         Physical Activity    On average, how many days per week do you engage in moderate to strenuous exercise (like a brisk walk)? 0 days (P)      On average, how many minutes do you engage in exercise at this level? 0 min (P)         Financial Resource Strain    How hard is it for you to pay for the very basics like food, housing, medical care, and heating? Not very hard (P)         Housing Stability    In the last 12 months, was there a time when you were not able to pay the mortgage or rent on time? No (P)      At any time in the past 12  months, were you homeless or living in a shelter (including now)? No (P)         Transportation Needs    In the past 12 months, has lack of transportation kept you from medical appointments or from getting medications? No (P)      In the past 12 months, has lack of transportation kept you from meetings, work, or from getting things needed for daily living? No (P)         Food Insecurity    Within the past 12 months, you worried that your food would run out before you got the money to buy more. Never true (P)      Within the past 12 months, the food you bought just didn't last and you didn't have money to get more. Never true (P)         Stress    Do you feel stress - tense, restless, nervous, or anxious, or unable to sleep at night because your mind is troubled all the time - these days? To some extent (P)         Social Isolation    How often do you feel lonely or isolated from those around you?  Rarely (P)         Alcohol Use    Q1: How often do you have a drink containing alcohol? Never (P)      Q2: How many drinks containing alcohol do you have on a typical day when you are drinking? Patient does not drink (P)      Q3: How often do you have six or more drinks on one occasion? Never (P)         Utilities    In the past 12 months has the electric, gas, oil, or water company threatened to shut off services in your home? No (P)         Health Literacy    How often do you need to have someone help you when you read instructions, pamphlets, or other written material from your doctor or pharmacy? Rarely (P)                       Discharge Plan A and Plan B have been determined by review of patient's clinical status, future medical and therapeutic needs, and coverage/benefits for post-acute care in coordination with multidisciplinary team members.    Nga Segal, KRISTA, MSW, LMSW, RSW   Case Management  [facility]  Email: [email or web address]

## 2025-07-03 NOTE — DISCHARGE SUMMARY
[facility] - Emergency Dept  [facility]  Discharge Summary      Patient Name: Jud Villa  MRN: [id number]  BOBBY: [id number]  Patient Class: OP- Observation  Admission Date: 7/2/2025  Hospital Length of Stay: 0 days  Discharge Date and Time: 07/03/2025 3:34 PM  Attending Physician: No att. providers found   Discharging Provider: Elmer Lopez MD  Primary Care Provider: Bo Lopez MD  [facility] Team: [facility] 2 Elmer Lopez MD  Primary Care Team: [facility] 2    HPI:   65 year old woman with T2DM on insulin, CAD s/p CABG (2023), COPD, migraines, L PCA stroke (March 2023) that presented for left sided headache and difficulty walking this morning. LKN was 7/1/25 at 2300. She was worried she was having another stroke which prompted her to come to the ED. She had associated blurry vision and RUE tingling. Stroke code was called and her neuro exam was significant for R hemianopsia. NIHSS 2. Her stroke imaging (CTH/CTA/MRI) has been unremarkable for a new infarct/hemorrhage. She reports her headache, blurry vision and RUE tingling has resolved. She still reports some tingling in her R fingers. She denies vertigo. No focal or neurological deficits on repeat exam. She was admitted to hospital medicine for an episode of transient neurological symptoms.        * No surgery found *      Hospital Course:   Admitted for HA and difficulty walking yest morning and was concerned for a stroke. stroke workup with CT and MRI was no acute changes or new strokes. Vascular neurology eval the patient and had no concerns for stroke.  symptoms have resolved and no neuro deficits and was able to complete the walk test. She was stable for home discharge.       Physical Exam  Constitutional:       General: She is not in acute distress.     Appearance: She is normal weight.   HENT:      Head: Normocephalic and atraumatic.   Eyes:      Extraocular Movements: Extraocular movements intact.    Cardiovascular:      Rate and Rhythm: Normal rate and regular rhythm.   Pulmonary:      Effort: Pulmonary effort is normal. No respiratory distress.      Breath sounds: Normal breath sounds. No wheezing.   Abdominal:      General: There is no distension.      Palpations: Abdomen is soft.      Tenderness: There is no abdominal tenderness.   Neurological:      Mental Status: She is alert. Mental status is at baseline.      Cranial Nerves: Cranial nerves 2-12 are intact. No cranial nerve deficit, dysarthria or facial asymmetry.      Motor: No weakness or pronator drift.   Goals of Care Treatment Preferences:  Code Status: Full Code         Consults:   Consults (From admission, onward)          Status Ordering Provider     Inpatient consult to Neurology Services (Vascular Neurology)  Once        Provider:  (Not yet assigned)    Completed JAVIER MINER            Assessment & Plan  Episode of transient neurologic symptoms  Patient with L PCA stroke in 2023 that presented to the ED for L sided headache and trouble walking when she woke up. Stroke code was called. Vascular neurology was consulted and stroke imaging including MRI brain was negative. Vascular neurology signed off. She has no focal/neurological exam on repeat exam. Her neurological symptoms could be recrudescence of her previous stroke vs migraine. She has a history of migraines that she sees neurology for. No evidence of a new stroke seen.       Plan:  -6 MWT  -Consider PT/OT if 6 MWT failed  -Denies headache at this time  -Migraine cocktail as needed   -Continue Eliquis and statin  -Discharge in morning if neuro exam normal      Anxiety state, unspecified  Stable. Continue Lexapro.     COPD (chronic obstructive pulmonary disease)  Known COPD that is stable on Trelegy. Continuing with [facility] inpatient.   Type 2 diabetes mellitus with other circulatory complication, with long-term current use of insulin  Known history of T2DM on 10 units glargine qhs at  home. Previous A1c 7.6.     Plan:  -continue 5 units glargine qhs  -POCT glucose 4x/day  -LDSSI  -hypogylcemia PRNs  History of CVA (cerebrovascular accident)  Patient with L PCA stroke in 2023. Stable.    S/P CABG x 2  History of CAD s/p CABG (LIMA-LAD and SVG-OM1) in April 2023. Stable.       Plan:  -Stable  -Continue Eliquis and statin    Final Active Diagnoses:    Diagnosis Date Noted POA    PRINCIPAL PROBLEM:  Episode of transient neurologic symptoms [R29.90] 07/02/2025 Yes    S/P CABG x 2 [Z95.1] 04/17/2023 Not Applicable    History of CVA (cerebrovascular accident) [Z86.73] 04/01/2023 Not Applicable    Type 2 diabetes mellitus with other circulatory complication, with long-term current use of insulin [E11.59, Z79.4] 01/29/2020 Not Applicable    COPD (chronic obstructive pulmonary disease) [J44.9] 11/04/2015 Yes    Anxiety state, unspecified [F41.1] 06/03/2015 Yes      Problems Resolved During this Admission:       Discharged Condition: stable    Disposition: Home or Self Care    Follow Up:    Patient Instructions:   No discharge procedures on file.    Significant Diagnostic Studies: Labs: CMP   Recent Labs   Lab 07/02/25  1553 07/03/25  0520    140   K 4.0 3.7    105   CO2 26 25   * 105   BUN 12 8   CREATININE 0.6 0.5   CALCIUM 9.2 8.8   PROT 7.1 6.5   ALBUMIN 4.0 3.6   BILITOT 0.3 0.4   ALKPHOS 83 67   AST 17 16   ALT 16 11   ANIONGAP 11 10   , CBC   Recent Labs   Lab 07/02/25  1553 07/03/25  0520   WBC 10.02 8.16   HGB 14.4 13.2   HCT 45.2 40.5    205   , and All labs within the past 24 hours have been reviewed      Pending Diagnostic Studies:       Procedure Component Value Units Date/Time    GREY TOP URINE HOLD [[id number]] Collected: 07/02/25 1733    Order Status: Sent Lab Status: In process Updated: 07/02/25 1748    Specimen: Urine            Medications:  Reconciled Home Medications:      Medication List        PAUSE taking these medications      LANTUS SOLOSTAR U-100 INSULIN  "100 unit/mL (3 mL) Inpn pen  Wait to take this until your doctor or other care provider tells you to start again.  Generic drug: insulin glargine U-100 (Lantus)  Inject 10 Units into the skin every evening.            CONTINUE taking these medications      acetaminophen 500 MG tablet  Commonly known as: TYLENOL  Take 2 tablets (1,000 mg total) by mouth every 6 (six) hours as needed for Pain.     albuterol 90 mcg/actuation inhaler  Commonly known as: PROVENTIL/VENTOLIN HFA  Inhale 1-2 puffs into the lungs every 4 (four) hours as needed for Wheezing. Rescue     albuterol-ipratropium 2.5 mg-0.5 mg/3 mL nebulizer solution  Commonly known as: DUO-NEB  Take 3 mLs by nebulization every 4 (four) hours as needed for Wheezing. Rescue     atorvastatin 80 MG tablet  Commonly known as: LIPITOR  Take 1 tablet (80 mg total) by mouth once daily.     BD ULTRA-FINE SHORT PEN NEEDLE 31 gauge x 5/16" Ndle  Generic drug: pen needle, diabetic  Use 1 needle to inject insulin under the skin once daily     blood sugar diagnostic Strp  To check BG 2 times daily, to use with insurance preferred meter     blood-glucose meter kit  To check BG 2 times daily, to use with insurance preferred meter     cetirizine 10 MG tablet  Commonly known as: ZYRTEC  Take 1 tablet (10 mg total) by mouth once daily.     DEXCOM G7 SENSOR Vicki  Generic drug: blood-glucose sensor  1 Device by Misc.(Non-Drug; Combo Route) route every 10 days.     ELIQUIS 5 mg Tab  Generic drug: apixaban  TAKE 1 TABLET(5 MG) BY MOUTH TWICE DAILY     EScitalopram oxalate 10 MG tablet  Commonly known as: LEXAPRO  Take 1 tablet (10 mg total) by mouth once daily.     JARDIANCE 25 mg tablet  Generic drug: empagliflozin  TAKE 1 TABLET(25 MG) BY MOUTH DAILY     lancets [facility]  To check BG 2 times daily, to use with insurance preferred meter     metFORMIN 1000 MG tablet  Commonly known as: GLUCOPHAGE  Take 1 tablet (1,000 mg total) by mouth 2 (two) times daily with meals.     nicotine 21 mg/24 " hr  Commonly known as: NICODERM CQ  Place 1 patch onto the skin once daily.     ondansetron 4 MG Tbdl  Commonly known as: ZOFRAN-ODT  Take 1 tablet (4 mg total) by mouth every 8 (eight) hours as needed (nausea).     TRELEGY ELLIPTA 100-62.5-25 mcg Dsdv  Generic drug: fluticasone-umeclidin-vilanter  INHALE 1 PUFF INTO THE LUNGS EVERY DAY     UBRELVY 100 mg tablet  Generic drug: ubrogepant  TAKE 1 TABLET BY MOUTH AS NEEDED FOR MIGRAINE. IF SYMPTOMS PERSIST OR RETURN, MAY REPEAT DOSE AFTER 2 HOURS. MAXIMUM 2 TABLETS PER 24 HOURS              Indwelling Lines/Drains at time of discharge:   Lines/Drains/Airways       None                       Time spent on the discharge of patient: 30 minutes         Elmer Lopez MD  Department of Hospital Medicine  [facility] - Emergency Dept

## 2025-07-07 ENCOUNTER — PATIENT OUTREACH (OUTPATIENT)
Dept: ADMINISTRATIVE | Facility: CLINIC | Age: 65
End: 2025-07-07
Payer: MEDICARE

## 2025-07-07 NOTE — PROGRESS NOTES
C3 nurse spoke with Jud Villa who is unable to complete the call at this time and requested a callback. Patient does not have a HOSFU. Message routed to PCP's staff assistance needed with scheduling a HOSFU.

## 2025-07-08 ENCOUNTER — PATIENT MESSAGE (OUTPATIENT)
Dept: ADMINISTRATIVE | Facility: CLINIC | Age: 65
End: 2025-07-08
Payer: MEDICARE

## 2025-07-08 NOTE — PROGRESS NOTES
C3 nurse spoke with Jud Villa for a TCC post hospital discharge follow up call. The patient has a scheduled HOSFU with her PCP, Bo Lopez MD on 7/10/25 at 1000. No messages routed at this time.

## 2025-07-08 NOTE — PROGRESS NOTES
C3 nurse attempted to contact Jud Villa for a TCC post hospital discharge follow up call. No answer, LVM with callback information. The patient has a scheduled HOSFU with her PCP, Bo Lopez MD on 7/10/25 at 1000. No messages routed at this time.

## 2025-07-17 ENCOUNTER — OFFICE VISIT (OUTPATIENT)
Dept: FAMILY MEDICINE | Facility: CLINIC | Age: 65
End: 2025-07-17
Payer: MEDICARE

## 2025-07-17 VITALS
BODY MASS INDEX: 21.59 KG/M2 | SYSTOLIC BLOOD PRESSURE: 120 MMHG | OXYGEN SATURATION: 95 % | WEIGHT: 117.31 LBS | HEART RATE: 94 BPM | DIASTOLIC BLOOD PRESSURE: 70 MMHG | HEIGHT: 62 IN

## 2025-07-17 DIAGNOSIS — E11.59 TYPE 2 DIABETES MELLITUS WITH OTHER CIRCULATORY COMPLICATION, WITH LONG-TERM CURRENT USE OF INSULIN: ICD-10-CM

## 2025-07-17 DIAGNOSIS — F17.200 SMOKER: ICD-10-CM

## 2025-07-17 DIAGNOSIS — F33.1 MODERATE EPISODE OF RECURRENT MAJOR DEPRESSIVE DISORDER: ICD-10-CM

## 2025-07-17 DIAGNOSIS — R41.840 DIFFICULTY CONCENTRATING: Primary | ICD-10-CM

## 2025-07-17 DIAGNOSIS — G93.89 ENCEPHALOMALACIA: ICD-10-CM

## 2025-07-17 DIAGNOSIS — Z78.0 ASYMPTOMATIC MENOPAUSE: ICD-10-CM

## 2025-07-17 DIAGNOSIS — I63.9 CEREBROVASCULAR ACCIDENT (CVA), UNSPECIFIED MECHANISM: ICD-10-CM

## 2025-07-17 DIAGNOSIS — F33.0 MILD EPISODE OF RECURRENT MAJOR DEPRESSIVE DISORDER: ICD-10-CM

## 2025-07-17 DIAGNOSIS — Z79.4 TYPE 2 DIABETES MELLITUS WITH OTHER CIRCULATORY COMPLICATION, WITH LONG-TERM CURRENT USE OF INSULIN: ICD-10-CM

## 2025-07-17 PROCEDURE — 99999 PR PBB SHADOW E&M-EST. PATIENT-LVL V: CPT | Mod: PBBFAC,,, | Performed by: FAMILY MEDICINE

## 2025-07-17 RX ORDER — ATOMOXETINE 10 MG/1
10 CAPSULE ORAL DAILY
Qty: 30 CAPSULE | Refills: 0 | Status: SHIPPED | OUTPATIENT
Start: 2025-07-17 | End: 2025-08-16

## 2025-07-17 RX ORDER — INSULIN GLARGINE 100 [IU]/ML
10 INJECTION, SOLUTION SUBCUTANEOUS NIGHTLY
Qty: 9 ML | Refills: 3 | Status: SHIPPED | OUTPATIENT
Start: 2025-07-17 | End: 2026-07-17

## 2025-07-17 RX ORDER — ESCITALOPRAM OXALATE 10 MG/1
10 TABLET ORAL DAILY
Qty: 90 TABLET | Refills: 3 | Status: SHIPPED | OUTPATIENT
Start: 2025-07-17 | End: 2026-07-17

## 2025-07-17 NOTE — PROGRESS NOTES
Subjective     Patient ID: Jud Villa is a 65 y.o. female.    Chief Complaint: Hospital Follow Up, Diabetes, and Depression    65 years old female came to the clinic after recent hospitalization.  Patient was concerned about possible stroke associated with dizziness.  No acute stroke during the hospital evaluation.  Patient with evidence of  arthrosclerosis, encephalomalacia and old stroke.  Patient is requesting a medicine to help her with concentration.  She was not taking Lexapro.  No suicidal or homicidal ideations.  Patient is a smoker considering quitting.  She was not taking the insulin.  Last A1c was elevated.    Diabetes  She presents for her follow-up diabetic visit. She has type 2 diabetes mellitus. Her disease course has been stable. Hypoglycemia symptoms include nervousness/anxiousness. Pertinent negatives for hypoglycemia include no dizziness or seizures. Pertinent negatives for diabetes include no chest pain, no fatigue, no polydipsia, no polyphagia and no polyuria. There are no hypoglycemic complications. Symptoms are worsening. Diabetic complications include a CVA. Risk factors for coronary artery disease include diabetes mellitus, post-menopausal and sedentary lifestyle. Current diabetic treatment includes oral agent (dual therapy). She is compliant with treatment most of the time.   Depression  Visit Type: follow-up  Patient presents with the following symptoms: decreased concentration, depressed mood and nervousness/anxiety.  Patient is not experiencing: palpitations, shortness of breath, suicidal ideas, suicidal planning and thoughts of death.   Severity: moderate   Sleep quality: fair      Review of Systems   Constitutional: Negative.  Negative for activity change, fatigue and fever.   HENT: Negative.  Negative for nasal congestion, dental problem, ear discharge, ear pain, hearing loss, postnasal drip, rhinorrhea, sore throat and voice change.    Eyes: Negative.  Negative for pain,  discharge, itching and visual disturbance.   Respiratory: Negative.  Negative for cough, chest tightness, shortness of breath and wheezing.    Cardiovascular:  Negative for chest pain and palpitations.   Gastrointestinal: Negative.  Negative for abdominal pain, blood in stool, diarrhea, nausea and vomiting.   Endocrine: Negative for polydipsia, polyphagia and polyuria.   Genitourinary: Negative.  Negative for difficulty urinating, dyspareunia, dysuria, hematuria, menstrual problem, pelvic pain, urgency, vaginal bleeding, vaginal discharge and vaginal pain.   Musculoskeletal: Negative.  Negative for arthralgias and gait problem.   Integumentary:  Negative for wound.   Neurological: Negative.  Negative for dizziness and seizures.   Hematological:  Negative for adenopathy. Does not bruise/bleed easily.   Psychiatric/Behavioral:  Positive for decreased concentration, depression and depressed mood. Negative for behavioral problems, sleep disturbance and suicidal ideas. The patient is nervous/anxious.           Objective     Physical Exam  Constitutional:       General: She is not in acute distress.     Appearance: She is well-developed. She is not diaphoretic.   HENT:      Head: Normocephalic and atraumatic.      Right Ear: External ear normal.      Left Ear: External ear normal.      Nose: Nose normal.      Mouth/Throat:      Pharynx: No oropharyngeal exudate.   Eyes:      General: No scleral icterus.        Right eye: No discharge.         Left eye: No discharge.      Conjunctiva/sclera: Conjunctivae normal.      Pupils: Pupils are equal, round, and reactive to light.   Neck:      Thyroid: No thyromegaly.      Vascular: No JVD.      Trachea: No tracheal deviation.   Cardiovascular:      Rate and Rhythm: Normal rate and regular rhythm.      Heart sounds: Normal heart sounds. No murmur heard.     No friction rub. No gallop.   Pulmonary:      Effort: Pulmonary effort is normal. No respiratory distress.      Breath sounds:  Normal breath sounds. No stridor. No wheezing or rales.   Chest:      Chest wall: No tenderness.   Abdominal:      General: Bowel sounds are normal. There is no distension.      Palpations: Abdomen is soft. There is no mass.      Tenderness: There is no abdominal tenderness. There is no guarding or rebound.   Musculoskeletal:         General: No tenderness. Normal range of motion.      Cervical back: Normal range of motion and neck supple.   Lymphadenopathy:      Cervical: No cervical adenopathy.   Skin:     General: Skin is warm and dry.      Coloration: Skin is not pale.      Findings: No erythema or rash.   Neurological:      Mental Status: She is alert and oriented to person, place, and time.      Cranial Nerves: No cranial nerve deficit.      Motor: No abnormal muscle tone.      Coordination: Coordination normal.      Deep Tendon Reflexes: Reflexes are normal and symmetric.   Psychiatric:         Mood and Affect: Mood is anxious and depressed.         Behavior: Behavior normal.         Thought Content: Thought content normal.         Judgment: Judgment normal.            Assessment and Plan     1. Difficulty concentrating  -     atomoxetine (STRATTERA) 10 MG capsule; Take 1 capsule (10 mg total) by mouth once daily.  Dispense: 30 capsule; Refill: 0    2. Type 2 diabetes mellitus with other circulatory complication, with long-term current use of insulin  -     insulin glargine U-100, Lantus, (LANTUS SOLOSTAR U-100 INSULIN) 100 unit/mL (3 mL) InPn pen; Inject 10 Units into the skin every evening.  Dispense: 9 mL; Refill: 3    3. Mild episode of recurrent major depressive disorder  -     EScitalopram oxalate (LEXAPRO) 10 MG tablet; Take 1 tablet (10 mg total) by mouth once daily.  Dispense: 90 tablet; Refill: 3    4. Asymptomatic menopause  -     DXA Bone Density Axial Skeleton 1 or more sites; Future; Expected date: 07/17/2025    5. Smoker  Overview:  Has quit but lives in home, must not smoke in  home    Orders:  -     Ambulatory referral/consult to Smoking Cessation Program; Future; Expected date: 07/24/2025    6. Cerebrovascular accident (CVA), unspecified mechanism    7. Encephalomalacia    8. Moderate episode of recurrent major depressive disorder      Continue monitoring blood sugar at home,ADA diet.   Continue monitoring blood pressure at home, low sodium diet.   I spent a total of 44 minutes on the day of the visit.This includes face to face time and non-face to face time preparing to see the patient (eg, review of tests), obtaining and/or reviewing separately obtained history, documenting clinical information in the electronic or other health record, independently interpreting results and communicating results to the patient/family/caregiver, or care coordinator.        Follow up in about 6 weeks (around 8/28/2025), or if symptoms worsen or fail to improve.

## 2025-08-05 ENCOUNTER — TELEPHONE (OUTPATIENT)
Dept: CARDIOLOGY | Facility: CLINIC | Age: 65
End: 2025-08-05
Payer: MEDICARE

## 2025-08-05 ENCOUNTER — PATIENT MESSAGE (OUTPATIENT)
Dept: ELECTROPHYSIOLOGY | Facility: CLINIC | Age: 65
End: 2025-08-05
Payer: MEDICARE

## 2025-08-05 DIAGNOSIS — Z86.73 HISTORY OF CVA (CEREBROVASCULAR ACCIDENT): ICD-10-CM

## 2025-08-05 DIAGNOSIS — I97.89 POSTOPERATIVE ATRIAL FIBRILLATION: ICD-10-CM

## 2025-08-05 DIAGNOSIS — I48.91 POSTOPERATIVE ATRIAL FIBRILLATION: ICD-10-CM

## 2025-08-05 DIAGNOSIS — E78.00 PURE HYPERCHOLESTEROLEMIA: ICD-10-CM

## 2025-08-05 RX ORDER — APIXABAN 5 MG/1
5 TABLET, FILM COATED ORAL 2 TIMES DAILY
Qty: 60 TABLET | Refills: 11 | Status: SHIPPED | OUTPATIENT
Start: 2025-08-05

## 2025-08-05 NOTE — TELEPHONE ENCOUNTER
Pt to be scheduled for EGD/ colonoscopy at [facility]. She was scheduled on 8/6 w. Ms Arechiga + EKG and she agreed to date/time of appointment(s). Pt on Eliquis.

## 2025-08-26 ENCOUNTER — TELEPHONE (OUTPATIENT)
Dept: FAMILY MEDICINE | Facility: CLINIC | Age: 65
End: 2025-08-26
Payer: MEDICARE

## 2025-08-28 ENCOUNTER — OFFICE VISIT (OUTPATIENT)
Dept: CARDIOLOGY | Facility: CLINIC | Age: 65
End: 2025-08-28
Payer: MEDICARE

## 2025-08-28 ENCOUNTER — HOSPITAL ENCOUNTER (OUTPATIENT)
Dept: CARDIOLOGY | Facility: CLINIC | Age: 65
Discharge: HOME | End: 2025-08-28
Payer: MEDICARE

## 2025-08-28 VITALS
DIASTOLIC BLOOD PRESSURE: 73 MMHG | HEART RATE: 116 BPM | WEIGHT: 126.56 LBS | BODY MASS INDEX: 23.29 KG/M2 | OXYGEN SATURATION: 95 % | HEIGHT: 62 IN | SYSTOLIC BLOOD PRESSURE: 129 MMHG

## 2025-08-28 DIAGNOSIS — E11.59 TYPE 2 DIABETES MELLITUS WITH OTHER CIRCULATORY COMPLICATION, WITH LONG-TERM CURRENT USE OF INSULIN: ICD-10-CM

## 2025-08-28 DIAGNOSIS — Z79.4 TYPE 2 DIABETES MELLITUS WITH OTHER CIRCULATORY COMPLICATION, WITH LONG-TERM CURRENT USE OF INSULIN: ICD-10-CM

## 2025-08-28 DIAGNOSIS — I48.91 POSTOPERATIVE ATRIAL FIBRILLATION: ICD-10-CM

## 2025-08-28 DIAGNOSIS — I97.89 POSTOPERATIVE ATRIAL FIBRILLATION: ICD-10-CM

## 2025-08-28 DIAGNOSIS — E78.2 MIXED HYPERLIPIDEMIA: ICD-10-CM

## 2025-08-28 DIAGNOSIS — Z86.73 HISTORY OF CVA (CEREBROVASCULAR ACCIDENT): ICD-10-CM

## 2025-08-28 DIAGNOSIS — I25.10 CORONARY ARTERY DISEASE INVOLVING NATIVE CORONARY ARTERY OF NATIVE HEART WITHOUT ANGINA PECTORIS: ICD-10-CM

## 2025-08-28 DIAGNOSIS — Z95.1 S/P CABG X 2: ICD-10-CM

## 2025-08-28 DIAGNOSIS — Z01.810 PREOP CARDIOVASCULAR EXAM: Primary | ICD-10-CM

## 2025-08-28 PROBLEM — I25.9 CHEST PAIN DUE TO MYOCARDIAL ISCHEMIA: Status: RESOLVED | Noted: 2023-04-01 | Resolved: 2025-08-28

## 2025-08-28 PROBLEM — I25.118 ATHEROSCLEROTIC HEART DISEASE OF NATIVE CORONARY ARTERY WITH OTHER FORMS OF ANGINA PECTORIS: Status: RESOLVED | Noted: 2023-07-12 | Resolved: 2025-08-28

## 2025-08-28 PROBLEM — E78.00 PURE HYPERCHOLESTEROLEMIA: Status: RESOLVED | Noted: 2023-06-13 | Resolved: 2025-08-28

## 2025-08-28 LAB
OHS QRS DURATION: 78 MS
OHS QTC CALCULATION: 438 MS

## 2025-08-28 PROCEDURE — 3051F HG A1C>EQUAL 7.0%<8.0%: CPT | Mod: CPTII,S$GLB,, | Performed by: PHYSICIAN ASSISTANT

## 2025-08-28 PROCEDURE — 1160F RVW MEDS BY RX/DR IN RCRD: CPT | Mod: CPTII,S$GLB,, | Performed by: PHYSICIAN ASSISTANT

## 2025-08-28 PROCEDURE — 3074F SYST BP LT 130 MM HG: CPT | Mod: CPTII,S$GLB,, | Performed by: PHYSICIAN ASSISTANT

## 2025-08-28 PROCEDURE — 1126F AMNT PAIN NOTED NONE PRSNT: CPT | Mod: CPTII,S$GLB,, | Performed by: PHYSICIAN ASSISTANT

## 2025-08-28 PROCEDURE — 3008F BODY MASS INDEX DOCD: CPT | Mod: CPTII,S$GLB,, | Performed by: PHYSICIAN ASSISTANT

## 2025-08-28 PROCEDURE — 3078F DIAST BP <80 MM HG: CPT | Mod: CPTII,S$GLB,, | Performed by: PHYSICIAN ASSISTANT

## 2025-08-28 PROCEDURE — 99214 OFFICE O/P EST MOD 30 MIN: CPT | Mod: S$GLB,,, | Performed by: PHYSICIAN ASSISTANT

## 2025-08-28 PROCEDURE — 99999 PR PBB SHADOW E&M-EST. PATIENT-LVL V: CPT | Mod: PBBFAC,,, | Performed by: PHYSICIAN ASSISTANT

## 2025-08-28 PROCEDURE — 1159F MED LIST DOCD IN RCRD: CPT | Mod: CPTII,S$GLB,, | Performed by: PHYSICIAN ASSISTANT

## 2025-08-28 PROCEDURE — 3288F FALL RISK ASSESSMENT DOCD: CPT | Mod: CPTII,S$GLB,, | Performed by: PHYSICIAN ASSISTANT

## 2025-08-28 PROCEDURE — 1101F PT FALLS ASSESS-DOCD LE1/YR: CPT | Mod: CPTII,S$GLB,, | Performed by: PHYSICIAN ASSISTANT

## 2025-08-28 RX ORDER — METOPROLOL SUCCINATE 25 MG/1
25 TABLET, EXTENDED RELEASE ORAL DAILY
Qty: 90 TABLET | Refills: 3 | Status: SHIPPED | OUTPATIENT
Start: 2025-08-28 | End: 2026-08-28

## 2025-09-02 ENCOUNTER — TELEPHONE (OUTPATIENT)
Dept: CARDIOLOGY | Facility: CLINIC | Age: 65
End: 2025-09-02
Payer: MEDICARE

## (undated) DEVICE — SET DECANTER MEDICHOICE

## (undated) DEVICE — PAD CURAD NONADH 3X4IN

## (undated) DEVICE — CLIP SPRING 6MM

## (undated) DEVICE — ADHESIVE DERMABOND ADVANCED

## (undated) DEVICE — SUT 6 18IN STEEL MONO CCS

## (undated) DEVICE — KIT GLIDESHEATH SLEND 6FR 10CM

## (undated) DEVICE — DRESSING TRANS 4X4 TEGADERM

## (undated) DEVICE — BLOWER MISTER

## (undated) DEVICE — Device

## (undated) DEVICE — TRAY CATH LAB OMC

## (undated) DEVICE — DRAPE CVMAX SPLIT ANES SCRN

## (undated) DEVICE — DRAIN CHEST DRY SUCTION

## (undated) DEVICE — DRAIN CHANNEL ROUND 19FR

## (undated) DEVICE — GLOVE BIOGEL PI MICRO SZ 7.5

## (undated) DEVICE — SUT PROLENE 4-0 RB-1 BL MO

## (undated) DEVICE — SUT SILK 2-0 SH 18IN BLACK

## (undated) DEVICE — NDL 18GA X1 1/2 REG BEVEL

## (undated) DEVICE — GAUZE SPONGE 4X4 8 PLY NS

## (undated) DEVICE — HEMOSTAT VASC BAND REG 24CM

## (undated) DEVICE — OMNIPAQUE 350 200ML

## (undated) DEVICE — SYR 3CC LUER LOC

## (undated) DEVICE — BANDAGE ELAS SOFTWRAP ST 6X5YD

## (undated) DEVICE — SUT SILK BLK BR. 2 2-60

## (undated) DEVICE — SUT PROLENE 5-0 BL C-1 4-24

## (undated) DEVICE — BANDAGE ELAS SOFTWRAP ST 4X5YD

## (undated) DEVICE — SPIKE CONTRAST CONTROLLER

## (undated) DEVICE — TUBING HF INSUFFLATION W/ FLTR

## (undated) DEVICE — SUT 4-0 12-18IN SILK BLACK

## (undated) DEVICE — DRAPE ANGIO BRACH 38X44IN

## (undated) DEVICE — ELECTRODE REM PLYHSV RETURN 9

## (undated) DEVICE — SUT 2/0 36IN COATED VICRYL

## (undated) DEVICE — PLEDGET SUT SOFT 3/8X3/16X1/16

## (undated) DEVICE — SUT PROLENE 7-0 BV-1 30

## (undated) DEVICE — BLADE SAW STERNAL 5/BX

## (undated) DEVICE — DRESSING TELFA N ADH 3X8

## (undated) DEVICE — SUT VICRYL BR 1 GEN 27 CT-1

## (undated) DEVICE — CANNULA VESSEL FREE FLOW

## (undated) DEVICE — BLADE 4IN EDGE INSULATED

## (undated) DEVICE — GOWN POLY REINF BRTH SLV XL

## (undated) DEVICE — WIRE INTRAMYOCARDIAL TEMP

## (undated) DEVICE — SYS VIRTUOSAPH PLUS EVM

## (undated) DEVICE — TRAY HEART OMC

## (undated) DEVICE — KIT URINARY CATH URINE METER

## (undated) DEVICE — INSERTS STEALTH FIBRA SIZE 5

## (undated) DEVICE — CATH JACKY RADIAL 5FR 100CM

## (undated) DEVICE — WIRE GUIDE SAFE-T-J .035 260CM

## (undated) DEVICE — SUT VICRYL 3-0 27 SH

## (undated) DEVICE — DRESSING AQUACEL SACRAL 9 X 9

## (undated) DEVICE — DRAPE SLUSH WARMER WITH DISC

## (undated) DEVICE — KIT SAHARA DRAPE DRAW/LIFT

## (undated) DEVICE — TIP YANKAUERS BULB NO VENT

## (undated) DEVICE — BANDAGE ACE ELASTIC 6"

## (undated) DEVICE — SUT LIGACLIP SMALL XTRA

## (undated) DEVICE — GAUZE SPONGE 4X4 12PLY

## (undated) DEVICE — KIT CUSTOM MANIFOLD

## (undated) DEVICE — TRANSDUCER ADULT DISP

## (undated) DEVICE — TAPE SURG MEDIPORE 6X72IN

## (undated) DEVICE — RETRACTOR OCTOBASE INSERT HOLD

## (undated) DEVICE — DRESSING ADH ISLAND 3.6 X 14

## (undated) DEVICE — CLOSURE SKIN STERI STRIP 1/2X4

## (undated) DEVICE — SPONGE GAUZE 16PLY 4X4

## (undated) DEVICE — SUT PROLENE 4-0 SH BLU 36IN